# Patient Record
Sex: MALE | Race: WHITE | Employment: OTHER | URBAN - METROPOLITAN AREA
[De-identification: names, ages, dates, MRNs, and addresses within clinical notes are randomized per-mention and may not be internally consistent; named-entity substitution may affect disease eponyms.]

---

## 2017-01-01 ENCOUNTER — THERAPY VISIT (OUTPATIENT)
Dept: PHYSICAL THERAPY | Facility: CLINIC | Age: 72
End: 2017-01-01
Payer: MEDICARE

## 2017-01-01 DIAGNOSIS — M54.12 CERVICAL RADICULOPATHY: ICD-10-CM

## 2017-01-01 DIAGNOSIS — M54.50 ACUTE LEFT-SIDED LOW BACK PAIN WITHOUT SCIATICA: Primary | ICD-10-CM

## 2017-01-01 PROCEDURE — 97110 THERAPEUTIC EXERCISES: CPT | Mod: GP | Performed by: PHYSICAL THERAPIST

## 2017-01-01 PROCEDURE — 97161 PT EVAL LOW COMPLEX 20 MIN: CPT | Mod: GP | Performed by: PHYSICAL THERAPIST

## 2017-01-01 PROCEDURE — G8978 MOBILITY CURRENT STATUS: HCPCS | Mod: GP | Performed by: PHYSICAL THERAPIST

## 2017-01-01 PROCEDURE — G8979 MOBILITY GOAL STATUS: HCPCS | Mod: GP | Performed by: PHYSICAL THERAPIST

## 2017-01-01 ASSESSMENT — ACTIVITIES OF DAILY LIVING (ADL)
KNEE_ACTIVITY_OF_DAILY_LIVING_SCORE: 64.29
GO DOWN STAIRS: ACTIVITY IS MINIMALLY DIFFICULT
SQUAT: ACTIVITY IS FAIRLY DIFFICULT
HOW_WOULD_YOU_RATE_THE_OVERALL_FUNCTION_OF_YOUR_KNEE_DURING_YOUR_USUAL_DAILY_ACTIVITIES?: NEARLY NORMAL
SWELLING: I DO NOT HAVE THE SYMPTOM
AS_A_RESULT_OF_YOUR_KNEE_INJURY,_HOW_WOULD_YOU_RATE_YOUR_CURRENT_LEVEL_OF_DAILY_ACTIVITY?: NEARLY NORMAL
WEAKNESS: THE SYMPTOM AFFECTS MY ACTIVITY MODERATELY
KNEEL ON THE FRONT OF YOUR KNEE: ACTIVITY IS MINIMALLY DIFFICULT
LIMPING: THE SYMPTOM AFFECTS MY ACTIVITY MODERATELY
GO UP STAIRS: ACTIVITY IS MINIMALLY DIFFICULT
GIVING WAY, BUCKLING OR SHIFTING OF KNEE: THE SYMPTOM AFFECTS MY ACTIVITY MODERATELY
SIT WITH YOUR KNEE BENT: ACTIVITY IS MINIMALLY DIFFICULT
RAW_SCORE: 45
WALK: ACTIVITY IS MINIMALLY DIFFICULT
STAND: ACTIVITY IS MINIMALLY DIFFICULT
STIFFNESS: THE SYMPTOM AFFECTS MY ACTIVITY MODERATELY
RISE FROM A CHAIR: ACTIVITY IS MINIMALLY DIFFICULT
KNEE_ACTIVITY_OF_DAILY_LIVING_SUM: 45
PAIN: THE SYMPTOM AFFECTS MY ACTIVITY MODERATELY

## 2017-01-19 ENCOUNTER — THERAPY VISIT (OUTPATIENT)
Dept: PHYSICAL THERAPY | Facility: CLINIC | Age: 72
End: 2017-01-19
Payer: MEDICARE

## 2017-01-19 DIAGNOSIS — M54.12 CERVICAL RADICULOPATHY: Primary | ICD-10-CM

## 2017-01-19 PROCEDURE — G8979 MOBILITY GOAL STATUS: HCPCS | Mod: GP | Performed by: PHYSICAL THERAPIST

## 2017-01-19 PROCEDURE — 97110 THERAPEUTIC EXERCISES: CPT | Mod: GP | Performed by: PHYSICAL THERAPIST

## 2017-01-19 PROCEDURE — G8980 MOBILITY D/C STATUS: HCPCS | Mod: GP | Performed by: PHYSICAL THERAPIST

## 2017-01-19 NOTE — PROGRESS NOTES
Subjective:    HPI                    Objective:    System    Physical Exam    General     ROS    Assessment/Plan:      PROGRESS  REPORT    Progress reporting period is from 11.11.16 to 1.19.17.       SUBJECTIVE  Subjective changes noted by patient:  Pt reports he continues to be able to sleep without waking with pain.  Still feels (L) neck/superior scapular region mainly if sitting 30 min in recliner or at computer.  Has been doing HEP consistently 3 x daily and reports no significant change during or after.  Relates that he has not done exercise when noticing increase of symptoms with sitting, however.  Overall feels like symptoms have improved but feels like things have plateaued.    Current Pain level: 0/10.     Initial Pain level: 3/10.   Changes in function:  Yes (See Goal flowsheet attached for changes in current functional level)  Adverse reaction to treatment or activity: None    OBJECTIVE  C/S AROM:  Flex WNL; Ext mod loss; Rotation WNL (B).     ASSESSMENT/PLAN  Updated problem list and treatment plan: Diagnosis 1:  Neck Pain    Pain -  self management, education, directional preference exercise and home program  Decreased ROM/flexibility - therapeutic exercise and home program  Decreased joint mobility - therapeutic exercise and home program  Impaired muscle performance - neuro re-education and home program  Impaired posture - neuro re-education and home program  STG/LTGs have been met or progress has been made towards goals:  Yes (See Goal flow sheet completed today.)  Assessment of Progress: The patient's progress has plateaued.  Self Management Plans:  Patient is independent in a home treatment program.  Patient is independent in self management of symptoms.  I have re-evaluated this patient and find that the nature, scope, duration and intensity of the therapy is appropriate for the medical condition of the patient.  Serge continues to require the following intervention to meet STG and LTG's:  PT  intervention is no longer required to meet STG/LTG.    Recommendations:  Will follow up with pt over phone in 4 weeks and if he is continuing to be able to self manage symptoms will formally D/C at that time and this progress note to serve as D/C status.    Please refer to the daily flowsheet for treatment today, total treatment time and time spent performing 1:1 timed codes.

## 2017-01-19 NOTE — Clinical Note
[facility] PHYSICAL THERAPY  [address]  [facility] [address]  [phone]    2017    Re: Serge Brambila   :   1945  MRN:  [id number]   REFERRING PHYSICIAN:   Linn Thompson    [facility] PH[facility]ICAL [facility]ERAP[facility]    Date of Initial Evaluation:  2016  Visits:    8  Reason for Referral:  Cervical radiculopathy    PROGRESS  REPORT:  Progress reporting period is from 16 to 17.       SUBJECTIVE  Subjective changes noted by patient:  Pt reports he continues to be able to sleep without waking with pain.  Still feels (L) neck/superior scapular region mainly if sitting 30 min in recliner or at computer.  Has been doing HEP consistently 3 x daily and reports no significant change during or after.  Relates that he has not done exercise when noticing increase of symptoms with sitting, however.  Overall feels like symptoms have improved but feels like things have plateaued.    Current Pain level: 0/10.    Initial Pain level: 3/10.   Changes in function:  Yes (See Goal flowsheet attached for changes in current functional level).  Adverse reaction to treatment or activity: None    OBJECTIVE:  C/S AROM:  Flex WNL; Ext mod loss; Rotation WNL (B).     ASSESSMENT/PLAN  Updated problem list and treatment plan: Diagnosis 1:  Neck Pain    Pain -  self management, education, directional preference exercise and home program  Decreased ROM/flexibility - therapeutic exercise and home program  Decreased joint mobility - therapeutic exercise and home program  Impaired muscle performance - neuro re-education and home program  Impaired posture - neuro re-education and home program  STG/LTGs have been met or progress has been made towards goals:  Yes (See Goal flow sheet completed today.)  Assessment of Progress: The patient's progress has plateaued.  Self Management Plans:  Patient is independent in a home treatment program.  Patient is  independent in self management of symptoms.  I have re-evaluated this patient and find that the nature, scope, duration and intensity of the therapy is appropriate for the medical condition of the patient.  Serge continues to require the following intervention to meet STG and LTG's:  PT intervention is no longer required to meet STG/LTG.      Re: Serge Brambila   :   1945    Recommendations:  Will follow up with pt over phone in 4 weeks and if he is continuing to be able to self manage symptoms will formally D/C at that time and this progress note to serve as D/C status.    Thank you for your referral.    INQUIRIES        Therapist:   Kike Prakash DPT, cert MDT  [facility]  [address] [facility] [address]  [facility] [address]  Phone: [phone]  Fax: [phone]

## 2017-10-19 PROBLEM — M54.50 ACUTE LEFT-SIDED LOW BACK PAIN WITHOUT SCIATICA: Status: ACTIVE | Noted: 2017-01-01

## 2017-10-19 NOTE — PROGRESS NOTES
[facility] Initial Evaluation -- Lumbar    Date: October 19, 2017  Serge Brambila is a 72 year old male with a lumbar condition.   Referral: GP  Work mechanical stresses:  NA  Employment status:  Retired  Leisure mechanical stresses: Sedentary  Functional disability score (JAKE/STarT Back):  See JAKE/STarT Back  VAS score (0-10): 7/10  Patient goals/expectations:  Reduce pain in back and knee    HISTORY:    Present symptoms: (L) lower back, lateral hip, knee and sometimes to ankle   Pain quality (sharp/shooting/stabbing/aching/burning/cramping):  achy   Paresthesia (yes/no):  No    Present since (onset date): ~ 10/1/17.     Symptoms (improving/unchanging/worsening):  unchanging.     Symptoms commenced as a result of: No apparent reason   Condition occurred in the following environment:   No apparent reason     Symptoms at onset (back/thigh/leg): back  Constant symptoms (back/thigh/leg): None  Intermittent symptoms (back/thigh/leg): As above    Symptoms are made worse with the following: Sometimes Bending, Always Rising, Sometimes Standing, Sometimes Walking, Sometimes Lying and Time of day - Always PM   Symptoms are made better with the following: Always When still and Other - heat    Disturbed sleep (yes/no):  Yes Sleeping postures (prone/sup/side R/L):   Previous episodes (0/1-5/6-10/11+): 0 Year of first episode: NT    Previous history: (L) Tibial Plateau fx, none for low back  Previous treatments: PT for knee      Specific Questions:  Cough/Sneeze/Strain (pos/neg): Pos  Bowel/Bladder (normal/abnormal): Normal  Gait (normal/abnormal): Normal  Medications (nil/NSAIDS/analg/steroids/anticoag/other):  Other - Thyroid and High blood pressure  Medical allergies:  None  General health (excellent/good/fair/poor):  Fair  Pertinent medical history:  History of fractures, Diabetes, High blood pressure, Heart problems and Thyroid problems  Imaging (None/Xray/MRI/Other):  None  Recent or major surgery  (yes/no):  No  Night pain (yes/no): No  Accidents (yes/no): No  Unexplained weight loss (yes/no): No  Barriers at home: None  Other red flags: None    EXAMINATION    Posture:   Sitting (good/fair/poor): Fair  Standing (good/fair/poor):Fair  Lordosis (red/acc/normal): Reduced  Correction of posture (better/worse/no effect): Worse    Lateral Shift (right/left/nil): Nil  Relevant (yes/no):  No  Other Observations: NA    Neurological:    Motor deficit:  NT  Reflexes:  NT  Sensory deficit:  NT  Dural signs:  Neg slump    Movement Loss:   Yayo Mod Min Nil Pain   Flexion  X   (L) low back   Extension  X   (L) low back   Side Gliding R  X   (L) low back, lateral hip   Side Gliding L  X   (L) low back     Test Movements:   During: produces, abolishes, increases, decreases, no effect, centralizing, peripheralizing   After: better, worse, no better, no worse, no effect, centralized, peripheralized    Pretest symptoms standing: (L) low back   Symptoms During Symptoms After ROM increased ROM decreased No Effect   FIS        Rep FIS        EIS Increases    No Worse         Rep EIS Increases    Worse      X  Legs feel more weak with walking afterwards   Pretest symptoms lying:     Symptoms During Symptoms After ROM increased ROM decreased No Effect   KRISTINA        Rep KRISTINA        EIL        Rep EIL        If required, pretest symptoms:    Symptoms During Symptoms After ROM increased ROM decreased No Effect   SGIS - R        Rep SGIS - R        SGIS - L        Rep SGIS - L          Static Tests:  Sitting slouched:  NT  Sitting erect:  NT  Standing slouched NT  Standing erect:  NT  Lying prone in extension:  NT Long sitting:  NT    Other Tests: FISit - Increases, NW, NE ROM or walking    Provisional Classification:  Inconclusive/Other - Testing response with FISit    Principle of Management:  Education:  Purpose of repeated movements  Equipment provided:  None  Mechanical therapy (Y/N):  Y   Extension principle:    Lateral Principle:     Flexion principle:  FISit x 10 reps 5 x daily  Other:      ASSESSMENT/PLAN:    Patient is a 72 year old male with lumbar and left side knee complaints.    Patient has the following significant findings with corresponding treatment plan.                Diagnosis 1:  (L) Low back pain/knee pain    Pain -  self management, education, directional preference exercise and home program  Decreased ROM/flexibility - manual therapy, therapeutic exercise and home program  Decreased joint mobility - manual therapy, therapeutic exercise and home program  Decreased strength - therapeutic exercise, therapeutic activities and home program  Impaired muscle performance - neuro re-education and home program  Decreased function - therapeutic activities and home program  Impaired posture - neuro re-education and home program    Therapy Evaluation Codes:   1) History comprised of:   Personal factors that impact the plan of care:      None.    Comorbidity factors that impact the plan of care are:      Diabetes, Heart problems and High blood pressure.     Medications impacting care: High blood pressure.  2) Examination of Body Systems comprised of:   Body structures and functions that impact the plan of care:      Lumbar spine.   Activity limitations that impact the plan of care are:      Bending, Sitting, Standing and Walking.  3) Clinical presentation characteristics are:   Stable/Uncomplicated.  4) Decision-Making    Low complexity using standardized patient assessment instrument and/or measureable assessment of functional outcome.  Cumulative Therapy Evaluation is: Low complexity.    Previous and current functional limitations:  (See Goal Flow Sheet for this information)    Short term and Long term goals: (See Goal Flow Sheet for this information)     Communication ability:  Patient appears to be able to clearly communicate and understand verbal and written communication and follow directions correctly.  Treatment Explanation - The  following has been discussed with the patient:   RX ordered/plan of care  Anticipated outcomes  Possible risks and side effects  This patient would benefit from PT intervention to resume normal activities.   Rehab potential is good.    Frequency:  1 X week, once daily  Duration:  for 6 weeks  Discharge Plan:  Achieve all LTG.  Independent in home treatment program.  Reach maximal therapeutic benefit.    Please refer to the daily flowsheet for treatment today, total treatment time and time spent performing 1:1 timed codes.

## 2017-10-19 NOTE — LETTER
DEPARTMENT OF HEALTH AND HUMAN SERVICES  [facility] FOR MEDICARE & MEDICAID SERVICES    PLAN/UPDATED PLAN OF PROGRESS FOR OUTPATIENT REHABILITATION    PATIENTS NAME:  Serge Brambila  : 1945    PROVIDER NUMBER:    [id number]  [id number]     PROVIDER NAME: [facility]ICAL [facility]ERAP[facility]  MEDICAL RECORD NUMBER: [id number]     START OF CARE DATE:  SOC Date: 10/19/17   TYPE:  PT    PRIMARY/TREATMENT DIAGNOSIS: (Pertinent Medical Diagnosis)  Cervical radiculopathy  Acute left-sided low back pain without sciatica    VISITS FROM START OF CARE: 1     [facility] [employer] [employer] Initial Evaluation -- Lumbar  Date: 2017  Serge Brambila is a 72 year old male with a lumbar condition.   Referral: GP  Work mechanical stresses:  NA  Employment status:  Retired  Leisure mechanical stresses: Sedentary  Functional disability score (JAKE/STarT Back):  See JAKE/STarT Back  VAS score (0-10): 7/10  Patient goals/expectations:  Reduce pain in back and knee    HISTORY:  Present symptoms: (L) lower back, lateral hip, knee and sometimes to ankle   Pain quality (sharp/shooting/stabbing/aching/burning/cramping):  achy   Paresthesia (yes/no):  No  Present since (onset date): ~ 10/1/17.     Symptoms (improving/unchanging/worsening):  unchanging.   Symptoms commenced as a result of: No apparent reason   Condition occurred in the following environment:   No apparent reason   Symptoms at onset (back/thigh/leg): back  Constant symptoms (back/thigh/leg): None  Intermittent symptoms (back/thigh/leg): As above  Symptoms are made worse with the following: Sometimes Bending, Always Rising, Sometimes Standing, Sometimes Walking, Sometimes Lying and Time of day - Always PM   Symptoms are made better with the following: Always When still and Other - heat  Disturbed sleep (yes/no):  Yes   Sleeping postures (prone/sup/side R/L):   Previous episodes (0/1-5/6-10/11+): 0   Year of first episode:  NT  Previous history: (L) Tibial Plateau fx, none for low back  Previous treatments: PT for knee  PATIENTS NAME:  Serge Brambila  : 1945  PRIMARY/TREATMENT DIAGNOSIS: (Pertinent Medical Diagnosis)  Cervical radiculopathy  Acute left-sided low back pain without sciatica    Specific Questions:  Cough/Sneeze/Strain (pos/neg): Pos  Bowel/Bladder (normal/abnormal): Normal  Gait (normal/abnormal): Normal  Medications (nil/NSAIDS/analg/steroids/anticoag/other):  Other - Thyroid and High blood pressure  Medical allergies:  None  General health (excellent/good/fair/poor):  Fair  Pertinent medical history:  History of fractures, Diabetes, High blood pressure, Heart problems and Thyroid problems  Imaging (None/Xray/MRI/Other):  None  Recent or major surgery (yes/no):  No  Night pain (yes/no): No  Accidents (yes/no): No  Unexplained weight loss (yes/no): No  Barriers at home: None  Other red flags: None    EXAMINATION    Posture:   Sitting (good/fair/poor): Fair  Standing (good/fair/poor):Fair  Lordosis (red/acc/normal): Reduced  Correction of posture (better/worse/no effect): Worse  Lateral Shift (right/left/nil): Nil  Relevant (yes/no):  No  Other Observations: NA    Neurological:  Motor deficit:  NT  Reflexes:  NT  Sensory deficit:  NT  Dural signs:  Neg slump  Movement Loss:   Yayo Mod Min Nil Pain   Flexion  X   (L) low back   Extension  X   (L) low back   Side Gliding R  X   (L) low back, lateral hip   Side Gliding L  X   (L) low back     Test Movements:   During: produces, abolishes, increases, decreases, no effect, centralizing, peripheralizing   After: better, worse, no better, no worse, no effect, centralized, peripheralized            PATIENTS NAME:  Serge Brambila  : 1945  PRIMARY/TREATMENT DIAGNOSIS: (Pertinent Medical Diagnosis)  Cervical radiculopathy  Acute left-sided low back pain without sciatica    Pretest symptoms standing: (L) low back   Symptoms During Symptoms After ROM increased ROM  decreased No Effect   FIS        Rep FIS        EIS Increases    No Worse         Rep EIS Increases    Worse      X  Legs feel more weak with walking afterwards   Pretest symptoms lying:     Symptoms During Symptoms After ROM increased ROM decreased No Effect   KRISTINA        Rep KRISTINA        EIL        Rep EIL        If required, pretest symptoms:    Symptoms During Symptoms After ROM increased ROM decreased No Effect   SGIS - R        Rep SGIS - R        SGIS - L        Rep SGIS - L          Static Tests:  Sitting slouched:  NT  Sitting erect:  NT  Standing slouched NT  Standing erect:  NT  Lying prone in extension:  NT Long sitting:  NT    Other Tests: FISit - Increases, NW, NE ROM or walking    Provisional Classification:  Inconclusive/Other - Testing response with FISit    Principle of Management:  Education:  Purpose of repeated movements  Equipment provided:  None  Mechanical therapy (Y/N):  Y   Extension principle:      Lateral Principle:    Flexion principle:  FISit x 10 reps 5 x daily    Other:          PATIENTS NAME:  Serge Brambila  : 1945  PRIMARY/TREATMENT DIAGNOSIS: (Pertinent Medical Diagnosis)  Cervical radiculopathy  Acute left-sided low back pain without sciatica    ASSESSMENT/PLAN:  Patient is a 72 year old male with lumbar and left side knee complaints.    Patient has the following significant findings with corresponding treatment plan.                Diagnosis 1:  (L) Low back pain/knee pain    Pain -  self management, education, directional preference exercise and home program  Decreased ROM/flexibility - manual therapy, therapeutic exercise and home program  Decreased joint mobility - manual therapy, therapeutic exercise and home program  Decreased strength - therapeutic exercise, therapeutic activities and home program  Impaired muscle performance - neuro re-education and home program  Decreased function - therapeutic activities and home program  Impaired posture - neuro re-education and  home program    Therapy Evaluation Codes:   1) History comprised of:   Personal factors that impact the plan of care:      None.    Comorbidity factors that impact the plan of care are:      Diabetes, Heart problems and High blood pressure.     Medications impacting care: High blood pressure.  2) Examination of Body Systems comprised of:   Body structures and functions that impact the plan of care:      Lumbar spine.   Activity limitations that impact the plan of care are:      Bending, Sitting, Standing and Walking.  3) Clinical presentation characteristics are:   Stable/Uncomplicated.  4) Decision-Making    Low complexity using standardized patient assessment instrument and/or measureable assessment of functional outcome.  Cumulative Therapy Evaluation is: Low complexity.    Previous and current functional limitations:  (See Goal Flow Sheet for this information)    Short term and Long term goals: (See Goal Flow Sheet for this information)   Communication ability:  Patient appears to be able to clearly communicate and understand verbal and written communication and follow directions correctly.  Treatment Explanation - The following has been discussed with the patient:   RX ordered/plan of care, Anticipated outcomes, Possible risks and side effects                    PATIENTS NAME:  Serge Brambila  : 1945  PRIMARY/TREATMENT DIAGNOSIS: (Pertinent Medical Diagnosis)  Cervical radiculopathy  Acute left-sided low back pain without sciatica      This patient would benefit from PT intervention to resume normal activities.   Rehab potential is good.  Frequency:  1 X week, once daily  Duration:  for 6 weeks  Discharge Plan:  Achieve all LTG.  Independent in home treatment program.  Reach maximal therapeutic benefit.          Caregiver Signature/Credentials _____________________________ Date ________          Kike Prakash DPT, Cert MDT     I have reviewed and certified the need for these services and plan of  "treatment while under my care.        PHYSICIAN'S SIGNATURE:   _________________________________________    Date___________   Linn Thompson MD    Certification period:  Beginning of Cert date period: 10/19/17 to 01/16/18     Functional Level Progress Report: Please see attached \"Goal Flow sheet for Functional level.\"    ____X____ Continue Services or       ________ DC Services                Service dates: From  SOC Date: 10/19/17  to present                         "

## 2018-01-01 ENCOUNTER — OFFICE VISIT (OUTPATIENT)
Dept: INFUSION THERAPY | Facility: CLINIC | Age: 73
End: 2018-01-01
Attending: RADIOLOGY
Payer: MEDICARE

## 2018-01-01 ENCOUNTER — APPOINTMENT (OUTPATIENT)
Dept: MEDSURG UNIT | Facility: CLINIC | Age: 73
End: 2018-01-01
Attending: RADIOLOGY
Payer: MEDICARE

## 2018-01-01 ENCOUNTER — APPOINTMENT (OUTPATIENT)
Dept: CARDIOLOGY | Facility: CLINIC | Age: 73
DRG: 682 | End: 2018-01-01
Payer: MEDICARE

## 2018-01-01 ENCOUNTER — APPOINTMENT (OUTPATIENT)
Dept: CT IMAGING | Facility: CLINIC | Age: 73
DRG: 435 | End: 2018-01-01
Attending: INTERNAL MEDICINE
Payer: MEDICARE

## 2018-01-01 ENCOUNTER — APPOINTMENT (OUTPATIENT)
Dept: PHYSICAL THERAPY | Facility: CLINIC | Age: 73
DRG: 441 | End: 2018-01-01
Payer: MEDICARE

## 2018-01-01 ENCOUNTER — OFFICE VISIT (OUTPATIENT)
Dept: PALLIATIVE CARE | Facility: CLINIC | Age: 73
End: 2018-01-01
Attending: INTERNAL MEDICINE
Payer: COMMERCIAL

## 2018-01-01 ENCOUNTER — APPOINTMENT (OUTPATIENT)
Dept: GENERAL RADIOLOGY | Facility: CLINIC | Age: 73
DRG: 441 | End: 2018-01-01
Attending: EMERGENCY MEDICINE
Payer: MEDICARE

## 2018-01-01 ENCOUNTER — HOSPITAL ENCOUNTER (INPATIENT)
Facility: CLINIC | Age: 73
LOS: 5 days | Discharge: HOME HEALTH | DRG: 871 | End: 2018-07-01
Attending: EMERGENCY MEDICINE | Admitting: INTERNAL MEDICINE
Payer: MEDICARE

## 2018-01-01 ENCOUNTER — INFUSION THERAPY VISIT (OUTPATIENT)
Dept: TRANSPLANT | Facility: CLINIC | Age: 73
End: 2018-01-01
Attending: INTERNAL MEDICINE
Payer: MEDICARE

## 2018-01-01 ENCOUNTER — APPOINTMENT (OUTPATIENT)
Dept: CT IMAGING | Facility: CLINIC | Age: 73
DRG: 811 | End: 2018-01-01
Attending: EMERGENCY MEDICINE
Payer: MEDICARE

## 2018-01-01 ENCOUNTER — DOCUMENTATION ONLY (OUTPATIENT)
Dept: PHARMACY | Facility: CLINIC | Age: 73
End: 2018-01-01

## 2018-01-01 ENCOUNTER — CARE COORDINATION (OUTPATIENT)
Dept: CARE COORDINATION | Facility: CLINIC | Age: 73
End: 2018-01-01

## 2018-01-01 ENCOUNTER — RADIANT APPOINTMENT (OUTPATIENT)
Dept: ULTRASOUND IMAGING | Facility: CLINIC | Age: 73
End: 2018-01-01
Attending: RADIOLOGY
Payer: MEDICARE

## 2018-01-01 ENCOUNTER — OFFICE VISIT (OUTPATIENT)
Dept: GASTROENTEROLOGY | Facility: CLINIC | Age: 73
End: 2018-01-01
Payer: MEDICARE

## 2018-01-01 ENCOUNTER — APPOINTMENT (OUTPATIENT)
Dept: GENERAL RADIOLOGY | Facility: CLINIC | Age: 73
DRG: 871 | End: 2018-01-01
Attending: EMERGENCY MEDICINE
Payer: MEDICARE

## 2018-01-01 ENCOUNTER — HOSPITAL ENCOUNTER (INPATIENT)
Facility: CLINIC | Age: 73
LOS: 4 days | Discharge: HOME HEALTH | DRG: 682 | End: 2018-07-30
Attending: EMERGENCY MEDICINE | Admitting: INTERNAL MEDICINE
Payer: MEDICARE

## 2018-01-01 ENCOUNTER — OFFICE VISIT (OUTPATIENT)
Dept: INTERVENTIONAL RADIOLOGY/VASCULAR | Facility: CLINIC | Age: 73
End: 2018-01-01
Payer: COMMERCIAL

## 2018-01-01 ENCOUNTER — HOSPITAL ENCOUNTER (OUTPATIENT)
Dept: NUCLEAR MEDICINE | Facility: CLINIC | Age: 73
Setting detail: NUCLEAR MEDICINE
End: 2018-04-16
Attending: RADIOLOGY | Admitting: RADIOLOGY
Payer: MEDICARE

## 2018-01-01 ENCOUNTER — APPOINTMENT (OUTPATIENT)
Dept: GENERAL RADIOLOGY | Facility: CLINIC | Age: 73
DRG: 441 | End: 2018-01-01
Attending: INTERNAL MEDICINE
Payer: MEDICARE

## 2018-01-01 ENCOUNTER — TELEPHONE (OUTPATIENT)
Dept: ONCOLOGY | Facility: CLINIC | Age: 73
End: 2018-01-01

## 2018-01-01 ENCOUNTER — TEAM CONFERENCE (OUTPATIENT)
Dept: INTERVENTIONAL RADIOLOGY/VASCULAR | Facility: CLINIC | Age: 73
End: 2018-01-01

## 2018-01-01 ENCOUNTER — APPOINTMENT (OUTPATIENT)
Dept: CARDIOLOGY | Facility: CLINIC | Age: 73
DRG: 682 | End: 2018-01-01
Attending: HOSPITALIST
Payer: MEDICARE

## 2018-01-01 ENCOUNTER — APPOINTMENT (OUTPATIENT)
Dept: ULTRASOUND IMAGING | Facility: CLINIC | Age: 73
DRG: 871 | End: 2018-01-01
Attending: STUDENT IN AN ORGANIZED HEALTH CARE EDUCATION/TRAINING PROGRAM
Payer: MEDICARE

## 2018-01-01 ENCOUNTER — APPOINTMENT (OUTPATIENT)
Dept: INTERVENTIONAL RADIOLOGY/VASCULAR | Facility: CLINIC | Age: 73
End: 2018-01-01
Attending: RADIOLOGY
Payer: MEDICARE

## 2018-01-01 ENCOUNTER — HOSPITAL ENCOUNTER (INPATIENT)
Facility: CLINIC | Age: 73
LOS: 5 days | Discharge: HOME | DRG: 441 | End: 2018-03-18
Attending: INTERNAL MEDICINE | Admitting: INTERNAL MEDICINE
Payer: MEDICARE

## 2018-01-01 ENCOUNTER — CARE COORDINATION (OUTPATIENT)
Dept: ONCOLOGY | Facility: CLINIC | Age: 73
End: 2018-01-01

## 2018-01-01 ENCOUNTER — OFFICE VISIT (OUTPATIENT)
Dept: CARDIOLOGY | Facility: CLINIC | Age: 73
End: 2018-01-01
Attending: INTERNAL MEDICINE
Payer: COMMERCIAL

## 2018-01-01 ENCOUNTER — TELEPHONE (OUTPATIENT)
Dept: GASTROENTEROLOGY | Facility: CLINIC | Age: 73
End: 2018-01-01

## 2018-01-01 ENCOUNTER — HOSPITAL ENCOUNTER (INPATIENT)
Facility: CLINIC | Age: 73
LOS: 5 days | Discharge: HOME | DRG: 435 | End: 2018-03-06
Attending: INTERNAL MEDICINE | Admitting: PEDIATRICS
Payer: MEDICARE

## 2018-01-01 ENCOUNTER — HOSPITAL ENCOUNTER (INPATIENT)
Facility: CLINIC | Age: 73
LOS: 10 days | Discharge: HOME HEALTH | DRG: 441 | End: 2018-08-10
Attending: EMERGENCY MEDICINE | Admitting: INTERNAL MEDICINE
Payer: MEDICARE

## 2018-01-01 ENCOUNTER — PRE VISIT (OUTPATIENT)
Dept: CARDIOLOGY | Facility: CLINIC | Age: 73
End: 2018-01-01

## 2018-01-01 ENCOUNTER — RESULTS ONLY (OUTPATIENT)
Dept: ONCOLOGY | Facility: CLINIC | Age: 73
End: 2018-01-01

## 2018-01-01 ENCOUNTER — TELEPHONE (OUTPATIENT)
Dept: PHARMACY | Facility: OTHER | Age: 73
End: 2018-01-01

## 2018-01-01 ENCOUNTER — APPOINTMENT (OUTPATIENT)
Dept: MRI IMAGING | Facility: CLINIC | Age: 73
DRG: 435 | End: 2018-01-01
Attending: INTERNAL MEDICINE
Payer: MEDICARE

## 2018-01-01 ENCOUNTER — TELEPHONE (OUTPATIENT)
Dept: INTERVENTIONAL RADIOLOGY/VASCULAR | Facility: CLINIC | Age: 73
End: 2018-01-01

## 2018-01-01 ENCOUNTER — APPOINTMENT (OUTPATIENT)
Dept: MRI IMAGING | Facility: CLINIC | Age: 73
DRG: 871 | End: 2018-01-01
Attending: STUDENT IN AN ORGANIZED HEALTH CARE EDUCATION/TRAINING PROGRAM
Payer: MEDICARE

## 2018-01-01 ENCOUNTER — APPOINTMENT (OUTPATIENT)
Dept: ULTRASOUND IMAGING | Facility: CLINIC | Age: 73
DRG: 435 | End: 2018-01-01
Attending: INTERNAL MEDICINE
Payer: MEDICARE

## 2018-01-01 ENCOUNTER — HOSPITAL ENCOUNTER (OUTPATIENT)
Dept: NUCLEAR MEDICINE | Facility: CLINIC | Age: 73
Setting detail: NUCLEAR MEDICINE
End: 2018-03-28
Attending: FAMILY MEDICINE
Payer: MEDICARE

## 2018-01-01 ENCOUNTER — HOME INFUSION (PRE-WILLOW HOME INFUSION) (OUTPATIENT)
Dept: PHARMACY | Facility: CLINIC | Age: 73
End: 2018-01-01

## 2018-01-01 ENCOUNTER — APPOINTMENT (OUTPATIENT)
Dept: OCCUPATIONAL THERAPY | Facility: CLINIC | Age: 73
DRG: 435 | End: 2018-01-01
Attending: INTERNAL MEDICINE
Payer: MEDICARE

## 2018-01-01 ENCOUNTER — HOSPITAL ENCOUNTER (OUTPATIENT)
Facility: CLINIC | Age: 73
End: 2018-01-01
Admitting: RADIOLOGY
Payer: MEDICARE

## 2018-01-01 ENCOUNTER — HOSPITAL ENCOUNTER (OUTPATIENT)
Facility: CLINIC | Age: 73
Discharge: HOME | End: 2018-01-24
Attending: INTERNAL MEDICINE | Admitting: INTERNAL MEDICINE
Payer: MEDICARE

## 2018-01-01 ENCOUNTER — ONCOLOGY VISIT (OUTPATIENT)
Dept: ONCOLOGY | Facility: CLINIC | Age: 73
DRG: 811 | End: 2018-01-01
Attending: INTERNAL MEDICINE
Payer: MEDICARE

## 2018-01-01 ENCOUNTER — TELEPHONE (OUTPATIENT)
Dept: INTERNAL MEDICINE | Facility: CLINIC | Age: 73
End: 2018-01-01

## 2018-01-01 ENCOUNTER — HOSPITAL ENCOUNTER (INPATIENT)
Facility: CLINIC | Age: 73
LOS: 1 days | DRG: 435 | End: 2018-08-22
Attending: EMERGENCY MEDICINE | Admitting: INTERNAL MEDICINE
Payer: MEDICARE

## 2018-01-01 ENCOUNTER — APPOINTMENT (OUTPATIENT)
Dept: SPEECH THERAPY | Facility: CLINIC | Age: 73
DRG: 441 | End: 2018-01-01
Payer: MEDICARE

## 2018-01-01 ENCOUNTER — INFUSION THERAPY VISIT (OUTPATIENT)
Dept: ONCOLOGY | Facility: CLINIC | Age: 73
End: 2018-01-01
Attending: INTERNAL MEDICINE
Payer: MEDICARE

## 2018-01-01 ENCOUNTER — APPOINTMENT (OUTPATIENT)
Dept: GENERAL RADIOLOGY | Facility: CLINIC | Age: 73
DRG: 441 | End: 2018-01-01
Payer: MEDICARE

## 2018-01-01 ENCOUNTER — ANESTHESIA (OUTPATIENT)
Dept: SURGERY | Facility: CLINIC | Age: 73
DRG: 871 | End: 2018-01-01
Payer: MEDICARE

## 2018-01-01 ENCOUNTER — HOSPITAL ENCOUNTER (OUTPATIENT)
Dept: NUCLEAR MEDICINE | Facility: CLINIC | Age: 73
Setting detail: NUCLEAR MEDICINE
Discharge: HOME | End: 2018-03-28
Attending: FAMILY MEDICINE | Admitting: FAMILY MEDICINE
Payer: MEDICARE

## 2018-01-01 ENCOUNTER — APPOINTMENT (OUTPATIENT)
Dept: PHYSICAL THERAPY | Facility: CLINIC | Age: 73
DRG: 871 | End: 2018-01-01
Attending: STUDENT IN AN ORGANIZED HEALTH CARE EDUCATION/TRAINING PROGRAM
Payer: MEDICARE

## 2018-01-01 ENCOUNTER — HOSPITAL ENCOUNTER (INPATIENT)
Facility: CLINIC | Age: 73
LOS: 1 days | Discharge: HOME | DRG: 811 | End: 2018-05-03
Attending: EMERGENCY MEDICINE | Admitting: INTERNAL MEDICINE
Payer: MEDICARE

## 2018-01-01 ENCOUNTER — RESEARCH ENCOUNTER (OUTPATIENT)
Dept: INTERVENTIONAL RADIOLOGY/VASCULAR | Facility: CLINIC | Age: 73
End: 2018-01-01

## 2018-01-01 ENCOUNTER — APPOINTMENT (OUTPATIENT)
Dept: CT IMAGING | Facility: CLINIC | Age: 73
DRG: 441 | End: 2018-01-01
Attending: INTERNAL MEDICINE
Payer: MEDICARE

## 2018-01-01 ENCOUNTER — DOCUMENTATION ONLY (OUTPATIENT)
Dept: OTHER | Facility: CLINIC | Age: 73
End: 2018-01-01

## 2018-01-01 ENCOUNTER — APPOINTMENT (OUTPATIENT)
Dept: PHYSICAL THERAPY | Facility: CLINIC | Age: 73
DRG: 871 | End: 2018-01-01
Payer: MEDICARE

## 2018-01-01 ENCOUNTER — APPOINTMENT (OUTPATIENT)
Dept: ULTRASOUND IMAGING | Facility: CLINIC | Age: 73
DRG: 682 | End: 2018-01-01
Payer: MEDICARE

## 2018-01-01 ENCOUNTER — HOSPITAL ENCOUNTER (OUTPATIENT)
Facility: CLINIC | Age: 73
Discharge: HOME | End: 2018-04-16
Attending: RADIOLOGY | Admitting: RADIOLOGY
Payer: MEDICARE

## 2018-01-01 ENCOUNTER — ANESTHESIA EVENT (OUTPATIENT)
Dept: SURGERY | Facility: CLINIC | Age: 73
DRG: 871 | End: 2018-01-01
Payer: MEDICARE

## 2018-01-01 ENCOUNTER — CARE COORDINATION (OUTPATIENT)
Dept: ONCOLOGY | Facility: CLINIC | Age: 73
End: 2018-01-01
Payer: COMMERCIAL

## 2018-01-01 ENCOUNTER — APPOINTMENT (OUTPATIENT)
Dept: ULTRASOUND IMAGING | Facility: CLINIC | Age: 73
DRG: 441 | End: 2018-01-01
Payer: MEDICARE

## 2018-01-01 ENCOUNTER — TELEPHONE (OUTPATIENT)
Dept: NEPHROLOGY | Facility: CLINIC | Age: 73
End: 2018-01-01

## 2018-01-01 ENCOUNTER — TRANSFERRED RECORDS (OUTPATIENT)
Dept: HEALTH INFORMATION MANAGEMENT | Facility: CLINIC | Age: 73
End: 2018-01-01

## 2018-01-01 ENCOUNTER — RADIANT APPOINTMENT (OUTPATIENT)
Dept: ULTRASOUND IMAGING | Facility: CLINIC | Age: 73
End: 2018-01-01
Attending: INTERNAL MEDICINE
Payer: MEDICARE

## 2018-01-01 VITALS
DIASTOLIC BLOOD PRESSURE: 63 MMHG | RESPIRATION RATE: 16 BRPM | HEART RATE: 59 BPM | OXYGEN SATURATION: 97 % | SYSTOLIC BLOOD PRESSURE: 120 MMHG | TEMPERATURE: 97.8 F

## 2018-01-01 VITALS
RESPIRATION RATE: 16 BRPM | DIASTOLIC BLOOD PRESSURE: 39 MMHG | SYSTOLIC BLOOD PRESSURE: 139 MMHG | BODY MASS INDEX: 33.63 KG/M2 | TEMPERATURE: 97.8 F | HEART RATE: 61 BPM | WEIGHT: 221.9 LBS | OXYGEN SATURATION: 100 % | HEIGHT: 68 IN

## 2018-01-01 VITALS
SYSTOLIC BLOOD PRESSURE: 138 MMHG | DIASTOLIC BLOOD PRESSURE: 57 MMHG | OXYGEN SATURATION: 100 % | RESPIRATION RATE: 16 BRPM | HEART RATE: 66 BPM | TEMPERATURE: 98.1 F

## 2018-01-01 VITALS
HEART RATE: 74 BPM | BODY MASS INDEX: 31.5 KG/M2 | RESPIRATION RATE: 18 BRPM | DIASTOLIC BLOOD PRESSURE: 46 MMHG | SYSTOLIC BLOOD PRESSURE: 113 MMHG | WEIGHT: 207.2 LBS | OXYGEN SATURATION: 97 % | TEMPERATURE: 96.7 F

## 2018-01-01 VITALS
HEIGHT: 68 IN | OXYGEN SATURATION: 100 % | RESPIRATION RATE: 18 BRPM | WEIGHT: 217.6 LBS | BODY MASS INDEX: 32.98 KG/M2 | SYSTOLIC BLOOD PRESSURE: 146 MMHG | HEART RATE: 67 BPM | DIASTOLIC BLOOD PRESSURE: 42 MMHG | TEMPERATURE: 96.8 F

## 2018-01-01 VITALS
WEIGHT: 230.3 LBS | OXYGEN SATURATION: 100 % | BODY MASS INDEX: 35.02 KG/M2 | DIASTOLIC BLOOD PRESSURE: 38 MMHG | SYSTOLIC BLOOD PRESSURE: 132 MMHG | RESPIRATION RATE: 16 BRPM | TEMPERATURE: 98.6 F

## 2018-01-01 VITALS
SYSTOLIC BLOOD PRESSURE: 134 MMHG | WEIGHT: 203.4 LBS | BODY MASS INDEX: 30.93 KG/M2 | DIASTOLIC BLOOD PRESSURE: 38 MMHG | RESPIRATION RATE: 18 BRPM | HEART RATE: 57 BPM

## 2018-01-01 VITALS
DIASTOLIC BLOOD PRESSURE: 43 MMHG | WEIGHT: 208.11 LBS | TEMPERATURE: 98.1 F | SYSTOLIC BLOOD PRESSURE: 133 MMHG | BODY MASS INDEX: 31.64 KG/M2 | HEART RATE: 65 BPM | OXYGEN SATURATION: 100 %

## 2018-01-01 VITALS
HEIGHT: 68 IN | SYSTOLIC BLOOD PRESSURE: 131 MMHG | TEMPERATURE: 97.7 F | BODY MASS INDEX: 35.87 KG/M2 | RESPIRATION RATE: 16 BRPM | WEIGHT: 236.7 LBS | DIASTOLIC BLOOD PRESSURE: 49 MMHG | OXYGEN SATURATION: 100 % | HEART RATE: 64 BPM

## 2018-01-01 VITALS
BODY MASS INDEX: 31.93 KG/M2 | TEMPERATURE: 97.5 F | DIASTOLIC BLOOD PRESSURE: 43 MMHG | SYSTOLIC BLOOD PRESSURE: 114 MMHG | WEIGHT: 210 LBS | RESPIRATION RATE: 18 BRPM | OXYGEN SATURATION: 98 %

## 2018-01-01 VITALS
HEART RATE: 66 BPM | BODY MASS INDEX: 32.29 KG/M2 | DIASTOLIC BLOOD PRESSURE: 37 MMHG | WEIGHT: 212.3 LBS | TEMPERATURE: 98.4 F | RESPIRATION RATE: 16 BRPM | SYSTOLIC BLOOD PRESSURE: 145 MMHG | OXYGEN SATURATION: 100 %

## 2018-01-01 VITALS
TEMPERATURE: 97.4 F | WEIGHT: 228.9 LBS | HEART RATE: 54 BPM | SYSTOLIC BLOOD PRESSURE: 149 MMHG | BODY MASS INDEX: 34.8 KG/M2 | OXYGEN SATURATION: 100 % | DIASTOLIC BLOOD PRESSURE: 53 MMHG | RESPIRATION RATE: 20 BRPM

## 2018-01-01 VITALS
TEMPERATURE: 97.6 F | HEART RATE: 55 BPM | RESPIRATION RATE: 16 BRPM | OXYGEN SATURATION: 99 % | DIASTOLIC BLOOD PRESSURE: 41 MMHG | BODY MASS INDEX: 35.65 KG/M2 | RESPIRATION RATE: 16 BRPM | BODY MASS INDEX: 35.3 KG/M2 | HEIGHT: 68 IN | SYSTOLIC BLOOD PRESSURE: 103 MMHG | WEIGHT: 232.14 LBS | DIASTOLIC BLOOD PRESSURE: 73 MMHG | HEART RATE: 53 BPM | TEMPERATURE: 98.9 F | WEIGHT: 235.23 LBS | SYSTOLIC BLOOD PRESSURE: 139 MMHG | OXYGEN SATURATION: 99 %

## 2018-01-01 VITALS
SYSTOLIC BLOOD PRESSURE: 135 MMHG | RESPIRATION RATE: 16 BRPM | TEMPERATURE: 98.2 F | WEIGHT: 201.3 LBS | HEART RATE: 65 BPM | BODY MASS INDEX: 30.61 KG/M2 | DIASTOLIC BLOOD PRESSURE: 37 MMHG | OXYGEN SATURATION: 100 %

## 2018-01-01 VITALS
BODY MASS INDEX: 34.56 KG/M2 | DIASTOLIC BLOOD PRESSURE: 66 MMHG | WEIGHT: 228 LBS | HEART RATE: 66 BPM | RESPIRATION RATE: 10 BRPM | OXYGEN SATURATION: 98 % | SYSTOLIC BLOOD PRESSURE: 165 MMHG | HEIGHT: 68 IN

## 2018-01-01 VITALS
TEMPERATURE: 98.6 F | SYSTOLIC BLOOD PRESSURE: 142 MMHG | DIASTOLIC BLOOD PRESSURE: 45 MMHG | WEIGHT: 222.5 LBS | OXYGEN SATURATION: 98 % | BODY MASS INDEX: 33.83 KG/M2

## 2018-01-01 VITALS
SYSTOLIC BLOOD PRESSURE: 148 MMHG | HEIGHT: 68 IN | HEART RATE: 71 BPM | OXYGEN SATURATION: 100 % | WEIGHT: 204.1 LBS | RESPIRATION RATE: 16 BRPM | DIASTOLIC BLOOD PRESSURE: 64 MMHG | BODY MASS INDEX: 30.93 KG/M2 | TEMPERATURE: 98.3 F

## 2018-01-01 VITALS
TEMPERATURE: 98.6 F | WEIGHT: 223.4 LBS | DIASTOLIC BLOOD PRESSURE: 69 MMHG | SYSTOLIC BLOOD PRESSURE: 145 MMHG | OXYGEN SATURATION: 99 % | BODY MASS INDEX: 33.97 KG/M2 | HEART RATE: 53 BPM

## 2018-01-01 VITALS
BODY MASS INDEX: 35.73 KG/M2 | TEMPERATURE: 98.1 F | OXYGEN SATURATION: 100 % | RESPIRATION RATE: 18 BRPM | HEART RATE: 55 BPM | WEIGHT: 235.01 LBS | DIASTOLIC BLOOD PRESSURE: 33 MMHG | SYSTOLIC BLOOD PRESSURE: 188 MMHG

## 2018-01-01 VITALS — DIASTOLIC BLOOD PRESSURE: 62 MMHG | SYSTOLIC BLOOD PRESSURE: 137 MMHG | HEART RATE: 60 BPM | OXYGEN SATURATION: 100 %

## 2018-01-01 VITALS
HEIGHT: 68 IN | HEART RATE: 56 BPM | DIASTOLIC BLOOD PRESSURE: 51 MMHG | WEIGHT: 231 LBS | SYSTOLIC BLOOD PRESSURE: 132 MMHG | BODY MASS INDEX: 35.01 KG/M2 | OXYGEN SATURATION: 100 %

## 2018-01-01 VITALS
HEIGHT: 68 IN | BODY MASS INDEX: 32.31 KG/M2 | SYSTOLIC BLOOD PRESSURE: 142 MMHG | WEIGHT: 213.19 LBS | HEART RATE: 50 BPM | OXYGEN SATURATION: 100 % | TEMPERATURE: 97.9 F | DIASTOLIC BLOOD PRESSURE: 53 MMHG | RESPIRATION RATE: 16 BRPM

## 2018-01-01 VITALS
OXYGEN SATURATION: 99 % | RESPIRATION RATE: 16 BRPM | HEART RATE: 78 BPM | SYSTOLIC BLOOD PRESSURE: 165 MMHG | TEMPERATURE: 98.6 F | BODY MASS INDEX: 30.9 KG/M2 | WEIGHT: 203.2 LBS | DIASTOLIC BLOOD PRESSURE: 55 MMHG

## 2018-01-01 VITALS
WEIGHT: 229.4 LBS | BODY MASS INDEX: 34.89 KG/M2 | OXYGEN SATURATION: 99 % | HEART RATE: 60 BPM | SYSTOLIC BLOOD PRESSURE: 126 MMHG | RESPIRATION RATE: 16 BRPM | DIASTOLIC BLOOD PRESSURE: 44 MMHG | TEMPERATURE: 99.1 F

## 2018-01-01 VITALS
TEMPERATURE: 99 F | BODY MASS INDEX: 34.86 KG/M2 | OXYGEN SATURATION: 96 % | WEIGHT: 230 LBS | SYSTOLIC BLOOD PRESSURE: 120 MMHG | HEIGHT: 68 IN | DIASTOLIC BLOOD PRESSURE: 60 MMHG | RESPIRATION RATE: 16 BRPM | HEART RATE: 60 BPM

## 2018-01-01 VITALS
DIASTOLIC BLOOD PRESSURE: 37 MMHG | SYSTOLIC BLOOD PRESSURE: 73 MMHG | OXYGEN SATURATION: 100 % | HEIGHT: 68 IN | HEART RATE: 40 BPM | BODY MASS INDEX: 31.83 KG/M2 | TEMPERATURE: 92.9 F | WEIGHT: 210 LBS

## 2018-01-01 VITALS
DIASTOLIC BLOOD PRESSURE: 30 MMHG | WEIGHT: 218.9 LBS | TEMPERATURE: 98.7 F | HEART RATE: 59 BPM | BODY MASS INDEX: 33.29 KG/M2 | SYSTOLIC BLOOD PRESSURE: 132 MMHG | RESPIRATION RATE: 16 BRPM

## 2018-01-01 VITALS
OXYGEN SATURATION: 99 % | BODY MASS INDEX: 31.58 KG/M2 | DIASTOLIC BLOOD PRESSURE: 43 MMHG | WEIGHT: 207.7 LBS | SYSTOLIC BLOOD PRESSURE: 143 MMHG | TEMPERATURE: 97.8 F | HEART RATE: 61 BPM

## 2018-01-01 VITALS
OXYGEN SATURATION: 100 % | DIASTOLIC BLOOD PRESSURE: 40 MMHG | HEART RATE: 55 BPM | BODY MASS INDEX: 31.94 KG/M2 | TEMPERATURE: 98.3 F | SYSTOLIC BLOOD PRESSURE: 135 MMHG | WEIGHT: 210 LBS

## 2018-01-01 DIAGNOSIS — R94.31 PROLONGED Q-T INTERVAL ON ECG: ICD-10-CM

## 2018-01-01 DIAGNOSIS — K75.81 LIVER CIRRHOSIS SECONDARY TO NASH (H): Primary | ICD-10-CM

## 2018-01-01 DIAGNOSIS — A41.9 SEPSIS, DUE TO UNSPECIFIED ORGANISM: ICD-10-CM

## 2018-01-01 DIAGNOSIS — K76.82 HEPATIC ENCEPHALOPATHY (H): ICD-10-CM

## 2018-01-01 DIAGNOSIS — D64.9 ANEMIA, UNSPECIFIED TYPE: ICD-10-CM

## 2018-01-01 DIAGNOSIS — K65.2 SBP (SPONTANEOUS BACTERIAL PERITONITIS) (H): ICD-10-CM

## 2018-01-01 DIAGNOSIS — D64.9 ANEMIA, UNSPECIFIED TYPE: Primary | ICD-10-CM

## 2018-01-01 DIAGNOSIS — R18.8 ASCITES OF LIVER: ICD-10-CM

## 2018-01-01 DIAGNOSIS — C22.0 HCC (HEPATOCELLULAR CARCINOMA) (H): Primary | ICD-10-CM

## 2018-01-01 DIAGNOSIS — R16.0 LIVER MASS: Primary | ICD-10-CM

## 2018-01-01 DIAGNOSIS — K74.60 LIVER CIRRHOSIS SECONDARY TO NASH (H): Primary | ICD-10-CM

## 2018-01-01 DIAGNOSIS — D64.9 ANEMIA: ICD-10-CM

## 2018-01-01 DIAGNOSIS — I10 ESSENTIAL HYPERTENSION: ICD-10-CM

## 2018-01-01 DIAGNOSIS — D64.9 ANEMIA: Primary | ICD-10-CM

## 2018-01-01 DIAGNOSIS — K75.81 LIVER CIRRHOSIS SECONDARY TO NASH (H): ICD-10-CM

## 2018-01-01 DIAGNOSIS — C22.0 HCC (HEPATOCELLULAR CARCINOMA) (H): ICD-10-CM

## 2018-01-01 DIAGNOSIS — Z85.05 HISTORY OF HEPATOCELLULAR CARCINOMA: ICD-10-CM

## 2018-01-01 DIAGNOSIS — E79.0 HYPERURICEMIA: ICD-10-CM

## 2018-01-01 DIAGNOSIS — E55.9 VITAMIN D DEFICIENCY: ICD-10-CM

## 2018-01-01 DIAGNOSIS — Z79.899 MEDICATION MANAGEMENT: ICD-10-CM

## 2018-01-01 DIAGNOSIS — Z51.5 ENCOUNTER FOR END OF LIFE CARE: ICD-10-CM

## 2018-01-01 DIAGNOSIS — I48.0 PAROXYSMAL ATRIAL FIBRILLATION (H): ICD-10-CM

## 2018-01-01 DIAGNOSIS — K72.10 END-STAGE LIVER DISEASE (H): ICD-10-CM

## 2018-01-01 DIAGNOSIS — K70.31 ALCOHOLIC CIRRHOSIS OF LIVER WITH ASCITES (H): ICD-10-CM

## 2018-01-01 DIAGNOSIS — R16.0 LIVER MASS: ICD-10-CM

## 2018-01-01 DIAGNOSIS — R18.8 OTHER ASCITES: Primary | ICD-10-CM

## 2018-01-01 DIAGNOSIS — K92.2 GASTROINTESTINAL HEMORRHAGE, UNSPECIFIED GASTROINTESTINAL HEMORRHAGE TYPE: ICD-10-CM

## 2018-01-01 DIAGNOSIS — I95.9 HYPOTENSION, UNSPECIFIED HYPOTENSION TYPE: ICD-10-CM

## 2018-01-01 DIAGNOSIS — K74.60 LIVER CIRRHOSIS SECONDARY TO NASH (H): ICD-10-CM

## 2018-01-01 DIAGNOSIS — Z79.4 TYPE 2 DIABETES MELLITUS WITHOUT COMPLICATION, WITH LONG-TERM CURRENT USE OF INSULIN (H): ICD-10-CM

## 2018-01-01 DIAGNOSIS — I25.10 CORONARY ARTERY DISEASE INVOLVING NATIVE HEART WITHOUT ANGINA PECTORIS, UNSPECIFIED VESSEL OR LESION TYPE: ICD-10-CM

## 2018-01-01 DIAGNOSIS — I10 HYPERTENSION, UNSPECIFIED TYPE: ICD-10-CM

## 2018-01-01 DIAGNOSIS — M54.12 CERVICAL RADICULOPATHY: ICD-10-CM

## 2018-01-01 DIAGNOSIS — K76.82 HEPATIC ENCEPHALOPATHY (H): Primary | ICD-10-CM

## 2018-01-01 DIAGNOSIS — R18.8 OTHER ASCITES: ICD-10-CM

## 2018-01-01 DIAGNOSIS — I48.91 ATRIAL FIBRILLATION, UNSPECIFIED TYPE (H): Primary | ICD-10-CM

## 2018-01-01 DIAGNOSIS — R41.82 ALTERED MENTAL STATUS, UNSPECIFIED ALTERED MENTAL STATUS TYPE: ICD-10-CM

## 2018-01-01 DIAGNOSIS — Z71.89 ADVANCE CARE PLANNING: ICD-10-CM

## 2018-01-01 DIAGNOSIS — N17.9 AKI (ACUTE KIDNEY INJURY) (H): ICD-10-CM

## 2018-01-01 DIAGNOSIS — R18.8 CIRRHOSIS OF LIVER WITH ASCITES, UNSPECIFIED HEPATIC CIRRHOSIS TYPE (H): ICD-10-CM

## 2018-01-01 DIAGNOSIS — C22.0 HEPATOCELLULAR CARCINOMA (H): ICD-10-CM

## 2018-01-01 DIAGNOSIS — I85.10 SECONDARY ESOPHAGEAL VARICES WITHOUT BLEEDING (H): Primary | Chronic | ICD-10-CM

## 2018-01-01 DIAGNOSIS — E72.20 HYPERAMMONEMIA (H): ICD-10-CM

## 2018-01-01 DIAGNOSIS — R17 JAUNDICE: ICD-10-CM

## 2018-01-01 DIAGNOSIS — R78.81 BACTEREMIA: ICD-10-CM

## 2018-01-01 DIAGNOSIS — K74.60 CIRRHOSIS OF LIVER WITH ASCITES, UNSPECIFIED HEPATIC CIRRHOSIS TYPE (H): ICD-10-CM

## 2018-01-01 DIAGNOSIS — N18.9 CHRONIC KIDNEY DISEASE, UNSPECIFIED CKD STAGE: ICD-10-CM

## 2018-01-01 DIAGNOSIS — Z79.4 TYPE 2 DIABETES MELLITUS WITHOUT COMPLICATION, WITH LONG-TERM CURRENT USE OF INSULIN (H): Primary | ICD-10-CM

## 2018-01-01 DIAGNOSIS — Z51.5 COMFORT MEASURES ONLY STATUS: ICD-10-CM

## 2018-01-01 DIAGNOSIS — M62.81 GENERALIZED MUSCLE WEAKNESS: ICD-10-CM

## 2018-01-01 DIAGNOSIS — E07.9 THYROID DISEASE: Primary | ICD-10-CM

## 2018-01-01 DIAGNOSIS — Z79.4 ENCOUNTER FOR LONG-TERM (CURRENT) USE OF INSULIN (H): ICD-10-CM

## 2018-01-01 DIAGNOSIS — I12.9 MALIGNANT HYPERTENSIVE KIDNEY DISEASE WITH CHRONIC KIDNEY DISEASE STAGE I THROUGH STAGE IV, OR UNSPECIFIED(403.00): ICD-10-CM

## 2018-01-01 DIAGNOSIS — C22.0 CARCINOMA OF LIVER (H): ICD-10-CM

## 2018-01-01 DIAGNOSIS — R01.1 HEART MURMUR: ICD-10-CM

## 2018-01-01 DIAGNOSIS — E72.4: ICD-10-CM

## 2018-01-01 DIAGNOSIS — R50.9 FEVER, UNSPECIFIED FEVER CAUSE: ICD-10-CM

## 2018-01-01 DIAGNOSIS — D69.6 THROMBOCYTOPENIA (H): ICD-10-CM

## 2018-01-01 DIAGNOSIS — R06.00 DYSPNEA, UNSPECIFIED TYPE: ICD-10-CM

## 2018-01-01 DIAGNOSIS — E11.8 DIABETIC COMPLICATION (H): ICD-10-CM

## 2018-01-01 DIAGNOSIS — E11.9 TYPE 2 DIABETES MELLITUS WITHOUT COMPLICATION, WITH LONG-TERM CURRENT USE OF INSULIN (H): Primary | ICD-10-CM

## 2018-01-01 DIAGNOSIS — I10 HYPERTENSION: Primary | ICD-10-CM

## 2018-01-01 DIAGNOSIS — E87.5 HYPERKALEMIA: ICD-10-CM

## 2018-01-01 DIAGNOSIS — E11.8 TYPE 2 DIABETES MELLITUS WITH COMPLICATION, UNSPECIFIED LONG TERM INSULIN USE STATUS: ICD-10-CM

## 2018-01-01 DIAGNOSIS — I10 HYPERTENSION: ICD-10-CM

## 2018-01-01 DIAGNOSIS — E11.9 TYPE 2 DIABETES MELLITUS WITHOUT COMPLICATION, WITH LONG-TERM CURRENT USE OF INSULIN (H): ICD-10-CM

## 2018-01-01 LAB
A PHAGOCYTOPH DNA BLD QL NAA+PROBE: NOT DETECTED
A PHAGOCYTOPH IGG TITR SER IF: NORMAL {TITER}
A PHAGOCYTOPH IGM TITR SER IF: NORMAL {TITER}
ABO + RH BLD: NORMAL
AFP SERPL-MCNC: 3.6 UG/L (ref 0–8)
AFP SERPL-MCNC: <1.5 UG/L (ref 0–8)
ALBUMIN FLD-MCNC: 0.3 G/DL
ALBUMIN FLD-MCNC: 0.3 G/DL
ALBUMIN FLD-MCNC: 0.4 G/DL
ALBUMIN FLD-MCNC: 0.4 G/DL
ALBUMIN SERPL-MCNC: 1.9 G/DL (ref 3.4–5)
ALBUMIN SERPL-MCNC: 2.1 G/DL (ref 3.4–5)
ALBUMIN SERPL-MCNC: 2.3 G/DL (ref 3.4–5)
ALBUMIN SERPL-MCNC: 2.4 G/DL (ref 3.4–5)
ALBUMIN SERPL-MCNC: 2.5 G/DL (ref 3.4–5)
ALBUMIN SERPL-MCNC: 2.5 G/DL (ref 3.4–5)
ALBUMIN SERPL-MCNC: 2.6 G/DL (ref 3.4–5)
ALBUMIN SERPL-MCNC: 2.8 G/DL (ref 3.4–5)
ALBUMIN SERPL-MCNC: 2.9 G/DL (ref 3.4–5)
ALBUMIN SERPL-MCNC: 3.1 G/DL (ref 3.4–5)
ALBUMIN SERPL-MCNC: 3.2 G/DL (ref 3.4–5)
ALBUMIN SERPL-MCNC: 3.3 G/DL (ref 3.4–5)
ALBUMIN SERPL-MCNC: 3.4 G/DL (ref 3.4–5)
ALBUMIN SERPL-MCNC: 3.4 G/DL (ref 3.4–5)
ALBUMIN SERPL-MCNC: 3.5 G/DL (ref 3.4–5)
ALBUMIN SERPL-MCNC: 3.5 G/DL (ref 3.4–5)
ALBUMIN SERPL-MCNC: 3.6 G/DL (ref 3.4–5)
ALBUMIN SERPL-MCNC: 3.8 G/DL (ref 3.4–5)
ALBUMIN SERPL-MCNC: 3.8 G/DL (ref 3.4–5)
ALBUMIN UR-MCNC: 10 MG/DL
ALBUMIN UR-MCNC: 10 MG/DL
ALBUMIN UR-MCNC: 30 MG/DL
ALBUMIN UR-MCNC: 30 MG/DL
ALBUMIN UR-MCNC: NEGATIVE MG/DL
ALP SERPL-CCNC: 105 U/L (ref 40–150)
ALP SERPL-CCNC: 109 U/L (ref 40–150)
ALP SERPL-CCNC: 113 U/L (ref 40–150)
ALP SERPL-CCNC: 115 U/L (ref 40–150)
ALP SERPL-CCNC: 130 U/L (ref 40–150)
ALP SERPL-CCNC: 139 U/L (ref 40–150)
ALP SERPL-CCNC: 144 U/L (ref 40–150)
ALP SERPL-CCNC: 150 U/L (ref 40–150)
ALP SERPL-CCNC: 153 U/L (ref 40–150)
ALP SERPL-CCNC: 160 U/L (ref 40–150)
ALP SERPL-CCNC: 160 U/L (ref 40–150)
ALP SERPL-CCNC: 162 U/L (ref 40–150)
ALP SERPL-CCNC: 166 U/L (ref 40–150)
ALP SERPL-CCNC: 174 U/L (ref 40–150)
ALP SERPL-CCNC: 176 U/L (ref 40–150)
ALP SERPL-CCNC: 182 U/L (ref 40–150)
ALP SERPL-CCNC: 186 U/L (ref 40–150)
ALP SERPL-CCNC: 192 U/L (ref 40–150)
ALP SERPL-CCNC: 193 U/L (ref 40–150)
ALP SERPL-CCNC: 193 U/L (ref 40–150)
ALP SERPL-CCNC: 204 U/L (ref 40–150)
ALP SERPL-CCNC: 210 U/L (ref 40–150)
ALP SERPL-CCNC: 226 U/L (ref 40–150)
ALP SERPL-CCNC: 235 U/L (ref 40–150)
ALP SERPL-CCNC: 243 U/L (ref 40–150)
ALP SERPL-CCNC: 250 U/L (ref 40–150)
ALP SERPL-CCNC: 255 U/L (ref 40–150)
ALP SERPL-CCNC: 282 U/L (ref 40–150)
ALP SERPL-CCNC: 93 U/L (ref 40–150)
ALP SERPL-CCNC: 96 U/L (ref 40–150)
ALP SERPL-CCNC: 97 U/L (ref 40–150)
ALP SERPL-CCNC: 99 U/L (ref 40–150)
ALT SERPL W P-5'-P-CCNC: 100 U/L (ref 0–70)
ALT SERPL W P-5'-P-CCNC: 106 U/L (ref 0–70)
ALT SERPL W P-5'-P-CCNC: 126 U/L (ref 0–70)
ALT SERPL W P-5'-P-CCNC: 133 U/L (ref 0–70)
ALT SERPL W P-5'-P-CCNC: 157 U/L (ref 0–70)
ALT SERPL W P-5'-P-CCNC: 160 U/L (ref 0–70)
ALT SERPL W P-5'-P-CCNC: 162 U/L (ref 0–70)
ALT SERPL W P-5'-P-CCNC: 168 U/L (ref 0–70)
ALT SERPL W P-5'-P-CCNC: 178 U/L (ref 0–70)
ALT SERPL W P-5'-P-CCNC: 181 U/L (ref 0–70)
ALT SERPL W P-5'-P-CCNC: 198 U/L (ref 0–70)
ALT SERPL W P-5'-P-CCNC: 231 U/L (ref 0–70)
ALT SERPL W P-5'-P-CCNC: 260 U/L (ref 0–70)
ALT SERPL W P-5'-P-CCNC: 54 U/L (ref 0–70)
ALT SERPL W P-5'-P-CCNC: 58 U/L (ref 0–70)
ALT SERPL W P-5'-P-CCNC: 58 U/L (ref 0–70)
ALT SERPL W P-5'-P-CCNC: 59 U/L (ref 0–70)
ALT SERPL W P-5'-P-CCNC: 59 U/L (ref 0–70)
ALT SERPL W P-5'-P-CCNC: 60 U/L (ref 0–70)
ALT SERPL W P-5'-P-CCNC: 62 U/L (ref 0–70)
ALT SERPL W P-5'-P-CCNC: 62 U/L (ref 0–70)
ALT SERPL W P-5'-P-CCNC: 65 U/L (ref 0–70)
ALT SERPL W P-5'-P-CCNC: 67 U/L (ref 0–70)
ALT SERPL W P-5'-P-CCNC: 68 U/L (ref 0–70)
ALT SERPL W P-5'-P-CCNC: 69 U/L (ref 0–70)
ALT SERPL W P-5'-P-CCNC: 70 U/L (ref 0–70)
ALT SERPL W P-5'-P-CCNC: 71 U/L (ref 0–70)
ALT SERPL W P-5'-P-CCNC: 73 U/L (ref 0–70)
ALT SERPL W P-5'-P-CCNC: 78 U/L (ref 0–70)
ALT SERPL W P-5'-P-CCNC: 80 U/L (ref 0–70)
ALT SERPL-CCNC: 60 IU/L (ref 12–68)
AMMONIA PLAS-SCNC: 112 UMOL/L (ref 10–50)
AMMONIA PLAS-SCNC: 132 UMOL/L (ref 10–50)
AMMONIA PLAS-SCNC: 194 UMOL/L (ref 10–50)
AMMONIA PLAS-SCNC: 202 UMOL/L (ref 10–50)
AMMONIA PLAS-SCNC: 54 UMOL/L (ref 10–50)
ANION GAP SERPL CALCULATED.3IONS-SCNC: 10 MMOL/L (ref 3–14)
ANION GAP SERPL CALCULATED.3IONS-SCNC: 11 MMOL/L (ref 3–14)
ANION GAP SERPL CALCULATED.3IONS-SCNC: 12 MMOL/L (ref 3–14)
ANION GAP SERPL CALCULATED.3IONS-SCNC: 13 MMOL/L (ref 3–14)
ANION GAP SERPL CALCULATED.3IONS-SCNC: 14 MMOL/L (ref 3–14)
ANION GAP SERPL CALCULATED.3IONS-SCNC: 15 MMOL/L (ref 3–14)
ANION GAP SERPL CALCULATED.3IONS-SCNC: 7 MMOL/L (ref 3–14)
ANION GAP SERPL CALCULATED.3IONS-SCNC: 8 MMOL/L (ref 3–14)
ANION GAP SERPL CALCULATED.3IONS-SCNC: 8 MMOL/L (ref 3–14)
ANION GAP SERPL CALCULATED.3IONS-SCNC: 9 MMOL/L (ref 3–14)
APPEARANCE FLD: NORMAL
APPEARANCE UR: ABNORMAL
APPEARANCE UR: CLEAR
APTT PPP: 37 SEC (ref 22–37)
APTT PPP: 42 SEC (ref 22–37)
AST SERPL W P-5'-P-CCNC: 100 U/L (ref 0–45)
AST SERPL W P-5'-P-CCNC: 102 U/L (ref 0–45)
AST SERPL W P-5'-P-CCNC: 106 U/L (ref 0–45)
AST SERPL W P-5'-P-CCNC: 106 U/L (ref 0–45)
AST SERPL W P-5'-P-CCNC: 107 U/L (ref 0–45)
AST SERPL W P-5'-P-CCNC: 107 U/L (ref 0–45)
AST SERPL W P-5'-P-CCNC: 108 U/L (ref 0–45)
AST SERPL W P-5'-P-CCNC: 110 U/L (ref 0–45)
AST SERPL W P-5'-P-CCNC: 110 U/L (ref 0–45)
AST SERPL W P-5'-P-CCNC: 117 U/L (ref 0–45)
AST SERPL W P-5'-P-CCNC: 124 U/L (ref 0–45)
AST SERPL W P-5'-P-CCNC: 127 U/L (ref 0–45)
AST SERPL W P-5'-P-CCNC: 131 U/L (ref 0–45)
AST SERPL W P-5'-P-CCNC: 145 U/L (ref 0–45)
AST SERPL W P-5'-P-CCNC: 157 U/L (ref 0–45)
AST SERPL W P-5'-P-CCNC: 176 U/L (ref 0–45)
AST SERPL W P-5'-P-CCNC: 201 U/L (ref 0–45)
AST SERPL W P-5'-P-CCNC: 212 U/L (ref 0–45)
AST SERPL W P-5'-P-CCNC: 242 U/L (ref 0–45)
AST SERPL W P-5'-P-CCNC: 248 U/L (ref 0–45)
AST SERPL W P-5'-P-CCNC: 267 U/L (ref 0–45)
AST SERPL W P-5'-P-CCNC: 281 U/L (ref 0–45)
AST SERPL W P-5'-P-CCNC: 300 U/L (ref 0–45)
AST SERPL W P-5'-P-CCNC: 302 U/L (ref 0–45)
AST SERPL W P-5'-P-CCNC: 334 U/L (ref 0–45)
AST SERPL W P-5'-P-CCNC: 351 U/L (ref 0–45)
AST SERPL W P-5'-P-CCNC: 361 U/L (ref 0–45)
AST SERPL W P-5'-P-CCNC: 83 U/L (ref 0–45)
AST SERPL W P-5'-P-CCNC: 95 U/L (ref 0–45)
AST SERPL W P-5'-P-CCNC: 99 U/L (ref 0–45)
AST SERPL-CCNC: 94 IU/L (ref 12–37)
BACTERIA #/AREA URNS HPF: ABNORMAL /HPF
BACTERIA #/AREA URNS HPF: ABNORMAL /HPF
BACTERIA SPEC CULT: ABNORMAL
BACTERIA SPEC CULT: NO GROWTH
BACTERIA SPEC CULT: NORMAL
BASE DEFICIT BLDV-SCNC: 8.3 MMOL/L
BASE DEFICIT BLDV-SCNC: 8.6 MMOL/L
BASOPHILS # BLD AUTO: 0 10E9/L (ref 0–0.2)
BASOPHILS NFR BLD AUTO: 0.1 %
BASOPHILS NFR BLD AUTO: 0.2 %
BASOPHILS NFR BLD AUTO: 0.3 %
BASOPHILS NFR BLD AUTO: 0.6 %
BASOPHILS NFR BLD AUTO: 0.7 %
BASOPHILS NFR BLD AUTO: 0.9 %
BASOPHILS NFR BLD AUTO: 1 %
BASOPHILS NFR BLD AUTO: 1.4 %
BILIRUB DIRECT SERPL-MCNC: 0.6 MG/DL (ref 0–0.2)
BILIRUB DIRECT SERPL-MCNC: 12.2 MG/DL (ref 0–0.2)
BILIRUB DIRECT SERPL-MCNC: 3 MG/DL (ref 0–0.2)
BILIRUB DIRECT SERPL-MCNC: 8.4 MG/DL (ref 0–0.2)
BILIRUB SERPL-MCNC: 0.9 MG/DL (ref 0.2–1.3)
BILIRUB SERPL-MCNC: 1 MG/DL (ref 0.2–1.3)
BILIRUB SERPL-MCNC: 1 MG/DL (ref 0.2–1.3)
BILIRUB SERPL-MCNC: 1.1 MG/DL (ref 0.2–1.3)
BILIRUB SERPL-MCNC: 1.2 MG/DL (ref 0.2–1.3)
BILIRUB SERPL-MCNC: 1.4 MG/DL (ref 0.2–1.3)
BILIRUB SERPL-MCNC: 1.4 MG/DL (ref 0.2–1.3)
BILIRUB SERPL-MCNC: 1.7 MG/DL (ref 0.2–1.3)
BILIRUB SERPL-MCNC: 1.9 MG/DL (ref 0.2–1.3)
BILIRUB SERPL-MCNC: 11.1 MG/DL (ref 0.2–1.3)
BILIRUB SERPL-MCNC: 12.2 MG/DL (ref 0.2–1.3)
BILIRUB SERPL-MCNC: 14.5 MG/DL (ref 0.2–1.3)
BILIRUB SERPL-MCNC: 17.2 MG/DL (ref 0.2–1.3)
BILIRUB SERPL-MCNC: 17.2 MG/DL (ref 0.2–1.3)
BILIRUB SERPL-MCNC: 18.1 MG/DL (ref 0.2–1.3)
BILIRUB SERPL-MCNC: 20.6 MG/DL (ref 0.2–1.3)
BILIRUB SERPL-MCNC: 22.4 MG/DL (ref 0.2–1.3)
BILIRUB SERPL-MCNC: 3.7 MG/DL (ref 0.2–1.3)
BILIRUB SERPL-MCNC: 3.7 MG/DL (ref 0.2–1.3)
BILIRUB SERPL-MCNC: 4 MG/DL (ref 0.2–1.3)
BILIRUB SERPL-MCNC: 4.1 MG/DL (ref 0.2–1.3)
BILIRUB SERPL-MCNC: 4.2 MG/DL (ref 0.2–1.3)
BILIRUB SERPL-MCNC: 5.2 MG/DL (ref 0.2–1.3)
BILIRUB SERPL-MCNC: 7.9 MG/DL (ref 0.2–1.3)
BILIRUB SERPL-MCNC: 8.8 MG/DL (ref 0.2–1.3)
BILIRUB SERPL-MCNC: 9.3 MG/DL (ref 0.2–1.3)
BILIRUB SERPL-MCNC: 9.9 MG/DL (ref 0.2–1.3)
BILIRUB UR QL STRIP: ABNORMAL
BILIRUB UR QL STRIP: NEGATIVE
BLD GP AB SCN SERPL QL: NORMAL
BLD PROD TYP BPU: NORMAL
BLD UNIT ID BPU: 0
BLOOD BANK CMNT PATIENT-IMP: NORMAL
BLOOD PRODUCT CODE: NORMAL
BPU ID: NORMAL
BUN SERPL-MCNC: 101 MG/DL (ref 7–30)
BUN SERPL-MCNC: 18 MG/DL (ref 7–30)
BUN SERPL-MCNC: 20 MG/DL (ref 7–30)
BUN SERPL-MCNC: 20 MG/DL (ref 7–30)
BUN SERPL-MCNC: 22 MG/DL (ref 7–30)
BUN SERPL-MCNC: 23 MG/DL (ref 7–30)
BUN SERPL-MCNC: 30 MG/DL (ref 7–30)
BUN SERPL-MCNC: 31 MG/DL (ref 7–30)
BUN SERPL-MCNC: 33 MG/DL (ref 7–30)
BUN SERPL-MCNC: 35 MG/DL (ref 7–30)
BUN SERPL-MCNC: 36 MG/DL (ref 7–30)
BUN SERPL-MCNC: 37 MG/DL (ref 7–30)
BUN SERPL-MCNC: 38 MG/DL (ref 7–30)
BUN SERPL-MCNC: 38 MG/DL (ref 7–30)
BUN SERPL-MCNC: 40 MG/DL (ref 7–30)
BUN SERPL-MCNC: 40 MG/DL (ref 7–30)
BUN SERPL-MCNC: 47 MG/DL (ref 7–30)
BUN SERPL-MCNC: 49 MG/DL (ref 7–30)
BUN SERPL-MCNC: 50 MG/DL (ref 7–30)
BUN SERPL-MCNC: 51 MG/DL (ref 7–30)
BUN SERPL-MCNC: 70 MG/DL (ref 7–30)
BUN SERPL-MCNC: 70 MG/DL (ref 7–30)
BUN SERPL-MCNC: 71 MG/DL (ref 7–30)
BUN SERPL-MCNC: 72 MG/DL (ref 7–30)
BUN SERPL-MCNC: 73 MG/DL (ref 7–30)
BUN SERPL-MCNC: 74 MG/DL (ref 7–30)
BUN SERPL-MCNC: 79 MG/DL (ref 7–30)
BUN SERPL-MCNC: 87 MG/DL (ref 7–30)
BUN SERPL-MCNC: 87 MG/DL (ref 7–30)
BUN SERPL-MCNC: 89 MG/DL (ref 7–30)
BUN SERPL-MCNC: 93 MG/DL (ref 7–30)
BUN SERPL-MCNC: 93 MG/DL (ref 7–30)
BUN SERPL-MCNC: 95 MG/DL (ref 7–30)
BUN SERPL-MCNC: 96 MG/DL (ref 7–30)
BUN SERPL-MCNC: 97 MG/DL (ref 7–30)
BUN SERPL-MCNC: 97 MG/DL (ref 7–30)
BUN SERPL-MCNC: 99 MG/DL (ref 7–30)
CALCIUM SERPL-MCNC: 7.6 MG/DL (ref 8.5–10.1)
CALCIUM SERPL-MCNC: 7.8 MG/DL (ref 8.5–10.1)
CALCIUM SERPL-MCNC: 7.8 MG/DL (ref 8.5–10.1)
CALCIUM SERPL-MCNC: 7.9 MG/DL (ref 8.5–10.1)
CALCIUM SERPL-MCNC: 8 MG/DL (ref 8.5–10.1)
CALCIUM SERPL-MCNC: 8.1 MG/DL (ref 8.5–10.1)
CALCIUM SERPL-MCNC: 8.1 MG/DL (ref 8.5–10.1)
CALCIUM SERPL-MCNC: 8.2 MG/DL (ref 8.5–10.1)
CALCIUM SERPL-MCNC: 8.3 MG/DL (ref 8.5–10.1)
CALCIUM SERPL-MCNC: 8.4 MG/DL (ref 8.5–10.1)
CALCIUM SERPL-MCNC: 8.4 MG/DL (ref 8.5–10.1)
CALCIUM SERPL-MCNC: 8.5 MG/DL (ref 8.5–10.1)
CALCIUM SERPL-MCNC: 8.6 MG/DL (ref 8.5–10.1)
CALCIUM SERPL-MCNC: 8.7 MG/DL (ref 8.5–10.1)
CALCIUM SERPL-MCNC: 8.8 MG/DL (ref 8.5–10.1)
CALCIUM SERPL-MCNC: 8.8 MG/DL (ref 8.5–10.1)
CALCIUM SERPL-MCNC: 9 MG/DL (ref 8.5–10.1)
CALCIUM SERPL-MCNC: 9 MG/DL (ref 8.5–10.1)
CALCIUM SERPL-MCNC: 9.4 MG/DL (ref 8.5–10.1)
CANCER AG19-9 SERPL-ACNC: 20 U/ML (ref 0–37)
CEA SERPL-MCNC: 3.3 UG/L (ref 0–2.5)
CHLORIDE SERPL-SCNC: 102 MMOL/L (ref 94–109)
CHLORIDE SERPL-SCNC: 104 MMOL/L (ref 94–109)
CHLORIDE SERPL-SCNC: 104 MMOL/L (ref 94–109)
CHLORIDE SERPL-SCNC: 107 MMOL/L (ref 94–109)
CHLORIDE SERPL-SCNC: 108 MMOL/L (ref 94–109)
CHLORIDE SERPL-SCNC: 109 MMOL/L (ref 94–109)
CHLORIDE SERPL-SCNC: 109 MMOL/L (ref 94–109)
CHLORIDE SERPL-SCNC: 110 MMOL/L (ref 94–109)
CHLORIDE SERPL-SCNC: 111 MMOL/L (ref 94–109)
CHLORIDE SERPL-SCNC: 112 MMOL/L (ref 94–109)
CHLORIDE SERPL-SCNC: 113 MMOL/L (ref 94–109)
CHLORIDE SERPL-SCNC: 114 MMOL/L (ref 94–109)
CHLORIDE SERPL-SCNC: 116 MMOL/L (ref 94–109)
CHLORIDE SERPL-SCNC: 117 MMOL/L (ref 94–109)
CHLORIDE SERPL-SCNC: 118 MMOL/L (ref 94–109)
CHLORIDE SERPL-SCNC: 124 MMOL/L (ref 94–109)
CHLORIDE UR-SCNC: 124 MMOL/L
CO2 BLDCOV-SCNC: 16 MMOL/L (ref 21–28)
CO2 SERPL-SCNC: 13 MMOL/L (ref 20–32)
CO2 SERPL-SCNC: 14 MMOL/L (ref 20–32)
CO2 SERPL-SCNC: 15 MMOL/L (ref 20–32)
CO2 SERPL-SCNC: 15 MMOL/L (ref 20–32)
CO2 SERPL-SCNC: 16 MMOL/L (ref 20–32)
CO2 SERPL-SCNC: 17 MMOL/L (ref 20–32)
CO2 SERPL-SCNC: 18 MMOL/L (ref 20–32)
CO2 SERPL-SCNC: 19 MMOL/L (ref 20–32)
CO2 SERPL-SCNC: 20 MMOL/L (ref 20–32)
CO2 SERPL-SCNC: 21 MMOL/L (ref 20–32)
COLOR FLD: NORMAL
COLOR FLD: YELLOW
COLOR UR AUTO: ABNORMAL
COLOR UR AUTO: YELLOW
COPATH REPORT: NORMAL
CREAT SERPL-MCNC: 1.45 MG/DL (ref 0.66–1.25)
CREAT SERPL-MCNC: 1.45 MG/DL (ref 0.66–1.25)
CREAT SERPL-MCNC: 1.48 MG/DL (ref 0.66–1.25)
CREAT SERPL-MCNC: 1.49 MG/DL (ref 0.66–1.25)
CREAT SERPL-MCNC: 1.52 MG/DL (ref 0.7–1.3)
CREAT SERPL-MCNC: 1.55 MG/DL (ref 0.66–1.25)
CREAT SERPL-MCNC: 1.56 MG/DL (ref 0.66–1.25)
CREAT SERPL-MCNC: 1.58 MG/DL (ref 0.66–1.25)
CREAT SERPL-MCNC: 1.59 MG/DL (ref 0.66–1.25)
CREAT SERPL-MCNC: 1.64 MG/DL (ref 0.66–1.25)
CREAT SERPL-MCNC: 1.67 MG/DL (ref 0.66–1.25)
CREAT SERPL-MCNC: 1.67 MG/DL (ref 0.66–1.25)
CREAT SERPL-MCNC: 1.69 MG/DL (ref 0.66–1.25)
CREAT SERPL-MCNC: 1.7 MG/DL (ref 0.66–1.25)
CREAT SERPL-MCNC: 1.72 MG/DL (ref 0.66–1.25)
CREAT SERPL-MCNC: 1.72 MG/DL (ref 0.66–1.25)
CREAT SERPL-MCNC: 1.73 MG/DL (ref 0.66–1.25)
CREAT SERPL-MCNC: 1.76 MG/DL (ref 0.66–1.25)
CREAT SERPL-MCNC: 1.79 MG/DL (ref 0.66–1.25)
CREAT SERPL-MCNC: 1.85 MG/DL (ref 0.66–1.25)
CREAT SERPL-MCNC: 1.86 MG/DL (ref 0.66–1.25)
CREAT SERPL-MCNC: 1.86 MG/DL (ref 0.66–1.25)
CREAT SERPL-MCNC: 1.89 MG/DL (ref 0.66–1.25)
CREAT SERPL-MCNC: 1.96 MG/DL (ref 0.66–1.25)
CREAT SERPL-MCNC: 1.97 MG/DL (ref 0.66–1.25)
CREAT SERPL-MCNC: 1.97 MG/DL (ref 0.66–1.25)
CREAT SERPL-MCNC: 2.01 MG/DL (ref 0.66–1.25)
CREAT SERPL-MCNC: 2.08 MG/DL (ref 0.66–1.25)
CREAT SERPL-MCNC: 2.22 MG/DL (ref 0.66–1.25)
CREAT SERPL-MCNC: 2.27 MG/DL (ref 0.66–1.25)
CREAT SERPL-MCNC: 2.37 MG/DL (ref 0.66–1.25)
CREAT SERPL-MCNC: 2.57 MG/DL (ref 0.66–1.25)
CREAT SERPL-MCNC: 2.61 MG/DL (ref 0.66–1.25)
CREAT SERPL-MCNC: 2.68 MG/DL (ref 0.66–1.25)
CREAT SERPL-MCNC: 2.69 MG/DL (ref 0.66–1.25)
CREAT SERPL-MCNC: 2.71 MG/DL (ref 0.66–1.25)
CREAT SERPL-MCNC: 2.88 MG/DL (ref 0.66–1.25)
CREAT SERPL-MCNC: 2.91 MG/DL (ref 0.66–1.25)
CREAT SERPL-MCNC: 2.97 MG/DL (ref 0.66–1.25)
CREAT SERPL-MCNC: 3.11 MG/DL (ref 0.66–1.25)
CREAT SERPL-MCNC: 3.15 MG/DL (ref 0.66–1.25)
CREAT SERPL-MCNC: 3.17 MG/DL (ref 0.66–1.25)
CREAT UR-MCNC: 105 MG/DL
CREAT UR-MCNC: 150 MG/DL
CREAT UR-MCNC: 88 MG/DL
DEPRECATED CALCIDIOL+CALCIFEROL SERPL-MC: 8 UG/L (ref 20–75)
DIFFERENTIAL METHOD BLD: ABNORMAL
E CHAFFEENSIS DNA BLD QL NAA+PROBE: NOT DETECTED
E EWINGII DNA SPEC QL NAA+PROBE: NOT DETECTED
EHRLICHIA DNA SPEC QL NAA+PROBE: NOT DETECTED
EOSINOPHIL # BLD AUTO: 0 10E9/L (ref 0–0.7)
EOSINOPHIL # BLD AUTO: 0.1 10E9/L (ref 0–0.7)
EOSINOPHIL # BLD AUTO: 0.2 10E9/L (ref 0–0.7)
EOSINOPHIL # BLD AUTO: 0.3 10E9/L (ref 0–0.7)
EOSINOPHIL NFR BLD AUTO: 0.2 %
EOSINOPHIL NFR BLD AUTO: 0.6 %
EOSINOPHIL NFR BLD AUTO: 1.5 %
EOSINOPHIL NFR BLD AUTO: 2.7 %
EOSINOPHIL NFR BLD AUTO: 4.8 %
EOSINOPHIL NFR BLD AUTO: 4.8 %
EOSINOPHIL NFR BLD AUTO: 5 %
EOSINOPHIL NFR BLD AUTO: 5 %
EOSINOPHIL NFR BLD AUTO: 5.1 %
EOSINOPHIL NFR BLD AUTO: 5.2 %
EOSINOPHIL NFR BLD AUTO: 5.5 %
EOSINOPHIL NFR BLD AUTO: 6.2 %
EOSINOPHIL NFR FLD MANUAL: 4 %
EPO SERPL-ACNC: 12 MU/ML (ref 4–27)
ERYTHROCYTE [DISTWIDTH] IN BLOOD BY AUTOMATED COUNT: 16.5 % (ref 10–15)
ERYTHROCYTE [DISTWIDTH] IN BLOOD BY AUTOMATED COUNT: 16.7 % (ref 10–15)
ERYTHROCYTE [DISTWIDTH] IN BLOOD BY AUTOMATED COUNT: 16.8 % (ref 10–15)
ERYTHROCYTE [DISTWIDTH] IN BLOOD BY AUTOMATED COUNT: 16.8 % (ref 10–15)
ERYTHROCYTE [DISTWIDTH] IN BLOOD BY AUTOMATED COUNT: 16.9 % (ref 10–15)
ERYTHROCYTE [DISTWIDTH] IN BLOOD BY AUTOMATED COUNT: 17 % (ref 10–15)
ERYTHROCYTE [DISTWIDTH] IN BLOOD BY AUTOMATED COUNT: 17.1 % (ref 10–15)
ERYTHROCYTE [DISTWIDTH] IN BLOOD BY AUTOMATED COUNT: 17.2 % (ref 10–15)
ERYTHROCYTE [DISTWIDTH] IN BLOOD BY AUTOMATED COUNT: 17.3 % (ref 10–15)
ERYTHROCYTE [DISTWIDTH] IN BLOOD BY AUTOMATED COUNT: 18.1 % (ref 10–15)
ERYTHROCYTE [DISTWIDTH] IN BLOOD BY AUTOMATED COUNT: 18.3 % (ref 10–15)
ERYTHROCYTE [DISTWIDTH] IN BLOOD BY AUTOMATED COUNT: 19.1 % (ref 10–15)
ERYTHROCYTE [DISTWIDTH] IN BLOOD BY AUTOMATED COUNT: 19.1 % (ref 10–15)
ERYTHROCYTE [DISTWIDTH] IN BLOOD BY AUTOMATED COUNT: 20.6 % (ref 10–15)
ERYTHROCYTE [DISTWIDTH] IN BLOOD BY AUTOMATED COUNT: 20.7 % (ref 10–15)
ERYTHROCYTE [DISTWIDTH] IN BLOOD BY AUTOMATED COUNT: 20.9 % (ref 10–15)
ERYTHROCYTE [DISTWIDTH] IN BLOOD BY AUTOMATED COUNT: 21.4 % (ref 10–15)
ERYTHROCYTE [DISTWIDTH] IN BLOOD BY AUTOMATED COUNT: 21.5 % (ref 10–15)
ERYTHROCYTE [DISTWIDTH] IN BLOOD BY AUTOMATED COUNT: 21.6 % (ref 10–15)
ERYTHROCYTE [DISTWIDTH] IN BLOOD BY AUTOMATED COUNT: 21.6 % (ref 10–15)
ERYTHROCYTE [DISTWIDTH] IN BLOOD BY AUTOMATED COUNT: 21.7 % (ref 10–15)
ERYTHROCYTE [DISTWIDTH] IN BLOOD BY AUTOMATED COUNT: 21.7 % (ref 10–15)
ERYTHROCYTE [DISTWIDTH] IN BLOOD BY AUTOMATED COUNT: 21.8 % (ref 10–15)
ERYTHROCYTE [DISTWIDTH] IN BLOOD BY AUTOMATED COUNT: 22 % (ref 10–15)
ERYTHROCYTE [DISTWIDTH] IN BLOOD BY AUTOMATED COUNT: 22 % (ref 10–15)
ERYTHROCYTE [DISTWIDTH] IN BLOOD BY AUTOMATED COUNT: 22.1 % (ref 10–15)
ERYTHROCYTE [DISTWIDTH] IN BLOOD BY AUTOMATED COUNT: 22.2 % (ref 10–15)
ERYTHROCYTE [DISTWIDTH] IN BLOOD BY AUTOMATED COUNT: 22.2 % (ref 10–15)
ERYTHROCYTE [DISTWIDTH] IN BLOOD BY AUTOMATED COUNT: 22.3 % (ref 10–15)
ERYTHROCYTE [DISTWIDTH] IN BLOOD BY AUTOMATED COUNT: 22.4 % (ref 10–15)
ERYTHROCYTE [DISTWIDTH] IN BLOOD BY AUTOMATED COUNT: 22.5 % (ref 10–15)
ERYTHROCYTE [DISTWIDTH] IN BLOOD BY AUTOMATED COUNT: 22.6 % (ref 10–15)
ERYTHROCYTE [DISTWIDTH] IN BLOOD BY AUTOMATED COUNT: 22.6 % (ref 10–15)
ERYTHROCYTE [DISTWIDTH] IN BLOOD BY AUTOMATED COUNT: 22.7 % (ref 10–15)
ERYTHROCYTE [DISTWIDTH] IN BLOOD BY AUTOMATED COUNT: 22.9 % (ref 10–15)
ERYTHROCYTE [DISTWIDTH] IN BLOOD BY AUTOMATED COUNT: 22.9 % (ref 10–15)
ERYTHROCYTE [DISTWIDTH] IN BLOOD BY AUTOMATED COUNT: 23.2 % (ref 10–15)
ERYTHROCYTE [DISTWIDTH] IN BLOOD BY AUTOMATED COUNT: 24 % (ref 10–15)
ETHYL GLUCURONIDE UR QL: NEGATIVE
FACT X ACT/NOR PPP CHRO: 32 % (ref 70–130)
FACT X ACT/NOR PPP CHRO: 35 % (ref 70–130)
FACT X ACT/NOR PPP CHRO: 48 % (ref 70–130)
FERRITIN SERPL-MCNC: 42 NG/ML (ref 26–388)
FERRITIN SERPL-MCNC: 958 NG/ML (ref 26–388)
FIBRINOGEN PPP-MCNC: 288 MG/DL (ref 200–420)
FIBRINOGEN PPP-MCNC: 355 MG/DL (ref 200–420)
FIBRINOGEN PPP-MCNC: 376 MG/DL (ref 200–420)
FLUAV H1 2009 PAND RNA SPEC QL NAA+PROBE: NEGATIVE
FLUAV H1 RNA SPEC QL NAA+PROBE: NEGATIVE
FLUAV H3 RNA SPEC QL NAA+PROBE: NEGATIVE
FLUAV RNA SPEC QL NAA+PROBE: NEGATIVE
FLUBV RNA SPEC QL NAA+PROBE: NEGATIVE
FOLATE SERPL-MCNC: 12.8 NG/ML
FRACT EXCRET NA UR+SERPL-RTO: 0.3 %
FRACT EXCRET NA UR+SERPL-RTO: 0.5 %
FRACT EXCRET NA UR+SERPL-RTO: 0.5 %
GFR SERPL CREATININE-BSD FRML MDRD: 19 ML/MIN/1.7M2
GFR SERPL CREATININE-BSD FRML MDRD: 20 ML/MIN/1.7M2
GFR SERPL CREATININE-BSD FRML MDRD: 20 ML/MIN/1.7M2
GFR SERPL CREATININE-BSD FRML MDRD: 21 ML/MIN/1.7M2
GFR SERPL CREATININE-BSD FRML MDRD: 21 ML/MIN/1.7M2
GFR SERPL CREATININE-BSD FRML MDRD: 22 ML/MIN/1.7M2
GFR SERPL CREATININE-BSD FRML MDRD: 23 ML/MIN/1.7M2
GFR SERPL CREATININE-BSD FRML MDRD: 23 ML/MIN/1.7M2
GFR SERPL CREATININE-BSD FRML MDRD: 24 ML/MIN/1.7M2
GFR SERPL CREATININE-BSD FRML MDRD: 24 ML/MIN/1.7M2
GFR SERPL CREATININE-BSD FRML MDRD: 25 ML/MIN/1.7M2
GFR SERPL CREATININE-BSD FRML MDRD: 27 ML/MIN/1.7M2
GFR SERPL CREATININE-BSD FRML MDRD: 29 ML/MIN/1.7M2
GFR SERPL CREATININE-BSD FRML MDRD: 29 ML/MIN/1.7M2
GFR SERPL CREATININE-BSD FRML MDRD: 32 ML/MIN/1.7M2
GFR SERPL CREATININE-BSD FRML MDRD: 33 ML/MIN/1.7M2
GFR SERPL CREATININE-BSD FRML MDRD: 34 ML/MIN/1.7M2
GFR SERPL CREATININE-BSD FRML MDRD: 35 ML/MIN/1.7M2
GFR SERPL CREATININE-BSD FRML MDRD: 36 ML/MIN/1.7M2
GFR SERPL CREATININE-BSD FRML MDRD: 38 ML/MIN/1.7M2
GFR SERPL CREATININE-BSD FRML MDRD: 38 ML/MIN/1.7M2
GFR SERPL CREATININE-BSD FRML MDRD: 39 ML/MIN/1.7M2
GFR SERPL CREATININE-BSD FRML MDRD: 40 ML/MIN/1.7M2
GFR SERPL CREATININE-BSD FRML MDRD: 40 ML/MIN/1.7M2
GFR SERPL CREATININE-BSD FRML MDRD: 41 ML/MIN/1.7M2
GFR SERPL CREATININE-BSD FRML MDRD: 41 ML/MIN/1.7M2
GFR SERPL CREATININE-BSD FRML MDRD: 42 ML/MIN/1.7M2
GFR SERPL CREATININE-BSD FRML MDRD: 43 ML/MIN/1.7M2
GFR SERPL CREATININE-BSD FRML MDRD: 43 ML/MIN/1.7M2
GFR SERPL CREATININE-BSD FRML MDRD: 44 ML/MIN/1.7M2
GFR SERPL CREATININE-BSD FRML MDRD: 44 ML/MIN/1.7M2
GFR SERPL CREATININE-BSD FRML MDRD: 45 ML/MIN/1.73M2
GFR SERPL CREATININE-BSD FRML MDRD: 46 ML/MIN/1.7M2
GFR SERPL CREATININE-BSD FRML MDRD: 47 ML/MIN/1.7M2
GFR SERPL CREATININE-BSD FRML MDRD: 48 ML/MIN/1.7M2
GFR SERPL CREATININE-BSD FRML MDRD: 48 ML/MIN/1.7M2
GLUCOSE BLDC GLUCOMTR-MCNC: 102 MG/DL (ref 70–99)
GLUCOSE BLDC GLUCOMTR-MCNC: 102 MG/DL (ref 70–99)
GLUCOSE BLDC GLUCOMTR-MCNC: 106 MG/DL (ref 70–99)
GLUCOSE BLDC GLUCOMTR-MCNC: 110 MG/DL (ref 70–99)
GLUCOSE BLDC GLUCOMTR-MCNC: 113 MG/DL (ref 70–99)
GLUCOSE BLDC GLUCOMTR-MCNC: 116 MG/DL (ref 70–99)
GLUCOSE BLDC GLUCOMTR-MCNC: 128 MG/DL (ref 70–99)
GLUCOSE BLDC GLUCOMTR-MCNC: 128 MG/DL (ref 70–99)
GLUCOSE BLDC GLUCOMTR-MCNC: 131 MG/DL (ref 70–99)
GLUCOSE BLDC GLUCOMTR-MCNC: 132 MG/DL (ref 70–99)
GLUCOSE BLDC GLUCOMTR-MCNC: 134 MG/DL (ref 70–99)
GLUCOSE BLDC GLUCOMTR-MCNC: 135 MG/DL (ref 70–99)
GLUCOSE BLDC GLUCOMTR-MCNC: 136 MG/DL (ref 70–99)
GLUCOSE BLDC GLUCOMTR-MCNC: 137 MG/DL (ref 70–99)
GLUCOSE BLDC GLUCOMTR-MCNC: 140 MG/DL (ref 70–99)
GLUCOSE BLDC GLUCOMTR-MCNC: 140 MG/DL (ref 70–99)
GLUCOSE BLDC GLUCOMTR-MCNC: 141 MG/DL (ref 70–99)
GLUCOSE BLDC GLUCOMTR-MCNC: 142 MG/DL (ref 70–99)
GLUCOSE BLDC GLUCOMTR-MCNC: 143 MG/DL (ref 70–99)
GLUCOSE BLDC GLUCOMTR-MCNC: 144 MG/DL (ref 70–99)
GLUCOSE BLDC GLUCOMTR-MCNC: 145 MG/DL (ref 70–99)
GLUCOSE BLDC GLUCOMTR-MCNC: 146 MG/DL (ref 70–99)
GLUCOSE BLDC GLUCOMTR-MCNC: 148 MG/DL (ref 70–99)
GLUCOSE BLDC GLUCOMTR-MCNC: 149 MG/DL (ref 70–99)
GLUCOSE BLDC GLUCOMTR-MCNC: 150 MG/DL (ref 70–99)
GLUCOSE BLDC GLUCOMTR-MCNC: 151 MG/DL (ref 70–99)
GLUCOSE BLDC GLUCOMTR-MCNC: 153 MG/DL (ref 70–99)
GLUCOSE BLDC GLUCOMTR-MCNC: 154 MG/DL (ref 70–99)
GLUCOSE BLDC GLUCOMTR-MCNC: 156 MG/DL (ref 70–99)
GLUCOSE BLDC GLUCOMTR-MCNC: 156 MG/DL (ref 70–99)
GLUCOSE BLDC GLUCOMTR-MCNC: 157 MG/DL (ref 70–99)
GLUCOSE BLDC GLUCOMTR-MCNC: 158 MG/DL (ref 70–99)
GLUCOSE BLDC GLUCOMTR-MCNC: 158 MG/DL (ref 70–99)
GLUCOSE BLDC GLUCOMTR-MCNC: 159 MG/DL (ref 70–99)
GLUCOSE BLDC GLUCOMTR-MCNC: 159 MG/DL (ref 70–99)
GLUCOSE BLDC GLUCOMTR-MCNC: 162 MG/DL (ref 70–99)
GLUCOSE BLDC GLUCOMTR-MCNC: 163 MG/DL (ref 70–99)
GLUCOSE BLDC GLUCOMTR-MCNC: 164 MG/DL (ref 70–99)
GLUCOSE BLDC GLUCOMTR-MCNC: 165 MG/DL (ref 70–99)
GLUCOSE BLDC GLUCOMTR-MCNC: 168 MG/DL (ref 70–99)
GLUCOSE BLDC GLUCOMTR-MCNC: 169 MG/DL (ref 70–99)
GLUCOSE BLDC GLUCOMTR-MCNC: 170 MG/DL (ref 70–99)
GLUCOSE BLDC GLUCOMTR-MCNC: 172 MG/DL (ref 70–99)
GLUCOSE BLDC GLUCOMTR-MCNC: 172 MG/DL (ref 70–99)
GLUCOSE BLDC GLUCOMTR-MCNC: 175 MG/DL (ref 70–99)
GLUCOSE BLDC GLUCOMTR-MCNC: 181 MG/DL (ref 70–99)
GLUCOSE BLDC GLUCOMTR-MCNC: 183 MG/DL (ref 70–99)
GLUCOSE BLDC GLUCOMTR-MCNC: 186 MG/DL (ref 70–99)
GLUCOSE BLDC GLUCOMTR-MCNC: 189 MG/DL (ref 70–99)
GLUCOSE BLDC GLUCOMTR-MCNC: 190 MG/DL (ref 70–99)
GLUCOSE BLDC GLUCOMTR-MCNC: 190 MG/DL (ref 70–99)
GLUCOSE BLDC GLUCOMTR-MCNC: 192 MG/DL (ref 70–99)
GLUCOSE BLDC GLUCOMTR-MCNC: 193 MG/DL (ref 70–99)
GLUCOSE BLDC GLUCOMTR-MCNC: 193 MG/DL (ref 70–99)
GLUCOSE BLDC GLUCOMTR-MCNC: 198 MG/DL (ref 70–99)
GLUCOSE BLDC GLUCOMTR-MCNC: 199 MG/DL (ref 70–99)
GLUCOSE BLDC GLUCOMTR-MCNC: 201 MG/DL (ref 70–99)
GLUCOSE BLDC GLUCOMTR-MCNC: 203 MG/DL (ref 70–99)
GLUCOSE BLDC GLUCOMTR-MCNC: 206 MG/DL (ref 70–99)
GLUCOSE BLDC GLUCOMTR-MCNC: 208 MG/DL (ref 70–99)
GLUCOSE BLDC GLUCOMTR-MCNC: 209 MG/DL (ref 70–99)
GLUCOSE BLDC GLUCOMTR-MCNC: 215 MG/DL (ref 70–99)
GLUCOSE BLDC GLUCOMTR-MCNC: 216 MG/DL (ref 70–99)
GLUCOSE BLDC GLUCOMTR-MCNC: 217 MG/DL (ref 70–99)
GLUCOSE BLDC GLUCOMTR-MCNC: 220 MG/DL (ref 70–99)
GLUCOSE BLDC GLUCOMTR-MCNC: 229 MG/DL (ref 70–99)
GLUCOSE BLDC GLUCOMTR-MCNC: 231 MG/DL (ref 70–99)
GLUCOSE BLDC GLUCOMTR-MCNC: 236 MG/DL (ref 70–99)
GLUCOSE BLDC GLUCOMTR-MCNC: 237 MG/DL (ref 70–99)
GLUCOSE BLDC GLUCOMTR-MCNC: 243 MG/DL (ref 70–99)
GLUCOSE BLDC GLUCOMTR-MCNC: 244 MG/DL (ref 70–99)
GLUCOSE BLDC GLUCOMTR-MCNC: 251 MG/DL (ref 70–99)
GLUCOSE BLDC GLUCOMTR-MCNC: 78 MG/DL (ref 70–99)
GLUCOSE BLDC GLUCOMTR-MCNC: 79 MG/DL (ref 70–99)
GLUCOSE BLDC GLUCOMTR-MCNC: 85 MG/DL (ref 70–99)
GLUCOSE BLDC GLUCOMTR-MCNC: 86 MG/DL (ref 70–99)
GLUCOSE BLDC GLUCOMTR-MCNC: 93 MG/DL (ref 70–99)
GLUCOSE BLDC GLUCOMTR-MCNC: 98 MG/DL (ref 70–99)
GLUCOSE FLD-MCNC: 108 MG/DL
GLUCOSE SERPL-MCNC: 102 MG/DL (ref 70–99)
GLUCOSE SERPL-MCNC: 104 MG/DL (ref 74–106)
GLUCOSE SERPL-MCNC: 105 MG/DL (ref 70–99)
GLUCOSE SERPL-MCNC: 106 MG/DL (ref 70–99)
GLUCOSE SERPL-MCNC: 108 MG/DL (ref 70–99)
GLUCOSE SERPL-MCNC: 112 MG/DL (ref 70–99)
GLUCOSE SERPL-MCNC: 112 MG/DL (ref 70–99)
GLUCOSE SERPL-MCNC: 113 MG/DL (ref 70–99)
GLUCOSE SERPL-MCNC: 118 MG/DL (ref 70–99)
GLUCOSE SERPL-MCNC: 119 MG/DL (ref 70–99)
GLUCOSE SERPL-MCNC: 119 MG/DL (ref 70–99)
GLUCOSE SERPL-MCNC: 122 MG/DL (ref 70–99)
GLUCOSE SERPL-MCNC: 127 MG/DL (ref 70–99)
GLUCOSE SERPL-MCNC: 128 MG/DL (ref 70–99)
GLUCOSE SERPL-MCNC: 128 MG/DL (ref 70–99)
GLUCOSE SERPL-MCNC: 130 MG/DL (ref 70–99)
GLUCOSE SERPL-MCNC: 133 MG/DL (ref 70–99)
GLUCOSE SERPL-MCNC: 135 MG/DL (ref 70–99)
GLUCOSE SERPL-MCNC: 136 MG/DL (ref 70–99)
GLUCOSE SERPL-MCNC: 137 MG/DL (ref 70–99)
GLUCOSE SERPL-MCNC: 137 MG/DL (ref 70–99)
GLUCOSE SERPL-MCNC: 146 MG/DL (ref 70–99)
GLUCOSE SERPL-MCNC: 148 MG/DL (ref 70–99)
GLUCOSE SERPL-MCNC: 152 MG/DL (ref 70–99)
GLUCOSE SERPL-MCNC: 160 MG/DL (ref 70–99)
GLUCOSE SERPL-MCNC: 161 MG/DL (ref 70–99)
GLUCOSE SERPL-MCNC: 161 MG/DL (ref 70–99)
GLUCOSE SERPL-MCNC: 162 MG/DL (ref 70–99)
GLUCOSE SERPL-MCNC: 167 MG/DL (ref 70–99)
GLUCOSE SERPL-MCNC: 173 MG/DL (ref 70–99)
GLUCOSE SERPL-MCNC: 174 MG/DL (ref 70–99)
GLUCOSE SERPL-MCNC: 175 MG/DL (ref 70–99)
GLUCOSE SERPL-MCNC: 177 MG/DL (ref 70–99)
GLUCOSE SERPL-MCNC: 179 MG/DL (ref 70–99)
GLUCOSE SERPL-MCNC: 181 MG/DL (ref 70–99)
GLUCOSE SERPL-MCNC: 195 MG/DL (ref 70–99)
GLUCOSE SERPL-MCNC: 208 MG/DL (ref 70–99)
GLUCOSE SERPL-MCNC: 230 MG/DL (ref 70–99)
GLUCOSE SERPL-MCNC: 243 MG/DL (ref 70–99)
GLUCOSE SERPL-MCNC: 76 MG/DL (ref 70–99)
GLUCOSE UR STRIP-MCNC: NEGATIVE MG/DL
GRAM STN SPEC: NORMAL
HADV DNA SPEC QL NAA+PROBE: NEGATIVE
HADV DNA SPEC QL NAA+PROBE: NEGATIVE
HAPTOGLOB SERPL-MCNC: 37 MG/DL (ref 35–175)
HAPTOGLOB SERPL-MCNC: 40 MG/DL (ref 35–175)
HAPTOGLOB SERPL-MCNC: 53 MG/DL (ref 35–175)
HAPTOGLOB SERPL-MCNC: 69 MG/DL (ref 35–175)
HBV CORE AB SERPL QL IA: NONREACTIVE
HBV SURFACE AB SERPL IA-ACNC: 0.44 M[IU]/ML
HBV SURFACE AG SERPL QL IA: NONREACTIVE
HCO3 BLDV-SCNC: 15 MMOL/L (ref 21–28)
HCO3 BLDV-SCNC: 17 MMOL/L (ref 21–28)
HCT VFR BLD AUTO: 17.7 % (ref 40–53)
HCT VFR BLD AUTO: 18.6 % (ref 40–53)
HCT VFR BLD AUTO: 19.2 % (ref 40–53)
HCT VFR BLD AUTO: 19.9 % (ref 40–53)
HCT VFR BLD AUTO: 20.2 % (ref 40–53)
HCT VFR BLD AUTO: 20.2 % (ref 40–53)
HCT VFR BLD AUTO: 20.7 % (ref 40–53)
HCT VFR BLD AUTO: 20.8 % (ref 40–53)
HCT VFR BLD AUTO: 21.1 % (ref 40–53)
HCT VFR BLD AUTO: 21.6 % (ref 40–53)
HCT VFR BLD AUTO: 21.6 % (ref 40–53)
HCT VFR BLD AUTO: 21.9 % (ref 40–53)
HCT VFR BLD AUTO: 22 % (ref 40–53)
HCT VFR BLD AUTO: 22.2 % (ref 40–53)
HCT VFR BLD AUTO: 22.3 % (ref 40–53)
HCT VFR BLD AUTO: 22.4 % (ref 40–53)
HCT VFR BLD AUTO: 22.7 % (ref 40–53)
HCT VFR BLD AUTO: 22.7 % (ref 40–53)
HCT VFR BLD AUTO: 22.9 % (ref 40–53)
HCT VFR BLD AUTO: 23 % (ref 40–53)
HCT VFR BLD AUTO: 23.1 % (ref 40–53)
HCT VFR BLD AUTO: 23.2 % (ref 40–53)
HCT VFR BLD AUTO: 23.2 % (ref 40–53)
HCT VFR BLD AUTO: 23.4 % (ref 40–53)
HCT VFR BLD AUTO: 23.5 % (ref 40–53)
HCT VFR BLD AUTO: 23.5 % (ref 40–53)
HCT VFR BLD AUTO: 23.6 % (ref 40–53)
HCT VFR BLD AUTO: 24.2 % (ref 40–53)
HCT VFR BLD AUTO: 24.5 % (ref 40–53)
HCT VFR BLD AUTO: 24.6 % (ref 40–53)
HCT VFR BLD AUTO: 24.6 % (ref 40–53)
HCT VFR BLD AUTO: 25 % (ref 40–53)
HCT VFR BLD AUTO: 25.1 % (ref 40–53)
HCT VFR BLD AUTO: 25.3 % (ref 40–53)
HCT VFR BLD AUTO: 25.3 % (ref 40–53)
HCT VFR BLD AUTO: 25.4 % (ref 40–53)
HCT VFR BLD AUTO: 26 % (ref 40–53)
HCT VFR BLD AUTO: 26.2 % (ref 40–53)
HCT VFR BLD AUTO: 26.9 % (ref 40–53)
HCT VFR BLD AUTO: 27.7 % (ref 40–53)
HCT VFR BLD AUTO: 27.7 % (ref 40–53)
HCT VFR BLD AUTO: 28 % (ref 40–53)
HCT VFR BLD AUTO: 28.5 % (ref 40–53)
HCT VFR BLD AUTO: 29.4 % (ref 40–53)
HCV AB SERPL QL IA: NONREACTIVE
HGB BLD-MCNC: 5.7 G/DL (ref 13.3–17.7)
HGB BLD-MCNC: 6.2 G/DL (ref 13.3–17.7)
HGB BLD-MCNC: 6.5 G/DL (ref 13.3–17.7)
HGB BLD-MCNC: 6.6 G/DL (ref 13.3–17.7)
HGB BLD-MCNC: 6.7 G/DL (ref 13.3–17.7)
HGB BLD-MCNC: 6.7 G/DL (ref 13.3–17.7)
HGB BLD-MCNC: 6.9 G/DL (ref 13.3–17.7)
HGB BLD-MCNC: 6.9 G/DL (ref 13.3–17.7)
HGB BLD-MCNC: 7 G/DL (ref 13.3–17.7)
HGB BLD-MCNC: 7 G/DL (ref 13.3–17.7)
HGB BLD-MCNC: 7.1 G/DL (ref 13.3–17.7)
HGB BLD-MCNC: 7.2 G/DL (ref 13.3–17.7)
HGB BLD-MCNC: 7.2 G/DL (ref 13.3–17.7)
HGB BLD-MCNC: 7.3 G/DL (ref 13.3–17.7)
HGB BLD-MCNC: 7.4 G/DL (ref 13.3–17.7)
HGB BLD-MCNC: 7.5 G/DL (ref 13.3–17.7)
HGB BLD-MCNC: 7.6 G/DL (ref 13.3–17.7)
HGB BLD-MCNC: 7.7 G/DL (ref 13.3–17.7)
HGB BLD-MCNC: 7.8 G/DL (ref 13.3–17.7)
HGB BLD-MCNC: 7.9 G/DL (ref 13.3–17.7)
HGB BLD-MCNC: 7.9 G/DL (ref 13.3–17.7)
HGB BLD-MCNC: 8 G/DL (ref 13.3–17.7)
HGB BLD-MCNC: 8 G/DL (ref 13.3–17.7)
HGB BLD-MCNC: 8.1 G/DL (ref 13.3–17.7)
HGB BLD-MCNC: 8.1 G/DL (ref 13.3–17.7)
HGB BLD-MCNC: 8.2 G/DL (ref 13.3–17.7)
HGB BLD-MCNC: 8.3 G/DL (ref 13.3–17.7)
HGB BLD-MCNC: 8.4 G/DL (ref 13.3–17.7)
HGB BLD-MCNC: 8.5 G/DL (ref 13.3–17.7)
HGB BLD-MCNC: 8.5 G/DL (ref 13.3–17.7)
HGB BLD-MCNC: 8.6 G/DL (ref 13.3–17.7)
HGB BLD-MCNC: 8.7 G/DL (ref 13.3–17.7)
HGB BLD-MCNC: 8.8 G/DL (ref 13.3–17.7)
HGB BLD-MCNC: 8.9 G/DL (ref 13.3–17.7)
HGB BLD-MCNC: 9.2 G/DL (ref 13.3–17.7)
HGB UR QL STRIP: ABNORMAL
HGB UR QL STRIP: NEGATIVE
HMPV RNA SPEC QL NAA+PROBE: NEGATIVE
HPIV1 RNA SPEC QL NAA+PROBE: NEGATIVE
HPIV2 RNA SPEC QL NAA+PROBE: NEGATIVE
HPIV3 RNA SPEC QL NAA+PROBE: NEGATIVE
HYALINE CASTS #/AREA URNS LPF: 1 /LPF (ref 0–2)
HYALINE CASTS #/AREA URNS LPF: 139 /LPF (ref 0–2)
HYALINE CASTS #/AREA URNS LPF: 2 /LPF (ref 0–2)
IMM GRANULOCYTES # BLD: 0 10E9/L (ref 0–0.4)
IMM GRANULOCYTES NFR BLD: 0 %
IMM GRANULOCYTES NFR BLD: 0 %
IMM GRANULOCYTES NFR BLD: 0.2 %
IMM GRANULOCYTES NFR BLD: 0.3 %
IMM GRANULOCYTES NFR BLD: 0.4 %
IMM GRANULOCYTES NFR BLD: 0.6 %
IMM GRANULOCYTES NFR BLD: 0.7 %
IMM GRANULOCYTES NFR BLD: 1 %
INR PPP: 1.31 (ref 0.86–1.14)
INR PPP: 1.42 (ref 0.86–1.14)
INR PPP: 1.43 (ref 0.86–1.14)
INR PPP: 1.44 (ref 0.86–1.14)
INR PPP: 1.47 (ref 0.86–1.14)
INR PPP: 1.52 (ref 0.86–1.14)
INR PPP: 1.53 (ref 0.86–1.14)
INR PPP: 1.54 (ref 0.86–1.14)
INR PPP: 1.55 (ref 0.86–1.14)
INR PPP: 1.56 (ref 0.86–1.14)
INR PPP: 1.58 (ref 0.86–1.14)
INR PPP: 1.59 (ref 0.86–1.14)
INR PPP: 1.61 (ref 0.86–1.14)
INR PPP: 1.65 (ref 0.86–1.14)
INR PPP: 1.66 (ref 0.86–1.14)
INR PPP: 1.67 (ref 0.86–1.14)
INR PPP: 1.69 (ref 0.86–1.14)
INR PPP: 1.76 (ref 0.86–1.14)
INR PPP: 1.85 (ref 0.86–1.14)
INR PPP: 1.86 (ref 0.86–1.14)
INR PPP: 1.91 (ref 0.86–1.14)
INR PPP: 1.94 (ref 0.86–1.14)
INR PPP: 1.95 (ref 0.86–1.14)
INR PPP: 2.01 (ref 0.86–1.14)
INR PPP: 2.02 (ref 0.86–1.14)
INR PPP: 2.03 (ref 0.86–1.14)
INR PPP: 2.11 (ref 0.86–1.14)
INR PPP: 2.85 (ref 0.86–1.14)
INR PPP: 2.94 (ref 0.86–1.14)
INTERPRETATION ECG - MUSE: NORMAL
IRON SATN MFR SERPL: 104 % (ref 15–46)
IRON SATN MFR SERPL: 97 % (ref 15–46)
IRON SERPL-MCNC: 115 UG/DL (ref 35–180)
IRON SERPL-MCNC: 141 UG/DL (ref 35–180)
KETONES UR STRIP-MCNC: NEGATIVE MG/DL
LACTATE BLD-SCNC: 1 MMOL/L (ref 0.7–2)
LACTATE BLD-SCNC: 2.2 MMOL/L (ref 0.7–2)
LACTATE BLD-SCNC: 2.2 MMOL/L (ref 0.7–2)
LACTATE BLD-SCNC: 2.4 MMOL/L (ref 0.7–2.1)
LDH SERPL L TO P-CCNC: 198 U/L (ref 85–227)
LDH SERPL L TO P-CCNC: 245 U/L (ref 85–227)
LDH SERPL L TO P-CCNC: 248 U/L (ref 85–227)
LEUKOCYTE ESTERASE UR QL STRIP: ABNORMAL
LEUKOCYTE ESTERASE UR QL STRIP: NEGATIVE
LIPASE SERPL-CCNC: 248 U/L (ref 73–393)
LYMPHOCYTES # BLD AUTO: 0.3 10E9/L (ref 0.8–5.3)
LYMPHOCYTES # BLD AUTO: 0.4 10E9/L (ref 0.8–5.3)
LYMPHOCYTES # BLD AUTO: 0.5 10E9/L (ref 0.8–5.3)
LYMPHOCYTES # BLD AUTO: 0.6 10E9/L (ref 0.8–5.3)
LYMPHOCYTES # BLD AUTO: 0.7 10E9/L (ref 0.8–5.3)
LYMPHOCYTES # BLD AUTO: 0.9 10E9/L (ref 0.8–5.3)
LYMPHOCYTES NFR BLD AUTO: 10.7 %
LYMPHOCYTES NFR BLD AUTO: 11 %
LYMPHOCYTES NFR BLD AUTO: 11 %
LYMPHOCYTES NFR BLD AUTO: 11.9 %
LYMPHOCYTES NFR BLD AUTO: 13.4 %
LYMPHOCYTES NFR BLD AUTO: 13.9 %
LYMPHOCYTES NFR BLD AUTO: 14.4 %
LYMPHOCYTES NFR BLD AUTO: 15.5 %
LYMPHOCYTES NFR BLD AUTO: 16.6 %
LYMPHOCYTES NFR BLD AUTO: 20.2 %
LYMPHOCYTES NFR BLD AUTO: 6.2 %
LYMPHOCYTES NFR BLD AUTO: 7 %
LYMPHOCYTES NFR FLD MANUAL: 20 %
LYMPHOCYTES NFR FLD MANUAL: 27 %
LYMPHOCYTES NFR FLD MANUAL: 30 %
LYMPHOCYTES NFR FLD MANUAL: 31 %
LYMPHOCYTES NFR FLD MANUAL: 33 %
LYMPHOCYTES NFR FLD MANUAL: 38 %
LYMPHOCYTES NFR FLD MANUAL: 48 %
Lab: ABNORMAL
Lab: NORMAL
MAGNESIUM SERPL-MCNC: 2.3 MG/DL (ref 1.6–2.3)
MAGNESIUM SERPL-MCNC: 2.3 MG/DL (ref 1.6–2.3)
MAGNESIUM SERPL-MCNC: 2.5 MG/DL (ref 1.6–2.3)
MAGNESIUM SERPL-MCNC: 2.5 MG/DL (ref 1.6–2.3)
MCH RBC QN AUTO: 28.5 PG (ref 26.5–33)
MCH RBC QN AUTO: 28.6 PG (ref 26.5–33)
MCH RBC QN AUTO: 28.7 PG (ref 26.5–33)
MCH RBC QN AUTO: 28.7 PG (ref 26.5–33)
MCH RBC QN AUTO: 28.8 PG (ref 26.5–33)
MCH RBC QN AUTO: 28.8 PG (ref 26.5–33)
MCH RBC QN AUTO: 28.9 PG (ref 26.5–33)
MCH RBC QN AUTO: 29 PG (ref 26.5–33)
MCH RBC QN AUTO: 29.1 PG (ref 26.5–33)
MCH RBC QN AUTO: 29.3 PG (ref 26.5–33)
MCH RBC QN AUTO: 29.3 PG (ref 26.5–33)
MCH RBC QN AUTO: 29.7 PG (ref 26.5–33)
MCH RBC QN AUTO: 29.8 PG (ref 26.5–33)
MCH RBC QN AUTO: 29.9 PG (ref 26.5–33)
MCH RBC QN AUTO: 30 PG (ref 26.5–33)
MCH RBC QN AUTO: 30.1 PG (ref 26.5–33)
MCH RBC QN AUTO: 30.2 PG (ref 26.5–33)
MCH RBC QN AUTO: 30.3 PG (ref 26.5–33)
MCH RBC QN AUTO: 30.3 PG (ref 26.5–33)
MCH RBC QN AUTO: 30.4 PG (ref 26.5–33)
MCH RBC QN AUTO: 30.5 PG (ref 26.5–33)
MCH RBC QN AUTO: 30.6 PG (ref 26.5–33)
MCH RBC QN AUTO: 30.6 PG (ref 26.5–33)
MCH RBC QN AUTO: 30.7 PG (ref 26.5–33)
MCH RBC QN AUTO: 30.8 PG (ref 26.5–33)
MCH RBC QN AUTO: 30.9 PG (ref 26.5–33)
MCH RBC QN AUTO: 31 PG (ref 26.5–33)
MCH RBC QN AUTO: 31 PG (ref 26.5–33)
MCH RBC QN AUTO: 31.9 PG (ref 26.5–33)
MCHC RBC AUTO-ENTMCNC: 30 G/DL (ref 31.5–36.5)
MCHC RBC AUTO-ENTMCNC: 30.2 G/DL (ref 31.5–36.5)
MCHC RBC AUTO-ENTMCNC: 30.3 G/DL (ref 31.5–36.5)
MCHC RBC AUTO-ENTMCNC: 30.5 G/DL (ref 31.5–36.5)
MCHC RBC AUTO-ENTMCNC: 30.5 G/DL (ref 31.5–36.5)
MCHC RBC AUTO-ENTMCNC: 30.6 G/DL (ref 31.5–36.5)
MCHC RBC AUTO-ENTMCNC: 30.7 G/DL (ref 31.5–36.5)
MCHC RBC AUTO-ENTMCNC: 30.8 G/DL (ref 31.5–36.5)
MCHC RBC AUTO-ENTMCNC: 30.8 G/DL (ref 31.5–36.5)
MCHC RBC AUTO-ENTMCNC: 31.3 G/DL (ref 31.5–36.5)
MCHC RBC AUTO-ENTMCNC: 31.9 G/DL (ref 31.5–36.5)
MCHC RBC AUTO-ENTMCNC: 32 G/DL (ref 31.5–36.5)
MCHC RBC AUTO-ENTMCNC: 32.7 G/DL (ref 31.5–36.5)
MCHC RBC AUTO-ENTMCNC: 32.8 G/DL (ref 31.5–36.5)
MCHC RBC AUTO-ENTMCNC: 32.9 G/DL (ref 31.5–36.5)
MCHC RBC AUTO-ENTMCNC: 33 G/DL (ref 31.5–36.5)
MCHC RBC AUTO-ENTMCNC: 33.5 G/DL (ref 31.5–36.5)
MCHC RBC AUTO-ENTMCNC: 33.6 G/DL (ref 31.5–36.5)
MCHC RBC AUTO-ENTMCNC: 33.8 G/DL (ref 31.5–36.5)
MCHC RBC AUTO-ENTMCNC: 33.9 G/DL (ref 31.5–36.5)
MCHC RBC AUTO-ENTMCNC: 33.9 G/DL (ref 31.5–36.5)
MCHC RBC AUTO-ENTMCNC: 34.1 G/DL (ref 31.5–36.5)
MCHC RBC AUTO-ENTMCNC: 34.2 G/DL (ref 31.5–36.5)
MCHC RBC AUTO-ENTMCNC: 34.2 G/DL (ref 31.5–36.5)
MCHC RBC AUTO-ENTMCNC: 34.4 G/DL (ref 31.5–36.5)
MCHC RBC AUTO-ENTMCNC: 34.5 G/DL (ref 31.5–36.5)
MCHC RBC AUTO-ENTMCNC: 34.6 G/DL (ref 31.5–36.5)
MCHC RBC AUTO-ENTMCNC: 34.7 G/DL (ref 31.5–36.5)
MCHC RBC AUTO-ENTMCNC: 35 G/DL (ref 31.5–36.5)
MCHC RBC AUTO-ENTMCNC: 35.2 G/DL (ref 31.5–36.5)
MCHC RBC AUTO-ENTMCNC: 35.3 G/DL (ref 31.5–36.5)
MCV RBC AUTO: 86 FL (ref 78–100)
MCV RBC AUTO: 86 FL (ref 78–100)
MCV RBC AUTO: 88 FL (ref 78–100)
MCV RBC AUTO: 89 FL (ref 78–100)
MCV RBC AUTO: 90 FL (ref 78–100)
MCV RBC AUTO: 91 FL (ref 78–100)
MCV RBC AUTO: 92 FL (ref 78–100)
MCV RBC AUTO: 94 FL (ref 78–100)
MCV RBC AUTO: 95 FL (ref 78–100)
MCV RBC AUTO: 96 FL (ref 78–100)
MCV RBC AUTO: 97 FL (ref 78–100)
MICROBIOLOGIST REVIEW: NORMAL
MONOCYTES # BLD AUTO: 0.2 10E9/L (ref 0–1.3)
MONOCYTES # BLD AUTO: 0.3 10E9/L (ref 0–1.3)
MONOCYTES # BLD AUTO: 0.4 10E9/L (ref 0–1.3)
MONOCYTES # BLD AUTO: 0.5 10E9/L (ref 0–1.3)
MONOCYTES # BLD AUTO: 0.5 10E9/L (ref 0–1.3)
MONOCYTES # BLD AUTO: 0.6 10E9/L (ref 0–1.3)
MONOCYTES NFR BLD AUTO: 11.5 %
MONOCYTES NFR BLD AUTO: 16.8 %
MONOCYTES NFR BLD AUTO: 3.2 %
MONOCYTES NFR BLD AUTO: 3.9 %
MONOCYTES NFR BLD AUTO: 5.5 %
MONOCYTES NFR BLD AUTO: 7.1 %
MONOCYTES NFR BLD AUTO: 7.4 %
MONOCYTES NFR BLD AUTO: 7.7 %
MONOCYTES NFR BLD AUTO: 8.9 %
MONOCYTES NFR BLD AUTO: 9.4 %
MONOCYTES NFR BLD AUTO: 9.7 %
MONOCYTES NFR BLD AUTO: 9.9 %
MONOS+MACROS NFR FLD MANUAL: 7 %
MRSA DNA SPEC QL NAA+PROBE: NEGATIVE
MUCOUS THREADS #/AREA URNS LPF: PRESENT /LPF
NEUTROPHILS # BLD AUTO: 1.8 10E9/L (ref 1.6–8.3)
NEUTROPHILS # BLD AUTO: 2 10E9/L (ref 1.6–8.3)
NEUTROPHILS # BLD AUTO: 2.3 10E9/L (ref 1.6–8.3)
NEUTROPHILS # BLD AUTO: 2.3 10E9/L (ref 1.6–8.3)
NEUTROPHILS # BLD AUTO: 2.4 10E9/L (ref 1.6–8.3)
NEUTROPHILS # BLD AUTO: 2.5 10E9/L (ref 1.6–8.3)
NEUTROPHILS # BLD AUTO: 2.5 10E9/L (ref 1.6–8.3)
NEUTROPHILS # BLD AUTO: 3 10E9/L (ref 1.6–8.3)
NEUTROPHILS # BLD AUTO: 3.8 10E9/L (ref 1.6–8.3)
NEUTROPHILS # BLD AUTO: 4.8 10E9/L (ref 1.6–8.3)
NEUTROPHILS # BLD AUTO: 5.2 10E9/L (ref 1.6–8.3)
NEUTROPHILS # BLD AUTO: 6.9 10E9/L (ref 1.6–8.3)
NEUTROPHILS NFR BLD AUTO: 64.4 %
NEUTROPHILS NFR BLD AUTO: 65.4 %
NEUTROPHILS NFR BLD AUTO: 66.5 %
NEUTROPHILS NFR BLD AUTO: 70.7 %
NEUTROPHILS NFR BLD AUTO: 71.6 %
NEUTROPHILS NFR BLD AUTO: 73.2 %
NEUTROPHILS NFR BLD AUTO: 73.8 %
NEUTROPHILS NFR BLD AUTO: 74.1 %
NEUTROPHILS NFR BLD AUTO: 75.4 %
NEUTROPHILS NFR BLD AUTO: 79.8 %
NEUTROPHILS NFR BLD AUTO: 84.9 %
NEUTROPHILS NFR BLD AUTO: 89.6 %
NEUTS BAND NFR FLD MANUAL: 16 %
NEUTS BAND NFR FLD MANUAL: 3 %
NEUTS BAND NFR FLD MANUAL: 43 %
NEUTS BAND NFR FLD MANUAL: 5 %
NEUTS BAND NFR FLD MANUAL: 63 %
NITRATE UR QL: NEGATIVE
NRBC # BLD AUTO: 0 10*3/UL
NRBC BLD AUTO-RTO: 0 /100
NT-PROBNP SERPL-MCNC: 4205 PG/ML (ref 0–900)
NUM BPU REQUESTED: 1
NUM BPU REQUESTED: 2
NUM BPU REQUESTED: 4
O2/TOTAL GAS SETTING VFR VENT: 21 %
O2/TOTAL GAS SETTING VFR VENT: 21 %
OSMOLALITY UR: 346 MMOL/KG (ref 100–1200)
OTHER CELLS FLD MANUAL: 30 %
OTHER CELLS FLD MANUAL: 32 %
OTHER CELLS FLD MANUAL: 57 %
OTHER CELLS FLD MANUAL: 62 %
OTHER CELLS FLD MANUAL: 64 %
OTHER CELLS FLD MANUAL: 77 %
PCO2 BLDV: 24 MM HG (ref 40–50)
PCO2 BLDV: 26 MM HG (ref 40–50)
PCO2 BLDV: 32 MM HG (ref 40–50)
PH BLDV: 7.33 PH (ref 7.32–7.43)
PH BLDV: 7.39 PH (ref 7.32–7.43)
PH BLDV: 7.44 PH (ref 7.32–7.43)
PH UR STRIP: 5 PH (ref 5–7)
PH UR STRIP: 5.5 PH (ref 5–7)
PHOSPHATE SERPL-MCNC: 3.2 MG/DL (ref 2.5–4.5)
PHOSPHATE SERPL-MCNC: 3.8 MG/DL (ref 2.5–4.5)
PHOSPHATE SERPL-MCNC: 3.8 MG/DL (ref 2.5–4.5)
PHOSPHATE SERPL-MCNC: 5.1 MG/DL (ref 2.5–4.5)
PLATELET # BLD AUTO: 101 10E9/L (ref 150–450)
PLATELET # BLD AUTO: 105 10E9/L (ref 150–450)
PLATELET # BLD AUTO: 105 10E9/L (ref 150–450)
PLATELET # BLD AUTO: 108 10E9/L (ref 150–450)
PLATELET # BLD AUTO: 108 10E9/L (ref 150–450)
PLATELET # BLD AUTO: 109 10E9/L (ref 150–450)
PLATELET # BLD AUTO: 111 10E9/L (ref 150–450)
PLATELET # BLD AUTO: 113 10E9/L (ref 150–450)
PLATELET # BLD AUTO: 114 10E9/L (ref 150–450)
PLATELET # BLD AUTO: 115 10E9/L (ref 150–450)
PLATELET # BLD AUTO: 123 10E9/L (ref 150–450)
PLATELET # BLD AUTO: 124 10E9/L (ref 150–450)
PLATELET # BLD AUTO: 124 10E9/L (ref 150–450)
PLATELET # BLD AUTO: 126 10E9/L (ref 150–450)
PLATELET # BLD AUTO: 131 10E9/L (ref 150–450)
PLATELET # BLD AUTO: 135 10E9/L (ref 150–450)
PLATELET # BLD AUTO: 35 10E9/L (ref 150–450)
PLATELET # BLD AUTO: 37 10E9/L (ref 150–450)
PLATELET # BLD AUTO: 38 10E9/L (ref 150–450)
PLATELET # BLD AUTO: 40 10E9/L (ref 150–450)
PLATELET # BLD AUTO: 40 10E9/L (ref 150–450)
PLATELET # BLD AUTO: 41 10E9/L (ref 150–450)
PLATELET # BLD AUTO: 42 10E9/L (ref 150–450)
PLATELET # BLD AUTO: 43 10E9/L (ref 150–450)
PLATELET # BLD AUTO: 45 10E9/L (ref 150–450)
PLATELET # BLD AUTO: 46 10E9/L (ref 150–450)
PLATELET # BLD AUTO: 48 10E9/L (ref 150–450)
PLATELET # BLD AUTO: 48 10E9/L (ref 150–450)
PLATELET # BLD AUTO: 49 10E9/L (ref 150–450)
PLATELET # BLD AUTO: 54 10E9/L (ref 150–450)
PLATELET # BLD AUTO: 54 10E9/L (ref 150–450)
PLATELET # BLD AUTO: 56 10E9/L (ref 150–450)
PLATELET # BLD AUTO: 56 10E9/L (ref 150–450)
PLATELET # BLD AUTO: 70 10E9/L (ref 150–450)
PLATELET # BLD AUTO: 72 10E9/L (ref 150–450)
PLATELET # BLD AUTO: 73 10E9/L (ref 150–450)
PLATELET # BLD AUTO: 73 10E9/L (ref 150–450)
PLATELET # BLD AUTO: 74 10E9/L (ref 150–450)
PLATELET # BLD AUTO: 75 10E9/L (ref 150–450)
PLATELET # BLD AUTO: 77 10E9/L (ref 150–450)
PLATELET # BLD AUTO: 81 10E9/L (ref 150–450)
PLATELET # BLD AUTO: 87 10E9/L (ref 150–450)
PLATELET # BLD AUTO: 91 10E9/L (ref 150–450)
PLATELET # BLD EST: ABNORMAL 10*3/UL
PLATELET # BLD EST: ABNORMAL 10*3/UL
PO2 BLDV: 36 MM HG (ref 25–47)
PO2 BLDV: 53 MM HG (ref 25–47)
PO2 BLDV: 55 MM HG (ref 25–47)
POTASSIUM SERPL-SCNC: 4 MMOL/L (ref 3.4–5.3)
POTASSIUM SERPL-SCNC: 4.1 MMOL/L (ref 3.4–5.3)
POTASSIUM SERPL-SCNC: 4.2 MMOL/L (ref 3.4–5.3)
POTASSIUM SERPL-SCNC: 4.2 MMOL/L (ref 3.5–5.1)
POTASSIUM SERPL-SCNC: 4.3 MMOL/L (ref 3.4–5.3)
POTASSIUM SERPL-SCNC: 4.4 MMOL/L (ref 3.4–5.3)
POTASSIUM SERPL-SCNC: 4.5 MMOL/L (ref 3.4–5.3)
POTASSIUM SERPL-SCNC: 4.6 MMOL/L (ref 3.4–5.3)
POTASSIUM SERPL-SCNC: 4.6 MMOL/L (ref 3.4–5.3)
POTASSIUM SERPL-SCNC: 4.7 MMOL/L (ref 3.4–5.3)
POTASSIUM SERPL-SCNC: 4.8 MMOL/L (ref 3.4–5.3)
POTASSIUM SERPL-SCNC: 4.9 MMOL/L (ref 3.4–5.3)
POTASSIUM SERPL-SCNC: 5 MMOL/L (ref 3.4–5.3)
POTASSIUM SERPL-SCNC: 5 MMOL/L (ref 3.4–5.3)
POTASSIUM SERPL-SCNC: 5.1 MMOL/L (ref 3.4–5.3)
POTASSIUM SERPL-SCNC: 5.1 MMOL/L (ref 3.4–5.3)
POTASSIUM SERPL-SCNC: 5.2 MMOL/L (ref 3.4–5.3)
POTASSIUM SERPL-SCNC: 5.4 MMOL/L (ref 3.4–5.3)
POTASSIUM SERPL-SCNC: 5.4 MMOL/L (ref 3.4–5.3)
POTASSIUM SERPL-SCNC: 5.5 MMOL/L (ref 3.4–5.3)
POTASSIUM SERPL-SCNC: 5.6 MMOL/L (ref 3.4–5.3)
POTASSIUM SERPL-SCNC: 5.7 MMOL/L (ref 3.4–5.3)
POTASSIUM SERPL-SCNC: 5.7 MMOL/L (ref 3.4–5.3)
POTASSIUM SERPL-SCNC: 5.8 MMOL/L (ref 3.4–5.3)
POTASSIUM SERPL-SCNC: 5.9 MMOL/L (ref 3.4–5.3)
POTASSIUM SERPL-SCNC: 6 MMOL/L (ref 3.4–5.3)
POTASSIUM SERPL-SCNC: 6.3 MMOL/L (ref 3.4–5.3)
POTASSIUM UR-SCNC: 23 MMOL/L
PROCALCITONIN SERPL-MCNC: 6.36 NG/ML
PROCALCITONIN SERPL-MCNC: 6.7 NG/ML
PROCALCITONIN SERPL-MCNC: 7.13 NG/ML
PROCALCITONIN SERPL-MCNC: 7.58 NG/ML
PROT FLD-MCNC: 0.9 G/DL
PROT FLD-MCNC: 1 G/DL
PROT FLD-MCNC: 1.1 G/DL
PROT SERPL-MCNC: 5.6 G/DL (ref 6.8–8.8)
PROT SERPL-MCNC: 5.7 G/DL (ref 6.8–8.8)
PROT SERPL-MCNC: 5.9 G/DL (ref 6.8–8.8)
PROT SERPL-MCNC: 6 G/DL (ref 6.8–8.8)
PROT SERPL-MCNC: 6 G/DL (ref 6.8–8.8)
PROT SERPL-MCNC: 6.1 G/DL (ref 6.8–8.8)
PROT SERPL-MCNC: 6.2 G/DL (ref 6.8–8.8)
PROT SERPL-MCNC: 6.3 G/DL (ref 6.8–8.8)
PROT SERPL-MCNC: 6.4 G/DL (ref 6.8–8.8)
PROT SERPL-MCNC: 6.5 G/DL (ref 6.8–8.8)
PROT SERPL-MCNC: 6.6 G/DL (ref 6.8–8.8)
PROT SERPL-MCNC: 6.7 G/DL (ref 6.8–8.8)
PROT SERPL-MCNC: 6.8 G/DL (ref 6.8–8.8)
PROT SERPL-MCNC: 7 G/DL (ref 6.8–8.8)
PROT SERPL-MCNC: 7.1 G/DL (ref 6.8–8.8)
PROT SERPL-MCNC: 7.2 G/DL (ref 6.8–8.8)
PROT SERPL-MCNC: 7.4 G/DL (ref 6.8–8.8)
PROT UR-MCNC: 0.43 G/L
PROT/CREAT 24H UR: 0.29 G/G CR (ref 0–0.2)
PTH-INTACT SERPL-MCNC: 63 PG/ML (ref 18–80)
RBC # BLD AUTO: 1.97 10E12/L (ref 4.4–5.9)
RBC # BLD AUTO: 2.01 10E12/L (ref 4.4–5.9)
RBC # BLD AUTO: 2.15 10E12/L (ref 4.4–5.9)
RBC # BLD AUTO: 2.16 10E12/L (ref 4.4–5.9)
RBC # BLD AUTO: 2.29 10E12/L (ref 4.4–5.9)
RBC # BLD AUTO: 2.31 10E12/L (ref 4.4–5.9)
RBC # BLD AUTO: 2.32 10E12/L (ref 4.4–5.9)
RBC # BLD AUTO: 2.33 10E12/L (ref 4.4–5.9)
RBC # BLD AUTO: 2.36 10E12/L (ref 4.4–5.9)
RBC # BLD AUTO: 2.37 10E12/L (ref 4.4–5.9)
RBC # BLD AUTO: 2.38 10E12/L (ref 4.4–5.9)
RBC # BLD AUTO: 2.39 10E12/L (ref 4.4–5.9)
RBC # BLD AUTO: 2.41 10E12/L (ref 4.4–5.9)
RBC # BLD AUTO: 2.42 10E12/L (ref 4.4–5.9)
RBC # BLD AUTO: 2.46 10E12/L (ref 4.4–5.9)
RBC # BLD AUTO: 2.49 10E12/L (ref 4.4–5.9)
RBC # BLD AUTO: 2.5 10E12/L (ref 4.4–5.9)
RBC # BLD AUTO: 2.5 10E12/L (ref 4.4–5.9)
RBC # BLD AUTO: 2.52 10E12/L (ref 4.4–5.9)
RBC # BLD AUTO: 2.52 10E12/L (ref 4.4–5.9)
RBC # BLD AUTO: 2.53 10E12/L (ref 4.4–5.9)
RBC # BLD AUTO: 2.53 10E12/L (ref 4.4–5.9)
RBC # BLD AUTO: 2.56 10E12/L (ref 4.4–5.9)
RBC # BLD AUTO: 2.56 10E12/L (ref 4.4–5.9)
RBC # BLD AUTO: 2.59 10E12/L (ref 4.4–5.9)
RBC # BLD AUTO: 2.59 10E12/L (ref 4.4–5.9)
RBC # BLD AUTO: 2.6 10E12/L (ref 4.4–5.9)
RBC # BLD AUTO: 2.61 10E12/L (ref 4.4–5.9)
RBC # BLD AUTO: 2.66 10E12/L (ref 4.4–5.9)
RBC # BLD AUTO: 2.68 10E12/L (ref 4.4–5.9)
RBC # BLD AUTO: 2.69 10E12/L (ref 4.4–5.9)
RBC # BLD AUTO: 2.7 10E12/L (ref 4.4–5.9)
RBC # BLD AUTO: 2.71 10E12/L (ref 4.4–5.9)
RBC # BLD AUTO: 2.74 10E12/L (ref 4.4–5.9)
RBC # BLD AUTO: 2.75 10E12/L (ref 4.4–5.9)
RBC # BLD AUTO: 2.78 10E12/L (ref 4.4–5.9)
RBC # BLD AUTO: 2.78 10E12/L (ref 4.4–5.9)
RBC # BLD AUTO: 2.85 10E12/L (ref 4.4–5.9)
RBC # BLD AUTO: 2.91 10E12/L (ref 4.4–5.9)
RBC # BLD AUTO: 2.93 10E12/L (ref 4.4–5.9)
RBC # BLD AUTO: 2.93 10E12/L (ref 4.4–5.9)
RBC # BLD AUTO: 2.98 10E12/L (ref 4.4–5.9)
RBC # BLD AUTO: 2.99 10E12/L (ref 4.4–5.9)
RBC # BLD AUTO: 3.08 10E12/L (ref 4.4–5.9)
RBC # FLD: NORMAL /UL
RBC #/AREA URNS AUTO: 0 /HPF (ref 0–2)
RBC #/AREA URNS AUTO: 1 /HPF (ref 0–2)
RBC #/AREA URNS AUTO: 3 /HPF (ref 0–2)
RBC #/AREA URNS AUTO: <1 /HPF (ref 0–2)
RETICS # AUTO: 30.5 10E9/L (ref 25–95)
RETICS # AUTO: 34.3 10E9/L (ref 25–95)
RETICS # AUTO: 38.6 10E9/L (ref 25–95)
RETICS # AUTO: 46.1 10E9/L (ref 25–95)
RETICS # AUTO: 58.2 10E9/L (ref 25–95)
RETICS # AUTO: 63.2 10E9/L (ref 25–95)
RETICS/RBC NFR AUTO: 1.2 % (ref 0.5–2)
RETICS/RBC NFR AUTO: 1.3 % (ref 0.5–2)
RETICS/RBC NFR AUTO: 1.4 % (ref 0.5–2)
RETICS/RBC NFR AUTO: 2.3 % (ref 0.5–2)
RETICS/RBC NFR AUTO: 2.4 % (ref 0.5–2)
RETICS/RBC NFR AUTO: 2.5 % (ref 0.5–2)
RHINOVIRUS RNA SPEC QL NAA+PROBE: NEGATIVE
RSV RNA SPEC QL NAA+PROBE: NEGATIVE
RSV RNA SPEC QL NAA+PROBE: NEGATIVE
SAO2 % BLDV FROM PO2: 73 %
SODIUM SERPL-SCNC: 132 MMOL/L (ref 133–144)
SODIUM SERPL-SCNC: 134 MMOL/L (ref 133–144)
SODIUM SERPL-SCNC: 136 MMOL/L (ref 133–144)
SODIUM SERPL-SCNC: 137 MMOL/L (ref 133–144)
SODIUM SERPL-SCNC: 138 MMOL/L (ref 133–144)
SODIUM SERPL-SCNC: 139 MMOL/L (ref 133–144)
SODIUM SERPL-SCNC: 139 MMOL/L (ref 133–144)
SODIUM SERPL-SCNC: 140 MMOL/L (ref 133–144)
SODIUM SERPL-SCNC: 141 MMOL/L (ref 133–144)
SODIUM SERPL-SCNC: 142 MMOL/L (ref 133–144)
SODIUM SERPL-SCNC: 144 MMOL/L (ref 133–144)
SODIUM SERPL-SCNC: 144 MMOL/L (ref 133–144)
SODIUM SERPL-SCNC: 145 MMOL/L (ref 133–144)
SODIUM SERPL-SCNC: 146 MMOL/L (ref 133–144)
SODIUM SERPL-SCNC: 151 MMOL/L (ref 133–144)
SODIUM UR-SCNC: 102 MMOL/L
SODIUM UR-SCNC: 18 MMOL/L
SODIUM UR-SCNC: 18 MMOL/L
SODIUM UR-SCNC: 20 MMOL/L
SODIUM UR-SCNC: 38 MMOL/L
SOURCE: ABNORMAL
SP GR UR STRIP: 1.01 (ref 1–1.03)
SP GR UR STRIP: 1.02 (ref 1–1.03)
SPECIMEN EXP DATE BLD: NORMAL
SPECIMEN SOURCE FLD: NORMAL
SPECIMEN SOURCE: ABNORMAL
SPECIMEN SOURCE: NORMAL
SQUAMOUS #/AREA URNS AUTO: 1 /HPF (ref 0–1)
SQUAMOUS #/AREA URNS AUTO: 1 /HPF (ref 0–1)
SQUAMOUS #/AREA URNS AUTO: <1 /HPF (ref 0–1)
T4 FREE SERPL-MCNC: 0.8 NG/DL (ref 0.76–1.46)
TIBC SERPL-MCNC: 111 UG/DL (ref 240–430)
TIBC SERPL-MCNC: 145 UG/DL (ref 240–430)
TRANS CELLS #/AREA URNS HPF: 1 /HPF (ref 0–1)
TRANS CELLS #/AREA URNS HPF: <1 /HPF (ref 0–1)
TRANS CELLS #/AREA URNS HPF: <1 /HPF (ref 0–1)
TRANSFERRIN SERPL-MCNC: 255 MG/DL (ref 210–360)
TRANSFERRIN SERPL-MCNC: 89 MG/DL (ref 210–360)
TRANSFUSION STATUS PATIENT QL: NORMAL
TROPONIN I SERPL-MCNC: <0.015 UG/L (ref 0–0.04)
TROPONIN I SERPL-MCNC: <0.015 UG/L (ref 0–0.04)
TSH SERPL DL<=0.005 MIU/L-ACNC: <0.01 MU/L (ref 0.4–4)
UPPER EUS: NORMAL
UPPER GI ENDOSCOPY: NORMAL
URATE SERPL-MCNC: 10.4 MG/DL (ref 3.5–7.2)
UROBILINOGEN UR STRIP-MCNC: NORMAL MG/DL (ref 0–2)
UUN UR-MCNC: 318 MG/DL
UUN/CREAT 24H UR: 6 G/G CR
VANCOMYCIN SERPL-MCNC: 12.8 MG/L
VANCOMYCIN SERPL-MCNC: 17.8 MG/L
VANCOMYCIN SERPL-MCNC: 20.7 MG/L
VANCOMYCIN SERPL-MCNC: 24.9 MG/L
VIT B12 SERPL-MCNC: 506 PG/ML (ref 193–986)
WBC # BLD AUTO: 2.4 10E9/L (ref 4–11)
WBC # BLD AUTO: 2.6 10E9/L (ref 4–11)
WBC # BLD AUTO: 2.7 10E9/L (ref 4–11)
WBC # BLD AUTO: 2.8 10E9/L (ref 4–11)
WBC # BLD AUTO: 2.9 10E9/L (ref 4–11)
WBC # BLD AUTO: 3 10E9/L (ref 4–11)
WBC # BLD AUTO: 3 10E9/L (ref 4–11)
WBC # BLD AUTO: 3.1 10E9/L (ref 4–11)
WBC # BLD AUTO: 3.2 10E9/L (ref 4–11)
WBC # BLD AUTO: 3.4 10E9/L (ref 4–11)
WBC # BLD AUTO: 3.5 10E9/L (ref 4–11)
WBC # BLD AUTO: 3.5 10E9/L (ref 4–11)
WBC # BLD AUTO: 3.6 10E9/L (ref 4–11)
WBC # BLD AUTO: 3.6 10E9/L (ref 4–11)
WBC # BLD AUTO: 3.7 10E9/L (ref 4–11)
WBC # BLD AUTO: 3.7 10E9/L (ref 4–11)
WBC # BLD AUTO: 4 10E9/L (ref 4–11)
WBC # BLD AUTO: 4.1 10E9/L (ref 4–11)
WBC # BLD AUTO: 4.2 10E9/L (ref 4–11)
WBC # BLD AUTO: 4.3 10E9/L (ref 4–11)
WBC # BLD AUTO: 4.5 10E9/L (ref 4–11)
WBC # BLD AUTO: 4.6 10E9/L (ref 4–11)
WBC # BLD AUTO: 4.7 10E9/L (ref 4–11)
WBC # BLD AUTO: 4.8 10E9/L (ref 4–11)
WBC # BLD AUTO: 4.9 10E9/L (ref 4–11)
WBC # BLD AUTO: 5.1 10E9/L (ref 4–11)
WBC # BLD AUTO: 5.3 10E9/L (ref 4–11)
WBC # BLD AUTO: 5.4 10E9/L (ref 4–11)
WBC # BLD AUTO: 5.6 10E9/L (ref 4–11)
WBC # BLD AUTO: 6 10E9/L (ref 4–11)
WBC # BLD AUTO: 6.1 10E9/L (ref 4–11)
WBC # BLD AUTO: 6.3 10E9/L (ref 4–11)
WBC # BLD AUTO: 6.7 10E9/L (ref 4–11)
WBC # BLD AUTO: 7.8 10E9/L (ref 4–11)
WBC # FLD AUTO: 101 /UL
WBC # FLD AUTO: 107 /UL
WBC # FLD AUTO: 133 /UL
WBC # FLD AUTO: 134 /UL
WBC # FLD AUTO: 190 /UL
WBC # FLD AUTO: 24 /UL
WBC # FLD AUTO: 287 /UL
WBC #/AREA URNS AUTO: 1 /HPF (ref 0–5)
WBC #/AREA URNS AUTO: 2 /HPF (ref 0–5)
WBC #/AREA URNS AUTO: 2 /HPF (ref 0–5)
WBC #/AREA URNS AUTO: 3 /HPF (ref 0–5)
WBC #/AREA URNS AUTO: <1 /HPF (ref 0–5)

## 2018-01-01 PROCEDURE — 84300 ASSAY OF URINE SODIUM: CPT | Performed by: INTERNAL MEDICINE

## 2018-01-01 PROCEDURE — 99221 1ST HOSP IP/OBS SF/LOW 40: CPT | Mod: AI | Performed by: PEDIATRICS

## 2018-01-01 PROCEDURE — 27210908 ZZH NEEDLE CR4

## 2018-01-01 PROCEDURE — 84100 ASSAY OF PHOSPHORUS: CPT | Performed by: STUDENT IN AN ORGANIZED HEALTH CARE EDUCATION/TRAINING PROGRAM

## 2018-01-01 PROCEDURE — 40000802 ZZH SITE CHECK

## 2018-01-01 PROCEDURE — 12000025 ZZH R&B TRANSPLANT INTERMEDIATE

## 2018-01-01 PROCEDURE — 84443 ASSAY THYROID STIM HORMONE: CPT | Performed by: INTERNAL MEDICINE

## 2018-01-01 PROCEDURE — 36415 COLL VENOUS BLD VENIPUNCTURE: CPT | Performed by: STUDENT IN AN ORGANIZED HEALTH CARE EDUCATION/TRAINING PROGRAM

## 2018-01-01 PROCEDURE — 12000008 ZZH R&B INTERMEDIATE UMMC

## 2018-01-01 PROCEDURE — A9270 NON-COVERED ITEM OR SERVICE: HCPCS | Mod: GY | Performed by: INTERNAL MEDICINE

## 2018-01-01 PROCEDURE — 25000132 ZZH RX MED GY IP 250 OP 250 PS 637: Mod: GY | Performed by: INTERNAL MEDICINE

## 2018-01-01 PROCEDURE — 84157 ASSAY OF PROTEIN OTHER: CPT | Performed by: INTERNAL MEDICINE

## 2018-01-01 PROCEDURE — 99215 OFFICE O/P EST HI 40 MIN: CPT | Mod: ZP | Performed by: INTERNAL MEDICINE

## 2018-01-01 PROCEDURE — 85025 COMPLETE CBC W/AUTO DIFF WBC: CPT | Performed by: STUDENT IN AN ORGANIZED HEALTH CARE EDUCATION/TRAINING PROGRAM

## 2018-01-01 PROCEDURE — 00000146 ZZHCL STATISTIC GLUCOSE BY METER IP

## 2018-01-01 PROCEDURE — 25000132 ZZH RX MED GY IP 250 OP 250 PS 637: Mod: GY | Performed by: STUDENT IN AN ORGANIZED HEALTH CARE EDUCATION/TRAINING PROGRAM

## 2018-01-01 PROCEDURE — 82728 ASSAY OF FERRITIN: CPT | Performed by: STUDENT IN AN ORGANIZED HEALTH CARE EDUCATION/TRAINING PROGRAM

## 2018-01-01 PROCEDURE — A9270 NON-COVERED ITEM OR SERVICE: HCPCS | Mod: GY | Performed by: STUDENT IN AN ORGANIZED HEALTH CARE EDUCATION/TRAINING PROGRAM

## 2018-01-01 PROCEDURE — 84157 ASSAY OF PROTEIN OTHER: CPT | Performed by: STUDENT IN AN ORGANIZED HEALTH CARE EDUCATION/TRAINING PROGRAM

## 2018-01-01 PROCEDURE — 85045 AUTOMATED RETICULOCYTE COUNT: CPT | Performed by: STUDENT IN AN ORGANIZED HEALTH CARE EDUCATION/TRAINING PROGRAM

## 2018-01-01 PROCEDURE — 89051 BODY FLUID CELL COUNT: CPT | Performed by: INTERNAL MEDICINE

## 2018-01-01 PROCEDURE — 25000128 H RX IP 250 OP 636: Performed by: STUDENT IN AN ORGANIZED HEALTH CARE EDUCATION/TRAINING PROGRAM

## 2018-01-01 PROCEDURE — 86900 BLOOD TYPING SEROLOGIC ABO: CPT | Performed by: INTERNAL MEDICINE

## 2018-01-01 PROCEDURE — 89051 BODY FLUID CELL COUNT: CPT | Performed by: STUDENT IN AN ORGANIZED HEALTH CARE EDUCATION/TRAINING PROGRAM

## 2018-01-01 PROCEDURE — 97116 GAIT TRAINING THERAPY: CPT | Mod: GP

## 2018-01-01 PROCEDURE — 80053 COMPREHEN METABOLIC PANEL: CPT | Performed by: STUDENT IN AN ORGANIZED HEALTH CARE EDUCATION/TRAINING PROGRAM

## 2018-01-01 PROCEDURE — P9047 ALBUMIN (HUMAN), 25%, 50ML: HCPCS | Mod: ZF | Performed by: RADIOLOGY

## 2018-01-01 PROCEDURE — 87086 URINE CULTURE/COLONY COUNT: CPT | Performed by: INTERNAL MEDICINE

## 2018-01-01 PROCEDURE — 70450 CT HEAD/BRAIN W/O DYE: CPT

## 2018-01-01 PROCEDURE — 25000128 H RX IP 250 OP 636: Performed by: EMERGENCY MEDICINE

## 2018-01-01 PROCEDURE — 92526 ORAL FUNCTION THERAPY: CPT | Mod: GN | Performed by: SPEECH-LANGUAGE PATHOLOGIST

## 2018-01-01 PROCEDURE — 40000133 ZZH STATISTIC OT WARD VISIT

## 2018-01-01 PROCEDURE — P9047 ALBUMIN (HUMAN), 25%, 50ML: HCPCS | Performed by: INTERNAL MEDICINE

## 2018-01-01 PROCEDURE — 82570 ASSAY OF URINE CREATININE: CPT | Performed by: INTERNAL MEDICINE

## 2018-01-01 PROCEDURE — 86923 COMPATIBILITY TEST ELECTRIC: CPT | Performed by: EMERGENCY MEDICINE

## 2018-01-01 PROCEDURE — 25000125 ZZHC RX 250

## 2018-01-01 PROCEDURE — 85610 PROTHROMBIN TIME: CPT | Performed by: STUDENT IN AN ORGANIZED HEALTH CARE EDUCATION/TRAINING PROGRAM

## 2018-01-01 PROCEDURE — 85610 PROTHROMBIN TIME: CPT | Performed by: INTERNAL MEDICINE

## 2018-01-01 PROCEDURE — 86850 RBC ANTIBODY SCREEN: CPT | Performed by: INTERNAL MEDICINE

## 2018-01-01 PROCEDURE — A9503 TC99M MEDRONATE: HCPCS

## 2018-01-01 PROCEDURE — 25000125 ZZHC RX 250: Performed by: STUDENT IN AN ORGANIZED HEALTH CARE EDUCATION/TRAINING PROGRAM

## 2018-01-01 PROCEDURE — 40000170 ZZH STATISTIC PRE-PROCEDURE ASSESSMENT II: Performed by: INTERNAL MEDICINE

## 2018-01-01 PROCEDURE — 36415 COLL VENOUS BLD VENIPUNCTURE: CPT | Performed by: EMERGENCY MEDICINE

## 2018-01-01 PROCEDURE — 25000128 H RX IP 250 OP 636: Performed by: INTERNAL MEDICINE

## 2018-01-01 PROCEDURE — A9270 NON-COVERED ITEM OR SERVICE: HCPCS | Mod: GY | Performed by: HOSPITALIST

## 2018-01-01 PROCEDURE — 99239 HOSP IP/OBS DSCHRG MGMT >30: CPT | Mod: GC | Performed by: INTERNAL MEDICINE

## 2018-01-01 PROCEDURE — 87070 CULTURE OTHR SPECIMN AEROBIC: CPT | Performed by: PHYSICIAN ASSISTANT

## 2018-01-01 PROCEDURE — 27210995 ZZH RX 272: Performed by: STUDENT IN AN ORGANIZED HEALTH CARE EDUCATION/TRAINING PROGRAM

## 2018-01-01 PROCEDURE — 89051 BODY FLUID CELL COUNT: CPT | Performed by: PHYSICIAN ASSISTANT

## 2018-01-01 PROCEDURE — 83605 ASSAY OF LACTIC ACID: CPT | Performed by: STUDENT IN AN ORGANIZED HEALTH CARE EDUCATION/TRAINING PROGRAM

## 2018-01-01 PROCEDURE — 99153 MOD SED SAME PHYS/QHP EA: CPT

## 2018-01-01 PROCEDURE — 36415 COLL VENOUS BLD VENIPUNCTURE: CPT | Performed by: INTERNAL MEDICINE

## 2018-01-01 PROCEDURE — 96365 THER/PROPH/DIAG IV INF INIT: CPT | Performed by: EMERGENCY MEDICINE

## 2018-01-01 PROCEDURE — 20000002 ZZH R&B BMT INTERMEDIATE

## 2018-01-01 PROCEDURE — 25000128 H RX IP 250 OP 636

## 2018-01-01 PROCEDURE — 97530 THERAPEUTIC ACTIVITIES: CPT | Mod: GP

## 2018-01-01 PROCEDURE — 85384 FIBRINOGEN ACTIVITY: CPT | Performed by: INTERNAL MEDICINE

## 2018-01-01 PROCEDURE — 83615 LACTATE (LD) (LDH) ENZYME: CPT | Performed by: INTERNAL MEDICINE

## 2018-01-01 PROCEDURE — 84132 ASSAY OF SERUM POTASSIUM: CPT | Performed by: INTERNAL MEDICINE

## 2018-01-01 PROCEDURE — 99285 EMERGENCY DEPT VISIT HI MDM: CPT | Mod: 25 | Performed by: EMERGENCY MEDICINE

## 2018-01-01 PROCEDURE — 99233 SBSQ HOSP IP/OBS HIGH 50: CPT | Mod: GC | Performed by: INTERNAL MEDICINE

## 2018-01-01 PROCEDURE — 12000001 ZZH R&B MED SURG/OB UMMC

## 2018-01-01 PROCEDURE — 71045 X-RAY EXAM CHEST 1 VIEW: CPT

## 2018-01-01 PROCEDURE — 25000128 H RX IP 250 OP 636: Performed by: RADIOLOGY

## 2018-01-01 PROCEDURE — 36416 COLLJ CAPILLARY BLOOD SPEC: CPT | Performed by: INTERNAL MEDICINE

## 2018-01-01 PROCEDURE — 40000556 ZZH STATISTIC PERIPHERAL IV START W US GUIDANCE

## 2018-01-01 PROCEDURE — 85027 COMPLETE CBC AUTOMATED: CPT | Performed by: STUDENT IN AN ORGANIZED HEALTH CARE EDUCATION/TRAINING PROGRAM

## 2018-01-01 PROCEDURE — 99285 EMERGENCY DEPT VISIT HI MDM: CPT | Mod: Z6 | Performed by: EMERGENCY MEDICINE

## 2018-01-01 PROCEDURE — 25000125 ZZHC RX 250: Performed by: INTERNAL MEDICINE

## 2018-01-01 PROCEDURE — 82306 VITAMIN D 25 HYDROXY: CPT | Performed by: INTERNAL MEDICINE

## 2018-01-01 PROCEDURE — 86923 COMPATIBILITY TEST ELECTRIC: CPT | Performed by: STUDENT IN AN ORGANIZED HEALTH CARE EDUCATION/TRAINING PROGRAM

## 2018-01-01 PROCEDURE — 40000275 ZZH STATISTIC RCP TIME EA 10 MIN

## 2018-01-01 PROCEDURE — 86901 BLOOD TYPING SEROLOGIC RH(D): CPT | Performed by: INTERNAL MEDICINE

## 2018-01-01 PROCEDURE — 84132 ASSAY OF SERUM POTASSIUM: CPT | Performed by: STUDENT IN AN ORGANIZED HEALTH CARE EDUCATION/TRAINING PROGRAM

## 2018-01-01 PROCEDURE — 85610 PROTHROMBIN TIME: CPT | Performed by: EMERGENCY MEDICINE

## 2018-01-01 PROCEDURE — P9016 RBC LEUKOCYTES REDUCED: HCPCS | Performed by: STUDENT IN AN ORGANIZED HEALTH CARE EDUCATION/TRAINING PROGRAM

## 2018-01-01 PROCEDURE — G0463 HOSPITAL OUTPT CLINIC VISIT: HCPCS | Mod: ZF

## 2018-01-01 PROCEDURE — 87075 CULTR BACTERIA EXCEPT BLOOD: CPT | Performed by: STUDENT IN AN ORGANIZED HEALTH CARE EDUCATION/TRAINING PROGRAM

## 2018-01-01 PROCEDURE — 0W9G3ZZ DRAINAGE OF PERITONEAL CAVITY, PERCUTANEOUS APPROACH: ICD-10-PCS | Performed by: INTERNAL MEDICINE

## 2018-01-01 PROCEDURE — 25000132 ZZH RX MED GY IP 250 OP 250 PS 637: Mod: GY | Performed by: EMERGENCY MEDICINE

## 2018-01-01 PROCEDURE — 82668 ASSAY OF ERYTHROPOIETIN: CPT | Performed by: INTERNAL MEDICINE

## 2018-01-01 PROCEDURE — 25000132 ZZH RX MED GY IP 250 OP 250 PS 637: Mod: GY | Performed by: MARRIAGE & FAMILY THERAPIST

## 2018-01-01 PROCEDURE — 87077 CULTURE AEROBIC IDENTIFY: CPT | Performed by: EMERGENCY MEDICINE

## 2018-01-01 PROCEDURE — P9047 ALBUMIN (HUMAN), 25%, 50ML: HCPCS | Performed by: STUDENT IN AN ORGANIZED HEALTH CARE EDUCATION/TRAINING PROGRAM

## 2018-01-01 PROCEDURE — P9016 RBC LEUKOCYTES REDUCED: HCPCS | Performed by: INTERNAL MEDICINE

## 2018-01-01 PROCEDURE — C1769 GUIDE WIRE: HCPCS

## 2018-01-01 PROCEDURE — 99223 1ST HOSP IP/OBS HIGH 75: CPT | Performed by: INTERNAL MEDICINE

## 2018-01-01 PROCEDURE — 25000128 H RX IP 250 OP 636: Mod: ZF | Performed by: RADIOLOGY

## 2018-01-01 PROCEDURE — 84466 ASSAY OF TRANSFERRIN: CPT | Performed by: INTERNAL MEDICINE

## 2018-01-01 PROCEDURE — 80053 COMPREHEN METABOLIC PANEL: CPT | Performed by: INTERNAL MEDICINE

## 2018-01-01 PROCEDURE — 43235 EGD DIAGNOSTIC BRUSH WASH: CPT | Performed by: INTERNAL MEDICINE

## 2018-01-01 PROCEDURE — 80048 BASIC METABOLIC PNL TOTAL CA: CPT | Performed by: INTERNAL MEDICINE

## 2018-01-01 PROCEDURE — 40000193 ZZH STATISTIC PT WARD VISIT

## 2018-01-01 PROCEDURE — 84156 ASSAY OF PROTEIN URINE: CPT | Performed by: INTERNAL MEDICINE

## 2018-01-01 PROCEDURE — P9016 RBC LEUKOCYTES REDUCED: HCPCS | Performed by: EMERGENCY MEDICINE

## 2018-01-01 PROCEDURE — 83735 ASSAY OF MAGNESIUM: CPT | Performed by: INTERNAL MEDICINE

## 2018-01-01 PROCEDURE — 12000026 ZZH R&B TRANSPLANT

## 2018-01-01 PROCEDURE — 87070 CULTURE OTHR SPECIMN AEROBIC: CPT | Performed by: STUDENT IN AN ORGANIZED HEALTH CARE EDUCATION/TRAINING PROGRAM

## 2018-01-01 PROCEDURE — 93005 ELECTROCARDIOGRAM TRACING: CPT

## 2018-01-01 PROCEDURE — 99221 1ST HOSP IP/OBS SF/LOW 40: CPT | Mod: AI | Performed by: INTERNAL MEDICINE

## 2018-01-01 PROCEDURE — 36245 INS CATH ABD/L-EXT ART 1ST: CPT

## 2018-01-01 PROCEDURE — 96361 HYDRATE IV INFUSION ADD-ON: CPT | Performed by: INTERNAL MEDICINE

## 2018-01-01 PROCEDURE — 86901 BLOOD TYPING SEROLOGIC RH(D): CPT | Performed by: STUDENT IN AN ORGANIZED HEALTH CARE EDUCATION/TRAINING PROGRAM

## 2018-01-01 PROCEDURE — 97161 PT EVAL LOW COMPLEX 20 MIN: CPT | Mod: GP

## 2018-01-01 PROCEDURE — 25000131 ZZH RX MED GY IP 250 OP 636 PS 637: Mod: GY | Performed by: STUDENT IN AN ORGANIZED HEALTH CARE EDUCATION/TRAINING PROGRAM

## 2018-01-01 PROCEDURE — 83010 ASSAY OF HAPTOGLOBIN QUANT: CPT | Performed by: EMERGENCY MEDICINE

## 2018-01-01 PROCEDURE — 81001 URINALYSIS AUTO W/SCOPE: CPT | Performed by: EMERGENCY MEDICINE

## 2018-01-01 PROCEDURE — 87077 CULTURE AEROBIC IDENTIFY: CPT | Performed by: INTERNAL MEDICINE

## 2018-01-01 PROCEDURE — 85018 HEMOGLOBIN: CPT | Performed by: STUDENT IN AN ORGANIZED HEALTH CARE EDUCATION/TRAINING PROGRAM

## 2018-01-01 PROCEDURE — 83010 ASSAY OF HAPTOGLOBIN QUANT: CPT | Performed by: INTERNAL MEDICINE

## 2018-01-01 PROCEDURE — 99285 EMERGENCY DEPT VISIT HI MDM: CPT | Performed by: EMERGENCY MEDICINE

## 2018-01-01 PROCEDURE — 27210190 US PARACENTESIS

## 2018-01-01 PROCEDURE — 93010 ELECTROCARDIOGRAM REPORT: CPT | Mod: Z6 | Performed by: EMERGENCY MEDICINE

## 2018-01-01 PROCEDURE — 93010 ELECTROCARDIOGRAM REPORT: CPT | Performed by: INTERNAL MEDICINE

## 2018-01-01 PROCEDURE — 25000125 ZZHC RX 250: Mod: ZF | Performed by: RADIOLOGY

## 2018-01-01 PROCEDURE — 84300 ASSAY OF URINE SODIUM: CPT | Performed by: HOSPITALIST

## 2018-01-01 PROCEDURE — 85027 COMPLETE CBC AUTOMATED: CPT | Performed by: INTERNAL MEDICINE

## 2018-01-01 PROCEDURE — 82248 BILIRUBIN DIRECT: CPT | Performed by: EMERGENCY MEDICINE

## 2018-01-01 PROCEDURE — 27210732 ZZH ACCESSORY CR1

## 2018-01-01 PROCEDURE — 82378 CARCINOEMBRYONIC ANTIGEN: CPT | Performed by: STUDENT IN AN ORGANIZED HEALTH CARE EDUCATION/TRAINING PROGRAM

## 2018-01-01 PROCEDURE — 40000141 ZZH STATISTIC PERIPHERAL IV START W/O US GUIDANCE

## 2018-01-01 PROCEDURE — 80053 COMPREHEN METABOLIC PANEL: CPT | Performed by: EMERGENCY MEDICINE

## 2018-01-01 PROCEDURE — 49083 ABD PARACENTESIS W/IMAGING: CPT | Mod: GC | Performed by: HOSPITALIST

## 2018-01-01 PROCEDURE — 82945 GLUCOSE OTHER FLUID: CPT | Performed by: STUDENT IN AN ORGANIZED HEALTH CARE EDUCATION/TRAINING PROGRAM

## 2018-01-01 PROCEDURE — 25000131 ZZH RX MED GY IP 250 OP 636 PS 637: Mod: GY | Performed by: INTERNAL MEDICINE

## 2018-01-01 PROCEDURE — 86923 COMPATIBILITY TEST ELECTRIC: CPT | Performed by: INTERNAL MEDICINE

## 2018-01-01 PROCEDURE — 85730 THROMBOPLASTIN TIME PARTIAL: CPT | Performed by: EMERGENCY MEDICINE

## 2018-01-01 PROCEDURE — 99152 MOD SED SAME PHYS/QHP 5/>YRS: CPT

## 2018-01-01 PROCEDURE — 85049 AUTOMATED PLATELET COUNT: CPT | Performed by: INTERNAL MEDICINE

## 2018-01-01 PROCEDURE — 93312 ECHO TRANSESOPHAGEAL: CPT

## 2018-01-01 PROCEDURE — 81001 URINALYSIS AUTO W/SCOPE: CPT | Performed by: STUDENT IN AN ORGANIZED HEALTH CARE EDUCATION/TRAINING PROGRAM

## 2018-01-01 PROCEDURE — 00000155 ZZHCL STATISTIC H-CELL BLOCK W/STAIN: Performed by: STUDENT IN AN ORGANIZED HEALTH CARE EDUCATION/TRAINING PROGRAM

## 2018-01-01 PROCEDURE — 80053 COMPREHEN METABOLIC PANEL: CPT

## 2018-01-01 PROCEDURE — 82803 BLOOD GASES ANY COMBINATION: CPT | Performed by: STUDENT IN AN ORGANIZED HEALTH CARE EDUCATION/TRAINING PROGRAM

## 2018-01-01 PROCEDURE — 87205 SMEAR GRAM STAIN: CPT | Performed by: INTERNAL MEDICINE

## 2018-01-01 PROCEDURE — 82607 VITAMIN B-12: CPT | Performed by: STUDENT IN AN ORGANIZED HEALTH CARE EDUCATION/TRAINING PROGRAM

## 2018-01-01 PROCEDURE — 00000102 ZZHCL STATISTIC CYTO WRIGHT STAIN TC: Performed by: STUDENT IN AN ORGANIZED HEALTH CARE EDUCATION/TRAINING PROGRAM

## 2018-01-01 PROCEDURE — 86706 HEP B SURFACE ANTIBODY: CPT | Performed by: INTERNAL MEDICINE

## 2018-01-01 PROCEDURE — 82565 ASSAY OF CREATININE: CPT | Performed by: RADIOLOGY

## 2018-01-01 PROCEDURE — 0W9G3ZZ DRAINAGE OF PERITONEAL CAVITY, PERCUTANEOUS APPROACH: ICD-10-PCS | Performed by: PHYSICIAN ASSISTANT

## 2018-01-01 PROCEDURE — 82042 OTHER SOURCE ALBUMIN QUAN EA: CPT | Performed by: STUDENT IN AN ORGANIZED HEALTH CARE EDUCATION/TRAINING PROGRAM

## 2018-01-01 PROCEDURE — 87186 SC STD MICRODIL/AGAR DIL: CPT | Performed by: EMERGENCY MEDICINE

## 2018-01-01 PROCEDURE — 82436 ASSAY OF URINE CHLORIDE: CPT | Performed by: STUDENT IN AN ORGANIZED HEALTH CARE EDUCATION/TRAINING PROGRAM

## 2018-01-01 PROCEDURE — 99233 SBSQ HOSP IP/OBS HIGH 50: CPT | Performed by: INTERNAL MEDICINE

## 2018-01-01 PROCEDURE — 25000125 ZZHC RX 250: Performed by: PEDIATRICS

## 2018-01-01 PROCEDURE — 80048 BASIC METABOLIC PNL TOTAL CA: CPT | Performed by: STUDENT IN AN ORGANIZED HEALTH CARE EDUCATION/TRAINING PROGRAM

## 2018-01-01 PROCEDURE — 99291 CRITICAL CARE FIRST HOUR: CPT | Mod: 25 | Performed by: EMERGENCY MEDICINE

## 2018-01-01 PROCEDURE — 84300 ASSAY OF URINE SODIUM: CPT | Performed by: STUDENT IN AN ORGANIZED HEALTH CARE EDUCATION/TRAINING PROGRAM

## 2018-01-01 PROCEDURE — 88305 TISSUE EXAM BY PATHOLOGIST: CPT | Performed by: STUDENT IN AN ORGANIZED HEALTH CARE EDUCATION/TRAINING PROGRAM

## 2018-01-01 PROCEDURE — 88112 CYTOPATH CELL ENHANCE TECH: CPT | Performed by: STUDENT IN AN ORGANIZED HEALTH CARE EDUCATION/TRAINING PROGRAM

## 2018-01-01 PROCEDURE — 36430 TRANSFUSION BLD/BLD COMPNT: CPT

## 2018-01-01 PROCEDURE — 25000132 ZZH RX MED GY IP 250 OP 250 PS 637: Mod: GY

## 2018-01-01 PROCEDURE — 40000225 ZZH STATISTIC SLP WARD VISIT: Performed by: SPEECH-LANGUAGE PATHOLOGIST

## 2018-01-01 PROCEDURE — 80076 HEPATIC FUNCTION PANEL: CPT | Performed by: STUDENT IN AN ORGANIZED HEALTH CARE EDUCATION/TRAINING PROGRAM

## 2018-01-01 PROCEDURE — 0W9G3ZX DRAINAGE OF PERITONEAL CAVITY, PERCUTANEOUS APPROACH, DIAGNOSTIC: ICD-10-PCS | Performed by: PEDIATRICS

## 2018-01-01 PROCEDURE — 85025 COMPLETE CBC W/AUTO DIFF WBC: CPT | Performed by: EMERGENCY MEDICINE

## 2018-01-01 PROCEDURE — 97110 THERAPEUTIC EXERCISES: CPT | Mod: GP

## 2018-01-01 PROCEDURE — 87800 DETECT AGNT MULT DNA DIREC: CPT | Performed by: EMERGENCY MEDICINE

## 2018-01-01 PROCEDURE — 99239 HOSP IP/OBS DSCHRG MGMT >30: CPT | Performed by: INTERNAL MEDICINE

## 2018-01-01 PROCEDURE — 83540 ASSAY OF IRON: CPT | Performed by: INTERNAL MEDICINE

## 2018-01-01 PROCEDURE — 74176 CT ABD & PELVIS W/O CONTRAST: CPT

## 2018-01-01 PROCEDURE — 49083 ABD PARACENTESIS W/IMAGING: CPT | Performed by: PHYSICIAN ASSISTANT

## 2018-01-01 PROCEDURE — 84133 ASSAY OF URINE POTASSIUM: CPT | Performed by: STUDENT IN AN ORGANIZED HEALTH CARE EDUCATION/TRAINING PROGRAM

## 2018-01-01 PROCEDURE — 93306 TTE W/DOPPLER COMPLETE: CPT | Mod: 26 | Performed by: INTERNAL MEDICINE

## 2018-01-01 PROCEDURE — 83615 LACTATE (LD) (LDH) ENZYME: CPT | Performed by: EMERGENCY MEDICINE

## 2018-01-01 PROCEDURE — 85730 THROMBOPLASTIN TIME PARTIAL: CPT | Performed by: INTERNAL MEDICINE

## 2018-01-01 PROCEDURE — 82728 ASSAY OF FERRITIN: CPT | Performed by: INTERNAL MEDICINE

## 2018-01-01 PROCEDURE — 40000611 ZZHCL STATISTIC MORPHOLOGY W/INTERP HEMEPATH TC 85060: Performed by: INTERNAL MEDICINE

## 2018-01-01 PROCEDURE — 83605 ASSAY OF LACTIC ACID: CPT

## 2018-01-01 PROCEDURE — 84466 ASSAY OF TRANSFERRIN: CPT | Performed by: STUDENT IN AN ORGANIZED HEALTH CARE EDUCATION/TRAINING PROGRAM

## 2018-01-01 PROCEDURE — 93320 DOPPLER ECHO COMPLETE: CPT | Mod: 26 | Performed by: INTERNAL MEDICINE

## 2018-01-01 PROCEDURE — 87070 CULTURE OTHR SPECIMN AEROBIC: CPT | Performed by: INTERNAL MEDICINE

## 2018-01-01 PROCEDURE — 87040 BLOOD CULTURE FOR BACTERIA: CPT | Performed by: EMERGENCY MEDICINE

## 2018-01-01 PROCEDURE — 36415 COLL VENOUS BLD VENIPUNCTURE: CPT | Performed by: HOSPITALIST

## 2018-01-01 PROCEDURE — A9585 GADOBUTROL INJECTION: HCPCS | Performed by: INTERNAL MEDICINE

## 2018-01-01 PROCEDURE — 84145 PROCALCITONIN (PCT): CPT | Performed by: INTERNAL MEDICINE

## 2018-01-01 PROCEDURE — A9270 NON-COVERED ITEM OR SERVICE: HCPCS | Mod: GY | Performed by: EMERGENCY MEDICINE

## 2018-01-01 PROCEDURE — 96375 TX/PRO/DX INJ NEW DRUG ADDON: CPT | Performed by: EMERGENCY MEDICINE

## 2018-01-01 PROCEDURE — 12000006 ZZH R&B IMCU INTERMEDIATE UMMC

## 2018-01-01 PROCEDURE — 27210995 ZZH RX 272

## 2018-01-01 PROCEDURE — 85049 AUTOMATED PLATELET COUNT: CPT | Performed by: RADIOLOGY

## 2018-01-01 PROCEDURE — 97530 THERAPEUTIC ACTIVITIES: CPT | Mod: GO

## 2018-01-01 PROCEDURE — 83735 ASSAY OF MAGNESIUM: CPT | Performed by: STUDENT IN AN ORGANIZED HEALTH CARE EDUCATION/TRAINING PROGRAM

## 2018-01-01 PROCEDURE — 82565 ASSAY OF CREATININE: CPT | Performed by: STUDENT IN AN ORGANIZED HEALTH CARE EDUCATION/TRAINING PROGRAM

## 2018-01-01 PROCEDURE — A9270 NON-COVERED ITEM OR SERVICE: HCPCS | Performed by: INTERNAL MEDICINE

## 2018-01-01 PROCEDURE — 93010 ELECTROCARDIOGRAM REPORT: CPT | Mod: ZP | Performed by: INTERNAL MEDICINE

## 2018-01-01 PROCEDURE — 83970 ASSAY OF PARATHORMONE: CPT | Performed by: INTERNAL MEDICINE

## 2018-01-01 PROCEDURE — 99204 OFFICE O/P NEW MOD 45 MIN: CPT | Mod: GC | Performed by: INTERNAL MEDICINE

## 2018-01-01 PROCEDURE — C1760 CLOSURE DEV, VASC: HCPCS

## 2018-01-01 PROCEDURE — A9270 NON-COVERED ITEM OR SERVICE: HCPCS | Mod: GY

## 2018-01-01 PROCEDURE — 97162 PT EVAL MOD COMPLEX 30 MIN: CPT | Mod: GP

## 2018-01-01 PROCEDURE — 86850 RBC ANTIBODY SCREEN: CPT | Performed by: EMERGENCY MEDICINE

## 2018-01-01 PROCEDURE — 99233 SBSQ HOSP IP/OBS HIGH 50: CPT | Mod: 24 | Performed by: INTERNAL MEDICINE

## 2018-01-01 PROCEDURE — 80202 ASSAY OF VANCOMYCIN: CPT

## 2018-01-01 PROCEDURE — 25000125 ZZHC RX 250: Performed by: EMERGENCY MEDICINE

## 2018-01-01 PROCEDURE — 82247 BILIRUBIN TOTAL: CPT | Performed by: STUDENT IN AN ORGANIZED HEALTH CARE EDUCATION/TRAINING PROGRAM

## 2018-01-01 PROCEDURE — 99233 SBSQ HOSP IP/OBS HIGH 50: CPT | Performed by: CLINICAL NURSE SPECIALIST

## 2018-01-01 PROCEDURE — 82042 OTHER SOURCE ALBUMIN QUAN EA: CPT | Performed by: EMERGENCY MEDICINE

## 2018-01-01 PROCEDURE — 85045 AUTOMATED RETICULOCYTE COUNT: CPT | Performed by: EMERGENCY MEDICINE

## 2018-01-01 PROCEDURE — 82140 ASSAY OF AMMONIA: CPT | Performed by: EMERGENCY MEDICINE

## 2018-01-01 PROCEDURE — 27210780 ZZH KIT CR3

## 2018-01-01 PROCEDURE — 0W9G3ZX DRAINAGE OF PERITONEAL CAVITY, PERCUTANEOUS APPROACH, DIAGNOSTIC: ICD-10-PCS | Performed by: INTERNAL MEDICINE

## 2018-01-01 PROCEDURE — 96374 THER/PROPH/DIAG INJ IV PUSH: CPT | Performed by: EMERGENCY MEDICINE

## 2018-01-01 PROCEDURE — 87040 BLOOD CULTURE FOR BACTERIA: CPT | Performed by: INTERNAL MEDICINE

## 2018-01-01 PROCEDURE — 85018 HEMOGLOBIN: CPT | Performed by: INTERNAL MEDICINE

## 2018-01-01 PROCEDURE — 84484 ASSAY OF TROPONIN QUANT: CPT | Performed by: EMERGENCY MEDICINE

## 2018-01-01 PROCEDURE — 87040 BLOOD CULTURE FOR BACTERIA: CPT | Performed by: STUDENT IN AN ORGANIZED HEALTH CARE EDUCATION/TRAINING PROGRAM

## 2018-01-01 PROCEDURE — 49083 ABD PARACENTESIS W/IMAGING: CPT | Performed by: PEDIATRICS

## 2018-01-01 PROCEDURE — 25000132 ZZH RX MED GY IP 250 OP 250 PS 637: Mod: GY | Performed by: HOSPITALIST

## 2018-01-01 PROCEDURE — 80202 ASSAY OF VANCOMYCIN: CPT | Performed by: INTERNAL MEDICINE

## 2018-01-01 PROCEDURE — 78306 BONE IMAGING WHOLE BODY: CPT

## 2018-01-01 PROCEDURE — 99233 SBSQ HOSP IP/OBS HIGH 50: CPT | Mod: GC

## 2018-01-01 PROCEDURE — 49082 ABD PARACENTESIS: CPT | Mod: Z6 | Performed by: EMERGENCY MEDICINE

## 2018-01-01 PROCEDURE — 84100 ASSAY OF PHOSPHORUS: CPT | Performed by: INTERNAL MEDICINE

## 2018-01-01 PROCEDURE — 37000008 ZZH ANESTHESIA TECHNICAL FEE, 1ST 30 MIN: Performed by: INTERNAL MEDICINE

## 2018-01-01 PROCEDURE — 93975 VASCULAR STUDY: CPT | Mod: TC

## 2018-01-01 PROCEDURE — 99232 SBSQ HOSP IP/OBS MODERATE 35: CPT | Mod: GC

## 2018-01-01 PROCEDURE — 85610 PROTHROMBIN TIME: CPT | Performed by: RADIOLOGY

## 2018-01-01 PROCEDURE — A9270 NON-COVERED ITEM OR SERVICE: HCPCS | Mod: GY | Performed by: MARRIAGE & FAMILY THERAPIST

## 2018-01-01 PROCEDURE — 93005 ELECTROCARDIOGRAM TRACING: CPT | Mod: ZF

## 2018-01-01 PROCEDURE — 86900 BLOOD TYPING SEROLOGIC ABO: CPT | Performed by: STUDENT IN AN ORGANIZED HEALTH CARE EDUCATION/TRAINING PROGRAM

## 2018-01-01 PROCEDURE — 81003 URINALYSIS AUTO W/O SCOPE: CPT | Performed by: HOSPITALIST

## 2018-01-01 PROCEDURE — 93010 ELECTROCARDIOGRAM REPORT: CPT | Mod: 76 | Performed by: INTERNAL MEDICINE

## 2018-01-01 PROCEDURE — 87086 URINE CULTURE/COLONY COUNT: CPT | Performed by: STUDENT IN AN ORGANIZED HEALTH CARE EDUCATION/TRAINING PROGRAM

## 2018-01-01 PROCEDURE — 36415 COLL VENOUS BLD VENIPUNCTURE: CPT

## 2018-01-01 PROCEDURE — 83880 ASSAY OF NATRIURETIC PEPTIDE: CPT | Performed by: STUDENT IN AN ORGANIZED HEALTH CARE EDUCATION/TRAINING PROGRAM

## 2018-01-01 PROCEDURE — 85004 AUTOMATED DIFF WBC COUNT: CPT | Performed by: INTERNAL MEDICINE

## 2018-01-01 PROCEDURE — 99223 1ST HOSP IP/OBS HIGH 75: CPT | Mod: AI | Performed by: INTERNAL MEDICINE

## 2018-01-01 PROCEDURE — 25000125 ZZHC RX 250: Performed by: NURSE ANESTHETIST, CERTIFIED REGISTERED

## 2018-01-01 PROCEDURE — 86901 BLOOD TYPING SEROLOGIC RH(D): CPT | Performed by: EMERGENCY MEDICINE

## 2018-01-01 PROCEDURE — 85025 COMPLETE CBC W/AUTO DIFF WBC: CPT | Performed by: INTERNAL MEDICINE

## 2018-01-01 PROCEDURE — 86666 EHRLICHIA ANTIBODY: CPT | Performed by: STUDENT IN AN ORGANIZED HEALTH CARE EDUCATION/TRAINING PROGRAM

## 2018-01-01 PROCEDURE — 87641 MR-STAPH DNA AMP PROBE: CPT | Performed by: INTERNAL MEDICINE

## 2018-01-01 PROCEDURE — 85045 AUTOMATED RETICULOCYTE COUNT: CPT | Performed by: INTERNAL MEDICINE

## 2018-01-01 PROCEDURE — 99223 1ST HOSP IP/OBS HIGH 75: CPT | Performed by: CLINICAL NURSE SPECIALIST

## 2018-01-01 PROCEDURE — 82746 ASSAY OF FOLIC ACID SERUM: CPT | Performed by: STUDENT IN AN ORGANIZED HEALTH CARE EDUCATION/TRAINING PROGRAM

## 2018-01-01 PROCEDURE — 86850 RBC ANTIBODY SCREEN: CPT | Performed by: STUDENT IN AN ORGANIZED HEALTH CARE EDUCATION/TRAINING PROGRAM

## 2018-01-01 PROCEDURE — 80321 ALCOHOLS BIOMARKERS 1OR 2: CPT | Performed by: STUDENT IN AN ORGANIZED HEALTH CARE EDUCATION/TRAINING PROGRAM

## 2018-01-01 PROCEDURE — 93010 ELECTROCARDIOGRAM REPORT: CPT | Mod: Z6 | Performed by: INTERNAL MEDICINE

## 2018-01-01 PROCEDURE — 82570 ASSAY OF URINE CREATININE: CPT | Performed by: HOSPITALIST

## 2018-01-01 PROCEDURE — 0W9G3ZZ DRAINAGE OF PERITONEAL CAVITY, PERCUTANEOUS APPROACH: ICD-10-PCS | Performed by: HOSPITALIST

## 2018-01-01 PROCEDURE — 25000125 ZZHC RX 250: Performed by: RADIOLOGY

## 2018-01-01 PROCEDURE — 27210804 ZZH SHEATH CR3

## 2018-01-01 PROCEDURE — 76700 US EXAM ABDOM COMPLETE: CPT | Mod: XS

## 2018-01-01 PROCEDURE — 99285 EMERGENCY DEPT VISIT HI MDM: CPT | Mod: 25 | Performed by: INTERNAL MEDICINE

## 2018-01-01 PROCEDURE — 85260 CLOT FACTOR X STUART-POWER: CPT | Performed by: INTERNAL MEDICINE

## 2018-01-01 PROCEDURE — 49083 ABD PARACENTESIS W/IMAGING: CPT | Performed by: INTERNAL MEDICINE

## 2018-01-01 PROCEDURE — 82803 BLOOD GASES ANY COMBINATION: CPT | Performed by: INTERNAL MEDICINE

## 2018-01-01 PROCEDURE — 83735 ASSAY OF MAGNESIUM: CPT | Performed by: EMERGENCY MEDICINE

## 2018-01-01 PROCEDURE — 85049 AUTOMATED PLATELET COUNT: CPT | Performed by: HOSPITALIST

## 2018-01-01 PROCEDURE — 84295 ASSAY OF SERUM SODIUM: CPT | Performed by: STUDENT IN AN ORGANIZED HEALTH CARE EDUCATION/TRAINING PROGRAM

## 2018-01-01 PROCEDURE — 99232 SBSQ HOSP IP/OBS MODERATE 35: CPT | Mod: GC | Performed by: INTERNAL MEDICINE

## 2018-01-01 PROCEDURE — 36000053 ZZH SURGERY LEVEL 2 EA 15 ADDTL MIN - UMMC: Performed by: INTERNAL MEDICINE

## 2018-01-01 PROCEDURE — 87798 DETECT AGENT NOS DNA AMP: CPT | Performed by: STUDENT IN AN ORGANIZED HEALTH CARE EDUCATION/TRAINING PROGRAM

## 2018-01-01 PROCEDURE — 49083 ABD PARACENTESIS W/IMAGING: CPT

## 2018-01-01 PROCEDURE — 27210905 ZZH KIT CR7

## 2018-01-01 PROCEDURE — 25800025 ZZH RX 258: Performed by: INTERNAL MEDICINE

## 2018-01-01 PROCEDURE — 40000611 ZZHCL STATISTIC MORPHOLOGY W/INTERP HEMEPATH TC 85060: Performed by: STUDENT IN AN ORGANIZED HEALTH CARE EDUCATION/TRAINING PROGRAM

## 2018-01-01 PROCEDURE — 93005 ELECTROCARDIOGRAM TRACING: CPT | Performed by: INTERNAL MEDICINE

## 2018-01-01 PROCEDURE — 71260 CT THORAX DX C+: CPT

## 2018-01-01 PROCEDURE — 75726 ARTERY X-RAYS ABDOMEN: CPT

## 2018-01-01 PROCEDURE — 40000556 ZZH STATISTIC PERIPHERAL IV START W US GUIDANCE: Mod: ZF

## 2018-01-01 PROCEDURE — A9585 GADOBUTROL INJECTION: HCPCS | Performed by: RADIOLOGY

## 2018-01-01 PROCEDURE — 87186 SC STD MICRODIL/AGAR DIL: CPT | Performed by: INTERNAL MEDICINE

## 2018-01-01 PROCEDURE — 86900 BLOOD TYPING SEROLOGIC ABO: CPT | Performed by: EMERGENCY MEDICINE

## 2018-01-01 PROCEDURE — 81001 URINALYSIS AUTO W/SCOPE: CPT | Performed by: INTERNAL MEDICINE

## 2018-01-01 PROCEDURE — 84540 ASSAY OF URINE/UREA-N: CPT | Performed by: STUDENT IN AN ORGANIZED HEALTH CARE EDUCATION/TRAINING PROGRAM

## 2018-01-01 PROCEDURE — 86803 HEPATITIS C AB TEST: CPT | Performed by: INTERNAL MEDICINE

## 2018-01-01 PROCEDURE — 85260 CLOT FACTOR X STUART-POWER: CPT | Performed by: STUDENT IN AN ORGANIZED HEALTH CARE EDUCATION/TRAINING PROGRAM

## 2018-01-01 PROCEDURE — 34300033 ZZH RX 343

## 2018-01-01 PROCEDURE — 93005 ELECTROCARDIOGRAM TRACING: CPT | Performed by: EMERGENCY MEDICINE

## 2018-01-01 PROCEDURE — 25000128 H RX IP 250 OP 636: Performed by: GENERAL ACUTE CARE HOSPITAL

## 2018-01-01 PROCEDURE — 82803 BLOOD GASES ANY COMBINATION: CPT

## 2018-01-01 PROCEDURE — 99223 1ST HOSP IP/OBS HIGH 75: CPT | Mod: 24 | Performed by: INTERNAL MEDICINE

## 2018-01-01 PROCEDURE — 82962 GLUCOSE BLOOD TEST: CPT

## 2018-01-01 PROCEDURE — 99291 CRITICAL CARE FIRST HOUR: CPT | Mod: 25 | Performed by: INTERNAL MEDICINE

## 2018-01-01 PROCEDURE — 87070 CULTURE OTHR SPECIMN AEROBIC: CPT | Performed by: EMERGENCY MEDICINE

## 2018-01-01 PROCEDURE — 36247 INS CATH ABD/L-EXT ART 3RD: CPT

## 2018-01-01 PROCEDURE — 93306 TTE W/DOPPLER COMPLETE: CPT

## 2018-01-01 PROCEDURE — 36430 TRANSFUSION BLD/BLD COMPNT: CPT | Performed by: EMERGENCY MEDICINE

## 2018-01-01 PROCEDURE — 99221 1ST HOSP IP/OBS SF/LOW 40: CPT | Mod: GC | Performed by: INTERNAL MEDICINE

## 2018-01-01 PROCEDURE — 87086 URINE CULTURE/COLONY COUNT: CPT | Performed by: EMERGENCY MEDICINE

## 2018-01-01 PROCEDURE — 83550 IRON BINDING TEST: CPT | Performed by: INTERNAL MEDICINE

## 2018-01-01 PROCEDURE — 84145 PROCALCITONIN (PCT): CPT | Performed by: STUDENT IN AN ORGANIZED HEALTH CARE EDUCATION/TRAINING PROGRAM

## 2018-01-01 PROCEDURE — 84550 ASSAY OF BLOOD/URIC ACID: CPT | Performed by: STUDENT IN AN ORGANIZED HEALTH CARE EDUCATION/TRAINING PROGRAM

## 2018-01-01 PROCEDURE — 49083 ABD PARACENTESIS W/IMAGING: CPT | Mod: GC | Performed by: INTERNAL MEDICINE

## 2018-01-01 PROCEDURE — 36000051 ZZH SURGERY LEVEL 2 1ST 30 MIN - UMMC: Performed by: INTERNAL MEDICINE

## 2018-01-01 PROCEDURE — 96366 THER/PROPH/DIAG IV INF ADDON: CPT | Performed by: EMERGENCY MEDICINE

## 2018-01-01 PROCEDURE — 78597 LUNG PERFUSION DIFFERENTIAL: CPT

## 2018-01-01 PROCEDURE — 27210794 ZZH OR GENERAL SUPPLY STERILE: Performed by: INTERNAL MEDICINE

## 2018-01-01 PROCEDURE — 40000257 ZZH STATISTIC CONSULT NO CHARGE VASC ACCESS

## 2018-01-01 PROCEDURE — 86301 IMMUNOASSAY TUMOR CA 19-9: CPT | Performed by: STUDENT IN AN ORGANIZED HEALTH CARE EDUCATION/TRAINING PROGRAM

## 2018-01-01 PROCEDURE — 93312 ECHO TRANSESOPHAGEAL: CPT | Mod: 26 | Performed by: INTERNAL MEDICINE

## 2018-01-01 PROCEDURE — 87205 SMEAR GRAM STAIN: CPT | Performed by: STUDENT IN AN ORGANIZED HEALTH CARE EDUCATION/TRAINING PROGRAM

## 2018-01-01 PROCEDURE — 83615 LACTATE (LD) (LDH) ENZYME: CPT | Performed by: STUDENT IN AN ORGANIZED HEALTH CARE EDUCATION/TRAINING PROGRAM

## 2018-01-01 PROCEDURE — 27210995 ZZH RX 272: Performed by: INTERNAL MEDICINE

## 2018-01-01 PROCEDURE — 85384 FIBRINOGEN ACTIVITY: CPT | Performed by: STUDENT IN AN ORGANIZED HEALTH CARE EDUCATION/TRAINING PROGRAM

## 2018-01-01 PROCEDURE — 99205 OFFICE O/P NEW HI 60 MIN: CPT | Mod: GC | Performed by: INTERNAL MEDICINE

## 2018-01-01 PROCEDURE — 86704 HEP B CORE ANTIBODY TOTAL: CPT | Performed by: INTERNAL MEDICINE

## 2018-01-01 PROCEDURE — 84132 ASSAY OF SERUM POTASSIUM: CPT | Performed by: EMERGENCY MEDICINE

## 2018-01-01 PROCEDURE — 82140 ASSAY OF AMMONIA: CPT | Performed by: STUDENT IN AN ORGANIZED HEALTH CARE EDUCATION/TRAINING PROGRAM

## 2018-01-01 PROCEDURE — 25000125 ZZHC RX 250: Performed by: PHYSICIAN ASSISTANT

## 2018-01-01 PROCEDURE — 99239 HOSP IP/OBS DSCHRG MGMT >30: CPT | Mod: 24 | Performed by: INTERNAL MEDICINE

## 2018-01-01 PROCEDURE — 96360 HYDRATION IV INFUSION INIT: CPT | Performed by: INTERNAL MEDICINE

## 2018-01-01 PROCEDURE — 49082 ABD PARACENTESIS: CPT | Performed by: EMERGENCY MEDICINE

## 2018-01-01 PROCEDURE — 87340 HEPATITIS B SURFACE AG IA: CPT | Performed by: INTERNAL MEDICINE

## 2018-01-01 PROCEDURE — 25800025 ZZH RX 258

## 2018-01-01 PROCEDURE — 74183 MRI ABD W/O CNTR FLWD CNTR: CPT

## 2018-01-01 PROCEDURE — 80069 RENAL FUNCTION PANEL: CPT | Performed by: INTERNAL MEDICINE

## 2018-01-01 PROCEDURE — 87205 SMEAR GRAM STAIN: CPT | Performed by: EMERGENCY MEDICINE

## 2018-01-01 PROCEDURE — 82140 ASSAY OF AMMONIA: CPT | Performed by: INTERNAL MEDICINE

## 2018-01-01 PROCEDURE — 99233 SBSQ HOSP IP/OBS HIGH 50: CPT

## 2018-01-01 PROCEDURE — 82105 ALPHA-FETOPROTEIN SERUM: CPT | Performed by: EMERGENCY MEDICINE

## 2018-01-01 PROCEDURE — 93325 DOPPLER ECHO COLOR FLOW MAPG: CPT | Mod: 26 | Performed by: INTERNAL MEDICINE

## 2018-01-01 PROCEDURE — 25800025 ZZH RX 258: Performed by: STUDENT IN AN ORGANIZED HEALTH CARE EDUCATION/TRAINING PROGRAM

## 2018-01-01 PROCEDURE — 40000168 ZZH STATISTIC PP CARE STAGE 3

## 2018-01-01 PROCEDURE — 99292 CRITICAL CARE ADDL 30 MIN: CPT | Mod: 25 | Performed by: EMERGENCY MEDICINE

## 2018-01-01 PROCEDURE — 89051 BODY FLUID CELL COUNT: CPT | Performed by: EMERGENCY MEDICINE

## 2018-01-01 PROCEDURE — 97535 SELF CARE MNGMENT TRAINING: CPT | Mod: GO

## 2018-01-01 PROCEDURE — 80202 ASSAY OF VANCOMYCIN: CPT | Performed by: STUDENT IN AN ORGANIZED HEALTH CARE EDUCATION/TRAINING PROGRAM

## 2018-01-01 PROCEDURE — 85027 COMPLETE CBC AUTOMATED: CPT

## 2018-01-01 PROCEDURE — 80076 HEPATIC FUNCTION PANEL: CPT | Performed by: INTERNAL MEDICINE

## 2018-01-01 PROCEDURE — 71000014 ZZH RECOVERY PHASE 1 LEVEL 2 FIRST HR: Performed by: INTERNAL MEDICINE

## 2018-01-01 PROCEDURE — 83935 ASSAY OF URINE OSMOLALITY: CPT | Performed by: INTERNAL MEDICINE

## 2018-01-01 PROCEDURE — 25800025 ZZH RX 258: Performed by: RADIOLOGY

## 2018-01-01 PROCEDURE — A9540 TC99M MAA: HCPCS | Performed by: RADIOLOGY

## 2018-01-01 PROCEDURE — 87640 STAPH A DNA AMP PROBE: CPT | Performed by: INTERNAL MEDICINE

## 2018-01-01 PROCEDURE — 84439 ASSAY OF FREE THYROXINE: CPT | Performed by: INTERNAL MEDICINE

## 2018-01-01 PROCEDURE — 87798 DETECT AGENT NOS DNA AMP: CPT | Performed by: EMERGENCY MEDICINE

## 2018-01-01 PROCEDURE — 25000131 ZZH RX MED GY IP 250 OP 636 PS 637: Mod: GY | Performed by: EMERGENCY MEDICINE

## 2018-01-01 PROCEDURE — 96361 HYDRATE IV INFUSION ADD-ON: CPT | Performed by: EMERGENCY MEDICINE

## 2018-01-01 PROCEDURE — 25000128 H RX IP 250 OP 636: Performed by: NURSE ANESTHETIST, CERTIFIED REGISTERED

## 2018-01-01 PROCEDURE — 97165 OT EVAL LOW COMPLEX 30 MIN: CPT | Mod: GO

## 2018-01-01 PROCEDURE — 82105 ALPHA-FETOPROTEIN SERUM: CPT | Performed by: STUDENT IN AN ORGANIZED HEALTH CARE EDUCATION/TRAINING PROGRAM

## 2018-01-01 PROCEDURE — C1887 CATHETER, GUIDING: HCPCS

## 2018-01-01 PROCEDURE — G0500 MOD SEDAT ENDO SERVICE >5YRS: HCPCS | Performed by: INTERNAL MEDICINE

## 2018-01-01 PROCEDURE — 83690 ASSAY OF LIPASE: CPT | Performed by: EMERGENCY MEDICINE

## 2018-01-01 PROCEDURE — 99207 ZZC CDG-PROCEDURE CODE ADDED/ADJ: CPT | Performed by: PHYSICIAN ASSISTANT

## 2018-01-01 PROCEDURE — G0463 HOSPITAL OUTPT CLINIC VISIT: HCPCS

## 2018-01-01 PROCEDURE — 92610 EVALUATE SWALLOWING FUNCTION: CPT | Mod: GN | Performed by: SPEECH-LANGUAGE PATHOLOGIST

## 2018-01-01 PROCEDURE — 87800 DETECT AGNT MULT DNA DIREC: CPT | Performed by: INTERNAL MEDICINE

## 2018-01-01 PROCEDURE — 87633 RESP VIRUS 12-25 TARGETS: CPT | Performed by: STUDENT IN AN ORGANIZED HEALTH CARE EDUCATION/TRAINING PROGRAM

## 2018-01-01 PROCEDURE — 34300033 ZZH RX 343: Performed by: RADIOLOGY

## 2018-01-01 PROCEDURE — 37000009 ZZH ANESTHESIA TECHNICAL FEE, EACH ADDTL 15 MIN: Performed by: INTERNAL MEDICINE

## 2018-01-01 PROCEDURE — 87205 SMEAR GRAM STAIN: CPT | Performed by: PHYSICIAN ASSISTANT

## 2018-01-01 PROCEDURE — 85027 COMPLETE CBC AUTOMATED: CPT | Performed by: RADIOLOGY

## 2018-01-01 RX ORDER — NALOXONE HYDROCHLORIDE 0.4 MG/ML
.1-.4 INJECTION, SOLUTION INTRAMUSCULAR; INTRAVENOUS; SUBCUTANEOUS
Status: DISCONTINUED | OUTPATIENT
Start: 2018-01-01 | End: 2018-01-01 | Stop reason: HOSPADM

## 2018-01-01 RX ORDER — METOPROLOL TARTRATE 25 MG/1
25 TABLET, FILM COATED ORAL 2 TIMES DAILY
Qty: 60 TABLET | Refills: 1 | Status: ON HOLD | OUTPATIENT
Start: 2018-01-01 | End: 2018-01-01

## 2018-01-01 RX ORDER — ALBUMIN (HUMAN) 12.5 G/50ML
12.5 SOLUTION INTRAVENOUS 4 TIMES DAILY PRN
Status: DISCONTINUED | OUTPATIENT
Start: 2018-01-01 | End: 2018-01-01 | Stop reason: HOSPADM

## 2018-01-01 RX ORDER — AZITHROMYCIN 250 MG/1
500 TABLET, FILM COATED ORAL DAILY
Status: DISCONTINUED | OUTPATIENT
Start: 2018-01-01 | End: 2018-01-01 | Stop reason: HOSPADM

## 2018-01-01 RX ORDER — PANTOPRAZOLE SODIUM 40 MG/1
40 TABLET, DELAYED RELEASE ORAL 2 TIMES DAILY
Status: DISCONTINUED | OUTPATIENT
Start: 2018-01-01 | End: 2018-01-01 | Stop reason: HOSPADM

## 2018-01-01 RX ORDER — SODIUM BICARBONATE 650 MG/1
1300 TABLET ORAL 2 TIMES DAILY
Qty: 120 TABLET | Refills: 1 | Status: SHIPPED | OUTPATIENT
Start: 2018-01-01

## 2018-01-01 RX ORDER — DEXTROSE MONOHYDRATE 25 G/50ML
25-50 INJECTION, SOLUTION INTRAVENOUS
Status: DISCONTINUED | OUTPATIENT
Start: 2018-01-01 | End: 2018-01-01 | Stop reason: HOSPADM

## 2018-01-01 RX ORDER — DILTIAZEM HCL 60 MG
60 TABLET ORAL EVERY 6 HOURS SCHEDULED
Status: CANCELLED | OUTPATIENT
Start: 2018-01-01

## 2018-01-01 RX ORDER — METOPROLOL TARTRATE 25 MG/1
25 TABLET, FILM COATED ORAL 2 TIMES DAILY
Status: DISCONTINUED | OUTPATIENT
Start: 2018-01-01 | End: 2018-01-01

## 2018-01-01 RX ORDER — SODIUM CHLORIDE, SODIUM LACTATE, POTASSIUM CHLORIDE, CALCIUM CHLORIDE 600; 310; 30; 20 MG/100ML; MG/100ML; MG/100ML; MG/100ML
INJECTION, SOLUTION INTRAVENOUS CONTINUOUS
Status: DISCONTINUED | OUTPATIENT
Start: 2018-01-01 | End: 2018-01-01

## 2018-01-01 RX ORDER — SODIUM CHLORIDE 9 MG/ML
INJECTION, SOLUTION INTRAVENOUS CONTINUOUS
Status: DISCONTINUED | OUTPATIENT
Start: 2018-01-01 | End: 2018-01-01

## 2018-01-01 RX ORDER — LACTULOSE 10 G/15ML
20 SOLUTION ORAL 3 TIMES DAILY
Qty: 2700 ML | Refills: 1 | Status: SHIPPED | OUTPATIENT
Start: 2018-01-01

## 2018-01-01 RX ORDER — LIDOCAINE 40 MG/G
CREAM TOPICAL
Status: DISCONTINUED | OUTPATIENT
Start: 2018-01-01 | End: 2018-01-01 | Stop reason: HOSPADM

## 2018-01-01 RX ORDER — CARVEDILOL 6.25 MG/1
6.25 TABLET ORAL 2 TIMES DAILY WITH MEALS
Status: DISCONTINUED | OUTPATIENT
Start: 2018-01-01 | End: 2018-01-01

## 2018-01-01 RX ORDER — FENTANYL CITRATE 50 UG/ML
25-50 INJECTION, SOLUTION INTRAMUSCULAR; INTRAVENOUS
Status: DISCONTINUED | OUTPATIENT
Start: 2018-01-01 | End: 2018-01-01 | Stop reason: HOSPADM

## 2018-01-01 RX ORDER — ALLOPURINOL 100 MG/1
100 TABLET ORAL DAILY
Qty: 90 TABLET | Refills: 0 | Status: SHIPPED | OUTPATIENT
Start: 2018-01-01

## 2018-01-01 RX ORDER — LACTULOSE 10 G/15ML
200 SOLUTION ORAL EVERY 4 HOURS PRN
Status: DISCONTINUED | OUTPATIENT
Start: 2018-01-01 | End: 2018-01-01 | Stop reason: HOSPADM

## 2018-01-01 RX ORDER — FENTANYL CITRATE 50 UG/ML
INJECTION, SOLUTION INTRAMUSCULAR; INTRAVENOUS PRN
Status: DISCONTINUED | OUTPATIENT
Start: 2018-01-01 | End: 2018-01-01 | Stop reason: HOSPADM

## 2018-01-01 RX ORDER — MIDODRINE HYDROCHLORIDE 2.5 MG/1
2.5 TABLET ORAL
Status: DISCONTINUED | OUTPATIENT
Start: 2018-01-01 | End: 2018-01-01 | Stop reason: HOSPADM

## 2018-01-01 RX ORDER — FUROSEMIDE 40 MG
40 TABLET ORAL DAILY
Status: DISCONTINUED | OUTPATIENT
Start: 2018-01-01 | End: 2018-01-01 | Stop reason: HOSPADM

## 2018-01-01 RX ORDER — GADOBUTROL 604.72 MG/ML
10 INJECTION INTRAVENOUS ONCE
Status: DISCONTINUED | OUTPATIENT
Start: 2018-01-01 | End: 2018-01-01

## 2018-01-01 RX ORDER — MORPHINE SULFATE 10 MG/5ML
5-10 SOLUTION ORAL
Status: DISCONTINUED | OUTPATIENT
Start: 2018-01-01 | End: 2018-01-01

## 2018-01-01 RX ORDER — IPRATROPIUM BROMIDE AND ALBUTEROL SULFATE 2.5; .5 MG/3ML; MG/3ML
3 SOLUTION RESPIRATORY (INHALATION) EVERY 4 HOURS PRN
Status: DISCONTINUED | OUTPATIENT
Start: 2018-01-01 | End: 2018-01-01 | Stop reason: HOSPADM

## 2018-01-01 RX ORDER — LACTULOSE 10 G/15ML
200 SOLUTION ORAL EVERY 4 HOURS
Status: DISCONTINUED | OUTPATIENT
Start: 2018-01-01 | End: 2018-01-01

## 2018-01-01 RX ORDER — INSULIN GLARGINE 100 [IU]/ML
18 INJECTION, SOLUTION SUBCUTANEOUS AT BEDTIME
Qty: 3 ML | Refills: 1 | Status: CANCELLED | OUTPATIENT
Start: 2018-01-01

## 2018-01-01 RX ORDER — CARVEDILOL 6.25 MG/1
6.25 TABLET ORAL 2 TIMES DAILY WITH MEALS
Status: DISCONTINUED | OUTPATIENT
Start: 2018-01-01 | End: 2018-01-01 | Stop reason: HOSPADM

## 2018-01-01 RX ORDER — LACTULOSE 10 G/15ML
20 SOLUTION ORAL 3 TIMES DAILY
Status: DISCONTINUED | OUTPATIENT
Start: 2018-01-01 | End: 2018-01-01 | Stop reason: HOSPADM

## 2018-01-01 RX ORDER — PIPERACILLIN SODIUM, TAZOBACTAM SODIUM 4; .5 G/20ML; G/20ML
4.5 INJECTION, POWDER, LYOPHILIZED, FOR SOLUTION INTRAVENOUS EVERY 6 HOURS
Status: DISCONTINUED | OUTPATIENT
Start: 2018-01-01 | End: 2018-01-01

## 2018-01-01 RX ORDER — LIDOCAINE HYDROCHLORIDE 10 MG/ML
10 INJECTION, SOLUTION EPIDURAL; INFILTRATION; INTRACAUDAL; PERINEURAL ONCE
Status: COMPLETED | OUTPATIENT
Start: 2018-01-01 | End: 2018-01-01

## 2018-01-01 RX ORDER — LEVOTHYROXINE SODIUM 125 UG/1
125 TABLET ORAL DAILY
Status: DISCONTINUED | OUTPATIENT
Start: 2018-01-01 | End: 2018-01-01 | Stop reason: HOSPADM

## 2018-01-01 RX ORDER — SPIRONOLACTONE 100 MG/1
100 TABLET, FILM COATED ORAL DAILY
Status: DISCONTINUED | OUTPATIENT
Start: 2018-01-01 | End: 2018-01-01

## 2018-01-01 RX ORDER — LIDOCAINE 40 MG/G
CREAM TOPICAL
Status: CANCELLED | OUTPATIENT
Start: 2018-01-01

## 2018-01-01 RX ORDER — CIPROFLOXACIN 500 MG/1
500 TABLET, FILM COATED ORAL
Status: DISCONTINUED | OUTPATIENT
Start: 2018-01-01 | End: 2018-01-01

## 2018-01-01 RX ORDER — NEOMYCIN SULFATE 500 MG/1
1000 TABLET ORAL 4 TIMES DAILY
Qty: 240 TABLET | Refills: 1 | Status: SHIPPED | OUTPATIENT
Start: 2018-01-01 | End: 2018-01-01

## 2018-01-01 RX ORDER — FUROSEMIDE 40 MG
40 TABLET ORAL DAILY
Status: DISCONTINUED | OUTPATIENT
Start: 2018-01-01 | End: 2018-01-01

## 2018-01-01 RX ORDER — WARFARIN SODIUM 2.5 MG/1
2.5 TABLET ORAL
Status: DISCONTINUED | OUTPATIENT
Start: 2018-01-01 | End: 2018-01-01 | Stop reason: HOSPADM

## 2018-01-01 RX ORDER — METOPROLOL TARTRATE 25 MG/1
25 TABLET, FILM COATED ORAL 2 TIMES DAILY
Status: DISCONTINUED | OUTPATIENT
Start: 2018-01-01 | End: 2018-01-01 | Stop reason: HOSPADM

## 2018-01-01 RX ORDER — ALBUMIN (HUMAN) 12.5 G/50ML
12.5 SOLUTION INTRAVENOUS 4 TIMES DAILY PRN
Status: CANCELLED
Start: 2018-01-01

## 2018-01-01 RX ORDER — DEXTROSE MONOHYDRATE 100 MG/ML
INJECTION, SOLUTION INTRAVENOUS CONTINUOUS
Status: ACTIVE | OUTPATIENT
Start: 2018-01-01 | End: 2018-01-01

## 2018-01-01 RX ORDER — PROPOFOL 10 MG/ML
INJECTION, EMULSION INTRAVENOUS CONTINUOUS PRN
Status: DISCONTINUED | OUTPATIENT
Start: 2018-01-01 | End: 2018-01-01

## 2018-01-01 RX ORDER — FUROSEMIDE 10 MG/ML
20 INJECTION INTRAMUSCULAR; INTRAVENOUS ONCE
Status: COMPLETED | OUTPATIENT
Start: 2018-01-01 | End: 2018-01-01

## 2018-01-01 RX ORDER — IOPAMIDOL 755 MG/ML
100 INJECTION, SOLUTION INTRAVASCULAR ONCE
Status: COMPLETED | OUTPATIENT
Start: 2018-01-01 | End: 2018-01-01

## 2018-01-01 RX ORDER — SODIUM CHLORIDE 9 MG/ML
INJECTION, SOLUTION INTRAVENOUS CONTINUOUS
Status: CANCELLED | OUTPATIENT
Start: 2018-01-01

## 2018-01-01 RX ORDER — NICOTINE POLACRILEX 4 MG
15-30 LOZENGE BUCCAL
Status: DISCONTINUED | OUTPATIENT
Start: 2018-01-01 | End: 2018-01-01 | Stop reason: HOSPADM

## 2018-01-01 RX ORDER — PANTOPRAZOLE SODIUM 40 MG/1
40 TABLET, DELAYED RELEASE ORAL DAILY
Status: DISCONTINUED | OUTPATIENT
Start: 2018-01-01 | End: 2018-01-01 | Stop reason: HOSPADM

## 2018-01-01 RX ORDER — NALOXONE HYDROCHLORIDE 0.4 MG/ML
.1-.4 INJECTION, SOLUTION INTRAMUSCULAR; INTRAVENOUS; SUBCUTANEOUS
Status: DISCONTINUED | OUTPATIENT
Start: 2018-01-01 | End: 2018-01-01

## 2018-01-01 RX ORDER — HYDROMORPHONE HCL/0.9% NACL/PF 0.2MG/0.2
0.2 SYRINGE (ML) INTRAVENOUS ONCE
Status: COMPLETED | OUTPATIENT
Start: 2018-01-01 | End: 2018-01-01

## 2018-01-01 RX ORDER — TC 99M MEDRONATE 20 MG/10ML
20-30 INJECTION, POWDER, LYOPHILIZED, FOR SOLUTION INTRAVENOUS ONCE
Status: COMPLETED | OUTPATIENT
Start: 2018-01-01 | End: 2018-01-01

## 2018-01-01 RX ORDER — ACETAMINOPHEN 325 MG/1
650 TABLET ORAL EVERY 6 HOURS PRN
Status: DISCONTINUED | OUTPATIENT
Start: 2018-01-01 | End: 2018-01-01 | Stop reason: HOSPADM

## 2018-01-01 RX ORDER — CIPROFLOXACIN 500 MG/1
TABLET, FILM COATED ORAL
Qty: 120 TABLET | Refills: 0 | Status: SHIPPED | OUTPATIENT
Start: 2018-01-01

## 2018-01-01 RX ORDER — FLUTICASONE PROPIONATE 50 MCG
1 SPRAY, SUSPENSION (ML) NASAL DAILY
Status: DISCONTINUED | OUTPATIENT
Start: 2018-01-01 | End: 2018-01-01 | Stop reason: HOSPADM

## 2018-01-01 RX ORDER — ALLOPURINOL 100 MG/1
100 TABLET ORAL DAILY
Status: DISCONTINUED | OUTPATIENT
Start: 2018-01-01 | End: 2018-01-01 | Stop reason: HOSPADM

## 2018-01-01 RX ORDER — ALBUMIN (HUMAN) 12.5 G/50ML
50 SOLUTION INTRAVENOUS ONCE
Status: COMPLETED | OUTPATIENT
Start: 2018-01-01 | End: 2018-01-01

## 2018-01-01 RX ORDER — LORAZEPAM 0.5 MG/1
0.5 TABLET ORAL
Status: DISCONTINUED | OUTPATIENT
Start: 2018-01-01 | End: 2018-01-01

## 2018-01-01 RX ORDER — SPIRONOLACTONE 50 MG/1
50 TABLET, FILM COATED ORAL DAILY
Status: DISCONTINUED | OUTPATIENT
Start: 2018-01-01 | End: 2018-01-01 | Stop reason: HOSPADM

## 2018-01-01 RX ORDER — SODIUM BICARBONATE 650 MG/1
1300 TABLET ORAL 2 TIMES DAILY
Status: DISCONTINUED | OUTPATIENT
Start: 2018-01-01 | End: 2018-01-01 | Stop reason: HOSPADM

## 2018-01-01 RX ORDER — LANOLIN ALCOHOL/MO/W.PET/CERES
3 CREAM (GRAM) TOPICAL
Status: DISCONTINUED | OUTPATIENT
Start: 2018-01-01 | End: 2018-01-01 | Stop reason: HOSPADM

## 2018-01-01 RX ORDER — OCTREOTIDE ACETATE 50 UG/ML
50 INJECTION, SOLUTION INTRAVENOUS; SUBCUTANEOUS ONCE
Status: COMPLETED | OUTPATIENT
Start: 2018-01-01 | End: 2018-01-01

## 2018-01-01 RX ORDER — GUAIFENESIN, PSEUDOEPHEDRINE HYDROCHLORIDE 600; 60 MG/1; MG/1
1 TABLET, EXTENDED RELEASE ORAL 2 TIMES DAILY
Status: DISCONTINUED | OUTPATIENT
Start: 2018-01-01 | End: 2018-01-01 | Stop reason: HOSPADM

## 2018-01-01 RX ORDER — WARFARIN SODIUM 2.5 MG/1
2.5 TABLET ORAL
Status: COMPLETED | OUTPATIENT
Start: 2018-01-01 | End: 2018-01-01

## 2018-01-01 RX ORDER — ONDANSETRON 2 MG/ML
4 INJECTION INTRAMUSCULAR; INTRAVENOUS
Status: DISCONTINUED | OUTPATIENT
Start: 2018-01-01 | End: 2018-01-01 | Stop reason: HOSPADM

## 2018-01-01 RX ORDER — LIDOCAINE HYDROCHLORIDE 10 MG/ML
INJECTION, SOLUTION EPIDURAL; INFILTRATION; INTRACAUDAL; PERINEURAL
Status: COMPLETED
Start: 2018-01-01 | End: 2018-01-01

## 2018-01-01 RX ORDER — ACETAMINOPHEN 325 MG/1
650 TABLET ORAL EVERY 4 HOURS PRN
Status: DISCONTINUED | OUTPATIENT
Start: 2018-01-01 | End: 2018-01-01 | Stop reason: HOSPADM

## 2018-01-01 RX ORDER — FLUMAZENIL 0.1 MG/ML
0.2 INJECTION, SOLUTION INTRAVENOUS
Status: DISCONTINUED | OUTPATIENT
Start: 2018-01-01 | End: 2018-01-01 | Stop reason: HOSPADM

## 2018-01-01 RX ORDER — LEVOTHYROXINE SODIUM 75 UG/1
150 TABLET ORAL DAILY
Status: DISCONTINUED | OUTPATIENT
Start: 2018-01-01 | End: 2018-01-01 | Stop reason: HOSPADM

## 2018-01-01 RX ORDER — DEXTROSE MONOHYDRATE 25 G/50ML
25 INJECTION, SOLUTION INTRAVENOUS ONCE
Status: COMPLETED | OUTPATIENT
Start: 2018-01-01 | End: 2018-01-01

## 2018-01-01 RX ORDER — ALBUMIN (HUMAN) 12.5 G/50ML
100 SOLUTION INTRAVENOUS DAILY
Status: COMPLETED | OUTPATIENT
Start: 2018-01-01 | End: 2018-01-01

## 2018-01-01 RX ORDER — ALBUMIN (HUMAN) 12.5 G/50ML
100 SOLUTION INTRAVENOUS ONCE
Status: COMPLETED | OUTPATIENT
Start: 2018-01-01 | End: 2018-01-01

## 2018-01-01 RX ORDER — BLOOD PRESSURE TEST KIT-MEDIUM
KIT MISCELLANEOUS
COMMUNITY
Start: 2017-01-01

## 2018-01-01 RX ORDER — WARFARIN SODIUM 1 MG/1
1 TABLET ORAL
Status: COMPLETED | OUTPATIENT
Start: 2018-01-01 | End: 2018-01-01

## 2018-01-01 RX ORDER — MAGNESIUM HYDROXIDE 1200 MG/15ML
LIQUID ORAL
Status: COMPLETED
Start: 2018-01-01 | End: 2018-01-01

## 2018-01-01 RX ORDER — NEOMYCIN SULFATE 500 MG/1
1000 TABLET ORAL 4 TIMES DAILY
Status: ON HOLD | COMMUNITY
End: 2018-01-01

## 2018-01-01 RX ORDER — PIPERACILLIN SODIUM, TAZOBACTAM SODIUM 3; .375 G/15ML; G/15ML
3.38 INJECTION, POWDER, LYOPHILIZED, FOR SOLUTION INTRAVENOUS EVERY 6 HOURS
Status: DISCONTINUED | OUTPATIENT
Start: 2018-01-01 | End: 2018-01-01

## 2018-01-01 RX ORDER — SPIRONOLACTONE 50 MG/1
50 TABLET, FILM COATED ORAL DAILY
Qty: 30 TABLET | Refills: 1 | Status: ON HOLD | OUTPATIENT
Start: 2018-01-01 | End: 2018-01-01

## 2018-01-01 RX ORDER — LEVOFLOXACIN 5 MG/ML
750 INJECTION, SOLUTION INTRAVENOUS ONCE
Status: COMPLETED | OUTPATIENT
Start: 2018-01-01 | End: 2018-01-01

## 2018-01-01 RX ORDER — SODIUM CHLORIDE 9 MG/ML
INJECTION, SOLUTION INTRAVENOUS CONTINUOUS
Status: DISCONTINUED | OUTPATIENT
Start: 2018-01-01 | End: 2018-01-01 | Stop reason: HOSPADM

## 2018-01-01 RX ORDER — PANTOPRAZOLE SODIUM 40 MG/1
40 TABLET, DELAYED RELEASE ORAL
Status: DISCONTINUED | OUTPATIENT
Start: 2018-01-01 | End: 2018-01-01

## 2018-01-01 RX ORDER — INSULIN GLARGINE 100 [IU]/ML
30 INJECTION, SOLUTION SUBCUTANEOUS AT BEDTIME
Status: ON HOLD | COMMUNITY
End: 2018-01-01

## 2018-01-01 RX ORDER — LACTULOSE 10 G/15ML
20 SOLUTION ORAL ONCE
Status: COMPLETED | OUTPATIENT
Start: 2018-01-01 | End: 2018-01-01

## 2018-01-01 RX ORDER — ONDANSETRON 4 MG/1
4 TABLET, ORALLY DISINTEGRATING ORAL EVERY 6 HOURS PRN
Status: DISCONTINUED | OUTPATIENT
Start: 2018-01-01 | End: 2018-01-01 | Stop reason: HOSPADM

## 2018-01-01 RX ORDER — ALLOPURINOL 100 MG/1
100 TABLET ORAL DAILY
Status: DISCONTINUED | OUTPATIENT
Start: 2018-01-01 | End: 2018-01-01

## 2018-01-01 RX ORDER — CEFTRIAXONE 2 G/1
2 INJECTION, POWDER, FOR SOLUTION INTRAMUSCULAR; INTRAVENOUS EVERY 24 HOURS
Status: DISCONTINUED | OUTPATIENT
Start: 2018-01-01 | End: 2018-01-01

## 2018-01-01 RX ORDER — ACETAMINOPHEN 325 MG/1
650 TABLET ORAL EVERY 8 HOURS PRN
Qty: 100 TABLET | COMMUNITY
Start: 2018-01-01

## 2018-01-01 RX ORDER — DEXTROSE MONOHYDRATE 100 MG/ML
INJECTION, SOLUTION INTRAVENOUS CONTINUOUS
Status: DISCONTINUED | OUTPATIENT
Start: 2018-01-01 | End: 2018-01-01

## 2018-01-01 RX ORDER — VANCOMYCIN HYDROCHLORIDE 1 G/200ML
1000 INJECTION, SOLUTION INTRAVENOUS ONCE
Status: COMPLETED | OUTPATIENT
Start: 2018-01-01 | End: 2018-01-01

## 2018-01-01 RX ORDER — CARVEDILOL 6.25 MG/1
6.25 TABLET ORAL 2 TIMES DAILY WITH MEALS
Qty: 180 TABLET | Refills: 0 | Status: SHIPPED | OUTPATIENT
Start: 2018-01-01

## 2018-01-01 RX ORDER — SPIRONOLACTONE 50 MG/1
100 TABLET, FILM COATED ORAL DAILY
Qty: 30 TABLET | Refills: 1 | Status: SHIPPED | OUTPATIENT
Start: 2018-01-01 | End: 2018-01-01

## 2018-01-01 RX ORDER — SPIRONOLACTONE 25 MG/1
50 TABLET ORAL DAILY
Status: DISCONTINUED | OUTPATIENT
Start: 2018-01-01 | End: 2018-01-01 | Stop reason: HOSPADM

## 2018-01-01 RX ORDER — NEOMYCIN SULFATE 500 MG/1
500 TABLET ORAL 2 TIMES DAILY
Status: DISCONTINUED | OUTPATIENT
Start: 2018-01-01 | End: 2018-01-01

## 2018-01-01 RX ORDER — INSULIN GLARGINE 100 [IU]/ML
15 INJECTION, SOLUTION SUBCUTANEOUS AT BEDTIME
Status: DISCONTINUED | OUTPATIENT
Start: 2018-01-01 | End: 2018-01-01 | Stop reason: CLARIF

## 2018-01-01 RX ORDER — FENTANYL CITRATE 50 UG/ML
25-50 INJECTION, SOLUTION INTRAMUSCULAR; INTRAVENOUS EVERY 5 MIN PRN
Status: DISCONTINUED | OUTPATIENT
Start: 2018-01-01 | End: 2018-01-01 | Stop reason: HOSPADM

## 2018-01-01 RX ORDER — HEPARIN SODIUM 5000 [USP'U]/.5ML
5000 INJECTION, SOLUTION INTRAVENOUS; SUBCUTANEOUS EVERY 12 HOURS
Status: DISCONTINUED | OUTPATIENT
Start: 2018-01-01 | End: 2018-01-01

## 2018-01-01 RX ORDER — SODIUM POLYSTYRENE SULFONATE 15 G/60ML
30 SUSPENSION ORAL; RECTAL ONCE
Status: COMPLETED | OUTPATIENT
Start: 2018-01-01 | End: 2018-01-01

## 2018-01-01 RX ORDER — CEFAZOLIN SODIUM 1 G/50ML
1250 SOLUTION INTRAVENOUS ONCE
Status: COMPLETED | OUTPATIENT
Start: 2018-01-01 | End: 2018-01-01

## 2018-01-01 RX ORDER — ALLOPURINOL 100 MG/1
100 TABLET ORAL DAILY
Qty: 60 TABLET | Refills: 0 | Status: SHIPPED | OUTPATIENT
Start: 2018-01-01 | End: 2018-01-01

## 2018-01-01 RX ORDER — ACETAMINOPHEN 325 MG/1
650 TABLET ORAL EVERY 8 HOURS PRN
Status: DISCONTINUED | OUTPATIENT
Start: 2018-01-01 | End: 2018-01-01 | Stop reason: HOSPADM

## 2018-01-01 RX ORDER — FUROSEMIDE 20 MG
40 TABLET ORAL DAILY
Qty: 30 TABLET | Refills: 1 | Status: ON HOLD | OUTPATIENT
Start: 2018-01-01 | End: 2018-01-01

## 2018-01-01 RX ORDER — ACETAMINOPHEN 325 MG/1
325 TABLET ORAL EVERY 4 HOURS PRN
Status: DISCONTINUED | OUTPATIENT
Start: 2018-01-01 | End: 2018-01-01 | Stop reason: HOSPADM

## 2018-01-01 RX ORDER — LOSARTAN POTASSIUM 50 MG/1
50 TABLET ORAL DAILY
Status: DISCONTINUED | OUTPATIENT
Start: 2018-01-01 | End: 2018-01-01 | Stop reason: HOSPADM

## 2018-01-01 RX ORDER — INSULIN GLARGINE 100 [IU]/ML
15 INJECTION, SOLUTION SUBCUTANEOUS AT BEDTIME
Qty: 3 ML | Refills: 1 | Status: SHIPPED | OUTPATIENT
Start: 2018-01-01

## 2018-01-01 RX ORDER — CIPROFLOXACIN 250 MG/1
500 TABLET, FILM COATED ORAL DAILY
Qty: 30 TABLET | Refills: 3 | Status: SHIPPED | OUTPATIENT
Start: 2018-01-01 | End: 2018-01-01

## 2018-01-01 RX ORDER — LEVOTHYROXINE SODIUM 150 UG/1
150 TABLET ORAL DAILY
Status: DISCONTINUED | OUTPATIENT
Start: 2018-01-01 | End: 2018-01-01

## 2018-01-01 RX ORDER — SODIUM CHLORIDE, SODIUM LACTATE, POTASSIUM CHLORIDE, CALCIUM CHLORIDE 600; 310; 30; 20 MG/100ML; MG/100ML; MG/100ML; MG/100ML
INJECTION, SOLUTION INTRAVENOUS CONTINUOUS PRN
Status: DISCONTINUED | OUTPATIENT
Start: 2018-01-01 | End: 2018-01-01

## 2018-01-01 RX ORDER — LIDOCAINE HYDROCHLORIDE 10 MG/ML
10 INJECTION, SOLUTION EPIDURAL; INFILTRATION; INTRACAUDAL; PERINEURAL ONCE
Status: DISCONTINUED | OUTPATIENT
Start: 2018-01-01 | End: 2018-01-01

## 2018-01-01 RX ORDER — CEFPODOXIME PROXETIL 200 MG/1
200 TABLET, FILM COATED ORAL 2 TIMES DAILY
Status: DISCONTINUED | OUTPATIENT
Start: 2018-01-01 | End: 2018-01-01 | Stop reason: HOSPADM

## 2018-01-01 RX ORDER — SODIUM CHLORIDE 9 MG/ML
INJECTION, SOLUTION INTRAVENOUS CONTINUOUS PRN
Status: DISCONTINUED | OUTPATIENT
Start: 2018-01-01 | End: 2018-01-01 | Stop reason: HOSPADM

## 2018-01-01 RX ORDER — CIPROFLOXACIN 500 MG/1
TABLET, FILM COATED ORAL
Qty: 120 TABLET | Refills: 0 | Status: SHIPPED | OUTPATIENT
Start: 2018-01-01 | End: 2018-01-01

## 2018-01-01 RX ORDER — PANTOPRAZOLE SODIUM 40 MG/1
40 TABLET, DELAYED RELEASE ORAL
Status: DISCONTINUED | OUTPATIENT
Start: 2018-01-01 | End: 2018-01-01 | Stop reason: HOSPADM

## 2018-01-01 RX ORDER — LACTULOSE 10 G/15ML
20 SOLUTION ORAL
Status: DISCONTINUED | OUTPATIENT
Start: 2018-01-01 | End: 2018-01-01 | Stop reason: HOSPADM

## 2018-01-01 RX ORDER — WARFARIN SODIUM 2.5 MG/1
2.5 TABLET ORAL
Status: DISCONTINUED | OUTPATIENT
Start: 2018-01-01 | End: 2018-01-01

## 2018-01-01 RX ORDER — CARVEDILOL 6.25 MG/1
6.25 TABLET ORAL 2 TIMES DAILY WITH MEALS
Qty: 60 TABLET | Refills: 0 | Status: SHIPPED | OUTPATIENT
Start: 2018-01-01 | End: 2018-01-01

## 2018-01-01 RX ORDER — FUROSEMIDE 10 MG/ML
20 INJECTION INTRAMUSCULAR; INTRAVENOUS DAILY
Status: DISCONTINUED | OUTPATIENT
Start: 2018-01-01 | End: 2018-01-01

## 2018-01-01 RX ORDER — PHYTONADIONE 1 MG/.5ML
10 INJECTION, EMULSION INTRAMUSCULAR; INTRAVENOUS; SUBCUTANEOUS DAILY
Status: DISCONTINUED | OUTPATIENT
Start: 2018-01-01 | End: 2018-01-01

## 2018-01-01 RX ORDER — FENTANYL CITRATE 50 UG/ML
25 INJECTION, SOLUTION INTRAMUSCULAR; INTRAVENOUS
Status: DISCONTINUED | OUTPATIENT
Start: 2018-01-01 | End: 2018-01-01 | Stop reason: HOSPADM

## 2018-01-01 RX ORDER — CIPROFLOXACIN 500 MG/1
500 TABLET, FILM COATED ORAL EVERY 12 HOURS SCHEDULED
Status: DISCONTINUED | OUTPATIENT
Start: 2018-01-01 | End: 2018-01-01 | Stop reason: HOSPADM

## 2018-01-01 RX ORDER — CIPROFLOXACIN 500 MG/1
500 TABLET, FILM COATED ORAL
Status: DISCONTINUED | OUTPATIENT
Start: 2018-01-01 | End: 2018-01-01 | Stop reason: HOSPADM

## 2018-01-01 RX ORDER — GADOBUTROL 604.72 MG/ML
10 INJECTION INTRAVENOUS ONCE
Status: COMPLETED | OUTPATIENT
Start: 2018-01-01 | End: 2018-01-01

## 2018-01-01 RX ORDER — ASPIRIN 81 MG/1
81 TABLET ORAL DAILY
Status: DISCONTINUED | OUTPATIENT
Start: 2018-01-01 | End: 2018-01-01 | Stop reason: HOSPADM

## 2018-01-01 RX ORDER — FLUMAZENIL 0.1 MG/ML
0.2 INJECTION, SOLUTION INTRAVENOUS
Status: ACTIVE | OUTPATIENT
Start: 2018-01-01 | End: 2018-01-01

## 2018-01-01 RX ORDER — SPIRONOLACTONE 50 MG/1
50 TABLET, FILM COATED ORAL DAILY
Status: DISCONTINUED | OUTPATIENT
Start: 2018-01-01 | End: 2018-01-01

## 2018-01-01 RX ORDER — CIPROFLOXACIN 250 MG/1
250 TABLET, FILM COATED ORAL
Status: DISCONTINUED | OUTPATIENT
Start: 2018-01-01 | End: 2018-01-01 | Stop reason: HOSPADM

## 2018-01-01 RX ORDER — PROPRANOLOL HYDROCHLORIDE 20 MG/1
20 TABLET ORAL 4 TIMES DAILY
Status: DISCONTINUED | OUTPATIENT
Start: 2018-01-01 | End: 2018-01-01

## 2018-01-01 RX ORDER — CEFTRIAXONE 2 G/1
2 INJECTION, POWDER, FOR SOLUTION INTRAMUSCULAR; INTRAVENOUS EVERY 12 HOURS
Status: DISCONTINUED | OUTPATIENT
Start: 2018-01-01 | End: 2018-01-01

## 2018-01-01 RX ORDER — LACTULOSE 10 G/15ML
20 SOLUTION ORAL 3 TIMES DAILY PRN
Status: DISCONTINUED | OUTPATIENT
Start: 2018-01-01 | End: 2018-01-01 | Stop reason: HOSPADM

## 2018-01-01 RX ORDER — PANTOPRAZOLE SODIUM 40 MG/1
40 TABLET, DELAYED RELEASE ORAL 2 TIMES DAILY
Status: DISCONTINUED | OUTPATIENT
Start: 2018-01-01 | End: 2018-01-01

## 2018-01-01 RX ORDER — LEVOTHYROXINE SODIUM 150 UG/1
150 TABLET ORAL DAILY
Status: DISCONTINUED | OUTPATIENT
Start: 2018-01-01 | End: 2018-01-01 | Stop reason: HOSPADM

## 2018-01-01 RX ORDER — ETHYL CHLORIDE 100 %
AEROSOL, SPRAY (ML) TOPICAL ONCE
Status: COMPLETED | OUTPATIENT
Start: 2018-01-01 | End: 2018-01-01

## 2018-01-01 RX ORDER — CEFTRIAXONE 1 G/1
1 INJECTION, POWDER, FOR SOLUTION INTRAMUSCULAR; INTRAVENOUS EVERY 24 HOURS
Status: DISCONTINUED | OUTPATIENT
Start: 2018-01-01 | End: 2018-01-01

## 2018-01-01 RX ORDER — SPIRONOLACTONE 50 MG/1
100 TABLET, FILM COATED ORAL DAILY
Qty: 180 TABLET | Refills: 0 | Status: ON HOLD | OUTPATIENT
Start: 2018-01-01 | End: 2018-01-01

## 2018-01-01 RX ORDER — FUROSEMIDE 40 MG
40 TABLET ORAL ONCE
Status: COMPLETED | OUTPATIENT
Start: 2018-01-01 | End: 2018-01-01

## 2018-01-01 RX ORDER — LIDOCAINE HYDROCHLORIDE 10 MG/ML
10 INJECTION, SOLUTION EPIDURAL; INFILTRATION; INTRACAUDAL; PERINEURAL ONCE
Status: DISCONTINUED | OUTPATIENT
Start: 2018-01-01 | End: 2018-01-01 | Stop reason: HOSPADM

## 2018-01-01 RX ORDER — PROCHLORPERAZINE MALEATE 5 MG
5 TABLET ORAL EVERY 6 HOURS PRN
Status: DISCONTINUED | OUTPATIENT
Start: 2018-01-01 | End: 2018-01-01 | Stop reason: HOSPADM

## 2018-01-01 RX ORDER — OCTREOTIDE ACETATE 100 UG/ML
100 INJECTION, SOLUTION INTRAVENOUS; SUBCUTANEOUS 3 TIMES DAILY
Status: DISCONTINUED | OUTPATIENT
Start: 2018-01-01 | End: 2018-01-01 | Stop reason: HOSPADM

## 2018-01-01 RX ORDER — ONDANSETRON 2 MG/ML
4 INJECTION INTRAMUSCULAR; INTRAVENOUS ONCE
Status: CANCELLED | OUTPATIENT
Start: 2018-01-01

## 2018-01-01 RX ORDER — ALBUMIN (HUMAN) 12.5 G/50ML
50 SOLUTION INTRAVENOUS ONCE
Status: DISCONTINUED | OUTPATIENT
Start: 2018-01-01 | End: 2018-01-01

## 2018-01-01 RX ORDER — FLUTICASONE PROPIONATE 50 MCG
1 SPRAY, SUSPENSION (ML) NASAL DAILY
COMMUNITY
End: 2018-01-01

## 2018-01-01 RX ORDER — ATROPINE SULFATE 10 MG/ML
1 SOLUTION/ DROPS OPHTHALMIC 3 TIMES DAILY
Status: DISCONTINUED | OUTPATIENT
Start: 2018-01-01 | End: 2018-01-01 | Stop reason: HOSPADM

## 2018-01-01 RX ORDER — PROCHLORPERAZINE 25 MG
12.5 SUPPOSITORY, RECTAL RECTAL EVERY 12 HOURS PRN
Status: DISCONTINUED | OUTPATIENT
Start: 2018-01-01 | End: 2018-01-01 | Stop reason: HOSPADM

## 2018-01-01 RX ORDER — PANTOPRAZOLE SODIUM 20 MG/1
40 TABLET, DELAYED RELEASE ORAL 2 TIMES DAILY
Qty: 60 TABLET | Refills: 2 | Status: SHIPPED | OUTPATIENT
Start: 2018-01-01

## 2018-01-01 RX ORDER — LIDOCAINE HYDROCHLORIDE AND EPINEPHRINE 10; 10 MG/ML; UG/ML
INJECTION, SOLUTION INFILTRATION; PERINEURAL
Status: DISCONTINUED
Start: 2018-01-01 | End: 2018-01-01 | Stop reason: HOSPADM

## 2018-01-01 RX ORDER — LACTULOSE 10 G/15ML
25 SOLUTION ORAL 2 TIMES DAILY
Status: DISCONTINUED | OUTPATIENT
Start: 2018-01-01 | End: 2018-01-01

## 2018-01-01 RX ORDER — ACETAMINOPHEN 500 MG
500 TABLET ORAL EVERY 6 HOURS PRN
Status: DISCONTINUED | OUTPATIENT
Start: 2018-01-01 | End: 2018-01-01 | Stop reason: HOSPADM

## 2018-01-01 RX ORDER — CODEINE PHOSPHATE AND GUAIFENESIN 10; 100 MG/5ML; MG/5ML
5 SOLUTION ORAL EVERY 4 HOURS PRN
Status: DISCONTINUED | OUTPATIENT
Start: 2018-01-01 | End: 2018-01-01 | Stop reason: HOSPADM

## 2018-01-01 RX ORDER — LANCETS
EACH MISCELLANEOUS
COMMUNITY
Start: 2017-01-01

## 2018-01-01 RX ORDER — SODIUM CHLORIDE 9 MG/ML
INJECTION, SOLUTION INTRAVENOUS
Status: COMPLETED
Start: 2018-01-01 | End: 2018-01-01

## 2018-01-01 RX ORDER — CLINDAMYCIN PHOSPHATE 900 MG/50ML
900 INJECTION, SOLUTION INTRAVENOUS
Status: CANCELLED | OUTPATIENT
Start: 2018-01-01

## 2018-01-01 RX ORDER — BENZONATATE 100 MG/1
100 CAPSULE ORAL 3 TIMES DAILY PRN
Status: DISCONTINUED | OUTPATIENT
Start: 2018-01-01 | End: 2018-01-01 | Stop reason: HOSPADM

## 2018-01-01 RX ORDER — VANCOMYCIN HYDROCHLORIDE 1 G/200ML
1000 INJECTION, SOLUTION INTRAVENOUS EVERY 24 HOURS
Status: DISCONTINUED | OUTPATIENT
Start: 2018-01-01 | End: 2018-01-01

## 2018-01-01 RX ORDER — CIPROFLOXACIN 250 MG/1
500 TABLET, FILM COATED ORAL
Status: DISCONTINUED | OUTPATIENT
Start: 2018-01-01 | End: 2018-01-01

## 2018-01-01 RX ORDER — DEXTROSE MONOHYDRATE 25 G/50ML
INJECTION, SOLUTION INTRAVENOUS
Status: COMPLETED
Start: 2018-01-01 | End: 2018-01-01

## 2018-01-01 RX ORDER — SODIUM CHLORIDE 9 MG/ML
1000 INJECTION, SOLUTION INTRAVENOUS CONTINUOUS
Status: DISCONTINUED | OUTPATIENT
Start: 2018-01-01 | End: 2018-01-01

## 2018-01-01 RX ORDER — FUROSEMIDE 10 MG/ML
40 INJECTION INTRAMUSCULAR; INTRAVENOUS ONCE
Status: COMPLETED | OUTPATIENT
Start: 2018-01-01 | End: 2018-01-01

## 2018-01-01 RX ORDER — LOSARTAN POTASSIUM 25 MG/1
50 TABLET ORAL DAILY
Qty: 30 TABLET | Refills: 1 | Status: ON HOLD | OUTPATIENT
Start: 2018-01-01 | End: 2018-01-01

## 2018-01-01 RX ORDER — MORPHINE SULFATE 100 MG/5ML
5-10 SOLUTION ORAL
Status: DISCONTINUED | OUTPATIENT
Start: 2018-01-01 | End: 2018-01-01

## 2018-01-01 RX ORDER — LEVOTHYROXINE SODIUM 125 UG/1
125 CAPSULE ORAL DAILY
Qty: 30 CAPSULE | Refills: 0 | Status: SHIPPED | OUTPATIENT
Start: 2018-01-01

## 2018-01-01 RX ORDER — LACTULOSE 10 G/15ML
20 SOLUTION ORAL 2 TIMES DAILY PRN
Status: DISCONTINUED | OUTPATIENT
Start: 2018-01-01 | End: 2018-01-01 | Stop reason: HOSPADM

## 2018-01-01 RX ORDER — GADOBUTROL 604.72 MG/ML
0.1 INJECTION INTRAVENOUS ONCE
Status: COMPLETED | OUTPATIENT
Start: 2018-01-01 | End: 2018-01-01

## 2018-01-01 RX ORDER — ALBUMIN (HUMAN) 12.5 G/50ML
150 SOLUTION INTRAVENOUS ONCE
Status: COMPLETED | OUTPATIENT
Start: 2018-01-01 | End: 2018-01-01

## 2018-01-01 RX ORDER — LORAZEPAM 2 MG/ML
0.5 INJECTION INTRAMUSCULAR ONCE
Status: DISCONTINUED | OUTPATIENT
Start: 2018-01-01 | End: 2018-01-01

## 2018-01-01 RX ORDER — ONDANSETRON 2 MG/ML
4 INJECTION INTRAMUSCULAR; INTRAVENOUS EVERY 6 HOURS PRN
Status: DISCONTINUED | OUTPATIENT
Start: 2018-01-01 | End: 2018-01-01 | Stop reason: HOSPADM

## 2018-01-01 RX ORDER — IODIXANOL 320 MG/ML
150 INJECTION, SOLUTION INTRAVASCULAR ONCE
Status: COMPLETED | OUTPATIENT
Start: 2018-01-01 | End: 2018-01-01

## 2018-01-01 RX ORDER — SODIUM POLYSTYRENE SULFONATE 15 G/60ML
15 SUSPENSION ORAL; RECTAL ONCE
Status: COMPLETED | OUTPATIENT
Start: 2018-01-01 | End: 2018-01-01

## 2018-01-01 RX ADMIN — CARVEDILOL 6.25 MG: 6.25 TABLET, FILM COATED ORAL at 08:28

## 2018-01-01 RX ADMIN — LIDOCAINE HYDROCHLORIDE 10 ML: 10 INJECTION, SOLUTION EPIDURAL; INFILTRATION; INTRACAUDAL; PERINEURAL at 08:07

## 2018-01-01 RX ADMIN — SODIUM CHLORIDE 500 ML: 9 INJECTION, SOLUTION INTRAVENOUS at 00:00

## 2018-01-01 RX ADMIN — INSULIN ASPART 1 UNITS: 100 INJECTION, SOLUTION INTRAVENOUS; SUBCUTANEOUS at 15:42

## 2018-01-01 RX ADMIN — HUMAN INSULIN 10 UNITS: 100 INJECTION, SOLUTION SUBCUTANEOUS at 23:00

## 2018-01-01 RX ADMIN — INSULIN ASPART 2 UNITS: 100 INJECTION, SOLUTION INTRAVENOUS; SUBCUTANEOUS at 13:21

## 2018-01-01 RX ADMIN — SODIUM CHLORIDE, POTASSIUM CHLORIDE, SODIUM LACTATE AND CALCIUM CHLORIDE: 600; 310; 30; 20 INJECTION, SOLUTION INTRAVENOUS at 11:29

## 2018-01-01 RX ADMIN — LEVOTHYROXINE SODIUM 150 MCG: 75 TABLET ORAL at 16:29

## 2018-01-01 RX ADMIN — SODIUM CHLORIDE: 9 INJECTION, SOLUTION INTRAVENOUS at 09:02

## 2018-01-01 RX ADMIN — RIFAXIMIN 550 MG: 550 TABLET ORAL at 08:54

## 2018-01-01 RX ADMIN — METOPROLOL TARTRATE 25 MG: 25 TABLET ORAL at 11:04

## 2018-01-01 RX ADMIN — ALLOPURINOL 100 MG: 100 TABLET ORAL at 08:31

## 2018-01-01 RX ADMIN — MIDODRINE HYDROCHLORIDE 2.5 MG: 2.5 TABLET ORAL at 17:50

## 2018-01-01 RX ADMIN — LEVOTHYROXINE SODIUM 125 MCG: 125 TABLET ORAL at 08:35

## 2018-01-01 RX ADMIN — CIPROFLOXACIN HYDROCHLORIDE 500 MG: 500 TABLET, FILM COATED ORAL at 08:35

## 2018-01-01 RX ADMIN — LIDOCAINE HYDROCHLORIDE 20 ML: 10 INJECTION, SOLUTION EPIDURAL; INFILTRATION; INTRACAUDAL; PERINEURAL at 08:37

## 2018-01-01 RX ADMIN — INSULIN ASPART 1 UNITS: 100 INJECTION, SOLUTION INTRAVENOUS; SUBCUTANEOUS at 17:04

## 2018-01-01 RX ADMIN — SODIUM BICARBONATE 650 MG TABLET 1300 MG: at 20:13

## 2018-01-01 RX ADMIN — LEVOTHYROXINE SODIUM 125 MCG: 125 TABLET ORAL at 08:12

## 2018-01-01 RX ADMIN — INSULIN ASPART 1 UNITS: 100 INJECTION, SOLUTION INTRAVENOUS; SUBCUTANEOUS at 08:02

## 2018-01-01 RX ADMIN — MIDODRINE HYDROCHLORIDE 2.5 MG: 2.5 TABLET ORAL at 08:38

## 2018-01-01 RX ADMIN — ALBUMIN HUMAN 100 G: 0.25 SOLUTION INTRAVENOUS at 08:55

## 2018-01-01 RX ADMIN — PHYTONADIONE 10 MG: 10 INJECTION, EMULSION INTRAMUSCULAR; INTRAVENOUS; SUBCUTANEOUS at 09:06

## 2018-01-01 RX ADMIN — SODIUM BICARBONATE 1300 MG: 650 TABLET ORAL at 20:26

## 2018-01-01 RX ADMIN — SODIUM CHLORIDE, POTASSIUM CHLORIDE, SODIUM LACTATE AND CALCIUM CHLORIDE: 600; 310; 30; 20 INJECTION, SOLUTION INTRAVENOUS at 01:03

## 2018-01-01 RX ADMIN — LACTULOSE 20 G: 20 SOLUTION ORAL at 14:44

## 2018-01-01 RX ADMIN — METOPROLOL TARTRATE 25 MG: 25 TABLET ORAL at 19:15

## 2018-01-01 RX ADMIN — LACTULOSE 20 G: 20 SOLUTION ORAL at 05:33

## 2018-01-01 RX ADMIN — VANCOMYCIN HYDROCHLORIDE 1000 MG: 1 INJECTION, SOLUTION INTRAVENOUS at 17:43

## 2018-01-01 RX ADMIN — ATROPINE SULFATE 1 DROP: 10 SOLUTION/ DROPS OPHTHALMIC at 16:51

## 2018-01-01 RX ADMIN — LACTULOSE 200 G: 10 SOLUTION ORAL; RECTAL at 20:03

## 2018-01-01 RX ADMIN — FUROSEMIDE 40 MG: 40 TABLET ORAL at 15:40

## 2018-01-01 RX ADMIN — INSULIN ASPART 2 UNITS: 100 INJECTION, SOLUTION INTRAVENOUS; SUBCUTANEOUS at 16:59

## 2018-01-01 RX ADMIN — LOSARTAN POTASSIUM 50 MG: 50 TABLET ORAL at 07:55

## 2018-01-01 RX ADMIN — RIFAXIMIN 550 MG: 550 TABLET ORAL at 20:55

## 2018-01-01 RX ADMIN — FUROSEMIDE 20 MG: 10 INJECTION, SOLUTION INTRAVENOUS at 16:39

## 2018-01-01 RX ADMIN — INSULIN GLARGINE 15 UNITS: 100 INJECTION, SOLUTION SUBCUTANEOUS at 22:43

## 2018-01-01 RX ADMIN — INSULIN GLARGINE 30 UNITS: 100 INJECTION, SOLUTION SUBCUTANEOUS at 21:33

## 2018-01-01 RX ADMIN — ALBUMIN HUMAN 12.5 G: 0.25 SOLUTION INTRAVENOUS at 15:18

## 2018-01-01 RX ADMIN — MORPHINE SULFATE 10 MG: 10 SOLUTION ORAL at 09:45

## 2018-01-01 RX ADMIN — RIFAXIMIN 550 MG: 550 TABLET ORAL at 11:05

## 2018-01-01 RX ADMIN — ASPIRIN 81 MG: 81 TABLET, COATED ORAL at 08:51

## 2018-01-01 RX ADMIN — PROPRANOLOL HYDROCHLORIDE 20 MG: 20 TABLET ORAL at 20:27

## 2018-01-01 RX ADMIN — INSULIN GLARGINE 20 UNITS: 100 INJECTION, SOLUTION SUBCUTANEOUS at 22:11

## 2018-01-01 RX ADMIN — CARVEDILOL 6.25 MG: 6.25 TABLET, FILM COATED ORAL at 09:46

## 2018-01-01 RX ADMIN — CARVEDILOL 6.25 MG: 6.25 TABLET, FILM COATED ORAL at 17:31

## 2018-01-01 RX ADMIN — RIFAXIMIN 550 MG: 550 TABLET ORAL at 20:43

## 2018-01-01 RX ADMIN — LACTULOSE 200 G: 10 SOLUTION ORAL; RECTAL at 01:58

## 2018-01-01 RX ADMIN — LEVOTHYROXINE SODIUM 150 MCG: 75 TABLET ORAL at 07:45

## 2018-01-01 RX ADMIN — PANTOPRAZOLE SODIUM 40 MG: 40 INJECTION, POWDER, FOR SOLUTION INTRAVENOUS at 08:57

## 2018-01-01 RX ADMIN — LACTULOSE 20 G: 20 SOLUTION ORAL at 20:13

## 2018-01-01 RX ADMIN — METOPROLOL TARTRATE 25 MG: 25 TABLET ORAL at 20:28

## 2018-01-01 RX ADMIN — ALBUMIN HUMAN 12.5 G: 0.25 SOLUTION INTRAVENOUS at 08:43

## 2018-01-01 RX ADMIN — GADOBUTROL 10 ML: 604.72 INJECTION INTRAVENOUS at 15:26

## 2018-01-01 RX ADMIN — LIDOCAINE HYDROCHLORIDE 10 ML: 10 INJECTION, SOLUTION EPIDURAL; INFILTRATION; INTRACAUDAL; PERINEURAL at 08:09

## 2018-01-01 RX ADMIN — LACTULOSE 20 G: 20 POWDER, FOR SOLUTION ORAL at 16:57

## 2018-01-01 RX ADMIN — ALBUMIN HUMAN 12.5 G: 0.25 SOLUTION INTRAVENOUS at 08:26

## 2018-01-01 RX ADMIN — FUROSEMIDE 20 MG: 10 INJECTION, SOLUTION INTRAVENOUS at 16:54

## 2018-01-01 RX ADMIN — ACETAMINOPHEN 650 MG: 325 TABLET, FILM COATED ORAL at 18:36

## 2018-01-01 RX ADMIN — Medication 0.2 MG: at 09:41

## 2018-01-01 RX ADMIN — SODIUM BICARBONATE 1300 MG: 650 TABLET ORAL at 08:35

## 2018-01-01 RX ADMIN — INSULIN ASPART 1 UNITS: 100 INJECTION, SOLUTION INTRAVENOUS; SUBCUTANEOUS at 08:17

## 2018-01-01 RX ADMIN — LACTULOSE 20 G: 20 SOLUTION ORAL at 17:53

## 2018-01-01 RX ADMIN — ACETAMINOPHEN 325 MG: 325 TABLET, FILM COATED ORAL at 19:25

## 2018-01-01 RX ADMIN — CEFTRIAXONE SODIUM 1 G: 10 INJECTION, POWDER, FOR SOLUTION INTRAVENOUS at 20:53

## 2018-01-01 RX ADMIN — LACTULOSE 20 G: 20 SOLUTION ORAL at 14:30

## 2018-01-01 RX ADMIN — PANTOPRAZOLE SODIUM 40 MG: 40 INJECTION, POWDER, FOR SOLUTION INTRAVENOUS at 08:24

## 2018-01-01 RX ADMIN — SODIUM BICARBONATE 650 MG TABLET 1300 MG: at 20:27

## 2018-01-01 RX ADMIN — PANTOPRAZOLE SODIUM 40 MG: 40 INJECTION, POWDER, FOR SOLUTION INTRAVENOUS at 16:14

## 2018-01-01 RX ADMIN — FLUTICASONE PROPIONATE 1 SPRAY: 50 SPRAY, METERED NASAL at 09:12

## 2018-01-01 RX ADMIN — CIPROFLOXACIN HYDROCHLORIDE 500 MG: 500 TABLET, FILM COATED ORAL at 19:44

## 2018-01-01 RX ADMIN — FUROSEMIDE 40 MG: 40 TABLET ORAL at 08:52

## 2018-01-01 RX ADMIN — LIDOCAINE HYDROCHLORIDE 10 ML: 10 INJECTION, SOLUTION EPIDURAL; INFILTRATION; INTRACAUDAL; PERINEURAL at 09:39

## 2018-01-01 RX ADMIN — LACTULOSE 20 G: 20 SOLUTION ORAL at 08:38

## 2018-01-01 RX ADMIN — METOPROLOL TARTRATE 25 MG: 25 TABLET ORAL at 08:38

## 2018-01-01 RX ADMIN — LACTULOSE 20 G: 20 POWDER, FOR SOLUTION ORAL at 17:53

## 2018-01-01 RX ADMIN — LIDOCAINE HYDROCHLORIDE 20 ML: 10 INJECTION, SOLUTION EPIDURAL; INFILTRATION; INTRACAUDAL; PERINEURAL at 08:41

## 2018-01-01 RX ADMIN — FENTANYL CITRATE 25 MCG: 50 INJECTION, SOLUTION INTRAMUSCULAR; INTRAVENOUS at 14:43

## 2018-01-01 RX ADMIN — LIDOCAINE HYDROCHLORIDE 15 ML: 10 INJECTION, SOLUTION EPIDURAL; INFILTRATION; INTRACAUDAL; PERINEURAL at 08:27

## 2018-01-01 RX ADMIN — ALBUMIN HUMAN 12.5 G: 0.25 SOLUTION INTRAVENOUS at 08:37

## 2018-01-01 RX ADMIN — SODIUM BICARBONATE 1300 MG: 650 TABLET ORAL at 08:25

## 2018-01-01 RX ADMIN — SODIUM CHLORIDE 1000 ML: 9 INJECTION, SOLUTION INTRAVENOUS at 10:57

## 2018-01-01 RX ADMIN — LEVOFLOXACIN 750 MG: 5 INJECTION, SOLUTION INTRAVENOUS at 10:57

## 2018-01-01 RX ADMIN — LACTULOSE 20 G: 20 SOLUTION ORAL at 08:37

## 2018-01-01 RX ADMIN — BENZONATATE 100 MG: 100 CAPSULE, LIQUID FILLED ORAL at 02:23

## 2018-01-01 RX ADMIN — ALLOPURINOL 100 MG: 100 TABLET ORAL at 20:43

## 2018-01-01 RX ADMIN — VANCOMYCIN HYDROCHLORIDE 1750 MG: 10 INJECTION, POWDER, LYOPHILIZED, FOR SOLUTION INTRAVENOUS at 13:43

## 2018-01-01 RX ADMIN — RIFAXIMIN 550 MG: 550 TABLET ORAL at 19:55

## 2018-01-01 RX ADMIN — CIPROFLOXACIN HYDROCHLORIDE 500 MG: 500 TABLET, FILM COATED ORAL at 08:57

## 2018-01-01 RX ADMIN — SODIUM CHLORIDE, POTASSIUM CHLORIDE, SODIUM LACTATE AND CALCIUM CHLORIDE: 600; 310; 30; 20 INJECTION, SOLUTION INTRAVENOUS at 23:05

## 2018-01-01 RX ADMIN — ACETAMINOPHEN 650 MG: 325 TABLET, FILM COATED ORAL at 01:07

## 2018-01-01 RX ADMIN — LACTULOSE 20 G: 20 SOLUTION ORAL at 08:27

## 2018-01-01 RX ADMIN — OCTREOTIDE ACETATE 50 MCG: 50 INJECTION, SOLUTION INTRAVENOUS; SUBCUTANEOUS at 11:17

## 2018-01-01 RX ADMIN — INSULIN ASPART 1 UNITS: 100 INJECTION, SOLUTION INTRAVENOUS; SUBCUTANEOUS at 21:46

## 2018-01-01 RX ADMIN — PANTOPRAZOLE SODIUM 40 MG: 40 TABLET, DELAYED RELEASE ORAL at 09:11

## 2018-01-01 RX ADMIN — LIDOCAINE HYDROCHLORIDE 30 ML: 20 SOLUTION ORAL; TOPICAL at 14:34

## 2018-01-01 RX ADMIN — VANCOMYCIN HYDROCHLORIDE 1750 MG: 1 INJECTION, POWDER, LYOPHILIZED, FOR SOLUTION INTRAVENOUS at 06:54

## 2018-01-01 RX ADMIN — LIDOCAINE HYDROCHLORIDE 10 ML: 10 INJECTION, SOLUTION EPIDURAL; INFILTRATION; INTRACAUDAL; PERINEURAL at 12:39

## 2018-01-01 RX ADMIN — PIPERACILLIN SODIUM AND TAZOBACTAM SODIUM 3.38 G: 3; .375 INJECTION, POWDER, LYOPHILIZED, FOR SOLUTION INTRAVENOUS at 01:22

## 2018-01-01 RX ADMIN — OCTREOTIDE ACETATE 100 MCG: 100 INJECTION, SOLUTION INTRAVENOUS; SUBCUTANEOUS at 19:49

## 2018-01-01 RX ADMIN — GUAIFENESIN AND PSEUDOEPHEDRINE HYDROCHLORIDE 1 TABLET: 600; 60 TABLET, EXTENDED RELEASE ORAL at 08:33

## 2018-01-01 RX ADMIN — LACTULOSE 25 ML: 20 SOLUTION ORAL at 20:26

## 2018-01-01 RX ADMIN — SODIUM BICARBONATE 650 MG TABLET 1300 MG: at 08:17

## 2018-01-01 RX ADMIN — CEFTRIAXONE SODIUM 2 G: 2 INJECTION, POWDER, FOR SOLUTION INTRAMUSCULAR; INTRAVENOUS at 13:32

## 2018-01-01 RX ADMIN — LOSARTAN POTASSIUM 50 MG: 50 TABLET ORAL at 20:26

## 2018-01-01 RX ADMIN — FLUTICASONE PROPIONATE 1 SPRAY: 50 SPRAY, METERED NASAL at 08:53

## 2018-01-01 RX ADMIN — PANTOPRAZOLE SODIUM 40 MG: 40 INJECTION, POWDER, FOR SOLUTION INTRAVENOUS at 08:54

## 2018-01-01 RX ADMIN — SODIUM BICARBONATE 1300 MG: 650 TABLET ORAL at 19:15

## 2018-01-01 RX ADMIN — CEFPODOXIME PROXETIL 200 MG: 200 TABLET, FILM COATED ORAL at 19:55

## 2018-01-01 RX ADMIN — VANCOMYCIN HYDROCHLORIDE 1000 MG: 1 INJECTION, SOLUTION INTRAVENOUS at 18:05

## 2018-01-01 RX ADMIN — LEVOTHYROXINE SODIUM 125 MCG: 125 TABLET ORAL at 09:49

## 2018-01-01 RX ADMIN — RIFAXIMIN 550 MG: 550 TABLET ORAL at 20:28

## 2018-01-01 RX ADMIN — LACTULOSE 20 G: 20 SOLUTION ORAL at 20:34

## 2018-01-01 RX ADMIN — LACTULOSE 20 G: 20 SOLUTION ORAL at 20:35

## 2018-01-01 RX ADMIN — RIFAXIMIN 550 MG: 550 TABLET ORAL at 09:11

## 2018-01-01 RX ADMIN — SODIUM CHLORIDE, POTASSIUM CHLORIDE, SODIUM LACTATE AND CALCIUM CHLORIDE: 600; 310; 30; 20 INJECTION, SOLUTION INTRAVENOUS at 04:38

## 2018-01-01 RX ADMIN — CARVEDILOL 6.25 MG: 6.25 TABLET, FILM COATED ORAL at 08:01

## 2018-01-01 RX ADMIN — LEVOTHYROXINE SODIUM 150 MCG: 150 TABLET ORAL at 09:11

## 2018-01-01 RX ADMIN — DEXTROSE MONOHYDRATE 25 G: 500 INJECTION PARENTERAL at 06:10

## 2018-01-01 RX ADMIN — SODIUM CHLORIDE 500 ML: 900 IRRIGANT IRRIGATION at 01:58

## 2018-01-01 RX ADMIN — INSULIN GLARGINE 18 UNITS: 100 INJECTION, SOLUTION SUBCUTANEOUS at 22:36

## 2018-01-01 RX ADMIN — CIPROFLOXACIN HYDROCHLORIDE 250 MG: 250 TABLET, FILM COATED ORAL at 08:26

## 2018-01-01 RX ADMIN — ASPIRIN 81 MG: 81 TABLET, COATED ORAL at 08:23

## 2018-01-01 RX ADMIN — LEVOTHYROXINE SODIUM 150 MCG: 75 TABLET ORAL at 07:55

## 2018-01-01 RX ADMIN — RIFAXIMIN 550 MG: 550 TABLET ORAL at 20:32

## 2018-01-01 RX ADMIN — RIFAXIMIN 550 MG: 550 TABLET ORAL at 20:38

## 2018-01-01 RX ADMIN — PHYTONADIONE 10 MG: 10 INJECTION, EMULSION INTRAMUSCULAR; INTRAVENOUS; SUBCUTANEOUS at 12:10

## 2018-01-01 RX ADMIN — METOPROLOL TARTRATE 25 MG: 25 TABLET ORAL at 08:21

## 2018-01-01 RX ADMIN — GUAIFENESIN AND PSEUDOEPHEDRINE HYDROCHLORIDE 1 TABLET: 600; 60 TABLET, EXTENDED RELEASE ORAL at 19:44

## 2018-01-01 RX ADMIN — SODIUM BICARBONATE 1300 MG: 650 TABLET ORAL at 08:24

## 2018-01-01 RX ADMIN — LACTULOSE 20 G: 10 SOLUTION ORAL at 11:08

## 2018-01-01 RX ADMIN — AZITHROMYCIN 500 MG: 250 TABLET, FILM COATED ORAL at 16:39

## 2018-01-01 RX ADMIN — LEVOTHYROXINE SODIUM 150 MCG: 75 TABLET ORAL at 08:52

## 2018-01-01 RX ADMIN — LACTULOSE 20 G: 20 SOLUTION ORAL at 08:01

## 2018-01-01 RX ADMIN — PANTOPRAZOLE SODIUM 40 MG: 40 TABLET, DELAYED RELEASE ORAL at 08:26

## 2018-01-01 RX ADMIN — PANTOPRAZOLE SODIUM 40 MG: 40 INJECTION, POWDER, FOR SOLUTION INTRAVENOUS at 07:55

## 2018-01-01 RX ADMIN — LACTULOSE 20 G: 20 SOLUTION ORAL at 19:15

## 2018-01-01 RX ADMIN — LACTULOSE 20 G: 20 SOLUTION ORAL at 08:25

## 2018-01-01 RX ADMIN — LACTULOSE 20 G: 10 SOLUTION ORAL at 19:44

## 2018-01-01 RX ADMIN — RIFAXIMIN 550 MG: 550 TABLET ORAL at 20:07

## 2018-01-01 RX ADMIN — PIPERACILLIN SODIUM AND TAZOBACTAM SODIUM 3.38 G: 3; .375 INJECTION, POWDER, LYOPHILIZED, FOR SOLUTION INTRAVENOUS at 18:54

## 2018-01-01 RX ADMIN — CARVEDILOL 6.25 MG: 6.25 TABLET, FILM COATED ORAL at 18:04

## 2018-01-01 RX ADMIN — PANTOPRAZOLE SODIUM 40 MG: 40 INJECTION, POWDER, FOR SOLUTION INTRAVENOUS at 08:35

## 2018-01-01 RX ADMIN — ALBUMIN HUMAN 100 G: 0.25 SOLUTION INTRAVENOUS at 16:10

## 2018-01-01 RX ADMIN — INSULIN GLARGINE 15 UNITS: 100 INJECTION, SOLUTION SUBCUTANEOUS at 23:29

## 2018-01-01 RX ADMIN — PANTOPRAZOLE SODIUM 40 MG: 40 TABLET, DELAYED RELEASE ORAL at 09:44

## 2018-01-01 RX ADMIN — INSULIN ASPART 1 UNITS: 100 INJECTION, SOLUTION INTRAVENOUS; SUBCUTANEOUS at 12:49

## 2018-01-01 RX ADMIN — LACTULOSE 20 G: 20 SOLUTION ORAL at 15:30

## 2018-01-01 RX ADMIN — ALLOPURINOL 100 MG: 100 TABLET ORAL at 08:37

## 2018-01-01 RX ADMIN — CARVEDILOL 6.25 MG: 6.25 TABLET, FILM COATED ORAL at 19:02

## 2018-01-01 RX ADMIN — MIDODRINE HYDROCHLORIDE 2.5 MG: 2.5 TABLET ORAL at 11:40

## 2018-01-01 RX ADMIN — CEFTRIAXONE SODIUM 2 G: 2 INJECTION, POWDER, FOR SOLUTION INTRAMUSCULAR; INTRAVENOUS at 14:25

## 2018-01-01 RX ADMIN — RIFAXIMIN 550 MG: 550 TABLET ORAL at 20:50

## 2018-01-01 RX ADMIN — INSULIN ASPART 1 UNITS: 100 INJECTION, SOLUTION INTRAVENOUS; SUBCUTANEOUS at 13:24

## 2018-01-01 RX ADMIN — SODIUM BICARBONATE 1300 MG: 650 TABLET ORAL at 09:11

## 2018-01-01 RX ADMIN — MIDODRINE HYDROCHLORIDE 2.5 MG: 2.5 TABLET ORAL at 09:11

## 2018-01-01 RX ADMIN — PANTOPRAZOLE SODIUM 40 MG: 40 TABLET, DELAYED RELEASE ORAL at 08:31

## 2018-01-01 RX ADMIN — RIFAXIMIN 550 MG: 550 TABLET ORAL at 20:13

## 2018-01-01 RX ADMIN — INSULIN ASPART 1 UNITS: 100 INJECTION, SOLUTION INTRAVENOUS; SUBCUTANEOUS at 13:56

## 2018-01-01 RX ADMIN — SODIUM BICARBONATE 1300 MG: 650 TABLET ORAL at 08:01

## 2018-01-01 RX ADMIN — LACTULOSE 20 G: 20 SOLUTION ORAL at 20:37

## 2018-01-01 RX ADMIN — LACTULOSE 20 G: 20 SOLUTION ORAL at 19:13

## 2018-01-01 RX ADMIN — IOPAMIDOL 100 ML: 755 INJECTION, SOLUTION INTRAVENOUS at 22:25

## 2018-01-01 RX ADMIN — PIPERACILLIN SODIUM,TAZOBACTAM SODIUM 4.5 G: 4; .5 INJECTION, POWDER, FOR SOLUTION INTRAVENOUS at 05:48

## 2018-01-01 RX ADMIN — Medication: at 15:11

## 2018-01-01 RX ADMIN — RIFAXIMIN 550 MG: 550 TABLET ORAL at 08:39

## 2018-01-01 RX ADMIN — LACTULOSE 20 G: 20 POWDER, FOR SOLUTION ORAL at 20:26

## 2018-01-01 RX ADMIN — FUROSEMIDE 40 MG: 40 TABLET ORAL at 07:55

## 2018-01-01 RX ADMIN — INSULIN ASPART 1 UNITS: 100 INJECTION, SOLUTION INTRAVENOUS; SUBCUTANEOUS at 13:02

## 2018-01-01 RX ADMIN — CARVEDILOL 6.25 MG: 6.25 TABLET, FILM COATED ORAL at 21:39

## 2018-01-01 RX ADMIN — WARFARIN SODIUM 2.5 MG: 2.5 TABLET ORAL at 17:01

## 2018-01-01 RX ADMIN — LACTULOSE 20 G: 20 POWDER, FOR SOLUTION ORAL at 08:23

## 2018-01-01 RX ADMIN — SODIUM BICARBONATE 650 MG TABLET 1300 MG: at 21:14

## 2018-01-01 RX ADMIN — SODIUM BICARBONATE 650 MG TABLET 1300 MG: at 20:26

## 2018-01-01 RX ADMIN — GUAIFENESIN AND PSEUDOEPHEDRINE HYDROCHLORIDE 1 TABLET: 600; 60 TABLET, EXTENDED RELEASE ORAL at 10:25

## 2018-01-01 RX ADMIN — SODIUM BICARBONATE 650 MG TABLET 1300 MG: at 20:35

## 2018-01-01 RX ADMIN — LIDOCAINE HYDROCHLORIDE 20 ML: 10 INJECTION, SOLUTION EPIDURAL; INFILTRATION; INTRACAUDAL; PERINEURAL at 15:18

## 2018-01-01 RX ADMIN — ALBUMIN HUMAN 12.5 G: 0.25 SOLUTION INTRAVENOUS at 08:34

## 2018-01-01 RX ADMIN — SODIUM BICARBONATE 1300 MG: 650 TABLET ORAL at 08:38

## 2018-01-01 RX ADMIN — PANTOPRAZOLE SODIUM 40 MG: 40 TABLET, DELAYED RELEASE ORAL at 08:39

## 2018-01-01 RX ADMIN — RIFAXIMIN 550 MG: 550 TABLET ORAL at 07:59

## 2018-01-01 RX ADMIN — CEFTRIAXONE 1 G: 1 INJECTION, POWDER, FOR SOLUTION INTRAMUSCULAR; INTRAVENOUS at 16:10

## 2018-01-01 RX ADMIN — SPIRONOLACTONE 50 MG: 50 TABLET ORAL at 08:35

## 2018-01-01 RX ADMIN — VANCOMYCIN HYDROCHLORIDE 1000 MG: 1 INJECTION, SOLUTION INTRAVENOUS at 19:58

## 2018-01-01 RX ADMIN — RIFAXIMIN 550 MG: 550 TABLET ORAL at 21:34

## 2018-01-01 RX ADMIN — ALBUMIN HUMAN 12.5 G: 0.25 SOLUTION INTRAVENOUS at 14:54

## 2018-01-01 RX ADMIN — BENZONATATE 100 MG: 100 CAPSULE, LIQUID FILLED ORAL at 01:31

## 2018-01-01 RX ADMIN — LACTULOSE 20 G: 20 POWDER, FOR SOLUTION ORAL at 20:13

## 2018-01-01 RX ADMIN — PIPERACILLIN SODIUM AND TAZOBACTAM SODIUM 3.38 G: 3; .375 INJECTION, POWDER, LYOPHILIZED, FOR SOLUTION INTRAVENOUS at 00:10

## 2018-01-01 RX ADMIN — INSULIN ASPART 1 UNITS: 100 INJECTION, SOLUTION INTRAVENOUS; SUBCUTANEOUS at 18:51

## 2018-01-01 RX ADMIN — FUROSEMIDE 40 MG: 40 TABLET ORAL at 08:01

## 2018-01-01 RX ADMIN — LEVOTHYROXINE SODIUM 125 MCG: 125 TABLET ORAL at 08:25

## 2018-01-01 RX ADMIN — INSULIN ASPART 1 UNITS: 100 INJECTION, SOLUTION INTRAVENOUS; SUBCUTANEOUS at 12:42

## 2018-01-01 RX ADMIN — LIDOCAINE HYDROCHLORIDE 100 MG: 10 INJECTION, SOLUTION INFILTRATION; PERINEURAL at 10:06

## 2018-01-01 RX ADMIN — SODIUM BICARBONATE 1300 MG: 650 TABLET ORAL at 20:29

## 2018-01-01 RX ADMIN — LEVOTHYROXINE SODIUM 150 MCG: 75 TABLET ORAL at 09:45

## 2018-01-01 RX ADMIN — RIFAXIMIN 550 MG: 550 TABLET ORAL at 19:17

## 2018-01-01 RX ADMIN — PROPRANOLOL HYDROCHLORIDE 20 MG: 20 TABLET ORAL at 20:07

## 2018-01-01 RX ADMIN — PIPERACILLIN SODIUM AND TAZOBACTAM SODIUM 3.38 G: 3; .375 INJECTION, POWDER, LYOPHILIZED, FOR SOLUTION INTRAVENOUS at 06:10

## 2018-01-01 RX ADMIN — PANTOPRAZOLE SODIUM 40 MG: 40 TABLET, DELAYED RELEASE ORAL at 08:38

## 2018-01-01 RX ADMIN — LACTULOSE 25 ML: 20 SOLUTION ORAL at 07:44

## 2018-01-01 RX ADMIN — OCTREOTIDE ACETATE 100 MCG: 100 INJECTION, SOLUTION INTRAVENOUS; SUBCUTANEOUS at 20:13

## 2018-01-01 RX ADMIN — LACTULOSE 20 G: 20 SOLUTION ORAL at 20:31

## 2018-01-01 RX ADMIN — DEXTROSE MONOHYDRATE: 100 INJECTION, SOLUTION INTRAVENOUS at 10:40

## 2018-01-01 RX ADMIN — DEXTROSE MONOHYDRATE 25 G: 500 INJECTION PARENTERAL at 03:56

## 2018-01-01 RX ADMIN — RIFAXIMIN 550 MG: 550 TABLET ORAL at 08:23

## 2018-01-01 RX ADMIN — HUMAN INSULIN 5 UNITS/HR: 100 INJECTION, SOLUTION SUBCUTANEOUS at 03:10

## 2018-01-01 RX ADMIN — LACTULOSE 20 G: 20 SOLUTION ORAL at 07:58

## 2018-01-01 RX ADMIN — OCTREOTIDE ACETATE 100 MCG: 100 INJECTION, SOLUTION INTRAVENOUS; SUBCUTANEOUS at 20:40

## 2018-01-01 RX ADMIN — RIFAXIMIN 550 MG: 550 TABLET ORAL at 20:35

## 2018-01-01 RX ADMIN — FUROSEMIDE 20 MG: 10 INJECTION, SOLUTION INTRAVENOUS at 10:54

## 2018-01-01 RX ADMIN — ACETAMINOPHEN 650 MG: 325 TABLET, FILM COATED ORAL at 11:40

## 2018-01-01 RX ADMIN — LIDOCAINE HYDROCHLORIDE 10 ML: 10 INJECTION, SOLUTION EPIDURAL; INFILTRATION; INTRACAUDAL; PERINEURAL at 08:37

## 2018-01-01 RX ADMIN — PANTOPRAZOLE SODIUM 40 MG: 40 TABLET, DELAYED RELEASE ORAL at 08:32

## 2018-01-01 RX ADMIN — METOPROLOL TARTRATE 25 MG: 25 TABLET ORAL at 20:13

## 2018-01-01 RX ADMIN — ALBUMIN HUMAN 12.5 G: 0.25 SOLUTION INTRAVENOUS at 08:22

## 2018-01-01 RX ADMIN — CARVEDILOL 6.25 MG: 6.25 TABLET, FILM COATED ORAL at 08:25

## 2018-01-01 RX ADMIN — CIPROFLOXACIN HYDROCHLORIDE 250 MG: 250 TABLET, FILM COATED ORAL at 08:38

## 2018-01-01 RX ADMIN — RIFAXIMIN 550 MG: 550 TABLET ORAL at 08:13

## 2018-01-01 RX ADMIN — INSULIN GLARGINE 18 UNITS: 100 INJECTION, SOLUTION SUBCUTANEOUS at 22:22

## 2018-01-01 RX ADMIN — ALBUMIN HUMAN 12.5 G: 0.25 SOLUTION INTRAVENOUS at 08:40

## 2018-01-01 RX ADMIN — PANTOPRAZOLE SODIUM 40 MG: 40 INJECTION, POWDER, FOR SOLUTION INTRAVENOUS at 08:39

## 2018-01-01 RX ADMIN — VANCOMYCIN HYDROCHLORIDE 1750 MG: 10 INJECTION, POWDER, LYOPHILIZED, FOR SOLUTION INTRAVENOUS at 20:19

## 2018-01-01 RX ADMIN — AZITHROMYCIN 500 MG: 250 TABLET, FILM COATED ORAL at 08:51

## 2018-01-01 RX ADMIN — SODIUM POLYSTYRENE SULFONATE 15 G: 15 SUSPENSION ORAL; RECTAL at 23:44

## 2018-01-01 RX ADMIN — PIPERACILLIN AND TAZOBACTAM 3.38 G: 3; .375 INJECTION, POWDER, LYOPHILIZED, FOR SOLUTION INTRAVENOUS; PARENTERAL at 18:42

## 2018-01-01 RX ADMIN — CIPROFLOXACIN HYDROCHLORIDE 500 MG: 500 TABLET, FILM COATED ORAL at 08:37

## 2018-01-01 RX ADMIN — INSULIN GLARGINE 30 UNITS: 100 INJECTION, SOLUTION SUBCUTANEOUS at 21:46

## 2018-01-01 RX ADMIN — LACTULOSE 20 G: 20 SOLUTION ORAL at 08:34

## 2018-01-01 RX ADMIN — LACTULOSE 20 G: 20 SOLUTION ORAL at 14:40

## 2018-01-01 RX ADMIN — RIFAXIMIN 550 MG: 550 TABLET ORAL at 08:31

## 2018-01-01 RX ADMIN — SODIUM CHLORIDE 1000 ML: 9 INJECTION, SOLUTION INTRAVENOUS at 23:42

## 2018-01-01 RX ADMIN — CARVEDILOL 6.25 MG: 6.25 TABLET, FILM COATED ORAL at 08:23

## 2018-01-01 RX ADMIN — PANTOPRAZOLE SODIUM 40 MG: 40 TABLET, DELAYED RELEASE ORAL at 08:08

## 2018-01-01 RX ADMIN — LEVOTHYROXINE SODIUM 150 MCG: 75 TABLET ORAL at 11:04

## 2018-01-01 RX ADMIN — SPIRONOLACTONE 50 MG: 50 TABLET ORAL at 07:59

## 2018-01-01 RX ADMIN — LACTULOSE 20 G: 20 POWDER, FOR SOLUTION ORAL at 08:53

## 2018-01-01 RX ADMIN — METOPROLOL TARTRATE 25 MG: 25 TABLET ORAL at 20:43

## 2018-01-01 RX ADMIN — INSULIN ASPART 2 UNITS: 100 INJECTION, SOLUTION INTRAVENOUS; SUBCUTANEOUS at 22:25

## 2018-01-01 RX ADMIN — LACTULOSE 20 G: 20 SOLUTION ORAL at 21:34

## 2018-01-01 RX ADMIN — LACTULOSE 20 G: 20 SOLUTION ORAL at 14:33

## 2018-01-01 RX ADMIN — RIFAXIMIN 550 MG: 550 TABLET ORAL at 08:08

## 2018-01-01 RX ADMIN — LACTULOSE 20 G: 20 SOLUTION ORAL at 15:12

## 2018-01-01 RX ADMIN — SODIUM BICARBONATE 650 MG TABLET 1300 MG: at 07:55

## 2018-01-01 RX ADMIN — FUROSEMIDE 40 MG: 10 INJECTION, SOLUTION INTRAVENOUS at 12:17

## 2018-01-01 RX ADMIN — OCTREOTIDE ACETATE 50 MCG: 50 INJECTION, SOLUTION INTRAVENOUS; SUBCUTANEOUS at 14:34

## 2018-01-01 RX ADMIN — LACTULOSE 20 G: 20 SOLUTION ORAL at 20:27

## 2018-01-01 RX ADMIN — INSULIN ASPART 1 UNITS: 100 INJECTION, SOLUTION INTRAVENOUS; SUBCUTANEOUS at 08:39

## 2018-01-01 RX ADMIN — Medication 1 MG: at 01:42

## 2018-01-01 RX ADMIN — ALBUMIN HUMAN 12.5 G: 0.25 SOLUTION INTRAVENOUS at 08:18

## 2018-01-01 RX ADMIN — SPIRONOLACTONE 50 MG: 25 TABLET ORAL at 09:00

## 2018-01-01 RX ADMIN — METOPROLOL TARTRATE 25 MG: 25 TABLET ORAL at 20:38

## 2018-01-01 RX ADMIN — CARVEDILOL 6.25 MG: 6.25 TABLET, FILM COATED ORAL at 17:49

## 2018-01-01 RX ADMIN — LACTULOSE 20 G: 20 SOLUTION ORAL at 08:16

## 2018-01-01 RX ADMIN — MORPHINE SULFATE 10 MG: 10 SOLUTION ORAL at 13:53

## 2018-01-01 RX ADMIN — ALLOPURINOL 100 MG: 100 TABLET ORAL at 08:25

## 2018-01-01 RX ADMIN — LEVOTHYROXINE SODIUM 150 MCG: 75 TABLET ORAL at 08:37

## 2018-01-01 RX ADMIN — INSULIN ASPART 1 UNITS: 100 INJECTION, SOLUTION INTRAVENOUS; SUBCUTANEOUS at 12:25

## 2018-01-01 RX ADMIN — FLUTICASONE PROPIONATE 1 SPRAY: 50 SPRAY, METERED NASAL at 08:10

## 2018-01-01 RX ADMIN — DEXTROSE MONOHYDRATE 25 G: 25 INJECTION, SOLUTION INTRAVENOUS at 23:00

## 2018-01-01 RX ADMIN — PROPRANOLOL HYDROCHLORIDE 20 MG: 20 TABLET ORAL at 16:03

## 2018-01-01 RX ADMIN — LOSARTAN POTASSIUM 50 MG: 50 TABLET ORAL at 07:44

## 2018-01-01 RX ADMIN — RIFAXIMIN 550 MG: 550 TABLET ORAL at 20:31

## 2018-01-01 RX ADMIN — PANTOPRAZOLE SODIUM 40 MG: 40 INJECTION, POWDER, FOR SOLUTION INTRAVENOUS at 16:18

## 2018-01-01 RX ADMIN — MIDODRINE HYDROCHLORIDE 2.5 MG: 2.5 TABLET ORAL at 17:12

## 2018-01-01 RX ADMIN — RIFAXIMIN 550 MG: 550 TABLET ORAL at 09:45

## 2018-01-01 RX ADMIN — MORPHINE SULFATE 10 MG: 10 SOLUTION ORAL at 16:01

## 2018-01-01 RX ADMIN — SODIUM CHLORIDE, POTASSIUM CHLORIDE, SODIUM LACTATE AND CALCIUM CHLORIDE: 600; 310; 30; 20 INJECTION, SOLUTION INTRAVENOUS at 22:51

## 2018-01-01 RX ADMIN — PHYTONADIONE 10 MG: 10 INJECTION, EMULSION INTRAMUSCULAR; INTRAVENOUS; SUBCUTANEOUS at 18:14

## 2018-01-01 RX ADMIN — SPIRONOLACTONE 50 MG: 50 TABLET ORAL at 16:14

## 2018-01-01 RX ADMIN — LACTULOSE 20 G: 20 SOLUTION ORAL at 07:55

## 2018-01-01 RX ADMIN — SODIUM CHLORIDE 1000 ML: 9 INJECTION, SOLUTION INTRAVENOUS at 12:39

## 2018-01-01 RX ADMIN — MIDODRINE HYDROCHLORIDE 2.5 MG: 2.5 TABLET ORAL at 12:41

## 2018-01-01 RX ADMIN — SODIUM CHLORIDE, POTASSIUM CHLORIDE, SODIUM LACTATE AND CALCIUM CHLORIDE: 600; 310; 30; 20 INJECTION, SOLUTION INTRAVENOUS at 14:37

## 2018-01-01 RX ADMIN — LEVOTHYROXINE SODIUM 150 MCG: 75 TABLET ORAL at 08:17

## 2018-01-01 RX ADMIN — DEXTROSE AND SODIUM CHLORIDE: 5; 450 INJECTION, SOLUTION INTRAVENOUS at 07:59

## 2018-01-01 RX ADMIN — TOPICAL ANESTHETIC 0.5 ML: 200 SPRAY DENTAL; PERIODONTAL at 14:35

## 2018-01-01 RX ADMIN — LACTULOSE 20 G: 20 SOLUTION ORAL at 13:14

## 2018-01-01 RX ADMIN — INSULIN ASPART 2 UNITS: 100 INJECTION, SOLUTION INTRAVENOUS; SUBCUTANEOUS at 12:44

## 2018-01-01 RX ADMIN — LACTULOSE 20 G: 20 POWDER, FOR SOLUTION ORAL at 12:01

## 2018-01-01 RX ADMIN — FLUTICASONE PROPIONATE 1 SPRAY: 50 SPRAY, METERED NASAL at 08:24

## 2018-01-01 RX ADMIN — SODIUM BICARBONATE 1300 MG: 650 TABLET ORAL at 20:13

## 2018-01-01 RX ADMIN — LACTULOSE 20 G: 10 SOLUTION ORAL at 20:12

## 2018-01-01 RX ADMIN — INSULIN ASPART 2 UNITS: 100 INJECTION, SOLUTION INTRAVENOUS; SUBCUTANEOUS at 23:11

## 2018-01-01 RX ADMIN — RIFAXIMIN 550 MG: 550 TABLET ORAL at 08:51

## 2018-01-01 RX ADMIN — SODIUM CHLORIDE, POTASSIUM CHLORIDE, SODIUM LACTATE AND CALCIUM CHLORIDE: 600; 310; 30; 20 INJECTION, SOLUTION INTRAVENOUS at 17:42

## 2018-01-01 RX ADMIN — OCTREOTIDE ACETATE 100 MCG: 100 INJECTION, SOLUTION INTRAVENOUS; SUBCUTANEOUS at 13:14

## 2018-01-01 RX ADMIN — PROPRANOLOL HYDROCHLORIDE 20 MG: 20 TABLET ORAL at 08:38

## 2018-01-01 RX ADMIN — FLUTICASONE PROPIONATE 1 SPRAY: 50 SPRAY, METERED NASAL at 16:02

## 2018-01-01 RX ADMIN — ALBUMIN HUMAN 12.5 G: 0.25 SOLUTION INTRAVENOUS at 08:29

## 2018-01-01 RX ADMIN — METOPROLOL TARTRATE 25 MG: 25 TABLET ORAL at 07:55

## 2018-01-01 RX ADMIN — INSULIN ASPART 1 UNITS: 100 INJECTION, SOLUTION INTRAVENOUS; SUBCUTANEOUS at 17:52

## 2018-01-01 RX ADMIN — ASPIRIN 81 MG: 81 TABLET, COATED ORAL at 09:11

## 2018-01-01 RX ADMIN — OCTREOTIDE ACETATE 50 MCG/HR: 200 INJECTION, SOLUTION INTRAVENOUS; SUBCUTANEOUS at 11:16

## 2018-01-01 RX ADMIN — LEVOTHYROXINE SODIUM 150 MCG: 75 TABLET ORAL at 08:31

## 2018-01-01 RX ADMIN — MORPHINE SULFATE 5 MG: 100 SOLUTION ORAL at 01:29

## 2018-01-01 RX ADMIN — OCTREOTIDE ACETATE 100 MCG: 100 INJECTION, SOLUTION INTRAVENOUS; SUBCUTANEOUS at 08:38

## 2018-01-01 RX ADMIN — METOPROLOL TARTRATE 25 MG: 25 TABLET ORAL at 19:55

## 2018-01-01 RX ADMIN — INSULIN GLARGINE 15 UNITS: 100 INJECTION, SOLUTION SUBCUTANEOUS at 22:20

## 2018-01-01 RX ADMIN — SODIUM BICARBONATE 1300 MG: 650 TABLET ORAL at 08:22

## 2018-01-01 RX ADMIN — CALCIUM GLUCONATE 1 G: 98 INJECTION, SOLUTION INTRAVENOUS at 23:12

## 2018-01-01 RX ADMIN — FUROSEMIDE 40 MG: 40 TABLET ORAL at 08:37

## 2018-01-01 RX ADMIN — LACTULOSE 20 G: 10 SOLUTION ORAL at 08:38

## 2018-01-01 RX ADMIN — LACTULOSE 20 G: 20 POWDER, FOR SOLUTION ORAL at 19:55

## 2018-01-01 RX ADMIN — SODIUM CHLORIDE 1000 ML: 9 INJECTION, SOLUTION INTRAVENOUS at 16:29

## 2018-01-01 RX ADMIN — METOPROLOL TARTRATE 25 MG: 25 TABLET ORAL at 08:31

## 2018-01-01 RX ADMIN — PANTOPRAZOLE SODIUM 40 MG: 40 TABLET, DELAYED RELEASE ORAL at 20:44

## 2018-01-01 RX ADMIN — HUMAN INSULIN 10 UNITS: 100 INJECTION, SOLUTION SUBCUTANEOUS at 06:08

## 2018-01-01 RX ADMIN — LEVOTHYROXINE SODIUM 150 MCG: 75 TABLET ORAL at 08:38

## 2018-01-01 RX ADMIN — ASPIRIN 81 MG: 81 TABLET, COATED ORAL at 09:45

## 2018-01-01 RX ADMIN — RIFAXIMIN 550 MG: 550 TABLET ORAL at 20:36

## 2018-01-01 RX ADMIN — INSULIN ASPART 1 UNITS: 100 INJECTION, SOLUTION INTRAVENOUS; SUBCUTANEOUS at 18:26

## 2018-01-01 RX ADMIN — CEFTRIAXONE SODIUM 2 G: 10 INJECTION, POWDER, FOR SOLUTION INTRAVENOUS at 22:28

## 2018-01-01 RX ADMIN — INSULIN ASPART 1 UNITS: 100 INJECTION, SOLUTION INTRAVENOUS; SUBCUTANEOUS at 18:12

## 2018-01-01 RX ADMIN — RIFAXIMIN 550 MG: 550 TABLET ORAL at 08:24

## 2018-01-01 RX ADMIN — ALBUMIN HUMAN 12.5 G: 0.25 SOLUTION INTRAVENOUS at 09:00

## 2018-01-01 RX ADMIN — RIFAXIMIN 550 MG: 550 TABLET ORAL at 20:21

## 2018-01-01 RX ADMIN — GUAIFENESIN AND CODEINE PHOSPHATE 5 ML: 10; 100 LIQUID ORAL at 12:50

## 2018-01-01 RX ADMIN — INSULIN ASPART 1 UNITS: 100 INJECTION, SOLUTION INTRAVENOUS; SUBCUTANEOUS at 02:14

## 2018-01-01 RX ADMIN — INSULIN ASPART 1 UNITS: 100 INJECTION, SOLUTION INTRAVENOUS; SUBCUTANEOUS at 17:49

## 2018-01-01 RX ADMIN — GUAIFENESIN AND PSEUDOEPHEDRINE HYDROCHLORIDE 1 TABLET: 600; 60 TABLET, EXTENDED RELEASE ORAL at 21:14

## 2018-01-01 RX ADMIN — LACTULOSE 20 G: 20 SOLUTION ORAL at 14:56

## 2018-01-01 RX ADMIN — RIFAXIMIN 550 MG: 550 TABLET ORAL at 20:26

## 2018-01-01 RX ADMIN — SODIUM BICARBONATE 1300 MG: 650 TABLET ORAL at 08:54

## 2018-01-01 RX ADMIN — CARVEDILOL 6.25 MG: 6.25 TABLET, FILM COATED ORAL at 21:34

## 2018-01-01 RX ADMIN — CARVEDILOL 6.25 MG: 6.25 TABLET, FILM COATED ORAL at 08:37

## 2018-01-01 RX ADMIN — ALBUMIN HUMAN 50 G: 0.25 SOLUTION INTRAVENOUS at 08:25

## 2018-01-01 RX ADMIN — ALLOPURINOL 100 MG: 100 TABLET ORAL at 08:35

## 2018-01-01 RX ADMIN — LACTULOSE 20 G: 20 SOLUTION ORAL at 20:07

## 2018-01-01 RX ADMIN — LACTULOSE 20 G: 20 SOLUTION ORAL at 19:55

## 2018-01-01 RX ADMIN — INSULIN ASPART 2 UNITS: 100 INJECTION, SOLUTION INTRAVENOUS; SUBCUTANEOUS at 17:13

## 2018-01-01 RX ADMIN — INSULIN GLARGINE 15 UNITS: 100 INJECTION, SOLUTION SUBCUTANEOUS at 22:24

## 2018-01-01 RX ADMIN — LIDOCAINE HYDROCHLORIDE 10 ML: 10 INJECTION, SOLUTION EPIDURAL; INFILTRATION; INTRACAUDAL; PERINEURAL at 14:45

## 2018-01-01 RX ADMIN — SODIUM CHLORIDE: 0.9 INJECTION, SOLUTION INTRAVENOUS at 13:15

## 2018-01-01 RX ADMIN — CEFPODOXIME PROXETIL 200 MG: 200 TABLET, FILM COATED ORAL at 08:52

## 2018-01-01 RX ADMIN — SODIUM BICARBONATE 650 MG TABLET 1300 MG: at 08:21

## 2018-01-01 RX ADMIN — ALBUMIN HUMAN 12.5 G: 0.25 SOLUTION INTRAVENOUS at 08:45

## 2018-01-01 RX ADMIN — PROPRANOLOL HYDROCHLORIDE 20 MG: 20 TABLET ORAL at 13:04

## 2018-01-01 RX ADMIN — SODIUM BICARBONATE 1300 MG: 650 TABLET ORAL at 20:40

## 2018-01-01 RX ADMIN — METOPROLOL TARTRATE 25 MG: 25 TABLET ORAL at 21:13

## 2018-01-01 RX ADMIN — ALLOPURINOL 100 MG: 100 TABLET ORAL at 08:28

## 2018-01-01 RX ADMIN — ALLOPURINOL 100 MG: 100 TABLET ORAL at 11:03

## 2018-01-01 RX ADMIN — METOPROLOL TARTRATE 25 MG: 25 TABLET ORAL at 08:53

## 2018-01-01 RX ADMIN — LEVOTHYROXINE SODIUM 150 MCG: 150 TABLET ORAL at 17:50

## 2018-01-01 RX ADMIN — LEVOTHYROXINE SODIUM 150 MCG: 150 TABLET ORAL at 08:23

## 2018-01-01 RX ADMIN — TC 99M MEDRONATE 28.1 MCI.: 20 INJECTION, POWDER, LYOPHILIZED, FOR SOLUTION INTRAVENOUS at 09:14

## 2018-01-01 RX ADMIN — RIFAXIMIN 550 MG: 550 TABLET ORAL at 08:37

## 2018-01-01 RX ADMIN — CIPROFLOXACIN HYDROCHLORIDE 500 MG: 500 TABLET, FILM COATED ORAL at 08:01

## 2018-01-01 RX ADMIN — SODIUM BICARBONATE 1300 MG: 650 TABLET ORAL at 20:20

## 2018-01-01 RX ADMIN — ALBUMIN HUMAN 12.5 G: 0.25 SOLUTION INTRAVENOUS at 08:42

## 2018-01-01 RX ADMIN — SODIUM BICARBONATE 1300 MG: 650 TABLET ORAL at 20:34

## 2018-01-01 RX ADMIN — SPIRONOLACTONE 50 MG: 50 TABLET ORAL at 08:01

## 2018-01-01 RX ADMIN — GUAIFENESIN AND CODEINE PHOSPHATE 5 ML: 10; 100 LIQUID ORAL at 00:52

## 2018-01-01 RX ADMIN — RIFAXIMIN 550 MG: 550 TABLET ORAL at 20:40

## 2018-01-01 RX ADMIN — RIFAXIMIN 550 MG: 550 TABLET ORAL at 19:44

## 2018-01-01 RX ADMIN — CARVEDILOL 6.25 MG: 6.25 TABLET, FILM COATED ORAL at 08:35

## 2018-01-01 RX ADMIN — ALBUMIN HUMAN 12.5 G: 0.25 SOLUTION INTRAVENOUS at 08:20

## 2018-01-01 RX ADMIN — TOPICAL ANESTHETIC 1 SPRAY: 200 SPRAY DENTAL; PERIODONTAL at 15:35

## 2018-01-01 RX ADMIN — LIDOCAINE HYDROCHLORIDE: 10 INJECTION, SOLUTION EPIDURAL; INFILTRATION; INTRACAUDAL; PERINEURAL at 00:05

## 2018-01-01 RX ADMIN — INSULIN ASPART 1 UNITS: 100 INJECTION, SOLUTION INTRAVENOUS; SUBCUTANEOUS at 08:30

## 2018-01-01 RX ADMIN — INSULIN ASPART 1 UNITS: 100 INJECTION, SOLUTION INTRAVENOUS; SUBCUTANEOUS at 09:39

## 2018-01-01 RX ADMIN — LACTULOSE 20 G: 20 SOLUTION ORAL at 12:56

## 2018-01-01 RX ADMIN — LACTULOSE 20 G: 20 POWDER, FOR SOLUTION ORAL at 09:46

## 2018-01-01 RX ADMIN — SODIUM BICARBONATE 650 MG TABLET 1300 MG: at 11:05

## 2018-01-01 RX ADMIN — LACTULOSE 20 G: 20 SOLUTION ORAL at 14:06

## 2018-01-01 RX ADMIN — ALLOPURINOL 100 MG: 100 TABLET ORAL at 07:59

## 2018-01-01 RX ADMIN — LACTULOSE 20 G: 20 SOLUTION ORAL at 09:12

## 2018-01-01 RX ADMIN — METOPROLOL TARTRATE 25 MG: 25 TABLET ORAL at 08:51

## 2018-01-01 RX ADMIN — DEXTROSE MONOHYDRATE: 100 INJECTION, SOLUTION INTRAVENOUS at 06:49

## 2018-01-01 RX ADMIN — OCTREOTIDE ACETATE 100 MCG: 100 INJECTION, SOLUTION INTRAVENOUS; SUBCUTANEOUS at 09:12

## 2018-01-01 RX ADMIN — DEXTROSE MONOHYDRATE 25 G: 500 INJECTION PARENTERAL at 11:11

## 2018-01-01 RX ADMIN — INSULIN ASPART 2 UNITS: 100 INJECTION, SOLUTION INTRAVENOUS; SUBCUTANEOUS at 12:47

## 2018-01-01 RX ADMIN — SODIUM BICARBONATE 650 MG TABLET 1300 MG: at 08:31

## 2018-01-01 RX ADMIN — PHYTONADIONE 10 MG: 10 INJECTION, EMULSION INTRAMUSCULAR; INTRAVENOUS; SUBCUTANEOUS at 20:36

## 2018-01-01 RX ADMIN — INSULIN ASPART 1 UNITS: 100 INJECTION, SOLUTION INTRAVENOUS; SUBCUTANEOUS at 14:44

## 2018-01-01 RX ADMIN — VANCOMYCIN HYDROCHLORIDE 1250 MG: 10 INJECTION, POWDER, LYOPHILIZED, FOR SOLUTION INTRAVENOUS at 16:23

## 2018-01-01 RX ADMIN — LIDOCAINE HYDROCHLORIDE 10 ML: 10 INJECTION, SOLUTION EPIDURAL; INFILTRATION; INTRACAUDAL; PERINEURAL at 16:39

## 2018-01-01 RX ADMIN — OCTREOTIDE ACETATE 100 MCG: 100 INJECTION, SOLUTION INTRAVENOUS; SUBCUTANEOUS at 08:26

## 2018-01-01 RX ADMIN — PHYTONADIONE 10 MG: 10 INJECTION, EMULSION INTRAMUSCULAR; INTRAVENOUS; SUBCUTANEOUS at 11:02

## 2018-01-01 RX ADMIN — ALLOPURINOL 100 MG: 100 TABLET ORAL at 08:21

## 2018-01-01 RX ADMIN — SODIUM BICARBONATE: 84 INJECTION, SOLUTION INTRAVENOUS at 04:21

## 2018-01-01 RX ADMIN — ALBUMIN HUMAN 12.5 G: 0.25 SOLUTION INTRAVENOUS at 08:15

## 2018-01-01 RX ADMIN — LACTULOSE 20 G: 20 POWDER, FOR SOLUTION ORAL at 08:13

## 2018-01-01 RX ADMIN — INSULIN ASPART 1 UNITS: 100 INJECTION, SOLUTION INTRAVENOUS; SUBCUTANEOUS at 19:53

## 2018-01-01 RX ADMIN — LACTULOSE 20 G: 10 SOLUTION ORAL at 18:42

## 2018-01-01 RX ADMIN — PANTOPRAZOLE SODIUM 40 MG: 40 INJECTION, POWDER, FOR SOLUTION INTRAVENOUS at 08:34

## 2018-01-01 RX ADMIN — RIFAXIMIN 550 MG: 550 TABLET ORAL at 07:55

## 2018-01-01 RX ADMIN — NEOMYCIN SULFATE 500 MG: 500 TABLET ORAL at 21:52

## 2018-01-01 RX ADMIN — CARVEDILOL 6.25 MG: 6.25 TABLET, FILM COATED ORAL at 08:14

## 2018-01-01 RX ADMIN — LACTULOSE 20 G: 20 SOLUTION ORAL at 08:56

## 2018-01-01 RX ADMIN — ALBUMIN HUMAN 12.5 G: 0.25 SOLUTION INTRAVENOUS at 08:12

## 2018-01-01 RX ADMIN — GUAIFENESIN AND PSEUDOEPHEDRINE HYDROCHLORIDE 1 TABLET: 600; 60 TABLET, EXTENDED RELEASE ORAL at 11:14

## 2018-01-01 RX ADMIN — SODIUM CHLORIDE 1000 ML: 9 INJECTION, SOLUTION INTRAVENOUS at 13:52

## 2018-01-01 RX ADMIN — LACTULOSE 20 G: 10 SOLUTION ORAL at 16:49

## 2018-01-01 RX ADMIN — ALBUMIN HUMAN 50 G: 0.25 SOLUTION INTRAVENOUS at 22:29

## 2018-01-01 RX ADMIN — INSULIN GLARGINE 18 UNITS: 100 INJECTION, SOLUTION SUBCUTANEOUS at 22:03

## 2018-01-01 RX ADMIN — PANTOPRAZOLE SODIUM 40 MG: 40 INJECTION, POWDER, FOR SOLUTION INTRAVENOUS at 08:02

## 2018-01-01 RX ADMIN — INSULIN GLARGINE 18 UNITS: 100 INJECTION, SOLUTION SUBCUTANEOUS at 22:17

## 2018-01-01 RX ADMIN — LEVOTHYROXINE SODIUM 150 MCG: 75 TABLET ORAL at 08:08

## 2018-01-01 RX ADMIN — Medication 3.9 MILLICURIE: at 09:15

## 2018-01-01 RX ADMIN — ASPIRIN 81 MG: 81 TABLET, COATED ORAL at 08:09

## 2018-01-01 RX ADMIN — INSULIN ASPART 1 UNITS: 100 INJECTION, SOLUTION INTRAVENOUS; SUBCUTANEOUS at 22:28

## 2018-01-01 RX ADMIN — MIDODRINE HYDROCHLORIDE 2.5 MG: 2.5 TABLET ORAL at 17:38

## 2018-01-01 RX ADMIN — LACTULOSE 20 G: 20 POWDER, FOR SOLUTION ORAL at 20:38

## 2018-01-01 RX ADMIN — INSULIN GLARGINE 30 UNITS: 100 INJECTION, SOLUTION SUBCUTANEOUS at 22:11

## 2018-01-01 RX ADMIN — LIDOCAINE HYDROCHLORIDE 2 ML: 10 INJECTION, SOLUTION EPIDURAL; INFILTRATION; INTRACAUDAL; PERINEURAL at 12:00

## 2018-01-01 RX ADMIN — INSULIN GLARGINE 15 UNITS: 100 INJECTION, SOLUTION SUBCUTANEOUS at 22:33

## 2018-01-01 RX ADMIN — SPIRONOLACTONE 50 MG: 50 TABLET ORAL at 07:55

## 2018-01-01 RX ADMIN — GADOBUTROL 10 ML: 604.72 INJECTION INTRAVENOUS at 14:04

## 2018-01-01 RX ADMIN — PANTOPRAZOLE SODIUM 40 MG: 40 TABLET, DELAYED RELEASE ORAL at 20:13

## 2018-01-01 RX ADMIN — SODIUM BICARBONATE 1300 MG: 650 TABLET ORAL at 20:38

## 2018-01-01 RX ADMIN — RIFAXIMIN 550 MG: 550 TABLET ORAL at 19:15

## 2018-01-01 RX ADMIN — NEOMYCIN SULFATE 500 MG: 500 TABLET ORAL at 08:52

## 2018-01-01 RX ADMIN — INSULIN ASPART 2 UNITS: 100 INJECTION, SOLUTION INTRAVENOUS; SUBCUTANEOUS at 17:41

## 2018-01-01 RX ADMIN — LACTULOSE 20 G: 20 POWDER, FOR SOLUTION ORAL at 12:49

## 2018-01-01 RX ADMIN — INSULIN ASPART 1 UNITS: 100 INJECTION, SOLUTION INTRAVENOUS; SUBCUTANEOUS at 17:31

## 2018-01-01 RX ADMIN — METOPROLOL TARTRATE 25 MG: 25 TABLET ORAL at 20:26

## 2018-01-01 RX ADMIN — FUROSEMIDE 40 MG: 40 TABLET ORAL at 13:48

## 2018-01-01 RX ADMIN — SODIUM CHLORIDE 1000 ML: 9 INJECTION, SOLUTION INTRAVENOUS at 21:36

## 2018-01-01 RX ADMIN — SODIUM BICARBONATE 650 MG TABLET 1300 MG: at 07:44

## 2018-01-01 RX ADMIN — PIPERACILLIN SODIUM AND TAZOBACTAM SODIUM 3.38 G: 3; .375 INJECTION, POWDER, LYOPHILIZED, FOR SOLUTION INTRAVENOUS at 12:49

## 2018-01-01 RX ADMIN — ALBUMIN HUMAN 25 G: 0.25 SOLUTION INTRAVENOUS at 08:27

## 2018-01-01 RX ADMIN — LIDOCAINE HYDROCHLORIDE 10 ML: 10 INJECTION, SOLUTION EPIDURAL; INFILTRATION; INTRACAUDAL; PERINEURAL at 15:40

## 2018-01-01 RX ADMIN — ALBUMIN HUMAN 100 G: 0.25 SOLUTION INTRAVENOUS at 10:18

## 2018-01-01 RX ADMIN — RIFAXIMIN 550 MG: 550 TABLET ORAL at 08:21

## 2018-01-01 RX ADMIN — FUROSEMIDE 40 MG: 40 TABLET ORAL at 08:53

## 2018-01-01 RX ADMIN — PANTOPRAZOLE SODIUM 40 MG: 40 TABLET, DELAYED RELEASE ORAL at 08:14

## 2018-01-01 RX ADMIN — CEFPODOXIME PROXETIL 200 MG: 200 TABLET, FILM COATED ORAL at 08:08

## 2018-01-01 RX ADMIN — SODIUM BICARBONATE 1300 MG: 650 TABLET ORAL at 20:35

## 2018-01-01 RX ADMIN — ALBUMIN HUMAN 25 G: 0.25 SOLUTION INTRAVENOUS at 08:41

## 2018-01-01 RX ADMIN — LACTULOSE 20 G: 20 SOLUTION ORAL at 20:20

## 2018-01-01 RX ADMIN — SODIUM CHLORIDE, POTASSIUM CHLORIDE, SODIUM LACTATE AND CALCIUM CHLORIDE: 600; 310; 30; 20 INJECTION, SOLUTION INTRAVENOUS at 15:15

## 2018-01-01 RX ADMIN — PANTOPRAZOLE SODIUM 40 MG: 40 INJECTION, POWDER, FOR SOLUTION INTRAVENOUS at 09:37

## 2018-01-01 RX ADMIN — LACTULOSE 20 G: 20 POWDER, FOR SOLUTION ORAL at 13:00

## 2018-01-01 RX ADMIN — SODIUM BICARBONATE 1300 MG: 650 TABLET ORAL at 09:45

## 2018-01-01 RX ADMIN — LEVOTHYROXINE SODIUM 150 MCG: 150 TABLET ORAL at 09:00

## 2018-01-01 RX ADMIN — LIDOCAINE HYDROCHLORIDE 4 ML: 10 INJECTION, SOLUTION EPIDURAL; INFILTRATION; INTRACAUDAL; PERINEURAL at 12:01

## 2018-01-01 RX ADMIN — ALBUMIN HUMAN 12.5 G: 0.25 SOLUTION INTRAVENOUS at 08:51

## 2018-01-01 RX ADMIN — SODIUM BICARBONATE 650 MG TABLET 1300 MG: at 08:38

## 2018-01-01 RX ADMIN — SODIUM BICARBONATE 650 MG TABLET 1300 MG: at 20:07

## 2018-01-01 RX ADMIN — PANTOPRAZOLE SODIUM 40 MG: 40 INJECTION, POWDER, FOR SOLUTION INTRAVENOUS at 16:03

## 2018-01-01 RX ADMIN — ALBUMIN HUMAN 50 G: 0.25 SOLUTION INTRAVENOUS at 16:14

## 2018-01-01 RX ADMIN — SODIUM BICARBONATE 1300 MG: 650 TABLET ORAL at 19:55

## 2018-01-01 RX ADMIN — SODIUM BICARBONATE 650 MG TABLET 1300 MG: at 20:44

## 2018-01-01 RX ADMIN — CIPROFLOXACIN HYDROCHLORIDE 500 MG: 500 TABLET, FILM COATED ORAL at 09:46

## 2018-01-01 RX ADMIN — INSULIN GLARGINE 15 UNITS: 100 INJECTION, SOLUTION SUBCUTANEOUS at 22:38

## 2018-01-01 RX ADMIN — PIPERACILLIN AND TAZOBACTAM 3.38 G: 3; .375 INJECTION, POWDER, LYOPHILIZED, FOR SOLUTION INTRAVENOUS; PARENTERAL at 01:33

## 2018-01-01 RX ADMIN — IODIXANOL 20 ML: 320 INJECTION, SOLUTION INTRAVASCULAR at 09:38

## 2018-01-01 RX ADMIN — LACTULOSE 20 G: 20 SOLUTION ORAL at 15:40

## 2018-01-01 RX ADMIN — INSULIN ASPART 1 UNITS: 100 INJECTION, SOLUTION INTRAVENOUS; SUBCUTANEOUS at 06:21

## 2018-01-01 RX ADMIN — INSULIN GLARGINE 15 UNITS: 100 INJECTION, SOLUTION SUBCUTANEOUS at 21:40

## 2018-01-01 RX ADMIN — LACTULOSE 20 G: 20 SOLUTION ORAL at 19:17

## 2018-01-01 RX ADMIN — LEVOTHYROXINE SODIUM 150 MCG: 75 TABLET ORAL at 08:21

## 2018-01-01 RX ADMIN — LACTULOSE 20 G: 20 POWDER, FOR SOLUTION ORAL at 11:14

## 2018-01-01 RX ADMIN — RIFAXIMIN 550 MG: 550 TABLET ORAL at 08:17

## 2018-01-01 RX ADMIN — LIDOCAINE HYDROCHLORIDE: 10 INJECTION, SOLUTION EPIDURAL; INFILTRATION; INTRACAUDAL; PERINEURAL at 00:04

## 2018-01-01 RX ADMIN — INSULIN ASPART 1 UNITS: 100 INJECTION, SOLUTION INTRAVENOUS; SUBCUTANEOUS at 09:00

## 2018-01-01 RX ADMIN — MIDODRINE HYDROCHLORIDE 2.5 MG: 2.5 TABLET ORAL at 14:11

## 2018-01-01 RX ADMIN — CEFTRIAXONE SODIUM 1 G: 10 INJECTION, POWDER, FOR SOLUTION INTRAVENOUS at 20:27

## 2018-01-01 RX ADMIN — VANCOMYCIN HYDROCHLORIDE 1750 MG: 10 INJECTION, POWDER, LYOPHILIZED, FOR SOLUTION INTRAVENOUS at 11:38

## 2018-01-01 RX ADMIN — DEXTROSE MONOHYDRATE 25 G: 500 INJECTION PARENTERAL at 23:00

## 2018-01-01 RX ADMIN — CIPROFLOXACIN HYDROCHLORIDE 500 MG: 500 TABLET, FILM COATED ORAL at 11:40

## 2018-01-01 RX ADMIN — FUROSEMIDE 40 MG: 40 TABLET ORAL at 08:35

## 2018-01-01 RX ADMIN — ALBUMIN HUMAN 12.5 G: 0.25 SOLUTION INTRAVENOUS at 15:00

## 2018-01-01 RX ADMIN — MIDODRINE HYDROCHLORIDE 2.5 MG: 2.5 TABLET ORAL at 08:25

## 2018-01-01 RX ADMIN — ALLOPURINOL 100 MG: 100 TABLET ORAL at 09:49

## 2018-01-01 RX ADMIN — METOPROLOL TARTRATE 25 MG: 25 TABLET ORAL at 07:44

## 2018-01-01 RX ADMIN — Medication 1 MG: at 09:05

## 2018-01-01 RX ADMIN — FLUTICASONE PROPIONATE 1 SPRAY: 50 SPRAY, METERED NASAL at 09:46

## 2018-01-01 RX ADMIN — WARFARIN SODIUM 1 MG: 1 TABLET ORAL at 17:09

## 2018-01-01 RX ADMIN — INSULIN ASPART 1 UNITS: 100 INJECTION, SOLUTION INTRAVENOUS; SUBCUTANEOUS at 14:00

## 2018-01-01 RX ADMIN — INSULIN ASPART 1 UNITS: 100 INJECTION, SOLUTION INTRAVENOUS; SUBCUTANEOUS at 08:49

## 2018-01-01 RX ADMIN — FUROSEMIDE 40 MG: 40 TABLET ORAL at 08:13

## 2018-01-01 RX ADMIN — RIFAXIMIN 550 MG: 550 TABLET ORAL at 21:14

## 2018-01-01 RX ADMIN — INSULIN GLARGINE 15 UNITS: 100 INJECTION, SOLUTION SUBCUTANEOUS at 21:42

## 2018-01-01 RX ADMIN — SODIUM BICARBONATE 650 MG TABLET 1300 MG: at 08:53

## 2018-01-01 RX ADMIN — MIDAZOLAM 1.5 MG: 1 INJECTION INTRAMUSCULAR; INTRAVENOUS at 14:47

## 2018-01-01 RX ADMIN — PANTOPRAZOLE SODIUM 40 MG: 40 TABLET, DELAYED RELEASE ORAL at 07:44

## 2018-01-01 RX ADMIN — PANTOPRAZOLE SODIUM 40 MG: 40 TABLET, DELAYED RELEASE ORAL at 21:14

## 2018-01-01 RX ADMIN — PANTOPRAZOLE SODIUM 40 MG: 40 TABLET, DELAYED RELEASE ORAL at 17:56

## 2018-01-01 RX ADMIN — NEOMYCIN SULFATE 500 MG: 500 TABLET ORAL at 08:44

## 2018-01-01 RX ADMIN — SODIUM BICARBONATE 1300 MG: 650 TABLET ORAL at 08:23

## 2018-01-01 RX ADMIN — PIPERACILLIN SODIUM AND TAZOBACTAM SODIUM 3.38 G: 3; .375 INJECTION, POWDER, LYOPHILIZED, FOR SOLUTION INTRAVENOUS at 13:44

## 2018-01-01 RX ADMIN — ALLOPURINOL 100 MG: 100 TABLET ORAL at 08:57

## 2018-01-01 RX ADMIN — LACTULOSE 20 G: 20 SOLUTION ORAL at 08:51

## 2018-01-01 RX ADMIN — RIFAXIMIN 550 MG: 550 TABLET ORAL at 08:01

## 2018-01-01 RX ADMIN — ALBUMIN HUMAN 37.5 G: 0.25 SOLUTION INTRAVENOUS at 08:37

## 2018-01-01 RX ADMIN — CIPROFLOXACIN HYDROCHLORIDE 500 MG: 500 TABLET, FILM COATED ORAL at 08:23

## 2018-01-01 RX ADMIN — LIDOCAINE HYDROCHLORIDE 10 ML: 10 INJECTION, SOLUTION EPIDURAL; INFILTRATION; INTRACAUDAL; PERINEURAL at 13:15

## 2018-01-01 RX ADMIN — RIFAXIMIN 550 MG: 550 TABLET ORAL at 19:49

## 2018-01-01 RX ADMIN — SODIUM CHLORIDE, POTASSIUM CHLORIDE, SODIUM LACTATE AND CALCIUM CHLORIDE: 600; 310; 30; 20 INJECTION, SOLUTION INTRAVENOUS at 08:03

## 2018-01-01 RX ADMIN — ALBUMIN HUMAN 100 G: 0.25 SOLUTION INTRAVENOUS at 10:20

## 2018-01-01 RX ADMIN — GUAIFENESIN AND PSEUDOEPHEDRINE HYDROCHLORIDE 1 TABLET: 600; 60 TABLET, EXTENDED RELEASE ORAL at 08:39

## 2018-01-01 RX ADMIN — INSULIN GLARGINE 15 UNITS: 100 INJECTION, SOLUTION SUBCUTANEOUS at 21:56

## 2018-01-01 RX ADMIN — LEVOTHYROXINE SODIUM 125 MCG: 125 TABLET ORAL at 08:28

## 2018-01-01 RX ADMIN — RIFAXIMIN 550 MG: 550 TABLET ORAL at 08:25

## 2018-01-01 RX ADMIN — OCTREOTIDE ACETATE 100 MCG: 100 INJECTION, SOLUTION INTRAVENOUS; SUBCUTANEOUS at 18:35

## 2018-01-01 RX ADMIN — PANTOPRAZOLE SODIUM 40 MG: 40 INJECTION, POWDER, FOR SOLUTION INTRAVENOUS at 07:58

## 2018-01-01 RX ADMIN — LEVOTHYROXINE SODIUM 150 MCG: 75 TABLET ORAL at 08:53

## 2018-01-01 RX ADMIN — ALBUMIN HUMAN 150 G: 0.25 SOLUTION INTRAVENOUS at 16:00

## 2018-01-01 RX ADMIN — LACTULOSE 20 G: 20 POWDER, FOR SOLUTION ORAL at 12:17

## 2018-01-01 RX ADMIN — PANTOPRAZOLE SODIUM 40 MG: 40 INJECTION, POWDER, FOR SOLUTION INTRAVENOUS at 08:13

## 2018-01-01 RX ADMIN — LACTULOSE 20 G: 20 POWDER, FOR SOLUTION ORAL at 16:25

## 2018-01-01 RX ADMIN — LACTULOSE 20 G: 20 SOLUTION ORAL at 17:46

## 2018-01-01 RX ADMIN — PIPERACILLIN SODIUM,TAZOBACTAM SODIUM 4.5 G: 4; .5 INJECTION, POWDER, FOR SOLUTION INTRAVENOUS at 09:18

## 2018-01-01 RX ADMIN — CEFPODOXIME PROXETIL 200 MG: 200 TABLET, FILM COATED ORAL at 20:26

## 2018-01-01 RX ADMIN — LACTULOSE 20 G: 20 SOLUTION ORAL at 15:00

## 2018-01-01 RX ADMIN — RIFAXIMIN 550 MG: 550 TABLET ORAL at 20:34

## 2018-01-01 RX ADMIN — INSULIN ASPART 1 UNITS: 100 INJECTION, SOLUTION INTRAVENOUS; SUBCUTANEOUS at 21:20

## 2018-01-01 RX ADMIN — FUROSEMIDE 40 MG: 40 TABLET ORAL at 08:25

## 2018-01-01 RX ADMIN — PANTOPRAZOLE SODIUM 40 MG: 40 INJECTION, POWDER, FOR SOLUTION INTRAVENOUS at 08:17

## 2018-01-01 RX ADMIN — LEVOTHYROXINE SODIUM 150 MCG: 75 TABLET ORAL at 08:23

## 2018-01-01 RX ADMIN — SODIUM BICARBONATE 1300 MG: 650 TABLET ORAL at 19:17

## 2018-01-01 RX ADMIN — SODIUM CHLORIDE 80 MG: 9 INJECTION, SOLUTION INTRAVENOUS at 11:05

## 2018-01-01 RX ADMIN — MIDODRINE HYDROCHLORIDE 2.5 MG: 2.5 TABLET ORAL at 18:35

## 2018-01-01 RX ADMIN — HUMAN INSULIN 14 UNITS: 100 INJECTION, SOLUTION SUBCUTANEOUS at 03:57

## 2018-01-01 RX ADMIN — PHYTONADIONE 10 MG: 10 INJECTION, EMULSION INTRAMUSCULAR; INTRAVENOUS; SUBCUTANEOUS at 08:34

## 2018-01-01 RX ADMIN — LACTULOSE 20 G: 20 POWDER, FOR SOLUTION ORAL at 09:08

## 2018-01-01 RX ADMIN — LACTULOSE 20 G: 20 SOLUTION ORAL at 09:40

## 2018-01-01 RX ADMIN — VANCOMYCIN HYDROCHLORIDE 1750 MG: 10 INJECTION, POWDER, LYOPHILIZED, FOR SOLUTION INTRAVENOUS at 20:27

## 2018-01-01 RX ADMIN — INSULIN GLARGINE 15 UNITS: 100 INJECTION, SOLUTION SUBCUTANEOUS at 21:50

## 2018-01-01 RX ADMIN — PANTOPRAZOLE SODIUM 40 MG: 40 INJECTION, POWDER, FOR SOLUTION INTRAVENOUS at 08:50

## 2018-01-01 RX ADMIN — METOPROLOL TARTRATE 25 MG: 25 TABLET ORAL at 08:54

## 2018-01-01 RX ADMIN — SODIUM BICARBONATE 650 MG TABLET 1300 MG: at 20:50

## 2018-01-01 RX ADMIN — LEVOTHYROXINE SODIUM 150 MCG: 75 TABLET ORAL at 09:11

## 2018-01-01 RX ADMIN — SPIRONOLACTONE 50 MG: 50 TABLET ORAL at 08:18

## 2018-01-01 RX ADMIN — LACTULOSE 20 G: 20 SOLUTION ORAL at 20:55

## 2018-01-01 RX ADMIN — HUMAN INSULIN 10 UNITS: 100 INJECTION, SOLUTION SUBCUTANEOUS at 11:11

## 2018-01-01 RX ADMIN — INSULIN ASPART 1 UNITS: 100 INJECTION, SOLUTION INTRAVENOUS; SUBCUTANEOUS at 17:46

## 2018-01-01 RX ADMIN — CIPROFLOXACIN HYDROCHLORIDE 500 MG: 500 TABLET, FILM COATED ORAL at 08:28

## 2018-01-01 RX ADMIN — PROPRANOLOL HYDROCHLORIDE 20 MG: 20 TABLET ORAL at 17:30

## 2018-01-01 RX ADMIN — OCTREOTIDE ACETATE 100 MCG: 100 INJECTION, SOLUTION INTRAVENOUS; SUBCUTANEOUS at 15:02

## 2018-01-01 RX ADMIN — LIDOCAINE HYDROCHLORIDE 10 ML: 10 INJECTION, SOLUTION EPIDURAL; INFILTRATION; INTRACAUDAL; PERINEURAL at 14:38

## 2018-01-01 RX ADMIN — RIFAXIMIN 550 MG: 550 TABLET ORAL at 14:30

## 2018-01-01 RX ADMIN — INSULIN GLARGINE 15 UNITS: 100 INJECTION, SOLUTION SUBCUTANEOUS at 23:02

## 2018-01-01 RX ADMIN — Medication 1 MG: at 14:55

## 2018-01-01 RX ADMIN — LACTULOSE 20 G: 20 POWDER, FOR SOLUTION ORAL at 14:30

## 2018-01-01 RX ADMIN — LIDOCAINE HYDROCHLORIDE 15 ML: 10 INJECTION, SOLUTION EPIDURAL; INFILTRATION; INTRACAUDAL; PERINEURAL at 08:14

## 2018-01-01 RX ADMIN — LOSARTAN POTASSIUM 50 MG: 50 TABLET ORAL at 08:54

## 2018-01-01 RX ADMIN — ALBUMIN HUMAN 50 G: 0.25 SOLUTION INTRAVENOUS at 15:00

## 2018-01-01 RX ADMIN — SPIRONOLACTONE 50 MG: 50 TABLET ORAL at 08:37

## 2018-01-01 RX ADMIN — SODIUM CHLORIDE: 9 INJECTION, SOLUTION INTRAVENOUS at 20:36

## 2018-01-01 RX ADMIN — HUMAN INSULIN 2 UNITS/HR: 100 INJECTION, SOLUTION SUBCUTANEOUS at 01:58

## 2018-01-01 RX ADMIN — PANTOPRAZOLE SODIUM 40 MG: 40 TABLET, DELAYED RELEASE ORAL at 11:05

## 2018-01-01 RX ADMIN — LACTULOSE 20 G: 10 SOLUTION ORAL at 08:31

## 2018-01-01 RX ADMIN — RIFAXIMIN 550 MG: 550 TABLET ORAL at 08:28

## 2018-01-01 RX ADMIN — SPIRONOLACTONE 50 MG: 50 TABLET ORAL at 08:25

## 2018-01-01 RX ADMIN — LACTULOSE 20 G: 20 SOLUTION ORAL at 19:48

## 2018-01-01 RX ADMIN — PROPRANOLOL HYDROCHLORIDE 20 MG: 20 TABLET ORAL at 08:17

## 2018-01-01 RX ADMIN — FUROSEMIDE 20 MG: 10 INJECTION, SOLUTION INTRAVENOUS at 15:04

## 2018-01-01 RX ADMIN — INSULIN GLARGINE 18 UNITS: 100 INJECTION, SOLUTION SUBCUTANEOUS at 23:47

## 2018-01-01 RX ADMIN — CIPROFLOXACIN HYDROCHLORIDE 500 MG: 500 TABLET, FILM COATED ORAL at 08:25

## 2018-01-01 RX ADMIN — PIPERACILLIN SODIUM,TAZOBACTAM SODIUM 4.5 G: 4; .5 INJECTION, POWDER, FOR SOLUTION INTRAVENOUS at 17:03

## 2018-01-01 RX ADMIN — PIPERACILLIN SODIUM AND TAZOBACTAM SODIUM 3.38 G: 3; .375 INJECTION, POWDER, LYOPHILIZED, FOR SOLUTION INTRAVENOUS at 06:37

## 2018-01-01 RX ADMIN — SODIUM BICARBONATE 1300 MG: 650 TABLET ORAL at 08:13

## 2018-01-01 RX ADMIN — FUROSEMIDE 40 MG: 40 TABLET ORAL at 14:05

## 2018-01-01 RX ADMIN — LACTULOSE 20 G: 20 SOLUTION ORAL at 20:32

## 2018-01-01 RX ADMIN — INSULIN GLARGINE 15 UNITS: 100 INJECTION, SOLUTION SUBCUTANEOUS at 22:49

## 2018-01-01 RX ADMIN — CARVEDILOL 6.25 MG: 6.25 TABLET, FILM COATED ORAL at 07:59

## 2018-01-01 RX ADMIN — LACTULOSE 20 G: 20 SOLUTION ORAL at 14:18

## 2018-01-01 RX ADMIN — LEVOTHYROXINE SODIUM 125 MCG: 125 TABLET ORAL at 08:01

## 2018-01-01 RX ADMIN — FUROSEMIDE 40 MG: 40 TABLET ORAL at 07:45

## 2018-01-01 RX ADMIN — LOSARTAN POTASSIUM 50 MG: 50 TABLET ORAL at 08:17

## 2018-01-01 RX ADMIN — LACTULOSE 20 G: 20 POWDER, FOR SOLUTION ORAL at 17:08

## 2018-01-01 RX ADMIN — FUROSEMIDE 20 MG: 10 INJECTION, SOLUTION INTRAVENOUS at 07:58

## 2018-01-01 RX ADMIN — PANTOPRAZOLE SODIUM 40 MG: 40 TABLET, DELAYED RELEASE ORAL at 08:51

## 2018-01-01 RX ADMIN — PIPERACILLIN SODIUM,TAZOBACTAM SODIUM 4.5 G: 4; .5 INJECTION, POWDER, FOR SOLUTION INTRAVENOUS at 23:18

## 2018-01-01 RX ADMIN — MIDAZOLAM 2 MG: 1 INJECTION INTRAMUSCULAR; INTRAVENOUS at 09:37

## 2018-01-01 RX ADMIN — PANTOPRAZOLE SODIUM 40 MG: 40 TABLET, DELAYED RELEASE ORAL at 19:44

## 2018-01-01 RX ADMIN — SODIUM BICARBONATE 1300 MG: 650 TABLET ORAL at 19:52

## 2018-01-01 RX ADMIN — PANTOPRAZOLE SODIUM 40 MG: 40 TABLET, DELAYED RELEASE ORAL at 08:21

## 2018-01-01 RX ADMIN — NEOMYCIN SULFATE 500 MG: 500 TABLET ORAL at 20:13

## 2018-01-01 RX ADMIN — ALBUMIN HUMAN 37.5 G: 0.25 SOLUTION INTRAVENOUS at 08:09

## 2018-01-01 RX ADMIN — LACTULOSE 20 G: 10 SOLUTION ORAL at 13:46

## 2018-01-01 RX ADMIN — SPIRONOLACTONE 50 MG: 50 TABLET ORAL at 10:54

## 2018-01-01 RX ADMIN — SODIUM BICARBONATE 1300 MG: 650 TABLET ORAL at 08:08

## 2018-01-01 RX ADMIN — CARVEDILOL 6.25 MG: 6.25 TABLET, FILM COATED ORAL at 18:25

## 2018-01-01 RX ADMIN — PANTOPRAZOLE SODIUM 40 MG: 40 TABLET, DELAYED RELEASE ORAL at 21:34

## 2018-01-01 RX ADMIN — LACTULOSE 20 G: 10 SOLUTION ORAL at 08:21

## 2018-01-01 RX ADMIN — LIDOCAINE HYDROCHLORIDE 20 ML: 10 INJECTION, SOLUTION INFILTRATION; PERINEURAL at 08:22

## 2018-01-01 RX ADMIN — SPIRONOLACTONE 50 MG: 50 TABLET ORAL at 08:14

## 2018-01-01 RX ADMIN — GUAIFENESIN AND PSEUDOEPHEDRINE HYDROCHLORIDE 1 TABLET: 600; 60 TABLET, EXTENDED RELEASE ORAL at 20:13

## 2018-01-01 RX ADMIN — INSULIN ASPART 1 UNITS: 100 INJECTION, SOLUTION INTRAVENOUS; SUBCUTANEOUS at 02:44

## 2018-01-01 RX ADMIN — SODIUM BICARBONATE 650 MG TABLET 1300 MG: at 08:39

## 2018-01-01 RX ADMIN — PANTOPRAZOLE SODIUM 40 MG: 40 TABLET, DELAYED RELEASE ORAL at 08:23

## 2018-01-01 RX ADMIN — SODIUM BICARBONATE 650 MG TABLET 1300 MG: at 19:44

## 2018-01-01 RX ADMIN — SODIUM POLYSTYRENE SULFONATE 30 G: 15 SUSPENSION ORAL; RECTAL at 03:58

## 2018-01-01 RX ADMIN — METOPROLOL TARTRATE 25 MG: 25 TABLET ORAL at 09:11

## 2018-01-01 RX ADMIN — GADOBUTROL 10 ML: 604.72 INJECTION INTRAVENOUS at 15:16

## 2018-01-01 RX ADMIN — PIPERACILLIN SODIUM AND TAZOBACTAM SODIUM 3.38 G: 3; .375 INJECTION, POWDER, LYOPHILIZED, FOR SOLUTION INTRAVENOUS at 18:38

## 2018-01-01 RX ADMIN — PROPOFOL 100 MCG/KG/MIN: 10 INJECTION, EMULSION INTRAVENOUS at 15:35

## 2018-01-01 RX ADMIN — LIDOCAINE HYDROCHLORIDE 20 ML: 10 INJECTION, SOLUTION EPIDURAL; INFILTRATION; INTRACAUDAL; PERINEURAL at 08:25

## 2018-01-01 RX ADMIN — METOPROLOL TARTRATE 25 MG: 25 TABLET ORAL at 20:36

## 2018-01-01 RX ADMIN — INSULIN GLARGINE 30 UNITS: 100 INJECTION, SOLUTION SUBCUTANEOUS at 01:54

## 2018-01-01 RX ADMIN — MIDODRINE HYDROCHLORIDE 2.5 MG: 2.5 TABLET ORAL at 11:21

## 2018-01-01 RX ADMIN — CEFTRIAXONE 1 G: 1 INJECTION, POWDER, FOR SOLUTION INTRAMUSCULAR; INTRAVENOUS at 17:03

## 2018-01-01 RX ADMIN — LACTULOSE 20 G: 20 SOLUTION ORAL at 08:53

## 2018-01-01 RX ADMIN — LACTULOSE 20 G: 20 SOLUTION ORAL at 15:04

## 2018-01-01 RX ADMIN — PANTOPRAZOLE SODIUM 40 MG: 40 INJECTION, POWDER, FOR SOLUTION INTRAVENOUS at 22:04

## 2018-01-01 RX ADMIN — AZITHROMYCIN 500 MG: 250 TABLET, FILM COATED ORAL at 08:08

## 2018-01-01 RX ADMIN — BENZONATATE 100 MG: 100 CAPSULE, LIQUID FILLED ORAL at 11:14

## 2018-01-01 RX ADMIN — LACTULOSE 20 G: 20 POWDER, FOR SOLUTION ORAL at 20:28

## 2018-01-01 RX ADMIN — PANTOPRAZOLE SODIUM 40 MG: 40 INJECTION, POWDER, FOR SOLUTION INTRAVENOUS at 08:48

## 2018-01-01 RX ADMIN — INSULIN ASPART 1 UNITS: 100 INJECTION, SOLUTION INTRAVENOUS; SUBCUTANEOUS at 07:55

## 2018-01-01 RX ADMIN — WARFARIN SODIUM 1 MG: 1 TABLET ORAL at 17:48

## 2018-01-01 RX ADMIN — SODIUM CHLORIDE 8 MG/HR: 9 INJECTION, SOLUTION INTRAVENOUS at 11:06

## 2018-01-01 RX ADMIN — FUROSEMIDE 40 MG: 40 TABLET ORAL at 08:17

## 2018-01-01 RX ADMIN — LEVOTHYROXINE SODIUM 150 MCG: 150 TABLET ORAL at 08:25

## 2018-01-01 RX ADMIN — RIFAXIMIN 550 MG: 550 TABLET ORAL at 08:38

## 2018-01-01 RX ADMIN — CARVEDILOL 6.25 MG: 6.25 TABLET, FILM COATED ORAL at 19:55

## 2018-01-01 RX ADMIN — LACTULOSE 20 G: 20 SOLUTION ORAL at 13:29

## 2018-01-01 RX ADMIN — FENTANYL CITRATE 175 MCG: 50 INJECTION INTRAMUSCULAR; INTRAVENOUS at 09:38

## 2018-01-01 RX ADMIN — INSULIN ASPART 1 UNITS: 100 INJECTION, SOLUTION INTRAVENOUS; SUBCUTANEOUS at 18:14

## 2018-01-01 RX ADMIN — ALLOPURINOL 100 MG: 100 TABLET ORAL at 08:01

## 2018-01-01 RX ADMIN — INSULIN ASPART 1 UNITS: 100 INJECTION, SOLUTION INTRAVENOUS; SUBCUTANEOUS at 20:27

## 2018-01-01 RX ADMIN — RIFAXIMIN 550 MG: 550 TABLET ORAL at 08:35

## 2018-01-01 RX ADMIN — ALBUMIN HUMAN 12.5 G: 0.25 SOLUTION INTRAVENOUS at 15:45

## 2018-01-01 RX ADMIN — CIPROFLOXACIN HYDROCHLORIDE 500 MG: 500 TABLET, FILM COATED ORAL at 11:35

## 2018-01-01 RX ADMIN — LACTULOSE 20 G: 10 SOLUTION ORAL at 20:50

## 2018-01-01 RX ADMIN — LEVOTHYROXINE SODIUM 125 MCG: 125 TABLET ORAL at 07:59

## 2018-01-01 RX ADMIN — METOPROLOL TARTRATE 25 MG: 25 TABLET ORAL at 20:25

## 2018-01-01 RX ADMIN — OCTREOTIDE ACETATE 100 MCG: 100 INJECTION, SOLUTION INTRAVENOUS; SUBCUTANEOUS at 16:54

## 2018-01-01 RX ADMIN — ACETAMINOPHEN 375 MG: 325 TABLET, FILM COATED ORAL at 16:54

## 2018-01-01 RX ADMIN — PANTOPRAZOLE SODIUM 40 MG: 40 INJECTION, POWDER, FOR SOLUTION INTRAVENOUS at 16:22

## 2018-01-01 RX ADMIN — METOPROLOL TARTRATE 25 MG: 25 TABLET ORAL at 08:23

## 2018-01-01 RX ADMIN — METOPROLOL TARTRATE 25 MG: 25 TABLET ORAL at 09:45

## 2018-01-01 RX ADMIN — RIFAXIMIN 550 MG: 550 TABLET ORAL at 09:40

## 2018-01-01 RX ADMIN — ALBUMIN HUMAN 12.5 G: 0.25 SOLUTION INTRAVENOUS at 15:36

## 2018-01-01 RX ADMIN — VANCOMYCIN HYDROCHLORIDE 1000 MG: 1 INJECTION, SOLUTION INTRAVENOUS at 17:26

## 2018-01-01 RX ADMIN — RIFAXIMIN 550 MG: 550 TABLET ORAL at 19:53

## 2018-01-01 RX ADMIN — PANTOPRAZOLE SODIUM 40 MG: 40 INJECTION, POWDER, FOR SOLUTION INTRAVENOUS at 14:25

## 2018-01-01 RX ADMIN — ALLOPURINOL 100 MG: 100 TABLET ORAL at 08:13

## 2018-01-01 RX ADMIN — INSULIN ASPART 1 UNITS: 100 INJECTION, SOLUTION INTRAVENOUS; SUBCUTANEOUS at 12:14

## 2018-01-01 RX ADMIN — GUAIFENESIN AND CODEINE PHOSPHATE 5 ML: 10; 100 LIQUID ORAL at 08:24

## 2018-01-01 RX ADMIN — PANTOPRAZOLE SODIUM 40 MG: 40 TABLET, DELAYED RELEASE ORAL at 20:50

## 2018-01-01 RX ADMIN — PHYTONADIONE 10 MG: 10 INJECTION, EMULSION INTRAMUSCULAR; INTRAVENOUS; SUBCUTANEOUS at 08:40

## 2018-01-01 RX ADMIN — LIDOCAINE HYDROCHLORIDE 15 ML: 10 INJECTION, SOLUTION EPIDURAL; INFILTRATION; INTRACAUDAL; PERINEURAL at 08:19

## 2018-01-01 RX ADMIN — ALBUMIN HUMAN 12.5 G: 0.25 SOLUTION INTRAVENOUS at 15:28

## 2018-01-01 RX ADMIN — METOPROLOL TARTRATE 25 MG: 25 TABLET ORAL at 08:16

## 2018-01-01 RX ADMIN — LEVOTHYROXINE SODIUM 150 MCG: 150 TABLET ORAL at 08:38

## 2018-01-01 RX ADMIN — INSULIN GLARGINE 30 UNITS: 100 INJECTION, SOLUTION SUBCUTANEOUS at 21:44

## 2018-01-01 RX ADMIN — LACTULOSE 20 G: 20 SOLUTION ORAL at 14:00

## 2018-01-01 RX ADMIN — SODIUM CHLORIDE, POTASSIUM CHLORIDE, SODIUM LACTATE AND CALCIUM CHLORIDE 500 ML: 600; 310; 30; 20 INJECTION, SOLUTION INTRAVENOUS at 06:44

## 2018-01-01 RX ADMIN — FLUTICASONE PROPIONATE 1 SPRAY: 50 SPRAY, METERED NASAL at 08:16

## 2018-01-01 RX ADMIN — MORPHINE SULFATE 5 MG: 100 SOLUTION ORAL at 09:05

## 2018-01-01 RX ADMIN — LACTULOSE 20 G: 20 POWDER, FOR SOLUTION ORAL at 15:54

## 2018-01-01 RX ADMIN — LOSARTAN POTASSIUM 50 MG: 50 TABLET ORAL at 08:37

## 2018-01-01 RX ADMIN — CARVEDILOL 6.25 MG: 6.25 TABLET, FILM COATED ORAL at 17:47

## 2018-01-01 RX ADMIN — SODIUM BICARBONATE 1300 MG: 650 TABLET ORAL at 19:49

## 2018-01-01 RX ADMIN — LACTULOSE 20 G: 20 SOLUTION ORAL at 20:40

## 2018-01-01 RX ADMIN — SODIUM BICARBONATE 1300 MG: 650 TABLET ORAL at 08:51

## 2018-01-01 ASSESSMENT — ACTIVITIES OF DAILY LIVING (ADL)
ADLS_ACUITY_SCORE: 13
ADLS_ACUITY_SCORE: 16
ADLS_ACUITY_SCORE: 16
BATHING: 2-->ASSISTIVE PERSON
ADLS_ACUITY_SCORE: 13
SWALLOWING: 0-->SWALLOWS FOODS/LIQUIDS WITHOUT DIFFICULTY
ADLS_ACUITY_SCORE: 12
ADLS_ACUITY_SCORE: 13
TOILETING: 0-->INDEPENDENT
WHICH_OF_THE_ABOVE_FUNCTIONAL_RISKS_HAD_A_RECENT_ONSET_OR_CHANGE?: AMBULATION;TRANSFERRING;COGNITION
ADLS_ACUITY_SCORE: 13
ADLS_ACUITY_SCORE: 14
ADLS_ACUITY_SCORE: 20
ADLS_ACUITY_SCORE: 13
ADLS_ACUITY_SCORE: 13
ADLS_ACUITY_SCORE: 20
ADLS_ACUITY_SCORE: 13
ADLS_ACUITY_SCORE: 17
ADLS_ACUITY_SCORE: 16
ADLS_ACUITY_SCORE: 17
ADLS_ACUITY_SCORE: 17
ADLS_ACUITY_SCORE: 16
ADLS_ACUITY_SCORE: 18
ADLS_ACUITY_SCORE: 14
ADLS_ACUITY_SCORE: 12
ADLS_ACUITY_SCORE: 13
ADLS_ACUITY_SCORE: 17
ADLS_ACUITY_SCORE: 13
FALL_HISTORY_WITHIN_LAST_SIX_MONTHS: NO
ADLS_ACUITY_SCORE: 12
ADLS_ACUITY_SCORE: 18
ADLS_ACUITY_SCORE: 20
ADLS_ACUITY_SCORE: 16
DRESS: 0-->INDEPENDENT
ADLS_ACUITY_SCORE: 18
ADLS_ACUITY_SCORE: 16
ADLS_ACUITY_SCORE: 17
ADLS_ACUITY_SCORE: 12
ADLS_ACUITY_SCORE: 20
ADLS_ACUITY_SCORE: 17
ADLS_ACUITY_SCORE: 14
RETIRED_EATING: 0-->INDEPENDENT
ADLS_ACUITY_SCORE: 25
ADLS_ACUITY_SCORE: 13
ADLS_ACUITY_SCORE: 17
ADLS_ACUITY_SCORE: 17
TRANSFERRING: 0-->INDEPENDENT
ADLS_ACUITY_SCORE: 16
ADLS_ACUITY_SCORE: 18
ADLS_ACUITY_SCORE: 17
ADLS_ACUITY_SCORE: 20
ADLS_ACUITY_SCORE: 17
ADLS_ACUITY_SCORE: 25
ADLS_ACUITY_SCORE: 13
ADLS_ACUITY_SCORE: 12
ADLS_ACUITY_SCORE: 13
ADLS_ACUITY_SCORE: 14
ADLS_ACUITY_SCORE: 14
ADLS_ACUITY_SCORE: 13
ADLS_ACUITY_SCORE: 20
ADLS_ACUITY_SCORE: 25
ADLS_ACUITY_SCORE: 20
ADLS_ACUITY_SCORE: 18
ADLS_ACUITY_SCORE: 17
ADLS_ACUITY_SCORE: 16
PREVIOUS_RESPONSIBILITIES: MEAL PREP
ADLS_ACUITY_SCORE: 14
ADLS_ACUITY_SCORE: 16
AMBULATION: 0-->INDEPENDENT
ADLS_ACUITY_SCORE: 18
ADLS_ACUITY_SCORE: 12
ADLS_ACUITY_SCORE: 13
ADLS_ACUITY_SCORE: 25
ADLS_ACUITY_SCORE: 18
ADLS_ACUITY_SCORE: 25
ADLS_ACUITY_SCORE: 13
ADLS_ACUITY_SCORE: 17
ADLS_ACUITY_SCORE: 16
ADLS_ACUITY_SCORE: 20
ADLS_ACUITY_SCORE: 16
COGNITION: 1 - ATTENTION OR MEMORY DEFICITS
ADLS_ACUITY_SCORE: 16
ADLS_ACUITY_SCORE: 14
ADLS_ACUITY_SCORE: 13
ADLS_ACUITY_SCORE: 12
ADLS_ACUITY_SCORE: 14
ADLS_ACUITY_SCORE: 18
ADLS_ACUITY_SCORE: 13
RETIRED_COMMUNICATION: 0-->UNDERSTANDS/COMMUNICATES WITHOUT DIFFICULTY
ADLS_ACUITY_SCORE: 12
ADLS_ACUITY_SCORE: 16
ADLS_ACUITY_SCORE: 14
ADLS_ACUITY_SCORE: 16
ADLS_ACUITY_SCORE: 14
ADLS_ACUITY_SCORE: 18
ADLS_ACUITY_SCORE: 14
ADLS_ACUITY_SCORE: 20
ADLS_ACUITY_SCORE: 13
ADLS_ACUITY_SCORE: 13
ADLS_ACUITY_SCORE: 11
ADLS_ACUITY_SCORE: 13
ADLS_ACUITY_SCORE: 18
ADLS_ACUITY_SCORE: 20
ADLS_ACUITY_SCORE: 14
ADLS_ACUITY_SCORE: 17
ADLS_ACUITY_SCORE: 17
ADLS_ACUITY_SCORE: 12

## 2018-01-01 ASSESSMENT — ENCOUNTER SYMPTOMS
SHORTNESS OF BREATH: 0
ABDOMINAL PAIN: 0
APPETITE CHANGE: 1
VOMITING: 0
SWOLLEN GLANDS: 0
HEARTBURN: 0
SPUTUM PRODUCTION: 0
FATIGUE: 1
BRUISES/BLEEDS EASILY: 1
POSTURAL DYSPNEA: 1
FEVER: 1
FEVER: 0
BLOATING: 0
WEAKNESS: 1
CHILLS: 1
COLOR CHANGE: 1
BLOOD IN STOOL: 0
DIARRHEA: 0
NAUSEA: 0
ABDOMINAL PAIN: 0
ABDOMINAL PAIN: 0
CONFUSION: 1
COUGH: 0
JAUNDICE: 0
SHORTNESS OF BREATH: 0
BOWEL INCONTINENCE: 0
FATIGUE: 1
COUGH: 1
SHORTNESS OF BREATH: 0
RECTAL PAIN: 0
CONSTIPATION: 0
NAUSEA: 0
ANAL BLEEDING: 0
WHEEZING: 1
APPETITE CHANGE: 1
SHORTNESS OF BREATH: 0
SNORES LOUDLY: 0
HEADACHES: 0
FATIGUE: 1
HEMOPTYSIS: 0
BLOOD IN STOOL: 0
VOMITING: 0
FEVER: 0
VOMITING: 0
COUGH: 0
FATIGUE: 1
DYSPNEA ON EXERTION: 0
NAUSEA: 1
COUGH DISTURBING SLEEP: 1
CONFUSION: 1
ABDOMINAL PAIN: 0
SHORTNESS OF BREATH: 1

## 2018-01-01 ASSESSMENT — PAIN SCALES - GENERAL
PAINLEVEL: NO PAIN (0)

## 2018-01-01 ASSESSMENT — LIFESTYLE VARIABLES: TOBACCO_USE: 1

## 2018-01-01 ASSESSMENT — PAIN DESCRIPTION - DESCRIPTORS
DESCRIPTORS: THROBBING
DESCRIPTORS: ACHING

## 2018-01-24 NOTE — H&P
[facility]  Pre-Endoscopy History and Physical     Serge Brambila MRN# [id number]   YOB: 1945 Age: 72 year old     Date of Procedure: 1/24/2018  Primary care provider: Linn Thompson  Type of Endoscopy: esophagogastroduodenoscopy (upper GI endoscopy)  Reason for Procedure: Cirrhosis  Type of Anesthesia Anticipated: Moderate Sedation    HPI:    Serge is a 72 year old male who will be undergoing the above procedure.      A history and physical has been performed. The patient's medications and allergies have been reviewed. The risks and benefits of the procedure and the sedation options and risks were discussed with the patient.  All questions were answered and informed consent was obtained.      He denies a personal or family history of anesthesia complications or bleeding disorders.     Allergies not on file     None       Patient Active Problem List   Diagnosis     Cervical radiculopathy     Acute left-sided low back pain without sciatica        No past medical history on file.     No past surgical history on file.    Social History   Substance Use Topics     Smoking status: Not on file     Smokeless tobacco: Not on file     Alcohol use Not on file       No family history on file.    REVIEW OF SYSTEMS:     5 point ROS negative except as noted above in HPI, including Gen., Resp., CV, GI &  system review.      PHYSICAL EXAM:   There were no vitals taken for this visit. There is no height or weight on file to calculate BMI.   GENERAL APPEARANCE: healthy, alert and no distress  MENTAL STATUS: alert  AIRWAY EXAM: Mallampatti Class I (visualization of the soft palate, fauces, uvula, anterior and posterior pillars)  RESP: lungs clear to auscultation - no rales, rhonchi or wheezes  CV: normal S1 S2, no S3 or S4      DIAGNOSTICS:    Not indicated      IMPRESSION   ASA Class 2 - Mild systemic disease        PLAN:       Plan for esophagogastroduodenoscopy (upper GI endoscopy). We discussed  the risks, benefits and alternatives and the patient wished to proceed.    The above has been forwarded to the consulting provider.      Signed Electronically by: Colton Stroud MD  January 24, 2018

## 2018-02-07 PROBLEM — M54.50 ACUTE LEFT-SIDED LOW BACK PAIN WITHOUT SCIATICA: Status: RESOLVED | Noted: 2017-01-01 | Resolved: 2018-01-01

## 2018-02-28 NOTE — PROGRESS NOTES
[facility]  Transfer Triage Note    Date of call: 02/28/18  Time of call: 4:05 PM    Reason for Transfer:Procedure can be done here and not at referring hospital  Diagnosis: Liver mass    Outside Records: Available  Additional records requested to be faxed to [phone].    Stability of Patient: Patient is vitally stable, with no critical labs, and will likely remain stable throughout the transfer process    Expected Time of Arrival for Transfer: 8-24 hours    Recommendations for Management and Stabilization: Given    Additional Comments: This is a 72-year-old male with history of cirrhosis, liver mass of uncertain etiology diagnosed in last summer, who was admitted to [facility]note 3[facility] yesterday with ascites and found to have progression of the infiltrative liver mass involving right lower lobe in MRI scan of the abdomen.  Previous biopsies of the liver mass in last summer have all been indeterminate.  Given the extensive nature of the infiltrative mass possibly involving the vasculature, their IR team is not comfortable to biopsy.  Patient is also on Coumadin for atrial fibrillation and his INR is 3.6.  He got a dose of 10 mg IV vitamin K.  He also has chronic anemia with baseline hemoglobin of 7 and he presented with hemoglobin of 6 without any obvious source of bleeding.  His vitals are stable.  They did not do paracentesis given concern for bleeding with INR of 3.6.  Patient is very distended and will require paracentesis soon.  It will be helpful to send cytology from paracentesis fluid, and consult IR for potential biopsy of the liver mass when patient gets here.    Arash Brown MD

## 2018-03-01 PROBLEM — R16.0 LIVER MASS: Status: ACTIVE | Noted: 2018-01-01

## 2018-03-01 NOTE — IP AVS SNAPSHOT
[address] [address]    Phone:  [phone]                                       After Visit Summary   3/1/2018    Serge Brambila    MRN: [id number]           After Visit Summary Signature Page     I have received my discharge instructions, and my questions have been answered. I have discussed any challenges I see with this plan with the nurse or doctor.    ..........................................................................................................................................  Patient/Patient Representative Signature      ..........................................................................................................................................  Patient Representative Print Name and Relationship to Patient    ..................................................               ................................................  Date                                            Time    ..........................................................................................................................................  Reviewed by Signature/Title    ...................................................              ..............................................  Date                                                            Time

## 2018-03-02 NOTE — PROCEDURES
Procedure Note:    Procedure Name: Diagnostic Therapeutic paracentesis  Proceduralist (primary): Alexander Betts MD, Kay Ortiz MD  Pre-procedure diagnosis: ascites  Post-procedure diagnosis: ascites  Indication: malignancy workup/comfort    Consent was obtained from patient and placed in the chart.   Ultrasound used to locate ascites; optimal pocket found to be in LLQ, and marked.  POCUS images permanently saved in the EMR.  Universal protocol performed with nursing.   10 mL of 1% lidocaine used anesthetize with 25 gauge needle.   Cezar catheter used to obtain fluid, which was reddish in appearance, and was sent to lab. Catheter collapsed because of gut wall edema and thus a paracentesis catheter was inserted.  3200 mL total fluid obtained. Patient tolerated without any apparent complications.  Primary team notified.    Electronically signed by:  Alexander Betts M.D.   Pager: [phone]  3/2/2018, 4:20 PM

## 2018-03-02 NOTE — PLAN OF CARE
Problem: Patient Care Overview  Goal: Plan of Care/Patient Progress Review  Patient admitted from OSH this evening with ascites. VSS on RA. Independent. A&Ox4. NPO @ midnight. PIV SL. 6 BM's today per patient. GI consult for tomorrow. Possible liver biopsy and paracentesis. Continue to monitor and follow the POC.

## 2018-03-02 NOTE — PLAN OF CARE
Problem: Patient Care Overview  Goal: Plan of Care/Patient Progress Review  A&Ox4. NPO @ midnight for AB US, sips with meds ok. PIV SL. 6 BM's today per patient. GI consult today. Possible liver biopsy and paracentesis. Up independently.

## 2018-03-02 NOTE — H&P
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    Plan:  # ESLD 2/2 Cirrhosis (alcohol vs KUHN vs cytogenic), MELD-Na 19  Diagnosed with cirrhosis in 5/2017. Etiology unclear- had a negative workup at that time: hepatitis panel, smooth muscle antibody, mitochondrial M2 antibody, LKM type one < 0.05. Last EGD 1/24/18 w grade II varices and moderate portal gastropathy. Used to drink 2-4 drinks of alcohol a week prior to his dx, never was a significant alcohol user. Ast 37, Alt 68, Alk Phos 117, TBili 1.0, INR 2.0 (was initially 3.6 at OSH, s/p IV Vit K 10mg x1 on 2/27 and 5mg x1 on 3/1). Increased abdominal distension since Jan- did not have paracentesis at OSH due to elevated INR. Has never had a paracentesis before. No abdominal pain, afebrile, WBC normal- low concern for SBP but would still send paracentesis fluid labs for further evaluation. Has hx of portal vein thrombus, will get u/s with doppler for further evaluation.   - Continue lactulose 25mL BID  - Continue neomycin 500mg BID   - US Abdomen w/ doppler   - Procedure team consult for paracentesis w/ cell count, cytology, etc.   - Fluid restriction 1.5L  - Daily MELD labs     # Presumed Hepatocellular Carcinoma  MRI from 2/25/18 was reviewed at OSH- showed marked progression of presumed hepatocellular carcinoma (from prior imaging in May 2017) involving large portion of right lobe of liver. Bx from May 2017 inconclusive. Repeat Bx was not done at OSH due to elevated INR (2.0) and concern that mass is invading into  vasculature and pt would be high bleeding risk.   - AFP < 2 from 2/27   - GI consult, appreciate recs  - Consider IR consult in AM for bx  - NPO at MN     # Chronic Normocytic Anemia  Hgb 6.0 at OSH, s/p 2U transfusion, Hgb now stable in 7s. No evidence of bleeding- no melena, BRBPR, hematemesis. Likely multifactorial from anemia of chronic dz due to CKD, cirrhosis, and HCC.    - CBC in AM    # CKD  Cr 1.52, at baseline.     # Atrial Fibrillation on Coumadin  Rate controlled in 70s.   - Hold coumadin  - Continue metoprolol      # Type II DM  - Glargine 15U (half home dose)  - Low SSI  - Hypoglycemia protocol     # Lower Extremity Edema  - Continue Lasix 40mg     # Hypothyroidism   - Continue levothyroxine     # Hypertension  - Continue losartan 50mg   - Continue metoprolol 25mg BID    # GERD  - Continue pantoprazole 40mg     # NAA  - Continue CPAP         FEN: no IVF, monitor and replete lytes, NPO at MN  Prophylaxis: INR therapeutic at 2.0  Consults: GI, procedure team    Code Status: FULL    Disposition: Admit to Medicine for further workup of liver mass, needs paracentesis. Anticipate 1-2 days.     Patient seen and discussed with Dr. Stout, who agrees with above plan.    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    Patient was admitted to [facility] from 2/27-3/1. He presented to [facility] from PCP's office for Hgb 6, fatigue, and increased distention of his abdomen. He received 2U pRBC transfusion and Hgb remained stable. His MRI from 2/25/18 was reveiwed- showed marked progression of presumed  hepatocellular carcinoma (from prior imaging in May) involving large portion of right lobe of liver. He did not have bx due elevated INR, was sent here for possible bx. Did not have paracentesis due to same reason of elevated INR.    Patient reports that his abdomen has increasing become distended since mid-January. Has never had a paracentesis before. He is now having SOB and wheezing especially with exertion. Also with fatigue. Has been compliant with all his home meds including lactulose. Having 3-5 loose, brown colored stools daily. No melena or BRBPR. Denies confusion, lightheadedness, dizziness, fevers, chills, chest pain, abdominal pain, nausea, vomiting, constipation, urinary urgency/frequency/burning, muscle or joint aches. Went to [place] last month for vacation, otherwise no other travel. Denies exposure to sick contacts.     Patient states that his cirrhosis was dx in May 2017. Was told that he had a liver mass, had a bx of the mass at that time but it was inconclusive.              Review of Systems:     Comprehensive Review of Systems negative except otherwise noted in HPI.          Past Medical History:   Medical History reviewed.   Past Medical History:   Diagnosis Date     Atrial fibrillation (H)      Diabetes (H)     type II     Hepatic encephalopathy (H)      Hypertension      MI (myocardial infarction)     stent placement     Sleep apnea      Thyroid disease              Past Surgical History:   Surgical History reviewed.   Past Surgical History:   Procedure Laterality Date     APPENDECTOMY  age 16     COLONOSCOPY       ESOPHAGOSCOPY, GASTROSCOPY, DUODENOSCOPY (EGD), COMBINED N/A 1/24/2018    Procedure: COMBINED ESOPHAGOSCOPY, GASTROSCOPY, DUODENOSCOPY (EGD);  ESOPHAGOGASTRODUODENOSCOPY (MAC) ;  Surgeon: Colton Stroud MD;  Location:  GI     GI SURGERY  2012    partial colectomy for pre-CA nodule, removed 10 in.             Social History:   Social History reviewed.   Social History    Substance Use Topics     Smoking status: Former Smoker     Packs/day: 1.00     Years: 10.00     Quit date: 1/1/1999     Smokeless tobacco: Never Used     Alcohol use No    - Previous alcohol use 2-4 drinks once a week, quit April 2017 after cirrhosis dx. No hx of heavy/execessive alcohol use  - Denies illicit drug use   - Lives with wife, occasionally uses a walker to ambulate           Family History:   Family History reviewed.  No family history on file.          Allergies:     Allergies   Allergen Reactions     Penicillins     - He cannot recall what the reaction was          Medications:   Medications Reviewed.   No current facility-administered medications for this encounter.              Physical Exam:   Vitals were reviewed.  Temp: 98.6  F (37  C) Temp src: Oral BP: 151/51 Pulse: 73   Resp: 16 SpO2: 99 % O2 Device: None (Room air)      General: NAD, sitting in chair comfortably   Skin: Not jaundiced, no rash, no ecchymoses  HEENT: MMM, PERRLA, EOM intact, anicteric sclera   CV: RRR, normal S1S2, 2/6 systolic ejection murmur best heard at the upper right sternal border, no other m/r/g  Resp: Clear to auscultation bilaterally, no wheezes, rhonchi, crackles  Abd: Soft, non-tender, BS+, distended, fluid wave present, unable to appreciate masses   Extremities: warm and well perfused, 2+ pitting edema up to knees bilaterally   Neuro: AAOx3, no focal deficits            Data:        ROUTINE LABS (Last four results)  CMPNo lab results found in last 7 days.  CBCNo lab results found in last 7 days.  INRNo lab results found in last 7 days.  Arterial Blood GasNo lab results found in last 7 days.      Attestation:  Date of Service (when I saw the patient): 03/01/18    This patient has been seen and evaluated by me, Jean Stout.  Discussed with the house staff team or resident(s) and agree with the findings and plan in this note.     I have reviewed today's Medications, Vital Signs and Labs.    71 yo male with liver  cirrhosis who presents with a liver mass suspicious for hepatocellular carcinoma.  Admitted for further workup.

## 2018-03-02 NOTE — PLAN OF CARE
Problem: Patient Care Overview  Goal: Plan of Care/Patient Progress Review  Pt AOx4, VSS on RA. Arrived from [facility] around 1830. Family at bedside. PIV in place. Orders pending, MD notified of his arrival. Admitted due to liver biopsy and paracentesis. Will continue to monitor and follow POC.

## 2018-03-02 NOTE — CONSULTS
[facility]    Hepatology New Patient Visit    Referring provider:  No ref. provider found    Reason for consult:  Further workup of liver mass.     Chief complaint:  Liver mass     HPI:  72 year old male with decompensated KUHN cirrhosis complicated by hepatic encephalopathy, ascites and recent diagnosis of hepatocellular carcinoma in May 2017 now presents as a transfer from [facility] for consideration of further therapy.      The patient is a limited historian and is encephalopathic on my exam.  He tells me he has been told he has a liver cancer but then reports at some point that diagnosis was in doubt. Initially noted to have a 2.5 x 4 cm lesion in segment 7-8 in 5/2017, report shows findings are consistent with HCC.  The patient was sent for a liver biopsy for unclear reasons - biopsy with atypical cells.  It is not clear that any intervention was performed at that time, but the patient did meet with interventional radiology.  Complete records are not yet available for my review.  Subsequent imaging performed in early Feb shows marked progression of presumed hepatocellular carcinoma involving a large portion of the right liver from the dome to the gallbladder fossa.  Also with tumor thrombus in the portal vein.      He now presents after admission to [facility] 2/27 for symptomatic anemia.  Hemoglobin 6, drifting from 9-10 since November 2017.  No evidence of overt blood loss at that time.  Last upper endoscopy visible in our system with grade II esophageal varices in 5/2017 - though the patient does tell me he had a more recent endoscopy a month ago (cant recall exactly where this was done).  No melena, hematochezia, bright red blood per rectum.      The patient also endorsed abdominal distension, lower extremity edema, confusion.  No abdominal pain.  Significant weakness.  No weight loss.  Patient denies fevers, sweats, chills.    Medical hx Surgical hx   Past Medical  History:   Diagnosis Date     Atrial fibrillation (H)      Diabetes (H)      Hepatic encephalopathy (H)      Hypertension      MI (myocardial infarction)      Sleep apnea      Thyroid disease      HFpEF  NAA Past Surgical History:   Procedure Laterality Date     APPENDECTOMY  age 16     COLONOSCOPY       ESOPHAGOSCOPY, GASTROSCOPY, DUODENOSCOPY (EGD), COMBINED N/A 1/24/2018    Procedure: COMBINED ESOPHAGOSCOPY, GASTROSCOPY, DUODENOSCOPY (EGD);  ESOPHAGOGASTRODUODENOSCOPY (MAC) ;  Surgeon: Colton Stroud MD;  Location:  GI     GI SURGERY  2012    partial colectomy for pre-CA nodule, removed 10 in.   R hemiocolectomy for TVA at the appendiceal orifice         Medications  Prior to Admission medications    Medication Sig Start Date End Date Taking? Authorizing Provider   WARFARIN SODIUM PO     Reported, Patient   METFORMIN HCL PO     Reported, Patient   ASPIRIN ADULT LOW STRENGTH PO     Reported, Patient   FUROSEMIDE PO     Reported, Patient   LACTULOSE PO     Reported, Patient   BASAGLAR 100 UNIT/ML injection Inject Subcutaneous daily    Reported, Patient   LEVOTHYROXINE SODIUM PO     Reported, Patient   LOSARTAN POTASSIUM PO     Reported, Patient   METOPROLOL TARTRATE PO     Reported, Patient   neomycin (MYCIFRADIN) 500 MG tablet Take 1,000 mg by mouth 4 times daily    Reported, Patient   Nitroglycerin (NITROQUICK SL)     Reported, Patient   PANTOPRAZOLE SODIUM PO     Reported, Patient   SODIUM BICARBONATE PO     Reported, Patient       Allergies  Allergies   Allergen Reactions     Penicillins        Family hx Social hx   No family history on file.  No family history of liver disease or liver cancer     Social History   Substance Use Topics     Smoking status: Former Smoker     Packs/day: 1.00     Years: 10.00     Quit date: 1/1/1999     Smokeless tobacco: Never Used     Alcohol use No   quit smoking prior 10 pack year histroy   Quit alcohol in April, at most 2-4 drinks once weekly  No illicits  Lives with  wife; has 3 children  Ambulated with a walker       Review of systems  A 10-point review of systems was negative.    Examination  /56  Pulse 65  Temp 96.9  F (36.1  C) (Oral)  Resp 18  Wt 110.6 kg (243 lb 12.8 oz)  SpO2 99%  BMI 37.07 kg/m2  Body mass index is 37.07 kg/(m^2).    Gen- well, NAD, A+Ox3, normal color  Eye- EOMI  ENT- MMM, normal oropharynx  Lym- no palpable lymphadenopathy  CVS- S1, S2 normal, no added sounds, RRR  RS- CTA  Abd- soft, distended, non tender to palpation, no r/r/g  Extr- pulses good, edema bilaterally   MS- hands normal- no clubbing  Neuro- A+Ox3, +asterixis  Skin- no rash or jaundice  Psych- slow to respond to questioning though answering appropriately    Laboratory  Lab Results   Component Value Date     03/02/2018    POTASSIUM 4.0 03/02/2018    CHLORIDE 116 03/02/2018    CO2 17 03/02/2018    BUN 23 03/02/2018    CR 1.49 03/02/2018       Lab Results   Component Value Date    BILITOTAL 1.2 03/02/2018    ALT 59 03/02/2018    AST 83 03/02/2018    ALKPHOS 182 03/02/2018       Lab Results   Component Value Date    ALBUMIN 2.1 03/02/2018    PROTTOTAL 5.7 03/02/2018        Lab Results   Component Value Date    WBC 3.6 03/02/2018    HGB 6.7 03/02/2018    MCV 95 03/02/2018     03/02/2018       Lab Results   Component Value Date    INR 1.76 03/02/2018     MELD-Na score: 18 at 3/2/2018  6:38 AM  MELD score: 18 at 3/2/2018  6:38 AM  Calculated from:  Serum Creatinine: 1.49 mg/dL at 3/2/2018  6:38 AM  Serum Sodium: 144 mmol/L (Rounded to 137) at 3/2/2018  6:38 AM  Total Bilirubin: 1.2 mg/dL at 3/2/2018  6:38 AM  INR(ratio): 1.76 at 3/2/2018  6:38 AM  Age: 72 years    Radiology:  MRI abdomen:   1.  Marked progression of presumed hepatocellular carcinoma involving a large portion of the right lobe of the liver from the liver dome to the gallbladder fossa.  2.  Ascites.  3.  Portal vein is distended with tumor thrombus.  4.  Splenomegaly with enlarged splenic  varices.  5.  Epigastric and periportal nodes.  6.  No distant metastatic disease appreciated on this examination.    US:  1.  Ascites, hepatic cirrhosis with multiple heterogeneous masses  likely representing HCC, these are better defined on MRI from  2/5/2018.   2.  Nonocclusive tumor thrombus in the main portal vein with partial  nonocclusive extension into the left and right portal vein. Retrograde  flow in the main portal vein and right portal vein.  3.  Echogenic foci in the gallbladder likely represents  cholelithiasis, less likely sludge. No evidence of cholecystitis.    Assessment  72 year old male with decompensated KUHN cirrhosis complicated by hepatic encephalopathy, ascites and recent diagnosis of hepatocellular carcinoma in May 2017 now presents as a transfer from [facility] for consideration of further diagnostic testing/therapy.      #Large Hepatocellular carcinoma with portal venous tumor thrombus (9 x 7 cm).  MRI images reviewed personally with radiology.  Findings are consistent with HCC and there is no indication for additional testing.  Tumor is not within Mode Criteria.  Will complete staging and discuss therapeutic options with oncology and interventional radiology.  The patient may be a candidate for local-regional therapy including radio-embolization, especially given involvement of the portal vein.    -CT of the chest with contrast for staging  -Alpha feta protein  -Request formal over read of MRI images from [facility].    -Oncology consult  -IR consultation for consideration of radio-embolization   -Will plan to discuss the patient's case at liver tumor conference next Friday     #Decompensated KUHN cirrhosis complicated by hepatic encephalopathy, ascites.  No history of variceal hemorrhage.  MELD Na = 18.  Encephalopathic on our exam.    -complete infectious workup including blood culture, UA/UCx, and paracentesis.  CT chest as above.    -trend MELD labs   -trial 10 IV vitamin K  x 3 days   -lactulose, titrate to 3-4 bowel movements per day  -nutrition consultation  -2 gram sodium restriction    #Acute kidney injury?  Unclear baseline.  Cr improved today from recent peak 1.88 on 2/27/18.  BUN normal.  -UA/lytes  -strict I/O  -obtain outside records to determinate chronicity  -consider dose of albumin; assess response    #Anemia, normocytic.  Hemoglobin 6's.  Drifting from 9-10 since November 2017.  No evidence of overt blood loss at that time.  Last upper endoscopy visible in our system with grade II esophageal varices in 5/2017 - though the patient does tell me he had a more recent endoscopy a month ago (cant recall exactly where this was done).    -iron panel, b12, folate, smear, retic   -transfuse for hemoglobin <7  -call with overt blood loss  -please obtain outside hospital records re most recent endoscopy     GI will follow peripherally over the weekend.      Case discussed with Dr. Godfrey who is in agreement with the plan of care.  Please page with ? Or change in clinical status.      Yennifer Culver MD  Hepatology  [facility]    The patient was seen and examined.  The above assessment and plan was developed jointly with the fellow.      Chester Godfrey MD    GI Fellow Addendum    Consulting/Referring Physician: Dr. Marti

## 2018-03-03 NOTE — PROGRESS NOTES
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     # Hepatocellular carcinoma  Liver mass first appreciated in 05/2017. Repeat MRI from 02/2018 demonstrating progression of presumed hepatocellular carcinoma. Imaging appears c/w HCC.  Discussed with GI who will present case at liver tumor conference on 3/9.   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 No prior paracentesis.  Complicated by Grade III esophogeal varices, moderate portal gastropathy, splenic varices, ascites and tumor thrombus of portal vein.   -- Repeat liver US w/ dopplers  -- Trend daily MELD labs  -- Continue lactulose 20 g TID  -- Protonix 40 mg BID  -- Low sodium diet  -- Trial of 10 mg Vitamin K IV x3 days  -- UA with reflex to cx  -- CAPS consult for paracentesis, will plan to start ceftriaxone following tap  -- Blood Cx x2    # Acute on chronic normocytic anemia  Slowly down trending baseline since August, now between ~9-11. Required 2 U PRBC at OSH after presenting there with Hgb 6. Found to have hgb 6.7 POA.  Received 1 U PRBC with increase to 8.5. No h/o GI bleed.   -- Iron studies from 2/27 at OSH: Fe 27, Fe sat 8, ferritin 31, Transferrin 271, TIBC 346. Will repeat although patient recently received a total of 3 U of blood over the past 5 days.    -- Folate/B12  -- Reticulocyte  -- Peripheral smear  -- Trend CBC    # Acute kidney injury  Noted to have normal SCr in 2016 with steadily rising SCr since begininning of 2017. Presents with SCr 1.49 which is down trending from recent peak to 1.8 at OSH.   -- Trend BMP  -- Urine lytes  -- UA/UCx as above    # Atrial fibrillation ([id number] 5)  Started on warfarin in May after initial diagnosis. Found to have INR of 3.6 at OSH now s/p 10 mg IV vit K on 2/27 and 5 mg on 3/1. Warfarin held on admission.   -- Continue to hold warfarin  -- Trend INR  -- Vit K trial as above  -- Holding metoprolol at this time.     # Type II diabetes mellitus  - holding metformin   - Lantus 15 U (home dose 30 U)  - Low SSI  - Hypoglycemia protocol    # Lower extremity edema  - Continue PTA lasix    # CAD s/p RCA PCI in 2002  - Holding ASA at this time    Chronic problems  #Hypothyroidism - continue PTA levothyroxine  #HTN - Continue PTA losartan, hold metoprolol 25 mg BID  #GERD - continue PTA pantoprazole   #NAA - continue PTA CPAP    # Pain Assessment:   Current Pain Score 3/2/2018 3/2/2018 3/1/2018   Patient currently in pain? denies denies denies   Serge s pain level was assessed and he currently denies pain.      Diet: Fluid restriction 1500 ML FLUID  2 Gram Sodium Diet  Fluid restriction 1500 ML FLUID  Fluids: n/a  DVT Prophylaxis: Pneumatic Compression Devices  Code Status: Full Code    Disposition Plan   Expected discharge: 2 - 3 days, recommended to prior living arrangement once safe disposition plan/ TCU bed available and pending further evaluation of new dx of HCC.      Entered: Young Sousa 03/02/2018, 6:03 PM   Information in the above section will display in the discharge planner report.      The patient's care was discussed with the Attending Physician, Dr. Kumar.    Young Sousa  [facility]: 3  Pager: [phone]  Please see sticky note for cross cover information    Interval History   No  acute events overnight. Patient was reported to be wandering the hallway and ambulating well overnight. Currently denies abdominal pain, chest pain, headache or nausea.     Physical Exam   Vital Signs: Temp: 96.9  F (36.1  C) Temp src: Oral BP: 141/56 Pulse: 65   Resp: 18 SpO2: 99 % O2 Device: None (Room air)    Weight: 243 lbs 12.8 oz  General Appearance: NAD, resting comfortably  Respiratory: Oxymask in place, good air movement,  Cardiovascular: RRR, 2/6 FRANKO over LLSB, no rubs  GI: Distended, distant bowel sounds, no HSM appreciated, non-tender  Skin: No suspicious lesions over exposed areas. No jaundice.  Neuro: AAOx3, mild asterixis     Data   All data personally reviewed in epic.

## 2018-03-03 NOTE — PLAN OF CARE
Problem: Patient Care Overview  Goal: Plan of Care/Patient Progress Review  OT/5A: OT orders acknowledged and treatment initiated   Discharge Planner OT   Patient plan for discharge: Home with assist from wife  Current status: Pt sit<>Stand IND, ambulated IND ~15'x2. Seated EOB pt mod I to don/doff bilateral socks using figure 4 technique.   Barriers to return to prior living situation: Decreased activity tolerance, medical status, poor insight to limitations   Recommendations for discharge: TCU pending progress in therapy   Rationale for recommendations: Pt with some memory limitations and decreased insight into limitations, wife not home during working hours. Pt would benefit from further therapy to increased activity tolerance strength/endurance to facilitate return to PLOF and increase safety.        Entered by: Ramona Wise 03/03/2018 12:41 PM

## 2018-03-03 NOTE — PROGRESS NOTES
CLINICAL NUTRITION SERVICES - ASSESSMENT NOTE     Nutrition Prescription    RECOMMENDATIONS FOR MDs/PROVIDERS TO ORDER:  Continue with 2 gm Na+ diet as tolerated    Malnutrition Status:    Non-severe malnutrition in the context of acute on chronic illness     Recommendations already ordered by Registered Dietitian (RD):  None     Future/Additional Recommendations:  None        REASON FOR ASSESSMENT  Serge Brambila is a/an 72 year old male assessed by the dietitian for Provider Order - malnutrition, new dx of HCC    Chart reviewed:  PMH: ESLD, MELD 18, CAD s/p PCI (2005), HFpEF, Atrial Fibrillation on coumadin, CKD, T2DM.    - Patient presents for further evaluation of hepatocellular carcinoma.  - Per provider note, Liver mass first diagnosed in 05/2017. Repeat MRI from 02/2018 demonstrating progression of presumed hepatocellular carcinoma. Imaging appears c/w HCC.   - Also Grade III esophogeal varices, moderate portal gastropathy, splenic varices, ascites and tumor thrombus of portal vein    NUTRITION HISTORY  Obtained from patient and his wife. Patient reports a fair po intake. Per wife, patient has not been eating well for the past 3 month due to early satiety and fluid in his abdomen. Usually eats 2 meals per day, skips bkf.     Patient appeared somewhat confused.     CURRENT NUTRITION ORDERS  Diet: 2 g Sodium + 1500 ml fluid restriction  Intake/Tolerance: Patient reports tolerating his meal trays. Ate all of his oatmeal and toast at bkf this morning. Unable to order lunch due to NPO/ awaiting procedure.     LABS  T.bili: 1.4 ( elevated)      MEDICATIONS  Insulin   Lasix   Lactulose, rifaximin      ANTHROPOMETRICS  Height: 0 cm (Data Unavailable) - 172.7 cm   Most Recent Weight: 105.8 kg (233 lb 4 oz) - driest wt this admit on 3/3/18  IBW: 70 kg ( 151% IBW)   BMI:35.47 kg /m2 -  Obesity Grade II BMI 35-39.9  Weight History: Patient reports his UBW ~ 200# ( 91 kg )   Wt Readings from Last 10 Encounters:    03/03/18 105.8 kg (233 lb 4 oz)   01/24/18 103.4 kg (228 lb)       Dosing Weight: 79 kg (adjusted wt based on driest wt of 105.8 kg this admission.     ASSESSED NUTRITION NEEDS  Estimated Energy Needs: 1580 - 1975  kcals/day (20 - 25 kcals/kg)  Justification: Maintenance and Obese  Estimated Protein Needs: 79 - 95 grams protein/day (1 - 1.2 grams of pro/kg)  Justification: Maintenance  Estimated Fluid Needs: ESLD, CHF  Justification: Per provider pending fluid status    PHYSICAL FINDINGS  Lower extremity edema    MALNUTRITION  % Intake: </=75% for >/= 1 month (severe)  % Weight Loss: Unable to assess due to volume status changes   Subcutaneous Fat Loss: None observed  Muscle Loss: None observed  Fluid Accumulation/Edema: Mild, distended abdomen / ascites   Malnutrition Diagnosis: Non-severe malnutrition in the context of acute on chronic illness     NUTRITION DIAGNOSIS  Inadequate oral intake related to early satiety, ascites inhibiting patients ability to take adequate PO as evidenced by patients spouse report      INTERVENTIONS  Implementation  Nutrition Education: Encouraged small frequent meals + oral boost supplements if decrease PO and appetite.     Medical food supplement therapy - Patient declined at this time    Goals  Patient to consume % of nutritionally adequate meal trays TID, or the equivalent with supplements/snacks.     Monitoring/Evaluation  Progress toward goals will be monitored and evaluated per protocol.    Willi Payan RD/ALEXIS  Weekend Pager [phone]  Unit RD pager: [phone]

## 2018-03-03 NOTE — PROGRESS NOTES
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     # Suspected hepatocellular carcinoma  Liver mass first appreciated in 05/2017. Repeat MRI from 02/2018 demonstrating progression of presumed hepatocellular carcinoma. Imaging appears c/w HCC.  Discussed with GI who will present case at liver tumor conference on 3/9. AFP <1.5 and negative at OSH.   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 No prior paracentesis.  Complicated by Grade II esophogeal varices, moderate portal gastropathy, splenic varices, ascites and tumor thrombus of portal vein.   -- Repeat liver US w/ dopplers  -- Trend daily MELD labs  -- Continue lactulose 20 g scheduled TID, titrate PRNs to 3-5 BM per day  -- Protonix 40 mg IV BID  -- Low sodium diet  -- Trial of 10 mg Vitamin K IV x3 days (d1= 3/2/18)  -- Follow cultures  -- Start spironolactone 50 mg QD, will up titrate to 100 mg in 3 days  -- Start propranolol 20 mg QID    # Acute on chronic normocytic anemia  Slowly down trending baseline since August, now between ~9-11. Required 2 U PRBC at OSH after presenting there with Hgb 6. Found to have hgb 6.7 POA. No h/o GI bleed and Hgb remains stable  after receiving 1 U PRBC.   -- Iron studies from 2/27 at OSH: Fe 27, Fe sat 8, ferritin 31, Transferrin 271, TIBC 346. Will repeat although patient recently received a total of 3 U of blood over the past 5 days.   -- Follow up path  -- Trend CBC    # Acute kidney injury  Noted to have normal SCr in 2016 with steadily rising SCr since begininning of 2017. Presents with SCr 1.49 which is down trending from recent peak to 1.8 at OSH.   -- Trend BMP    # Atrial fibrillation ([id number] 5)  Started on warfarin in May after initial diagnosis. Found to have INR of 3.6 at OSH now s/p 10 mg IV vit K on 2/27 and 5 mg on 3/1. Warfarin held on admission.   -- Continue to hold warfarin  -- Trend INR  -- Vit K trial as above  -- Discontinue metoprolol    # Type II diabetes mellitus  - holding metformin   - Lantus 15 U (home dose 30 U)  - Low SSI  - Hypoglycemia protocol    # Lower extremity edema  - Continue PTA lasix    # CAD s/p RCA PCI in 2002  - Holding ASA at this time    Chronic problems  #Hypothyroidism - continue PTA levothyroxine  #HTN - Continue PTA losartan, restart metoprolol 25 mg BID  #GERD - continue PTA pantoprazole   #NAA - continue PTA CPAP    # Pain Assessment:   Current Pain Score 3/3/2018 3/2/2018 3/2/2018   Patient currently in pain? denies denies denies   Srege s pain level was assessed and he currently denies pain.      Diet: Fluid restriction 1500 ML FLUID  2 Gram Sodium Diet  Fluid restriction 1500 ML FLUID  Fluids: n/a  DVT Prophylaxis: Pneumatic Compression Devices  Code Status: Full Code    Disposition Plan   Expected discharge: 2 - 3 days, recommended to prior living arrangement once safe disposition plan/ TCU bed available and pending further evaluation of new dx of HCC.      Entered: oYung Sousa 03/03/2018, 4:10 PM   Information in the above section will display in the discharge planner report.      The patient's care was discussed with the Attending Physician, Dr. Kumar.    Young JACQUES  [place]  [facility]  [place]: 3  Pager: [phone]  Please see sticky note for cross cover information    Interval History   No acute events overnight. Wife updated at bedside. Dyspnea improved after >3L removed from paracentesis yesterday. No chest pain, abdominal pain, or chills. Family notes that patient is at his baseline mental status.     Physical Exam   Vital Signs: Temp: 96.3  F (35.7  C) Temp src: Oral BP: 122/48 Pulse: 63   Resp: 15 SpO2: 99 % O2 Device: None (Room air)    Weight: 233 lbs 3.95 oz  General Appearance: NAD, interactive  Respiratory: On room air, bibasilar rales, no wheezes or rhonchi  Cardiovascular: RRR, 2/6 FRANKO over LLSB, no rubs  GI: Soft, distended, distant bowel sounds, no HSM appreciated, non-tender  Skin: No suspicious lesions over exposed areas. No jaundice.  Neuro: AAOx3, no asterixis     Data   All data personally reviewed in epic.

## 2018-03-03 NOTE — PLAN OF CARE
Problem: Patient Care Overview  Goal: Plan of Care/Patient Progress Review  Outcome: No Change  Pt A/Ox4. Pt seems slow/confused but family stated this is pts baseline. Up with SBA to toilet, urine collected and sent. Pt with x2 stools today. Lactulose BID. Pt was NPO to prep for Abd US and paracentesis. Procedures done, pt was 2g Na diet this lyly. Pt instructed to not eat anything after he finished dinner at 18:30. Pt will need abd CT done around 20:30 or later this lyly. CT aware. L PIV SL, infused 1U PRBC today for initial hgb of 6.7. Repeat hgb 8.5. Onc and IR consults placed for tomorrow.

## 2018-03-03 NOTE — PLAN OF CARE
Problem: Patient Care Overview  Goal: Plan of Care/Patient Progress Review  Outcome: No Change    /50 (BP Location: Right arm)  Pulse 75  Temp 98.8  F (37.1  C) (Oral)  Resp 16  Wt 110.6 kg (243 lb 12.8 oz)  SpO2 98%  BMI 37.07 kg/m2    5304-0844:  Pt A&O, but forgetful @ baseline.  Up w/SBA.  VSS on RA.  CPAP @ HS.  L PIV x 2 SL.  Pt was NPO for abd CT, now completed.  Pt now back to 2gm Na diet w/ 1500ml FR, per MD.  Scheduled Lactulose BID.  Voiding WNL, UA sent.  No BM this shift.  No c/o pain.  Need sputum sample, pt aware.  Hgb now 8.5 after 1 unit PRBCs.  Abd para and US done yesterday.  Hepatology, oncology, and IR consults today.  AR=770 and 143, SSI held per order, Lantus given.  TCU bed available pending further eval of new dx HCC. Continue to monitor and follow POC.

## 2018-03-03 NOTE — PROGRESS NOTES
" 03/03/18 1208   Quick Adds   Type of Visit Initial Occupational Therapy Evaluation   Living Environment   Lives With spouse   Living Arrangements house   Home Accessibility bed and bath are not on the first floor;bed not on first floor;bath not on first floor;grab bars present (bathtub);stairs to enter home;stairs within home   Number of Stairs to Enter Home 3   Number of Stairs Within Home 16   Transportation Available family or friend will provide;car   Living Environment Comment Pt lives at home with wife. Pt is retired and wife works. Home is a split level home with 2 sets of stairs inside the home. Pt bedroom and bathroom are on the upper level of the home. Wife completes most of the in/outside of home \"chores,\" as well as provides assistance for  as needed. They are starting to implement routine where  does not shower unless wife is home.    Self-Care   Usual Activity Tolerance moderate   Current Activity Tolerance fair   Regular Exercise no   Equipment Currently Used at Home grab bar   Activity/Exercise/Self-Care Comment Pt IND with self care activities, when showering wife is home for increased safety. Wife cooks meals, when pt is home and she is at work pt with make his own lunch. Pt does not regularily exercise, for the past month or so has been feeling very fatigued with minimal activity.   Functional Level Prior   Ambulation 0-->independent   Transferring 0-->independent   Toileting 0-->independent   Bathing 0-->independent   Dressing 0-->independent   Eating 0-->independent   Communication 0-->understands/communicates without difficulty   Swallowing 0-->swallows foods/liquids without difficulty   Cognition 0 - no cognition issues reported   Fall history within last six months no   Which of the above functional risks had a recent onset or change? (can still complete IND but with decreased activity tolerance)   Prior Functional Level Comment IND with ADLs, wife provides assistance for IADLs " as needed.   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 Pt with some impulsive tendencies, and over estimates his own abilites.    Cognitive Status Examination   Orientation orientation to person, place and time   Level of Consciousness alert   Able to Follow Commands WNL/WFL   Personal Safety (Cognitive) mild impairment;impulsive   Memory impaired   Attention Sustained attention impaired   Visual Perception   Visual Perception Wears glasses  (reading)   Sensory Examination   Sensory Quick Adds No deficits were identified   Pain Assessment   Patient Currently in Pain No   Integumentary/Edema   Integumentary/Edema other (describe)  (Swelling in LE )   Range of Motion (ROM)   ROM Quick Adds No deficits were identified   Strength   Manual Muscle Testing Quick Adds No deficits were identified   Coordination   Upper Extremity Coordination No deficits were identified   Transfer Skill: Sit to Stand   Level of [place]: Sit/Stand independent   Lower Body Dressing   Level of [place]: Dress Lower Body independent  (some pain using figure 4 L LE)   Grooming   Level of [place]: Grooming  "independent  (reccomend wife is home when pt showers )   Eating/Self Feeding   Level of [place]: Eating independent   Instrumental Activities of Daily Living (IADL)   Previous Responsibilities meal prep  (cold lunches )   Activities of Daily Living Analysis   Impairments Contributing to Impaired Activities of Daily Living strength decreased;pain   General Therapy Interventions   Planned Therapy Interventions ADL retraining;ROM;strengthening   Clinical Impression   Criteria for Skilled Therapeutic Interventions Met yes, treatment indicated   OT Diagnosis deconditioning    Influenced by the following impairments decreased activity tolerance, general fatigue, L LE pain    Assessment of Occupational Performance 1-3 Performance Deficits   Identified Performance Deficits showering, IADLs   Clinical Decision Making (Complexity) Low complexity   Therapy Frequency daily   Predicted Duration of Therapy Intervention (days/wks) 1 week   Anticipated Equipment Needs at Discharge shower chair   Anticipated Discharge Disposition Home with Assist  (wife home after work )   Risks and Benefits of Treatment have been explained. Yes   Patient, Family & other staff in agreement with plan of care Yes   [facility] TM \"6 Clicks\"   2016, Trustees of [facility], under license to [employer].  All rights reserved.   6 Clicks Short Forms Daily Activity Inpatient Short Form   [facility]  \"6 Clicks\" Daily Activity Inpatient Short Form   1. Putting on and taking off regular lower body clothing? 3 - A Little   2. Bathing (including washing, rinsing, drying)? 3 - A Little   3. Toileting, which includes using toilet, bedpan or urinal? 4 - None   4. Putting on and taking off regular upper body clothing? 4 - None   5. Taking care of personal grooming such as brushing teeth? 4 - None   6. Eating meals? 4 - None   Daily Activity Raw Score (Score out of 24.Lower scores equate to lower levels of function) 22   Total " Evaluation Time   Total Evaluation Time (Minutes) 8

## 2018-03-04 NOTE — PLAN OF CARE
Problem: Patient Care Overview  Goal: Plan of Care/Patient Progress Review  Outcome: No Change  Pt A/Ox4, VSS and denies pain. Up indep to toilet, x3 BM's today. Pt on lactulose TID with goal of 3-5 BM's a day. Hgb 8.2 this AM. Pt has two L PIV's, both SL. FE studies done to check for cause in hgb drop over the past year. MRI of abd done yesterday, in process. Onc to consult pt prior to DC. Pt will possibly DC on Monday pending labs. Per pt, MD stated that pt is to f/u with PCP for MRI imaging to be reviewed.

## 2018-03-04 NOTE — PLAN OF CARE
Problem: Patient Care Overview  Goal: Plan of Care/Patient Progress Review  Outcome: No Change    /53 (BP Location: Right arm)  Pulse 66  Temp 96.9  F (36.1  C) (Oral)  Resp 16  Wt 105.8 kg (233 lb 4 oz)  SpO2 98%  BMI 35.47 kg/m2    9156-9991:  Pt A&O, but forgetful.  Pt can be a little slow to respond.  Up independently.  Ambulated halls this shift x 2.  VSS on RA.  Pt has home CPAP @ HS.  L PIVs SL.  IV Rocephin.  LE edema.  Lactulose up to TID from BID, goal 3-5 BMs/day.  Pt reported BM x 3 today.  Hgb down to 7.7 today @ 8.2 on 1800 recheck.  Iron studies done.  MRI of abd done today, results pending.  Voiding WNL.  No BM this shift.  No c/o pain.  Tolerating 2gm Na diet w/ 1500 ml FR.  KT=752 and 170, no SSI given per order.   Plan for possible d/c Monday, pt to f/u with OP Hepatology for review of MRI imaging results.  Continue to monitor and follow POC.

## 2018-03-04 NOTE — PROGRESS NOTES
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     # Suspected hepatocellular carcinoma  Liver mass first appreciated in 05/2017. Repeat MRI from 02/2018 demonstrating progression of presumed hepatocellular carcinoma. Imaging appears c/w HCC.  Discussed with GI who will present case at liver tumor conference on 3/9. AFP <1.5 and negative at OSH. CEA 3.3.   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 No prior paracentesis.  Complicated by Grade II esophogeal varices, moderate portal gastropathy, splenic varices, ascites and tumor thrombus of portal vein.   -- Trend daily MELD labs  -- Continue lactulose 20 g scheduled TID, titrate PRNs to 3-5 BM per day  -- Protonix 40 mg IV BID  -- Low sodium diet  -- Trial of 10 mg Vitamin K IV x3 days, complete today  -- Follow up cultures, NGTD on ascitic or blood cx (collected 3/2)  -- Continue spironolactone 50 mg QD, will up titrate to 100 mg ion 3/6  -- Continue propranolol 20 mg QID  -- Discontinue ceftriaxone for SBP ppx    # Acute on chronic normocytic anemia  Slowly down trending baseline since August,  now between ~9-11. Required 2 U PRBC at OSH after presenting there with Hgb 6. Found to have hgb 6.7 POA. No h/o GI bleed and Hgb remains stable after receiving 1 U PRBC.   -- Iron studies from 2/27 at OSH: Fe 27, Fe sat 8, ferritin 31, Transferrin 271, TIBC 346.   -- Follow up path  -- Trend CBC    # Acute kidney injury  Noted to have normal SCr in 2016 with steadily rising SCr since begininning of 2017. Presents with SCr 1.49 which is down trending from recent peak to 1.8 at OSH.   -- Trend BMP    # Atrial fibrillation (KEY2EO1-GKOy 5)  Started on warfarin in May after initial diagnosis. Found to have INR of 3.6 at OSH now s/p 10 mg IV vit K on 2/27 and 5 mg on 3/1. Warfarin held on admission. PTA metoprolol discontinued.   -- Continue to hold warfarin, will need further consideration of anticoagulation moving forward in the setting of possible malignancy, PVT and Afib.   -- Trend INR  -- Vit K trial as above    # Type II diabetes mellitus  - holding metformin   - Lantus 15 U (home dose 30 U)  - Low SSI  - Hypoglycemia protocol    # Lower extremity edema  - Continue PTA lasix    # CAD s/p RCA PCI in 2002  - Holding ASA at this time    Chronic problems  #Hypothyroidism - continue PTA levothyroxine  #HTN - Continue PTA losartan, restart metoprolol 25 mg BID  #GERD - continue PTA pantoprazole   #NAA - continue PTA CPAP    # Pain Assessment:   Current Pain Score 3/3/2018 3/3/2018 3/2/2018   Patient currently in pain? denies denies nitish Valentine s pain level was assessed and he currently denies pain.      Diet: Fluid restriction 1500 ML FLUID  2 Gram Sodium Diet  Fluid restriction 1500 ML FLUID  Fluids: n/a  DVT Prophylaxis: Pneumatic Compression Devices  Code Status: Full Code    Disposition Plan   Expected discharge: 2 - 3 days, recommended to prior living arrangement once safe disposition plan/ TCU bed available and pending further evaluation of new dx of HCC.      Entered: Young Sousa 03/04/2018, 6:36 AM    Information in the above section will display in the discharge planner report.      The patient's care was discussed with the Attending Physician, Dr. Emanuel.    Young Sousa  [facility]: 3  Pager: [phone]  Please see sticky note for cross cover information    Interval History   No acute events overnight. No shortness of breath, abdominal pain, or chest pain. 5 bowel movements yesterday without blood.     Physical Exam   Vital Signs: Temp: 96.9  F (36.1  C) Temp src: Oral BP: 136/53 Pulse: 66   Resp: 16 SpO2: 98 % O2 Device: None (Room air)    Weight: 233 lbs 3.95 oz  General Appearance: NAD, interactive  Respiratory: On room air, bibasilar rales, no wheezes or rhonchi  Cardiovascular: RRR, 2/6 FRANKO over LLSB, no rubs  GI: Soft, distended, distant bowel sounds, no HSM appreciated, non-tender  Skin: No suspicious lesions over exposed areas. No jaundice.  Neuro: AAOx3, no asterixis     Data   All data personally reviewed in epic.

## 2018-03-04 NOTE — CONSULTS
Interventional Radiology Clinic Visit    Date of this visit: 3/4/2018    Serge Brambila  is referred by  for treatment recommendations. The patient requires evaluation for diagnosis of infiltrative HCC.     Primary Physician: Linn Thompson        History Of Present Illness:    Serge Brambila is a 72 year old male who presents with diagnosis of HCC on a background of likely KUHN cirrhosis.  Mr. Brambila was initially found to have HCC in May of 2017.  It appears the lesion was biopsied, for unclear reasons, but not treated.  Although the May imaging is not available, the HCC was reportedly much smaller at this time.  Currently it is large, infiltrative, and involves the adjacent venous systems.  He has fairly well maintained liver function with a total bilirubin of 1.4.  He does have ascites and was admitted with anemia presumably from esophageal varices.    Review of Systems:    General: Negative for recent fever.  Skin: Negative for jaundice.  Eyes: Negative for jaundice.  Respiratory: Negative for shortness of breath.  Cardiovascular: Negative for chest pain.  Gastrointestinal: Negative for abdominal pain or swelling, nausea, vomiting, or diarrhea.  Musculoskeletal: Negative for ankle swelling.    Past Medical/Surgical History:    Past Medical History:   Diagnosis Date     Atrial fibrillation (H)      Diabetes (H)     type II     Hepatic encephalopathy (H)      Hypertension      MI (myocardial infarction)     stent placement     Sleep apnea      Thyroid disease      Past Surgical History:   Procedure Laterality Date     APPENDECTOMY  age 16     COLONOSCOPY       ESOPHAGOSCOPY, GASTROSCOPY, DUODENOSCOPY (EGD), COMBINED N/A 1/24/2018    Procedure: COMBINED ESOPHAGOSCOPY, GASTROSCOPY, DUODENOSCOPY (EGD);  ESOPHAGOGASTRODUODENOSCOPY (MAC) ;  Surgeon: Colton Stroud MD;  Location:  GI     GI SURGERY  2012    partial colectomy for pre-CA nodule, removed 10 in.       Current Medications:    No  current outpatient prescriptions on file.       Allergies:    Penicillins    Family History:  No pertinent family history.    Social History:      Social History     Social History     Marital status:      Spouse name: N/A     Number of children: N/A     Years of education: N/A     Social History Main Topics     Smoking status: Former Smoker     Packs/day: 1.00     Years: 10.00     Quit date: 1/1/1999     Smokeless tobacco: Never Used     Alcohol use No     Drug use: No     Sexual activity: Not on file     Other Topics Concern     Not on file     Social History Narrative     No narrative on file       Physical Exam:    /53 (BP Location: Right arm)  Pulse 66  Temp 96.9  F (36.1  C) (Oral)  Resp 16  Wt 105.8 kg (233 lb 4 oz)  SpO2 98%  BMI 35.47 kg/m2     GENERAL APPEARANCE: healthy, alert and no distress  PSYCHIATRIC: mentation appears normal and affect normal.  EYES: No jaundice.  SKIN: No jaundice.   RESP: lungs clear to auscultation - no rales, rhonchi or wheezes  CARDIOVASCULAR: regular rates and rhythm, normal S1 S2, no S3 or S4 and no murmur  ABDOMEN:  soft, nontender, no masses and bowel sounds normal.  MUSCULOSKELETAL: No edema in the lower extremities.    Laboratory Studies:    Lab Results   Component Value Date    HGB 8.2 03/03/2018     Lab Results   Component Value Date     03/03/2018     Lab Results   Component Value Date    WBC 3.5 03/03/2018       Lab Results   Component Value Date    INR 1.67 03/03/2018       Lab Results   Component Value Date    PROTTOTAL 6.1 03/03/2018      Lab Results   Component Value Date    ALBUMIN 2.3 03/03/2018     Lab Results   Component Value Date    BILITOTAL 1.4 03/03/2018     No results found for: BILICONJ   Lab Results   Component Value Date    ALKPHOS 192 03/03/2018     Lab Results   Component Value Date     03/03/2018     Lab Results   Component Value Date    ALT 65 03/03/2018       Lab Results   Component Value Date    CR 1.45  03/03/2018     No results found for: GFR    Alpha Fetoprotein   Date Value Ref Range Status   03/02/2018 <1.5 0 - 8 ug/L Final     Comment:     Assay Method:  Chemiluminescence using Siemens Centaur XP       Imaging:     I reviewed the MRI from 2/5/2018 which demonstrated a infiltrative HCC.        ASSESSMENT:      Serge Brambila is a 72 year old male with infiltrative HCC in the right lobe of the liver on the background of KUHN cirrhosis.     Child-Israel score: 9 (B)  Native MELD score: 17  ECOG performance status: 1    I believe the patient is a suitable candidate for a radioembolization procedure.    I showed him the imaging and discussed the findings. I discussed with him that the goal of the procedure is disease control with a survival benefit rather than a cure given the nature of the disease, but that sometimes lesions will be cured in this manner. Alternatives were reviewed, including surgery, but the patient is not a surgical candidate.  I explained the radioembolization procedure  - that it is a two step procedure on two different days that involves an angiogram and nuclear medicine study on each day, and that it requires insurance pre-approval. I explained that the first procedure involves mapping the arteries to the liver and embolizing any sidebranches that place the patient at risk of non-target radiation injury, then checking for shunting to the lungs. The second procedure, assuming relevant sidebranches were embolizable and the lung shunting is not too high, involves delivering the radiation beads intra-arterially and imaging the liver afterwards to assess the pattern of radiation distribution.   We discussed the procedure at length, however, the patient would like to come to clinic as an outpatient with family to further discuss his options.  We will contact him to set this appointment up after discharge.      PLAN:  Will return to clinic with his family, per patient request, as soon as patient is  discharged to further discuss treatment options.      A total of 30 minutes was spent in care for the patient, of which >50% was spent in counseling and co-ordination of care.    It was a pleasure seeing the patient.     Signed,    Fernando Montgomery M.D.  Department of Interventional Radiology  [facility]  Patient Care Team:  Linn Thompson as PCP - General (Family Practice)

## 2018-03-04 NOTE — PLAN OF CARE
Problem: Patient Care Overview  Goal: Plan of Care/Patient Progress Review  Outcome: No Change  Pt A/Ox4, VSS and denies pain. Up indep to toilet, x3 BM's today. Pt on lactulose TID with goal of 3-5 BM's a day. Hgb 7.7 this AM, recheck pending for 18:00 timed draw. Pt has two L PIV's, both SL. FE studies done to check for cause in hgb drop over the past year. MRI of abd done today, in process. Pt will possibly DC on MOnday pending labs. Per pt, MD stated that pt is to f/u with OP hepatology for MRI imaging to be reviewed.

## 2018-03-04 NOTE — PLAN OF CARE
Problem: Patient Care Overview  Goal: Plan of Care/Patient Progress Review  5A-PT: Defer: Per discussion with RN, OT, chart review and observation of pt, pt with no acute PT needs at this time, ambulating hallway indep. RN denies balance concerns. PT to defer. Will complete orders, please re-order if pt has change in status. OT to continue to follows.

## 2018-03-05 NOTE — PROGRESS NOTES
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     # Suspected hepatocellular carcinoma  Liver mass first appreciated in 05/2017. Repeat MRI from 02/2018 demonstrating progression of presumed hepatocellular carcinoma. Imaging appears c/w HCC.  Discussed with GI who will present case at liver tumor conference on 3/9. AFP <1.5 and negative at OSH. CEA 3.3. High risk for decompensation based on location of mass.  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 Cirrhotic changes on US noted at that time. No prior paracentesis.  Complicated by Grade II esophogeal varices, moderate portal gastropathy, splenic varices, ascites and tumor thrombus of portal vein.   -- Trend daily MELD labs  -- Continue lactulose 20 g scheduled TID, titrate PRNs to 3-5 BM per day  -- Protonix 40 mg IV BID  -- Low sodium diet  -- Follow up cultures, NGTD on ascitic or blood cx (collected 3/2)  -- Continue spironolactone 50 mg QD, will increase to 100 mg on 3/6    # Acute on chronic  normocytic anemia  Slowly down trending baseline since August, now between ~9-11. Required 2 U PRBC at OSH after presenting there with Hgb 6. Found to have hgb 6.7 POA. No h/o GI bleed and Hgb remains stable after receiving 1 U PRBC, differential includes variceal bleeding, will monitor for bleeding one additional day for stability.   -- Iron studies from 2/27 at OSH: Fe 27, Fe sat 8, ferritin 31, Transferrin 271, TIBC 346.   -- Follow up path  -- Trend CBC    # H/o right hemicolectomy for recurrent cecal villous adenoma  Last colonoscopy was in 2011. No known family history of GI related disease or malignancies. Pan-diverticulosis identified on colonoscopy.   -- No colonoscopies since 2011 noted in [facility], [facility] or [place] systems.   -- Will need repeat colonoscopy as outpatient    # Acute kidney injury  Noted to have normal SCr in 2016 with steadily rising SCr since begininning of 2017. Presents with SCr 1.49  from recent peak to 1.8 at OSH. Rising after initiation of spironolactone   -- Seen by Dr. Kumar at [facility], will need outpatient follow up  -- Trend BMP    # Atrial fibrillation ([id number]-[id number] 5)  Started on warfarin in May after initial diagnosis. Found to have INR of 3.6 at OSH now s/p 10 mg IV vit K on 2/27 and 5 mg on 3/1. Warfarin held on admission. PTA metoprolol discontinued.   -- Continue to hold warfarin, will need further consideration of anticoagulation moving forward in the setting of possible malignancy, PVT and Afib.   -- Metoprolol 25 mg BID  -- Trend INR  -- Vit K trial as above    # Type II diabetes mellitus  - holding metformin   - Lantus 15 U (home dose 30 U)  - Low SSI  - Hypoglycemia protocol    # Lower extremity edema  - Continue PTA lasix    # CAD s/p RCA PCI in 2002  - Holding ASA at this time    Chronic problems  #Hypothyroidism - continue PTA levothyroxine  #HTN - Continue PTA losartan, restart metoprolol 25 mg BID  #GERD - continue PTA pantoprazole   #NAA - continue  PTA CPAP    # Pain Assessment:   Current Pain Score 3/4/2018 3/4/2018 3/3/2018   Patient currently in pain? denies denies nitish [place] pain level was assessed and he currently denies pain.      Diet: Fluid restriction 1500 ML FLUID  2 Gram Sodium Diet  Fluid restriction 1500 ML FLUID  Fluids: n/a  DVT Prophylaxis: Pneumatic Compression Devices  Code Status: Full Code    Disposition Plan   Expected discharge: 2 - 3 days, recommended to prior living arrangement once safe disposition plan/ TCU bed available and pending further evaluation of new dx of HCC.      Entered: Young Sousa 03/05/2018, 6:18 AM   Information in the above section will display in the discharge planner report.      The patient's care was discussed with the Attending Physician, Dr. Romero.    Young Sousa  [facility]  [place]: 3  Pager: [phone]  Please see sticky note for cross cover information    Interval History   No acute events overnight. Patient feels somewhat short of breath with exertion but not requiring oxygen. No chest pain or abdominal pain. He has had 4-6 bowel movements overnight. Discussed MRI results with patient and wife by phone.     Physical Exam   Vital Signs: Temp: 96.1  F (35.6  C) Temp src: Oral BP: (!) 137/39 Pulse: 60   Resp: 20 SpO2: 97 % O2 Device: None (Room air)    Weight: 229 lbs 14.4 oz  General Appearance: NAD, interactive  Respiratory: On room air, CTAB, no wheezes or rhonchi  Cardiovascular: RRR, 2/6 FRANKO over LLSB, no rubs  GI: Soft, distended, distant bowel sounds, no HSM appreciated, non-tender  Skin: No suspicious lesions over exposed areas. No jaundice.  Neuro: AAOx3, no asterixis     Data   All data personally reviewed in [facility].    Internal Medicine Staff Addendum  Date of Service: 3/5/2018  I have seen and examined Kosta, reviewed the data and discussed the plan of care with the patient and the care team on P&FC Rounds.  I agree with the above documentation     I discussed pt's care  with bedside RN, case management/social work today.  I personally reviewed imaging, labs, medications and past 24 hr notes.  Assessment/Plan/Diagnoses: plan/dx as above, which contains my edits and reflects our joint medical decision-making.     Vineet Romero MD  Internal Medicine/Pediatrics Hospitalist & Staff Physician   of Internal Medicine and Pediatrics  [facility]  Pager: [phone]

## 2018-03-05 NOTE — PLAN OF CARE
Problem: Patient Care Overview  Goal: Plan of Care/Patient Progress Review  Outcome: No Change    /47 (BP Location: Right arm)  Pulse 58  Temp 97.7  F (36.5  C) (Oral)  Resp 18  Wt 104.3 kg (229 lb 14.4 oz)  SpO2 98%  BMI 34.96 kg/m2    3190-7599:  Pt A&O, but forgetful.  Pt can be a little slow w/ responses.  Up independently, ambulated halls this shift.  VSS on RA.  Pt has home CPAP @ HS.  L PIVs x 2 SL.  IV Rocephin d/c'd, UA normal.  LE edema improving.  Lactulose TID, goal of 3-5 BMs/day.  BM x 3 reported on eves, no BM this shift.  No c/o pain.  Tolerating 2gm Na diet w/ 1500 ml FR.  Hgb=8.3.  RX=355 and 172, SSI held per order.  Plan for possible d/c today or tomorrow pending PT/OT eval and Oncology consult.  Pt to f/u with PCP to compare MRI imaging and development of care plan.  Continue to monitor and follow POC.

## 2018-03-05 NOTE — PLAN OF CARE
Problem: Patient Care Overview  Goal: Plan of Care/Patient Progress Review  Discharge Planner OT   Patient plan for discharge: Home with assist from wife  Current status: Pt completed all mobility, transfers and ambulation ind. Pt ambulated ~500 ft with no LOB or rest breaks. Pt completed UB dressing ind, LB dressing using figure four technique. Pt completed standing g/h tasks ind. Provided education on grading activity for fluctuation in energy levels, medication set up and W/S and pacing strategies. Pt able to provide examples for home carryover and states spouse will assist as needed.  Barriers to return to prior living situation: Decreased activity tolerance, medical status  Recommendations for discharge: After collaboration with the OT, the recommended dc location is now home with assist.

## 2018-03-05 NOTE — PROGRESS NOTES
GI Fellow Brief Note    Patient seen and examined.    Mentation better this AM.    Denies pain.    Patient tells me he is anxious for discharge home.      MELD-Na score: 16 at 3/5/2018  7:25 AM  MELD score: 16 at 3/5/2018  7:25 AM  Calculated from:  Serum Creatinine: 1.67 mg/dL at 3/5/2018  7:25 AM  Serum Sodium: 140 mmol/L (Rounded to 137) at 3/5/2018  7:25 AM  Total Bilirubin: 0.9 mg/dL (Rounded to 1) at 3/5/2018  7:25 AM  INR(ratio): 1.52 at 3/5/2018  7:25 AM  Age: 72 years    CT chest without clear evidence of metastatic disease.      Impression and Plan:  72 year old male with new diagnosis of infiltrative HCC in the right lobe of the liver on the background of KUHN cirrhosis. Not within rafita criteria.  AFP low which is unusual.  Discussing therapeutic options including radioembolization.       -appreciate IR input; patient to follow up in the outpatient setting  -I will arrange for liver clinic follow up; patient requests transition of care to [facility]  -appreciate oncology consultation   -await MRI abdomen overread  -continue with lactulose/rifaximin   -continue with furosemide/lasix   -would hold BB given large ascites; patient will be referred for outpatient endoscopy for consideration of banding esophageal varices (I will arrange)    Case discussed with Dr. Godfrey who is in agreement with plan of care.    Please page with ? Or change in clinical status    Yennifer Culver  [phone]    Seen and examined.  The above assessment and plan was developed jointly with the fellow.      Chester Godfrey MD

## 2018-03-06 NOTE — PLAN OF CARE
Problem: Patient Care Overview  Goal: Plan of Care/Patient Progress Review  Outcome: No Change  A&O but forgetful at times. VSS on RA. Denies pain. Scheduled lactulose given. BM x2. OT eval completed. BG checked with meals. Tolerating diet. 1500ml fluid restriction. Ambulating independently. Awaiting for oncology recs. Possible d/c tomorrow. Will continue with POC.

## 2018-03-06 NOTE — PLAN OF CARE
Problem: Patient Care Overview  Goal: Plan of Care/Patient Progress Review  Outcome: No Change  [id number]    Reason for Admission: Hepatocellular carcinoma eval.   PMHx: ESLD KUHN, CAD, HFpEF, A-fibb on coumadin, R hemicolectomy, CKD , DM2, Hypothyroid.   Barriers to D/C: None. Cleared by PT/OT. Possible d/c today to home.    Vitals: VSS on RA ex bradycardia in 50s. CPAP when sleeping  Activity: Up ind. Ambulated on unit this evening.   Pain: No c/o pain at this time.   Neuro: A&O x4. No encephalopathy at this time.   Cardiac: WDL.   Respiratory: Dyspnea on exertion.   GI/: WDL. BM x1 this shift.   Diet: 2 gram Na+ diet. 1500 FR.   Skin: WDL ex ruddiness.   LDAs: L upper PIV. L lower PIV.   BG: Before meals and bedtime and 0200. SSI given per parameters. (220 and 149)    Plan: D/c today to home. Follow up with liver clinic outpt.

## 2018-03-06 NOTE — CONSULTS
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 Staging work up complete suggesting HCC localized to liver. Chest CT less likely to be metastatic, short term follow up is needed. AFP is normal limits.The size and location of mass makes it unresectable and he does not satisfy [place]/UNOS criteria for liver transplantation. The lesion is too big for RFA but may be amenable to TACE/radioembolization. The pt may be a candidate for considering systemic Rx with Sorafenib following TACE (time to progression benefit established only in HCV related HCC).     Of note, non occlusive portal vein thrombus is probably a tumor thrombus (per MRI imaging) which does not necessarily require AC. Risks for AC should be weighed against benfits (pt at risk for bleeding and pt also has Afib). pt has hx of  right hemicolectomy for recurrent cecal villous adenoma in 2012. No colonscopy in records after the procedure. No known family history of colon cancer. Pan-diverticulosis identified on colonoscopy in 2011.     Recommendations:  -F/u IR consultation for possible TACE or radioembolization  -Consideration for systemic therapy with Sorafenib; will arrange for f/u appt with GI oncologist  -Colonoscopy as an OP      Pt seen and discussed with Dr. Torrez who agrees with the plan.    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     Patient was admitted to [facility] from 2/27-3/1. He presented to [facility] from PCP's office for Hgb 6, fatigue, and increased distention of his abdomen. He received 2U pRBC transfusion.. His MRI from 2/25/18 was reveiwed- showed marked progression of presumed hepatocellular carcinoma (from prior imaging in May) involving large portion of right lobe of liver. He did not have bx due elevated INR, was sent here for possible bx. Did not have paracentesis due to same reason of elevated INR.     Patient reports that his abdomen has increasing become distended since mid-January. Has never had a paracentesis before. He is now having SOB and wheezing especially with exertion. Also with fatigue. Has been compliant with all his home meds including lactulose. Having 3-5 loose, brown colored stools daily. No melena or BRBPR. Denies confusion, lightheadedness, dizziness, fevers, chills, chest pain, abdominal pain, nausea, vomiting, constipation, urinary urgency/frequency/burning, muscle or joint aches. Went to [place] last month for vacation, otherwise no other travel. Denies exposure to sick contacts.    Patient states that his cirrhosis was dx in May 2017. Was told that he had a liver mass, had a bx of the mass at that time but it was inconclusive.     Hospital Course: Pt was admitted for confusion and was treated for hepatic encephalopathy and portal HTN and ascites. With appropriate Rx his symptoms have improved.MRI of the abdomen w/wo contrast on 3/3/19 did confirm the diagnosis of HCC and it also showed a tumor thrombus in the  portal vein. AFp was normal. CEA was slighly elvated and  is pending. CT scan of chest w contrast showed patchy upper lobe predominant peripheral subpleural opacities,predominantly groundglass, with a consolidative component (infectious/inflammatory) atypical for metastasis.  Oncology was consulted for further management.    Review of system: Complete ROS otherwise negative         Past Medical History:     Past Medical History:   Diagnosis Date     Atrial fibrillation (H)      Diabetes (H)     type II     Hepatic encephalopathy (H)      Hypertension      MI (myocardial infarction)     stent placement     Sleep apnea      Thyroid disease              Past Surgical History:     Past Surgical History:   Procedure Laterality Date     APPENDECTOMY  age 16     COLONOSCOPY       ESOPHAGOSCOPY, GASTROSCOPY, DUODENOSCOPY (EGD), COMBINED N/A 1/24/2018    Procedure: COMBINED ESOPHAGOSCOPY, GASTROSCOPY, DUODENOSCOPY (EGD);  ESOPHAGOGASTRODUODENOSCOPY (MAC) ;  Surgeon: Colton Stroud MD;  Location:  GI     GI SURGERY  2012    partial colectomy for pre-CA nodule, removed 10 in.             Social History:     Social History     Social History     Marital status:      Spouse name: N/A     Number of children: N/A     Years of education: N/A     Occupational History     Not on file.     Social History Main Topics     Smoking status: Former Smoker     Packs/day: 1.00     Years: 10.00     Quit date: 1/1/1999     Smokeless tobacco: Never Used     Alcohol use No     Drug use: No     Sexual activity: Not on file     Other Topics Concern     Not on file     Social History Narrative     No narrative on file             Family History:   No family history on file.             Allergies:   .   Allergies   Allergen Reactions     Penicillins              Medications:     Current Facility-Administered Medications   Medication     metoprolol tartrate (LOPRESSOR) tablet 25 mg     [START ON 3/6/2018] spironolactone  "(ALDACTONE) tablet 50 mg     fluticasone (FLONASE) 50 MCG/ACT spray 1 spray     rifaximin (XIFAXAN) tablet 550 mg     pantoprazole (PROTONIX) 40 mg IV push injection     Daily 2 GRAM acetaminophen limit, unless fulminent liver failure or transaminases greater than or equal to 300 - 400, then none     lactulose (CHRONULAC) solution 20 g     lactulose (CHRONULAC) solution 20 g     levothyroxine (SYNTHROID/LEVOTHROID) tablet 150 mcg     losartan (COZAAR) tablet 50 mg     sodium bicarbonate tablet 1,300 mg     naloxone (NARCAN) injection 0.1-0.4 mg     melatonin tablet 1 mg     acetaminophen (TYLENOL) tablet 650 mg     insulin glargine (LANTUS) injection 15 Units     glucose 40 % gel 15-30 g    Or     dextrose 50 % injection 25-50 mL    Or     glucagon injection 1 mg     insulin aspart (NovoLOG) inj (RAPID ACTING)     insulin aspart (NovoLOG) inj (RAPID ACTING)     furosemide (LASIX) tablet 40 mg           Vital signs:  Temp: 97.1  F (36.2  C) Temp src: Oral BP: 146/50 Pulse: 53   Resp: 18 SpO2: 99 % O2 Device: None (Room air)     Weight: 105.4 kg (232 lb 4.8 oz)  Estimated body mass index is 35.32 kg/(m^2) as calculated from the following:    Height as of 1/24/18: 1.727 m (5' 8\").    Weight as of this encounter: 105.4 kg (232 lb 4.8 oz).    Physical exam:    Gen: Well built and nourished, not in any acute distress  HEENT: Mucous Membrane Moist, no oral lesions, no pallor, icterus  Neck: supple,   Lymph Nodes: no cervical, axillary LAD   CVS: Regular Rate Rhythm, no murmurs  Resp: CTA, no added sounds  GI: Soft, non-tender, distended, no palpable Hepatospleenomegaly   Extremities: mild LE edema   Neuro: AAOx4. Cranial nerves grossly intact. Strength 5/5 throughout.  Sensations grossly intact.          Data:   The labs and imaging were reviewed.      .  Recent Results (from the past 24 hour(s))   Glucose by meter    Collection Time: 03/04/18  9:56 PM   Result Value Ref Range    Glucose 181 (H) 70 - 99 mg/dL   Glucose by " meter    Collection Time: 03/05/18  2:04 AM   Result Value Ref Range    Glucose 172 (H) 70 - 99 mg/dL   Comprehensive metabolic panel    Collection Time: 03/05/18  7:25 AM   Result Value Ref Range    Sodium 140 133 - 144 mmol/L    Potassium 4.5 3.4 - 5.3 mmol/L    Chloride 112 (H) 94 - 109 mmol/L    Carbon Dioxide 19 (L) 20 - 32 mmol/L    Anion Gap 9 3 - 14 mmol/L    Glucose 122 (H) 70 - 99 mg/dL    Urea Nitrogen 20 7 - 30 mg/dL    Creatinine 1.67 (H) 0.66 - 1.25 mg/dL    GFR Estimate 41 (L) >60 mL/min/1.7m2    GFR Estimate If Black 49 (L) >60 mL/min/1.7m2    Calcium 8.1 (L) 8.5 - 10.1 mg/dL    Bilirubin Total 0.9 0.2 - 1.3 mg/dL    Albumin 2.3 (L) 3.4 - 5.0 g/dL    Protein Total 6.5 (L) 6.8 - 8.8 g/dL    Alkaline Phosphatase 250 (H) 40 - 150 U/L    ALT 68 0 - 70 U/L     (H) 0 - 45 U/L   CBC with platelets    Collection Time: 03/05/18  7:25 AM   Result Value Ref Range    WBC 4.5 4.0 - 11.0 10e9/L    RBC Count 2.93 (L) 4.4 - 5.9 10e12/L    Hemoglobin 8.4 (L) 13.3 - 17.7 g/dL    Hematocrit 27.7 (L) 40.0 - 53.0 %    MCV 95 78 - 100 fl    MCH 28.7 26.5 - 33.0 pg    MCHC 30.3 (L) 31.5 - 36.5 g/dL    RDW 17.0 (H) 10.0 - 15.0 %    Platelet Count 126 (L) 150 - 450 10e9/L   INR    Collection Time: 03/05/18  7:25 AM   Result Value Ref Range    INR 1.52 (H) 0.86 - 1.14   Glucose by meter    Collection Time: 03/05/18  8:15 AM   Result Value Ref Range    Glucose 116 (H) 70 - 99 mg/dL   Glucose by meter    Collection Time: 03/05/18 12:21 PM   Result Value Ref Range    Glucose 140 (H) 70 - 99 mg/dL   Glucose by meter    Collection Time: 03/05/18  5:48 PM   Result Value Ref Range    Glucose 209 (H) 70 - 99 mg/dL       Lab Results   Component Value Date    WBC 4.5 03/05/2018     Lab Results   Component Value Date    RBC 2.93 03/05/2018     Lab Results   Component Value Date    HGB 8.4 03/05/2018     Lab Results   Component Value Date    HCT 27.7 03/05/2018     No components found for: MCT  Lab Results   Component Value Date     MCV 95 03/05/2018     Lab Results   Component Value Date    MCH 28.7 03/05/2018     Lab Results   Component Value Date    MCHC 30.3 03/05/2018     Lab Results   Component Value Date    RDW 17.0 03/05/2018     Lab Results   Component Value Date     03/05/2018         Last Basic Metabolic Panel:  Lab Results   Component Value Date     03/05/2018      Lab Results   Component Value Date    POTASSIUM 4.5 03/05/2018     Lab Results   Component Value Date    CHLORIDE 112 03/05/2018     Lab Results   Component Value Date    HARI 8.1 03/05/2018     Lab Results   Component Value Date    CO2 19 03/05/2018     Lab Results   Component Value Date    BUN 20 03/05/2018     Lab Results   Component Value Date    CR 1.67 03/05/2018     Lab Results   Component Value Date     03/05/2018         Results for orders placed or performed during the hospital encounter of 03/01/18   US Abdomen Complete w Doppler Complete    Narrative    EXAMINATION: US ABDOMEN COMPLETE WITH DOPPLER, 3/2/2018 9:58 AM     COMPARISON: Outside Hospital Liver MRI abdomen report from 2/5/2018.    HISTORY: Cirrhosis, likely hepatocellular carcinoma, evaluate for  portal vein thrombus and ascites;      TECHNIQUE: The abdomen was scanned in standard fashion with  specialized ultrasound transducer(s) using both gray-scale, color  Doppler, and spectral flow techniques.    Findings:    Liver: The liver demonstrates abnormal heterogeneous echogenicity and  nodularity. Multiple focal heterogeneously echogenic hepatic masses  with ill-defined borders are noted. These are better evaluated on the  MRI from 2/5/2018. Liver measures 16.7 cm.    Extrahepatic portal vein flow is retrograde, measuring 66 cm/sec.  Right portal vein flow is retrograde, measuring 72 cm/sec.  Left portal vein flow is antegrade, measuring 13 cm/sec.  Nonocclusive clot is visualized in the intra-hepatic and extra-hepatic  main portal vein that partially extends into the right portal  and left  portal veins.    Flow in the hepatic artery is towards the liver and:  226 cm/sec peak systolic  0.65 resistive index.     The splenic vein is patent and flow is towards the liver.  The left,  middle, and right hepatic veins are patent with flow towards the IVC.  The IVC is patent with flow towards the heart.   The visualized aorta  is not dilated.    Gallbladder: Gallbladder is decompressed and not well-visualized.  There is no wall thickening, pericholecystic fluid, positive  sonographic Delong's sign. Small echogenic foci within the lumen of  the gallbladder demonstrates no flow on color imaging, this may  represent cholelithiasis versus sludge.    Bile Ducts: Both the intra- and extrahepatic biliary system are of  normal caliber.  The common bile duct is not visualized.    Pancreas: Is not visualized.    Kidneys: Both kidneys are of normal echotexture, without soft tissue  mass or hydronephrosis.   The craniocaudal dimensions are: right- 11.7  cm, left- 10.8 cm. The left kidney is not well-seen. The right kidney  demonstrates parapelvic cyst measuring 1.7 x 2.0 x 2.0 cm.    Spleen: The spleen measures 12.9 cm in sagittal dimension.    Fluid: Ascites is present.      Impression    Impression:   1.  Ascites, hepatic cirrhosis with multiple heterogeneous masses  likely representing HCC, these are better defined on MRI from  2/5/2018.   2.  Nonocclusive tumor thrombus in the main portal vein with partial  nonocclusive extension into the left and right portal vein. Retrograde  flow in the main portal vein and right portal vein.  3.  Echogenic foci in the gallbladder likely represents  cholelithiasis, less likely sludge. No evidence of cholecystitis.    I have personally reviewed the examination and initial interpretation  and I agree with the findings.    TOSHIA YIP MD   CT Chest w Contrast    Narrative    EXAMINATION: Chest CT  with contrast     CLINICAL HISTORY: Evaluate for evidence of  metastasis from  hepatocellular carcinoma..    COMPARISON: MRI 5 2018..    TECHNIQUE: CT imaging obtained through the chest with intravenous  contrast. Coronal and axial MIP reformatted images obtained.    FINDINGS:  Heart size is normal. No pericardial effusion.  Normal thoracic  vasculature. No thoracic lymphadenopathy. Central tracheobronchial  tree is patent. No pleural effusion or pneumothorax. Mucosal  hyperdensities in the lower esophagus which taper superiorly  suspicious for esophageal varices.    The lung windows reveals scattered patchy predominantly groundglass  opacities. These nodules are present in subpleural and  peribronchovascular locations. The most notable focuses a  peribronchial vascular mixed consolidative and groundglass opacity in  the right upper lobe (series 6 image 67), measuring at least 4.6 cm.  Additional innumerable smaller prominently groundglass density nodules  are also present.    Bones and soft tissues:  Degenerative changes in spine, most notable in the partially  visualized lower cervical spine. No acute fracture identified. No  suspicious bone lesion. Mild gynecomastia. Mild soft tissue anasarca.    Partially imaged upper abdomen:  Cirrhotic morphology of the liver with upper abdominal ascites.  Heterogeneous ill-defined mass in the right lobe of liver is poorly  characterized on this single phase examination, but was consistent  with a large infiltrative hepatocellular carcinoma on the MRI from  2/5/2018. There is again noted portal vein thrombus, which is not  obviously changed or not MRI, but again is suboptimally visualized.  Portosystemic collateral vasculature.      Impression    IMPRESSION:   1. Patchy upper lobe predominant peribronchovascular and peripheral  subpleural opacities. The opacities are predominantly groundglass,  however there is a consolidative component within several of the  areas. The differential is broad and the appearance is most suspicious  for an  infectious or inflammatory cause. The appearance is less  typical of metastasis, but not entirely excluded from the  differential. Attention on follow-up.    2. The upper abdominal findings are better characterized on the  comparison MRI. There has been no obvious change in the large  hepatocellular carcinoma an associated portal vein thrombus, although  these findings are not to completely characterized. There is upper  abdominal ascites and varices, and probable esophageal varices.    I have personally reviewed the examination and initial interpretation  and I agree with the findings.    BRITTANY HARVEY MD ([place])   MR Abdomen w/o & w Contrast    Narrative    MRI ABDOMEN    CLINICAL HISTORY:  Evaluation for possible HCC, please document if  patient meets radiographic criteria;     TECHNIQUE: Images were acquired with and without intravenous contrast  through the abdomen. The following MR images were acquired: TrueFISP,  multiplanar T2 weighted, axial T1 in/out of phase, diffusion-weighted.  Multiplanar T1-weighted images with fat saturation were before  contrast administration and at multiple time points following the  administration of intravenous contrast.     FINDINGS:    Comparison study: Ultrasound 3/2/2018    Liver: Cirrhotic hepatic configuration. No steatosis or iron  deposition.    There is a 11 x 5.9 cm arterially enhancing lesion in the right  hepatic lobe, hepatic segments 7, 8 and 5 (series 10 image 52). There  are areas of internal washout during the portal venous and delayed  phases. Pseudocapsule is not definitively identified. There mild  associated increased T2 signal.  There is no increased signal on  diffusion weighted images. Previously this measured similar in size  (exam 4 weeks ago) accounting for differences in technique.  This  lesion is most conspicuous on three-minute delayed phase images, and  is well seen on outside MR images. Increased prominence of a 2.3 cm T1  hyperintense nodule  within this lesion on series 9 image 53. This  could represent an underlying regenerative nodule and/or a new area of  hemorrhage given history of prior biopsy. There is a similarly T1  hyperintense lesion in segment 5 (9 image 36).  There is venous  invasion, evidenced by mixed signal intensity and heterogeneously  enhancing, nonocclusive intraluminal material extending from the  anterior and posterior divisions of the right through the main portal  vein to the portosplenic confluence. OPTN/LIRADS class 5X/LR-TIV    Gallbladder: Cholelithiasis    Spleen: Splenomegaly, measuring 14.4 cm.    Kidneys: Bilateral simple cysts.    Adrenal glands: Unremarkable.    Pancreas: Maintained T1 hyperintensity.    Bowel: Unremarkable    Lymph nodes: Indeterminate small gastrohepatic ligament and periportal  lymph nodes which may be reactive. Examples include series 10, image  49 and series 10, image 27.    Blood vessels:  Splenorenal varices. Expansile portal vein thrombus as  above. No abdominal aortic aneurysm.      Lung bases: Relatively clear.    Bones and soft tissues: No suspicious lesions in the bones.    Mesentery and abdominal wall: Mesenteric stranding and edema,  otherwise normal.    Ascites: Moderate volume ascites.      Impression    IMPRESSION:    1. Cirrhosis and evidence of portal hypertension including  splenomegaly, modified ascites and upper abdominal collateral vessel  formation..  2. Infiltrative right hepatic lobe HCC measures up to 11 cm. Tumor is  comparable outside MR to 5/20/2018. Associated tumor thrombus extends  throughout the right portal the venous system to the splenoportal  confluence.  OPTN 5X/LR-TIV .  3. Based on this exam only, the patient is not within [place] criteria.  4. Cholelithiasis    OPTN/LIRADS definitions.  LIRADS v2014.    LIRADS 1:  Definitely benign.  LIRADS 2:  Probably benign.  LIRADS 3:  Indeterminate for HCC.  LIRADS 4:  Probably HCC.  LIRADS M:  Probably malignant.  Not  specific for HCC.    OPTN 5a:  Diagnostic for HCC.  < 2 cm.  OPTN 5b:  Diagnostic for HCC.  > Or = 2 cm and < 5 cm.  OPTN 5x:  Diagnostic for HCC.  > Or = 5 cm.  OPTN 5T:  Previously treated HCC.  At the [facility],  this category is invoked if there is suspicion of residual tumor.    [place] criteria for liver transplantation:    1. Presence of no HCCs greater than 5 cm. One HCC measuring 3-5 cm is  allowed if no other HCCs are present.  2. Maximum of 3 HCCs measuring 3 cm or less.  3. No vascular invasion.  4. No extrahepatic metastases.    I have personally reviewed the examination and initial interpretation  and I agree with the findings.    MD Guanaco MARTINEZ MD

## 2018-03-06 NOTE — DISCHARGE SUMMARY
General acute hospital, [place]    Internal Medicine Discharge Summary- [facility]    Date of Admission:  3/1/2018  Date of Discharge:  3/6/2018  2:27 PM  Discharging Attending Provider: Dr. Romero  Discharge Team: [facility] 3    Discharge Diagnoses   Altered mental status secondary to hepatic encephalopathy  Acute on chronic anemia secondary to blood loss  End stage liver disease  Couaguloathy due to liver dysfunction  Liver mass, likely HCC  Acute kidney injury    Follow-ups Needed After Discharge   1. PCP within 7 days   - Discuss ongoing anticoagulation management; INR vs chromogenic factor 10   - Discuss anti-hypertensive regimen (i.e. Losartan)   - Ongoing Rifaximin vs neomycin coverage, assistance paperwork provided  2. Oncology follow up for further intervention   3. Interventional radiology within 1-2 weeks for consideration of radio-embolization therapy  4. Nephrology follow up in 3-4 weeks (Dr. Kumar) at [facility]   5. Hepatology for HCC follow up   6. GI follow up for variceal banding and repeat colonoscopy    Hospital Course   Serge Brambila was admitted on 3/1/2018 for further workup of a presumed HCC.  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  He also has a history of hepatic encephalopathy requiring lactulose and neomycin.  This is further complicated by 3 to esophageal varices, moderate portal gastropathy, splenic varices, ascites and tumor thrombus of the portal vein.  A liver mass was first noticed on imaging in May 2017.  Repeat MRI from February 2018 demonstrated progression of presumed hepatocellular carcinoma as imaging appeared consistent with HCC.  He was transferred to [facility] from an outside hospital following a brief stay for acute anemia with hemoglobin of 6 and increased abdominal distention.   The above-noted MRI was completed during that hospital stay. Following blood transfusion and stabilization he was transferred to [facility] for potential biopsy of this area.  The hepatology team was consulted.  His hemoglobin was stable and there was no suspected active GI bleed.  Due to ascites a paracentesis was completed and was not consistent with SBP; cytology was negative for malignancy.  AFP was less than 1.5 and CEA was 3.3.  CA 19-9 was pending.  GI and oncology were involved and were in agreement that this is likely HCC.  Though imaging was not within my [place] Criteria.  Interventional radiology was consulted and discussed radio embolization the HCC.  He will follow-up in INR clinic as an outpatient with his family to discuss this option further.  He will follow-up in hepatology clinic at [facility], with plan for outpatient endoscopy for consideration of esophageal variceal banding and colonoscopy.  Additionally he will follow-up with GI oncology as an outpatient to discuss potential systemic therapy for HCC.     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  Cleared with lactulose and rifaximin.  Diagnostic paracentesis was performed and was not consistent with SBP.  Lactulose will be continued at discharge.  Rifaximin was found to be exceptionally expensive for the patient with his current insurance, thus a prior authorization was filled prior to discharge, but this may take weeks to obtain.  Neomycin was not continued upon discharge due to his underlying renal disease.  Patient and his family were instructed to follow-up on this issue with his primary care provider, and in the interim were instructed on proper use of lactulose and signs of refractory encephalopathy despite this therapy for which he should seek medical evaluation.  In addition Spironolactone will be continued upon discharge at an increased dose of 100 mg daily.  Follow up with GI as per above.     # Acute on chronic normocytic anemia  Slowly down trending baseline since August, now between ~9-11. Required 2 U PRBC at OSH after presenting there with Hgb 6. Found to have hgb 6.7 POA. No h/o GI bleed and Hgb remains stable after receiving 1 U PRBC, differential includes variceal bleeding as he has a hx of this. However no active bleeding was noticed during hospitalization. He was briefly on IV protonix and received ceftriaxone prophylaxis due to hx of varices.  An anemia workup was completed though this was likely low yield in the setting of recent transfusion.  Day of discharge his hemoglobin was 8.7 and stable.  Follow-up on this issue as an outpatient with recommended CBC recheck and colonoscopy. Continue PPI upon DC.    # H/o right hemicolectomy for recurrent cecal villous adenoma  Last colonoscopy was in 2011. No known family history of GI related disease or malignancies. Pan-diverticulosis identified on colonoscopy.   -- [place]oscopies since [place] [facility] [facility] or [place] systems.   -- Will need repeat colonoscopy as outpatient     # Acute kidney injury  Noted to have normal SCr in 2016 with steadily rising SCr since begininning of 2017. Presents with SCr 1.49  from recent peak to 1.8 at OSH. Cr was 1.58 at discharge. Recommend BMP recheck at PCP follow up and follow up with Dr. Kumar at [facility].     # Atrial fibrillation ([id number])  Started on warfarin in May after initial diagnosis. Found to have INR of 3.6 at OSH now s/p 10 mg IV vit K on 2/27 and 5 mg on 3/1. Warfarin and metoprolol held on admission due to concern for GIB. Once stabilized these medications were restarted. Following heme/onc consult for HCC, it was recommended that chromogenic factor 10 levels could be followed in the future if there is question of INR validity due to his underlying liver disease. Baseline chromogenic factor 10 day of discharge was 48 and this can be  used to trend as needed. INR ws 1.47. Pt instructed to restart home dose of warfarin and have INR recheck in 2 days. Recommend rechecking chromogenic factor 10 if INR is elevated beyond expectation or if he has bleeding with a normal INR. Continue metoprolol.     # Type II diabetes mellitus  - hold metformin due to JUAN M, discuss restarting with PCP   - continue home dose long acting insulin     # Lower extremity edema  - Continue PTA lasix     # CAD s/p RCA PCI in 2002  - continue ASA, metoprolol     Chronic problems  #Hypothyroidism - continue PTA levothyroxine  #HTN - Continue PTA losartan, metoprolol  #GERD - continue PTA pantoprazole   #NAA - continue PTA CPAP    Consultations This Hospital Stay   GI HEPATOLOGY ADULT IP CONSULT  INTERNAL MEDICINE PROCEDURE TEAM ADULT IP CONSULT [facility] - PARACENTESIS  NUTRITION SERVICES ADULT IP CONSULT  ONCOLOGY IP CONSULT  INTERVENTIONAL RADIOLOGY ADULT/PEDS IP CONSULT  RADIOLOGY IP CONSULT  PHYSICAL THERAPY ADULT IP CONSULT  OCCUPATIONAL THERAPY ADULT IP CONSULT    Code Status   Full Code     The patient was discussed with Dr. Heather Santacruz MD  PGY3 IM/Peds  [facility]  ______________________________________________________________________    Physical Exam   Vital Signs: Temp: 97.1  F (36.2  C) Temp src: Oral BP: 146/50 Pulse: 53   Resp: 18 SpO2: 99 % O2 Device: None (Room air)    Weight: 232 lbs 4.8 oz    General Appearance: NAD  Respiratory: CTAB, no w/r/r  Cardiovascular: RRR, 3/6 FRANKO at LUSB/LLSB  GI: obese, mild distention, bowel sounds active, paracentesis dressing c/d/i  Neuro: alert, no asterixis, no focal deficits    Significant Results and Procedures   Most Recent 3 CBC's:  Recent Labs   Lab Test  03/06/18   0840  03/05/18   0725  03/04/18   0833   WBC  4.9  4.5  4.1   HGB  8.7*  8.4*  8.3*   MCV  95  95  95   PLT  124*  126*  124*     Most Recent 3 BMP's:  Recent Labs   Lab Test  03/06/18   0840  03/05/18   0725  03/04/18   0833   NA   141  140  145*   POTASSIUM  4.8  4.5  4.3   CHLORIDE  112*  112*  116*   CO2  21  19*  20   BUN  22  20  18   CR  1.58*  1.67*  1.45*   ANIONGAP  8  9  9   HARI  8.3*  8.1*  7.9*   GLC  118*  122*  108*     Most Recent 2 LFT's:  Recent Labs   Lab Test  03/06/18   0840  03/05/18   0725   AST  212*  100*   ALT  100*  68   ALKPHOS  255*  250*   BILITOTAL  0.9  0.9     Most Recent 3 INR's:  Recent Labs   Lab Test  03/06/18   0840  03/05/18   0725  03/04/18   0833   INR  1.47*  1.52*  1.53*   ,   Results for orders placed or performed during the hospital encounter of 03/01/18   US Abdomen Complete w Doppler Complete    Narrative    EXAMINATION: US ABDOMEN COMPLETE WITH DOPPLER, 3/2/2018 9:58 AM     COMPARISON: Outside Hospital Liver MRI abdomen report from 2/5/2018.    HISTORY: Cirrhosis, likely hepatocellular carcinoma, evaluate for  portal vein thrombus and ascites;      TECHNIQUE: The abdomen was scanned in standard fashion with  specialized ultrasound transducer(s) using both gray-scale, color  Doppler, and spectral flow techniques.    Findings:    Liver: The liver demonstrates abnormal heterogeneous echogenicity and  nodularity. Multiple focal heterogeneously echogenic hepatic masses  with ill-defined borders are noted. These are better evaluated on the  MRI from 2/5/2018. Liver measures 16.7 cm.    Extrahepatic portal vein flow is retrograde, measuring 66 cm/sec.  Right portal vein flow is retrograde, measuring 72 cm/sec.  Left portal vein flow is antegrade, measuring 13 cm/sec.  Nonocclusive clot is visualized in the intra-hepatic and extra-hepatic  main portal vein that partially extends into the right portal and left  portal veins.    Flow in the hepatic artery is towards the liver and:  226 cm/sec peak systolic  0.65 resistive index.     The splenic vein is patent and flow is towards the liver.  The left,  middle, and right hepatic veins are patent with flow towards the IVC.  The IVC is patent with flow towards  the heart.   The visualized aorta  is not dilated.    Gallbladder: Gallbladder is decompressed and not well-visualized.  There is no wall thickening, pericholecystic fluid, positive  sonographic Delong's sign. Small echogenic foci within the lumen of  the gallbladder demonstrates no flow on color imaging, this may  represent cholelithiasis versus sludge.    Bile Ducts: Both the intra- and extrahepatic biliary system are of  normal caliber.  The common bile duct is not visualized.    Pancreas: Is not visualized.    Kidneys: Both kidneys are of normal echotexture, without soft tissue  mass or hydronephrosis.   The craniocaudal dimensions are: right- 11.7  cm, left- 10.8 cm. The left kidney is not well-seen. The right kidney  demonstrates parapelvic cyst measuring 1.7 x 2.0 x 2.0 cm.    Spleen: The spleen measures 12.9 cm in sagittal dimension.    Fluid: Ascites is present.      Impression    Impression:   1.  Ascites, hepatic cirrhosis with multiple heterogeneous masses  likely representing HCC, these are better defined on MRI from  2/5/2018.   2.  Nonocclusive tumor thrombus in the main portal vein with partial  nonocclusive extension into the left and right portal vein. Retrograde  flow in the main portal vein and right portal vein.  3.  Echogenic foci in the gallbladder likely represents  cholelithiasis, less likely sludge. No evidence of cholecystitis.    I have personally reviewed the examination and initial interpretation  and I agree with the findings.    TOSHIA YIP MD   CT Chest w Contrast    Narrative    EXAMINATION: Chest CT  with contrast     CLINICAL HISTORY: Evaluate for evidence of metastasis from  hepatocellular carcinoma..    COMPARISON: MRI 5 2018..    TECHNIQUE: CT imaging obtained through the chest with intravenous  contrast. Coronal and axial MIP reformatted images obtained.    FINDINGS:  Heart size is normal. No pericardial effusion.  Normal thoracic  vasculature. No thoracic  lymphadenopathy. Central tracheobronchial  tree is patent. No pleural effusion or pneumothorax. Mucosal  hyperdensities in the lower esophagus which taper superiorly  suspicious for esophageal varices.    The lung windows reveals scattered patchy predominantly groundglass  opacities. These nodules are present in subpleural and  peribronchovascular locations. The most notable focuses a  peribronchial vascular mixed consolidative and groundglass opacity in  the right upper lobe (series 6 image 67), measuring at least 4.6 cm.  Additional innumerable smaller prominently groundglass density nodules  are also present.    Bones and soft tissues:  Degenerative changes in spine, most notable in the partially  visualized lower cervical spine. No acute fracture identified. No  suspicious bone lesion. Mild gynecomastia. Mild soft tissue anasarca.    Partially imaged upper abdomen:  Cirrhotic morphology of the liver with upper abdominal ascites.  Heterogeneous ill-defined mass in the right lobe of liver is poorly  characterized on this single phase examination, but was consistent  with a large infiltrative hepatocellular carcinoma on the MRI from  2/5/2018. There is again noted portal vein thrombus, which is not  obviously changed or not MRI, but again is suboptimally visualized.  Portosystemic collateral vasculature.      Impression    IMPRESSION:   1. Patchy upper lobe predominant peribronchovascular and peripheral  subpleural opacities. The opacities are predominantly groundglass,  however there is a consolidative component within several of the  areas. The differential is broad and the appearance is most suspicious  for an infectious or inflammatory cause. The appearance is less  typical of metastasis, but not entirely excluded from the  differential. Attention on follow-up.    2. The upper abdominal findings are better characterized on the  comparison MRI. There has been no obvious change in the large  hepatocellular  carcinoma an associated portal vein thrombus, although  these findings are not to completely characterized. There is upper  abdominal ascites and varices, and probable esophageal varices.    I have personally reviewed the examination and initial interpretation  and I agree with the findings.    BRITTANY HARVEY MD ([place])   MR Abdomen w/o & w Contrast    Narrative    MRI ABDOMEN    CLINICAL HISTORY:  Evaluation for possible HCC, please document if  patient meets radiographic criteria;     TECHNIQUE: Images were acquired with and without intravenous contrast  through the abdomen. The following MR images were acquired: TrueFISP,  multiplanar T2 weighted, axial T1 in/out of phase, diffusion-weighted.  Multiplanar T1-weighted images with fat saturation were before  contrast administration and at multiple time points following the  administration of intravenous contrast.     FINDINGS:    Comparison study: Ultrasound 3/2/2018    Liver: Cirrhotic hepatic configuration. No steatosis or iron  deposition.    There is a 11 x 5.9 cm arterially enhancing lesion in the right  hepatic lobe, hepatic segments 7, 8 and 5 (series 10 image 52). There  are areas of internal washout during the portal venous and delayed  phases. Pseudocapsule is not definitively identified. There mild  associated increased T2 signal.  There is no increased signal on  diffusion weighted images. Previously this measured similar in size  (exam 4 weeks ago) accounting for differences in technique.  This  lesion is most conspicuous on three-minute delayed phase images, and  is well seen on outside MR images. Increased prominence of a 2.3 cm T1  hyperintense nodule within this lesion on series 9 image 53. This  could represent an underlying regenerative nodule and/or a new area of  hemorrhage given history of prior biopsy. There is a similarly T1  hyperintense lesion in segment 5 (9 image 36).  There is venous  invasion, evidenced by mixed signal intensity and  heterogeneously  enhancing, nonocclusive intraluminal material extending from the  anterior and posterior divisions of the right through the main portal  vein to the portosplenic confluence. OPTN/LIRADS class 5X/LR-TIV    Gallbladder: Cholelithiasis    Spleen: Splenomegaly, measuring 14.4 cm.    Kidneys: Bilateral simple cysts.    Adrenal glands: Unremarkable.    Pancreas: Maintained T1 hyperintensity.    Bowel: Unremarkable    Lymph nodes: Indeterminate small gastrohepatic ligament and periportal  lymph nodes which may be reactive. Examples include series 10, image  49 and series 10, image 27.    Blood vessels:  Splenorenal varices. Expansile portal vein thrombus as  above. No abdominal aortic aneurysm.      Lung bases: Relatively clear.    Bones and soft tissues: No suspicious lesions in the bones.    Mesentery and abdominal wall: Mesenteric stranding and edema,  otherwise normal.    Ascites: Moderate volume ascites.      Impression    IMPRESSION:    1. Cirrhosis and evidence of portal hypertension including  splenomegaly, modified ascites and upper abdominal collateral vessel  formation..  2. Infiltrative right hepatic lobe HCC measures up to 11 cm. Tumor is  comparable outside MR to 5/20/2018. Associated tumor thrombus extends  throughout the right portal the venous system to the splenoportal  confluence.  OPTN 5X/LR-TIV .  3. Based on this exam only, the patient is not within [place] criteria.  4. Cholelithiasis    OPTN/LIRADS definitions.  LIRADS v2014.    LIRADS 1:  Definitely benign.  LIRADS 2:  Probably benign.  LIRADS 3:  Indeterminate for HCC.  LIRADS 4:  Probably HCC.  LIRADS M:  Probably malignant.  Not specific for HCC.    OPTN 5a:  Diagnostic for HCC.  < 2 cm.  OPTN 5b:  Diagnostic for HCC.  > Or = 2 cm and < 5 cm.  OPTN 5x:  Diagnostic for HCC.  > Or = 5 cm.  OPTN 5T:  Previously treated HCC.  At the [facility],  this category is invoked if there is suspicion of residual tumor.    [place]  criteria for liver transplantation:    1. Presence of no HCCs greater than 5 cm. One HCC measuring 3-5 cm is  allowed if no other HCCs are present.  2. Maximum of 3 HCCs measuring 3 cm or less.  3. No vascular invasion.  4. No extrahepatic metastases.    I have personally reviewed the examination and initial interpretation  and I agree with the findings.    ALVINO LOVE MD       Pending Results   Unresulted Labs Ordered in the Past 30 Days of this Admission     Date and Time Order Name Status Description    3/2/2018 1902 Fluid Culture Aerobic Bacterial Preliminary     3/2/2018 1632 Blood culture Preliminary     3/2/2018 1632 Blood culture Preliminary         Primary Care Physician   TRENA LANGE    Discharge Disposition   Discharged to home  Condition at discharge: Stable    Discharge Orders     Follow Up and recommended labs and tests   1. PCP within 7 days  2. Oncology follow up for further intervention   3. Interventional radiology within 1-2 weeks for consideration of radio-embolization therapy  4. Nephrology follow up in 3-4 weeks (Dr. Kumar) at [facility]   5. Hepatology (Dr. Godfrey) for HCC follow up   6. GI follow up for repeat colonoscopy and esophogeal banding   7. Anticoagulation clinic for ongoing monitoring of chromogenic factor 10    CBC, CMP in 7 days  INR, chromogenic factor 10 in 2-3 days     Activity   Your activity upon discharge: activity as tolerated     Adult [facility] Follow-up and recommended labs and tests   Follow up with primary care provider, TRENA LANGE, within 7 days to evaluate after surgery, for hospital follow- up and regarding new diagnosis.  The following labs/tests are recommended: CMP, CBC, INR, chromogenic factor 10.      1. PCP within 7 days: discuss re-initiation of metformin if Kidney function continues to be stable  2. Oncology follow up for further intervention   3. Interventional radiology within 1-2 weeks for consideration of radio-embolization therapy  4. Nephrology  follow up in 3-4 weeks (Dr. Kumar) at [facility]   5. Hepatology (Dr. Godfrey) for HCC follow up   6. GI follow up for repeat colonoscopy and esophogeal banding   7. Anticoagulation clinic for ongoing monitoring of INR (though discuss repeat chromogenic factor 10 repeat testing with your primary doctor and oncologist)      Appointments on [place] and/or [facility] (with [facility] or [facility] provider or service). Call [phone] if you haven't heard regarding these appointments within 7 days of discharge.     Reason for your hospital stay   Dear Serge Brambila    Your were hospitalized at [facility] for further evaluation of hepatocellular carcinoma.  Today you are ready to be discharged home.  If you develop fever, shortness of breath, light headedness, chest pain, abdominal pain, bowel changes or worsening confusion please seek medical attention.    We are suggesting the following medication changes:  Spironolactone 50 mg daily  Lactulose 3 times daily with a goal of 3-5 bowel movements per day.  Stop taking neomycin. Prior authorization for cheaper rifaximin (another medicine for hepatic encephalopathy) was placed in the hospital, it can take a few weeks to get approval. Discuss this with your primary care doctor.  - continue your usual dose of coumadin and follow up with your INR clinic for INR recheck in 2 days. If your INR is ever high or you are having new bleeding have your doctor check a chromogenic factor 10 level as this may help guide anticoagulation in the setting of liver disease.    Please get the following tests done:  INR in two days at anticoagulation clinic  CBC, CMP within 7 days    Please set up an appointment with:  1. PCP within 7 days to discuss likely diagnosis, and to help coordinate specialist appointments  2. Oncology follow up for further intervention   3. Interventional radiology within 1-2 weeks for consideration of radio-embolization therapy  4.  Nephrology follow up in 3-4 weeks (Dr. Kumar) at [facility]   5. Hepatology (Dr. Godfrey) for HCC follow up   6. GI follow up for repeat colonoscopy and esophogeal banding   7. Anticoagulation clinic for ongoing monitoring of chromogenic factor 10    Your hospital unit at the time of discharge is 5A so if you have any questions please call the hospital at [phone] and ask to talk to a nurse on 5A.     Full Code     Diet   Follow this diet upon discharge: Orders Placed This Encounter     Fluid restriction 1500 ML FLUID     2 Gram Sodium Diet       Discharge Medications   Discharge Medication List as of 3/6/2018  1:55 PM      START taking these medications    Details   rifaximin (XIFAXAN) 550 MG TABS tablet Take 1 tablet (550 mg) by mouth 2 times daily, Disp-60 tablet, R-3, Local Print      spironolactone (ALDACTONE) 50 MG tablet Take 1 tablet (50 mg) by mouth daily, Disp-30 tablet, R-1, E-Prescribe         CONTINUE these medications which have CHANGED    Details   aspirin (ASPIRIN ADULT LOW STRENGTH) 81 MG EC tablet Take 1 tablet (81 mg) by mouth daily, Disp-30 tablet, R-1, E-Prescribe      furosemide (LASIX) 20 MG tablet Take 2 tablets (40 mg) by mouth daily, Disp-30 tablet, R-1, E-Prescribe      lactulose (CHRONULAC) 10 GM/15ML solution 30 mLs (20 g) by Oral or NG Tube route 3 times daily, Disp-2700 mL, R-1, E-Prescribe      metoprolol tartrate (LOPRESSOR) 25 MG tablet Take 1 tablet (25 mg) by mouth 2 times daily, Disp-60 tablet, R-1, E-Prescribe      losartan (COZAAR) 25 MG tablet Take 2 tablets (50 mg) by mouth daily, Disp-30 tablet, R-1, E-Prescribe      sodium bicarbonate 650 MG tablet Take 2 tablets (1,300 mg) by mouth 2 times daily, Disp-120 tablet, R-1, E-Prescribe         CONTINUE these medications which have NOT CHANGED    Details   WARFARIN SODIUM PO Historical      BASAGLAR 100 UNIT/ML injection Inject Subcutaneous daily, Historical      LEVOTHYROXINE SODIUM PO Historical      Nitroglycerin  (NITROQUICK SL) Historical      PANTOPRAZOLE SODIUM PO Historical         STOP taking these medications       metFORMIN (GLUCOPHAGE) 500 MG tablet Comments:   Reason for Stopping:         neomycin (MYCIFRADIN) 500 MG tablet Comments:   Reason for Stopping:             Allergies   Allergies   Allergen Reactions     Penicillins      Internal Medicine Staff Addendum  Date of Service: 3/6/2018  I have seen and examined Mr. De La Cruz, reviewed the data and discussed the plan of care with the patient, his wife, and the care team on P&FC Rounds.  I agree with the above documentation.  I discussed pt's care with bedside RN, case management/social work today.  I personally reviewed imaging, labs, medications and past 24 hr notes.    40 minutes spent in discharge, including >50% in counseling and coordination of care, medication review and plan of care recommended on follow up. Questions were answered.   Dr. Thompson  (PCP) was contacted electronically at the time of discharge, so as to bridge from hospital to outpatient care.   It was our pleasure to care for Mr. De La Cruz during his hospitalization. Please do not hesitate to contact me should there be questions regarding the hospital course or discharge plan.      Vineet Romero MD  Internal Medicine/Pediatrics Hospitalist & Staff Physician   of Internal Medicine and Pediatrics  [facility]  Pager: [phone]

## 2018-03-06 NOTE — PROGRESS NOTES
Patient Assistance Enrollment    Pt has trouble affording Xifaxan, copays are over $550 per month. Pharmacy liaison provided application for [employer] Patient Assistance Program (Ph: [phone]) to provide free drug shipped to home if eligible. Pt agreed to apply. Submitted application via fax 3/5. Per [facility] representative, this can take up to 3 weeks to process. Awaiting determination from [facility].     3/12: Pt was approved for free xifaxan through [employer] PAP. Left voicemail for pt regarding status update. Effective 03/06/2018 - 12/31/2018.     Lauren Fisher  Pharmacy Liaison  Ph: [phone] Page: [phone]

## 2018-03-06 NOTE — PLAN OF CARE
Problem: Patient Care Overview  Goal: Plan of Care/Patient Progress Review  Outcome: Adequate for Discharge Date Met: 03/06/18  D/c orders received from MD. D/c instructions reviewed with patient and wife both state understanding. D/c'ed IVx2 cannula intact. D/c home with wife.

## 2018-03-07 NOTE — PLAN OF CARE
Problem: Patient Care Overview  Goal: Plan of Care/Patient Progress Review  Occupational Therapy Discharge Summary    Reason for therapy discharge:    Discharged to home.    Progress towards therapy goal(s). See goals on Care Plan in [facility] electronic health record for goal details.  Goals partially met.  Barriers to achieving goals:   discharge from facility.    Therapy recommendation(s):    Wife to assist with heavy ADLs.

## 2018-03-09 NOTE — TELEPHONE ENCOUNTER
----- Message from Yennifer Culver MD sent at 3/9/2018  6:52 AM CST -----  Regarding: RE: Post-hospital follow up   He does.  The patient has esophageal varices that were not banded and he needs repeat endoscopy for consideration of banding.      Joon De Oliveira    Thanks so much  ----- Message -----     From: Di Jones MA     Sent: 3/7/2018   8:25 AM       To: Yennifer Culver MD  Subject: RE: Post-hospital follow up                      Hi,    I reached out to this patient to schedule EGD... He stated he just had one at the end of Jan 2018. Informed him that I would reach out and ask the question for him.  Per report - repeat in 1 year. Does he need another one so soon?      Thank Di anne  ----- Message -----     From: Yennifer Culver MD     Sent: 3/6/2018  10:53 AM       To: Referral Specialist Team, #  Subject: Post-hospital follow up                          Hi All,     This patient is discharging from the hospital and I would like him to follow up in my clinic (Yennifer Culver).  Can you please schedule him for my next available with standard labs prior to that appointment.     Patient may be discharged either today or tomorrow.      I would also like this patient scheduled for an upper endoscopy with any provider; moderate sedation ok;  for consideration of variceal banding.  I will place the order.      Thanks so much for your help with this patient    Yennifer Culver

## 2018-03-12 NOTE — TELEPHONE ENCOUNTER
Called wife and informed her that I am reaching out to them regarding new pt consult about Y90.   Pt was consulted while inpatient for HCC.   Informed his wife that I can f/u with DR. Montgomery as we'd like for them to follow up with a clinic visit regarding Y90. I do see a thorough note regarding Y90 consult.     Wife states that she is aware of this pending procedure and they would like to move forward. I informed her that I'll consult with Dr Galeas. Should an appt not be needed then I can just send out additional information regarding the Y90 information. She agrees to plan.     An option would be to keep the consult appt that we've made for them on Mon 3/26 @ 1pm.       I will consult with Dr Montgomery to see what is the best plan of care due to we would want to get Prior Authorization as this can take 7-14 days.     *consulted with Dr. Montgomery. Called wife.   I will send out information regarding Y90 Sirspheres to them. I've informed wife that this will include an OLAYINKA for them to sign and mail back to me.     We will go ahead and still schedule for the new consult appt for Y90 on Mon 3/26 as wife will make this appt for more information. In the meantime we should hopefully get authorization back with an approval or denial.     Wife agrees  To plan.     Information as well as OLAYINKA for Y90 Sirs will be mailed out today,.  Jacqueline Valverde RN, BSN  Interventional Radiology Nurse Coordinator   Phone: [phone]

## 2018-03-13 PROBLEM — R41.82 ALTERED MENTAL STATUS: Status: ACTIVE | Noted: 2018-01-01

## 2018-03-13 NOTE — ED PROVIDER NOTES
History     Chief Complaint   Patient presents with     Altered Mental Status     The history is provided by the spouse. The history is limited by the condition of the patient (altered mental status).     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 History is provided by the patient's wife due to patient's altered mental status.    Patient's wife reports concern for patient's altered mental status despite being compliant with his prescribed medications including lactulose. Patient's wife reports the patient was discharged last week after admission for hepatic encephalopathy. She does report that while admitted plan was made to transition patient from neomycin to rifaximin, however, this required pre-authorization from patient's insurance. She states they have received the authorization, however, the patient has not yet started this medication. Since discharge, the patient has not taken neomycin. She reports the patient's last paracentesis was 10 days ago during the patient's last admission.     Per chart review, patient was admitted from 03/01/18-03/06/18 for altered mental status secondary to hepatic encephalopathy which was treated with lactulose and rifaximin. Diagnostic paracentesis was performed while the patient was admitted and did not show consistent evidence for SBP. He was instructed to discontinue neomycin due to underlying renal disease while they waited for authorization for rifaximin and his spironolactone dosage was increased to 100 mg daily.     I have reviewed the Medications, Allergies, Past Medical and Surgical History, and Social History in the [employer] system.  Past Medical History:   Diagnosis  Date     Atrial fibrillation (H)      Diabetes (H)     type II     Hepatic encephalopathy (H)      Hypertension      MI (myocardial infarction)     stent placement     Sleep apnea      Thyroid disease        Past Surgical History:   Procedure Laterality Date     APPENDECTOMY  age 16     COLONOSCOPY       ESOPHAGOSCOPY, GASTROSCOPY, DUODENOSCOPY (EGD), COMBINED N/A 1/24/2018    Procedure: COMBINED ESOPHAGOSCOPY, GASTROSCOPY, DUODENOSCOPY (EGD);  ESOPHAGOGASTRODUODENOSCOPY (MAC) ;  Surgeon: Colton Stroud MD;  Location:  GI     GI SURGERY  2012    partial colectomy for pre-CA nodule, removed 10 in.       No family history on file.    Social History   Substance Use Topics     Smoking status: Former Smoker     Packs/day: 1.00     Years: 10.00     Quit date: 1/1/1999     Smokeless tobacco: Never Used     Alcohol use No       Current Facility-Administered Medications   Medication     lactulose (CEPHULAC) Packet 20 g     rifaximin (XIFAXAN) tablet 550 mg     Current Outpatient Prescriptions   Medication     lactulose (CHRONULAC) 10 GM/15ML solution     rifaximin (XIFAXAN) 550 MG TABS tablet     aspirin (ASPIRIN ADULT LOW STRENGTH) 81 MG EC tablet     furosemide (LASIX) 20 MG tablet     spironolactone (ALDACTONE) 50 MG tablet     metoprolol tartrate (LOPRESSOR) 25 MG tablet     losartan (COZAAR) 25 MG tablet     sodium bicarbonate 650 MG tablet     WARFARIN SODIUM PO     BASAGLAR 100 UNIT/ML injection     LEVOTHYROXINE SODIUM PO     Nitroglycerin (NITROQUICK SL)     PANTOPRAZOLE SODIUM PO        Allergies   Allergen Reactions     Penicillins        Review of Systems   Unable to perform ROS: Mental status change       Physical Exam   BP: 167/75  Pulse: 101  Heart Rate: 88  Temp: 97.8  F (36.6  C)  Resp: 20  Weight: 108.7 kg (239 lb 10.2 oz)  SpO2: 97 %      Physical Exam   Constitutional: No distress.   HENT:   Head: Atraumatic.   Mouth/Throat: Oropharynx is clear and moist. No oropharyngeal exudate.   Eyes: Pupils  are equal, round, and reactive to light. No scleral icterus.   Neck: Neck supple. No JVD present.   Cardiovascular: Regular rhythm, normal heart sounds and intact distal pulses.  Tachycardia present.  Exam reveals no gallop.    No murmur heard.  Pulmonary/Chest: Breath sounds normal. No respiratory distress. He has no wheezes. He has no rales. He exhibits no tenderness.   Abdominal: Soft. Bowel sounds are normal. He exhibits distension. He exhibits no mass. There is no tenderness. There is no rebound and no guarding.   Musculoskeletal: He exhibits no edema or tenderness.   Neurological: He is alert. No cranial nerve deficit.   Not oriented to time and place   Skin: Skin is warm. No rash noted. He is not diaphoretic.       ED Course     ED Course     Procedures       12:15 PM  The patient was seen and examined by Dr. Vazquez in Room 15.          EKG Interpretation:      Interpreted by Lory Vazquez MD  Time reviewed: 11:55 AM  Symptoms at time of EKG: altered mental status   Rhythm: normal sinus   Rate: 94 bpm  Axis: Rightward Axis   Ectopy: none  Conduction: normal  ST Segments/ T Waves: QTc prolongation   Q Waves: none  Comparison to prior: Unchanged from 03/01/2018    Clinical Impression: no acute changes, no STEMI    Critical Care time:  was 30 minutes for this patient excluding procedures.         Results for orders placed or performed during the hospital encounter of 03/13/18 (from the past 24 hour(s))   EKG 12 lead     Status: None (Preliminary result)    Collection Time: 03/13/18 11:54 AM   Result Value Ref Range    Interpretation ECG Click View Image link to view waveform and result    CT Head w/o Contrast     Status: None    Collection Time: 03/13/18  1:03 PM    Narrative    CT HEAD W/O CONTRAST 3/13/2018 1:03 PM    Provided History: confusion;     Comparison: None available.    Technique: Using multidetector thin collimation helical acquisition  technique, axial, coronal and sagittal CT images from the  skull base  to the vertex were obtained without intravenous contrast.     Findings:    No intracranial hemorrhage, mass effect, or midline shift.  Mild-to-moderate generalized parenchymal volume loss and mild  leukoaraiosis. Ventricles are not dilated out of proportion to the  cerebral sulci. The gray to white matter differentiation of the  cerebral hemispheres is preserved. The basal cisterns are patent.    The visualized paranasal sinuses are clear. The mastoid air cells are  clear.       Impression    Impression: No acute intracranial pathology.    I have personally reviewed the examination and initial interpretation  and I agree with the findings.    CATHY DIAZ MD   Ammonia     Status: Abnormal    Collection Time: 03/13/18  1:16 PM   Result Value Ref Range    Ammonia 112 (HH) 10 - 50 umol/L   Comprehensive metabolic panel     Status: Abnormal    Collection Time: 03/13/18  1:17 PM   Result Value Ref Range    Sodium 142 133 - 144 mmol/L    Potassium 4.7 3.4 - 5.3 mmol/L    Chloride 114 (H) 94 - 109 mmol/L    Carbon Dioxide 16 (L) 20 - 32 mmol/L    Anion Gap 12 3 - 14 mmol/L    Glucose 152 (H) 70 - 99 mg/dL    Urea Nitrogen 33 (H) 7 - 30 mg/dL    Creatinine 1.72 (H) 0.66 - 1.25 mg/dL    GFR Estimate 39 (L) >60 mL/min/1.7m2    GFR Estimate If Black 48 (L) >60 mL/min/1.7m2    Calcium 8.5 8.5 - 10.1 mg/dL    Bilirubin Total 1.0 0.2 - 1.3 mg/dL    Albumin 2.4 (L) 3.4 - 5.0 g/dL    Protein Total 7.2 6.8 - 8.8 g/dL    Alkaline Phosphatase 243 (H) 40 - 150 U/L    ALT 67 0 - 70 U/L     (H) 0 - 45 U/L   INR     Status: Abnormal    Collection Time: 03/13/18  1:17 PM   Result Value Ref Range    INR 1.56 (H) 0.86 - 1.14           Labs Ordered and Resulted from Time of ED Arrival Up to the Time of Departure from the ED   AMMONIA - Abnormal; Notable for the following:        Result Value    Ammonia 112 (*)     All other components within normal limits   COMPREHENSIVE METABOLIC PANEL - Abnormal; Notable for the  following:     Chloride 114 (*)     Carbon Dioxide 16 (*)     Glucose 152 (*)     Urea Nitrogen 33 (*)     Creatinine 1.72 (*)     GFR Estimate 39 (*)     GFR Estimate If Black 48 (*)     Albumin 2.4 (*)     Alkaline Phosphatase 243 (*)      (*)     All other components within normal limits   INR - Abnormal; Notable for the following:     INR 1.56 (*)     All other components within normal limits   CBC WITH PLATELETS DIFFERENTIAL   ROUTINE UA WITH MICROSCOPIC   CARDIAC CONTINUOUS MONITORING            Assessments & Plan (with Medical Decision Making)  Hepatic encephalopathy, in the process of transitioning to rifaximin from neomycine in addition to lactuloce, because of insurance, has not started rifaximin yet, now confusted, no fever or other new symptoms, CT head and labs all ok, D/W Medicine-admit. Given lactuloce and rifaximin po that he tolerated.       I have reviewed the nursing notes.    I have reviewed the findings, diagnosis, plan and need for follow up with the patient.    New Prescriptions    No medications on file       Final diagnoses:   Hepatic encephalopathy (H)   IPari, am serving as a trained medical scribe to document services personally performed by Lory Vazquez MD, based on the provider's statements to me.   Lory MEDRANO MD, was physically present and have reviewed and verified the accuracy of this note documented by Pari Kendrick.      3/13/2018   [facility], [place], EMERGENCY DEPARTMENT     Lory Vazquez MD  03/13/18 [id number]

## 2018-03-13 NOTE — ED NOTES
[facility], [place]   ED Nurse to Floor Handoff     Serge Brambila is a 72 year old male who speaks English and lives with a spouse,  in a home  They arrived in the ED by ambulance from home    ED Chief Complaint: Altered Mental Status    ED Dx;   Final diagnoses:   Hepatic encephalopathy (H)         Needed?: No    Allergies:   Allergies   Allergen Reactions     Penicillins      Happened in his teens, unsure what his reaction was. Tolerated ceftriaxone 3/18   .  Past Medical Hx:   Past Medical History:   Diagnosis Date     Atrial fibrillation (H)      Diabetes (H)     type II     Hepatic encephalopathy (H)      Hypertension      MI (myocardial infarction)     stent placement     Sleep apnea      Thyroid disease       Baseline Mental status: WDL  Current Mental Status changes: other confusion, forgetful at times    Infection: No  Sepsis suspected: No  Isolation type: No active isolations     Activity level - Baseline/Home:  Independent  Activity Level - Current:   Stand with Assist    Bariatric equipment needed?: No    In the ED these meds were given:   Medications   0.9% sodium chloride BOLUS (0 mLs Intravenous Stopped 3/13/18 1433)   lactulose (CEPHULAC) Packet 20 g (20 g Oral Given 3/13/18 1430)   rifaximin (XIFAXAN) tablet 550 mg (550 mg Oral Given 3/13/18 1430)       Drips running?  No    Home pump or pre-existing LDA's present? No    Labs results:   Labs Ordered and Resulted from Time of ED Arrival Up to the Time of Departure from the ED   AMMONIA - Abnormal; Notable for the following:        Result Value    Ammonia 112 (*)     All other components within normal limits   COMPREHENSIVE METABOLIC PANEL - Abnormal; Notable for the following:     Chloride 114 (*)     Carbon Dioxide 16 (*)     Glucose 152 (*)     Urea Nitrogen 33 (*)     Creatinine 1.72 (*)     GFR Estimate 39 (*)     GFR Estimate If Black 48 (*)     Albumin 2.4 (*)     Alkaline Phosphatase 243 (*)     AST  124 (*)     All other components within normal limits   INR - Abnormal; Notable for the following:     INR 1.56 (*)     All other components within normal limits   CBC WITH PLATELETS DIFFERENTIAL   ROUTINE UA WITH MICROSCOPIC   CARDIAC CONTINUOUS MONITORING       Imaging Studies:   Recent Results (from the past 24 hour(s))   CT Head w/o Contrast    Narrative    CT HEAD W/O CONTRAST 3/13/2018 1:03 PM    Provided History: confusion;     Comparison: None available.    Technique: Using multidetector thin collimation helical acquisition  technique, axial, coronal and sagittal CT images from the skull base  to the vertex were obtained without intravenous contrast.     Findings:    No intracranial hemorrhage, mass effect, or midline shift.  Mild-to-moderate generalized parenchymal volume loss and mild  leukoaraiosis. Ventricles are not dilated out of proportion to the  cerebral sulci. The gray to white matter differentiation of the  cerebral hemispheres is preserved. The basal cisterns are patent.    The visualized paranasal sinuses are clear. The mastoid air cells are  clear.       Impression    Impression: No acute intracranial pathology.    I have personally reviewed the examination and initial interpretation  and I agree with the findings.    CATHY DIAZ MD       Recent vital signs:   /55  Pulse 101  Temp 97.8  F (36.6  C) (Oral)  Resp 18  Wt 108.7 kg (239 lb 10.2 oz)  SpO2 99%  BMI 36.44 kg/m2    Cardiac Rhythm: Normal Sinus  Pt needs tele? No  Skin/wound Issues: None    Code Status: Full Code    Pain control: fair    Nausea control: fair    Abnormal labs/tests/findings requiring intervention: ammonia level 112    Family present during ED course? Yes   Family Comments/Social Situation comments: wife at bedside    Tasks needing completion: None    Heather Luther RN  [facility]-- [phone] [facility]

## 2018-03-13 NOTE — ED NOTES
Pt BIBA with altered mental status. Pt's wife reports pt was starting to get confused this morning and gave him his scheduled lactulose. A family friend checked on pt and called 911 due to his confusion. Upon arrival, pt is alert, slow to respond, confused on the date/time. Pt reports chest and abd pain. VSS upon arrival. Neuro exam completed, no deficits noted.

## 2018-03-13 NOTE — IP AVS SNAPSHOT
MRN:[id number]                      After Visit Summary   3/13/2018    Serge Brambila    MRN: [id number]           Thank you!     Thank you for choosing [facility] for your care. Our goal is always to provide you with excellent care. Hearing back from our patients is one way we can continue to improve our services. Please take a few minutes to complete the written survey that you may receive in the mail after you visit with us. Thank you!        Patient Information     Date Of Birth          1945        About your hospital stay     You were admitted on:  March 13, 2018 You last received care in the:  [address] [facility]    You were discharged on:  March 18, 2018        Reason for your hospital stay       You were admitted to the hospital with confusion and an infection in your lungs. We treated you with antibiotics and increased doses of lactulose with improvement in your symptoms.                  Who to Call     For medical emergencies, please call 911.  For non-urgent questions about your medical care, please call your primary care provider or clinic, [phone]          Attending Provider     Provider Specialty    Lory Vazquez MD Internal Medicine    Nataliia Ernandez DO Internal Medicine       Primary Care Provider Office Phone # Fax #    Linn GONZALEZ Jay [phone]       When to contact your care team       Contact your care team with fevers, chills, confusion, and increasing abdominal distention.                  After Care Instructions     Activity       Your activity upon discharge: activity as tolerated            Diet       Follow this diet upon discharge: Orders Placed This Encounter      2 Gram Sodium Diet            Discharge Instructions       You should continue to take lactulose and rifaximin with a goal of 4-5 loose stools daily, if you notice changes in sleep pattern or increasing confusion you should take more of the lactulose. Tomorrow you can resume  the losartan and spironolactone that we have been holding in the hospital. You should follow up with your primary care provider in the next few days to recheck your kidney function. Make sure to discuss warfarin with your cardiologist and keep your previously scheduled appointments with the cancer and liver doctors.                  Follow-up Appointments     Adult [facility] Follow-up and recommended labs and tests       Follow up with primary care provider, TRENA LANGE, within 7 days for hospital follow- up.  The following labs/tests are recommended: BMP to check renal function.    Follow up as previously scheduled with the liver and cancer doctors.      Appointments on [facility] and/or [facility] (with [facility] or [facility] provider or service). Call [phone] if you haven't heard regarding these appointments within 7 days of discharge.                  Your next 10 appointments already scheduled     Mar 23, 2018  8:15 AM CDT   (Arrive by 8:00 AM)   New Patient Visit with Zues Willis MD   [facility] ([facility])    [address]  [address]  [facility] [id number] [phone]            Mar 26, 2018  1:00 PM CDT   (Arrive by 12:45 PM)   New Patient Visit with Fernando Montgomery MD   [facility] Interventional Radiology ([facility])    [address]  1st Floor  [place] [place] [facility] [id number] [phone]            May 09, 2018  8:00 AM CDT   Lab with  LAB   [facility] Lab ([facility])    [address]  1st Floor  [place] [facility] [id number] [phone]            May 09, 2018  9:00 AM CDT   (Arrive by 8:45 AM)   Return General Liver with Yennifer Culver MD   [facility] Hepatology ([facility])    [address]  [address]  [facility] [place] [facility] [id number] [phone]              Pending Results     Date and Time Order Name Status Description    3/18/2018 1004 POC US  "Guide for Paracentesis In process     3/15/2018 1000 POC US Guide for Paracentesis In process     3/13/2018 193 Anaerobic bacterial culture Preliminary     3/13/2018 1939 fluid culture - Aerobic Bacterial Preliminary             Statement of Approval     Ordered          18 1218  I have reviewed and agree with all the recommendations and orders detailed in this document.  EFFECTIVE NOW     Approved and electronically signed by:  Swapnil Rondon MD             Admission Information     Date & Time Provider Department Dept. Phone    3/13/2018 Nataliia Ernandez DO [address] [facility] [place] [phone]      Your Vitals Were     Blood Pressure Pulse Temperature Respirations Height Weight    103/73 (BP Location: Left arm) 53 98.9  F (37.2  C) (Axillary) 16 1.727 m (5' 7.99\") 106.7 kg (235 lb 3.7 oz)    Pulse Oximetry BMI (Body Mass Index)                99% 35.78 kg/m2          [employer] Information     [facility] lets you send messages to your doctor, view your test results, renew your prescriptions, schedule appointments and more. To sign up, go to [email or web address] . Click on \"Log in\" on the left side of the screen, which will take you to the Welcome page. Then click on \"Sign up Now\" on the right side of the page.     You will be asked to enter the access code listed below, as well as some personal information. Please follow the directions to create your username and password.     Your access code is: [email or web address]  Expires: 2018 11:57 AM     Your access code will  in 90 days. If you need help or a new code, please call your [facility] clinic or [phone].        Care EveryWhere ID     This is your Care EveryWhere ID. This could be used by other organizations to access your [facility] medical records  [id number]        Equal Access to Services     FRANK CONROY AH: Eva Benz, warupert lindsey, qaybta kaalbartolo marie, miley guerrero. So [facility] " [phone].    ATENCIÓN: Si tawanda magana, tiene a collins disposición servicios gratuitos de asistencia lingüística. Rufina blakely [phone].    We comply with applicable federal civil rights laws and [place] laws. We do not discriminate on the basis of race, color, national origin, age, disability, sex, sexual orientation, or gender identity.               Review of your medicines      CONTINUE these medicines which have NOT CHANGED        Dose / Directions    aspirin 81 MG EC tablet   Commonly known as:  ASPIRIN ADULT LOW STRENGTH   Used for:  Coronary artery disease involving native heart without angina pectoris, unspecified vessel or lesion type        Dose:  81 mg   Take 1 tablet (81 mg) by mouth daily   Quantity:  30 tablet   Refills:  1       BASAGLAR 100 UNIT/ML injection        Dose:  37 Units   Inject 37 Units Subcutaneous At Bedtime   Refills:  0       fluticasone 50 MCG/ACT spray   Commonly known as:  FLONASE        Dose:  1 spray   Spray 1 spray into both nostrils daily   Refills:  0       furosemide 20 MG tablet   Commonly known as:  LASIX   Used for:  Cirrhosis of liver with ascites, unspecified hepatic cirrhosis type (H)        Dose:  40 mg   Take 2 tablets (40 mg) by mouth daily   Quantity:  30 tablet   Refills:  1       lactulose 10 GM/15ML solution   Commonly known as:  CHRONULAC   Used for:  Hepatic encephalopathy (H)        Dose:  20 g   30 mLs (20 g) by Oral or NG Tube route 3 times daily   Quantity:  2700 mL   Refills:  1       LEVOTHYROXINE SODIUM PO        Dose:  150 mcg   Take 150 mcg by mouth daily   Refills:  0       losartan 25 MG tablet   Commonly known as:  COZAAR   Used for:  Essential hypertension        Dose:  50 mg   Take 2 tablets (50 mg) by mouth daily   Quantity:  30 tablet   Refills:  1       metoprolol tartrate 25 MG tablet   Commonly known as:  LOPRESSOR   Used for:  Paroxysmal atrial fibrillation (H)        Dose:  25 mg   Take 1 tablet (25 mg) by mouth 2 times daily    Quantity:  60 tablet   Refills:  1       NITROQUICK SL        Refills:  0       PANTOPRAZOLE SODIUM PO        Dose:  40 mg   Take 40 mg by mouth daily   Refills:  0       rifaximin 550 MG Tabs tablet   Commonly known as:  XIFAXAN   Indication:  hepatic encephalopathy   Used for:  Hepatic encephalopathy (H)        Dose:  550 mg   Take 1 tablet (550 mg) by mouth 2 times daily   Quantity:  60 tablet   Refills:  3       sodium bicarbonate 650 MG tablet   Used for:  Chronic kidney disease, unspecified CKD stage        Dose:  1300 mg   Take 2 tablets (1,300 mg) by mouth 2 times daily   Quantity:  120 tablet   Refills:  1       spironolactone 50 MG tablet   Commonly known as:  ALDACTONE   Used for:  Cirrhosis of liver with ascites, unspecified hepatic cirrhosis type (H)        Dose:  50 mg   Take 1 tablet (50 mg) by mouth daily   Quantity:  30 tablet   Refills:  1       WARFARIN SODIUM PO   Indication:  Warfarin through [facility]        Dose:  5 mg   Take 5 mg by mouth daily   Refills:  0                Protect others around you: Learn how to safely use, store and throw away your medicines at www.disposemymeds.org.             Medication List: This is a list of all your medications and when to take them. Check marks below indicate your daily home schedule. Keep this list as a reference.      Medications           Morning Afternoon Evening Bedtime As Needed    aspirin 81 MG EC tablet   Commonly known as:  ASPIRIN ADULT LOW STRENGTH   Take 1 tablet (81 mg) by mouth daily   Last time this was given:  81 mg on 3/18/2018  8:09 AM                                BASAGLAR 100 UNIT/ML injection   Inject 37 Units Subcutaneous At Bedtime   Last time this was given:  18 Units on 3/17/2018 10:17 PM                                fluticasone 50 MCG/ACT spray   Commonly known as:  FLONASE   Spray 1 spray into both nostrils daily   Last time this was given:  1 spray on 3/18/2018  8:10 AM                                furosemide 20  MG tablet   Commonly known as:  LASIX   Take 2 tablets (40 mg) by mouth daily                                lactulose 10 GM/15ML solution   Commonly known as:  CHRONULAC   30 mLs (20 g) by Oral or NG Tube route 3 times daily   Last time this was given:  20 g on 3/14/2018  3:12 PM                                LEVOTHYROXINE SODIUM PO   Take 150 mcg by mouth daily   Last time this was given:  150 mcg on 3/18/2018  8:08 AM                                losartan 25 MG tablet   Commonly known as:  COZAAR   Take 2 tablets (50 mg) by mouth daily                                metoprolol tartrate 25 MG tablet   Commonly known as:  LOPRESSOR   Take 1 tablet (25 mg) by mouth 2 times daily   Last time this was given:  25 mg on 3/18/2018  8:16 AM                                NITROQUICK SL                                PANTOPRAZOLE SODIUM PO   Take 40 mg by mouth daily   Last time this was given:  40 mg on 3/18/2018  8:08 AM                                rifaximin 550 MG Tabs tablet   Commonly known as:  XIFAXAN   Take 1 tablet (550 mg) by mouth 2 times daily   Last time this was given:  550 mg on 3/18/2018  8:08 AM                                sodium bicarbonate 650 MG tablet   Take 2 tablets (1,300 mg) by mouth 2 times daily   Last time this was given:  1,300 mg on 3/18/2018  8:08 AM                                spironolactone 50 MG tablet   Commonly known as:  ALDACTONE   Take 1 tablet (50 mg) by mouth daily                                WARFARIN SODIUM PO   Take 5 mg by mouth daily   Last time this was given:  1 mg on 3/17/2018  5:09 PM

## 2018-03-13 NOTE — H&P
"[facility]    Internal Medicine History and Physical - [facility]       Date of Admission:  3/13/2018    Chief Complaint   \"I have been confused today\"    History is obtained from the patient.    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    Recent admission here from 3/1 - 3/6 with altered mental status and JUAN M consistent with hepatic encephalopathy due to inadequate stooling. At that point was discharged with lactulose and plan for rifaximin however he was unable to get this covered immediately so he has been without this.    Since discharge, he has been doing well, taking lactulose as prescribed 3-4 bowel movements per day. He denies any fevers, chills, night sweats. Has some intermittent left upper quadrant abdominal pain, no specific trigger. Developed some nausea this AM without emesis. No dark tarry stools or bright red blood per rectum. Continues to have LE edema and abdominal distention.     Denies chest pain, SOB, rashes, dysuria.    Review of Systems   The 10 point Review of Systems is negative other than noted in the HPI or here.    Past Medical History    I have reviewed this patient's medical history and updated it with pertinent information if needed.   Past Medical History:   Diagnosis Date     Atrial fibrillation (H)      Diabetes (H)     type II     Hepatic encephalopathy (H)      Hypertension      MI (myocardial infarction)     stent placement     Sleep apnea      Thyroid disease      Past Surgical History   I have reviewed this patient's surgical history and updated it with pertinent information if needed.  Past Surgical History:   Procedure Laterality Date     APPENDECTOMY  age 16     COLONOSCOPY       ESOPHAGOSCOPY, GASTROSCOPY, " DUODENOSCOPY (EGD), COMBINED N/A 2018    Procedure: COMBINED ESOPHAGOSCOPY, GASTROSCOPY, DUODENOSCOPY (EGD);  ESOPHAGOGASTRODUODENOSCOPY (MAC) ;  Surgeon: Colton Stroud MD;  Location:  GI     GI SURGERY      partial colectomy for pre-CA nodule, removed 10 in.     Social History   Social History   Substance Use Topics     Smoking status: Former Smoker     Packs/day: 1.00     Years: 10.00     Quit date: 1999     Smokeless tobacco: Never Used     Alcohol use No     Family History   Non contributory    Prior to Admission Medications   Prior to Admission Medications   Prescriptions Last Dose Informant Patient Reported? Taking?   BASAGLAR 100 UNIT/ML injection 3/12/2018 at 2000  Yes Yes   Sig: Inject 37 Units Subcutaneous At Bedtime    LEVOTHYROXINE SODIUM PO 3/12/2018 at 0800  Yes Yes   Sig: Take 150 mcg by mouth daily    Nitroglycerin (NITROQUICK SL)   Yes No   PANTOPRAZOLE SODIUM PO 3/12/2018 at 0800  Yes Yes   Sig: Take 40 mg by mouth daily    WARFARIN SODIUM PO   Yes No   Sig: Take 5 mg by mouth daily    aspirin (ASPIRIN ADULT LOW STRENGTH) 81 MG EC tablet 3/12/2018 at 0800  No Yes   Sig: Take 1 tablet (81 mg) by mouth daily   fluticasone (FLONASE) 50 MCG/ACT spray 3/12/2018 at 0800  Yes Yes   Sig: Spray 1 spray into both nostrils daily   furosemide (LASIX) 20 MG tablet 3/12/2018 at 0800  No Yes   Sig: Take 2 tablets (40 mg) by mouth daily   lactulose (CHRONULAC) 10 GM/15ML solution 3/13/2018 at 0700  No Yes   Si mLs (20 g) by Oral or NG Tube route 3 times daily   losartan (COZAAR) 25 MG tablet 3/12/2018 at Unknown time  No Yes   Sig: Take 2 tablets (50 mg) by mouth daily   metoprolol tartrate (LOPRESSOR) 25 MG tablet 3/12/2018 at Unknown time  No Yes   Sig: Take 1 tablet (25 mg) by mouth 2 times daily   rifaximin (XIFAXAN) 550 MG TABS tablet   No No   Sig: Take 1 tablet (550 mg) by mouth 2 times daily   sodium bicarbonate 650 MG tablet 3/12/2018 at 2000  No Yes   Sig: Take 2 tablets  (1,300 mg) by mouth 2 times daily   spironolactone (ALDACTONE) 50 MG tablet 3/12/2018 at 2000  No Yes   Sig: Take 1 tablet (50 mg) by mouth daily      Facility-Administered Medications: None     Allergies   Allergies   Allergen Reactions     Penicillins      Happened in his teens, unsure what his reaction was. Tolerated ceftriaxone 3/18       Physical Exam   Vital Signs: Temp: 97  F (36.1  C) Temp src: Axillary BP: 153/56 Pulse: 101 Heart Rate: 92 Resp: 20 SpO2: 100 % O2 Device: None (Room air)    Weight: 232 lbs 2.31 oz    General Appearance: pleasant, comfortable, confused, NAD  Eyes: sclera anicteric  HEENT: no JVD  Respiratory: some scattered wheezing  Cardiovascular: irregular, normal rate, II/VI systolic murmur, no r/g  GI: distended, non tender to palpation, positive fluid wave  Genitourinary: deferred  Skin: no jaundice   Extremities: 1-2+ LE edema, angelita's nails  Neurologic: alert and oriented to person/place/situation, positive asterixis, no apparent focal deficits  Psychiatric: normal mood and affect    Assessment & Plan   Serge Brambila is a 72 year old male  who has a 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    #AMS  #Cirrhosis 2/2 EtOH vs KUHN  #Concern for HCC  #Elevated ammonia  #Asterixis  Constellation of symptoms most consistent with hepatic encephalopathy. As to etiology of hepatic encephalopathy, differential includes inadequate stooling, SBP, dehydration and JUAN M. Given normal WBC and no fevers infection seems less likely, will obtain diagnostic paracentesis to rule out SBP. I suspect this will be due to inadequate stool output with contribution from worsened renal function, however will rule out other causes. It is unclear to me at this point the overall plans for  treatment of his presumed HCC, will further clarify moving forward as patient clears.  -diagnostic SBP  -lactulose QID and prn if needed  -rifaximin BID  -consider therapeutic para in coming days  -hold medications that could cause somnolence  -2 gram sodium diet  -daily MELD labs    #JUAN M on CKD  Recently renal function has been slowly worsening. This chronic worsening is likely related to his underlying liver disease, however has been feeling dehydrated and has been receiving lasix. Clearly his total body volume is up given ascites, however I would not be surprised if his intravascular volume is down. To that end, will hold lasix for now, give some albumin, and continue to monitor.  -hold lasix, spironolactone, losartan  -trend Cr  -albumin 50 g   -continuing home sodium bicarb    #Chronic normocytic anemia  At recent baseline, no signs of bleeding but will monitor closely.    #AFib  Has been on warfarin for a while, CHADs VASc is high, however so is bleeding risk given liver disease and thrombocytopenia. At this point will hold warfarin given acute decompensation and address anticoagulation moving forward with patient and family as he clears.  -metoprolol 25 mg bid    #DM II  Basal and bolus insulin while inpatient.    #Hypothyroidism  Levothyroxine.    # Pain Assessment:   Current Pain Score 3/13/2018 3/6/2018 3/5/2018   Patient currently in pain? denies denies denies   Serge s pain level was assessed and he currently denies pain.      Diet: 2 Gram Sodium Diet  Fluids: none  DVT Prophylaxis: Pneumatic Compression Devices  Code Status: Full Code    Disposition Plan   Expected discharge: 2 - 3 days; recommended to prior living arrangement once mental status at baseline.     Entered: Swapnil Glass 03/13/2018, 4:53 PM   Information in the above section will display in the discharge planner report.    The patient was discussed with Dr. Ernandez.    Swapnil Glass  [address]  [facility]   Pager: [phone]  Please see sticky note for cross cover information    Data   Data     Recent Labs  Lab 03/13/18  1317   WBC 6.1   HGB 8.6*   MCV 94   *   INR 1.56*      POTASSIUM 4.7   CHLORIDE 114*   CO2 16*   BUN 33*   CR 1.72*   ANIONGAP 12   HARI 8.5   *   ALBUMIN 2.4*   PROTTOTAL 7.2   BILITOTAL 1.0   ALKPHOS 243*   ALT 67   *     Recent Results (from the past 24 hour(s))   CT Head w/o Contrast    Narrative    CT HEAD W/O CONTRAST 3/13/2018 1:03 PM    Provided History: confusion;     Comparison: None available.    Technique: Using multidetector thin collimation helical acquisition  technique, axial, coronal and sagittal CT images from the skull base  to the vertex were obtained without intravenous contrast.     Findings:    No intracranial hemorrhage, mass effect, or midline shift.  Mild-to-moderate generalized parenchymal volume loss and mild  leukoaraiosis. Ventricles are not dilated out of proportion to the  cerebral sulci. The gray to white matter differentiation of the  cerebral hemispheres is preserved. The basal cisterns are patent.    The visualized paranasal sinuses are clear. The mastoid air cells are  clear.       Impression    Impression: No acute intracranial pathology.    I have personally reviewed the examination and initial interpretation  and I agree with the findings.    CATHY DIAZ MD   XR Chest Port 1 View    Narrative    Exam: XR CHEST PORT 1 VW, 3/13/2018 2:58 PM    Indication: confusion;     Comparison: Chest CT 3/2/2018    Findings:   Heart is normal in size. There are bilateral hazy airspace opacities.  No large pleural effusion. No pneumothorax.      Impression    Impression: Bilateral hazy airspace opacities. Primary differential  includes infection versus pulmonary edema.    I have personally reviewed the examination and initial interpretation  and I agree with the findings.    BRAULIO OTERO MD

## 2018-03-13 NOTE — IP AVS SNAPSHOT
[address] [address]    Phone:  [phone]    Fax:  [phone]                                       After Visit Summary   3/13/2018    Serge Brambila    MRN: [id number]           After Visit Summary Signature Page     I have received my discharge instructions, and my questions have been answered. I have discussed any challenges I see with this plan with the nurse or doctor.    ..........................................................................................................................................  Patient/Patient Representative Signature      ..........................................................................................................................................  Patient Representative Print Name and Relationship to Patient    ..................................................               ................................................  Date                                            Time    ..........................................................................................................................................  Reviewed by Signature/Title    ...................................................              ..............................................  Date                                                            Time

## 2018-03-13 NOTE — PHARMACY-ADMISSION MEDICATION HISTORY
Admission medication history interview status for the 3/13/2018 admission is complete. See Epic admission navigator for allergy information, pharmacy, prior to admission medications and immunization status.     Medication history interview sources:  Patient    Changes made to PTA medication list (reason)  Added: flonase  Deleted: None  Changed: added doses for- basaglar, levothyroxine, pantoprazole, warfarin    Additional medication history information (including reliability of information, actions taken by pharmacist):  - patient excellent historian, wife also helpful  - Patient was unsure what his goal INR is, but reports it was 1.5 yesterday 3/12 at [facility] (per care everywhere, INR was 1.5, taking 5mg daily, goal INR 2-3)  - Patient has gotten authorization through insurance for rifaximin, but has not started it yet  - 10/17/17 flu shot    Prior to Admission medications    Medication Sig Last Dose Taking? Auth Provider   fluticasone (FLONASE) 50 MCG/ACT spray Spray 1 spray into both nostrils daily 3/12/2018 at 0800 Yes Unknown, Entered By History   aspirin (ASPIRIN ADULT LOW STRENGTH) 81 MG EC tablet Take 1 tablet (81 mg) by mouth daily 3/12/2018 at 0800 Yes Young Sousa DO   furosemide (LASIX) 20 MG tablet Take 2 tablets (40 mg) by mouth daily 3/12/2018 at 0800 Yes Young Sousa DO   spironolactone (ALDACTONE) 50 MG tablet Take 1 tablet (50 mg) by mouth daily 3/12/2018 at 2000 Yes Young Sousa DO   lactulose (CHRONULAC) 10 GM/15ML solution 30 mLs (20 g) by Oral or NG Tube route 3 times daily 3/13/2018 at 0700 Yes Young Sousa DO   metoprolol tartrate (LOPRESSOR) 25 MG tablet Take 1 tablet (25 mg) by mouth 2 times daily 3/12/2018 at Unknown time Yes Young Sousa DO   losartan (COZAAR) 25 MG tablet Take 2 tablets (50 mg) by mouth daily 3/12/2018 at Unknown time Yes Young Sousa DO   sodium bicarbonate 650 MG tablet Take 2 tablets (1,300 mg) by mouth 2  times daily 3/12/2018 at 2000 Yes Young Sousa DO   BASAGLAR 100 UNIT/ML injection Inject 37 Units Subcutaneous At Bedtime  3/12/2018 at 2000 Yes Reported, Patient   LEVOTHYROXINE SODIUM PO Take 150 mcg by mouth daily  3/12/2018 at 0800 Yes Reported, Patient   PANTOPRAZOLE SODIUM PO Take 40 mg by mouth daily  3/12/2018 at 0800 Yes Reported, Patient   rifaximin (XIFAXAN) 550 MG TABS tablet Take 1 tablet (550 mg) by mouth 2 times daily   Young Sousa DO   WARFARIN SODIUM PO Take 5 mg by mouth daily    Reported, Patient   Nitroglycerin (NITROQUICK SL)    Reported, Patient         Medication history completed by: Lucille Diamond, PharmD Resident

## 2018-03-14 NOTE — PLAN OF CARE
Problem: Liver Failure, Acute/Chronic (Adult)  Goal: Signs and Symptoms of Listed Potential Problems Will be Absent, Minimized or Managed (Liver Failure, Acute/Chronic)  Signs and symptoms of listed potential problems will be absent, minimized or managed by discharge/transition of care (reference Liver Failure, Acute/Chronic (Adult) CPG).   Outcome: No Change   03/14/18 1601   Liver Failure, Acute/Chronic   Problems Assessed (Liver Failure, Acute/Chronic) all   Problems Present (Liver Failure) gastrointestinal complications   Orientated to self and place only. Forgetful. Most confused this afternoon and achy headache MD notified. Lactulose every two hours prn. Robitussin and tessalon for cough. Hand tremors have improved. The MD was not able to preform the paracentesis today. Blood gas ph 7.39,pco2 26, po2 53 and bicarb 15. Loose stool. NP swab done. Stand by assist. Bed alarm is on.

## 2018-03-14 NOTE — PHARMACY-VANCOMYCIN DOSING SERVICE
Pharmacy Vancomycin Initial Note  Date of Service 2018  Patient's  1945  72 year old, male    Indication: [facility]ated Pneumonia    Current estimated CrCl = CrCl cannot be calculated (Unknown ideal weight.).    Creatinine for last 3 days  3/13/2018:  1:17 PM Creatinine 1.72 mg/dL  3/14/2018:  3:21 AM Creatinine 1.64 mg/dL    Recent Vancomycin Level(s) for last 3 days  No results found for requested labs within last 72 hours.      Vancomycin IV Administrations (past 72 hours)      No vancomycin orders with administrations in past 72 hours.                Nephrotoxins and other renal medications (Future)    Start     Dose/Rate Route Frequency Ordered Stop    03/15/18 0615  vancomycin (VANCOCIN) 1,750 mg in sodium chloride 0.9 % 500 mL intermittent infusion      1,750 mg  over 2 Hours Intravenous EVERY 12 HOURS 18 1804      18 1815  vancomycin (VANCOCIN) 1,750 mg in sodium chloride 0.9 % 500 mL intermittent infusion      1,750 mg  over 2 Hours Intravenous ONCE 18 18018 1800  piperacillin-tazobactam (ZOSYN) 3.375 g vial to attach to  mL bag      3.375 g  over 30 Minutes Intravenous EVERY 6 HOURS 18 1757            Contrast Orders - past 72 hours     None                Plan:  1.  Start vancomycin  1750 mg IV q12h.   2.  Goal Trough Level: 15-20 mg/L   3.  Pharmacy will check trough levels as appropriate in 1-3 Days.    4. Serum creatinine levels will be ordered daily for the first week of therapy and at least twice weekly for subsequent weeks.    5. [place] method utilized to dose vancomycin therapy: Method 2    Jacquie Anne RPH

## 2018-03-14 NOTE — PROGRESS NOTES
Care Coordinator Progress Note     Admission Date/Time:  3/13/2018  Attending MD:  Nataliia Ernandez DO     Data  Chart reviewed, discussed with interdisciplinary team.   Patient was admitted for:    Hepatic encephalopathy (H)  Medication management.    Concerns with insurance coverage for discharge needs: None   Current Living Situation: Patient lives with spouse.  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 Met with pt at bedside to introduce RNCC role and discuss discharge planning and pt appeared to be confused. Pt gave writer permission to discuss discharge planning with his spouse, Tori. Called Tori who stated that pt is typically fairly independent at home and is alone during the day while Tori is at work. Per Aurea, pt takes the prescribed dosage of lactulose religiously and, if pt dose miss a dose, Tori reminds pt. Pt wears a CPAP at night and gets INR checks weekly for coumadin. Pt intermittently drives and Tori stated that there has been no concerns for safety while pt has been driving. Pt was not taking any antibiotics since last discharge as pt was waiting for insurance approval for rifaxamin. Tori stated that the pt did get approval for rifaxamin and will be getting rifaxamin mailed to house soon. Will continue to follow plan of care and assist with discharge planning as needed.     Plan  Anticipated Discharge Date:  2-3 days  Anticipated Discharge Plan:  Likely discharge to home with assist from  spouse    Sangita Elizabeth RN

## 2018-03-14 NOTE — PLAN OF CARE
Problem: Patient Care Overview  Goal: Plan of Care/Patient Progress Review  Outcome: No Change  Pt has hx of hepatocellular carcinoma and admitted through ED w/ increased ammonia, confusion, and weakness. Currently pt is afeb, VSS. O2 sats 98% on RA, but pt sounding and feeling labored with breathing. 2L NC for comfort. MD notified. Paracentesis planned for diagnostic purposes, but MD communicated will take off extra fluid for pt comfort. Also ordered albumin. Pt is not confused at this point, but is having difficulty finding words to express himself, especially medical terms. Able to remember lab values from earlier in day and last week, medication regime, etc. Unable to state month. Pt has bilateral hand tremors and generalized weakness, using bed alarm when family not present. Bruising on skin - pt on coumadin for a-fib. Hx of DMT2, HTN, MI, CAD, thyroid disease. 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 Family present and engaged in cares.

## 2018-03-14 NOTE — PROGRESS NOTES
Internal Medicine Brief Progress Note    Attempted bedside paracentesis, site was found with ultrasound. Was unable to enter peritoneal cavity with needle due to excess subcutaneous tissue. Will place IR consult for procedure in AM.    Will give dose of ceftriaxone tonight for potential SBP while awaiting further workup.    Swapnil Rondon  Internal Medicine PGY2

## 2018-03-14 NOTE — PROGRESS NOTES
"CLINICAL NUTRITION SERVICES - ASSESSMENT NOTE     Nutrition Prescription    RECOMMENDATIONS FOR MDs/PROVIDERS TO ORDER:  None at this time.    Malnutrition Status:    Non-severe malnutrition in the context of chronic illness    Recommendations already ordered by Registered Dietitian (RD):  None at this time.    Future/Additional Recommendations:  Continue 2 gram sodium diet as tolerated. If eating <75% of meals consistently, offer oral nutrition supplements.     Encourage intake of small, frequent meals (5-6 times/day) high in protein/calories throughout the day.         REASON FOR ASSESSMENT  Serge Brambila is a/an 72 year old male assessed by the dietitian for Admission Nutrition Risk Screen for reduced oral intake over the last month    NUTRITION HISTORY  Serge reports his appetite has been < normal for the past month. Pt seen by inpatient RD on 3/3/18 recently where he reported the same hx in addition to difficulty tolerating larger amount of PO intake d/t early satiety. Pt stated he usually eats 2 meals per day. Reported that he is supposed to be following a lower sodium diet, but hasn't been consistent with this.     CURRENT NUTRITION ORDERS  Diet: 2 g Sodium  Intake/Tolerance: no intake documented yet since admission.     LABS  Labs reviewed    MEDICATIONS  Medications reviewed    ANTHROPOMETRICS  Ht Readings from Last 1 Encounters:   01/24/18 1.727 m (5' 8\")   Most Recent Weight: 105.2 kg (231 lb 14.8 oz)    IBW: 70 kg  BMI: Obesity Grade I BMI 30-34.9  Weight History: Stable x 2 months, though suspect wt trends have varied d/t fluid status  Wt Readings from Last 15 Encounters:   03/14/18 105.2 kg (231 lb 14.8 oz)   03/06/18 105.3 kg (232 lb 2.3 oz)   01/24/18 103.4 kg (228 lb)       Dosing Weight: 79 kg (adjusted) - using driest wt this admission of 105.2 kg (3/14/18)    ASSESSED NUTRITION NEEDS  Estimated Energy Needs: 0604-6070 kcals/day (20 - 25 kcals/kg)  Justification: Overweight  Estimated " Protein Needs: 79-95 grams protein/day (1 - 1.2 grams of pro/kg)  Justification: Hypercatabolism with acute illness  Estimated Fluid Needs: Per provider pending fluid status    PHYSICAL FINDINGS  Heavy, frequent cough.  Distended abdomen.  LUE bruised.  Ezra's nails.    MALNUTRITION  % Intake: < 75% for >/= 3 months (non-severe)  % Weight Loss: None noted, but likely will be difficult to assess with fluid status changes associated with ESLD  Subcutaneous Fat Loss: None observed  Muscle Loss: Upper arm (bicep, tricep):  Moderate   Fluid Accumulation/Edema: trace edema in BLEs    Malnutrition Diagnosis: Non-severe malnutrition in the context of chronic illness    NUTRITION DIAGNOSIS  Inadequate oral intake related to decreased appetite and early satiety associated with chronic illness as evidenced by suspect eating <75% of estimated need for >3 months per pt verbal report      INTERVENTIONS  Implementation  Nutrition Education: Unable to complete due to pt is intermittently confused today; falling asleep multiple times during assessment.    Goals  Patient to consume % of nutritionally adequate meal trays TID, or the equivalent with supplements/snacks.     Monitoring/Evaluation  Progress toward goals will be monitored and evaluated per protocol.    Chester Starr RD, LD  5C/BMT Dietitian  Pager: [phone]

## 2018-03-14 NOTE — PLAN OF CARE
Problem: Liver Failure, Acute/Chronic (Adult)  Goal: Signs and Symptoms of Listed Potential Problems Will be Absent, Minimized or Managed (Liver Failure, Acute/Chronic)  Signs and symptoms of listed potential problems will be absent, minimized or managed by discharge/transition of care (reference Liver Failure, Acute/Chronic (Adult) CPG).   Outcome: No Change  Afebrile OVSS, BP sl high this morning due to persistent coughing. Pt is A/O but some times forgetful and difficulty finding words. MD had atempted abdominal paracentesis at bedside. MD could not aspirate fluid due to dense subcutaneuos tissue may be going to IR for procedure today. Pt had moderate amount of nose bleed during the night. Also started to have persistent nagging dry cough that kept him up all nigh Pt could not find a very comfortable position in bed as such sat in the chair for 2 hours then back to bed and spent most of the night sitting at the edge of the bed . Alternating between home C-PAP and 02 2L NC. Pt started wheezing this morning around 04:30 keo, HO notified with all the above. Tessalon ordered earlier with no relief but no other orders given. New PIV in right for arm placed at the beginning of the shift and pt got Albumin with no problem. Pt also continue to have small frequent loose stools. Placed on Droplet/enteric precautions. Continue to monitor pt and continue with plan of care.   03/14/18 0650   Liver Failure, Acute/Chronic   Problems Assessed (Liver Failure, Acute/Chronic) all   Problems Present (Liver Failure) gastrointestinal complications;fluid/electrolyte imbalance;neurologic deterioration;renal dysfunction

## 2018-03-14 NOTE — PROGRESS NOTES
[facility], [place]    Internal Medicine Progress Note - Maroon Service    Main Plans for Today   -paracentesis with procedure team  -ultrasound IVC and lungs for assessment of fluid status  -robitussin plus codeine for cough  -respiratory viral panel/BNP/VBG    Assessment & Plan   Serge Brambila is a 72 year old male  who has a 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     #Hepatic encephalopathy  #Cirrhosis 2/2 EtOH vs KUHN  #Concern for HCC  #Elevated ammonia  #Asterixis  Constellation of symptoms most consistent with hepatic encephalopathy, reason for decompensation seems most likely inadequate stooling and less likely infection. Attempted bedside paracentesis, unable to enter peritoneum given subcutaneous tissue, have asked procedure team to attempt. In the interim, have given ceftriaxone for potential SBP. Mental status stable, asterixis improving.  -diagnostic/therapeutic SBP with procedure team  -lactulose QID and prn if needed  -rifaximin BID  -hold medications that could cause somnolence  -2 gram sodium diet  -daily MELD labs    MELD-Na score: 19 at 3/14/2018  3:21 AM  MELD score: 19 at 3/14/2018  3:21 AM  Calculated from:  Serum Creatinine: 1.64 mg/dL at 3/14/2018  3:21 AM  Serum Sodium: 144 mmol/L (Rounded to 137) at 3/14/2018  3:21 AM  Total Bilirubin: 1.7 mg/dL at 3/14/2018  3:21 AM  INR(ratio): 1.67 at 3/14/2018  3:21 AM  Age: 72 years    #JUAN M on CKD  Recently renal function has been slowly worsening. This chronic worsening is likely related to his underlying liver disease, at presentation we were concerned with intravascular dehydration with increased total body volume. Currently holding diuretics and ARB, received dose of albumin 3/13,  creatinine/BUN slightly improved, however with cough and subjective SOB concern for pulmonary edema. Intravascular volume status remains unclear, will ask procedure team to ultrasound IVC and lungs for further assessment.  -hold lasix, spironolactone, losartan  -trend Cr  -albumin 50 g 3/13  -continuing home sodium bicarb  -ultrasound IVC and lungs  -VBG/BNP    #Cough  #SOB  The timing of this is slightly unclear, initially stated cough has been present for a few days but later stated it was new since last night. Dry and non productive, does endorse some nasal drainage. As above, concern for pulmonary edema vs infection (viral or bacterial). Will treat symptomatically and further evaluate.  -respiratory viral panel  -robitussin/codeine and tessalon  -evaluate volume status as above  -defer CXR for now     #Chronic normocytic anemia  At recent baseline, no signs of bleeding but will monitor closely.     #AFib  Has been on warfarin for a while, CHADs VASc is high, however so is bleeding risk given liver disease and thrombocytopenia. At this point will hold warfarin given acute decompensation and address anticoagulation moving forward with patient and family as he clears.  -metoprolol 25 mg bid     #DM II  Basal and bolus insulin while inpatient.     #Hypothyroidism  Levothyroxine.    # Pain Assessment:   Current Pain Score 3/14/2018 3/14/2018 3/13/2018   Patient currently in pain? denies denies denies   Serge s pain level was assessed and he currently denies pain.      Diet: 2 Gram Sodium Diet  Fluids: none  DVT Prophylaxis: Pneumatic Compression Devices  Code Status: Full Code    Disposition Plan   Expected discharge: 2 - 3 days, recommended to prior living arrangement once mental status at baseline.     Entered: Swapnil Glass 03/14/2018, 8:25 AM   Information in the above section will display in the discharge planner report.      The patient's care was discussed with the Attending Physician, Dr. Ernandez,  Bedside Nurse, Care Coordinator/ and Patient.    Swapnil Glass  [facility]: 3  Pager: [phone]  Please see sticky note for cross cover information    Interval History   Difficulty sleeping last night due to persistent dry cough, received tessalon without much benefit. On further discussion has been having cough for a few days prior to admission along with runny nose, however he later stated this cough is new since last evening. Continues to deny fevers and chills, feels breathing has been improving. Notes his hand tremors are improved this morning, however continues to endorse some difficulty with word finding. Had nose bleed overnight, this has since resolved, will occasionally get nose bleeds.    Physical Exam   Vital Signs: Temp: 97.9  F (36.6  C) Temp src: Axillary BP: 148/56 Pulse: 101 Heart Rate: 91 Resp: 18 SpO2: 98 % O2 Device: None (Room air) Oxygen Delivery: 2 LPM  Weight: 231 lbs 14.78 oz  General Appearance: pleasant male, coughing frequently  Respiratory: clear bilaterally with good air movement  Cardiovascular: normal rate, regular rhythm, no m/r/g  GI: similar distention, positive fluid wave, non tender  Skin: scattered bruising, no jaundice  Extremities: stable LE edema     Data   Medications       sodium chloride (PF)  3 mL Intracatheter Q8H     aspirin  81 mg Oral Daily     fluticasone  1 spray Both Nostrils Daily     levothyroxine (SYNTHROID/LEVOTHROID) tablet 150 mcg  150 mcg Oral Daily     metoprolol tartrate  25 mg Oral BID     pantoprazole (PROTONIX) EC tablet 40 mg  40 mg Oral Daily     rifaximin  550 mg Oral BID     sodium bicarbonate  1,300 mg Oral BID     lactulose  20 g Oral 4x Daily     insulin glargine  18 Units Subcutaneous At Bedtime     insulin aspart  1-7 Units Subcutaneous TID AC     insulin aspart  1-5 Units Subcutaneous At Bedtime     sodium chloride (PF)  3 mL Intracatheter Q8H     Data     Recent Labs  Lab 03/14/18  8572  03/13/18  1317   WBC 6.7 6.1   HGB 8.2* 8.6*   MCV 94 94   * 108*   INR 1.67* 1.56*    142   POTASSIUM 4.9 4.7   CHLORIDE 114* 114*   CO2 15* 16*   BUN 30 33*   CR 1.64* 1.72*   ANIONGAP 15* 12   HARI 8.7 8.5   * 152*   ALBUMIN 2.9* 2.4*   PROTTOTAL 7.1 7.2   BILITOTAL 1.7* 1.0   ALKPHOS 193* 243*   ALT 58 67   * 124*

## 2018-03-15 NOTE — PLAN OF CARE
Problem: Liver Failure, Acute/Chronic (Adult)  Goal: Signs and Symptoms of Listed Potential Problems Will be Absent, Minimized or Managed (Liver Failure, Acute/Chronic)  Signs and symptoms of listed potential problems will be absent, minimized or managed by discharge/transition of care (reference Liver Failure, Acute/Chronic (Adult) CPG).   Pt is alert, oriented to self, place and situation.Pt is forgetful.Pt is able to make his needs known. Pt is cooperative to nursing cares. Pt is uop to chair for breakfast and pt is up adelaide recliner for late lunch. Bs 85 and 107 today, good appetite. Pt had paracenthesis done at bed side with 1.5 liters off per MD. Abdominal fluid sample got sent to lab. Pt had 50 gm albumin IV x1. Up to bathroom with SBA, had two watery stool for the day shift. Voided in toilet( urine not saved).Chair arm is maintained when pt is sitting on a chair and bed alarm is maintained when apt is on bed.

## 2018-03-15 NOTE — PLAN OF CARE
"Problem: Liver Failure, Acute/Chronic (Adult)  Goal: Signs and Symptoms of Listed Potential Problems Will be Absent, Minimized or Managed (Liver Failure, Acute/Chronic)  Signs and symptoms of listed potential problems will be absent, minimized or managed by discharge/transition of care (reference Liver Failure, Acute/Chronic (Adult) CPG).   Outcome: No Change  AVSS, Pt is oriented to self, place and person. Pt is disoriented to time and situation. Pt is forgetful and have difficulty finding words.  Lactulose given per order and pt is having loose stools. Pt is not using call light as such setting off bed alarm frequently. Pt is very thirsty and getting up frequently to drink. BG at HS was 198, No SSI needed but got schedule Lantus insulin as ordered BG on AM lab was 113. New PIV placed due to previous one pulled accidentally by pt. Using \"No No \" to protect IV.. Got Vancomycin and Zosyn. Pt continue to cough strong non productive cough. Pt got Guifenesin with codien and Tessalon with some relief. C/O left knee pain and warm and cold packs applied.  Lung sounds with wheezing and coarse. Continue to monitor pt and continue with plan of care.        "

## 2018-03-15 NOTE — PHARMACY-ANTICOAGULATION SERVICE
Clinical Pharmacy - Warfarin Dosing Consult     Pharmacy has been consulted to manage this patient s warfarin therapy.  Indication: Atrial Fibrillation  Therapy Goal: INR 2-3;Chr Factor 10: 20-40%  Warfarin Prior to Admission: Yes  Warfarin PTA Regimen: 5mg daily  Significant drug interactions: rifaximin may decrease INR    INR   Date Value Ref Range Status   03/15/2018 1.94 (H) 0.86 - 1.14 Final   03/14/2018 1.67 (H) 0.86 - 1.14 Final     Chromogenic Factor 10   Date Value Ref Range Status   03/06/2018 48 (L) 70 - 130 % Final     Comment:     Therapeutic Range:  A Chromogenic Factor 10 level of approximately 20-40%   inversely correlates with an INR of 2-3 for patients receiving Warfarin.   Chromogenic Factor 10 levels below 20% indicate an INR greater than 3 and   levels above 40% indicate an INR less than 2.         Recommend warfarin 2.5 mg today.  Pharmacy will monitor Sergekatrina Brambila daily and order warfarin doses to achieve specified goal.      Please contact pharmacy as soon as possible if the warfarin needs to be held for a procedure or if the warfarin goals change.

## 2018-03-15 NOTE — PLAN OF CARE
Problem: Liver Failure, Acute/Chronic (Adult)  Goal: Signs and Symptoms of Listed Potential Problems Will be Absent, Minimized or Managed (Liver Failure, Acute/Chronic)  Signs and symptoms of listed potential problems will be absent, minimized or managed by discharge/transition of care (reference Liver Failure, Acute/Chronic (Adult) CPG).   Outcome: No Change   03/14/18 1930   Liver Failure, Acute/Chronic   Problems Assessed (Liver Failure, Acute/Chronic) all   Problems Present (Liver Failure) gastrointestinal complications;situational response       Problem: Patient Care Overview  Goal: Plan of Care/Patient Progress Review  Outcome: No Change  [id number]. AVSS. Pt oriented to self, place and situation; trouble finding words at times. Pt had no complaints of pain or nausea. Lactulose given as scheduled & pt having moderate amount of stool output. CXR performed & suspected pneumonia found so started on IV abx. Blood sugars covered per orders. No other concerns, continue plan of care.

## 2018-03-15 NOTE — PROCEDURES
PROCEDURE:   Diagnostic & Therapeutic Paracentesis, U/S guided.    INDICATION:   Symptom management    DIAGNOSIS:  Worsening ascites.    PROCEDURE : Jo Ann Jacobs PA-C    ATTENDING: Dr. Mari Emanuel    CONSENT:   Informed consent was obtained after risks and benefits were explained at length.     PROCEDURE SUMMARY:   A time-out was performed. The area of the right abdomen was prepped and draped in a sterile fashion using chlorhexidine scrub. 1% lidocaine was used to numb the region. Ultrasound was used to locate a safe and appropriate location for paracentesis catheter insertion. A 5Fr paracentesis catheter was inserted and advanced with negative pressure. No blood was aspirated. Clear yellow fluid was retrieved and collected. Approximately 65 mL of ascitic fluid was collected and sent for laboratory analysis. The catheter was then connected to the vaccutainer and 1.5 liters of additional ascitic fluid were drained. The catheter was removed and no leaking was noted. The patient tolerated the procedure well without any immediate complications. Dr. Emanuel was present during the procedure.    ESTIMATED BLOOD LOSS: minimal

## 2018-03-15 NOTE — PROGRESS NOTES
[facility], [place]     Internal Medicine Progress Note - Maroon Service        Main Plans for Today      - paracentesis        Assessment & Plan      Serge Brambila is a 72 year old male  who has a 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      #Hepatic encephalopathy 2/2 to either non compliance or pneumonia  #Cirrhosis 2/2 EtOH vs KUHN  #Concern for HCC  #Elevated ammonia  #Asterixis  -diagnostic/therapeutic SBP done --> negative for SBP  -lactulose QID and prn if needed  -rifaximin BID  -hold medications that could cause somnolence  -2 gram sodium diet  -daily MELD labs     Healthcare associated pneumonia  - vancomycin and zosyn  - mrsa nares pending    #JUAN M on CKD - difficult as he is total volume up and third spacing.  Will give additional albumin dose today and monitor kidney function.    - IVC evaluation  -hold lasix, spironolactone, losartan today  -trend Cr  -albumin 50 g 3/13. Albumin 3/15/2018  -continuing home sodium bicarb        #Chronic normocytic anemia  At recent baseline, no signs of bleeding but will monitor closely.      #AFib  Has been on warfarin for a while, CHADs VASc is high, however so is bleeding risk given liver disease and thrombocytopenia.    -metoprolol 25 mg bid  - resume coumadin  - Need to discuss with patient and wife risk vs benefit of him staying on warfarin      #DM II  Basal and bolus insulin while inpatient.      #Hypothyroidism  Levothyroxine.     # Pain Assessment:   Current Pain Score 3/14/2018 3/14/2018 3/13/2018   Patient currently in pain? denies denies denies   Serge s pain level was assessed and he currently denies pain.       Diet: 2 Gram Sodium Diet  Fluids: none  DVT Prophylaxis: Pneumatic Compression  Devices  Code Status: Full Code        Disposition Plan     Expected discharge: 2 - 3 days, recommended to prior living arrangement once mental status at baseline.        Information in the above section will display in the discharge planner report.       The patient's care was discussed with Bedside Nurse, Care Coordinator/ and Patient.     Nataliia Ernandez DO  [facility]        Interval History      Cough improved this morning.  Was persistent last night.  Did not sleep well.  Denies pain.  Denies shortness of breath at this moment.     Physical Exam     Vital Signs: Temp: 97.9  F (36.6  C) Temp src: Axillary BP: 148/56 Pulse: 101 Heart Rate: 91 Resp: 18 SpO2: 98 % O2 Device: None (Room air) Oxygen Delivery: 2 LPM  Weight: 231 lbs 14.78 oz  General Appearance: Laying in bed comfortably, not distressed  Respiratory: expiratory wheezing in right lung fields  Cardiovascular:  RRR, no murmurs appreciated  GI: soft, non distended, non tender  Skin: no jaundice  Extremities: 1+ BLE pitting edema        Data          Medications         sodium chloride (PF)  3 mL Intracatheter Q8H     aspirin  81 mg Oral Daily     fluticasone  1 spray Both Nostrils Daily     levothyroxine (SYNTHROID/LEVOTHROID) tablet 150 mcg  150 mcg Oral Daily     metoprolol tartrate  25 mg Oral BID     pantoprazole (PROTONIX) EC tablet 40 mg  40 mg Oral Daily     rifaximin  550 mg Oral BID     sodium bicarbonate  1,300 mg Oral BID     lactulose  20 g Oral 4x Daily     insulin glargine  18 Units Subcutaneous At Bedtime     insulin aspart  1-7 Units Subcutaneous TID AC     insulin aspart  1-5 Units Subcutaneous At Bedtime     sodium chloride (PF)  3 mL Intracatheter Q8H         Data

## 2018-03-15 NOTE — PROGRESS NOTES
CAPS team consulted for diagnostic and therapeutic paracentesis. Bedside ultrasound completed and adequate fluid for para bedside, but patient with severe almost continuous coughing and inability to lie supine, rolls onto L side and curls up to assist with coughing discomfort. Checked in with patient throughout day to assess for improvement in ability to tolerate procedure today, but Pt continued with severe cough and inability to lie supine. CAPS team will attempt para again tomorrow, Ashley 3 team updated. Please call with any questions.     Mari Emanuel MD  [phone]

## 2018-03-16 NOTE — PLAN OF CARE
"Problem: Patient Care Overview  Goal: Plan of Care/Patient Progress Review  /63 (BP Location: Left arm)  Pulse 101  Temp 97.3  F (36.3  C)  Resp 14  Ht 1.727 m (5' 7.99\")  Wt 105.3 kg (232 lb 2.3 oz)  SpO2 97%  BMI 35.31 kg/m2  VSS. A&Ox4. CPAP- HS. Denies pain and nausea. Up SBA. Poor PO intake. Voiding well, 3 BMs this evening. L PIV SL'd. Coarse lung sounds. Continue POC.      "

## 2018-03-16 NOTE — PLAN OF CARE
Problem: Patient Care Overview  Goal: Plan of Care/Patient Progress Review  Outcome: Improving  Pt alert, disoriented to situation. Forgetful at times. VSS. Afebrile. Cipap on when sleeping. Denies pain. Vanc stopped today.  Abd ultrasound completed today. Ascites fluid neg for bacteria. Frequent dry cough, lungs course. PIC TKO between antibiotics.BGs covered with SS insulin. 89 and 149. 2g sodium diet, denies n/v. several loose BMs. PT consult this afternoon. Up w/ SBA. Bed alarm on for safety. Using call light appropriately. UA still needs to be collected. Cont POC.

## 2018-03-16 NOTE — PLAN OF CARE
Problem: Patient Care Overview  Goal: Plan of Care/Patient Progress Review  Outcome: No Change  2405-0409: VSS on CPAP throughout night. Alert but disoriented to time and situation. Very forgetful especially short term memory. Up SBA. Voiding adequate amounts. 5 BMs overnight. Taken off droplet precautions, all tests negative. PIV TKO with intermittent zosyn and vancomycin for HAP. Frequent dry cough. Lung sounds coarse. Hgb 7.0 with AM labs, team aware. Tolerating 2gm Na diet, fair appetite. Paracentesis done yesterday, 1.5L off. Dressing CDI. Bed alarm on at all times for safety, impulsivity. Will continue to monitor.

## 2018-03-16 NOTE — PROGRESS NOTES
[facility], [place]    Internal Medicine Progress Note - Maroon Service    Main Plans for Today   -PT consult  -narrow antibiotics  -UA, FENa  -bedside ultrasound for IVC and volume status      Assessment & Plan   Serge Brambila is a 72 year old male  who has a history of cirrhosis secondary to EtOH vs K[facility]N c/b hepatic encephalopathy, variceal bleed, and ascites, likely CKD, AFib on warfarin, and hypothyroidism who is admitted for confusion with elevated ammonia and asterixis on exam concerning for hepatic encephalopathy FTH bacterial pneumonia      #Hepatic encephalopathy  #Cirrhosis 2/2 EtOH vs KUHN  #Concern for HCC  #Elevated ammonia  #Asterixis at presentation  Decompensation likely multifactorial due to inadequate stool output, lack of rifaximin, and pneumonia. Clinically improving mental status and now without asterixis, however MELD increasing due to worsening renal function.  -para negative for SBP - 1.5 L removed  -lactulose QID and prn if needed  -rifaximin BID  -hold medications that could cause somnolence  -2 gram sodium diet  -daily MELD labs     MELD-Na score: 23 at 3/16/2018  4:30 AM  MELD score: 23 at 3/16/2018  4:30 AM  Calculated from:  Serum Creatinine: 1.97 mg/dL at 3/16/2018  4:30 AM  Serum Sodium: 138 mmol/L (Rounded to 137) at 3/16/2018  4:30 AM  Total Bilirubin: 1.7 mg/dL at 3/16/2018  4:30 AM  INR(ratio): 2.11 at 3/16/2018  4:30 AM  Age: 72 years     #JUAN M on CKD  Renal function worsening since admission, I suspect pre-renal component, however unclear if intravascular hypo or hypervolemia. Has received albumin 50 g x2 since admission, will send further workup and try to assess intravascular volume status.  -hold lasix, spironolactone, losartan  -trend Cr  -albumin 50 g 3/13 and 3/15  -continuing home sodium bicarb  -ultrasound IVC  -UA/FENa     #Pneumonia  Cough, SOB, elevated procal, and CXR all concerning for bacterial pneumonia. Symptomatically is improving  since beginning treatment. He does have recent healthcare exposure, however MRSA nasal swab negative and does not seem to fit with pseudomonal pneumonia. Will narrow today and continue to monitor, low threshold to broaden.  -respiratory viral panel negative  -robitussin/codeine and tessalon  -vanc/zosyn -> cefpodoxime/azithromycin 3/16   -low threshold to broaden back to zosyn      #Chronic normocytic anemia  7.0 today, no signs of bleeding, will obtain consent but not recheck until AM.      #AFib  Has been on warfarin for a while, CHADs VASc is high, however so is bleeding risk given liver disease and thrombocytopenia. Have resumed warfarin, will further discuss as mental status clears  -metoprolol 25 mg bid  -warfarin      #DM II  Basal and bolus insulin while inpatient.      #Hypothyroidism  Levothyroxine.    # Pain Assessment:   Current Pain Score 3/16/2018 3/15/2018 3/15/2018   Patient currently in pain? denies denies denies   Pain location - - -   Pain descriptors - - -   Serge s pain level was assessed and he currently denies pain.      Diet: 2 Gram Sodium Diet  Fluids: none  DVT Prophylaxis: Warfarin  Code Status: Full Code    Disposition Plan   Expected discharge: 2 - 3 days, recommended to prior living arrangement once mental status at baseline.     Entered: Swapnil Glass 03/16/2018, 12:53 PM   Information in the above section will display in the discharge planner report.      The patient's care was discussed with the Attending Physician, Dr. Ernandez, Bedside Nurse, Care Coordinator/ and Patient.    Swapnil Glass  [facility]: 3  Pager: [phone]  Please see sticky note for cross cover information    Interval History   No acute events overnight. Cough and breathing continue to improve, no fevers or chills. Thinking is also improving, however will be impulsive at times. Having good response in terms of stool output with lactulose. Denies blood in stool,  urine, or further epistaxis. Interested in PT consult for getting up and out of bed more.    Physical Exam   Vital Signs: Temp: 98  F (36.7  C) Temp src: Axillary BP: 116/56 Pulse: 53 Heart Rate: 54 Resp: 16 SpO2: 96 % O2 Device: BiPAP/CPAP    Weight: 233 lbs .42 oz  General Appearance: pleasant, comfortable, NAD, coughing far less than prior  Respiratory: clear bilaterally, good air movement  Cardiovascular: normal rate, regular rhythm, no m/r/g  GI: soft, distended - slightly less than previously, non tender, positive fluid wave  Skin: no jaundice  Neuro: alert and oriented, better word finding, no asterixis     Data   Reviewed in EPIC

## 2018-03-16 NOTE — PROVIDER NOTIFICATION
MD notified via web-based paging regarding patient's Hgb 7.0 with AM labs. Last Hgb 7.8. Currently no consent form signed. No s/sx of bleeding. Will continue to monitor.

## 2018-03-17 NOTE — PROGRESS NOTES
03/17/18 1134   Quick Adds   Type of Visit Initial PT Evaluation   Living Environment   Lives With spouse   Living Arrangements house   Home Accessibility bed and bath are not on the first floor;bed not on first floor;bath not on first floor;grab bars present (bathtub);stairs to enter home;stairs within home   Number of Stairs to Enter Home 1   Number of Stairs Within Home 16  (3 story split entry, 6 steps each. bedroom 3rd floor )   Stair Railings at Home (one railing)   Living Environment Comment Pt wife works full time. Pt IND, makes bed, showers IND. Wife responsible for household duties.    Self-Care   Usual Activity Tolerance moderate   Current Activity Tolerance fair   Regular Exercise no   Equipment Currently Used at Home cpap  (Has shower chair)   Activity/Exercise/Self-Care Comment Pt IND w/ self cares. Pt does some exercise, stationary bike ~ 1 mile however not often    Functional Level Prior   Ambulation 0-->independent   Transferring 0-->independent   Toileting 0-->independent   Bathing 0-->independent   Dressing 0-->independent   Eating 0-->independent   Fall history within last six months no   Prior Functional Level Comment IND    General Information   Onset of Illness/Injury or Date of Surgery - Date 03/13/18   Referring Physician Swapnil Rondon MD   Patient/Family Goals Statement Not stated   Pertinent History of Current Problem (include personal factors and/or comorbidities that impact the POC) Serge Brambila is a 72 year old male  who has a history of cirrhosis secondary to EtOH 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 c/b hepatic encephalopathy, variceal bleed, and ascites, likely CKD, AFib on warfarin, and hypothyroidism who is admitted for confusion with elevated ammonia and asterixis on exam concerning for hepatic encephalopathy FTH bacterial pneumonia   Precautions/Limitations no known precautions/limitations   General Observations Activity: Up w/ assist    Cognitive Status Examination   Orientation  "orientation to person, place and time   Level of Consciousness alert   Follows Commands and Answers Questions 100% of the time   Personal Safety and Judgment intact   Memory impaired   Cognitive Comment Pt reports difficulty w/ word finding and memory for > 1.5 years, chronic   Posture    Posture Forward head position;Protracted shoulders   Range of Motion (ROM)   ROM Comment WFL   Strength   Strength Comments Gross BLE >3/5 based on functional mobility however pt w/ gross deconditioning    Bed Mobility   Bed Mobility Comments Not assessed    Transfer Skills   Transfer Comments STS w/o AD, CGA for safety, no LOB, requires BUE push off from low surface    Gait   Gait Comments Pt ambulates w/o AD, CGA, low BLE ft clearance, slow catina   Balance   Balance Comments Good sitting. Fair-good dynamic standing balance   Sensory Examination   Sensory Perception Comments Intact   General Therapy Interventions   Planned Therapy Interventions balance training;neuromuscular re-education;strengthening;transfer training;progressive activity/exercise   Clinical Impression   Criteria for Skilled Therapeutic Intervention yes, treatment indicated   PT Diagnosis Impaired endurance and tolerance to activity    Influenced by the following impairments Medical condition, age, strength, endurance, high level balance   Functional limitations due to impairments IMpaired endurance and tolerance to acitivty   Clinical Presentation Evolving/Changing   Clinical Presentation Rationale age, medical condition, recent hospitalization    Clinical Decision Making (Complexity) Moderate complexity   Therapy Frequency` 5 times/week   Predicted Duration of Therapy Intervention (days/wks) 1 week   Risk & Benefits of therapy have been explained Yes   Patient, Family & other staff in agreement with plan of care Yes   [facility] AM-PAC  \"6 Clicks\" V.2 Basic Mobility Inpatient Short Form   1. Turning from your back to your side while in a flat bed " without using bedrails? 3 - A Little   2. Moving from lying on your back to sitting on the side of a flat bed without using bedrails? 3 - A Little   3. Moving to and from a bed to a chair (including a wheelchair)? 3 - A Little   4. Standing up from a chair using your arms (e.g., wheelchair, or bedside chair)? 3 - A Little   5. To walk in hospital room? 3 - A Little   6. Climbing 3-5 steps with a railing? 3 - A Little   Basic Mobility Raw Score (Score out of 24.Lower scores equate to lower levels of function) 18   Total Evaluation Time   Total Evaluation Time (Minutes) 10

## 2018-03-17 NOTE — PLAN OF CARE
"Problem: Patient Care Overview  Goal: Plan of Care/Patient Progress Review  Outcome: No Change  /43 (BP Location: Right arm)  Pulse 53  Temp 98.7  F (37.1  C) (Axillary)  Resp 18  Ht 1.727 m (5' 7.99\")  Wt 105.7 kg (233 lb 0.4 oz)  SpO2 98%  BMI 35.44 kg/m2 on his own Bipap machine at night.Lungs clear and diminished in bases.Continues to have dry cough but says its better when he is on Bipap machine.Up to bathroom with SBA and has been stable on his feet but continues to have loose stools from lactulose.Pt A&O x3 and sleeping between cares and denies pain.Plan is possible discharge today.      "

## 2018-03-17 NOTE — PLAN OF CARE
Problem: Liver Failure, Acute/Chronic (Adult)  Goal: Signs and Symptoms of Listed Potential Problems Will be Absent, Minimized or Managed (Liver Failure, Acute/Chronic)  Signs and symptoms of listed potential problems will be absent, minimized or managed by discharge/transition of care (reference Liver Failure, Acute/Chronic (Adult) CPG).   Outcome: Improving   03/17/18 1835   Liver Failure, Acute/Chronic   Problems Assessed (Liver Failure, Acute/Chronic) all   Problems Present (Liver Failure) infection       Problem: Patient Care Overview  Goal: Plan of Care/Patient Progress Review  Outcome: Improving  AVSS. Patient offers no complaints. Patient has been up walking with wife. Anticipate discharge Monday.      03/17/18 1835   OTHER   Plan Of Care Reviewed With patient;spouse   Plan of Care Review   Progress improving

## 2018-03-17 NOTE — PROGRESS NOTES
[facility], [place]    Internal Medicine Progress Note - [facility] Service    Main Plans for Today   - IV lasix 40 mg x1  - discuss warfarin plan   - PT consult    Assessment & Plan   Serge Brambila is a 72 year old male  who has a history of cirrhosis secondary to EtOH vs KUHN c/b hepatic encephalopathy, variceal bleed, and ascites, likely CKD, AFib on warfarin, and hypothyroidism who is admitted for confusion with elevated ammonia and asterixis on exam concerning for hepatic encephalopathy FTH bacterial pneumonia improving with lactulose/rifaximin and antibiotics.      #Hepatic encephalopathy  #Cirrhosis 2/2 EtOH vs KUHN  #Concern for HCC  #Elevated ammonia  #Asterixis at presentation  Decompensation likely multifactorial due to inadequate stool output, lack of rifaximin, and pneumonia. Clinically improving mental status and now without asterixis, however MELD increased due to worsened renal function.  -para negative for SBP - 1.5 L removed  -lactulose QID and prn if needed  -rifaximin BID - has now received home medication  -hold medications that could cause somnolence  -2 gram sodium diet  -daily MELD labs      MELD-Na score: 21 at 3/17/2018  4:18 AM  MELD score: 21 at 3/17/2018  4:18 AM  Calculated from:  Serum Creatinine: 1.96 mg/dL at 3/17/2018  4:18 AM  Serum Sodium: 137 mmol/L at 3/17/2018  4:18 AM  Total Bilirubin: 1.2 mg/dL at 3/17/2018  4:18 AM  INR(ratio): 1.86 at 3/17/2018  4:18 AM  Age: 72 years     #JUAN M on CKD  Renal function worsening since admission, I suspect pre-renal component, however unclear if intravascular hypo or hypervolemia. Has received albumin 50 g x2 since admission, IVC ultrasound ~2.5 cm with some respiratory variation, received IV lasix x1 3/16 with stable creatinine, seems like more fluid to be removed.  -hold spironolactone and losartan  -trend Cr  -albumin 50 g 3/13 and 3/15  -continuing home sodium bicarb  -ultrasound IVC  -UA bland, FENa 0.5%  -IV  lasix 20 mg 3/16, 40 mg 3/17      #Pneumonia  Cough, SOB, elevated procal, and CXR all concerning for bacterial pneumonia. Symptomatically is improving since beginning treatment. He does have recent healthcare exposure, however MRSA nasal swab negative and does not seem to fit with pseudomonal pneumonia. Have narrowed, procal improving.  -respiratory viral panel negative  -robitussin/codeine and tessalon  -vanc/zosyn -> cefpodoxime/azithromycin 3/16   -low threshold to broaden back to zosyn      #Chronic normocytic anemia  7.6 today, no signs of bleeding, consent obtained.      #AFib  Has been on warfarin for a while, CHADs VASc is high, however so is bleeding risk given liver disease and thrombocytopenia. Have resumed warfarin, will further discuss as mental status clears.  -metoprolol 25 mg bid  -warfarin      #DM II  Basal and bolus insulin while inpatient.      #Hypothyroidism  Levothyroxine.    # Pain Assessment:   Current Pain Score 3/17/2018 3/17/2018 3/16/2018   Patient currently in pain? denies denies denies   Pain location - - -   Pain descriptors - - -   Serge s pain level was assessed and he currently denies pain.      Diet: 2 Gram Sodium Diet  Fluids: none  DVT Prophylaxis: Warfarin  Code Status: Full Code    Disposition Plan   Expected discharge: 2 - 3 days, recommended to prior living arrangement once mental status at baseline and renal function improved.     Entered: Swapnil Glass 03/17/2018, 12:00 PM   Information in the above section will display in the discharge planner report.      The patient's care was discussed with the Attending Physician, Dr. Ernandez, Bedside Nurse, Care Coordinator/ and Patient.    Swapnil Glass  [facility]: 3  Pager: [phone]  Please see sticky note for cross cover information    Interval History   No acute events overnight, cough continues to be improved. Able to lie more flat compared to prior days. No fevers or chills,  thinking is clearing with continued bowel movements.    Physical Exam   Vital Signs: Temp: 98.2  F (36.8  C) Temp src: Axillary BP: 133/57   Heart Rate: 61 Resp: 18 SpO2: 97 % O2 Device: BiPAP/CPAP    Weight: 235 lbs 4.8 oz  General Appearance: pleasant male, comfortable sitting in chair  Respiratory: clear bilaterally, good air movement  Cardiovascular: normal rate, regular rhythm, no m/r/g  GI: soft, similar distention, non tender  Skin: no rashes or bruising  Extremities: similar LE edema     Data   Medications     Warfarin Therapy Reminder         furosemide  40 mg Intravenous Once     azithromycin  500 mg Oral Daily     cefpodoxime  200 mg Oral BID     lidocaine (PF)  10 mL Intradermal Once     sodium chloride (PF)  3 mL Intracatheter Q8H     aspirin  81 mg Oral Daily     fluticasone  1 spray Both Nostrils Daily     levothyroxine (SYNTHROID/LEVOTHROID) tablet 150 mcg  150 mcg Oral Daily     metoprolol tartrate  25 mg Oral BID     pantoprazole (PROTONIX) EC tablet 40 mg  40 mg Oral Daily     rifaximin  550 mg Oral BID     sodium bicarbonate  1,300 mg Oral BID     lactulose  20 g Oral 4x Daily     insulin glargine  18 Units Subcutaneous At Bedtime     insulin aspart  1-7 Units Subcutaneous TID AC     insulin aspart  1-5 Units Subcutaneous At Bedtime     sodium chloride (PF)  3 mL Intracatheter Q8H     Data     Recent Labs  Lab 03/17/18  0418 03/16/18  0430 03/15/18  0435   WBC 5.6 5.1 6.3   HGB 7.6* 7.0* 7.8*   MCV 97 94 94   * 101* 115*   INR 1.86* 2.11* 1.94*    138 142   POTASSIUM 4.6 4.9 4.8   CHLORIDE 111* 112* 113*   CO2 14* 16* 18*   BUN 38* 38* 36*   CR 1.96* 1.97* 1.79*   ANIONGAP 12 10 11   HARI 8.0* 8.0* 8.1*   * 106* 113*   ALBUMIN 2.6* 2.6* 2.6*   PROTTOTAL 6.1* 6.0* 6.6*   BILITOTAL 1.2 1.7* 1.9*   ALKPHOS 160* 130 162*   ALT 59 54 60   * 117* 157*

## 2018-03-17 NOTE — PLAN OF CARE
Problem: Patient Care Overview  Goal: Plan of Care/Patient Progress Review  Discharge Planner PT   Patient plan for discharge: Home w/ assist  Current status: Eval complete, treatment initiated. Pt doing well and close to baseline for functional mobility. Assessed pt safety w/ ambulating to and from bathroom, pt is safe to do so during day. Pt to call nursing staff at night. A & O x 3. Wife present. Pt progressed to IND w/ STS transfers from low surfaces. Pt ambualted 500' w/o AD, engaged in balance challenges, no overt LOB. 13 stairs in stairwell w/ SBA, no LOB, reciprocal ascending, descend step to due to L knee pain. Will follow for endurance, strength, HEP  Barriers to return to prior living situation: None  Recommendations for discharge: Rec continued OP PT py to improve above impairments however pt declining therefore plan to issue HEP for pt to perform at home  Rationale for recommendations: Pt safe to DC home when medically. Rec pt wears yellow mask in hallway 2/2 cough, pt educated on this. Pt safe to walk halls w/ wife during day or w/ nursing staff w/o AD       Entered by: Claudette Heath 03/17/2018 12:46 PM

## 2018-03-18 NOTE — PLAN OF CARE
Problem: Liver Failure, Acute/Chronic (Adult)  Goal: Signs and Symptoms of Listed Potential Problems Will be Absent, Minimized or Managed (Liver Failure, Acute/Chronic)  Signs and symptoms of listed potential problems will be absent, minimized or managed by discharge/transition of care (reference Liver Failure, Acute/Chronic (Adult) CPG).   Outcome: Improving   03/18/18 0609   Liver Failure, Acute/Chronic   Problems Assessed (Liver Failure, Acute/Chronic) all   Problems Present (Liver Failure) infection;renal dysfunction       Problem: Patient Care Overview  Goal: Plan of Care/Patient Progress Review  Outcome: Improving  AVSS. Patient offered no complaints overnight. New PIV placed. No replacements needed. Good appetite, voiding adequately. Pt is recording intake on whiteboard in room. Continue to monitor.

## 2018-03-18 NOTE — PROGRESS NOTES
Pt discharged to: home  Via: personal vehicle  Accompanied by: wife  Belongings: with patient  Teaching: reviewed medication instructions and appointment follow ups  Clinic appointment: patient states has appointment tomorrow with provider

## 2018-03-18 NOTE — PROCEDURES
PROCEDURE:   Therapeutic Paracentesis, U/S guided.    INDICATION:   Symptom management    DIAGNOSIS:  Worsening ascites.    Procedure : Oleksandr Xie PA-C    ATTENDING: Dr. Mari Emanuel    CONSENT:   Informed consent was obtained after risks and benefits were explained at length.     PROCEDURE SUMMARY:   A time-out was performed. The area of the left abdomen was prepped and draped in a sterile fashion using chlorhexidine scrub. 1% lidocaine was used to numb the region. Ultrasound was used to locate a safe and appropriate location for paracentesis catheter insertion. A small incision was made with an 11 blade. An 8Fr paracentesis catheter was inserted and advanced with negative pressure. No blood was aspirated. Clear yellow fluid was retrieved and collected. Approximately 65 mL of ascitic fluid was collected and sent for laboratory analysis. The catheter was then connected to the vaccutainer and 1.7 liters of additional ascitic fluid were drained. The catheter was removed and no leaking was noted. The patient tolerated the procedure well without any immediate complications. Dr. Emanuel was present during the procedure.    ESTIMATED BLOOD LOSS: minimal

## 2018-03-19 NOTE — DISCHARGE SUMMARY
[facility], [place]    Internal Medicine Discharge Summary- [facility]    Date of Admission:  3/13/2018  Date of Discharge:  3/18/2018  Discharging Attending Provider: Dr. Nataliia Ernandez  Discharge Team: [facility] 3    Discharge Diagnoses   1. Hepatic encephalopathy  POA: Y  2. Cirrhosis  POA: Y  3. Acute kidney injury on chronic kidney disease  POA: Y  4. Bacterial pneumonia  POA: Y  5. History of atrial fibrillation  POA: Y    Follow-ups Needed After Discharge   1. Follow up with PCP within 1 week for recheck of kidney function after resuming losartan and spironolactone  2. Follow up as previously scheduled with oncologist and hepatologist for hepatocellular carcinoma and cirrhosis  3. Follow up with cardiologist regarding ongoing warfarin use in setting of cirrhosis and prior GI bleeding    Hospital Course   Serge Brambila was admitted on 3/13/2018 for altered mental status secondary to hepatic encephalopathy.  The following problems were addressed during his hospitalization:    #Hepatic encephalopathy  #Cirrhosis 2/2 EtOH vs KUHN  #Concern for HCC  #Elevated ammonia  #Asterixis at presentation  Presented with a few days of difficulty with word finding and confusion. At presentation, ammonia was elevated and asterixis present on exam consistent with hepatic encephalopathy. Had recent admission for similar issues, was discharged with rifaximin, however was unable to get this medication in timely manner. Decompensation likely multifactorial due to inadequate stool output, lack of rifaximin, and pneumonia (see below). Underwent para x2 that were negative for SBP. Mental status improved with increased lactulose, adding rifaximin, and treating infection. MELD remained stable during hospitalization.  -para negative for SBP x2  -lactulose for goal of 4-5 bowel movements daily  -rifaximin BID - has now received home medication  -2 gram sodium diet  -follow up with oncology and hepatology as  previously scheduled      MELD-Na score: 18 at 3/18/2018  4:27 AM  MELD score: 18 at 3/18/2018  4:27 AM  Calculated from:  Serum Creatinine: 1.69 mg/dL at 3/18/2018  4:27 AM  Serum Sodium: 138 mmol/L (Rounded to 137) at 3/18/2018  4:27 AM  Total Bilirubin: 1.2 mg/dL at 3/18/2018  4:27 AM  INR(ratio): 1.66 at 3/18/2018  4:27 AM  Age: 72 years     #JUAN M on CKD  Renal function worsened during admission, we suspected pre-renal component, however it was unclear if intravascular hypo or hypervolemia. Received albumin 50 g x2 since admission with further worsening, bedside IVC ultrasound ~2.5 cm with ~30% respiratory variation which seemed to indicate he did not need further fluids. Rather we reintroduced lasix, initially IV, with improvement in creatinine to baseline. Held spironolactone and losartan during hospitalization, will continue after discharge with PCP follow up to ensure renal function stable.  -resume home lasix 3/18  -resume spironolactone/losartan 3/19  -follow up with PCP to ensure renal function stable  -albumin 50 g 3/13 and 3/15  -continuing home sodium bicarb  -ultrasound IVC - 2.5 cm with ~30% variability  -UA bland, FENa 0.5%  -IV lasix 20 mg 3/16, 40 mg 3/17 - creatinine improvement after      #Pneumonia  Cough, SOB, elevated procal, and CXR all concerning for bacterial pneumonia. Symptomatically he improved with antibiotics. He does have recent healthcare exposure, however MRSA nasal swab negative and does not seem to fit with pseudomonal pneumonia. Completed 6 day course of antibiotics for CAP.  -respiratory viral panel negative  -vanc/zosyn -> cefpodoxime/azithromycin 3/16 - completed 6 day course      #Chronic normocytic anemia  Remained stable, no signs of bleeding, consent obtained.      #AFib  Has been on warfarin for a while, CHADs VASc is high, however so is bleeding risk given liver disease and thrombocytopenia. Continuing warfarin, will follow up with cardiologist to discuss risks/benefits  moving forward.  -metoprolol 25 mg bid  -warfarin      #DM II  PTA medications at discharge.      #Hypothyroidism  Levothyroxine.    # Discharge Pain Plan:   -Patient currently has NO PAIN and is not being prescribed pain medications on discharge.    Consultations This Hospital Stay   PT  Medicine procedure team    Code Status   Full Code     The patient was discussed with Dr. Ernandez.    Swapnil Rondon  Internal Medicine PGY2  [facility]  Pager: [phone]  ______________________________________________________________________    Physical Exam   Vital Signs: Temp: 98.9  F (37.2  C) Temp src: Axillary BP: 103/73   Heart Rate: 56 Resp: 16 SpO2: 99 % O2 Device: BiPAP/CPAP    Weight: 235 lbs 3.69 oz    General Appearance: pleasant male, comfortable, NAD  Respiratory: clear bilaterally  Cardiovascular: normal rate, regular rhythm, II/VI systolic ejection murmur, no r/g  GI: soft, distended - similar, non tender  Skin: no rashes or bruising  Other: stable 1+ LE edema bilaterally    Significant Results and Procedures   Ammonia: 112  Cr 3/18: 1.69    MELD-Na score: 18 at 3/18/2018  4:27 AM  MELD score: 18 at 3/18/2018  4:27 AM  Calculated from:  Serum Creatinine: 1.69 mg/dL at 3/18/2018  4:27 AM  Serum Sodium: 138 mmol/L (Rounded to 137) at 3/18/2018  4:27 AM  Total Bilirubin: 1.2 mg/dL at 3/18/2018  4:27 AM  INR(ratio): 1.66 at 3/18/2018  4:27 AM  Age: 72 years    CXR 3/14:  IMPRESSION: Increased patchy airspace opacities in the midlungs  bilaterally, compatible with worsening pulmonary edema or infection.    Pending Results   These results will be followed up by PCP  Unresulted Labs Ordered in the Past 30 Days of this Admission     Date and Time Order Name Status Description    3/13/2018 1939 Anaerobic bacterial culture Preliminary     3/13/2018 1939 fluid culture - Aerobic Bacterial Preliminary         Primary Care Physician   TRENA LANGE    Discharge Disposition   Discharged to home  Condition at  discharge: Stable    Discharge Orders     Reason for your hospital stay   You were admitted to the hospital with confusion and an infection in your lungs. We treated you with antibiotics and increased doses of lactulose with improvement in your symptoms.     Adult [facility] Follow-up and recommended labs and tests   Follow up with primary care provider, TRENA LANGE, within 7 days for hospital follow- up.  The following labs/tests are recommended: BMP to check renal function.    Follow up as previously scheduled with the liver and cancer doctors.      Appointments on [place] and/or [facility] (with [facility] or [facility] provider or service). Call [phone] if you haven't heard regarding these appointments within 7 days of discharge.     Activity   Your activity upon discharge: activity as tolerated     When to contact your care team   Contact your care team with fevers, chills, confusion, and increasing abdominal distention.     Discharge Instructions   You should continue to take lactulose and rifaximin with a goal of 4-5 loose stools daily, if you notice changes in sleep pattern or increasing confusion you should take more of the lactulose. Tomorrow you can resume the losartan and spironolactone that we have been holding in the hospital. You should follow up with your primary care provider in the next few days to recheck your kidney function. Make sure to discuss warfarin with your cardiologist and keep your previously scheduled appointments with the cancer and liver doctors.     Full Code     Diet   Follow this diet upon discharge: Orders Placed This Encounter     2 Gram Sodium Diet       Discharge Medications   Discharge Medication List as of 3/18/2018 12:55 PM      CONTINUE these medications which have NOT CHANGED    Details   fluticasone (FLONASE) 50 MCG/ACT spray Spray 1 spray into both nostrils daily, Historical      aspirin (ASPIRIN ADULT LOW STRENGTH) 81 MG EC tablet Take 1 tablet (81 mg) by mouth daily,  Disp-30 tablet, R-1, E-Prescribe      furosemide (LASIX) 20 MG tablet Take 2 tablets (40 mg) by mouth daily, Disp-30 tablet, R-1, E-Prescribe      spironolactone (ALDACTONE) 50 MG tablet Take 1 tablet (50 mg) by mouth daily, Disp-30 tablet, R-1, E-Prescribe      lactulose (CHRONULAC) 10 GM/15ML solution 30 mLs (20 g) by Oral or NG Tube route 3 times daily, Disp-2700 mL, R-1, E-Prescribe      metoprolol tartrate (LOPRESSOR) 25 MG tablet Take 1 tablet (25 mg) by mouth 2 times daily, Disp-60 tablet, R-1, E-Prescribe      losartan (COZAAR) 25 MG tablet Take 2 tablets (50 mg) by mouth daily, Disp-30 tablet, R-1, E-Prescribe      sodium bicarbonate 650 MG tablet Take 2 tablets (1,300 mg) by mouth 2 times daily, Disp-120 tablet, R-1, E-Prescribe      BASAGLAR 100 UNIT/ML injection Inject 37 Units Subcutaneous At Bedtime , Historical      LEVOTHYROXINE SODIUM PO Take 150 mcg by mouth daily , Historical      PANTOPRAZOLE SODIUM PO Take 40 mg by mouth daily , Historical      rifaximin (XIFAXAN) 550 MG TABS tablet Take 1 tablet (550 mg) by mouth 2 times daily, Disp-60 tablet, R-3, Local Print      WARFARIN SODIUM PO Take 5 mg by mouth daily , Historical      Nitroglycerin (NITROQUICK SL) Historical           Allergies   Allergies   Allergen Reactions     Penicillins      Happened in his teens, unsure what his reaction was. Tolerated ceftriaxone 3/18

## 2018-03-23 NOTE — NURSING NOTE
Chief Complaint   Patient presents with     Blood Draw     venipuncture labs drawn.     Maryan Hamilton, CMA

## 2018-03-23 NOTE — LETTER
"3/23/2018       RE: Serge Brambila  [address]  [facility] [address]     Dear Colleague,    Thank you for referring your patient, Serge Brambila, to the [facility]. Please see a copy of my visit note below.    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 He was admitted at the time for altered mental status that was eventually determined to be hepatic encephalopathy. The following month on 5/19/17 he had an MRI abdomen reported as \"consistent with HCC\" involving the right lobe of the liver, segment 7-8, measuring 2.6 x 4.1 cm. He had a CT-guided liver biopsy on 6/28/17. Pathology returned as \"atypical hepatocytes.\" He followed up with his PCP multiple times over the rest of 2017 and did not have further workup for the liver mass until a repeat MRI liver on 2/5/18 that showed progression of the HCC, now measuring 9.2 x 7.3 cm. There was also evidence of portal vein tumor thrombus. These records were reviewed in care everywhere.     He was admitted to [facility] from 3/1-3/6/18 for hepatic encephalopathy, acute anemia (Hb 6), and worsening ascites. Gastroenterology and oncology were consulted. MRI abdomen on 3/3/18 was definitive for HCC (OPTN 5x), with an 11cm right lobe lesion and tumor thrombus in the portal venous system. Interventional radiology was consulted as an inpatient and discussed radio-embolization, with plan to follow-up in their clinic. He was Child-Israel class B based on labs during that hospitalization.     He was admitted here again from 3/13-3/18/18 for hepatic encephalopathy, JUAN M, and pneumonia. He had therapeutic paracentesis done twice during that hospitalization.     Serge is here with his wife Tori for " initial medical oncology consultation. He was discharged 5 days ago. He completed a course of abx for pneumonia and still has a cough. He is doing ok at home. He naps several times during the day and definitely is less active compared to a year ago. He goes up and down the stairs in their townhome. He drives to the store and does shopping. He retired several years ago and his wife still works full time.     He has no abdominal pain. His weight has fluctuated up and down by at least 30 lbs, depending on the amount of extra fluid he has from the liver disease. Since starting spironolactone, his weight is down.     PAST MEDICAL HISTORY:  1. 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 HCC   3. CAD  4. DM  5. 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 . Has 3 children. Lives in [place]. Previously drank 4 beers per week which he stopped since diagnosis of cirrhosis. Former smoker.     MEDS:    Current Outpatient Prescriptions:      Blood Pressure Monitoring (RA BLOOD PRESSURE CUFF MONITOR) MISC, by Misc.(Non-Drug; Combo Route) route once daily., Disp: , Rfl:      blood glucose monitoring (NO BRAND SPECIFIED) test strip, 1 each, Disp: , Rfl:      blood glucose monitoring (ONETOUCH ULTRA) test strip, TEST TWICE DAILY TO THREE TIMES DAILY, Disp: , Rfl:      [employer] LANCETS 21G MISC, by Misc.(Non-Drug; Combo Route) route once daily., Disp: , Rfl:      fluticasone (FLONASE) 50 MCG/ACT spray, Spray 1 spray into both nostrils daily, Disp: , Rfl:      rifaximin (XIFAXAN) 550 MG TABS tablet, Take 1 tablet (550 mg) by mouth 2 times daily, Disp: 60 tablet, Rfl: 3     aspirin (ASPIRIN ADULT LOW STRENGTH) 81 MG EC tablet, Take 1 tablet (81 mg) by mouth daily, Disp: 30 tablet, Rfl: 1      "furosemide (LASIX) 20 MG tablet, Take 2 tablets (40 mg) by mouth daily, Disp: 30 tablet, Rfl: 1     spironolactone (ALDACTONE) 50 MG tablet, Take 1 tablet (50 mg) by mouth daily, Disp: 30 tablet, Rfl: 1     lactulose (CHRONULAC) 10 GM/15ML solution, 30 mLs (20 g) by Oral or NG Tube route 3 times daily, Disp: 2700 mL, Rfl: 1     metoprolol tartrate (LOPRESSOR) 25 MG tablet, Take 1 tablet (25 mg) by mouth 2 times daily, Disp: 60 tablet, Rfl: 1     losartan (COZAAR) 25 MG tablet, Take 2 tablets (50 mg) by mouth daily, Disp: 30 tablet, Rfl: 1     sodium bicarbonate 650 MG tablet, Take 2 tablets (1,300 mg) by mouth 2 times daily, Disp: 120 tablet, Rfl: 1     WARFARIN SODIUM PO, Take 5 mg by mouth daily , Disp: , Rfl:      BASAGLAR 100 UNIT/ML injection, Inject 37 Units Subcutaneous At Bedtime , Disp: , Rfl:      LEVOTHYROXINE SODIUM PO, Take 150 mcg by mouth daily , Disp: , Rfl:      Nitroglycerin (NITROQUICK SL), , Disp: , Rfl:      PANTOPRAZOLE SODIUM PO, Take 40 mg by mouth daily , Disp: , Rfl:     ALLERGIES: PCN    ROS: Full 12-point ROS reviewed and negative unless reported above. Pertinent symptoms reviewed above per HPI.    PHYSICAL EXAM:  /49 (BP Location: Right arm, Patient Position: Chair, Cuff Size: Adult Large)  Pulse 64  Temp 97.7  F (36.5  C) (Oral)  Resp 16  Ht 1.727 m (5' 7.99\")  Wt 107.4 kg (236 lb 11.2 oz)  SpO2 100%  BMI 36 kg/m2  ECOG PS 2-3  General: NAD, alert and interactive  HEENT: PERRL, MMM, no oral lesions  Lungs: CTA bilaterally  Cardiovascular: RRR, systolic murmur  Abdominal/Rectal: +BS, soft, obese. Palpable hepatosplenomegaly. Non-distended.   Lymph: no cervical, supraclavicular, axillary, or inguinal adenopathy  MSK: decreased muscle mass   Skin: ecchymoses on dorsal hands   Neuro: A&O, although responses are delayed. Moving all extremities. Ambulates onto exam table slowly.     LABS REVIEWED ON DAY OF VISIT  Results for TOM SAUER (MRN [id number]) as of 3/22/2018 " 21:05   Ref. Range 3/2/2018 16:28   Alpha Fetoprotein Latest Ref Range: 0 - 8 ug/L <1.5       RADIOLOGY:  MRI abdomen 3/3/18  IMPRESSION:    1. Cirrhosis and evidence of portal hypertension including  splenomegaly, modified ascites and upper abdominal collateral vessel  formation..  2. Infiltrative right hepatic lobe HCC measures up to 11 cm. Tumor is  comparable outside MR to 5/20/2018. Associated tumor thrombus extends  throughout the right portal the venous system to the splenoportal  confluence.  OPTN 5X/LR-TIV .  3. Based on this exam only, the patient is not within [place] criteria.  4. Cholelithiasis    CT chest 3/2/18  1. Patchy upper lobe predominant peribronchovascular and peripheral  subpleural opacities. The opacities are predominantly groundglass,  however there is a consolidative component within several of the  areas. The differential is broad and the appearance is most suspicious  for an infectious or inflammatory cause. The appearance is less  typical of metastasis, but not entirely excluded from the  differential. Attention on follow-up.    IMPRESSION/PLAN:  Serge Brambila is a 71 y/o man with HCC in the setting of cirrhosis 2/2 KUHN. The right hepatic lobe mass is 11 cm with tumor thrombus in the portal vein, so this clearly does not meet [place] criteria for transplantation. Currently can be staged as [id number]. We will complete staging with a bone scan. AFP level is undetectable so this will not be helpful as a marker of disease activity.     We discussed that he has a large primary HCC that is invading the portal venous system. Pending the bone scan, we do not have evidence of metastatic disease. Given the size and venous involvement, he is very unlikely to be a surgical candidate. We offered him a surgical referral, and he preferred to think about this rather than schedule it now. He has an appointment with Dr. Fernando Montgomery next week in IR clinic to discuss the option of local therapy with  radioembolization.    He has either Child Israel class B liver disease (score = 9) or if given a score of 3+ for ascites, then has class C disease. In either case, he has advanced liver disease. We discussed that the evidence of benefit for sorafenib or lenvatinib is restricted to class A disease, with a very small amount of data for class B disease. For instance the SHARP study ([facility] 2008) had only 5% of patients with class B disease. Even in patients with class A disease, the benefit is modest with an approximate 3 month PFS over placebo. Aside from his borderline B/C liver disease, he also has borderline performance status (ECOG 2-3) and has been hospitalized twice in the past month. Given these factors, we do not recommend systemic chemotherapy.     In summary, plan is to complete staging with a bone scan and have him see Dr. Montgomery in IR clinic next week to discuss local therapy. We can see him back in the future although we do not anticipate that he will be a candidate for systemic chemotherapy.     We ordered lab work today at the request of his primary physician Dr. Thompson at [facility].      Patient seen and discussed with staff Dr. Alba Bella MD  Heme/Onc Fellow  Pager: [phone]      MEDICAL ONCOLOGY ATTENDING PHYSICIAN ADDENDUM:    I have seen and evaluated this patient with the medical oncology fellow. I have reviewed and edited his note, and agree with the assessment and plan stated above. A complete review of systems was assessed, and pertinent positives/negatives are discussed in detail above and as follows.    Mr. Serge Brambila is a 72-year-old gentleman from [facility], accompanied by his wife.  He is kindly referred for a recent diagnosis of hepatocellular carcinoma in the setting of cirrhosis.  It is unclear if the cirrhosis is secondary to KUHN/NAFLD or alcoholic cirrhosis.  He has a remote history of heavy binge drinking in the 1980s, but he does not specify the amount.  More recently,  he endorses drinking 4 beers per week.  Please see the fellow's note for further details.      In brief, as of last 04/2017, he was hospitalized at [facility] where he received subsequent care for many months when he developed hepatic encephalopathy.  Since that time in the last 4 months, he has had 4 hospitalizations for encephalopathy, including last week here at our own institution, the [facility].  He had an MRI of abdomen and further radiologic evaluation at [facility] that detected a small size, sub 3 cm mass consistent at the time and interpreted as early stage hepatocellular carcinoma.      I do not see any evidence that further full staging or further evaluation was completed at [facility].  A CT-guided biopsy was attempted on 06/28 and reported to be negative, in terms of histopathologic proof.  The patient had subsequent followup including 02/2018.  An MRI abdomen showed significantly enlarged tumor.  At the current time, most recent imaging suggests that it is nearly 11 cm in size.  By the end of February, he was hospitalized with a hemoglobin of 6 and another subsequent MRI abdomen 03/03 at our institution, where he was also hospitalized 03/15-03/18 with hepatic encephalopathy.  He had acute renal failure on top of his chronic kidney disease.  He has severe type 2 diabetes and a long list of moderate comorbidities as noted in the medical record and in Dr. Bella's note.  Patient is under the care of hepatologist, Dr. Godfrey, at our institution as well.      PAST MEDICAL HISTORY, SURGICAL HISTORY, FAMILY HISTORY, Review of Systems AND FULL SOCIAL HISTORY:  Reviewed in full.      The patient's wife is here today and notes that she missed many of the inpatient discussions while the patient was hospitalized, and the patient does not recall much of the conversation as he was hospitalized for hepatic encephalopathy.  Apparently, other family members were present.  They did meet  with Dr. Fernando Montgomery from our Interventional Radiology team, with whom they are scheduled to meet this Monday, 3 days from now, to discuss the possibility of yttrium-90 radioembolization.      I had a very in-depth conversation with the patient and his wife today that a tumor of this size is well outside of [place] criteria.  With 11 cm hepatocellular carcinoma, it would be extremely unlikely, based on that criteria alone, that he would be eligible for surgical resection.  I quoted that only approximately 20% of patients with HCC can have surgical resection with intent to cure due to the extensive comorbidities, cirrhosis-related issues or complications of resecting the tumor based on its location.  The patient has a known tumor thrombus in the portal vein and that alone would make him ineligible for attempt at surgical resection with intent to cure and/or liver transplantation.      He had extensive questions about liver transplantation that I will defer to Dr. Godfrey.  But I did review with him the extreme lack of likelihood that he would be a candidate for liver transplantation on numerous issues.  His Child-Israel score is either B or C, depending on the subjective assessment of ascites but regardless, as I reviewed systemic therapy options, I would not consider him to be an ideal candidate for sorafenib, lenvatinib, or any other form of FDA-approved systemic therapy for unresectable hepatocellular carcinoma.      Of note, his systemic staging has not yet been completed.  He did have a CT chest earlier in March that did not show any obvious evidence of lung metastases, but as the bone scan has not yet been performed, we offered to order one.  We reviewed in depth that the implication would be that if he has stage IV metastatic disease, then radioembolization would not be likely to be of benefit and would likely not be performed.  We will go forward with his consent and have that done, but it would be unlikely to be  done in time for the appointment 3 days from now with Dr. Montgomery.  I reviewed again that the possibility of radioembolization lies with Dr. Montgomery, but the likelihood that it would significantly prolong his overall survival would be less likely than increasing progression-free survival.      He has severe comorbidities, in addition to his moderate to severe cirrhosis.  We did review at his request that the prognosis is not good in general.  He took it rather hard.  It was unclear how much he was able to do absorb from today's conversation due to either being overwhelmed and/or any residual hepatic encephalopathy and lingering side effects.  The patient's wife asked also very good questions, and she stated full understanding of today's conversation.  We have elected to proceed with the bone scan which we did order today.  His AFP is unremarkable.  The CT chest has already been done.  This will complete full staging for his radiologically diagnosed HCC.  We will also look forward to Dr. Kali Montgomery's evaluation.  I sent a message via Epic following his consultation to apprise Dr. Montgomery of the conversation I had with the patient.      I would be happy to see the patient back at any time if he wishes to continue surveillance here.  But again, as stated above, I would not be likely in the setting of his severe cirrhosis, his Child-Israel score to recommend sorafenib or any other systemic-directed therapy as it would likely have excessive risk rather than a potential benefit.  I also offered that if he would like to see one of our hepatobiliary surgeons for a full surgical assessment, he is welcome to do so, but I did prepare him with the idea that it would be extremely unlikely he would be offered surgery.  His wife stated full understanding at the time that I left the room.  By the completion of the visit, he did not make a final decision as to whether or not he wanted surgical referral.  Again, I would be happy to make it  at any time.  Otherwise, I did offer he is welcome to seek a second opinion if he so wishes.     I spent > 60 minutes in consultation including history, full review of systems, and full physical exam; 50 minutes were spent discussion.  Over 50% of the time was spent counseling and coordinating care.    25 minutes were spent reviewing the patient s medical records, images, imaging reports, pathology review, and all other records and lab values prior to and during the appointment.    WAYNE FRIAS MD, PHD

## 2018-03-23 NOTE — PROGRESS NOTES
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 He was admitted at the time for altered mental status that was eventually determined to be hepatic encephalopathy. The following month on 5/19/17 he had an MRI abdomen reported as \"consistent with HCC\" involving the right lobe of the liver, segment 7-8, measuring 2.6 x 4.1 cm. He had a CT-guided liver biopsy on 6/28/17. Pathology returned as \"atypical hepatocytes.\" He followed up with his PCP multiple times over the rest of 2017 and did not have further workup for the liver mass until a repeat MRI liver on 2/5/18 that showed progression of the HCC, now measuring 9.2 x 7.3 cm. There was also evidence of portal vein tumor thrombus. These records were reviewed in care everywhere.     He was admitted to [facility] from 3/1-3/6/18 for hepatic encephalopathy, acute anemia (Hb 6), and worsening ascites. Gastroenterology and oncology were consulted. MRI abdomen on 3/3/18 was definitive for HCC (OPTN 5x), with an 11cm right lobe lesion and tumor thrombus in the portal venous system. Interventional radiology was consulted as an inpatient and discussed radio-embolization, with plan to follow-up in their clinic. He was Child-Israel class B based on labs during that hospitalization.     He was admitted here again from 3/13-3/18/18 for hepatic encephalopathy, JUAN M, and pneumonia. He had therapeutic paracentesis done twice during that hospitalization.     Serge is here with his wife Tori for initial medical oncology consultation. He was discharged 5 days ago. He completed a course of abx for pneumonia and still has a cough. He is doing ok at home. He naps several times during the day and definitely is less active compared to a year " ago. He goes up and down the stairs in their townhome. He drives to the store and does shopping. He retired several years ago and his wife still works full time.     He has no abdominal pain. His weight has fluctuated up and down by at least 30 lbs, depending on the amount of extra fluid he has from the liver disease. Since starting spironolactone, his weight is down.     PAST MEDICAL HISTORY:  1. 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 HCC   3. CAD  4. DM  5. 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 . Has 3 children. Lives in [place]. Previously drank 4 beers per week which he stopped since diagnosis of cirrhosis. Former smoker.     MEDS:    Current Outpatient Prescriptions:      Blood Pressure Monitoring (RA BLOOD PRESSURE CUFF MONITOR) MISC, by Misc.(Non-Drug; Combo Route) route once daily., Disp: , Rfl:      blood glucose monitoring (NO BRAND SPECIFIED) test strip, 1 each, Disp: , Rfl:      blood glucose monitoring (ONETOUCH ULTRA) test strip, TEST TWICE DAILY TO THREE TIMES DAILY, Disp: , Rfl:      [employer] LANCETS 21G MISC, by Misc.(Non-Drug; Combo Route) route once daily., Disp: , Rfl:      fluticasone (FLONASE) 50 MCG/ACT spray, Spray 1 spray into both nostrils daily, Disp: , Rfl:      rifaximin (XIFAXAN) 550 MG TABS tablet, Take 1 tablet (550 mg) by mouth 2 times daily, Disp: 60 tablet, Rfl: 3     aspirin (ASPIRIN ADULT LOW STRENGTH) 81 MG EC tablet, Take 1 tablet (81 mg) by mouth daily, Disp: 30 tablet, Rfl: 1     furosemide (LASIX) 20 MG tablet, Take 2 tablets (40 mg) by mouth daily, Disp: 30 tablet, Rfl: 1     spironolactone (ALDACTONE) 50 MG tablet, Take 1 tablet (50 mg) by mouth daily, Disp: 30 tablet, Rfl: 1     lactulose (CHRONULAC) 10 GM/15ML solution,  "30 mLs (20 g) by Oral or NG Tube route 3 times daily, Disp: 2700 mL, Rfl: 1     metoprolol tartrate (LOPRESSOR) 25 MG tablet, Take 1 tablet (25 mg) by mouth 2 times daily, Disp: 60 tablet, Rfl: 1     losartan (COZAAR) 25 MG tablet, Take 2 tablets (50 mg) by mouth daily, Disp: 30 tablet, Rfl: 1     sodium bicarbonate 650 MG tablet, Take 2 tablets (1,300 mg) by mouth 2 times daily, Disp: 120 tablet, Rfl: 1     WARFARIN SODIUM PO, Take 5 mg by mouth daily , Disp: , Rfl:      BASAGLAR 100 UNIT/ML injection, Inject 37 Units Subcutaneous At Bedtime , Disp: , Rfl:      LEVOTHYROXINE SODIUM PO, Take 150 mcg by mouth daily , Disp: , Rfl:      Nitroglycerin (NITROQUICK SL), , Disp: , Rfl:      PANTOPRAZOLE SODIUM PO, Take 40 mg by mouth daily , Disp: , Rfl:     ALLERGIES: PCN    ROS: Full 12-point ROS reviewed and negative unless reported above. Pertinent symptoms reviewed above per HPI.    PHYSICAL EXAM:  /49 (BP Location: Right arm, Patient Position: Chair, Cuff Size: Adult Large)  Pulse 64  Temp 97.7  F (36.5  C) (Oral)  Resp 16  Ht 1.727 m (5' 7.99\")  Wt 107.4 kg (236 lb 11.2 oz)  SpO2 100%  BMI 36 kg/m2  ECOG PS 2-3  General: NAD, alert and interactive  HEENT: PERRL, MMM, no oral lesions  Lungs: CTA bilaterally  Cardiovascular: RRR, systolic murmur  Abdominal/Rectal: +BS, soft, obese. Palpable hepatosplenomegaly. Non-distended.   Lymph: no cervical, supraclavicular, axillary, or inguinal adenopathy  MSK: decreased muscle mass   Skin: ecchymoses on dorsal hands   Neuro: A&O, although responses are delayed. Moving all extremities. Ambulates onto exam table slowly.     LABS REVIEWED ON DAY OF VISIT  Results for TOM SAUER (MRN [id number]) as of 3/22/2018 21:05   Ref. Range 3/2/2018 16:28   Alpha Fetoprotein Latest Ref Range: 0 - 8 ug/L <1.5       RADIOLOGY:  MRI abdomen 3/3/18  IMPRESSION:    1. Cirrhosis and evidence of portal hypertension including  splenomegaly, modified ascites and upper abdominal " collateral vessel  formation..  2. Infiltrative right hepatic lobe HCC measures up to 11 cm. Tumor is  comparable outside MR to 5/20/2018. Associated tumor thrombus extends  throughout the right portal the venous system to the splenoportal  confluence.  OPTN 5X/LR-TIV .  3. Based on this exam only, the patient is not within Bryson criteria.  4. Cholelithiasis    CT chest 3/2/18  1. Patchy upper lobe predominant peribronchovascular and peripheral  subpleural opacities. The opacities are predominantly groundglass,  however there is a consolidative component within several of the  areas. The differential is broad and the appearance is most suspicious  for an infectious or inflammatory cause. The appearance is less  typical of metastasis, but not entirely excluded from the  differential. Attention on follow-up.    IMPRESSION/PLAN:  Serge Brambila is a 71 y/o man with HCC in the setting of cirrhosis 2/2 KUHN. The right hepatic lobe mass is 11 cm with tumor thrombus in the portal vein, so this clearly does not meet Bryson criteria for transplantation. Currently can be staged as [id number]. We will complete staging with a bone scan. AFP level is undetectable so this will not be helpful as a marker of disease activity.     We discussed that he has a large primary HCC that is invading the portal venous system. Pending the bone scan, we do not have evidence of metastatic disease. Given the size and venous involvement, he is very unlikely to be a surgical candidate. We offered him a surgical referral, and he preferred to think about this rather than schedule it now. He has an appointment with Dr. Fernando Montgomery next week in IR clinic to discuss the option of local therapy with radioembolization.    He has either Child Israel class B liver disease (score = 9) or if given a score of 3+ for ascites, then has class C disease. In either case, he has advanced liver disease. We discussed that the evidence of benefit for sorafenib or  lenvatinib is restricted to class A disease, with a very small amount of data for class B disease. For instance the SHARP study ([facility] 2008) had only 5% of patients with class B disease. Even in patients with class A disease, the benefit is modest with an approximate 3 month PFS over placebo. Aside from his borderline B/C liver disease, he also has borderline performance status (ECOG 2-3) and has been hospitalized twice in the past month. Given these factors, we do not recommend systemic chemotherapy.     In summary, plan is to complete staging with a bone scan and have him see Dr. Montgomery in IR clinic next week to discuss local therapy. We can see him back in the future although we do not anticipate that he will be a candidate for systemic chemotherapy.     We ordered lab work today at the request of his primary physician Dr. Thompson at [facility].      Patient seen and discussed with staff Dr. Alba Bella MD  Heme/Onc Fellow  Pager: [phone]      MEDICAL ONCOLOGY ATTENDING PHYSICIAN ADDENDUM:    I have seen and evaluated this patient with the medical oncology fellow. I have reviewed and edited his note, and agree with the assessment and plan stated above. A complete review of systems was assessed, and pertinent positives/negatives are discussed in detail above and as follows.    Mr. Serge Brambila is a 72-year-old gentleman from [facility], accompanied by his wife.  He is kindly referred for a recent diagnosis of hepatocellular carcinoma in the setting of cirrhosis.  It is unclear if the cirrhosis is secondary to KUHN/NAFLD or alcoholic cirrhosis.  He has a remote history of heavy binge drinking in the 1980s, but he does not specify the amount.  More recently, he endorses drinking 4 beers per week.  Please see the fellow's note for further details.      In brief, as of last 04/2017, he was hospitalized at [facility] where he received subsequent care for many months when he developed hepatic  encephalopathy.  Since that time in the last 4 months, he has had 4 hospitalizations for encephalopathy, including last week here at our own institution, the [facility].  He had an MRI of abdomen and further radiologic evaluation at [facility] that detected a small size, sub 3 cm mass consistent at the time and interpreted as early stage hepatocellular carcinoma.      I do not see any evidence that further full staging or further evaluation was completed at [facility].  A CT-guided biopsy was attempted on 06/28 and reported to be negative, in terms of histopathologic proof.  The patient had subsequent followup including 02/2018.  An MRI abdomen showed significantly enlarged tumor.  At the current time, most recent imaging suggests that it is nearly 11 cm in size.  By the end of February, he was hospitalized with a hemoglobin of 6 and another subsequent MRI abdomen 03/03 at our institution, where he was also hospitalized 03/15-03/18 with hepatic encephalopathy.  He had acute renal failure on top of his chronic kidney disease.  He has severe type 2 diabetes and a long list of moderate comorbidities as noted in the medical record and in Dr. Bella's note.  Patient is under the care of hepatologist, Dr. Godfrey, at our institution as well.      PAST MEDICAL HISTORY, SURGICAL HISTORY, FAMILY HISTORY, Review of Systems AND FULL SOCIAL HISTORY:  Reviewed in full.      The patient's wife is here today and notes that she missed many of the inpatient discussions while the patient was hospitalized, and the patient does not recall much of the conversation as he was hospitalized for hepatic encephalopathy.  Apparently, other family members were present.  They did meet with Dr. Fernando Montgomery from our Interventional Radiology team, with whom they are scheduled to meet this Monday, 3 days from now, to discuss the possibility of yttrium-90 radioembolization.      I had a very in-depth conversation with the patient  and his wife today that a tumor of this size is well outside of Bryson criteria.  With 11 cm hepatocellular carcinoma, it would be extremely unlikely, based on that criteria alone, that he would be eligible for surgical resection.  I quoted that only approximately 20% of patients with HCC can have surgical resection with intent to cure due to the extensive comorbidities, cirrhosis-related issues or complications of resecting the tumor based on its location.  The patient has a known tumor thrombus in the portal vein and that alone would make him ineligible for attempt at surgical resection with intent to cure and/or liver transplantation.      He had extensive questions about liver transplantation that I will defer to Dr. Godfrey.  But I did review with him the extreme lack of likelihood that he would be a candidate for liver transplantation on numerous issues.  His Child-Israel score is either B or C, depending on the subjective assessment of ascites but regardless, as I reviewed systemic therapy options, I would not consider him to be an ideal candidate for sorafenib, lenvatinib, or any other form of FDA-approved systemic therapy for unresectable hepatocellular carcinoma.      Of note, his systemic staging has not yet been completed.  He did have a CT chest earlier in March that did not show any obvious evidence of lung metastases, but as the bone scan has not yet been performed, we offered to order one.  We reviewed in depth that the implication would be that if he has stage IV metastatic disease, then radioembolization would not be likely to be of benefit and would likely not be performed.  We will go forward with his consent and have that done, but it would be unlikely to be done in time for the appointment 3 days from now with Dr. Montgomery.  I reviewed again that the possibility of radioembolization lies with Dr. Montgomery, but the likelihood that it would significantly prolong his overall survival would be less likely than  increasing progression-free survival.      He has severe comorbidities, in addition to his moderate to severe cirrhosis.  We did review at his request that the prognosis is not good in general.  He took it rather hard.  It was unclear how much he was able to do absorb from today's conversation due to either being overwhelmed and/or any residual hepatic encephalopathy and lingering side effects.  The patient's wife asked also very good questions, and she stated full understanding of today's conversation.  We have elected to proceed with the bone scan which we did order today.  His AFP is unremarkable.  The CT chest has already been done.  This will complete full staging for his radiologically diagnosed HCC.  We will also look forward to Dr. Kali Montgomery's evaluation.  I sent a message via Epic following his consultation to apprise Dr. Montgomery of the conversation I had with the patient.      I would be happy to see the patient back at any time if he wishes to continue surveillance here.  But again, as stated above, I would not be likely in the setting of his severe cirrhosis, his Child-Israel score to recommend sorafenib or any other systemic-directed therapy as it would likely have excessive risk rather than a potential benefit.  I also offered that if he would like to see one of our hepatobiliary surgeons for a full surgical assessment, he is welcome to do so, but I did prepare him with the idea that it would be extremely unlikely he would be offered surgery.  His wife stated full understanding at the time that I left the room.  By the completion of the visit, he did not make a final decision as to whether or not he wanted surgical referral.  Again, I would be happy to make it at any time.  Otherwise, I did offer he is welcome to seek a second opinion if he so wishes.             I spent > 60 minutes in consultation including history, full review of systems, and full physical exam; 50 minutes were spent discussion.   Over 50% of the time was spent counseling and coordinating care.    25 minutes were spent reviewing the patient s medical records, images, imaging reports, pathology review, and all other records and lab values prior to and during the appointment.    WAYNE FRIAS MD, PHD

## 2018-03-23 NOTE — NURSING NOTE
"Oncology Rooming Note    March 23, 2018 8:02 AM   Serge Brambila is a 72 year old male who presents for:    Chief Complaint   Patient presents with     Oncology Clinic Visit     [facility] follow up      Initial Vitals: /49 (BP Location: Right arm, Patient Position: Chair, Cuff Size: Adult Large)  Pulse 64  Temp 97.7  F (36.5  C) (Oral)  Resp 16  Ht 1.727 m (5' 7.99\")  Wt 107.4 kg (236 lb 11.2 oz)  SpO2 100%  BMI 36 kg/m2 Estimated body mass index is 36 kg/(m^2) as calculated from the following:    Height as of this encounter: 1.727 m (5' 7.99\").    Weight as of this encounter: 107.4 kg (236 lb 11.2 oz). Body surface area is 2.27 meters squared.  No Pain (0) Comment: Data Unavailable   No LMP for male patient.  Allergies reviewed: YES  Medications reviewed: YES    Medications: Medication refills not needed today.  Pharmacy name entered into EPIC: Data Unavailable    Clinical concerns: no new concerns.  Pt received flu shot elsewhere. See Immunizations   Completed Fall Risk Screening and updated Health Maintenance .  See screenings  Updated pt health maintenance on colonscopy. Pt has had it done in 2011        6 minutes for nursing intake (face to face time)     Henny Walker CMA                "

## 2018-03-23 NOTE — MR AVS SNAPSHOT
After Visit Summary   3/23/2018    Serge Brambila    MRN: [id number]           Patient Information     Date Of Birth          1945        Visit Information        Provider Department      3/23/2018 8:15 AM Zeus Willis MD [facility]        Today's Diagnoses     HCC (hepatocellular carcinoma) (H)    -  1       Follow-ups after your visit        Your next 10 appointments already scheduled     Apr 06, 2018  7:30 AM CDT   Paracentesis Visit with Uc Spec Inf Para Provider,  40 ATC   [facility] Special[facility] Procedure ([facility])    [address]  [address]  [facility] [address]   [phone]            Apr 16, 2018  6:30 AM CDT   Procedure 6.5 hour with U2A ROOM 1   Unit 2A [facility] [place] ([facility])    [address] [address]               Apr 16, 2018  8:00 AM CDT   (Arrive by 7:45 AM)   IR VISCERAL ANGIOGRAM with UUIR4   [facility], [place], Interventional Radiology ([facility])    [address] [facility] [id number] [phone]           1. Your doctor will need to do a history and physical within 30 days before this procedure. 2. Your doctor will determine whether you need a 12 lead EKG, as well as which medications should not be taken the morning of the exam. 3. Laboratory tests are to be obtained by your doctor prior to the exam (creatinine, Hgb/Hct, platelet count, and INR) 4. If you have allergies to x-ray contrast or iodine, contact your doctor or a Radiology nurse prior to the exam day for instructions. 5. Someone will need to drive you to and from the hospital. 6. Bring a list of all drugs you are taking; include supplements and over-the-counter medications. 7. Wear comfortable clothes and leave your valuables at home. 8. If you are or may be pregnant, contact your doctor or a Radiology  nurse prior to the day of the exam. 9.  If you have diabetes, check with your doctor or a Radiology nurse to see if your insulin needs to be adjusted for the exam. 10. If you are taking Coumadin (to thin you blood) please contact your doctor or a Radiology nurse at least 5 days before the exam for special instructions. 11. You should not have received barium (x-ray contrast) within 48 hours of this exam. 12. The day before your exam you may eat your regular diet and are encouraged to drink at least 2 quarts of clear liquids. Drink no alcoholic beverages for 24 hours prior to the exam. 13. If you have a colostomy you will need to irrigate it with tap water at 8PM the evening before and again at 6AM the morning of the exam. 14. Do not smoke for 24 hours prior to the procedure. 15. Do not eat any solid food or milk products for 6 hours prior to the exam. You may drink clear liquids until 2 hours prior to the exam. Clear liquids include the following: water, Jell-O, clear broth, apple juice or any non-carbonated drink that you can see through (no pop!) 16. The morning of the exam you may brush your teeth and take medications as directed with a sip of water. 17. Tell the Radiology nurse if you have any allergies. 18. You will be asked to empty your bladder before the exam begins. 19. Following the exam you will need to remain on complete bedrest for 4-6 hours. The nurse will monitor your vital signs, puncture site, and the pulses and temperature of the arm or leg that was punctured. 20. When discharged, you cannot drive until morning, and an adult must be with you until then. You should stay in the [facility] area overnight.            Apr 16, 2018 12:00 PM CDT   (Arrive by 11:45 AM)   NM Y-90 SEPCT MAPPING with UUNM3   [facility], [place], Nuclear Medicine ([facility])    [address] [address] [phone]           Please bring a list of your  medicines to the exam. (Include vitamins, minerals and over-the-counter drugs.) You should wear comfortable clothes. Leave your valuables at home. Please bring related prior results and films.  Tell your doctor:   If you are breastfeeding or may be pregnant.   If you have had a barium test within the past 48 hours. Barium may change the results of certain exams.   If you think you may need sedation (medicine to help you relax).  You may eat and drink as normal.  Please call your Imaging Department at your exam site with any questions.            Apr 18, 2018  6:30 AM CDT   Procedure 6.5 hour with U2A ROOM 16   Unit 2A [facility] ([facility])    [address] [facility] [address]               Apr 18, 2018  8:30 AM CDT   (Arrive by 8:15 AM)   IR PERIPHERAL VISCERAL EMBOLIZATION with UUIR5   [facility], [place], Interventional Radiology ([facility])    [address] [address] [phone]           1. You will need to have had a history and physical exam within 7 days of the procedure. 2. Laboratory test are to be obtained by your doctor prior to the exam (CBCP, INR and PTT) 3. Someone will need to drive you to and from the hospital. 4. If you are or may be pregnant, contact your doctor or a Radiology nurse prior to the day of the exam. 5. If you have diabetes, check with your doctor or a Radiology nurse to see if your insulin needs to be adjusted for the exam. 6. If you are taking Coumadin (to thin you blood) please contact your doctor or a Radiology nurse at least 3 days before the exam for special instructions. 7. The day before your exam you may eat your regular diet and are encouraged to drink at least 2 quarts of clear liquids. Drink no alcoholic beverages for 24 hours prior to the exam. 8. Do not eat any solid food or milk products for 6 hours prior to the exam. You may drink clear liquids  until 2 hours prior to the exam. Clear liquids include the following: water, Jell-O, clear broth, apple juice or any noncarbonated drink that you can see through (no pop!) 9. The morning of the exam you may brush your teeth and take medications as directed with a sip of water. 10. Tell the Radiology nurse if you have any allergies.            Apr 18, 2018  2:30 PM CDT   (Arrive by 2:15 PM)   NM SIRSPHERE THERAPY with UUNM3   [facility], [place], Nuclear Medicine ([facility]    [address] [id number] [phone]           Please bring a list of your medicines to the exam. (Include vitamins, minerals and over-the-counter drugs.) You should wear comfortable clothes. Leave your valuables at home. Please bring related prior results and films.  Tell your doctor:   If you are breastfeeding or may be pregnant.   If you have had a barium test within the past few days. Barium may change the results of certain exams.   If you think you may need sedation (medicine to help you relax).  You may eat and drink as normal.  Please call your Imaging Department at your exam site with any questions.            May 09, 2018  8:00 AM CDT   Lab with  LAB   [facility] ([facility])    [address]  1st Floor  [facility] [address] [phone]            May 09, 2018  9:00 AM CDT   (Arrive by 8:45 AM)   Return General Liver with Yennifer Culver MD   [facility]atology ([facility])    [address]  [address]  [facility] [address] [phone]              Who to contact     If you have questions or need follow up information about today's clinic visit or your schedule please contact [facility] directly at [phone].  Normal or non-critical lab and imaging results will be communicated to you by MyChart, letter or phone within 4 business days after the clinic  "has received the results. If you do not hear from us within 7 days, please contact the clinic through [employer] or phone. If you have a critical or abnormal lab result, we will notify you by phone as soon as possible.  Submit refill requests through [employer] or call your pharmacy and they will forward the refill request to us. Please allow 3 business days for your refill to be completed.          Additional Information About Your Visit        [email or web address] Information     [employer] lets you send messages to your doctor, view your test results, renew your prescriptions, schedule appointments and more. To sign up, go to [email or web address] . Click on \"Log in\" on the left side of the screen, which will take you to the Welcome page. Then click on \"Sign up Now\" on the right side of the page.     You will be asked to enter the access code listed below, as well as some personal information. Please follow the directions to create your username and password.     Your access code is: [email or web address]  Expires: 2018 11:57 AM     Your access code will  in 90 days. If you need help or a new code, please call your [place] clinic or [phone].        Care EveryWhere ID     This is your Care EveryWhere ID. This could be used by other organizations to access your [place] medical records  [id number]        Your Vitals Were     Pulse Temperature Respirations Height Pulse Oximetry BMI (Body Mass Index)    64 97.7  F (36.5  C) (Oral) 16 1.727 m (5' 7.99\") 100% 36 kg/m2       Blood Pressure from Last 3 Encounters:   No data found for BP    Weight from Last 3 Encounters:   No data found for Wt              Today, you had the following     No orders found for display       Primary Care Provider Office Phone # Fax #    Linn Thompson [phone]       [facility] [address]  [facility] [address]        Equal Access to Services     FRANK CONROY AH: Hadii prateek buitrago Soangel, waaxda luqadaha, qaybta " miley morse laverneyang johnsonsancho holt ah. Elvira [facility] [phone].    ATENCIÓN: Si tawanda magana, tiene a collins disposición servicios gratuitos de asistencia lingüística. Rufina al [phone].    We comply with applicable federal civil rights laws and [place] laws. We do not discriminate on the basis of race, color, national origin, age, disability, sex, sexual orientation, or gender identity.            Thank you!     Thank you for choosing [facility]  for your care. Our goal is always to provide you with excellent care. Hearing back from our patients is one way we can continue to improve our services. Please take a few minutes to complete the written survey that you may receive in the mail after your visit with us. Thank you!             Your Updated Medication List - Protect others around you: Learn how to safely use, store and throw away your medicines at www.disposemymeds.org.          This list is accurate as of 3/23/18 11:59 PM.  Always use your most recent med list.                   Brand Name Dispense Instructions for use Diagnosis    aspirin 81 MG EC tablet    ASPIRIN ADULT LOW STRENGTH    30 tablet    Take 1 tablet (81 mg) by mouth daily    Coronary artery disease involving native heart without angina pectoris, unspecified vessel or lesion type       BASAGLAR 100 UNIT/ML injection      Inject 37 Units Subcutaneous At Bedtime        * ONETOUCH ULTRA test strip   Generic drug:  blood glucose monitoring      TEST TWICE DAILY TO THREE TIMES DAILY        * blood glucose monitoring test strip    no brand specified     1 each        fluticasone 50 MCG/ACT spray    FLONASE     Spray 1 spray into both nostrils daily        furosemide 20 MG tablet    LASIX    30 tablet    Take 2 tablets (40 mg) by mouth daily    Cirrhosis of liver with ascites, unspecified hepatic cirrhosis type (H)       [employer] LANCETS 21G Misc      by Misc.(Non-Drug; Combo Route) route once daily.         lactulose 10 GM/15ML solution    CHRONULAC    2700 mL    30 mLs (20 g) by Oral or NG Tube route 3 times daily    Hepatic encephalopathy (H)       LEVOTHYROXINE SODIUM PO      Take 150 mcg by mouth daily        losartan 25 MG tablet    COZAAR    30 tablet    Take 2 tablets (50 mg) by mouth daily    Essential hypertension       metoprolol tartrate 25 MG tablet    LOPRESSOR    60 tablet    Take 1 tablet (25 mg) by mouth 2 times daily    Paroxysmal atrial fibrillation (H)       NITROQUICK SL           PANTOPRAZOLE SODIUM PO      Take 40 mg by mouth daily        RA BLOOD PRESSURE CUFF MONITOR Misc      by Misc.(Non-Drug; Combo Route) route once daily.        rifaximin 550 MG Tabs tablet    XIFAXAN    60 tablet    Take 1 tablet (550 mg) by mouth 2 times daily    Hepatic encephalopathy (H)       sodium bicarbonate 650 MG tablet     120 tablet    Take 2 tablets (1,300 mg) by mouth 2 times daily    Chronic kidney disease, unspecified CKD stage       spironolactone 50 MG tablet    ALDACTONE    30 tablet    Take 1 tablet (50 mg) by mouth daily    Cirrhosis of liver with ascites, unspecified hepatic cirrhosis type (H)       WARFARIN SODIUM PO      Take 5 mg by mouth daily        * Notice:  This list has 2 medication(s) that are the same as other medications prescribed for you. Read the directions carefully, and ask your doctor or other care provider to review them with you.

## 2018-03-26 NOTE — PATIENT INSTRUCTIONS
"We will call you with the appointment details of your Radioembolizaton procedure.     As discussed with you, I will need to seek insurance approval before scheduling you for your procedure. This may take up to two weeks, however I will keep you updated. Please feel free to call me for updates as well.     On the day of the appointment, you will check in 1.5 hours early to your scheduled procedure.     Please check into the [facility] Waiting room, located on the main level, at [facility], located at [address] [place] [address].       Reminders:    -No solids or milk products 6 hours prior to appointment time.    -No clear liquids 2 hours prior to appointment time.     -Please take your morning medications as indicated with enough water to swallow.    -Please have a  as you will be going home afterwards.    Radioembolization consists of two procedures.     1.  Y90 Mapping- This procedure will help us see the vasculature of your liver and help us determine the shunting from the liver to the lung and gastric area.   Please allow for a total time of 3-5 hours for this day. You will need to \"pass\" this procedure before the Delivery day. This means that if you're \"shunting\" calculation is higher than a certain percentage (20%) then we may not be able to move forward with treatment due to risk of the radioembolization particles migrating elsewhere besides the tumor itself. If you do pass then we will move forward with the Deliver day.     2. Y90 Delivery procedure. This procedure is the actual delivery of the radioactive particles into the liver.   This will take approximately 2 hours. Please also plan for at least 3-5 hours for this day as well.         -Post follow up: We will call you 2-3 days after you leave to follow up after the procedure.     We will also have you return in 1 mos with an MRI and follow up appt.             *Please keep in mind that you may have Post Embolization " syndrome.  The reason for this is because the tumor is dying.     This includes:    -high fevers 101-102, which may last for a couple of days. We recommend using over the counter medications such as Tylenol or Ibuprofen.     -Nausea, in which we will give you medications after you are discharged home.     -Decreased appetite: We don't expect you to eat 3 full meals. Instead, we prefer that you snack through out the day.     -Feeling fatigue: this is normal, however we recommend that you get up and walk around.     -Radiation Precaution (please do these things for 5 days)     - we advise that although you are not radiating a large amount of radiation, please refrain from being near small children, pregnant women and those who have a compromised immune system. We advise a 3 feet distance for no more than 30 minutes.     -PLEASE ALSO MAKE SURE THAT YOU FLUSH TWICE WHEN USING THE BATHROOM, and make sure to rinse the sink and tub after each time you use them.      -Wash your hands with soap and water after each visit to the bathroom.      -Use separate eating utensils. Wash them separately from your family's dishes.      -Avoid public transportation (buses, trains, taxis, light rail) and crowded places such as stores, restaurants, theaters and sporting events.      -Sleep alone, Avoid intimate contact; No kissing, or intercourse     **Please keep in mind to stay hydrated after the procedure. At Least 6 glasses of water a day.        *Abnormal symptoms in which to call or seek immediate help:  1. Confusion!!-GO TO THE EMERGENCY ROOM.  2. Swelling of the lower legs  3. Severe abdominal pain that doesn't go away with pain medications.   4. High fevers that do not come down with over the counter medications.      Should ANY of the above symptoms are exhibited, please seek immediate help or call our hospital at [phone] and ask for the Interventional Radiologist On-call (after hours) or you can contact me (during business  hours) 8-4pm.     Should you have any further questions, please call us at anytime. It has been a pleasure to see you today.           Jacqueline Valverde RN  Interventional Radiology Nurse Coordinator   Phone: [phone]

## 2018-03-26 NOTE — PROGRESS NOTES
03/26/2018  Patient was informed about the  RESiN Study  and provided the study consent form to review. They were afforded the opportunity to ask questions and receive answers. Upon confirming they understood what the study was about and what participation entails, the study consent and HIPPA forms were signed and dated. The patient was also provided a signed copy for their records.    Astrid Nixon  Clinical Research Coordinator  Radiology

## 2018-03-26 NOTE — MR AVS SNAPSHOT
"              After Visit Summary   3/26/2018    Serge Brambila    MRN: [id number]           Patient Information     Date Of Birth          1945        Visit Information        Provider Department      3/26/2018 1:00 PM Fernando Montgomery MD [facility] Interventional Radiology        Care Instructions    We will call you with the appointment details of your Radioembolizaton procedure.     As discussed with you, I will need to seek insurance approval before scheduling you for your procedure. This may take up to two weeks, however I will keep you updated. Please feel free to call me for updates as well.     On the day of the appointment, you will check in 1.5 hours early to your scheduled procedure.     Please check into the [facility] Waiting room, located on the main level, at [facility], located at [address] [place] [address].       Reminders:    -No solids or milk products 6 hours prior to appointment time.    -No clear liquids 2 hours prior to appointment time.     -Please take your morning medications as indicated with enough water to swallow.    -Please have a  as you will be going home afterwards.    Radioembolization consists of two procedures.     1.  Y90 Mapping- This procedure will help us see the vasculature of your liver and help us determine the shunting from the liver to the lung and gastric area.   Please allow for a total time of 3-5 hours for this day. You will need to \"pass\" this procedure before the Delivery day. This means that if you're \"shunting\" calculation is higher than a certain percentage (20%) then we may not be able to move forward with treatment due to risk of the radioembolization particles migrating elsewhere besides the tumor itself. If you do pass then we will move forward with the Deliver day.     2. Y90 Delivery procedure. This procedure is the actual delivery of the radioactive particles into the liver.   This will take approximately " 2 hours. Please also plan for at least 3-5 hours for this day as well.         -Post follow up: We will call you 2-3 days after you leave to follow up after the procedure.     We will also have you return in 1 mos with an MRI and follow up appt.             *Please keep in mind that you may have Post Embolization syndrome.  The reason for this is because the tumor is dying.     This includes:    -high fevers 101-102, which may last for a couple of days. We recommend using over the counter medications such as Tylenol or Ibuprofen.     -Nausea, in which we will give you medications after you are discharged home.     -Decreased appetite: We don't expect you to eat 3 full meals. Instead, we prefer that you snack through out the day.     -Feeling fatigue: this is normal, however we recommend that you get up and walk around.     -Radiation Precaution (please do these things for 5 days)     - we advise that although you are not radiating a large amount of radiation, please refrain from being near small children, pregnant women and those who have a compromised immune system. We advise a 3 feet distance for no more than 30 minutes.     -PLEASE ALSO MAKE SURE THAT YOU FLUSH TWICE WHEN USING THE BATHROOM, and make sure to rinse the sink and tub after each time you use them.      -Wash your hands with soap and water after each visit to the bathroom.      -Use separate eating utensils. Wash them separately from your family's dishes.      -Avoid public transportation (buses, trains, taxis, light rail) and crowded places such as stores, restaurants, theaters and sporting events.      -Sleep alone, Avoid intimate contact; No kissing, or intercourse     **Please keep in mind to stay hydrated after the procedure. At Least 6 glasses of water a day.        *Abnormal symptoms in which to call or seek immediate help:  1. Confusion!!-GO TO THE EMERGENCY ROOM.  2. Swelling of the lower legs  3. Severe abdominal pain that doesn't go away  with pain medications.   4. High fevers that do not come down with over the counter medications.      Should ANY of the above symptoms are exhibited, please seek immediate help or call our hospital at [phone] and ask for the Interventional Radiologist On-call (after hours) or you can contact me (during business hours) 8-4pm.     Should you have any further questions, please call us at anytime. It has been a pleasure to see you today.           Jacqueline Valverde RN  Interventional Radiology Nurse Coordinator   Phone: [phone]                              Follow-ups after your visit        Your next 10 appointments already scheduled     Mar 28, 2018  9:00 AM CDT   (Arrive by 8:45 AM)   NM INJECTION with UUNMINJ2   [facility] [place], Nuclear Medicine ([facility])    [address] [place] [address] [phone]            Mar 28, 2018 12:00 PM CDT   (Arrive by 11:45 AM)   NM BONE SCAN WHOLE BODY with UUNM3   [facility], Nuclear Medicine ([facility])    [address] [place] [address] [phone]           Please bring a list of your medicines to the exam. (Include vitamins, minerals and over-the-counter drugs.) You should wear comfortable clothes. Leave your valuables at home. Please bring related prior results and films.  Tell your doctor:   If you are breastfeeding or may be pregnant.   If you have had a barium test within the past 48 hours. Barium may change the results of certain exams.   If you think you may need sedation (medicine to help you relax).  You may eat and drink as normal.  Please call your Imaging Department at your exam site with any questions.            May 09, 2018  8:00 AM CDT   Lab with  LAB   [facility] ([facility])    [address]  1st Floor  [facility] [address] [phone]            May 09, 2018  9:00 AM CDT    (Arrive by 8:45 AM)   Return General Liver with Yennifer Culver MD   [facility] Hepatology ([facility])    [address]  [address]  [facility] [address] [phone]              Who to contact     Please call your clinic at [phone] to:    Ask questions about your health    Make or cancel appointments    Discuss your medicines    Learn about your test results    Speak to your doctor            Additional Information About Your Visit        MyChart Information     [employer] is an electronic gateway that provides easy, online access to your medical records. With [employer], you can request a clinic appointment, read your test results, renew a prescription or communicate with your care team.     To sign up for [employer] visit the website at [email or web address]   You will be asked to enter the access code listed below, as well as some personal information. Please follow the directions to create your username and password.     Your access code is: [email or web address]  Expires: 2018 11:57 AM     Your access code will  in 90 days. If you need help or a new code, please contact your [facility] or call [phone] for assistance.        Care EveryWhere ID     This is your Care EveryWhere ID. This could be used by other organizations to access your [facility] medical records  [id number]        Your Vitals Were     Pulse Pulse Oximetry                60 100%           Blood Pressure from Last 3 Encounters:   18 137/62   18 131/49   18 103/73    Weight from Last 3 Encounters:   18 107.4 kg (236 lb 11.2 oz)   18 106.7 kg (235 lb 3.7 oz)   18 105.3 kg (232 lb 2.3 oz)              Today, you had the following     No orders found for display       Primary Care Provider Office Phone # Fax #    Linn Thompson [phone]       [facility] [address]  [facility] [address]         Equal Access to Services     [facility]: Hadii prateek curiel iftikhar Benz, waluceroda luqadaha, qaybta katoniomiley andino. So [facility] [phone].    ATENCIÓN: Si habla español, tiene a collins disposición servicios gratuitos de asistencia lingüística. Llame al [phone].    We comply with applicable federal civil rights laws and [place] laws. We do not discriminate on the basis of race, color, national origin, age, disability, sex, sexual orientation, or gender identity.            Thank you!     Thank you for choosing [facility] INTERVENTIONAL RADIOLOGY  for your care. Our goal is always to provide you with excellent care. Hearing back from our patients is one way we can continue to improve our services. Please take a few minutes to complete the written survey that you may receive in the mail after your visit with us. Thank you!             Your Updated Medication List - Protect others around you: Learn how to safely use, store and throw away your medicines at www.disposemymeds.org.          This list is accurate as of 3/26/18  1:11 PM.  Always use your most recent med list.                   Brand Name Dispense Instructions for use Diagnosis    aspirin 81 MG EC tablet    ASPIRIN ADULT LOW STRENGTH    30 tablet    Take 1 tablet (81 mg) by mouth daily    Coronary artery disease involving native heart without angina pectoris, unspecified vessel or lesion type       BASAGLAR 100 UNIT/ML injection      Inject 37 Units Subcutaneous At Bedtime        * ONETOUCH ULTRA test strip   Generic drug:  blood glucose monitoring      TEST TWICE DAILY TO THREE TIMES DAILY        * blood glucose monitoring test strip    no brand specified     1 each        fluticasone 50 MCG/ACT spray    FLONASE     Spray 1 spray into both nostrils daily        furosemide 20 MG tablet    LASIX    30 tablet    Take 2 tablets (40 mg) by mouth daily    Cirrhosis of liver with ascites, unspecified hepatic cirrhosis  type (H)       [employer] LANCETS 21G Misc      by Misc.(Non-Drug; Combo Route) route once daily.        lactulose 10 GM/15ML solution    CHRONULAC    2700 mL    30 mLs (20 g) by Oral or NG Tube route 3 times daily    Hepatic encephalopathy (H)       LEVOTHYROXINE SODIUM PO      Take 150 mcg by mouth daily        losartan 25 MG tablet    COZAAR    30 tablet    Take 2 tablets (50 mg) by mouth daily    Essential hypertension       metoprolol tartrate 25 MG tablet    LOPRESSOR    60 tablet    Take 1 tablet (25 mg) by mouth 2 times daily    Paroxysmal atrial fibrillation (H)       NITROQUICK SL           PANTOPRAZOLE SODIUM PO      Take 40 mg by mouth daily        RA BLOOD PRESSURE CUFF MONITOR Misc      by Misc.(Non-Drug; Combo Route) route once daily.        rifaximin 550 MG Tabs tablet    XIFAXAN    60 tablet    Take 1 tablet (550 mg) by mouth 2 times daily    Hepatic encephalopathy (H)       sodium bicarbonate 650 MG tablet     120 tablet    Take 2 tablets (1,300 mg) by mouth 2 times daily    Chronic kidney disease, unspecified CKD stage       spironolactone 50 MG tablet    ALDACTONE    30 tablet    Take 1 tablet (50 mg) by mouth daily    Cirrhosis of liver with ascites, unspecified hepatic cirrhosis type (H)       WARFARIN SODIUM PO      Take 5 mg by mouth daily        * Notice:  This list has 2 medication(s) that are the same as other medications prescribed for you. Read the directions carefully, and ask your doctor or other care provider to review them with you.

## 2018-03-26 NOTE — LETTER
3/26/2018       RE: Serge Brambila  [address]  [facility] [address]     Dear Colleague,    Thank you for referring your patient, Serge Brambila, to the [facility] INTERVENTIONAL RADIOLOGY at [facility]. Please see a copy of my visit note below.          Interventional Radiology Clinic Visit    Date of this visit: 3/26/2018    Srege Brambila  is referred by  for treatment recommendations. 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  He was initially diagnosed in April 2017 at [facility]. He was admitted at the time for altered mental status from HE. An MRI abdomen from May 2017demonstrated a 2.6 x 4.1 cm mass consistent with HCC involving the right lobe of the liver.  For unclear reasons he had a CT-guided liver biopsy in June 2017 which came back as atypical cells. This was not followed up until a liver MRI on 2/5/18 that showed progression of the HCC, now measuring 9.2 x 7 cm with portal vein invasion.       He was admitted to [facility] in early March, acute anemia, and worsening ascites. Gastroenterology and oncology were consulted. MRI abdomen on 3/3/18 was definitive for HCC (OPTN 5x), with an 11cm right lobe lesion and tumor thrombus in the portal venous system. He was readmitted in mid march for HE and pneumonia.       Review of Systems:    General: Negative for recent fever.  Skin: Negative for jaundice.  Eyes: Negative for jaundice.  Respiratory: Negative for shortness of breath.  Cardiovascular: Negative for chest pain.  Gastrointestinal: Negative for abdominal pain or swelling, nausea, vomiting, or diarrhea.  Musculoskeletal: Negative for ankle swelling.    Past Medical/Surgical History:    Past Medical History:   Diagnosis Date     Atrial  fibrillation (H)      Diabetes (H)     type II     Hepatic encephalopathy (H)      Hypertension      MI (myocardial infarction)     stent placement     Sleep apnea      Thyroid disease      Past Surgical History:   Procedure Laterality Date     APPENDECTOMY  age 16     COLONOSCOPY       ESOPHAGOSCOPY, GASTROSCOPY, DUODENOSCOPY (EGD), COMBINED N/A 1/24/2018    Procedure: COMBINED ESOPHAGOSCOPY, GASTROSCOPY, DUODENOSCOPY (EGD);  ESOPHAGOGASTRODUODENOSCOPY (MAC) ;  Surgeon: Colton Stroud MD;  Location:  GI     GI SURGERY  2012    partial colectomy for pre-CA nodule, removed 10 in.       Current Medications:    Current Outpatient Prescriptions   Medication Sig Dispense Refill     Blood Pressure Monitoring (RA BLOOD PRESSURE CUFF MONITOR) MISC by Misc.(Non-Drug; Combo Route) route once daily.       blood glucose monitoring (NO BRAND SPECIFIED) test strip 1 each       blood glucose monitoring (ONETOUCH ULTRA) test strip TEST TWICE DAILY TO THREE TIMES DAILY       [employer] LANCETS 21G MISC by Misc.(Non-Drug; Combo Route) route once daily.       fluticasone (FLONASE) 50 MCG/ACT spray Spray 1 spray into both nostrils daily       rifaximin (XIFAXAN) 550 MG TABS tablet Take 1 tablet (550 mg) by mouth 2 times daily 60 tablet 3     aspirin (ASPIRIN ADULT LOW STRENGTH) 81 MG EC tablet Take 1 tablet (81 mg) by mouth daily 30 tablet 1     furosemide (LASIX) 20 MG tablet Take 2 tablets (40 mg) by mouth daily 30 tablet 1     spironolactone (ALDACTONE) 50 MG tablet Take 1 tablet (50 mg) by mouth daily 30 tablet 1     lactulose (CHRONULAC) 10 GM/15ML solution 30 mLs (20 g) by Oral or NG Tube route 3 times daily 2700 mL 1     metoprolol tartrate (LOPRESSOR) 25 MG tablet Take 1 tablet (25 mg) by mouth 2 times daily 60 tablet 1     losartan (COZAAR) 25 MG tablet Take 2 tablets (50 mg) by mouth daily 30 tablet 1     sodium bicarbonate 650 MG tablet Take 2 tablets (1,300 mg) by mouth 2 times daily 120 tablet 1     WARFARIN SODIUM  PO Take 5 mg by mouth daily        BASAGLAR 100 UNIT/ML injection Inject 37 Units Subcutaneous At Bedtime        LEVOTHYROXINE SODIUM PO Take 150 mcg by mouth daily        Nitroglycerin (NITROQUICK SL)        PANTOPRAZOLE SODIUM PO Take 40 mg by mouth daily          Allergies:    Penicillins    Family History:    No family history on file.    Social History:    Social History     Social History     Marital status:      Spouse name: N/A     Number of children: N/A     Years of education: N/A     Social History Main Topics     Smoking status: Former Smoker     Packs/day: 1.00     Years: 10.00     Quit date: 1/1/1999     Smokeless tobacco: Never Used     Alcohol use No     Drug use: No     Sexual activity: Not on file     Other Topics Concern     Not on file     Social History Narrative       Physical Exam:    /62  Pulse 60  SpO2 100%     GENERAL APPEARANCE: healthy, alert and no distress  PSYCHIATRIC: mentation appears normal and affect normal.  EYES: No jaundice.  SKIN: No jaundice.   RESP: lungs clear to auscultation - no rales, rhonchi or wheezes  CARDIOVASCULAR: regular rates and rhythm, normal S1 S2, no S3 or S4 and no murmur  ABDOMEN:  soft, nontender, no masses and bowel sounds normal.  MUSCULOSKELETAL: No edema in the lower extremities.    Laboratory Studies:    Lab Results   Component Value Date    HGB 8.1 03/23/2018     Lab Results   Component Value Date     03/23/2018     Lab Results   Component Value Date    WBC 5.4 03/23/2018       Lab Results   Component Value Date    INR 1.66 03/18/2018       Lab Results   Component Value Date    PROTTOTAL 6.6 03/23/2018      Lab Results   Component Value Date    ALBUMIN 2.6 03/23/2018     Lab Results   Component Value Date    BILITOTAL 0.9 03/23/2018     No results found for: BILICONJ   Lab Results   Component Value Date    ALKPHOS 204 03/23/2018     Lab Results   Component Value Date     03/23/2018     Lab Results   Component Value Date     ALT 62 03/23/2018       Lab Results   Component Value Date    CR 1.89 03/23/2018     No results found for: GFR    Alpha Fetoprotein   Date Value Ref Range Status   03/02/2018 <1.5 0 - 8 ug/L Final     Comment:     Assay Method:  Chemiluminescence using Siemens Centaur XP       Imaging:     I reviewed the MRI from 3/3/2018 which demonstrates an approximately 11cm infiltrative right lobe mass with portal vein invasion.       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     Child-Israel score: B(9)  Native MELD score: 18  ECOG performance status: 0-1    I believe the patient is a suitable candidate for a radioembolization procedure.    I showed Mr. Brambila the imaging and discussed the findings. I discussed with him that the goal of the procedure is disease control with a survival benefit rather than a cure given the nature of the disease, but that rarely in his case, lesions will be cured in this manner. Alternatives were reviewed, including surgery, but the patient is not a surgical candidate.  I explained the radioembolization procedure  - that it is a two step procedure on two different days that involves an angiogram and nuclear medicine study on each day, and that it requires insurance pre-approval. I explained that the first procedure involves mapping the arteries to the liver and embolizing any sidebranches that place the patient at risk of non-target radiation injury, then checking for shunting to the lungs. The second procedure, assuming relevant sidebranches were embolizable and the lung shunting is not too high, involves delivering the radiation beads intra-arterially and imaging the liver afterwards to assess the pattern of radiation distribution.   I explained that both procedures are performed under conscious sedation. We discussed what will happen before, during and after the procedure; what to expect in the post procedure recovery  period; and what the follow-up will be. We discussed the risks of the procedure which include, but are not limited to, vascular injury causing bleeding or occlusion of blood vessels, groin hematoma, infection, biloma, liver failure, non-target radiation injury to structures such as gallbladder/bowel/pancreas/lung (with risks such as bowel ulceration, pancreatitis or pneumonitis), and incomplete treatment. Risks are increased the greater the deviation from normal lab values, especially albumin and total bilirubin, and also the larger the area we must treat. Specifically, we discussed that his hepatic encephalopathy may be worsened.  We also discussed the post-radioembolization syndrome which consists of varying degrees of pain, nausea, and lethargy. I also explained that new tumors may develop elsewhere given the nature of the disease. Most patients go home the same day, but occasionally circumstances are such that a longer admission may be required. I also informed the patient that I will be assisted during the procedure by a fellow and/or resident, and that sometimes a medical student may be observing. All of Mr. Brambila's questions were answered and he agreed to proceed.     PLAN:  Pending insurance pre-approval, we will schedule him for the Y90 mapping procedure accordingly.      A total of 45 minutes was spent in care for the patient, of which >50% was spent in counseling and co-ordination of care.    It was a pleasure seeing the patient.     CC  Patient Care Team:  Linn Thompson as PCP - General (Family Practice)  RICHARD DIETZ      Again, thank you for allowing me to participate in the care of your patient.      Sincerely,    Fernando Montgomery MD

## 2018-03-26 NOTE — PROGRESS NOTES
Interventional Radiology Clinic Visit    Date of this visit: 3/26/2018    Serge Brambila  is referred by  for treatment recommendations. 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  He was initially diagnosed in April 2017 at [facility]. He was admitted at the time for altered mental status from HE. An MRI abdomen from May 2017demonstrated a 2.6 x 4.1 cm mass consistent with HCC involving the right lobe of the liver.  For unclear reasons he had a CT-guided liver biopsy in June 2017 which came back as atypical cells. This was not followed up until a liver MRI on 2/5/18 that showed progression of the HCC, now measuring 9.2 x 7 cm with portal vein invasion.       He was admitted to [facility] in early March, acute anemia, and worsening ascites. Gastroenterology and oncology were consulted. MRI abdomen on 3/3/18 was definitive for HCC (OPTN 5x), with an 11cm right lobe lesion and tumor thrombus in the portal venous system. He was readmitted in mid march for HE and pneumonia.       Review of Systems:    General: Negative for recent fever.  Skin: Negative for jaundice.  Eyes: Negative for jaundice.  Respiratory: Negative for shortness of breath.  Cardiovascular: Negative for chest pain.  Gastrointestinal: Negative for abdominal pain or swelling, nausea, vomiting, or diarrhea.  Musculoskeletal: Negative for ankle swelling.    Past Medical/Surgical History:    Past Medical History:   Diagnosis Date     Atrial fibrillation (H)      Diabetes (H)     type II     Hepatic encephalopathy (H)      Hypertension      MI (myocardial infarction)     stent placement     Sleep apnea      Thyroid disease      Past Surgical History:   Procedure Laterality Date     APPENDECTOMY  age 16     COLONOSCOPY       ESOPHAGOSCOPY,  GASTROSCOPY, DUODENOSCOPY (EGD), COMBINED N/A 1/24/2018    Procedure: COMBINED ESOPHAGOSCOPY, GASTROSCOPY, DUODENOSCOPY (EGD);  ESOPHAGOGASTRODUODENOSCOPY (MAC) ;  Surgeon: Colton Stroud MD;  Location:  GI     GI SURGERY  2012    partial colectomy for pre-CA nodule, removed 10 in.       Current Medications:    Current Outpatient Prescriptions   Medication Sig Dispense Refill     Blood Pressure Monitoring (RA BLOOD PRESSURE CUFF MONITOR) MISC by Misc.(Non-Drug; Combo Route) route once daily.       blood glucose monitoring (NO BRAND SPECIFIED) test strip 1 each       blood glucose monitoring (ONETOUCH ULTRA) test strip TEST TWICE DAILY TO THREE TIMES DAILY       [employer] LANCETS 21G MISC by Misc.(Non-Drug; Combo Route) route once daily.       fluticasone (FLONASE) 50 MCG/ACT spray Spray 1 spray into both nostrils daily       rifaximin (XIFAXAN) 550 MG TABS tablet Take 1 tablet (550 mg) by mouth 2 times daily 60 tablet 3     aspirin (ASPIRIN ADULT LOW STRENGTH) 81 MG EC tablet Take 1 tablet (81 mg) by mouth daily 30 tablet 1     furosemide (LASIX) 20 MG tablet Take 2 tablets (40 mg) by mouth daily 30 tablet 1     spironolactone (ALDACTONE) 50 MG tablet Take 1 tablet (50 mg) by mouth daily 30 tablet 1     lactulose (CHRONULAC) 10 GM/15ML solution 30 mLs (20 g) by Oral or NG Tube route 3 times daily 2700 mL 1     metoprolol tartrate (LOPRESSOR) 25 MG tablet Take 1 tablet (25 mg) by mouth 2 times daily 60 tablet 1     losartan (COZAAR) 25 MG tablet Take 2 tablets (50 mg) by mouth daily 30 tablet 1     sodium bicarbonate 650 MG tablet Take 2 tablets (1,300 mg) by mouth 2 times daily 120 tablet 1     WARFARIN SODIUM PO Take 5 mg by mouth daily        BASAGLAR 100 UNIT/ML injection Inject 37 Units Subcutaneous At Bedtime        LEVOTHYROXINE SODIUM PO Take 150 mcg by mouth daily        Nitroglycerin (NITROQUICK SL)        PANTOPRAZOLE SODIUM PO Take 40 mg by mouth daily          Allergies:    Penicillins    Family  History:    No family history on file.    Social History:    Social History     Social History     Marital status:      Spouse name: N/A     Number of children: N/A     Years of education: N/A     Social History Main Topics     Smoking status: Former Smoker     Packs/day: 1.00     Years: 10.00     Quit date: 1/1/1999     Smokeless tobacco: Never Used     Alcohol use No     Drug use: No     Sexual activity: Not on file     Other Topics Concern     Not on file     Social History Narrative       Physical Exam:    /62  Pulse 60  SpO2 100%     GENERAL APPEARANCE: healthy, alert and no distress  PSYCHIATRIC: mentation appears normal and affect normal.  EYES: No jaundice.  SKIN: No jaundice.   RESP: lungs clear to auscultation - no rales, rhonchi or wheezes  CARDIOVASCULAR: regular rates and rhythm, normal S1 S2, no S3 or S4 and no murmur  ABDOMEN:  soft, nontender, no masses and bowel sounds normal.  MUSCULOSKELETAL: No edema in the lower extremities.    Laboratory Studies:    Lab Results   Component Value Date    HGB 8.1 03/23/2018     Lab Results   Component Value Date     03/23/2018     Lab Results   Component Value Date    WBC 5.4 03/23/2018       Lab Results   Component Value Date    INR 1.66 03/18/2018       Lab Results   Component Value Date    PROTTOTAL 6.6 03/23/2018      Lab Results   Component Value Date    ALBUMIN 2.6 03/23/2018     Lab Results   Component Value Date    BILITOTAL 0.9 03/23/2018     No results found for: BILICONJ   Lab Results   Component Value Date    ALKPHOS 204 03/23/2018     Lab Results   Component Value Date     03/23/2018     Lab Results   Component Value Date    ALT 62 03/23/2018       Lab Results   Component Value Date    CR 1.89 03/23/2018     No results found for: GFR    Alpha Fetoprotein   Date Value Ref Range Status   03/02/2018 <1.5 0 - 8 ug/L Final     Comment:     Assay Method:  Chemiluminescence using Siemens Centaur XP       Imaging:     I reviewed  the MRI from 3/3/2018 which demonstrates an approximately 11cm infiltrative right lobe mass with portal vein invasion.       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     Child-Israel score: B(9)  Native MELD score: 18  ECOG performance status: 0-1    I believe the patient is a suitable candidate for a radioembolization procedure.    I showed Mr. Brambila the imaging and discussed the findings. I discussed with him that the goal of the procedure is disease control with a survival benefit rather than a cure given the nature of the disease, but that rarely in his case, lesions will be cured in this manner. Alternatives were reviewed, including surgery, but the patient is not a surgical candidate.  I explained the radioembolization procedure  - that it is a two step procedure on two different days that involves an angiogram and nuclear medicine study on each day, and that it requires insurance pre-approval. I explained that the first procedure involves mapping the arteries to the liver and embolizing any sidebranches that place the patient at risk of non-target radiation injury, then checking for shunting to the lungs. The second procedure, assuming relevant sidebranches were embolizable and the lung shunting is not too high, involves delivering the radiation beads intra-arterially and imaging the liver afterwards to assess the pattern of radiation distribution.   I explained that both procedures are performed under conscious sedation. We discussed what will happen before, during and after the procedure; what to expect in the post procedure recovery period; and what the follow-up will be. We discussed the risks of the procedure which include, but are not limited to, vascular injury causing bleeding or occlusion of blood vessels, groin hematoma, infection, biloma, liver failure, non-target radiation injury to structures such as  gallbladder/bowel/pancreas/lung (with risks such as bowel ulceration, pancreatitis or pneumonitis), and incomplete treatment. Risks are increased the greater the deviation from normal lab values, especially albumin and total bilirubin, and also the larger the area we must treat. Specifically, we discussed that his hepatic encephalopathy may be worsened.  We also discussed the post-radioembolization syndrome which consists of varying degrees of pain, nausea, and lethargy. I also explained that new tumors may develop elsewhere given the nature of the disease. Most patients go home the same day, but occasionally circumstances are such that a longer admission may be required. I also informed the patient that I will be assisted during the procedure by a fellow and/or resident, and that sometimes a medical student may be observing. All of Mr. Brambila's questions were answered and he agreed to proceed.     PLAN:  Pending insurance pre-approval, we will schedule him for the Y90 mapping procedure accordingly.      A total of 45 minutes was spent in care for the patient, of which >50% was spent in counseling and co-ordination of care.    It was a pleasure seeing the patient.     Signed,    Fernando Montgomery M.D.  Department of Interventional Radiology  [facility]     CC  Patient Care Team:  Linn Thompson as PCP - General (Family Practice)  RICHARD DIETZ

## 2018-03-27 NOTE — TELEPHONE ENCOUNTER
Chun Phillips,         Thank you for submitting a referral for patient [id number]. This patient has Medicare as their primary insurance.  Medicare does not allow for pre-determination or authorization prior to treatment.  This patients secondary plan benefits are summarized below.         [facility] : [phone]    Spoke to Iban Jimenez Ref#: [id number]         Effective Date: 01/01/2017-current    Plan type: PPO    Referral: No    Calendar Year Plan    Fully-Funded         Radiology Benefit Manager: NO    Radiation Therapy Manager: NO         Provider and Facility: Same    Patient is covered at 100% of allowable    LTM: Unlimited         Per the FDA approved package insert, SIR-Spheres  is indicated for the treatment of non-resectable metastatic liver tumors from colorectal cancer with adjuvant intra-hepatic artery (HAC) FUDR (Floxuridine) chemotherapy. Medicare should cover 80% of the Medicare eligible charges for outpatient hospital, based on claims review, however, your patients diagnosis is HCC. The patient s secondary plan benefits are summarized above and should cover the remaining balance after Medicare claims are paid.         As a proactive measure we recommend that your facility and physician submit supporting clinical documentation with the claim to justify the off-label use.. We recommend sending a  copy of the treating physicians consult note that summarizes the clinical evidence for the  off-label use.          Your Medicare contractor has not published a local coverage determination (LCD) or medical coverage policy for Yttrium-90. In the event that there is a denial of services, I will assist you with the appeal process. If you have any additional questions or concerns please do not hesitate to contact me.         Thank you         Have a great day!

## 2018-03-28 NOTE — TELEPHONE ENCOUNTER
Wife called to inform us that pt's INR is 2.0.     We will schedule accordingly and then call back with appt details to safely loop in PCP to assist with coumadin bridging therapy.   Jacqueline Valverde RN, BSN  Interventional Radiology Nurse Coordinator   Phone: [phone]

## 2018-03-30 PROBLEM — K74.60 LIVER CIRRHOSIS SECONDARY TO NASH (H): Status: ACTIVE | Noted: 2018-01-01

## 2018-03-30 PROBLEM — R18.8 ASCITES OF LIVER: Status: ACTIVE | Noted: 2018-01-01

## 2018-03-30 PROBLEM — K75.81 LIVER CIRRHOSIS SECONDARY TO NASH (H): Status: ACTIVE | Noted: 2018-01-01

## 2018-03-30 NOTE — TELEPHONE ENCOUNTER
Received a msg from Dr. Durga Pillai, b/c Dr. Thompson is on vacation that pt can just stop his coumadin 5 days prior to the procedure and restart the night after his delivery.    Pt is on coumadin for a.fib in which this hasn't been evaluated for over one year. Pt does have a cardiologist. Dr. Draper in which he hasn't seen. I did advise for her to see if they can make an appt to f/u to look into this. She states that he had symptoms but it was never really looked into for this particular a.fib diagnosis.     I once again urged her to make an appt. In which she will.     Otherwise we will continue to follow up with the protocols regarding coumadin and  Have him stop 5 days prior to.     She agrees to plan .    Jacqueline Valverde RN, BSN  Interventional Radiology Nurse Coordinator   Phone: [phone]

## 2018-03-30 NOTE — TELEPHONE ENCOUNTER
Called and spoke to pt regarding his Y90 appt details.   He states that he would like for me to talk with his wife instead.     Called wife Tori on her cell phone.  Informed her that we did schedule Mapping for Mon 4/16   Delivery for Weds 4/18.     Appt details/time/location given to wife.   Will send out letter for their convenience.     I did inform her that I will call his PCP for management of his coumadin. I did inform her that IR protocols is to have pt off coumadin 5 days prior to, or have his INR 1.8 or below. They will help manage bridging if needed, however we will have the PCP manage this.     She states that pt is also due for a paracentesis. All Andrés have been performed while pt has been inpatient. Chart reviewed last noted para was inpatient on 3/18 with 1.7 liters removed.     She states that pt may be in denial as she states that pt has been short of breath and abdomen is large. She states that she's not sure in regards to how to schedule the para. I informed her that I will consult with Dr. Montgomery and we will see if we can put in orders until he sees Dr. Culver in Hepatology in May. I will also inform Dr. Willis of this information as well. She agrees to plan.     *Called infusion center: soonest appt Fri 4/6. @ 730am. I've asked for him to be placed on a waiting list to be scheduled sooner if possible.     *Called coumadin clinic ciera Lam a msg for someone to call me back to assist with coumadin bridging therapy.      Jacqueline Valverde RN, BSN  Interventional Radiology Nurse Coordinator   Phone: [phone]

## 2018-04-06 NOTE — MR AVS SNAPSHOT
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  The following information is a summary of our appointment as well as important reminders.      Discharge Instructions for Paracentesis  Paracentesis is a procedure to remove extra fluid from your belly (abdomen). This fluid buildup in the abdomen is called ascites. The procedure may have been done to take a sample of the fluid. Or, it may have been done to drain the extra fluid from your abdomen and help make you more comfortable.     Ascites is buildup of excess fluid in the abdomen.   Home care    If you have pain after the procedure, your healthcare provider can prescribe or recommend pain medicines. Take these exactly as directed. If you stopped taking other medicines before the procedure, ask your provider when you can start them again.    Take it easy for 24 hours after the procedure. Avoid physical activity until your provider says it s OK.    You will have a small bandage over the puncture site. Stitches (sutures), surgical staples, adhesive tapes, adhesive strips, or surgical glue may be used to close the incision. They also help stop bleeding and speed healing. You may take the bandage off in 24 hours.    Check the puncture site for the signs of infection listed below.  Follow-up care  Make a follow-up appointment with your healthcare provider as directed. During your follow-up visit, your provider will check your  healing. Let your provider know how you are feeling. You can also discuss the cause of your ascites and if you need any further treatment.  When to seek medical advice  Call your healthcare provider if you have any of the following after the procedure:    A fever of 100.4 F (38.0 C) or higher    Trouble breathing    Pain that doesn't go away even after taking pain medicine    Belly pain not caused by having the skin punctured    Bleeding from the puncture site    More than a small amount of fluid leaking from the puncture site    Swollen belly    Signs of infection at the puncture site. These include increased pain, redness, or swelling, warmth, or bad-smelling drainage.    Blood in your urine    Feeling dizzy or lightheaded, or fainting   Date Last Reviewed: 7/1/2016 2000-2017 [employer]. [address] [place] [address]. All rights reserved. This information is not intended as a substitute for professional medical care. Always follow your healthcare professional's instructions.      We look forward in seeing you on your next appointment here at [facility].  Please don t hesitate to call us at [phone] to reschedule any of your appointments or to speak with one of the [facility] registered nurses.  It was a pleasure taking care of you today.    Sincerely,    [facility]  Specialty Infusion [facility] Procedure Center  [address] [place] [address]  [address]  Phone:  [phone]            Follow-ups after your visit        Your next 10 appointments already scheduled     Apr 16, 2018  6:30 AM CDT   Procedure 6.5 hour with U2A ROOM 1   [address]A [facility] ([facility], [facility])    [address] [place] [address]               Apr 16, 2018  8:00 AM CDT   (Arrive by 7:45 AM)   IR VISCERAL ANGIOGRAM with UUIR4   [facility] [place], Interventional Radiology ([facility], [facility])    [address] [facility]  [place]  [facility] [address]   [phone]           1. Your doctor will need to do a history and physical within 30 days before this procedure. 2. Your doctor will determine whether you need a 12 lead EKG, as well as which medications should not be taken the morning of the exam. 3. Laboratory tests are to be obtained by your doctor prior to the exam (creatinine, Hgb/Hct, platelet count, and INR) 4. If you have allergies to x-ray contrast or iodine, contact your doctor or a Radiology nurse prior to the exam day for instructions. 5. Someone will need to drive you to and from the hospital. 6. Bring a list of all drugs you are taking; include supplements and over-the-counter medications. 7. Wear comfortable clothes and leave your valuables at home. 8. If you are or may be pregnant, contact your doctor or a Radiology nurse prior to the day of the exam. 9.  If you have diabetes, check with your doctor or a Radiology nurse to see if your insulin needs to be adjusted for the exam. 10. If you are taking Coumadin (to thin you blood) please contact your doctor or a Radiology nurse at least 5 days before the exam for special instructions. 11. You should not have received barium (x-ray contrast) within 48 hours of this exam. 12. The day before your exam you may eat your regular diet and are encouraged to drink at least 2 quarts of clear liquids. Drink no alcoholic beverages for 24 hours prior to the exam. 13. If you have a colostomy you will need to irrigate it with tap water at 8PM the evening before and again at 6AM the morning of the exam. 14. Do not smoke for 24 hours prior to the procedure. 15. Do not eat any solid food or milk products for 6 hours prior to the exam. You may drink clear liquids until 2 hours prior to the exam. Clear liquids include the following: water, Jell-O, clear broth, apple juice or any non-carbonated drink that you can see through (no pop!) 16. The morning of the exam you may brush your teeth  and take medications as directed with a sip of water. 17. Tell the Radiology nurse if you have any allergies. 18. You will be asked to empty your bladder before the exam begins. 19. Following the exam you will need to remain on complete bedrest for 4-6 hours. The nurse will monitor your vital signs, puncture site, and the pulses and temperature of the arm or leg that was punctured. 20. When discharged, you cannot drive until morning, and an adult must be with you until then. You should stay in the [place] area overnight.            Apr 16, 2018 12:00 PM CDT   (Arrive by 11:45 AM)   NM Y-90 SEPCT MAPPING with UUNM3   [facility], Nuclear Medicine ([facility])    [address] [facility] [address] [phone]           Please bring a list of your medicines to the exam. (Include vitamins, minerals and over-the-counter drugs.) You should wear comfortable clothes. Leave your valuables at home. Please bring related prior results and films.  Tell your doctor:   If you are breastfeeding or may be pregnant.   If you have had a barium test within the past 48 hours. Barium may change the results of certain exams.   If you think you may need sedation (medicine to help you relax).  You may eat and drink as normal.  Please call your Imaging Department at your exam site with any questions.            Apr 18, 2018  6:30 AM CDT   Procedure 6.5 hour with U2A ROOM 16   Unit 2A [facility] ([facility])    [address] [facility] [address]               Apr 18, 2018  8:30 AM CDT   (Arrive by 8:15 AM)   IR PERIPHERAL VISCERAL EMBOLIZATION with UUIR5   [facility], Interventional Radiology ([facility])    [address] [facility] [id number] [phone]           1. You will need to have had a history and physical exam within 7 days of the procedure. 2.  Laboratory test are to be obtained by your doctor prior to the exam (CBCP, INR and PTT) 3. Someone will need to drive you to and from the hospital. 4. If you are or may be pregnant, contact your doctor or a Radiology nurse prior to the day of the exam. 5. If you have diabetes, check with your doctor or a Radiology nurse to see if your insulin needs to be adjusted for the exam. 6. If you are taking Coumadin (to thin you blood) please contact your doctor or a Radiology nurse at least 3 days before the exam for special instructions. 7. The day before your exam you may eat your regular diet and are encouraged to drink at least 2 quarts of clear liquids. Drink no alcoholic beverages for 24 hours prior to the exam. 8. Do not eat any solid food or milk products for 6 hours prior to the exam. You may drink clear liquids until 2 hours prior to the exam. Clear liquids include the following: water, Jell-O, clear broth, apple juice or any noncarbonated drink that you can see through (no pop!) 9. The morning of the exam you may brush your teeth and take medications as directed with a sip of water. 10. Tell the Radiology nurse if you have any allergies.            Apr 18, 2018  2:30 PM CDT   (Arrive by 2:15 PM)   NM SIRSPHERE THERAPY with UUNM3   [facility] [place], Nuclear Medicine ([facility] [place])    [address] [facility] [address] [phone]           Please bring a list of your medicines to the exam. (Include vitamins, minerals and over-the-counter drugs.) You should wear comfortable clothes. Leave your valuables at home. Please bring related prior results and films.  Tell your doctor:   If you are breastfeeding or may be pregnant.   If you have had a barium test within the past few days. Barium may change the results of certain exams.   If you think you may need sedation (medicine to help you relax).  You may eat and drink as normal.  Please call your Imaging  "Department at your exam site with any questions.            Apr 24, 2018  7:30 AM CDT   Paracentesis Visit with Uc Spec Inf Para Provider, UC 40 ATC   [facility] Special[facility] ([facility])    [address]  [address]  [facility] [address] [phone]            May 09, 2018  8:00 AM CDT   Lab with UC LAB   [facility] ([facility])    [address]  1st Floor  [facility] [address] [phone]            May 09, 2018  9:00 AM CDT   (Arrive by 8:45 AM)   Return General Liver with Yennifer Culver MD   [facility] Hepatology ([facility])    [address]  [address]  [facility] [address] [phone]              Who to contact     If you have questions or need follow up information about today's clinic visit or your schedule please contact [facility] SPECIALTY AND PROCEDURE directly at [phone].  Normal or non-critical lab and imaging results will be communicated to you by [employer], letter or phone within 4 business days after the clinic has received the results. If you do not hear from us within 7 days, please contact the clinic through [employer] or phone. If you have a critical or abnormal lab result, we will notify you by phone as soon as possible.  Submit refill requests through [employer] or call your pharmacy and they will forward the refill request to us. Please allow 3 business days for your refill to be completed.          Additional Information About Your Visit        [employer] Information     [employer] lets you send messages to your doctor, view your test results, renew your prescriptions, schedule appointments and more. To sign up, go to [email or web address] . Click on \"Log in\" on the left side of the screen, which will take you to the Welcome page. Then click on \"Sign up Now\" on the right side of the page.     You will be asked " to enter the access code listed below, as well as some personal information. Please follow the directions to create your username and password.     Your access code is: [email or web address]  Expires: 2018 11:57 AM     Your access code will  in 90 days. If you need help or a new code, please call your [place] clinic or [phone].        Care EveryWhere ID     This is your Care EveryWhere ID. This could be used by other organizations to access your [place] medical records  [id number]        Your Vitals Were     Pulse Temperature Respirations Pulse Oximetry BMI (Body Mass Index)       60 99.1  F (37.3  C) (Oral) 16 99% 34.89 kg/m2        Blood Pressure from Last 3 Encounters:   18 126/44   18 137/62   18 131/49    Weight from Last 3 Encounters:   18 104.1 kg (229 lb 6.4 oz)   18 107.4 kg (236 lb 11.2 oz)   18 106.7 kg (235 lb 3.7 oz)              We Performed the Following     US Paracentesis        Primary Care Provider Office Phone # Fax #    Linn GONZALEZ Thompson [phone]       [facility] [address]  [facility] [address]        Equal Access to Services     FRANK CONROY : Hadii prateek ku hadasho Soangel, waaxda luqadaha, qaybta kaalmada adeegyada, miley sesay hayalan holt . So [facility] [phone].    ATENCIÓN: Si habla español, tiene a collins disposición servicios gratuitos de asistencia lingüística. Llame al [phone].    We comply with applicable federal civil rights laws and [place] laws. We do not discriminate on the basis of race, color, national origin, age, disability, sex, sexual orientation, or gender identity.            Thank you!     Thank you for choosing [facility] SPECIALTY AND PROCEDURE  for your care. Our goal is always to provide you with excellent care. Hearing back from our patients is one way we can continue to improve our services. Please take a few minutes to complete the written survey  that you may receive in the mail after your visit with us. Thank you!             Your Updated Medication List - Protect others around you: Learn how to safely use, store and throw away your medicines at www.disposemymeds.org.          This list is accurate as of 4/6/18  9:14 AM.  Always use your most recent med list.                   Brand Name Dispense Instructions for use Diagnosis    aspirin 81 MG EC tablet    ASPIRIN ADULT LOW STRENGTH    30 tablet    Take 1 tablet (81 mg) by mouth daily    Coronary artery disease involving native heart without angina pectoris, unspecified vessel or lesion type       BASAGLAR 100 UNIT/ML injection      Inject 37 Units Subcutaneous At Bedtime        * ONETOUCH ULTRA test strip   Generic drug:  blood glucose monitoring      TEST TWICE DAILY TO THREE TIMES DAILY        * blood glucose monitoring test strip    no brand specified     1 each        fluticasone 50 MCG/ACT spray    FLONASE     Spray 1 spray into both nostrils daily        furosemide 20 MG tablet    LASIX    30 tablet    Take 2 tablets (40 mg) by mouth daily    Cirrhosis of liver with ascites, unspecified hepatic cirrhosis type (H)       [employer] LANCETS 21G Misc      by Misc.(Non-Drug; Combo Route) route once daily.        lactulose 10 GM/15ML solution    CHRONULAC    2700 mL    30 mLs (20 g) by Oral or NG Tube route 3 times daily    Hepatic encephalopathy (H)       LEVOTHYROXINE SODIUM PO      Take 150 mcg by mouth daily        losartan 25 MG tablet    COZAAR    30 tablet    Take 2 tablets (50 mg) by mouth daily    Essential hypertension       metoprolol tartrate 25 MG tablet    LOPRESSOR    60 tablet    Take 1 tablet (25 mg) by mouth 2 times daily    Paroxysmal atrial fibrillation (H)       NITROQUICK SL           PANTOPRAZOLE SODIUM PO      Take 40 mg by mouth daily        RA BLOOD PRESSURE CUFF MONITOR Misc      by Misc.(Non-Drug; Combo Route) route once daily.        rifaximin 550 MG Tabs tablet    XIFAXAN    60  tablet    Take 1 tablet (550 mg) by mouth 2 times daily    Hepatic encephalopathy (H)       sodium bicarbonate 650 MG tablet     120 tablet    Take 2 tablets (1,300 mg) by mouth 2 times daily    Chronic kidney disease, unspecified CKD stage       spironolactone 50 MG tablet    ALDACTONE    30 tablet    Take 1 tablet (50 mg) by mouth daily    Cirrhosis of liver with ascites, unspecified hepatic cirrhosis type (H)       WARFARIN SODIUM PO      Take 5 mg by mouth daily        * Notice:  This list has 2 medication(s) that are the same as other medications prescribed for you. Read the directions carefully, and ask your doctor or other care provider to review them with you.

## 2018-04-06 NOTE — PATIENT INSTRUCTIONS
Dear Serge Brambila    Thank you for choosing [facility] ([facility]) for your Paracentesis.  The following information is a summary of our appointment as well as important reminders.      Discharge Instructions for Paracentesis  Paracentesis is a procedure to remove extra fluid from your belly (abdomen). This fluid buildup in the abdomen is called ascites. The procedure may have been done to take a sample of the fluid. Or, it may have been done to drain the extra fluid from your abdomen and help make you more comfortable.     Ascites is buildup of excess fluid in the abdomen.   Home care    If you have pain after the procedure, your healthcare provider can prescribe or recommend pain medicines. Take these exactly as directed. If you stopped taking other medicines before the procedure, ask your provider when you can start them again.    Take it easy for 24 hours after the procedure. Avoid physical activity until your provider says it s OK.    You will have a small bandage over the puncture site. Stitches (sutures), surgical staples, adhesive tapes, adhesive strips, or surgical glue may be used to close the incision. They also help stop bleeding and speed healing. You may take the bandage off in 24 hours.    Check the puncture site for the signs of infection listed below.  Follow-up care  Make a follow-up appointment with your healthcare provider as directed. During your follow-up visit, your provider will check your healing. Let your provider know how you are feeling. You can also discuss the cause of your ascites and if you need any further treatment.  When to seek medical advice  Call your healthcare provider if you have any of the following after the procedure:    A fever of 100.4 F (38.0 C) or higher    Trouble breathing    Pain that doesn't go away even after taking pain medicine    Belly pain not caused by having the skin punctured    Bleeding from  the puncture site    More than a small amount of fluid leaking from the puncture site    Swollen belly    Signs of infection at the puncture site. These include increased pain, redness, or swelling, warmth, or bad-smelling drainage.    Blood in your urine    Feeling dizzy or lightheaded, or fainting   Date Last Reviewed: 7/1/2016 2000-2017 [employer]. [address] [place] [address]. All rights reserved. This information is not intended as a substitute for professional medical care. Always follow your healthcare professional's instructions.      We look forward in seeing you on your next appointment here at [facility].  Please don t hesitate to call us at [phone] to reschedule any of your appointments or to speak with one of the [facility] registered nurses.  It was a pleasure taking care of you today.    Sincerely,    [facility]  Specialty Infusion & Procedure Center  [address] [place]  [address]  Phone:  [phone]

## 2018-04-06 NOTE — PROGRESS NOTES
Paracentesis Nursing Note  Serge Brambila [facility] for [facility].    During today's appointment orders from Dr. Montgomery were completed.    Progress Note:  Patient identification verified by name and date of birth.  Assessment completed.  Vitals monitored throughout appointment and recorded in Doc Flowsheets.  See proceduralist note in ultrasound.    Date of consent or authorization: 4/6/18.  Invasive Procedure Safety Checklist was completed and sent for scanning.     Paracentesis performed by Devin Hoang PA-C Radiology.    The following labs were communicated to provider performing paracentesis:  Lab Results   Component Value Date     03/23/2018       Total amount of ascites fluid drained: 2.9 liters.  Color of ascites fluid: straw colored.  Total amount of albumin given: 37.5  grams.    Patient tolerated procedure well.    Post procedure,denies pain or discomfort post paracentesis.    Per pt left-sided rib pain from recent fall.  Pain improves with repositioning.  Post-paracentesis also helped decrease pain.  Pt to make an appointment with primary care doctor.  Pt and wife verbalized understanding.    Discharge Plan:  Discharge instructions were reviewed with patient.  Patient/Representative verbalized understanding and all questions were answered.   [facility] in stable condition.    KATIE CALDERON, LEEANNE    Administrations This Visit     albumin human 25 % injection 12.5 g     Admin Date Action Dose Route Administered By             04/06/2018 New Bag 12.5 g Intravenous Ally Timmons RN              Admin Date Action Dose Route Administered By             04/06/2018 New Bag 12.5 g Intravenous Ally Timmons, LEEANNE              Admin Date Action Dose Route Administered By             04/06/2018 New Bag 12.5 g Intravenous Ally Timmons, RN                    lidocaine 1 % 20 mL     Admin Date  Action Dose Route Administered By             04/06/2018 Given by Other 20 mL Other Ally Timmons, RN                          /42  Pulse 61  Temp 99.1  F (37.3  C) (Oral)  Resp 16  SpO2 99%

## 2018-04-16 NOTE — IP AVS SNAPSHOT
MRN:[id number]                      After Visit Summary   4/16/2018    Serge Brmabila    MRN: [id number]           Visit Information        Department      4/16/2018  6:39 AM [address] [facility]          Review of your medicines      UNREVIEWED medicines. Ask your doctor about these medicines        Dose / Directions    aspirin 81 MG EC tablet   Commonly known as:  ASPIRIN ADULT LOW STRENGTH   Used for:  Coronary artery disease involving native heart without angina pectoris, unspecified vessel or lesion type        Dose:  81 mg   Take 1 tablet (81 mg) by mouth daily   Quantity:  30 tablet   Refills:  1       BASAGLAR 100 UNIT/ML injection        Dose:  37 Units   Inject 37 Units Subcutaneous At Bedtime   Refills:  0       fluticasone 50 MCG/ACT spray   Commonly known as:  FLONASE        Dose:  1 spray   Spray 1 spray into both nostrils daily   Refills:  0       furosemide 20 MG tablet   Commonly known as:  LASIX   Used for:  Cirrhosis of liver with ascites, unspecified hepatic cirrhosis type (H)        Dose:  40 mg   Take 2 tablets (40 mg) by mouth daily   Quantity:  30 tablet   Refills:  1       lactulose 10 GM/15ML solution   Commonly known as:  CHRONULAC   Used for:  Hepatic encephalopathy (H)        Dose:  20 g   30 mLs (20 g) by Oral or NG Tube route 3 times daily   Quantity:  2700 mL   Refills:  1       LEVOTHYROXINE SODIUM PO        Dose:  150 mcg   Take 150 mcg by mouth daily   Refills:  0       losartan 25 MG tablet   Commonly known as:  COZAAR   Used for:  Essential hypertension        Dose:  50 mg   Take 2 tablets (50 mg) by mouth daily   Quantity:  30 tablet   Refills:  1       metoprolol tartrate 25 MG tablet   Commonly known as:  LOPRESSOR   Used for:  Paroxysmal atrial fibrillation (H)        Dose:  25 mg   Take 1 tablet (25 mg) by mouth 2 times daily   Quantity:  60 tablet   Refills:  1       NITROQUICK SL        Refills:  0       PANTOPRAZOLE SODIUM PO        Dose:  40 mg    Take 40 mg by mouth daily   Refills:  0       rifaximin 550 MG Tabs tablet   Commonly known as:  XIFAXAN   Indication:  hepatic encephalopathy   Used for:  Hepatic encephalopathy (H)        Dose:  550 mg   Take 1 tablet (550 mg) by mouth 2 times daily   Quantity:  60 tablet   Refills:  3       sodium bicarbonate 650 MG tablet   Used for:  Chronic kidney disease, unspecified CKD stage        Dose:  1300 mg   Take 2 tablets (1,300 mg) by mouth 2 times daily   Quantity:  120 tablet   Refills:  1       spironolactone 50 MG tablet   Commonly known as:  ALDACTONE   Used for:  Cirrhosis of liver with ascites, unspecified hepatic cirrhosis type (H)        Dose:  50 mg   Take 1 tablet (50 mg) by mouth daily   Quantity:  30 tablet   Refills:  1       WARFARIN SODIUM PO   Indication:  Warfarin through [facility]        Dose:  5 mg   Take 5 mg by mouth daily   Refills:  0         CONTINUE these medicines which have NOT CHANGED        Dose / Directions    * ONETOUCH ULTRA test strip   Generic drug:  blood glucose monitoring        TEST TWICE DAILY TO THREE TIMES DAILY   Refills:  0       * blood glucose monitoring test strip   Commonly known as:  no brand specified        Dose:  1 each   1 each   Refills:  0       [employer] LANCETS 21G Misc        by Misc.(Non-Drug; Combo Route) route once daily.   Refills:  0       RA BLOOD PRESSURE CUFF MONITOR Misc        by Misc.(Non-Drug; Combo Route) route once daily.   Refills:  0       * Notice:  This list has 2 medication(s) that are the same as other medications prescribed for you. Read the directions carefully, and ask your doctor or other care provider to review them with you.             Protect others around you: Learn how to safely use, store and throw away your medicines at www.disposemymeds.org.         Follow-ups after your visit        Your next 10 appointments already scheduled     Apr 16, 2018 12:00 PM CDT   (Arrive by 11:45 AM)   NM Y-90 SEPCT MAPPING with UUNM3   [facility],  [place], Nuclear Medicine ([facility])    [address] [address] [phone]           Please bring a list of your medicines to the exam. (Include vitamins, minerals and over-the-counter drugs.) You should wear comfortable clothes. Leave your valuables at home. Please bring related prior results and films.  Tell your doctor:   If you are breastfeeding or may be pregnant.   If you have had a barium test within the past 48 hours. Barium may change the results of certain exams.   If you think you may need sedation (medicine to help you relax).  You may eat and drink as normal.  Please call your Imaging Department at your exam site with any questions.            Apr 18, 2018  6:30 AM CDT   Procedure 6.5 hour with U2A ROOM 16   [address] [facility] ([facility])    [address] [address]               Apr 18, 2018  8:30 AM CDT   (Arrive by 8:15 AM)   IR PERIPHERAL VISCERAL EMBOLIZATION with UUIR5   [facility], Interventional Radiology ([facility])    [address] [address] [phone]           1. You will need to have had a history and physical exam within 7 days of the procedure. 2. Laboratory test are to be obtained by your doctor prior to the exam (CBCP, INR and PTT) 3. Someone will need to drive you to and from the hospital. 4. If you are or may be pregnant, contact your doctor or a Radiology nurse prior to the day of the exam. 5. If you have diabetes, check with your doctor or a Radiology nurse to see if your insulin needs to be adjusted for the exam. 6. If you are taking Coumadin (to thin you blood) please contact your doctor or a Radiology nurse at least 3 days before the exam for special instructions. 7. The day before your exam you may eat your regular diet and are encouraged to drink at least 2  quarts of clear liquids. Drink no alcoholic beverages for 24 hours prior to the exam. 8. Do not eat any solid food or milk products for 6 hours prior to the exam. You may drink clear liquids until 2 hours prior to the exam. Clear liquids include the following: water, Jell-O, clear broth, apple juice or any noncarbonated drink that you can see through (no pop!) 9. The morning of the exam you may brush your teeth and take medications as directed with a sip of water. 10. Tell the Radiology nurse if you have any allergies.            Apr 18, 2018  2:30 PM CDT   (Arrive by 2:15 PM)   NM SIRSPHERE THERAPY with UUNM3   [facility], [place], Nuclear Medicine ([facility])    [address] [address] [phone]           Please bring a list of your medicines to the exam. (Include vitamins, minerals and over-the-counter drugs.) You should wear comfortable clothes. Leave your valuables at home. Please bring related prior results and films.  Tell your doctor:   If you are breastfeeding or may be pregnant.   If you have had a barium test within the past few days. Barium may change the results of certain exams.   If you think you may need sedation (medicine to help you relax).  You may eat and drink as normal.  Please call your Imaging Department at your exam site with any questions.            Apr 24, 2018  7:30 AM CDT   Paracentesis Visit with  Spec Inf Para Provider,  40 ATC   [facility] Special[facility] Procedure ([facility])    [address]  [address]  [facility] [address] [phone]            May 09, 2018  8:00 AM CDT   Lab with  LAB   [facility] Lab ([facility])    [address]  1st Floor  [facility] [id number] [phone]            May 09, 2018  9:00 AM CDT   (Arrive by 8:45 AM)   Return General Liver with Yennifer Culver MD   [facility]  Hepatology ([facility])    [address]  [address]  [facility] [address] [phone]               Care Instructions        Further instructions from your care team               [facility]   Interventional Radiology  Discharge Instructions Post Angiography for Y-90 Mapping    AFTER YOU GO HOME          Relax and take it easy for 24 hours.       Drink plenty of fluids.       Resume your regular diet, unless otherwise instructed by your Primary Physician.       DO NOT smoke for at least 24 hours, if you were given any sedation.       DO NOT drink alcoholic beverages the day of your procedure.       Do not drive or operate machinery at home or at work for 24 hours.          DO NOT do any strenuous exercise or lifting for at least 2 days following your Procedure.       DO NOT take a bath or shower for at least 12 hours following your procedure.       DO NOT make any important or legal decisions for 24 hours following your procedure.    CALL THE PHYSICIAN IF:       - You start bleeding from the procedure site. lie down flat and hold pressure on the site. A small lump or bruise is common at the puncture site. Your physician will tell you if you need to return to the hospital.      - You develop numbness, coolness or a change in color of the leg that was punctured.      - You experience increased pain or redness at the puncture site.      - You develop hives or a rash or unexplained itching.      - You develop a temperature of 101 degrees F or greater.    Additional Information:           Support the puncture site for coughing, sneezing, or moving your bowels for the first 48 hours  No tub bath, hot tubs, or swimming for 5 days  No lotion or powder to the puncture site for 3 day      Closure Device:Mynx            [facility] INTERVENTIONAL RADIOLOGY DEPARTMENT         Procedure Physician: SAWYER Montgomery/FADI Graves                             Date of Procedure:April 16,  "2018       Telephone Numbers:   [phone]     Monday-Friday 8:00 to 4:30 pm                                        [phone]     After 4:30 pm Monday-Friday, Weekend and Holidays. Ask for the Interventional Radiologist on call. Someone is on call 24 hrs/day.                     Additional Information About Your Visit        [employer] Information     [employer] gives you secure access to your electronic health record. If you see a primary care provider, you can also send messages to your care team and make appointments. If you have questions, please call your primary care clinic.  If you do not have a primary care provider, please call [phone] and they will assist you.        Care EveryWhere ID     This is your Care EveryWhere ID. This could be used by other organizations to access your [facility] medical records  [id number]        Your Vitals Were     Blood Pressure Pulse Temperature Respirations Height Weight    156/61 60 99  F (37.2  C) (Oral) 11 1.727 m (5' 8\") 104.3 kg (230 lb)    Pulse Oximetry BMI (Body Mass Index)                100% 34.97 kg/m2           Primary Care Provider Office Phone # Fax #    Linn Thompson [phone]      Equal Access to Services     FRANK CONROY AH: Hadii prateek curiel hadasho Sojerryali, waaxda luqadaha, qaybta kaalmada adeegyada, miley guerrero. So [facility] [phone].    ATENCIÓN: Si habla español, tiene a collins disposición servicios gratuitos de asistencia lingüística. Rufina al [phone].    We comply with applicable federal civil rights laws and [place] laws. We do not discriminate on the basis of race, color, national origin, age, disability, sex, sexual orientation, or gender identity.            Thank you!     Thank you for choosing [facility] for your care. Our goal is always to provide you with excellent care. Hearing back from our patients is one way we can continue to improve our services. Please take a few minutes to complete the written " survey that you may receive in the mail after you visit with us. Thank you!             Medication List: This is a list of all your medications and when to take them. Check marks below indicate your daily home schedule. Keep this list as a reference.      Medications           Morning Afternoon Evening Bedtime As Needed    aspirin 81 MG EC tablet   Commonly known as:  ASPIRIN ADULT LOW STRENGTH   Take 1 tablet (81 mg) by mouth daily                                BASAGLAR 100 UNIT/ML injection   Inject 37 Units Subcutaneous At Bedtime                                * ONETOUCH ULTRA test strip   TEST TWICE DAILY TO THREE TIMES DAILY   Generic drug:  blood glucose monitoring                                * blood glucose monitoring test strip   Commonly known as:  no brand specified   1 each                                fluticasone 50 MCG/ACT spray   Commonly known as:  FLONASE   Spray 1 spray into both nostrils daily                                furosemide 20 MG tablet   Commonly known as:  LASIX   Take 2 tablets (40 mg) by mouth daily                                [employer] LANCETS 21G Misc   by Misc.(Non-Drug; Combo Route) route once daily.                                lactulose 10 GM/15ML solution   Commonly known as:  CHRONULAC   30 mLs (20 g) by Oral or NG Tube route 3 times daily                                LEVOTHYROXINE SODIUM PO   Take 150 mcg by mouth daily                                losartan 25 MG tablet   Commonly known as:  COZAAR   Take 2 tablets (50 mg) by mouth daily                                metoprolol tartrate 25 MG tablet   Commonly known as:  LOPRESSOR   Take 1 tablet (25 mg) by mouth 2 times daily                                NITROQUICK SL                                PANTOPRAZOLE SODIUM PO   Take 40 mg by mouth daily                                RA BLOOD PRESSURE CUFF MONITOR Misc   by Misc.(Non-Drug; Combo Route) route once daily.                                rifaximin  550 MG Tabs tablet   Commonly known as:  XIFAXAN   Take 1 tablet (550 mg) by mouth 2 times daily                                sodium bicarbonate 650 MG tablet   Take 2 tablets (1,300 mg) by mouth 2 times daily                                spironolactone 50 MG tablet   Commonly known as:  ALDACTONE   Take 1 tablet (50 mg) by mouth daily                                WARFARIN SODIUM PO   Take 5 mg by mouth daily                                * Notice:  This list has 2 medication(s) that are the same as other medications prescribed for you. Read the directions carefully, and ask your doctor or other care provider to review them with you.

## 2018-04-16 NOTE — PROGRESS NOTES
Patient is ready for the procedure. Here for Y-90 Mapping. Wife with him. Blood Glucose is 113 this am.

## 2018-04-16 NOTE — PROGRESS NOTES
Patient Name: Serge Brambila  Medical Record Number: [id number]  Today's Date: 4/16/2018    Procedure: Y-90 mapping  Proceduralist: Rick Montgomery and Star    Sedation start time: 0840  Sedation end time: 0940  Sedation medications administered: versed 2 mg., fentanyl 175 mcg., dilaudid 0.2 mg.  Total sedation time: 1 hour    Procedure start time: 0850  Puncture time: 0851  Procedure end time: 0840    Report given to: Demetria Landaverde Notes: Pt arrived to IR room  4  from    Pt denies any questions or concerns regarding procedure. Pt positioned  Supine  and monitored per protocol. Pt tolerated procedure without any noted complications. Pt transferred back to  . RFA puncture site closed with a Mynx closure device.

## 2018-04-16 NOTE — DISCHARGE INSTRUCTIONS
[facility]   Interventional Radiology  Discharge Instructions Post Angiography for Y-90 Mapping    AFTER YOU GO HOME          Relax and take it easy for 24 hours.       Drink plenty of fluids.       Resume your regular diet, unless otherwise instructed by your Primary Physician.       DO NOT smoke for at least 24 hours, if you were given any sedation.       DO NOT drink alcoholic beverages the day of your procedure.       Do not drive or operate machinery at home or at work for 24 hours.          DO NOT do any strenuous exercise or lifting for at least 2 days following your Procedure.       DO NOT take a bath or shower for at least 12 hours following your procedure.       DO NOT make any important or legal decisions for 24 hours following your procedure.    CALL THE PHYSICIAN IF:       - You start bleeding from the procedure site. lie down flat and hold pressure on the site. A small lump or bruise is common at the puncture site. Your physician will tell you if you need to return to the hospital.      - You develop numbness, coolness or a change in color of the leg that was punctured.      - You experience increased pain or redness at the puncture site.      - You develop hives or a rash or unexplained itching.      - You develop a temperature of 101 degrees F or greater.    Additional Information:           Support the puncture site for coughing, sneezing, or moving your bowels for the first 48 hours  No tub bath, hot tubs, or swimming for 5 days  No lotion or powder to the puncture site for 3 day      Closure Device:Mynx            [facility] INTERVENTIONAL RADIOLOGY DEPARTMENT         Procedure Physician: SAWYER Montgomery/FADI Graves                             Date of Procedure:April 16, 2018       Telephone Numbers:   [phone]     Monday-Friday 8:00 to 4:30 pm                                        [phone]     After 4:30 pm Monday-Friday, Weekend and Holidays. Ask for the Interventional  Radiologist on call. Someone is on call 24 hrs/day.

## 2018-04-16 NOTE — IP AVS SNAPSHOT
[address] [address]                                       After Visit Summary   4/16/2018    Serge Brambila    MRN: [id number]           After Visit Summary Signature Page     I have received my discharge instructions, and my questions have been answered. I have discussed any challenges I see with this plan with the nurse or doctor.    ..........................................................................................................................................  Patient/Patient Representative Signature      ..........................................................................................................................................  Patient Representative Print Name and Relationship to Patient    ..................................................               ................................................  Date                                            Time    ..........................................................................................................................................  Reviewed by Signature/Title    ...................................................              ..............................................  Date                                                            Time

## 2018-04-16 NOTE — PROGRESS NOTES
Interventional Radiology Pre-Procedure Sedation Assessment   Time of Assessment: 7:22 AM    Expected Level: Moderate Sedation    Indication: Sedation is required for the following type of Procedure: Arterial    Sedation and procedural consent: Risks, benefits and alternatives were discussed with Patient    PO Intake: Appropriately NPO for procedure    ASA Class: Class 3 - SEVERE SYSTEMIC DISEASE, DEFINITE FUNCTIONAL LIMITATIONS.    Mallampati: Grade 1:  Soft palate, uvula, tonsillar pillars, and posterior pharyngeal wall visible    Lungs: Lungs Clear with good breath sounds bilaterally    Heart: Normal heart sounds and rate, 2/6 early systolic murmur     History and physical reviewed and no updates needed. I have reviewed the lab findings, diagnostic data, medications, and the plan for sedation. I have determined this patient to be an appropriate candidate for the planned sedation and procedure and have reassessed the patient IMMEDIATELY PRIOR to sedation and procedure.    Jay Graves MD

## 2018-04-16 NOTE — PROGRESS NOTES
Patient tolerated recovery stage well. VSS, R groin site clean/dry/intact, no hematoma, and denies pain. Patient tolerated PO food and fluids. Teaching was done and discharge instructions were given. Patient ambulated, voided, and PIV was removed. Patient discharged from the hospital via wheel chair to home with wife.

## 2018-04-18 NOTE — TELEPHONE ENCOUNTER
Called and informed pt who I spoke with his wife Tori regarding referral.     Informed them that I did consult with Dr. Montgomery regarding further follow up with Palliative care services. I informed her that this service is a supportive and outstanding group who not only helps patient but also helps them and their family with the disease process and coping mechanisms.     They are very open to this service. Would like to go ahead and schedule for this appt. I informed her that I'll have my  call and help with scheduling as they may have direct access to the appts.     The wife states that she and pt's family/children would also like to come to this appt. I informed her that I'll have my  call her with some options that way they can pick and choose to see who can make it or not.     She also asked about the Hepatology appt in which I informed her to keep it and then inquire to see if future appts are needed.     She states that pt seems to be needing more karine recently and would like to call the infusion center to schedule an appt sooner. I did provide the number for her.     We will continue to keep In touch as needed. In the meantime, we will have them see palliative care.   She agrees to plan.     Jacqueline Valverde RN, BSN  Interventional Radiology Nurse Coordinator   Phone: [phone]

## 2018-04-24 NOTE — PATIENT INSTRUCTIONS
Dear Serge Brambila    Thank you for choosing [facility] ([facility]) for your Paracentesis.  The following information is a summary of our appointment as well as important reminders.    Discharge Instructions for Paracentesis  Paracentesis is a procedure to remove extra fluid from your belly (abdomen). This fluid buildup in the abdomen is called ascites. The procedure may have been done to take a sample of the fluid. Or, it may have been done to drain the extra fluid from your abdomen and help make you more comfortable.     Ascites is buildup of excess fluid in the abdomen.   Home care    If you have pain after the procedure, your healthcare provider can prescribe or recommend pain medicines. Take these exactly as directed. If you stopped taking other medicines before the procedure, ask your provider when you can start them again.    Take it easy for 24 hours after the procedure. Avoid physical activity until your provider says it s OK.    You will have a small bandage over the puncture site. Stitches (sutures), surgical staples, adhesive tapes, adhesive strips, or surgical glue may be used to close the incision. They also help stop bleeding and speed healing. You may take the bandage off in 24 hours.    Check the puncture site for the signs of infection listed below.  Follow-up care  Make a follow-up appointment with your healthcare provider as directed. During your follow-up visit, your provider will check your healing. Let your provider know how you are feeling. You can also discuss the cause of your ascites and if you need any further treatment.  When to seek medical advice  Call your healthcare provider if you have any of the following after the procedure:    A fever of 100.4 F (38.0 C) or higher    Trouble breathing    Pain that doesn't go away even after taking pain medicine    Belly pain not caused by having the skin punctured    Bleeding from the  puncture site    More than a small amount of fluid leaking from the puncture site    Swollen belly    Signs of infection at the puncture site. These include increased pain, redness, or swelling, warmth, or bad-smelling drainage.    Blood in your urine    Feeling dizzy or lightheaded, or fainting   Date Last Reviewed: 7/1/2016 2000-2017 [employer]. [address] [place] [address]. All rights reserved. This information is not intended as a substitute for professional medical care. Always follow your healthcare professional's instructions.    We look forward in seeing you on your next appointment here at [facility].  Please don t hesitate to call us at [phone] to reschedule any of your appointments or to speak with one of the [facility] registered nurses.  It was a pleasure taking care of you today.    Sincerely,    [facility]  Special[facility] Infusion [facility] Procedure Center  [address] [place]  [address]  Phone:  [phone]

## 2018-04-24 NOTE — PROGRESS NOTES
Paracentesis Nursing Note  Serge Brambila presents [facility] for [facility]acentesis.    During today's appointment orders from Dr. Montgomery were completed.    Progress Note:  Patient identification verified by name and date of birth.  Assessment completed.  Vitals monitored throughout appointment and recorded in Doc Flowsheets.  See proceduralist note in ultrasound.    Date of consent or authorization: 4/6/18.  Invasive Procedure Safety Checklist was completed and sent for scanning.     Paracentesis performed by Colt Curiel PA-C Radiology.    The following labs were communicated to provider performing paracentesis:  Lab Results   Component Value Date     04/16/2018       Total amount of ascites fluid drained: 3.2 liters.  Color of ascites fluid: straw colored.  Total amount of albumin given: 37.5  grams.    Patient tolerated procedure well.    Post procedure, denies pain, dizziness or discomfort post paracentesis.  Rib pain also improved post-paracentesis.      Discharge Plan:  Discharge instructions were reviewed with patient. Declined AVS at discharge.  Patient/Representative verbalized understanding and all questions were answered.   Dis[facility] in stable condition.    KATIE CALDERON, RN        /41  Temp 98.6  F (37  C) (Oral)  Resp 16  Wt 107.8 kg (237 lb 9.6 oz)  SpO2 100%  BMI 36.13 kg/m2

## 2018-04-24 NOTE — MR AVS SNAPSHOT
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  The following information is a summary of our appointment as well as important reminders.    Discharge Instructions for Paracentesis  Paracentesis is a procedure to remove extra fluid from your belly (abdomen). This fluid buildup in the abdomen is called ascites. The procedure may have been done to take a sample of the fluid. Or, it may have been done to drain the extra fluid from your abdomen and help make you more comfortable.     Ascites is buildup of excess fluid in the abdomen.   Home care    If you have pain after the procedure, your healthcare provider can prescribe or recommend pain medicines. Take these exactly as directed. If you stopped taking other medicines before the procedure, ask your provider when you can start them again.    Take it easy for 24 hours after the procedure. Avoid physical activity until your provider says it s OK.    You will have a small bandage over the puncture site. Stitches (sutures), surgical staples, adhesive tapes, adhesive strips, or surgical glue may be used to close the incision. They also help stop bleeding and speed healing. You may take the bandage off in 24 hours.    Check the puncture site for the signs of infection listed below.  Follow-up care  Make a follow-up appointment with your healthcare provider as directed. During your follow-up visit, your provider will check your  healing. Let your provider know how you are feeling. You can also discuss the cause of your ascites and if you need any further treatment.  When to seek medical advice  Call your healthcare provider if you have any of the following after the procedure:    A fever of 100.4 F (38.0 C) or higher    Trouble breathing    Pain that doesn't go away even after taking pain medicine    Belly pain not caused by having the skin punctured    Bleeding from the puncture site    More than a small amount of fluid leaking from the puncture site    Swollen belly    Signs of infection at the puncture site. These include increased pain, redness, or swelling, warmth, or bad-smelling drainage.    Blood in your urine    Feeling dizzy or lightheaded, or fainting   Date Last Reviewed: 7/1/2016 2000-2017 [employer]. [address] [place] [address]. All rights reserved. This information is not intended as a substitute for professional medical care. Always follow your healthcare professional's instructions.    We look forward in seeing you on your next appointment here at [facility].  Please don t hesitate to call us at [phone] to reschedule any of your appointments or to speak with one of the [facility] registered nurses.  It was a pleasure taking care of you today.    Sincerely,    [facility]  Specialty Infusion [facility] Procedure Center  [address] [place] [address]  [address]  Phone:  [phone]            Follow-ups after your visit        Your next 10 appointments already scheduled     May 09, 2018  8:00 AM CDT   Lab with  LAB   [facility] Lab ([facility])    [address] [place] [address] Floor  [facility] [address] [phone]            May 09, 2018  9:00 AM CDT   (Arrive by 8:45 AM)   Return General Liver with Yennifer Culver MD   [facility] Hepatology ([facility])    [address] [facility]  [address] [place] [address]  [address]   [phone]            May 09, 2018 10:20 AM CDT   (Arrive by 10:05 AM)   Return Visit with Rach Mazariegos PA-C   [facility] ([facility])    [address]  [address]  [facility] [address]   [phone]            May 11, 2018  7:30 AM CDT   Paracentesis Visit with Uc Spec Inf Para Provider, UC 39 ATC   [facility] Specialty and Procedure ([facility])    [address]  [address]  [place] [place] [facility] [place] [address]   [phone]            May 16, 2018 12:45 PM CDT   (Arrive by 12:30 PM)   New Patient Visit with Sudarshan Connell MD   [facility] ([facility])    [address] [place] [address]  [address]  [facility] [place] [facility] [place] [address]   [phone]            May 18, 2018  7:30 AM CDT   Paracentesis Visit with Uc Spec Inf Para Provider, UC 39 ATC   [facility] Specialty and Procedure ([facility])    [address]  [address]  [facility] [place] [address]   [phone]            May 25, 2018  2:00 PM CDT   Paracentesis Visit with Uc Spec Inf Para Provider, UC 40 ATC   [facility] Specialty and Procedure ([facility])    [address]  [address]  [place] [address]   [phone]              Who to contact     If you have questions or need follow up information about today's clinic visit or your schedule please contact [facility] SPECIALTY AND PROCEDURE directly at [phone].  Normal or non-critical lab and imaging results will be communicated to you by MyChart, letter or phone within 4 business days after the clinic has received the results. If you do not hear from us within 7 days, please contact the clinic through MyChart or phone. If you have a critical or abnormal lab result, we will notify you by phone as soon as  possible.  Submit refill requests through [employer] or call your pharmacy and they will forward the refill request to us. Please allow 3 business days for your refill to be completed.          Additional Information About Your Visit        [employer] Information     [employer] gives you secure access to your electronic health record. If you see a primary care provider, you can also send messages to your care team and make appointments. If you have questions, please call your primary care clinic.  If you do not have a primary care provider, please call [phone] and they will assist you.        Care EveryWhere ID     This is your Care EveryWhere ID. This could be used by other organizations to access your [facility] medical records  [id number]        Your Vitals Were     Temperature Respirations Pulse Oximetry BMI (Body Mass Index)          98.6  F (37  C) (Oral) 16 100% 35.02 kg/m2         Blood Pressure from Last 3 Encounters:   04/24/18 (!) 132/38   04/16/18 120/60   04/06/18 126/44    Weight from Last 3 Encounters:   04/24/18 104.5 kg (230 lb 4.8 oz)   04/16/18 104.3 kg (230 lb)   04/06/18 104.1 kg (229 lb 6.4 oz)              We Performed the Following     US Paracentesis        Primary Care Provider Office Phone # Fax #    Linn Thompson [phone]       [facility] [address]  [facility] [address]        Equal Access to Services     [facility] : Hadii aad ku hadasho Soomaali, waaxda luqadaha, qaybta kaalmada adeegyada, miley holt . So [facility] [phone].    ATENCIÓN: Si habla español, tiene a collins disposición servicios gratuitos de asistencia lingüística. Rufina al [phone].    We comply with applicable federal civil rights laws and [place] laws. We do not discriminate on the basis of race, color, national origin, age, disability, sex, sexual orientation, or gender identity.            Thank you!     Thank you for choosing [facility]  CENTER SPECIALTY AND PROCEDURE  for your care. Our goal is always to provide you with excellent care. Hearing back from our patients is one way we can continue to improve our services. Please take a few minutes to complete the written survey that you may receive in the mail after your visit with us. Thank you!             Your Updated Medication List - Protect others around you: Learn how to safely use, store and throw away your medicines at www.disposemymeds.org.          This list is accurate as of 4/24/18  9:17 AM.  Always use your most recent med list.                   Brand Name Dispense Instructions for use Diagnosis    aspirin 81 MG EC tablet    ASPIRIN ADULT LOW STRENGTH    30 tablet    Take 1 tablet (81 mg) by mouth daily    Coronary artery disease involving native heart without angina pectoris, unspecified vessel or lesion type       BASAGLAR 100 UNIT/ML injection      Inject 37 Units Subcutaneous At Bedtime        * ONETOUCH ULTRA test strip   Generic drug:  blood glucose monitoring      TEST TWICE DAILY TO THREE TIMES DAILY        * blood glucose monitoring test strip    no brand specified     1 each        fluticasone 50 MCG/ACT spray    FLONASE     Spray 1 spray into both nostrils daily        furosemide 20 MG tablet    LASIX    30 tablet    Take 2 tablets (40 mg) by mouth daily    Cirrhosis of liver with ascites, unspecified hepatic cirrhosis type (H)       [employer] LANCETS 21G Misc      by Misc.(Non-Drug; Combo Route) route once daily.        lactulose 10 GM/15ML solution    CHRONULAC    2700 mL    30 mLs (20 g) by Oral or NG Tube route 3 times daily    Hepatic encephalopathy (H)       LEVOTHYROXINE SODIUM PO      Take 150 mcg by mouth daily        losartan 25 MG tablet    COZAAR    30 tablet    Take 2 tablets (50 mg) by mouth daily    Essential hypertension       metoprolol tartrate 25 MG tablet    LOPRESSOR    60 tablet    Take 1 tablet (25 mg) by mouth 2 times daily    Paroxysmal atrial  fibrillation (H)       NITROQUICK SL           PANTOPRAZOLE SODIUM PO      Take 40 mg by mouth daily        RA BLOOD PRESSURE CUFF MONITOR Misc      by Misc.(Non-Drug; Combo Route) route once daily.        rifaximin 550 MG Tabs tablet    XIFAXAN    60 tablet    Take 1 tablet (550 mg) by mouth 2 times daily    Hepatic encephalopathy (H)       sodium bicarbonate 650 MG tablet     120 tablet    Take 2 tablets (1,300 mg) by mouth 2 times daily    Chronic kidney disease, unspecified CKD stage       spironolactone 50 MG tablet    ALDACTONE    30 tablet    Take 1 tablet (50 mg) by mouth daily    Cirrhosis of liver with ascites, unspecified hepatic cirrhosis type (H)       WARFARIN SODIUM PO      Take 5 mg by mouth daily        * Notice:  This list has 2 medication(s) that are the same as other medications prescribed for you. Read the directions carefully, and ask your doctor or other care provider to review them with you.

## 2018-04-25 NOTE — TELEPHONE ENCOUNTER
"Wife calling very frustrated in regards to the appts. She states that pt did meet with his PCP in which they were recommended to seek out Hospice at this time. She states that with the conversation that pt only had a few months to live, they were advised to get started. She is also frustrated as they felt that they had no more guidance in regards to what to do next.     I informed her that I do see a Palliative care appt, however wife states that they feel that a month out with no further direction is frustrating to them, as they don't know what the next steps are.    I informed her that if they wish to see Hospice now to see what they have to offer or what they can do for pt towards end of life care, it is appropriate. She asked if they have to \"commit\" once they set up the meeting as pt has decided to move forward and at least meet with Hospit[facility],  as recommended by their PCP. I informed her that it can be an informational setting in which they can learn about hospice services. She states that Hospice services would be with [facility]. I inquired if they have a Palliative service as well, as she stated that their [facility] appt is one month out. She states that they will inquire on this to see if they can get in sooner. She asked if I had any \"pull\" for getting an appt sooner with Palliative, in which I told her that I do not, but can only send a msg to see if they can be put on a waiting list or so. She agrees to plan.     I informed her that I would advise to keep the Palliative care appt on 5/16 just incase something happens.     We talked about care teams as they had expressed that they would like to see someone else other than their current Oncologist. They are wondering who we would recommend. I informed her that we do work with other Oncologists in which we can make a referral to. I informed her that there is Dr. Espinosa, and Dr Cruz who are also great Oncologists that I know of. She is wondering if they " can get a referral to one of  Those other providers. Will have our  work on this referral and call them with appt details.     In the meantime, I informed her that with his diagnosis and prognosis that was given to them, they should focus on enjoying time together and with family. We will work on these appts and then call them when we schedule.     She agrees to plan.     Jacqueline Valverde RN, BSN  Interventional Radiology Nurse Coordinator   Phone: [phone]

## 2018-04-30 PROBLEM — C22.0 HCC (HEPATOCELLULAR CARCINOMA) (H): Status: ACTIVE | Noted: 2018-01-01

## 2018-04-30 NOTE — PROGRESS NOTES
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   He was previously seen by Dr. Willis.  MRI abdomen on 3/3/18 showed an infiltrative right hepatic lobe hepatocellular carcinoma, measuring up to 11 cm.  There is associated tumor thrombus that extends throughout the right portal venous system to the splenoportal confluence.  There was not obvious evidence of metastatic disease on staging CT chest or bone scan.  He was felt not to be a transplant candidate or surgical candidate.  He was seen by IR for evaluation for Y-90 treatment, but was canceled due to extensive lung update with shunt to the lung.  He was not offered any further locoregional treatment options.  He met with Dr. Willis, and he was felt to be a poor candidate for any of the systemic therapy options due to his poor liver function.  He made an appointment with palliative care, but it was a month out, and they felt like they weren't getting enough support.  He met with his PCP in the [facility], and he was recommended to enroll onto hospice.  He has a meeting with hospice tomorrow.  He presents here today for a second opinion, although he and his wife said that they don't know why they are here.  They say that the appointment was made for them.  He has been undergoing paracentesis every 2 weeks, but are going to increase the frequency to weekly.  He notes fatigue and occasional shortness of breath.          REVIEW OF SYSTEMS:   14 point ROS was reviewed and is negative other than as noted above in HPI.       HOME MEDICATIONS:  Current Outpatient Prescriptions   Medication Sig Dispense Refill     aspirin (ASPIRIN ADULT LOW  STRENGTH) 81 MG EC tablet Take 1 tablet (81 mg) by mouth daily 30 tablet 1     BASAGLAR 100 UNIT/ML injection Inject 37 Units Subcutaneous At Bedtime        blood glucose monitoring (NO BRAND SPECIFIED) test strip 1 each       blood glucose monitoring (ONETOUCH ULTRA) test strip TEST TWICE DAILY TO THREE TIMES DAILY       Blood Pressure Monitoring (RA BLOOD PRESSURE CUFF MONITOR) MISC by Misc.(Non-Drug; Combo Route) route once daily.       fluticasone (FLONASE) 50 MCG/ACT spray Spray 1 spray into both nostrils daily       furosemide (LASIX) 20 MG tablet Take 2 tablets (40 mg) by mouth daily 30 tablet 1     [employer] LANCETS 21G MISC by Misc.(Non-Drug; Combo Route) route once daily.       lactulose (CHRONULAC) 10 GM/15ML solution 30 mLs (20 g) by Oral or NG Tube route 3 times daily 2700 mL 1     LEVOTHYROXINE SODIUM PO Take 150 mcg by mouth daily        losartan (COZAAR) 25 MG tablet Take 2 tablets (50 mg) by mouth daily 30 tablet 1     metoprolol tartrate (LOPRESSOR) 25 MG tablet Take 1 tablet (25 mg) by mouth 2 times daily 60 tablet 1     Nitroglycerin (NITROQUICK SL)        PANTOPRAZOLE SODIUM PO Take 40 mg by mouth daily        rifaximin (XIFAXAN) 550 MG TABS tablet Take 1 tablet (550 mg) by mouth 2 times daily 60 tablet 3     sodium bicarbonate 650 MG tablet Take 2 tablets (1,300 mg) by mouth 2 times daily 120 tablet 1     spironolactone (ALDACTONE) 50 MG tablet Take 1 tablet (50 mg) by mouth daily 30 tablet 1     WARFARIN SODIUM PO Take 5 mg by mouth daily            ALLERGIES:  Allergies   Allergen Reactions     Penicillins      Happened in his teens, unsure what his reaction was. Tolerated ceftriaxone 3/18         PAST MEDICAL HISTORY:  Past Medical History:   Diagnosis Date     Atrial fibrillation (H)      Diabetes (H)     type II     Hepatic encephalopathy (H)      Hypertension      MI (myocardial infarction)     stent placement     Sleep apnea      Thyroid disease          PAST SURGICAL HISTORY:  Past  Surgical History:   Procedure Laterality Date     APPENDECTOMY  age 16     COLONOSCOPY       ESOPHAGOSCOPY, GASTROSCOPY, DUODENOSCOPY (EGD), COMBINED N/A 1/24/2018    Procedure: COMBINED ESOPHAGOSCOPY, GASTROSCOPY, DUODENOSCOPY (EGD);  ESOPHAGOGASTRODUODENOSCOPY (MAC) ;  Surgeon: Colton Stroud MD;  Location:  GI     GI SURGERY  2012    partial colectomy for pre-CA nodule, removed 10 in.         SOCIAL HISTORY:  Social History     Social History     Marital status:      Spouse name: N/A     Number of children: N/A     Years of education: N/A     Occupational History     Not on file.     Social History Main Topics     Smoking status: Former Smoker     Packs/day: 1.00     Years: 10.00     Quit date: 1/1/1999     Smokeless tobacco: Never Used     Alcohol use No     Drug use: No     Sexual activity: Not on file     Other Topics Concern     Not on file     Social History Narrative         FAMILY HISTORY:  No family history on file.      PHYSICAL EXAM:  Vital signs:  BP (!) 188/33  Pulse 55  Temp 98.1  F (36.7  C) (Oral)  Resp 18  Wt 106.6 kg (235 lb 0.2 oz)  SpO2 100%  BMI 35.73 kg/m2   ECOG: 3  GENERAL/CONSTITUTIONAL: No acute distress. Accompanied by wife.  EYES: No scleral icterus.  GASTROINTESTINAL: +abdominal distention.  NEUROLOGIC: Alert, oriented, answers questions appropriately.  INTEGUMENTARY: No jaundice.      LABS:  CBC RESULTS:   Recent Labs   Lab Test  04/16/18   0735   WBC  4.7   RBC  2.50*   HGB  7.2*   HCT  23.5*   MCV  94   MCH  28.8   MCHC  30.6*   RDW  19.1*   PLT  105*       Recent Labs   Lab Test  04/16/18   0735  03/23/18   1022  03/18/18   0427   NA   --   140  138   POTASSIUM   --   5.2  4.5   CHLORIDE   --   111*  110*   CO2   --   20  17*   ANIONGAP   --   9  11   GLC   --   137*  127*   BUN   --   40*  37*   CR  2.69*  1.89*  1.69*   HARI   --   8.2*  8.3*     Lab Results   Component Value Date     03/23/2018     Lab Results   Component Value Date    ALT 62  03/23/2018     No results found for: BILICONJ   Lab Results   Component Value Date    BILITOTAL 0.9 03/23/2018     Lab Results   Component Value Date    ALBUMIN 2.6 03/23/2018     Lab Results   Component Value Date    PROTTOTAL 6.6 03/23/2018      Lab Results   Component Value Date    ALKPHOS 204 03/23/2018     INR 1.67    Component      Latest Ref Rng & Units 3/2/2018   Alpha Fetoprotein      0 - 8 ug/L <1.5         IMAGING:  CT chest 3/2/18:  1. Patchy upper lobe predominant peribronchovascular and peripheral  subpleural opacities. The opacities are predominantly groundglass,  however there is a consolidative component within several of the  areas. The differential is broad and the appearance is most suspicious  for an infectious or inflammatory cause. The appearance is less  typical of metastasis, but not entirely excluded from the  differential. Attention on follow-up.     2. The upper abdominal findings are better characterized on the  comparison MRI. There has been no obvious change in the large  hepatocellular carcinoma an associated portal vein thrombus, although  these findings are not to completely characterized. There is upper  abdominal ascites and varices, and probable esophageal varices.       MRI abdomen 3/3/18:  1. Cirrhosis and evidence of portal hypertension including  splenomegaly, modified ascites and upper abdominal collateral vessel  formation..  2. Infiltrative right hepatic lobe HCC measures up to 11 cm. Tumor is  comparable outside MR to 5/20/2018. Associated tumor thrombus extends  throughout the right portal the venous system to the splenoportal  confluence.  OPTN 5X/LR-TIV .  3. Based on this exam only, the patient is not within Bryson criteria.  4. Cholelithiasis    Bone scan 3/28/18:  1. No abnormal radiotracer uptake to suggest osteoblastic metastasis.  2. Ascites.       ASSESSMENT/PLAN:  Serge Brambila is a 72 year old male with:    Hepatocellular carcinoma: He has a large tumor in  the right lobe of the liver, measuring up to 11 cm, with associated tumor thrombus.  His Child-Israel score is borderline high B, or C.  I agree that he would not be a liver transplant candidate, and likely not a surgical candidate.  He said initially that he doesn't understand that, but after some discussion, he seemed to understand it more.  I did offer a surgical referral, but he declined.  He attempted Y-90 by IR, but was not a candidate due to extensive lung shunting.  No further locoregional options were offered.  I discussed systemic therapy with the patient, such as sorafenib.  However, with his high Child-Israel score that is likely class C, or high B at best, he likely would not get much benefit from sorafenib, and may push him further into liver failure, along with other toxicities, and end up causing more harm than benefit.  Nivolumab is approved after failure on sorafenib.  We reviewed the incurable nature of his cancer, and that he has quite limited treatment options.  Prognosis is poor.  He and his wife said that it was difficult to process the first time he heard it from Dr. Willis, but is able to accept it better now.  They expressed some frustration over how far away his palliative care appointment is, and felt that there wasn't enough support.  They were able to get a hospice referral through his PCP at [facility] and is meeting with hospice tomorrow at his home.  He plans to enroll onto hospice.  I felt that it is a good plan, and agreed with his wishes.  It would be best to focus on supportive cares, quality of life, and spending as much time with his loved ones as much as possible at this point.    Serge and his wife had their questions answered.  As above, he is planning to enroll with home hospice tomorrow.  They are welcome to call with questions or return to clinic as needed if any further oncologic issues arise.        I spent a total of 60 minutes with the patient, with over >50% of the  time in counseling and/or coordination of care.       Ree Cruz MD  Hematology/Oncology  [facility]

## 2018-04-30 NOTE — MR AVS SNAPSHOT
After Visit Summary   4/30/2018    Serge Brambila    MRN: [id number]           Patient Information     Date Of Birth          1945        Visit Information        Provider Department      4/30/2018 7:45 AM Ree Cruz MD [facility]        Today's Diagnoses     HCC (hepatocellular carcinoma) (H)    -  1       Follow-ups after your visit        Your next 10 appointments already scheduled     May 09, 2018  8:00 AM CDT   Lab with UC LAB   [facility] ([facility])    [address]  1st Floor  [place] [id number] [phone]            May 09, 2018  9:00 AM CDT   (Arrive by 8:45 AM)   Return General Liver with Yennifer Culver MD   [facility] ([facility])    [address]  [address]  [place] [facility] [id number] [phone]            May 11, 2018  7:30 AM CDT   Paracentesis Visit with Uc Spec Inf Para Provider, UC 39 ATC   [facility] ([facility])    [address]  [address]  [place] [facility] [id number] [phone]            May 16, 2018 12:45 PM CDT   (Arrive by 12:30 PM)   New Patient Visit with Sudarshan Connell MD   [facility] ([facility])    [address]  [address]  [facility] [id number] [phone]            May 17, 2018  7:30 AM CDT   Paracentesis Visit with Uc Spec Inf Para Provider, UC 40 ATC   [facility] ([facility])    [address]  [address]  [place] [facility] [id number] [phone]            May 25, 2018  2:00 PM CDT   Paracentesis Visit with Uc Spec Inf Para Provider, UC 40 ATC   [facility] ([facility])    [address]  [address]  [place] [address]    [phone]            May 31, 2018  7:30 AM CDT   Paracentesis Visit with Uc Spec Inf Para Provider   [facility] ([facility])    [address]  [address]  [place] [facility] [address] [phone]              Who to contact     If you have questions or need follow up information about today's clinic visit or your schedule please contact [facility] directly at [phone].  Normal or non-critical lab and imaging results will be communicated to you by [employer], letter or phone within 4 business days after the clinic has received the results. If you do not hear from us within 7 days, please contact the clinic through [employer] or phone. If you have a critical or abnormal lab result, we will notify you by phone as soon as possible.  Submit refill requests through [employer] or call your pharmacy and they will forward the refill request to us. Please allow 3 business days for your refill to be completed.          Additional Information About Your Visit        [employer] Information     [employer] gives you secure access to your electronic health record. If you see a primary care provider, you can also send messages to your care team and make appointments. If you have questions, please call your primary care clinic.  If you do not have a primary care provider, please call [phone] and they will assist you.        Care EveryWhere ID     This is your Care EveryWhere ID. This could be used by other organizations to access your [place] medical records  [id number]        Your Vitals Were     Pulse Temperature Respirations Pulse Oximetry BMI (Body Mass Index)       55 98.1  F (36.7  C) (Oral) 18 100% 35.73 kg/m2        Blood Pressure from Last 3 Encounters:   04/30/18 (!) 188/33   04/24/18 (!) 132/38   04/16/18 120/60    Weight from Last 3 Encounters:   04/30/18 106.6 kg (235 lb 0.2 oz)   04/24/18 104.5 kg (230 lb 4.8 oz)    04/16/18 104.3 kg (230 lb)              Today, you had the following     No orders found for display       Primary Care Provider Office Phone # Fax #    Linn Thompson [phone]       [facility] [address]  [facility] [address]        Equal Access to Services     FRANK CONROY : Hadii aad ku hadasho Soomaali, waaxda luqadaha, qaybta kaalmada adeegyada, waxay idiin hayaan adesancho maddymaurilio lacory guerrero. So [facility] [phone].    ATENCIÓN: Si habla español, tiene a collins disposición servicios gratuitos de asistencia lingüística. [facility] [phone].    We comply with applicable federal civil rights laws and [place] laws. We do not discriminate on the basis of race, color, national origin, age, disability, sex, sexual orientation, or gender identity.            Thank you!     Thank you for choosing [facility]  for your care. Our goal is always to provide you with excellent care. Hearing back from our patients is one way we can continue to improve our services. Please take a few minutes to complete the written survey that you may receive in the mail after your visit with us. Thank you!             Your Updated Medication List - Protect others around you: Learn how to safely use, store and throw away your medicines at www.disposemymeds.org.          This list is accurate as of 4/30/18  9:25 AM.  Always use your most recent med list.                   Brand Name Dispense Instructions for use Diagnosis    aspirin 81 MG EC tablet    ASPIRIN ADULT LOW STRENGTH    30 tablet    Take 1 tablet (81 mg) by mouth daily    Coronary artery disease involving native heart without angina pectoris, unspecified vessel or lesion type       BASAGLAR 100 UNIT/ML injection      Inject 37 Units Subcutaneous At Bedtime        * ONETOUCH ULTRA test strip   Generic drug:  blood glucose monitoring      TEST TWICE DAILY TO THREE TIMES DAILY        * blood glucose monitoring test strip    no brand  specified     1 each        fluticasone 50 MCG/ACT spray    FLONASE     Spray 1 spray into both nostrils daily        furosemide 20 MG tablet    LASIX    30 tablet    Take 2 tablets (40 mg) by mouth daily    Cirrhosis of liver with ascites, unspecified hepatic cirrhosis type (H)       [employer] LANCETS 21G Misc      by Misc.(Non-Drug; Combo Route) route once daily.        lactulose 10 GM/15ML solution    CHRONULAC    2700 mL    30 mLs (20 g) by Oral or NG Tube route 3 times daily    Hepatic encephalopathy (H)       LEVOTHYROXINE SODIUM PO      Take 150 mcg by mouth daily        losartan 25 MG tablet    COZAAR    30 tablet    Take 2 tablets (50 mg) by mouth daily    Essential hypertension       metoprolol tartrate 25 MG tablet    LOPRESSOR    60 tablet    Take 1 tablet (25 mg) by mouth 2 times daily    Paroxysmal atrial fibrillation (H)       NITROQUICK SL           PANTOPRAZOLE SODIUM PO      Take 40 mg by mouth daily        RA BLOOD PRESSURE CUFF MONITOR Misc      by Misc.(Non-Drug; Combo Route) route once daily.        rifaximin 550 MG Tabs tablet    XIFAXAN    60 tablet    Take 1 tablet (550 mg) by mouth 2 times daily    Hepatic encephalopathy (H)       sodium bicarbonate 650 MG tablet     120 tablet    Take 2 tablets (1,300 mg) by mouth 2 times daily    Chronic kidney disease, unspecified CKD stage       spironolactone 50 MG tablet    ALDACTONE    30 tablet    Take 1 tablet (50 mg) by mouth daily    Cirrhosis of liver with ascites, unspecified hepatic cirrhosis type (H)       WARFARIN SODIUM PO      Take 5 mg by mouth daily        * Notice:  This list has 2 medication(s) that are the same as other medications prescribed for you. Read the directions carefully, and ask your doctor or other care provider to review them with you.

## 2018-04-30 NOTE — LETTER
4/30/2018       RE: Serge Brambila  [address]  [facility] [id number]     Dear Colleague,    Thank you for referring your patient, Serge Brambila, to the [facility]. Please see a copy of my visit note below.    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   He was previously seen by Dr. Willis.  MRI abdomen on 3/3/18 showed an infiltrative right hepatic lobe hepatocellular carcinoma, measuring up to 11 cm.  There is associated tumor thrombus that extends throughout the right portal venous system to the splenoportal confluence.  There was not obvious evidence of metastatic disease on staging CT chest or bone scan.  He was felt not to be a transplant candidate or surgical candidate.  He was seen by IR for evaluation for Y-90 treatment, but was canceled due to extensive lung update with shunt to the lung.  He was not offered any further locoregional treatment options.  He met with Dr. Willis, and he was felt to be a poor candidate for any of the systemic therapy options due to his poor liver function.  He made an appointment with palliative care, but it was a month out, and they felt like they weren't getting enough support.  He met with his PCP in the [facility], and he was recommended to enroll onto hospice.  He has a meeting with hospice tomorrow.  He presents here today for a second opinion, although he and his wife said that they don't know why they are here.  They say that the appointment was made for them.  He has been undergoing paracentesis every 2 weeks, but are going to increase the frequency to weekly.  He notes fatigue and occasional  shortness of breath.          REVIEW OF SYSTEMS:   14 point ROS was reviewed and is negative other than as noted above in HPI.       HOME MEDICATIONS:  Current Outpatient Prescriptions   Medication Sig Dispense Refill     aspirin (ASPIRIN ADULT LOW STRENGTH) 81 MG EC tablet Take 1 tablet (81 mg) by mouth daily 30 tablet 1     BASAGLAR 100 UNIT/ML injection Inject 37 Units Subcutaneous At Bedtime        blood glucose monitoring (NO BRAND SPECIFIED) test strip 1 each       blood glucose monitoring (ONETOUCH ULTRA) test strip TEST TWICE DAILY TO THREE TIMES DAILY       Blood Pressure Monitoring (RA BLOOD PRESSURE CUFF MONITOR) MISC by Misc.(Non-Drug; Combo Route) route once daily.       fluticasone (FLONASE) 50 MCG/ACT spray Spray 1 spray into both nostrils daily       furosemide (LASIX) 20 MG tablet Take 2 tablets (40 mg) by mouth daily 30 tablet 1     [employer] LANCETS 21G MISC by Misc.(Non-Drug; Combo Route) route once daily.       lactulose (CHRONULAC) 10 GM/15ML solution 30 mLs (20 g) by Oral or NG Tube route 3 times daily 2700 mL 1     LEVOTHYROXINE SODIUM PO Take 150 mcg by mouth daily        losartan (COZAAR) 25 MG tablet Take 2 tablets (50 mg) by mouth daily 30 tablet 1     metoprolol tartrate (LOPRESSOR) 25 MG tablet Take 1 tablet (25 mg) by mouth 2 times daily 60 tablet 1     Nitroglycerin (NITROQUICK SL)        PANTOPRAZOLE SODIUM PO Take 40 mg by mouth daily        rifaximin (XIFAXAN) 550 MG TABS tablet Take 1 tablet (550 mg) by mouth 2 times daily 60 tablet 3     sodium bicarbonate 650 MG tablet Take 2 tablets (1,300 mg) by mouth 2 times daily 120 tablet 1     spironolactone (ALDACTONE) 50 MG tablet Take 1 tablet (50 mg) by mouth daily 30 tablet 1     WARFARIN SODIUM PO Take 5 mg by mouth daily            ALLERGIES:  Allergies   Allergen Reactions     Penicillins      Happened in his teens, unsure what his reaction was. Tolerated ceftriaxone 3/18         PAST MEDICAL HISTORY:  Past Medical History:    Diagnosis Date     Atrial fibrillation (H)      Diabetes (H)     type II     Hepatic encephalopathy (H)      Hypertension      MI (myocardial infarction)     stent placement     Sleep apnea      Thyroid disease          PAST SURGICAL HISTORY:  Past Surgical History:   Procedure Laterality Date     APPENDECTOMY  age 16     COLONOSCOPY       ESOPHAGOSCOPY, GASTROSCOPY, DUODENOSCOPY (EGD), COMBINED N/A 1/24/2018    Procedure: COMBINED ESOPHAGOSCOPY, GASTROSCOPY, DUODENOSCOPY (EGD);  ESOPHAGOGASTRODUODENOSCOPY (MAC) ;  Surgeon: Colton Stroud MD;  Location:  GI     GI SURGERY  2012    partial colectomy for pre-CA nodule, removed 10 in.         SOCIAL HISTORY:  Social History     Social History     Marital status:      Spouse name: N/A     Number of children: N/A     Years of education: N/A     Occupational History     Not on file.     Social History Main Topics     Smoking status: Former Smoker     Packs/day: 1.00     Years: 10.00     Quit date: 1/1/1999     Smokeless tobacco: Never Used     Alcohol use No     Drug use: No     Sexual activity: Not on file     Other Topics Concern     Not on file     Social History Narrative         FAMILY HISTORY:  No family history on file.      PHYSICAL EXAM:  Vital signs:  BP (!) 188/33  Pulse 55  Temp 98.1  F (36.7  C) (Oral)  Resp 18  Wt 106.6 kg (235 lb 0.2 oz)  SpO2 100%  BMI 35.73 kg/m2   ECOG: 3  GENERAL/CONSTITUTIONAL: No acute distress. Accompanied by wife.  EYES: No scleral icterus.  GASTROINTESTINAL: +abdominal distention.  NEUROLOGIC: Alert, oriented, answers questions appropriately.  INTEGUMENTARY: No jaundice.      LABS:  CBC RESULTS:   Recent Labs   Lab Test  04/16/18   0735   WBC  4.7   RBC  2.50*   HGB  7.2*   HCT  23.5*   MCV  94   MCH  28.8   MCHC  30.6*   RDW  19.1*   PLT  105*       Recent Labs   Lab Test  04/16/18   0735  03/23/18   1022  03/18/18   0427   NA   --   140  138   POTASSIUM   --   5.2  4.5   CHLORIDE   --   111*  110*   CO2    --   20  17*   ANIONGAP   --   9  11   GLC   --   137*  127*   BUN   --   40*  37*   CR  2.69*  1.89*  1.69*   HARI   --   8.2*  8.3*     Lab Results   Component Value Date     03/23/2018     Lab Results   Component Value Date    ALT 62 03/23/2018     No results found for: BILICONJ   Lab Results   Component Value Date    BILITOTAL 0.9 03/23/2018     Lab Results   Component Value Date    ALBUMIN 2.6 03/23/2018     Lab Results   Component Value Date    PROTTOTAL 6.6 03/23/2018      Lab Results   Component Value Date    ALKPHOS 204 03/23/2018     INR 1.67    Component      Latest Ref Rng & Units 3/2/2018   Alpha Fetoprotein      0 - 8 ug/L <1.5         IMAGING:  CT chest 3/2/18:  1. Patchy upper lobe predominant peribronchovascular and peripheral  subpleural opacities. The opacities are predominantly groundglass,  however there is a consolidative component within several of the  areas. The differential is broad and the appearance is most suspicious  for an infectious or inflammatory cause. The appearance is less  typical of metastasis, but not entirely excluded from the  differential. Attention on follow-up.     2. The upper abdominal findings are better characterized on the  comparison MRI. There has been no obvious change in the large  hepatocellular carcinoma an associated portal vein thrombus, although  these findings are not to completely characterized. There is upper  abdominal ascites and varices, and probable esophageal varices.       MRI abdomen 3/3/18:  1. Cirrhosis and evidence of portal hypertension including  splenomegaly, modified ascites and upper abdominal collateral vessel  formation..  2. Infiltrative right hepatic lobe HCC measures up to 11 cm. Tumor is  comparable outside MR to 5/20/2018. Associated tumor thrombus extends  throughout the right portal the venous system to the splenoportal  confluence.  OPTN 5X/LR-TIV .  3. Based on this exam only, the patient is not within [place] criteria.  4.  Cholelithiasis    Bone scan 3/28/18:  1. No abnormal radiotracer uptake to suggest osteoblastic metastasis.  2. Ascites.       ASSESSMENT/PLAN:  Serge Brambila is a 72 year old male with:    Hepatocellular carcinoma: He has a large tumor in the right lobe of the liver, measuring up to 11 cm, with associated tumor thrombus.  His Child-Israel score is borderline high B, or C.  I agree that he would not be a liver transplant candidate, and likely not a surgical candidate.  He said initially that he doesn't understand that, but after some discussion, he seemed to understand it more.  I did offer a surgical referral, but he declined.  He attempted Y-90 by IR, but was not a candidate due to extensive lung shunting.  No further locoregional options were offered.  I discussed systemic therapy with the patient, such as sorafenib.  However, with his high Child-Israel score that is likely class C, or high B at best, he likely would not get much benefit from sorafenib, and may push him further into liver failure, along with other toxicities, and end up causing more harm than benefit.  Nivolumab is approved after failure on sorafenib.  We reviewed the incurable nature of his cancer, and that he has quite limited treatment options.  Prognosis is poor.  He and his wife said that it was difficult to process the first time he heard it from Dr. Willis, but is able to accept it better now.  They expressed some frustration over how far away his palliative care appointment is, and felt that there wasn't enough support.  They were able to get a hospice referral through his PCP at [facility] and is meeting with hospice tomorrow at his home.  He plans to enroll onto hospice.  I felt that it is a good plan, and agreed with his wishes.  It would be best to focus on supportive cares, quality of life, and spending as much time with his loved ones as much as possible at this point.    Serge and his wife had their questions answered.  As  above, he is planning to enroll with home hospice tomorrow.  They are welcome to call with questions or return to clinic as needed if any further oncologic issues arise.        I spent a total of 60 minutes with the patient, with over >50% of the time in counseling and/or coordination of care.       Ree Cruz MD  Hematology/Oncology  [facility]

## 2018-04-30 NOTE — NURSING NOTE
"Oncology Rooming Note    April 30, 2018 8:03 AM   Serge Brambila is a 72 year old male who presents for:    Chief Complaint   Patient presents with     Oncology Clinic Visit     New for HCC      Initial Vitals: BP (!) 188/33  Pulse 55  Temp 98.1  F (36.7  C) (Oral)  Resp 18  Wt 106.6 kg (235 lb 0.2 oz)  SpO2 100%  BMI 35.73 kg/m2 Estimated body mass index is 35.73 kg/(m^2) as calculated from the following:    Height as of 4/16/18: 1.727 m (5' 8\").    Weight as of this encounter: 106.6 kg (235 lb 0.2 oz). Body surface area is 2.26 meters squared.  No Pain (0) Comment: Data Unavailable   No LMP for male patient.  Allergies reviewed: Yes  Medications reviewed: Yes    Medications: Medication refills not needed today.  Pharmacy name entered into EPIC: Data Unavailable    Clinical concerns: Bp was abnormal  Cruz was notified.    10 minutes for nursing intake (face to face time)     Karla Limon MA              "

## 2018-05-02 PROBLEM — R41.82 ALTERED MENTAL STATUS: Status: RESOLVED | Noted: 2018-01-01 | Resolved: 2018-01-01

## 2018-05-02 PROBLEM — D62 ACUTE BLOOD LOSS ANEMIA: Status: ACTIVE | Noted: 2018-01-01

## 2018-05-02 NOTE — ED TRIAGE NOTES
Pt arrived from home per his primary care MD pt's HGB was 5.8 today. Pt reports mild SOB with ambulating and at rest at times. 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 On arrival pt alert and oriented x4. Vitals stable, afebrile.

## 2018-05-02 NOTE — H&P
[facility], [place]    Hematology / Oncology  Admission History & Physical     Date of Admission:  05/02/18  Date of Service: 05/02/18  Primary Oncologist: Dr. Ree Cruz (previously saw Dr. 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 He is not a transplant candidate, not appropriate for loco-regional treatments (per IR), and not felt to benefit from systemic therapy. Has recently made the decision to pursue hospice, but presents to the hospital today for evaluation of acute on chronic anemia, and concern for GI bleed.    Acute on chronic anemia.  Possible GI bleed.  Patient presented to PCP for evaluation of a 2-week history of SOB and lab workup revealed hemoglobin 5.8. He was instructed to present to the ED for further evaluation. Repeat labs here showed hemoglobin 5.7. This represents a drop from 7.2 on 4/16/2018. He denies any melena or hematochezia, though fecal occult blood test was positive in the ED. Of note, he is maintained on warfarin and aspirin, and INR on admission labs was 2.98.  He does have evidence of grade 2 esophageal varices on recent EGD, though denies any prior episodes of ulcers or GI bleeding.  Vitals are reassuring.  Two units of PRBCs were ordered to be transfused in the ED.  He was started on octreotide and pantoprazole.  In this setting, suspect slow/ooziing (rather than brisk) GI bleeding in the setting of high INR on warfarin.  - Add-on hemolysis labs and peripheral smear (to admission labs, prior to transfusion).  - Reverse INR with vitamin K 10 mg IV x 1.  - Continue pantoprazole 40 mg IV BID for now.   - Unlikely to benefit from octreotide IV, so will not continue for now.  -  "Recheck hemoglobin around 10PM.  - Transfuse for goal Hgb >8 (w/CAD history).  - Holding home aspirin and warfarin.  - NPO at midnight in case GI intervention is needed.    Recently diagnosed HCC.  Initially found to have a liver mass on MRI 5/19/2017 after he was evaluated for altered mental status which was felt to be secondary to hepatic encephalopathy.  At that time, the mass involved the right lobe of the liver, measuring 2.6 x 4.1 cm.  CT-guided liver biopsy on 6/28/2017 revealed \"atypical hepatocytes\".  Repeat MRI on 2/5/2018 showed progression of the tumor measuring 9.2 x 7.3 cm with evidence of portal vein thrombus.  He was admitted to [facility] in early March 2018 for hepatic encephalopathy and acute anemia with worsening ascites.  MRI on 3/3/2018 was definitive for HCC with an 11 cm right lobe lesion and tumor thrombus in the portal venous system.  IR discussed radio embolization, and he underwent successful MAA mapping for right hepatic lobe localization on 4/16/2018, but further therapy was canceled due to extensive lung shunting.  He saw Dr. Lina Cruz on 4/30/2018 for a second opinion.    - After discussion of possible systemic therapy options, the decision was made to pursue hospice.    Mild JUAN M on CKD stage 3.  Creatinine 2.2 on admission labs, slightly elevated compared to recent baseline. Possible mild pre-renal etiology in setting of GI bleeding.  - Holding home losartan for now.  - Gentle IV fluids overnight.    ESLD (thought secondary to KUHN), c/b portal hypertension with recurrent ascites, grade II esophageal varices, and hepatic encephalopathy.  - Continue home furosemide and spironolactone.  - Continue home lactulose and rifaximin.    CAD.  Hypertension.  - Holding home aspirin in setting of possible GI bleed.  - Continue home metoprolol (with holding parameters for low BP).  - Holding home losartan. If BP stable and bleeding concerns resolved, could consider resuming in the next day or " two.    Paroxysmal A.fib.  Currently in NSR. Maintained on warfarin long-term. INR today is 2.94.  - Holding warfarin in setting of possible GI bleed.  - Considering he is going to be entering hospice, do not think he will benefit from restarting warfarin at this point.    Hypothyroidism.  - Continue home levothyroxine.    Insulin-dependent DM2.  - Decrease home Basaglar insulin from 38 units QHS to 20 units QHS (anticipating NPO status).  - Medium intensity sliding scale correction with hypoglycemia protocol.    Pain Assessment:  Current Pain Score 5/2/2018   Patient currently in pain? yes   Pain score (0-10) -   Pain location Rib cage   Pain descriptors Aching   - Serge [place] pain level was assessed and he currently denies any pain medications.      FEN: CLD (NPO at midnight), NS at 100 ml/hr, replete lytes PRN  Prophylaxis: mechanical  Code: FULL  Disposition: Admitted to the oncology service for evaluation of acute on chronic anemia. Anticipate discharge to home in the next couple of days pending further evaluation and clinical improvement.    Jo Ann Matt MD/PhD  Heme/Onc/Transplant Hospitalist    Chief Complaint   Told to present to the ED for evaluation after found to have hemoglobin 5.8 associated with shortness of breath.  - History obtained from the patient and through chart review.    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 He presented to the ED for evaluation of abnormal labs. He was at a clinic appointment earlier in the ED for evaluation of shortness of breath and was found to have a hemoglobin of 5.8. He also complains of fatigue, abdominal distention and increased leg swelling. He currently is on lactulose but denies any other changes in his stools; specifically denies any melena  or hematochezia. He has no nausea or vomiting. Most recent therapeutic paracentesis was on 4/24/2018 (for removal of 3.2 L straw-colored ascites fluid). He normally feels some improvement in his shortness of breath after paracentesis, though notes he did not experience this typical relief. His shortness of breath seems to be worse in the morning, and he denies any orthopnea or PND. He is not dizzy or lightheaded when standing. Of note, he recently established care with Dr. Ree Cruz (of medical oncology) on 4/30/2018 for a second opinion regarding his newly diagnosed HCC. It was felt that he is not a transplant candidate, and likely not a candidate for loco-regional treatment by IR either. Systemic therapy was discussed, but overall it was felt that he would not benefit much from this. He was ultimately recommended to meet with hospice and palliative care.    Upon arrival in the ED, he was noted to be afebrile with HR 64 and /35. O2 sats were 100% on room air. Lab workup revealed hemoglobin 5.7 (previously 7.2 on 4/16/2018), platelet count 109 and INR 2.94. Troponin was negative. LFTs were stably elevated, and renal function is near his baseline (creatinine 2.2). CT abd/pelvis w/o contrast showed no evidence of hemorrhage in the abdomen or pelvis, with chronic sequelae of portal hypertension including splenomegaly, moderate/large volume ascites, mesenteric edema and soft tissue anasarca. He was started on octreotide and pantoprazole, and 2 units PRBCs were ordered for transfusion. He was admitted to the oncology service for further cares.    Heme/Onc History (adapted from most recent clinic note):  Serge Brambila is a 72 year old male with PMHx of ESLD thought secondary to KUHN, with ascites, hepatic encephalopathy, CAD, DM, atrial fibrillation, who presents with hepatocellular carcinoma.   He was previously seen by Dr. Willis.  MRI abdomen on 3/3/18 showed an infiltrative right hepatic lobe  "hepatocellular carcinoma, measuring up to 11 cm.  There is associated tumor thrombus that extends throughout the right portal venous system to the splenoportal confluence.  There was not obvious evidence of metastatic disease on staging CT chest or bone scan.  He was felt not to be a transplant candidate or surgical candidate.  He was seen by IR for evaluation for Y-90 treatment, but was canceled due to extensive lung shunting.  He was not offered any further locoregional treatment options.  He met with Dr. Willis, and he was felt to be a poor candidate for any of the systemic therapy options due to his poor liver function.  He met with his PCP in the [facility], and he was recommended to enroll onto hospice.  He has been undergoing paracentesis every 2 weeks, but are going to increase the frequency to weekly.  He notes fatigue and occasional shortness of breath.      Per note from 4/30/18 - \"He is planning to enroll with home hospice tomorrow.\"    Past Medical History    I have reviewed this patient's medical history and updated it with pertinent information if needed.   Past Medical History:   Diagnosis Date     Atrial fibrillation (H)      Diabetes (H)     type II     Hepatic encephalopathy (H)      Hypertension      MI (myocardial infarction)     stent placement     Sleep apnea      Thyroid disease        Past Surgical History   I have reviewed this patient's surgical history and updated it with pertinent information if needed.  Past Surgical History:   Procedure Laterality Date     APPENDECTOMY  age 16     COLONOSCOPY       ESOPHAGOSCOPY, GASTROSCOPY, DUODENOSCOPY (EGD), COMBINED N/A 1/24/2018    Procedure: COMBINED ESOPHAGOSCOPY, GASTROSCOPY, DUODENOSCOPY (EGD);  ESOPHAGOGASTRODUODENOSCOPY (MAC) ;  Surgeon: Colton Stroud MD;  Location:  GI     GI SURGERY  2012    partial colectomy for pre-CA nodule, removed 10 in.       Prior to Admission Medications   Prior to Admission Medications "   Prescriptions Last Dose Informant Patient Reported? Taking?   BASAGLAR 100 UNIT/ML injection  Self Yes No   Sig: Inject 37 Units Subcutaneous At Bedtime    Blood Pressure Monitoring (RA BLOOD PRESSURE CUFF MONITOR) MISC  Self Yes No   Sig: by Misc.(Non-Drug; Combo Route) route once daily.   [employer] LANCETS 21G MISC  Self Yes No   Sig: by Misc.(Non-Drug; Combo Route) route once daily.   LEVOTHYROXINE SODIUM PO  Self Yes No   Sig: Take 150 mcg by mouth daily    Nitroglycerin (NITROQUICK SL)  Self Yes No   PANTOPRAZOLE SODIUM PO  Self Yes No   Sig: Take 40 mg by mouth daily    WARFARIN SODIUM PO 2018 at Unknown time Self Yes Yes   Sig: Take 5 mg by mouth daily    aspirin (ASPIRIN ADULT LOW STRENGTH) 81 MG EC tablet  Self No No   Sig: Take 1 tablet (81 mg) by mouth daily   blood glucose monitoring (NO BRAND SPECIFIED) test strip  Self Yes No   Si each   blood glucose monitoring (ONETOUCH ULTRA) test strip  Self Yes No   Sig: TEST TWICE DAILY TO THREE TIMES DAILY   fluticasone (FLONASE) 50 MCG/ACT spray  Self Yes No   Sig: Spray 1 spray into both nostrils daily   furosemide (LASIX) 20 MG tablet  Self No No   Sig: Take 2 tablets (40 mg) by mouth daily   lactulose (CHRONULAC) 10 GM/15ML solution  Self No No   Si mLs (20 g) by Oral or NG Tube route 3 times daily   losartan (COZAAR) 25 MG tablet  Self No No   Sig: Take 2 tablets (50 mg) by mouth daily   metoprolol tartrate (LOPRESSOR) 25 MG tablet  Self No No   Sig: Take 1 tablet (25 mg) by mouth 2 times daily   rifaximin (XIFAXAN) 550 MG TABS tablet  Self No No   Sig: Take 1 tablet (550 mg) by mouth 2 times daily   sodium bicarbonate 650 MG tablet  Self No No   Sig: Take 2 tablets (1,300 mg) by mouth 2 times daily   spironolactone (ALDACTONE) 50 MG tablet  Self No No   Sig: Take 1 tablet (50 mg) by mouth daily      Facility-Administered Medications: None     Allergies   Allergies   Allergen Reactions     Penicillins      Happened in his teens, unsure what his  reaction was. Tolerated ceftriaxone 3/18       Social History   Social History     Social History     Marital status:      Spouse name: N/A     Number of children: N/A     Years of education: N/A     Occupational History     Not on file.     Social History Main Topics     Smoking status: Former Smoker     Packs/day: 1.00     Years: 10.00     Quit date: 1/1/1999     Smokeless tobacco: Never Used     Alcohol use No     Drug use: No     Sexual activity: Not on file     Other Topics Concern     Not on file     Social History Narrative       Family History   I have reviewed this patient's family history and updated it with pertinent information if needed.   Family History   Problem Relation Age of Onset     Hyperlipidemia Father      Hypertension Father      Cervical Cancer Maternal Grandmother      Alcoholism Mother      Abdominal Aortic Aneurysm Mother      HEART DISEASE Mother      Hypertension Mother      Thyroid Disease Mother      Hyperlipidemia Mother        Review of Systems   A comprehensive ROS was performed with the patient and was found to be negative or non-contributory with the exception of that noted in the HPI above.    Physical Exam   Temp:  [98  F (36.7  C)-98.4  F (36.9  C)] 98  F (36.7  C)  Heart Rate:  [63-68] 63  Resp:  [15-16] 15  BP: (126-148)/(35-49) 132/49  SpO2:  [100 %] 100 %  CONSTITUTIONAL: Alert and interactive. Lying in bed in no acute distress.  HEENT: NC/AT, PERRLA, EOMI, anicteric sclera, oropharynx is pink and moist.  NECK: Supple, full ROM.  CARDIOVASCULAR: RRR. Normal S1/S2. Grade II/VI systolic ejection murmur heard at the apex. 2+ equal and bilateral radial and pedal pulses.   RESPIRATORY: CTAB. No wheezes appreciated. Normal respiratory effort on ambient air.  GASTROINTESTINAL: Soft, non-tender, moderately distended, normoactive bowel sounds. No rebound or guarding.  MUSCULOSKELETAL: No joint swelling or tenderness.  EXTREMITIES: 1+ bilateral lower extremity edema.   SKIN:  Warm and intact. No concerning lesions or rashes on exposed skin surfaces. No jaundice.  NEUROLOGIC: Alert and fully oriented, appropriately responsive during interview.     Data   I have personally reviewed the following labs/imaging:  Results for orders placed or performed during the hospital encounter of 05/02/18 (from the past 24 hour(s))   CBC with platelets differential   Result Value Ref Range    WBC 4.0 4.0 - 11.0 10e9/L    RBC Count 1.97 (L) 4.4 - 5.9 10e12/L    Hemoglobin 5.7 (LL) 13.3 - 17.7 g/dL    Hematocrit 18.6 (L) 40.0 - 53.0 %    MCV 94 78 - 100 fl    MCH 28.9 26.5 - 33.0 pg    MCHC 30.6 (L) 31.5 - 36.5 g/dL    RDW 19.1 (H) 10.0 - 15.0 %    Platelet Count 109 (L) 150 - 450 10e9/L    Diff Method Automated Method     % Neutrophils 73.8 %    % Lymphocytes 13.4 %    % Monocytes 9.9 %    % Eosinophils 2.7 %    % Basophils 0.2 %    % Immature Granulocytes 0.0 %    Nucleated RBCs 0 0 /100    Absolute Neutrophil 3.0 1.6 - 8.3 10e9/L    Absolute Lymphocytes 0.5 (L) 0.8 - 5.3 10e9/L    Absolute Monocytes 0.4 0.0 - 1.3 10e9/L    Absolute Eosinophils 0.1 0.0 - 0.7 10e9/L    Absolute Basophils 0.0 0.0 - 0.2 10e9/L    Abs Immature Granulocytes 0.0 0 - 0.4 10e9/L    Absolute Nucleated RBC 0.0    Comprehensive metabolic panel   Result Value Ref Range    Sodium 140 133 - 144 mmol/L    Potassium 5.2 3.4 - 5.3 mmol/L    Chloride 114 (H) 94 - 109 mmol/L    Carbon Dioxide 17 (L) 20 - 32 mmol/L    Anion Gap 10 3 - 14 mmol/L    Glucose 130 (H) 70 - 99 mg/dL    Urea Nitrogen 50 (H) 7 - 30 mg/dL    Creatinine 2.22 (H) 0.66 - 1.25 mg/dL    GFR Estimate 29 (L) >60 mL/min/1.7m2    GFR Estimate If Black 35 (L) >60 mL/min/1.7m2    Calcium 8.2 (L) 8.5 - 10.1 mg/dL    Bilirubin Total 1.1 0.2 - 1.3 mg/dL    Albumin 2.6 (L) 3.4 - 5.0 g/dL    Protein Total 7.0 6.8 - 8.8 g/dL    Alkaline Phosphatase 210 (H) 40 - 150 U/L    ALT 68 0 - 70 U/L     (H) 0 - 45 U/L   ABO/Rh type and screen   Result Value Ref Range    Units Ordered 2      ABO O     RH(D) Pos     Antibody Screen Neg     Test Valid Only At          [facility]    Specimen Expires 05/05/2018     Crossmatch Red Blood Cells    Troponin I   Result Value Ref Range    Troponin I ES <0.015 0.000 - 0.045 ug/L   Blood component   Result Value Ref Range    Unit Number [id number]     Blood Component Type Red Blood Cells Leukocyte Reduced     Division Number 00     Status of Unit Released to care unit 05/02/2018 1628     Blood Product Code [id number]     Unit Status ISS    Blood component   Result Value Ref Range    Unit Number [id number]     Blood Component Type Red Blood Cells Leukocyte Reduced     Division Number 00     Status of Unit Ready for patient 05/02/2018 1453     Blood Product Code [id number]     Unit Status AKASH    INR   Result Value Ref Range    INR 2.94 (H) 0.86 - 1.14   EKG 12-lead, tracing only   Result Value Ref Range    Interpretation ECG Click View Image link to view waveform and result    CT Abdomen Pelvis w/o Contrast    Narrative    Exam: CT abdomen and pelvis without contrast    Comparison: Abdominal MRI 3/3/2018    History: drop in hemoglobin, h/o large liver tumor with thrombus, rule  out bleed    Technique: CT of the abdomen and pelvis was obtained without  intravenous contrast. Coronal and sagittal reconstructions were  obtained and reviewed.    Findings:    Lower chest: Mild cardiomegaly. Hypodense blood pool, compatible with  patient's clinical history of anemia. No pericardial effusion. Tiny  left-sided pleural effusion. No focal consolidation.    Abdomen/pelvis: Cirrhotic configuration of the liver with a poorly  defined infiltrative hepatocellular carcinoma involving the right  hepatic lobe, better characterized on MRI 3/3/2018. Prominent  gastrosplenic varices. The spleen is enlarged measuring 14.5 cm in  length. Moderate-large mild ascites with mesenteric edema and  anasarca.    Small amount of layering  stones/biliary sludge. Normal right adrenal  gland. Left adrenal gland is not well seen. Right-sided simple renal  cysts. No hydronephrosis. Small bowel is nondistended. Postsurgical  changes of partial colectomy and appendectomy. Colonic diverticulosis  without evidence of diverticulitis.    Reactive appearing abdominal and pelvic lymph nodes.    Bones: Degenerative changes of the spine and pelvis.      Impression    Impression:   1. No evidence of hemorrhage in the abdomen/pelvis.  2. Poorly defined infiltrative hepatocellular carcinoma in the right  hepatic lobe, better characterized on MRI 3/3/2018.  3. Sequelae of portal hypertension including splenomegaly,  moderate-large volume ascites, mesenteric edema, and soft tissue  anasarca.    I have personally reviewed the examination and initial interpretation  and I agree with the findings.    EDOUARD EUCEDA MD

## 2018-05-02 NOTE — IP AVS SNAPSHOT
MRN:[id number]                      After Visit Summary   5/2/2018    Serge Galeas    MRN: [id number]           Thank you!     Thank you for choosing [facility] for your care. Our goal is always to provide you with excellent care. Hearing back from our patients is one way we can continue to improve our services. Please take a few minutes to complete the written survey that you may receive in the mail after you visit with us. Thank you!        Patient Information     Date Of Birth          1945        Designated Caregiver       Most Recent Value    Caregiver    Will someone help with your care after discharge? yes    Name of designated caregiver nazanin galeas    Phone number of caregiver [phone]      About your hospital stay     You were admitted on:  May 2, 2018 You last received care in the:  [address] [facility] [facility]    You were discharged on:  May 3, 2018        Reason for your hospital stay       You were admitted for low hemobglobin                  Who to Call     For medical emergencies, please call 911.  For non-urgent questions about your medical care, please call your primary care provider or clinic, [phone]          Attending Provider     Provider Specialty    Patricia Cross MD Emergency Medicine    [facility]il[facility] peterson MD Hematology & Oncology       Primary Care Provider Office Phone # Fax #    Linn CARLOS Thompson [phone]       When to contact your care team       Call your local primary care provider office or the [facility] 24-hour triage line at [phone] or [phone] for temp >100.4, uncontrolled nausea/vomiting/diarrhea/constipation, unrelieved pain, bleeding not relieved with pressure, dizziness, chest pain, shortness of breath, loss of consciousness, and any new or concerning symptoms.                  After Care Instructions     Activity       Your activity upon discharge: Activity as tolerated. No driving or strenuous activity while  taking narcotics, if having headaches/dizziness/vision changes, or if feeling generally weak or unwell.            Diet       Follow this diet upon discharge: Regular diet as tolerated. Be sure to drink plenty of non-caffeinated fluids.                  Follow-up Appointments     Follow Up and recommended labs and tests       Follow up in clinic with various appointments as previously scheduled and listed below. Please schedule post-hospital discharge follow-up with your local primary care provider within 1 week of discharge and request to have your hemoglobin rechecked.                  Your next 10 appointments already scheduled     May 09, 2018  8:00 AM CDT   Lab with [facility]   [facility] ([facility])    [address]  1st Floor  [place] [facility] [address]   [phone]            May 09, 2018  9:00 AM CDT   (Arrive by 8:45 AM)   Return General Liver with Yennifer Culver MD   [facility] ([facility])    [address]  [address]  [place] [address]   [phone]            May 11, 2018  7:30 AM CDT   Paracentesis Visit with Uc Spec Inf Para Provider, UC 39 ATC   [facility] ([facility])    [address]  [address]  [place] [address] [phone]            May 16, 2018 12:45 PM CDT   (Arrive by 12:30 PM)   New Patient Visit with Sudarshan Connell MD   [facility] ([facility])    [address]  [address]  [place] [address]   [phone]            May 17, 2018  7:30 AM CDT   Paracentesis Visit with Uc Spec Inf Para Provider, UC 40 ATC   [facility] ([facility])    [address]  [address]  [place] [address]   [phone]            May 25, 2018  2:00 PM CDT   Paracentesis Visit with Uc Spec  Inf Para Provider,  40 ATC   [facility] ([facility])    [address]  [address]  [facility] [id number] [phone]            May 31, 2018  7:30 AM CDT   Paracentesis Visit with Uc Spec Inf Para Provider   [facility] ([facility])    [address]  [address]  [facility] [id number] [phone]              Pending Results     Date and Time Order Name Status Description    5/2/2018 1726 Blood Morphology Pathologist Review In process             Statement of Approval     Ordered          05/03/18 1423  I have reviewed and agree with all the recommendations and orders detailed in this document.  EFFECTIVE NOW     Approved and electronically signed by:  Tea Silveira APRN CNP             Admission Information     Date & Time Provider Department Dept. Phone    5/2/2018 Liliana Rizvi MD [address] [facility] [place] [phone]      Your Vitals Were     Blood Pressure Pulse Temperature Respirations Weight Pulse Oximetry    149/53 54 97.4  F (36.3  C) (Oral) 20 103.8 kg (228 lb 14.4 oz) 100%    BMI (Body Mass Index)                   34.8 kg/m2           MyChart Information     [facility] gives you secure access to your electronic health record. If you see a primary care provider, you can also send messages to your care team and make appointments. If you have questions, please call your primary care clinic.  If you do not have a primary care provider, please call [phone] and they will assist you.        Care EveryWhere ID     This is your Care EveryWhere ID. This could be used by other organizations to access your [facility] medical records  [id number]        Equal Access to Services     FRANK CONROY : anand Worrell qaybta kaalmada adeegyada, waxay idiin hayaan adeeg kharash la'aan ah. So [facility]  [phone].    ATENCIÓN: Si tawanda magana, tiene a collins disposición servicios gratuitos de asistencia lingüística. Rufina blakely [phone].    We comply with applicable federal civil rights laws and [place] laws. We do not discriminate on the basis of race, color, national origin, age, disability, sex, sexual orientation, or gender identity.               Review of your medicines      CONTINUE these medicines which may have CHANGED, or have new prescriptions. If we are uncertain of the size of tablets/capsules you have at home, strength may be listed as something that might have changed.        Dose / Directions    pantoprazole 20 MG EC tablet   Commonly known as:  PROTONIX   This may have changed:  when to take this   Used for:  Gastrointestinal hemorrhage, unspecified gastrointestinal hemorrhage type, HCC (hepatocellular carcinoma) (H)        Dose:  40 mg   Take 2 tablets (40 mg) by mouth 2 times daily   Quantity:  60 tablet   Refills:  2         CONTINUE these medicines which have NOT CHANGED        Dose / Directions    BASAGLAR 100 UNIT/ML injection        Dose:  37 Units   Inject 37 Units Subcutaneous At Bedtime   Refills:  0       * ONETOUCH ULTRA test strip   Generic drug:  blood glucose monitoring        TEST TWICE DAILY TO THREE TIMES DAILY   Refills:  0       * blood glucose monitoring test strip   Commonly known as:  no brand specified        Dose:  1 each   1 each   Refills:  0       fluticasone 50 MCG/ACT spray   Commonly known as:  FLONASE        Dose:  1 spray   Spray 1 spray into both nostrils daily   Refills:  0       furosemide 20 MG tablet   Commonly known as:  LASIX   Used for:  Cirrhosis of liver with ascites, unspecified hepatic cirrhosis type (H)        Dose:  40 mg   Take 2 tablets (40 mg) by mouth daily   Quantity:  30 tablet   Refills:  1       [employer] LANCETS 21G Misc        by Misc.(Non-Drug; Combo Route) route once daily.   Refills:  0       lactulose 10 GM/15ML solution   Commonly known  as:  CHRONULAC   Used for:  Hepatic encephalopathy (H)        Dose:  20 g   30 mLs (20 g) by Oral or NG Tube route 3 times daily   Quantity:  2700 mL   Refills:  1       LEVOTHYROXINE SODIUM PO        Dose:  150 mcg   Take 150 mcg by mouth daily   Refills:  0       metoprolol tartrate 25 MG tablet   Commonly known as:  LOPRESSOR   Used for:  Paroxysmal atrial fibrillation (H)        Dose:  25 mg   Take 1 tablet (25 mg) by mouth 2 times daily   Quantity:  60 tablet   Refills:  1       NITROQUICK SL        Refills:  0       RA BLOOD PRESSURE CUFF MONITOR Misc        by Misc.(Non-Drug; Combo Route) route once daily.   Refills:  0       rifaximin 550 MG Tabs tablet   Commonly known as:  XIFAXAN   Indication:  hepatic encephalopathy   Used for:  Hepatic encephalopathy (H)        Dose:  550 mg   Take 1 tablet (550 mg) by mouth 2 times daily   Quantity:  60 tablet   Refills:  3       sodium bicarbonate 650 MG tablet   Used for:  Chronic kidney disease, unspecified CKD stage        Dose:  1300 mg   Take 2 tablets (1,300 mg) by mouth 2 times daily   Quantity:  120 tablet   Refills:  1       spironolactone 50 MG tablet   Commonly known as:  ALDACTONE   Used for:  Cirrhosis of liver with ascites, unspecified hepatic cirrhosis type (H)        Dose:  50 mg   Take 1 tablet (50 mg) by mouth daily   Quantity:  30 tablet   Refills:  1       * Notice:  This list has 2 medication(s) that are the same as other medications prescribed for you. Read the directions carefully, and ask your doctor or other care provider to review them with you.      STOP taking     aspirin 81 MG EC tablet   Commonly known as:  ASPIRIN ADULT LOW STRENGTH           losartan 25 MG tablet   Commonly known as:  COZAAR           WARFARIN SODIUM PO                Where to get your medicines      These medications were sent to [facility] - [place], [place] - [address]  [address], [facility] [place] [place] [address]     Phone:  [phone]      pantoprazole 20 MG EC tablet                Protect others around you: Learn how to safely use, store and throw away your medicines at www.disposemymeds.org.             Medication List: This is a list of all your medications and when to take them. Check marks below indicate your daily home schedule. Keep this list as a reference.      Medications           Morning Afternoon Evening Bedtime As Needed    BASAGLAR 100 UNIT/ML injection   Inject 37 Units Subcutaneous At Bedtime   Last time this was given:  20 Units on 5/2/2018 10:11 PM                                * ONETOUCH ULTRA test strip   TEST TWICE DAILY TO THREE TIMES DAILY   Generic drug:  blood glucose monitoring                                * blood glucose monitoring test strip   Commonly known as:  no brand specified   1 each                                fluticasone 50 MCG/ACT spray   Commonly known as:  FLONASE   Spray 1 spray into both nostrils daily                                furosemide 20 MG tablet   Commonly known as:  LASIX   Take 2 tablets (40 mg) by mouth daily   Last time this was given:  40 mg on 5/3/2018  8:53 AM                                KR[employer] LANCETS 21G Misc   by Misc.(Non-Drug; Combo Route) route once daily.                                lactulose 10 GM/15ML solution   Commonly known as:  CHRONULAC   30 mLs (20 g) by Oral or NG Tube route 3 times daily   Last time this was given:  20 g on 5/3/2018  8:53 AM                                LEVOTHYROXINE SODIUM PO   Take 150 mcg by mouth daily   Last time this was given:  150 mcg on 5/3/2018  9:00 AM                                metoprolol tartrate 25 MG tablet   Commonly known as:  LOPRESSOR   Take 1 tablet (25 mg) by mouth 2 times daily   Last time this was given:  25 mg on 5/3/2018  8:53 AM                                NITROQUICK SL                                pantoprazole 20 MG EC tablet   Commonly known as:  PROTONIX   Take 2 tablets (40 mg) by mouth 2 times daily                                 RA BLOOD PRESSURE CUFF MONITOR Misc   by Misc.(Non-Drug; Combo Route) route once daily.                                rifaximin 550 MG Tabs tablet   Commonly known as:  XIFAXAN   Take 1 tablet (550 mg) by mouth 2 times daily   Last time this was given:  550 mg on 5/3/2018  8:54 AM                                sodium bicarbonate 650 MG tablet   Take 2 tablets (1,300 mg) by mouth 2 times daily   Last time this was given:  1,300 mg on 5/3/2018  8:54 AM                                spironolactone 50 MG tablet   Commonly known as:  ALDACTONE   Take 1 tablet (50 mg) by mouth daily   Last time this was given:  50 mg on 5/3/2018  9:00 AM                                * Notice:  This list has 2 medication(s) that are the same as other medications prescribed for you. Read the directions carefully, and ask your doctor or other care provider to review them with you.

## 2018-05-02 NOTE — ED NOTES
[facility], [place]   ED Nurse to Floor Handoff     Serge Brambila is a 72 year old male who speaks English and lives with a spouse,  in a home  They arrived in the ED by car from clinic    ED Chief Complaint: Abnormal Labs    ED Dx;   Final diagnoses:   Anemia, unspecified type   Gastrointestinal hemorrhage, unspecified gastrointestinal hemorrhage type   HCC (hepatocellular carcinoma) (H)         Needed?: No    Allergies:   Allergies   Allergen Reactions     Penicillins      Happened in his teens, unsure what his reaction was. Tolerated ceftriaxone 3/18   .  Past Medical Hx:   Past Medical History:   Diagnosis Date     Atrial fibrillation (H)      Diabetes (H)     type II     Hepatic encephalopathy (H)      Hypertension      MI (myocardial infarction)     stent placement     Sleep apnea      Thyroid disease       Baseline Mental status: WDL  Current Mental Status changes: at basesline    Infection present or suspected this encounter: no  Sepsis suspected: No  Isolation type: No active isolations     Activity level - Baseline/Home:  Stand with Assist  Activity Level - Current:   Stand with Assist    Bariatric equipment needed?: No    In the ED these meds were given:   Medications   pantoprazole (PROTONIX) 40 mg IV push injection (40 mg Intravenous Given 5/2/18 1425)   octreotide (sandoSTATIN) injection 50 mcg (50 mcg Intravenous Given 5/2/18 1434)       Drips running?  Yes  1st unit of PRBCs    Home pump  No    Current LDAs  Peripheral IV 05/02/18 Right Upper arm (Active)   Number of days:0       Labs results:   Labs Ordered and Resulted from Time of ED Arrival Up to the Time of Departure from the ED   CBC WITH PLATELETS DIFFERENTIAL - Abnormal; Notable for the following:        Result Value    RBC Count 1.97 (*)     Hemoglobin 5.7 (*)     Hematocrit 18.6 (*)     MCHC 30.6 (*)     RDW 19.1 (*)     Platelet Count 109 (*)     Absolute Lymphocytes 0.5 (*)     All other  components within normal limits   COMPREHENSIVE METABOLIC PANEL - Abnormal; Notable for the following:     Chloride 114 (*)     Carbon Dioxide 17 (*)     Glucose 130 (*)     Urea Nitrogen 50 (*)     Creatinine 2.22 (*)     GFR Estimate 29 (*)     GFR Estimate If Black 35 (*)     Calcium 8.2 (*)     Albumin 2.6 (*)     Alkaline Phosphatase 210 (*)      (*)     All other components within normal limits   INR - Abnormal; Notable for the following:     INR 2.94 (*)     All other components within normal limits   TROPONIN I   INR   ABO/RH TYPE AND SCREEN   RED BLOOD CELL PREPARE ORDER UNIT   BLOOD COMPONENT   BLOOD COMPONENT       Imaging Studies:   Recent Results (from the past 24 hour(s))   CT Abdomen Pelvis w/o Contrast    Narrative    Exam: CT abdomen and pelvis without contrast    Comparison: Abdominal MRI 3/3/2018    History: drop in hemoglobin, h/o large liver tumor with thrombus, rule  out bleed    Technique: CT of the abdomen and pelvis was obtained without  intravenous contrast. Coronal and sagittal reconstructions were  obtained and reviewed.    Findings:    Lower chest: Mild cardiomegaly. Hypodense blood pool, compatible with  patient's clinical history of anemia. No pericardial effusion. Tiny  left-sided pleural effusion. No focal consolidation.    Abdomen/pelvis: Cirrhotic configuration of the liver with a poorly  defined infiltrative hepatocellular carcinoma involving the right  hepatic lobe, better characterized on MRI 3/3/2018. Prominent  gastrosplenic varices. The spleen is enlarged measuring 14.5 cm in  length. Moderate-large mild ascites with mesenteric edema and  anasarca.    Small amount of layering stones/biliary sludge. Normal right adrenal  gland. Left adrenal gland is not well seen. Right-sided simple renal  cysts. No hydronephrosis. Small bowel is nondistended. Postsurgical  changes of partial colectomy and appendectomy. Colonic diverticulosis  without evidence of  diverticulitis.    Reactive appearing abdominal and pelvic lymph nodes.    Bones: Degenerative changes of the spine and pelvis.      Impression    Impression:   1. No evidence of hemorrhage in the abdomen/pelvis.  2. Poorly defined infiltrative hepatocellular carcinoma in the right  hepatic lobe, better characterized on MRI 3/3/2018.  3. Sequelae of portal hypertension including splenomegaly,  moderate-large volume ascites, mesenteric edema, and soft tissue  anasarca.    I have personally reviewed the examination and initial interpretation  and I agree with the findings.    EDOUARD EUCEDA MD       Recent vital signs:   /49  Temp 98.1  F (36.7  C) (Oral)  Resp 16  Wt 105.2 kg (231 lb 14.8 oz)  SpO2 100%  BMI 35.26 kg/m2    Cardiac Rhythm: Normal Sinus  Pt needs tele? No  Skin/wound Issues: None    Code Status: Full Code    Pain control: pt had none    Nausea control: pt had none    Abnormal labs/tests/findings requiring intervention: Hgb 5.8    Family present during ED course? Yes   Family Comments/Social Situation comments: Wife @ bedside    Tasks needing completion: None    Hailee Delong RN  [facility]-- [phone] [place] [facility]  [phone] [facility]

## 2018-05-02 NOTE — ED PROVIDER NOTES
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  The patient reports that he has been undergoing paracentesis every two weeks to help with abdominal distention as well as occasional shortness of breath.  He states that his last paracentesis was on Friday (5 days ago), but he did not experience his typical relief from shortness of breath and fatigue.  He was seen by [facility] for second opinion regarding his newly diagnosed hepatic lobe hepatocellular carcinoma on Monday (2 days ago).  It was felt that he is not a transplant candidate and likely not a surgical candidate.  They discussed systemic therapy, but felt that he would not benefit much from this.  Ultimately, they discussed that his treatment options are quite limited and recommended he meet with hospice and palliative care.  His primary care referred him to meet with hospice yesterday to the [facility] system.  Today, he had labs drawn through [facility] due to continued shortness of breath and was found to have a hemoglobin of 5.8.  He states he continues to have shortness of breath, worst in the morning, and some bilateral rib pain.  He states he also has significant increased leg swelling.  He is on Lasix and spironolactone.  He currently is on lactulose, but denies any other changes in his stools or noticing any blood.  He denies any cough, fever, or abdominal pain.  The patient has undergone transfusions twice in the past.  He denies any recent injuries or trauma, but notes that he did fall approximately 1 month ago and experienced an injury to his left ribs.  He was evaluated at [facility] at that time  and chest x-ray showed no fracture.    Past Medical History:   Diagnosis Date     Atrial fibrillation (H)      Diabetes (H)     type II     Hepatic encephalopathy (H)      Hypertension      MI (myocardial infarction)     stent placement     Sleep apnea      Thyroid disease        Past Surgical History:   Procedure Laterality Date     APPENDECTOMY  age 16     COLONOSCOPY       ESOPHAGOSCOPY, GASTROSCOPY, DUODENOSCOPY (EGD), COMBINED N/A 1/24/2018    Procedure: COMBINED ESOPHAGOSCOPY, GASTROSCOPY, DUODENOSCOPY (EGD);  ESOPHAGOGASTRODUODENOSCOPY (MAC) ;  Surgeon: Colton Stroud MD;  Location:  GI     GI SURGERY  2012    partial colectomy for pre-CA nodule, removed 10 in.       No family history on file.    Social History   Substance Use Topics     Smoking status: Former Smoker     Packs/day: 1.00     Years: 10.00     Quit date: 1/1/1999     Smokeless tobacco: Never Used     Alcohol use No       Current Facility-Administered Medications   Medication     octreotide (sandoSTATIN) injection 50 mcg     Current Outpatient Prescriptions   Medication     WARFARIN SODIUM PO     aspirin (ASPIRIN ADULT LOW STRENGTH) 81 MG EC tablet     BASAGLAR 100 UNIT/ML injection     blood glucose monitoring (NO BRAND SPECIFIED) test strip     blood glucose monitoring (ONETOUCH ULTRA) test strip     Blood Pressure Monitoring (RA BLOOD PRESSURE CUFF MONITOR) MISC     fluticasone (FLONASE) 50 MCG/ACT spray     furosemide (LASIX) 20 MG tablet     [employer] LANCETS 21G MISC     lactulose (CHRONULAC) 10 GM/15ML solution     LEVOTHYROXINE SODIUM PO     losartan (COZAAR) 25 MG tablet     metoprolol tartrate (LOPRESSOR) 25 MG tablet     Nitroglycerin (NITROQUICK SL)     PANTOPRAZOLE SODIUM PO     rifaximin (XIFAXAN) 550 MG TABS tablet     sodium bicarbonate 650 MG tablet     spironolactone (ALDACTONE) 50 MG tablet        Allergies   Allergen Reactions     Penicillins      Happened in his teens, unsure what his reaction was. Tolerated  ceftriaxone 3/18         I have reviewed the Medications, Allergies, Past Medical and Surgical History, and Social History in the Epic system.    Review of Systems   Constitutional: Negative for fever.   Respiratory: Positive for shortness of breath. Negative for cough.    Cardiovascular: Positive for chest pain (bilateral rib pain).   Gastrointestinal: Negative for abdominal pain, anal bleeding, blood in stool, nausea and vomiting.   All other systems reviewed and are negative.      Physical Exam   BP: (!) 148/35  Heart Rate: 64  Temp: 98.4  F (36.9  C)  Weight: 105.2 kg (231 lb 14.8 oz)  SpO2: 100 %      Physical Exam   General: patient is alert and oriented and in no acute distress   Head: atraumatic and normocephalic   EENT: moist mucus membranes without tonsillar erythema or exudates, pupils round and reactive, sclera anicteric  Neck: supple   Cardiovascular: regular rate and rhythm, extremities warm and well perfused, 2+ bilateral lower extremity edema  Pulmonary: lungs clear to auscultation bilaterally   Abdomen: soft, protuberant, non-tender  Musculoskeletal: normal range of motion   Neurological: alert and oriented, moving all extremities symmetrically, gait normal   Skin: warm, dry, bruising noted along the knees from prior fall      ED Course     ED Course     Procedures             EKG Interpretation:      Interpreted by Patricia Cross  Time reviewed: 1435  Symptoms at time of EKG: dyspnea   Rhythm: junctional  Rate: Normal  Axis: Normal  Ectopy: none  Conduction: normal  ST Segments/ T Waves: No acute ischemic changes  Q Waves: nonspecific  Comparison to prior: junctional rhythm    Clinical Impression: junctional rhythm      Critical Care time:  none             Labs Ordered and Resulted from Time of ED Arrival Up to the Time of Departure from the ED - No data to display         Assessments & Plan (with Medical Decision Making)   Mr. Brambila is a 72 year old male with a history of ESLD thought  secondary to KUHN with ascites, hepatic encephalopathy, CAD, DM Type II, atrial fibrillation, and newly diagnosed hepatocellular carcinoma who presents to the emergency department with his wife for dyspnea and anemia.  Repeat hemoglobin in the emergency department is 5.7.  Platelets are 109.  His INR is 2.94.  BUN is 50 with creatinine of 2.22.  LFTs are at baseline with an alk phos of 210 and AST of 100, ALT of 68.  On exam he is Hemoccult positive without evidence of gross blood on rectal exam.  He was started on Protonix and octreotide.  He does have a history of a large thrombus at 1 of his liver liver lesions.  He did have a CT of the abdomen to rule out intra-abdominal bleeding which is negative.  2 units of PRBCs were ordered.  We will plan to admit to oncology for continued transfusion and further workup of GI bleed and anemia.    I have reviewed the nursing notes.    I have reviewed the findings, diagnosis, plan and need for follow up with the patient.    Current Discharge Medication List          Final diagnoses:   Anemia, unspecified type   Gastrointestinal hemorrhage, unspecified gastrointestinal hemorrhage type   HCC (hepatocellular carcinoma) (H)   I, John Skinner, am serving as a trained medical scribe to document services personally performed by Patricia Cross MD, based on the provider's statements to me.      I, Patricia Cross MD, was physically present and have reviewed and verified the accuracy of this note documented by John Skinner.       5/2/2018   [facility], EMERGENCY DEPARTMENT     Patricia Cross MD  05/02/18 2747

## 2018-05-02 NOTE — IP AVS SNAPSHOT
[address] [address]    Phone:  [phone]                                       After Visit Summary   5/2/2018    Serge Brambila    MRN: [id number]           After Visit Summary Signature Page     I have received my discharge instructions, and my questions have been answered. I have discussed any challenges I see with this plan with the nurse or doctor.    ..........................................................................................................................................  Patient/Patient Representative Signature      ..........................................................................................................................................  Patient Representative Print Name and Relationship to Patient    ..................................................               ................................................  Date                                            Time    ..........................................................................................................................................  Reviewed by Signature/Title    ...................................................              ..............................................  Date                                                            Time

## 2018-05-03 NOTE — DISCHARGE SUMMARY
[facility], [place]    Discharge Summary  Hematology / Oncology    Date of Admission:  5/2/2018  Date of Discharge:  5/3/2018  Discharging Provider: Tea Silveira DNP, APRN, CNP  Primary Heme/Oncologist: Dr. Zeus Willis/Dr. 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 He is not a transplant candidate, not appropriate for loco-regional treatments (per IR), and not felt to benefit from systemic therapy. He has met with hospice and is considering enrolling in hospice care but has not made a decision to enroll as of yet. He presented to the [facility] ED for evaluation of acute on chronic anemia, and concern for GI bleed.    Hospital Course   Serge Brambila was admitted on 5/2/2018.  The following problems were addressed during his hospitalization:    Acute on chronic anemia.  Possible GI bleed.  Patient presented to PCP for evaluation of a 2-week history of SO; lab workup revealed hemoglobin 5.8. He was instructed to present to the ED for further evaluation. Repeat labs here showed hemoglobin 5.7. This represents a drop from 7.2 on 4/16/2018. He denies any melena, hematochezia, or hematemesis. Of note, he is maintained on warfarin and aspirin, and INR on admission labs was 2.98. He does have evidence of grade 2 esophageal varices on recent EGD, though denies any prior episodes of ulcers or GI bleeding. Vitals are  "reassuring. Two units of PRBCs were ordered to be transfused in the ED.  He was started on octreotide and pantoprazole. In this setting, suspect slow/ooziing (rather than brisk) GI bleeding in the setting of high INR on warfarin.    -Patient received total of 4 units RBC transfusion with improvement of hgb prior to discharge. He will f/u with PCP for hgb recheck.   -Aspirin and warfarin were discontinued PTA on Tuesday. We will NOT plan to resume.  -Patient received vitamin K for INR reversal.   -GI-Hepatology consulted. They discussed EGD and felt that it was appropriate to defer at this time, especially in absence of active bleeding, minimal intervention possible, and risk of more bleeding with high INR. Patient has outpt f/u with hepatologist at [place] next week.   -Continue oral PPI BID.   -Unlikely to benefit from octreotide IV, so discontinued.      Recently diagnosed HCC.  Initially found to have a liver mass on MRI 5/19/2017 after he was evaluated for altered mental status which was felt to be secondary to hepatic encephalopathy. At that time, the mass involved the right lobe of the liver, measuring 2.6 x 4.1 cm. CT-guided liver biopsy on 6/28/2017 revealed \"atypical hepatocytes\". Repeat MRI on 2/5/2018 showed progression of the tumor measuring 9.2 x 7.3 cm with evidence of portal vein thrombus. He was admitted to [facility] in early March 2018 for hepatic encephalopathy and acute anemia with worsening ascites. MRI on 3/3/2018 was definitive for HCC with an 11 cm right lobe lesion and tumor thrombus in the portal venous system. IR discussed radio embolization, and he underwent successful MAA mapping for right hepatic lobe localization on 4/16/2018, but further therapy was canceled due to extensive lung shunting. He saw Dr. Lina Cruz on 4/30/2018 for a second opinion. After discussion of possible systemic therapy options, the decision was made to pursue hospice.    -Patient/wife reports that they had " "information meeting with [facility] hospice liaison and are considering their options. However, it sounds like patient's goals currently are not in line with hospice and he has not enrolled as of yet. At this time, he continues to want aggressive cares such as full code, labs, transfusions, scopes if recommended, etc. He and his wife will discuss their options.      Mild JUAN M on CKD stage 3.  Creatinine 2.2 on admission labs, slightly elevated compared to recent baseline. Possible mild pre-renal etiology in setting of GI bleeding.    -Received gentle IV fluids overnight and 4U RBCs.  -Continue to hold PTA losartan.      ESLD (thought secondary to KUHN), c/b portal hypertension with recurrent ascites, grade II esophageal varices, and hepatic encephalopathy.  -Continue home furosemide and spironolactone.  -Continue home lactulose and rifaximin.  -Had therapeutic paracentesis with removal of ~3L on 5/3 afternoon.      CAD.  Hypertension.  -Continue home metoprolol. Did not resume home losartan as BPs ok and creatinine still elevated.      Paroxysmal A.fib.  Currently in NSR. Previously maintained on warfarin long-term but according to patient was \"stopped by his doctors\" on Tues 5/1/18. INR today is improving 2.85.     Hypothyroidism.  -Continue home levothyroxine.     Insulin-dependent DM2.  -Continue home Basaglar insulin 38 units qHS.     Discharge pain plan.   -Patient currently has NO PAIN and is not being prescribed pain medications on discharge.    Tea Silveira DNP, APRN, CNP  Hematology/Oncology  Pager: [phone]    Significant Results and Procedures   Results for orders placed or performed during the hospital encounter of 05/02/18   CT Abdomen Pelvis w/o Contrast    Narrative    Exam: CT abdomen and pelvis without contrast    Comparison: Abdominal MRI 3/3/2018    History: drop in hemoglobin, h/o large liver tumor with thrombus, rule  out bleed    Technique: CT of the abdomen and pelvis was obtained " without  intravenous contrast. Coronal and sagittal reconstructions were  obtained and reviewed.    Findings:    Lower chest: Mild cardiomegaly. Hypodense blood pool, compatible with  patient's clinical history of anemia. No pericardial effusion. Tiny  left-sided pleural effusion. No focal consolidation.    Abdomen/pelvis: Cirrhotic configuration of the liver with a poorly  defined infiltrative hepatocellular carcinoma involving the right  hepatic lobe, better characterized on MRI 3/3/2018. Prominent  gastrosplenic varices. The spleen is enlarged measuring 14.5 cm in  length. Moderate-large mild ascites with mesenteric edema and  anasarca.    Small amount of layering stones/biliary sludge. Normal right adrenal  gland. Left adrenal gland is not well seen. Right-sided simple renal  cysts. No hydronephrosis. Small bowel is nondistended. Postsurgical  changes of partial colectomy and appendectomy. Colonic diverticulosis  without evidence of diverticulitis.    Reactive appearing abdominal and pelvic lymph nodes.    Bones: Degenerative changes of the spine and pelvis.      Impression    Impression:   1. No evidence of hemorrhage in the abdomen/pelvis.  2. Poorly defined infiltrative hepatocellular carcinoma in the right  hepatic lobe, better characterized on MRI 3/3/2018.  3. Sequelae of portal hypertension including splenomegaly,  moderate-large volume ascites, mesenteric edema, and soft tissue  anasarca.    I have personally reviewed the examination and initial interpretation  and I agree with the findings.    EDOUARD EUCEDA MD   POC US Guide for Paracentesis    Impression    Attending: Rosa  Resident: Kenyon  Procedure: Diagnostic and therapeutic paracentesis  Indication: patient comfort and fluid analysis  Pre-procedure diagnosis: KUHN cirrhosis, hepatocellular carcinoma  Post-procedure diagnosis: same     The risks and benefits of the procedure were explained to the patient who expressed understanding and opted  to proceed.  Consent was obtained and placed in the chart.  Ultrasound was used to find a pocket of ascites in the left upper quadrant. A time out was performed. The area was prepped and draped in the usual sterile fashion.  6 ml of 1% lidocaine was instilled and ascites located.  A small incision was made with an 11 blade.  The [employer] paracentesis catheter and needle were inserted until ascites obtained then the needle removed.  The apparatus was connected to vacuum bottles and a total of 3200 ml removed.   A specimen was sent for analysis. The catheter was withdrawn and the area dressed. Pre and post-procedure images were saved to the medical record.     Patient tolerated the procedure well with no immediate complications.  The primary team was informed of the procedure.      Juwan Lee MD  Med-Peds Hospitalist  Pager [phone]             Unresulted Labs Ordered in the Past 30 Days of this Admission     Date and Time Order Name Status Description    5/2/2018 1726 Blood Morphology Pathologist Review In process           Code Status   Full Code    Physical Exam   Temp: 97.4  F (36.3  C) Temp src: Oral BP: 149/53 Pulse: 54 Heart Rate: 55 Resp: 20 SpO2: 100 % O2 Device: None (Room air)    Vitals:    05/02/18 1310 05/02/18 1809 05/03/18 1122   Weight: 105.2 kg (231 lb 14.8 oz) 97.6 kg (215 lb 1.6 oz) 103.8 kg (228 lb 14.4 oz)     Vital Signs with Ranges  Temp:  [96.2  F (35.7  C)-98.4  F (36.9  C)] 97.4  F (36.3  C)  Pulse:  [54] 54  Heart Rate:  [51-73] 55  Resp:  [15-20] 20  BP: (119-159)/(35-55) 149/53  SpO2:  [97 %-100 %] 100 %  I/O last 3 completed shifts:  In: 1994.33 [P.O.:600; I.V.:1088.33]  Out: 425 [Urine:425]    Constitutional: 72M seen resting comfortably in bed in NAD. Alert and interactive.   HEENT: NCAT. PERRL, anicteric sclera. Oral mucosa pink and moist with no lesions or thrush.  Hematologic / Lymphatic: No overt bleeding.  Respiratory: Non-labored breathing on RA. Lungs CTAB.   Cardiovascular:  Regular rate and rhythm. Grade 2 FRANKO appreciated. Strong distal pulses.  GI: Normoactive bowel sounds. Abdomen soft, non-distended, and non-tender to deep palpation throughout.   Skin: Warm and dry. No concerning lesions or rash on exposed surfaces. No jaundice.   Musculoskeletal: Extremities grossly normal, non-tender, 1+ non pitting BLE edema.   Neurologic: Alert, oriented, speech normal, no focal deficits.   Neuropsychiatric: Mentation and affect appear normal/appropriate.  Vascular Access: PIV.    Discharge Disposition   Discharged to home  Condition at discharge: Stable    Consultations This Hospital Stay   VASCULAR ACCESS CARE ADULT IP CONSULT  GI LUMINAL ADULT IP CONSULT  SOCIAL WORK IP CONSULT  INTERNAL MEDICINE PROCEDURE TEAM ADULT IP CONSULT [place] - [facility]    Discharge Orders     Reason for your hospital stay   You were admitted for low hemobglobin     Follow Up and recommended labs and tests   Follow up in clinic with various appointments as previously scheduled and listed below. Please schedule post-hospital discharge follow-up with your local primary care provider within 1 week of discharge and request to have your hemoglobin rechecked.     Activity   Your activity upon discharge: Activity as tolerated. No driving or strenuous activity while taking narcotics, if having headaches/dizziness/vision changes, or if feeling generally weak or unwell.     When to contact your care team   Call your local primary care provider office or the [facility] 24-hour triage line at [phone] or [phone] for temp >100.4, uncontrolled nausea/vomiting/diarrhea/constipation, unrelieved pain, bleeding not relieved with pressure, dizziness, chest pain, shortness of breath, loss of consciousness, and any new or concerning symptoms.     Full Code     Diet   Follow this diet upon discharge: Regular diet as tolerated. Be sure to drink plenty of non-caffeinated fluids.       Discharge Medications    Current Discharge Medication List      CONTINUE these medications which have CHANGED    Details   pantoprazole (PROTONIX) 20 MG EC tablet Take 2 tablets (40 mg) by mouth 2 times daily  Qty: 60 tablet, Refills: 2    Associated Diagnoses: Gastrointestinal hemorrhage, unspecified gastrointestinal hemorrhage type; HCC (hepatocellular carcinoma) (H)         CONTINUE these medications which have NOT CHANGED    Details   BASAGLAR 100 UNIT/ML injection Inject 37 Units Subcutaneous At Bedtime       !! blood glucose monitoring (NO BRAND SPECIFIED) test strip 1 each      !! blood glucose monitoring (ONETOUCH ULTRA) test strip TEST TWICE DAILY TO THREE TIMES DAILY      Blood Pressure Monitoring (RA BLOOD PRESSURE CUFF MONITOR) MISC by Misc.(Non-Drug; Combo Route) route once daily.      furosemide (LASIX) 20 MG tablet Take 2 tablets (40 mg) by mouth daily  Qty: 30 tablet, Refills: 1    Associated Diagnoses: Cirrhosis of liver with ascites, unspecified hepatic cirrhosis type (H)      [employer] LANCETS 21G MISC by Misc.(Non-Drug; Combo Route) route once daily.      lactulose (CHRONULAC) 10 GM/15ML solution 30 mLs (20 g) by Oral or NG Tube route 3 times daily  Qty: 2700 mL, Refills: 1    Associated Diagnoses: Hepatic encephalopathy (H)      LEVOTHYROXINE SODIUM PO Take 150 mcg by mouth daily       metoprolol tartrate (LOPRESSOR) 25 MG tablet Take 1 tablet (25 mg) by mouth 2 times daily  Qty: 60 tablet, Refills: 1    Associated Diagnoses: Paroxysmal atrial fibrillation (H)      rifaximin (XIFAXAN) 550 MG TABS tablet Take 1 tablet (550 mg) by mouth 2 times daily  Qty: 60 tablet, Refills: 3    Associated Diagnoses: Hepatic encephalopathy (H)      sodium bicarbonate 650 MG tablet Take 2 tablets (1,300 mg) by mouth 2 times daily  Qty: 120 tablet, Refills: 1    Associated Diagnoses: Chronic kidney disease, unspecified CKD stage      spironolactone (ALDACTONE) 50 MG tablet Take 1 tablet (50 mg) by mouth daily  Qty: 30 tablet, Refills: 1     Associated Diagnoses: Cirrhosis of liver with ascites, unspecified hepatic cirrhosis type (H)      fluticasone (FLONASE) 50 MCG/ACT spray Spray 1 spray into both nostrils daily      Nitroglycerin (NITROQUICK SL)        !! - Potential duplicate medications found. Please discuss with provider.      STOP taking these medications       aspirin (ASPIRIN ADULT LOW STRENGTH) 81 MG EC tablet Comments:   Reason for Stopping:         losartan (COZAAR) 25 MG tablet Comments:   Reason for Stopping:         WARFARIN SODIUM PO Comments:   Reason for Stopping:             Allergies   Allergies   Allergen Reactions     Penicillins      Happened in his teens, unsure what his reaction was. Tolerated ceftriaxone 3/18     Data   CBC  Recent Labs  Lab 05/03/18  0534 05/02/18 2238 05/02/18  1350   WBC 3.4*  --  4.0   RBC 2.56*  --  1.97*   HGB 7.5* 7.0* 5.7*   HCT 23.5*  --  18.6*   MCV 92  --  94   MCH 29.3  --  28.9   MCHC 31.9  --  30.6*   RDW 18.1*  --  19.1*   PLT 91*  --  109*     CMP  Recent Labs  Lab 05/03/18  0534 05/02/18  1350    140   POTASSIUM 5.4* 5.2   CHLORIDE 116* 114*   CO2 17* 17*   ANIONGAP 9 10   GLC 76 130*   BUN 51* 50*   CR 2.27* 2.22*   GFRESTIMATED 29* 29*   GFRESTBLACK 35* 35*   HARI 7.8* 8.2*   PROTTOTAL 6.4* 7.0   ALBUMIN 2.3* 2.6*   BILITOTAL 1.7* 1.1   ALKPHOS 176* 210*   AST 95* 100*   ALT 62 68     INR  Recent Labs  Lab 05/03/18  0534 05/02/18  1350   INR 2.85* 2.94*

## 2018-05-03 NOTE — PROCEDURES
Hospitalist Procedure Service - Paracentesis Procedure Note  Date of Service: 05/03/2018    Patient: Serge Brambila  MRN: [id number]  Admission Date: 5/2/2018  Hospital Day # 1    Attending: Rosa  Resident: Kenoyn  Procedure: Diagnostic and therapeutic paracentesis  Indication: patient comfort and fluid analysis  Pre-procedure diagnosis: KUHN cirrhosis, hepatocellular carcinoma  Post-procedure diagnosis: same    The risks and benefits of the procedure were explained to the patient who expressed understanding and opted to proceed.  Consent was obtained and placed in the chart.  Ultrasound was used to find a pocket of ascites in the left upper quadrant. A time out was performed. The area was prepped and draped in the usual sterile fashion.  6 ml of 1% lidocaine was instilled and ascites located.  A small incision was made with an 11 blade.  The [employer] paracentesis catheter and needle were inserted until ascites obtained then the needle removed.  The apparatus was connected to vacuum bottles and a total of 3200 ml removed.   A specimen was sent for analysis. The catheter was withdrawn and the area dressed. Pre and post-procedure images were saved to the medical record.    Patient tolerated the procedure well with no immediate complications.  The primary team was informed of the procedure.     Juwan Lee MD  Med-Peds Hospitalist  Pager [phone]

## 2018-05-03 NOTE — PLAN OF CARE
Problem: Patient Care Overview  Goal: Plan of Care/Patient Progress Review  Outcome: No Change  Pt dcd to home. Received 1 unit prbc without incident.afeb.vss. 3 L fluid removed via para today.good po intake.up ad david.

## 2018-05-03 NOTE — CONSULTS
Hepatology Consultation    Serge Brambila   MRN# [id number]     Age: 72 year old YOB: 1945       Referring provider: Liliana Rizvi  Attending Hepatologist: Dr. 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 He does not have overt signs of GI bleeding.       Recommendations:   1. Anemia  - differential includes slow bleeding from portal hypertensive gastropathy or combination of CKD and bone marrow suppression.   - due to elevated INR and low platelets, he likely has oozing from the stomach.   - has received PRBC with appropriate response.     2. KUHN cirrhosis  3. HCC   4. Esophageal varices  5. Hepatic encephalopathy  - MELD 28. Not a candidate for liver transplant  - not a candidate for Y90 due to shunting.   - history of gr II EV that has not been banded  - continue lactulose  - agree with continued palliative care discussions for possible hospice.   - para today. Would not recommend pleurex catheter unless enrolled in hospice.     Plan of care discussed with Dr. Lockwood    Thank you for the opportunity to be involved in Serge Brambila care. Please call with any questions or concerns.     YOLI Ellis, CNP  Inpatient Hepatology [facility]  [phone]               History of Present Illness:   Serge Brambila is a 72 year old male with a history of KUHN cirrhosis. His liver disease has been complicated by HCC (11 cm) with tumor thrombus, ascites, gr II EV, HE, CAD, a-fib (previously on warfarin), HTN, DM II, CKD who was admitted with a drop in his hemoglobin to 5.7. He did have any overt evidence of bleeding and CT abdomen in the ED did not have bleeding from the HCC. He has been evaluated for Y90 therapy for his HCC, however, he did have  "significant vascular shunting to his lungs on mapping. He was seen recently by a hospice team at [facility] but did not want to enroll due to concerns about receiving blood products. He started to feel unwell which he notes occurs when his hemoglobin is low and his primary provider suggested admission to hospital.     Mr. Brambila denies any melena or hematochezia but notes that \"I don't really look at them\". Per RN, he has had a BM here that was brown. A FOBT was performed in the ED that was positive. He has been followed by Dr. Stroud in the past who performed an EGD in 1/2018 that had gr II EV without varices. He denies changes in mentation or weight loss. He has been feeling fatigued. He has been off Coumadin since 5/1 and received Vitamin K 10 mg IV x1.              Past Medical History:     Past Medical History:   Diagnosis Date     Atrial fibrillation (H)      Diabetes (H)     type II     Hepatic encephalopathy (H)      Hypertension      MI (myocardial infarction)     stent placement     Sleep apnea      Thyroid disease               Past Surgical History:     Past Surgical History:   Procedure Laterality Date     APPENDECTOMY  age 16     COLONOSCOPY       ESOPHAGOSCOPY, GASTROSCOPY, DUODENOSCOPY (EGD), COMBINED N/A 1/24/2018    Procedure: COMBINED ESOPHAGOSCOPY, GASTROSCOPY, DUODENOSCOPY (EGD);  ESOPHAGOGASTRODUODENOSCOPY (MAC) ;  Surgeon: Colton Stroud MD;  Location:  GI     GI SURGERY  2012    partial colectomy for pre-CA nodule, removed 10 in.              Social History:     Social History   Substance Use Topics     Smoking status: Former Smoker     Packs/day: 1.00     Years: 10.00     Quit date: 1/1/1999     Smokeless tobacco: Never Used     Alcohol use No             Family History:   The family history includes Abdominal Aortic Aneurysm in his mother; Alcoholism in his mother; Cervical Cancer in his maternal grandmother; HEART DISEASE in his mother; Hyperlipidemia in his father and " mother; Hypertension in his father and mother; Thyroid Disease in his mother.             Immunizations:     Immunization History   Administered Date(s) Administered     Influenza (High Dose) 3 valent vaccine 10/23/2017            Allergies:     Allergies   Allergen Reactions     Penicillins      Happened in his teens, unsure what his reaction was. Tolerated ceftriaxone 3/18             Medications:   @  Prescriptions Prior to Admission   Medication Sig Dispense Refill Last Dose     aspirin (ASPIRIN ADULT LOW STRENGTH) 81 MG EC tablet Take 1 tablet (81 mg) by mouth daily 30 tablet 1 5/2/2018 at Unknown time     BASAGLAR 100 UNIT/ML injection Inject 37 Units Subcutaneous At Bedtime    5/2/2018 at Unknown time     blood glucose monitoring (NO BRAND SPECIFIED) test strip 1 each   5/2/2018 at Unknown time     blood glucose monitoring (ONETOUCH ULTRA) test strip TEST TWICE DAILY TO THREE TIMES DAILY   5/2/2018 at Unknown time     Blood Pressure Monitoring (RA BLOOD PRESSURE CUFF MONITOR) MISC by Misc.(Non-Drug; Combo Route) route once daily.   5/2/2018 at Unknown time     furosemide (LASIX) 20 MG tablet Take 2 tablets (40 mg) by mouth daily 30 tablet 1 5/2/2018 at Unknown time     [employer]ER LANCETS 21G MISC by Misc.(Non-Drug; Combo Route) route once daily.   5/2/2018 at Unknown time     lactulose (CHRONULAC) 10 GM/15ML solution 30 mLs (20 g) by Oral or NG Tube route 3 times daily 2700 mL 1 5/2/2018 at Unknown time     LEVOTHYROXINE SODIUM PO Take 150 mcg by mouth daily    5/2/2018 at Unknown time     losartan (COZAAR) 25 MG tablet Take 2 tablets (50 mg) by mouth daily 30 tablet 1 5/2/2018 at Unknown time     metoprolol tartrate (LOPRESSOR) 25 MG tablet Take 1 tablet (25 mg) by mouth 2 times daily 60 tablet 1 5/2/2018 at Unknown time     PANTOPRAZOLE SODIUM PO Take 40 mg by mouth daily    5/2/2018 at Unknown time     rifaximin (XIFAXAN) 550 MG TABS tablet Take 1 tablet (550 mg) by mouth 2 times daily 60 tablet 3 5/2/2018 at  Unknown time     sodium bicarbonate 650 MG tablet Take 2 tablets (1,300 mg) by mouth 2 times daily 120 tablet 1 5/2/2018 at Unknown time     spironolactone (ALDACTONE) 50 MG tablet Take 1 tablet (50 mg) by mouth daily 30 tablet 1 5/2/2018 at Unknown time     fluticasone (FLONASE) 50 MCG/ACT spray Spray 1 spray into both nostrils daily   Unknown at Unknown time     Nitroglycerin (NITROQUICK SL)    Unknown at Unknown time     WARFARIN SODIUM PO Take 5 mg by mouth daily       @          Review of Systems:    ROS: 10 point ROS neg other than the symptoms noted above in the HPI.          Physical Exam:   Blood pressure 137/44, temperature 96.5  F (35.8  C), temperature source Oral, resp. rate 18, weight 97.6 kg (215 lb 1.6 oz), SpO2 97 %. Body mass index is 32.71 kg/(m^2).    Intake/Output Summary (Last 24 hours) at 05/03/18 1119  Last data filed at 05/03/18 0957   Gross per 24 hour   Intake          2294.33 ml   Output              425 ml   Net          1869.33 ml     General: In no acute distress, no facial muscle wasting  Neuro: AOx3, No asterixis  HEENT: PERRL Noscleral icterus, Nooral lesions  Lymph:  Nocervical lymphadenoapthy  CV: S1/S2 with gr 2/6 murmurs, Skin warm and dry  Lungs: clear to auscultation Respirations even and nonlabored on room air  Abd: Moderately distended, nontender. +BS  Extrem: +1-2 lower peripehral edema  Skin: Nojaundice  Psych: pleasant         Data:     Lab Results   Component Value Date    WBC 3.4 05/03/2018     Lab Results   Component Value Date    RBC 2.56 05/03/2018     Lab Results   Component Value Date    HGB 7.5 05/03/2018     Lab Results   Component Value Date    HCT 23.5 05/03/2018     No components found for: MCT  Lab Results   Component Value Date    MCV 92 05/03/2018     Lab Results   Component Value Date    MCH 29.3 05/03/2018     Lab Results   Component Value Date    MCHC 31.9 05/03/2018     Lab Results   Component Value Date    RDW 18.1 05/03/2018     Lab Results    Component Value Date    PLT 91 05/03/2018       Last Basic Metabolic Panel:  Lab Results   Component Value Date     05/03/2018      Lab Results   Component Value Date    POTASSIUM 5.4 05/03/2018     Lab Results   Component Value Date    CHLORIDE 116 05/03/2018     Lab Results   Component Value Date    HARI 7.8 05/03/2018     Lab Results   Component Value Date    CO2 17 05/03/2018     Lab Results   Component Value Date    BUN 51 05/03/2018     Lab Results   Component Value Date    CR 2.27 05/03/2018     Lab Results   Component Value Date    GLC 76 05/03/2018       Liver Function Studies -   Recent Labs   Lab Test  05/03/18   0534   PROTTOTAL  6.4*   ALBUMIN  2.3*   BILITOTAL  1.7*   ALKPHOS  176*   AST  95*   ALT  62       Lab Results   Component Value Date    INR 2.85 05/03/2018       MELD-Na score: 28 at 5/3/2018  5:34 AM  MELD score: 28 at 5/3/2018  5:34 AM  Calculated from:  Serum Creatinine: 2.27 mg/dL at 5/3/2018  5:34 AM  Serum Sodium: 142 mmol/L (Rounded to 137) at 5/3/2018  5:34 AM  Total Bilirubin: 1.7 mg/dL at 5/3/2018  5:34 AM  INR(ratio): 2.85 at 5/3/2018  5:34 AM  Age: 72 years    IMAGING:  CT abd wo 5/2/18  Impression:   1. No evidence of hemorrhage in the abdomen/pelvis.  2. Poorly defined infiltrative hepatocellular carcinoma in the right  hepatic lobe, better characterized on MRI 3/3/2018.  3. Sequelae of portal hypertension including splenomegaly,  moderate-large volume ascites, mesenteric edema, and soft tissue  Anasarca.    EGD 1/24/18  Impression:               - Grade II esophageal varices.                             - Normal duodenal bulb, first portion of the                             duodenum and second portion of the duodenum.

## 2018-05-03 NOTE — PROGRESS NOTES
Prior Authorization Approval    pantoprazole 40 mg tabs  Date Initiated: 05/03/2018  Date Completed: 05/02/2018  Prior Auth Type: Quantity Limit     Status: Approved    Effective Date: 02/02/2018 - 05/03/2019  Copay: n/a  [place] Filled: No    Insurance: [employer] PDP  Ph: [phone]  ID: [id number]  Case Number: [id number]  Submitted Via: Елена Fisher  Pharmacy Liaison  Ph: [phone] Page: [phone]

## 2018-05-03 NOTE — PLAN OF CARE
Problem: Anemia (Adult)  Goal: Identify Related Risk Factors and Signs and Symptoms  Related risk factors and signs and symptoms are identified upon initiation of Human Response Clinical Practice Guideline (CPG).  Outcome: Improving  Pt admitted at 1800 from ED. Diastolic BP occasionally soft, OVSS, afebrile. Pt had one unit pRBC started in ED, finished here then a second infused for Hgb 5.7, re-check 7.0, night shift will infuse 3rd unit. Up w SBA, had one BM, good UOP, ambulated under his own power to bathroom w/ no dizziness or weakness. C/o pain from accidental fall a month ago; rib pain on the right, tylenol x1 w some relief. Was on clear diet until midnight now NPO. Insulin given for HS BG of 189. Home CPAP machine prepared for use; distilled water ordered--waiting for it to arrive. Pt has had heart murmur whole life; pt and moonlighter unconcerned at this time (is pt's baseline). 2nd IV placed. Possible paracentesis tomorrow.

## 2018-05-08 NOTE — PROGRESS NOTES
"RN Care Coordination Note  Reason for Outgoing Call:   Ree Cruz MD Fujioka, Naomi, MD Cc: Tea Silveira, APRN CNP; Sudarshan Connell MD; Moraima Quintero RN                   Yes, we will.  Thank you.  I'll cc: Mor[place]ia.            Previous Messages       ----- Message -----      From: Liliana Rizvi MD      Sent: 5/3/2018   6:25 PM        To: Ree Cruz MD, *     Hi Ree,     I know you only saw this patient once, but he is not ready to transition to hospice after all, and I worry about him because no one is in a position to coordinate his care. Can I ask you to have your nurse coordinator keep in touch with him until he makes that transition? He does have appts with Hepatology next week already and an appt with Palliative Care the week following (cc'd as well).     Thanks much!   Liliana           Nursing Action/Patient Instruction:  - spoke with pt by phone: introduced self as RN CC with Dr. Cruz's pts for [facility]. He tells me he is doing \"fine\" denies complaints/questions/new sx since recent discharge from the hospital. His wife is at work. He confirmed with me that he knows the date/time/location of tomorrow's hepatology appt.  - encouraged pt to call [facility] Nurse Line with questions/concerns.  - attempt to reach pt's wife by phone. Left detailed message re: my role and purpose of call to f/u after recent admission per request of inpt team and Dr. Cruz. Left call-back number and request to call me back if needed for questions/concerns.  Patient Response/Evaluation:   Pt thanked me for the call and states he will call us with questions/concerns.    Moraima Quintero, RN, BSN, OCN  Care Coordinator  [facility]      "

## 2018-05-09 NOTE — MR AVS SNAPSHOT
After Visit Summary   5/9/2018    Serge Brambila    MRN: [id number]           Patient Information     Date Of Birth          1945        Visit Information        Provider Department      5/9/2018 9:00 AM Yennifer Culver MD [facility] Hepatology        Today's Diagnoses     Other ascites    -  1      Care Instructions    Follow up with Dr. Quinn in 3 months  Start taking Ciprofloxacin daily  Continue with current medications and paracentesis as neded          Follow-ups after your visit        Follow-up notes from your care team     Return in about 3 months (around 8/9/2018).      Your next 10 appointments already scheduled     May 11, 2018  7:30 AM CDT   Paracentesis Visit with Uc Spec Inf Para Provider, UC 39 ATC   [facility] ([facility])    [address] [place] [address]  [address]  [facility] [address]   [phone]            May 16, 2018 12:45 PM CDT   (Arrive by 12:30 PM)   New Patient Visit with Sudarshan Connell MD   [facility] ([facility])    [address] [place] [address]  [address]  [facility] [address]   [phone]            May 17, 2018  7:30 AM CDT   Paracentesis Visit with Uc Spec Inf Para Provider, UC 40 ATC   [facility] ([facility])    [address] [place] [address]  [address]  [facility] [address]   [phone]            May 25, 2018  2:00 PM CDT   Paracentesis Visit with Uc Spec Inf Para Provider, UC 40 ATC   [facility] ([facility])    [address] [place] [address]  [address]  [facility] [address]   [phone]            May 31, 2018  7:30 AM CDT   Paracentesis Visit with Uc Spec Inf Para Provider, UC 39 ATC   [facility] ([facility])    [address]  [facility]  [address]  [facility] [id number] [phone]            Jun 08, 2018  2:00 PM CDT   Paracentesis Visit with  Spec Inf Para Provider   [facility] ([facility])    [address] [facility] [address]  [address]  [facility] [id number] [phone]              Who to contact     If you have questions or need follow up information about today's clinic visit or your schedule please contact [facility] HEPATOLOGY directly at [phone].  Normal or non-critical lab and imaging results will be communicated to you by [employer], letter or phone within 4 business days after the clinic has received the results. If you do not hear from us within 7 days, please contact the clinic through [employer] or phone. If you have a critical or abnormal lab result, we will notify you by phone as soon as possible.  Submit refill requests through [employer] or call your pharmacy and they will forward the refill request to us. Please allow 3 business days for your refill to be completed.          Additional Information About Your Visit        Unicahart Information     [employer] gives you secure access to your electronic health record. If you see a primary care provider, you can also send messages to your care team and make appointments. If you have questions, please call your primary care clinic.  If you do not have a primary care provider, please call [phone] and they will assist you.        Care EveryWhere ID     This is your Care EveryWhere ID. This could be used by other organizations to access your [facility] medical records  [id number]        Your Vitals Were     Pulse Temperature Pulse Oximetry BMI (Body Mass Index)          53 98.6  F (37  C) (Oral) 99% 33.97 kg/m2         Blood Pressure from Last 3 Encounters:   05/09/18 145/69   05/03/18 149/53   04/30/18 (!) 188/33    Weight from Last 3 Encounters:   05/09/18 101.3 kg (223 lb 6.4 oz)   05/03/18 103.8  kg (228 lb 14.4 oz)   04/30/18 106.6 kg (235 lb 0.2 oz)              Today, you had the following     No orders found for display         Today's Medication Changes          These changes are accurate as of 5/9/18 10:11 AM.  If you have any questions, ask your nurse or doctor.               Start taking these medicines.        Dose/Directions    ciprofloxacin 250 MG tablet   Commonly known as:  CIPRO   Used for:  Other ascites   Started by:  Yennifer Culver MD        Dose:  500 mg   Take 2 tablets (500 mg) by mouth daily   Quantity:  30 tablet   Refills:  3            Where to get your medicines      These medications were sent to [id number] - [place], [place] - [address] AT [facility] [address]  [address], [place] [address]     Phone:  [phone]     ciprofloxacin 250 MG tablet                Primary Care Provider Office Phone # Fax #    Linn Thompson [phone]       [facility] [address]  [facility] [address]        Equal Access to Services     [facility] : Hadii aad ku hadasho Soomaali, waaxda luqadaha, qaybta kaalmada adeegyada, miley holt . So [facility] [phone].    ATENCIÓN: Si habla español, tiene a collins disposición servicios gratuitos de asistencia lingüística. Rufina al [phone].    We comply with applicable federal civil rights laws and [place] laws. We do not discriminate on the basis of race, color, national origin, age, disability, sex, sexual orientation, or gender identity.            Thank you!     Thank you for choosing [facility]PATOLOG[facility]  for your care. Our goal is always to provide you with excellent care. Hearing back from our patients is one way we can continue to improve our services. Please take a few minutes to complete the written survey that you may receive in the mail after your visit with us. Thank you!             Your Updated Medication List -  Protect others around you: Learn how to safely use, store and throw away your medicines at www.disposemymeds.org.          This list is accurate as of 5/9/18 10:11 AM.  Always use your most recent med list.                   Brand Name Dispense Instructions for use Diagnosis    BASAGLAR 100 UNIT/ML injection      Inject 37 Units Subcutaneous At Bedtime        * ONETOUCH ULTRA test strip   Generic drug:  blood glucose monitoring      TEST TWICE DAILY TO THREE TIMES DAILY        * blood glucose monitoring test strip    no brand specified     1 each        ciprofloxacin 250 MG tablet    CIPRO    30 tablet    Take 2 tablets (500 mg) by mouth daily    Other ascites       fluticasone 50 MCG/ACT spray    FLONASE     Spray 1 spray into both nostrils daily        furosemide 20 MG tablet    LASIX    30 tablet    Take 2 tablets (40 mg) by mouth daily    Cirrhosis of liver with ascites, unspecified hepatic cirrhosis type (H)       [employer] LANCETS 21G Misc      by Misc.(Non-Drug; Combo Route) route once daily.        lactulose 10 GM/15ML solution    CHRONULAC    2700 mL    30 mLs (20 g) by Oral or NG Tube route 3 times daily    Hepatic encephalopathy (H)       LEVOTHYROXINE SODIUM PO      Take 150 mcg by mouth daily        metoprolol tartrate 25 MG tablet    LOPRESSOR    60 tablet    Take 1 tablet (25 mg) by mouth 2 times daily    Paroxysmal atrial fibrillation (H)       NITROQUICK SL           pantoprazole 20 MG EC tablet    PROTONIX    60 tablet    Take 2 tablets (40 mg) by mouth 2 times daily    Gastrointestinal hemorrhage, unspecified gastrointestinal hemorrhage type, HCC (hepatocellular carcinoma) (H)       RA BLOOD PRESSURE CUFF MONITOR Misc      by Misc.(Non-Drug; Combo Route) route once daily.        rifaximin 550 MG Tabs tablet    XIFAXAN    60 tablet    Take 1 tablet (550 mg) by mouth 2 times daily    Hepatic encephalopathy (H)       sodium bicarbonate 650 MG tablet     120 tablet    Take 2 tablets (1,300 mg) by mouth 2  times daily    Chronic kidney disease, unspecified CKD stage       spironolactone 50 MG tablet    ALDACTONE    30 tablet    Take 1 tablet (50 mg) by mouth daily    Cirrhosis of liver with ascites, unspecified hepatic cirrhosis type (H)       * Notice:  This list has 2 medication(s) that are the same as other medications prescribed for you. Read the directions carefully, and ask your doctor or other care provider to review them with you.

## 2018-05-09 NOTE — PROGRESS NOTES
[facility]    Hepatology New Patient Visit    Referring provider:  Referred Self      Chief complaint:  Hospital follow up.    HPI:  Mr. 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  No history of bleeding secondary to esophageal varices.      I first met Mr. Brambila in March of 2018 when he was noted to have a large hepatocellular carcinoma 11 cm.  There is associated tumor thrombus that extends throughout the right portal venous system to the splenoportal confluence. AFP has previously been normal.  He was seen by IR for evaluation for Y-90 treatment, but was determined to not be a candidate due to extensive lung update with shunt to the lung.  He was not offered any further locoregional treatment options.  He met with two different oncologists Dr. Willis and Dr. Cruz, and he was felt to be a poor candidate for any of the systemic therapy options due to his poor liver function.  Ultimately hospice care was recommended but the patient reports not feeling ready for this.  He did meet with the [facility] hospice team last week.  He is anxious to meet with palliative care here at the [facility] as well - appointment scheduled for some time next week.      Of note the patient also has had multiple recent admissions for symptomatic anemia.  The most recent hospitalization was Thursday May 3rd after an admission for symptomatic anemia with 2 gram drop in hemoglobin.  No overt blood loss.  Warfarin stopped on that admission.  Appropriate response to blood transfusion. Anemia felt to be multifactorial.  No procedures were performed.    Today the patient reports that he is feeling well with all things considered.  Understandably, he does voice some concerns regarding his cancer diagnosis.  The patient denies abdominal pain.  He reports nausea without vomiting.  Also with  early satiety.  40 lb weight loss in the last 1 year unintentional though has stabilized over the last few months.   No significant abdominal distension today however last paracentesis was Thursday with 3 liters off now on a weekly schedule.  Also with lower extremity edema bilaterally which has been chronic.  The patient is currently 40 mg of lasix and spironolactone 50 mg daily - increased dosing has been limited previously by renal function.  Yesterday with a single episode of confusion which is unusual for him on lactulose (3-5 BMs daily) and rifaximin.  No jaundice, fevers/chills.  No hematochezia, hematemesis, or melenic stool.    He stopped coumadin last Tuesday due to recurrent admissions for anemia thought to be multifactorial in the setting of chronic kidney disease, oozing from portal hypertensive gastropathy in the setting of supra-therapeutic INR.      Medical hx Surgical hx   Past Medical History:   Diagnosis Date     Atrial fibrillation (H)      Diabetes (H)      Hepatic encephalopathy (H)      Hypertension      MI (myocardial infarction)      Sleep apnea      Thyroid disease       Past Surgical History:   Procedure Laterality Date     APPENDECTOMY  age 16     COLONOSCOPY       ESOPHAGOSCOPY, GASTROSCOPY, DUODENOSCOPY (EGD), COMBINED N/A 1/24/2018    Procedure: COMBINED ESOPHAGOSCOPY, GASTROSCOPY, DUODENOSCOPY (EGD);  ESOPHAGOGASTRODUODENOSCOPY (MAC) ;  Surgeon: Colton Stroud MD;  Location:  GI     GI SURGERY  2012    partial colectomy for pre-CA nodule, removed 10 in.          Medications  Prior to Admission medications    Medication Sig Start Date End Date Taking? Authorizing Provider   BASAGLAR 100 UNIT/ML injection Inject 37 Units Subcutaneous At Bedtime    Yes Reported, Patient   blood glucose monitoring (NO BRAND SPECIFIED) test strip 1 each 1/4/18  Yes Reported, Patient   blood glucose monitoring (ONETOUCH ULTRA) test strip TEST TWICE DAILY TO THREE TIMES DAILY 9/12/17  Yes Reported,  Patient   Blood Pressure Monitoring (RA BLOOD PRESSURE CUFF MONITOR) MISC by Misc.(Non-Drug; Combo Route) route once daily. 12/27/17  Yes Reported, Patient   fluticasone (FLONASE) 50 MCG/ACT spray Spray 1 spray into both nostrils daily   Yes Unknown, Entered By History   furosemide (LASIX) 20 MG tablet Take 2 tablets (40 mg) by mouth daily 3/5/18  Yes Young Sousa DO   [employer] LANCETS 21G MISC by Misc.(Non-Drug; Combo Route) route once daily. 12/27/17  Yes Reported, Patient   lactulose (CHRONULAC) 10 GM/15ML solution 30 mLs (20 g) by Oral or NG Tube route 3 times daily 3/6/18  Yes Young Sousa DO   LEVOTHYROXINE SODIUM PO Take 150 mcg by mouth daily    Yes Reported, Patient   metoprolol tartrate (LOPRESSOR) 25 MG tablet Take 1 tablet (25 mg) by mouth 2 times daily 3/5/18  Yes Young Sousa DO   Nitroglycerin (NITROQUICK SL)    Yes Reported, Patient   pantoprazole (PROTONIX) 20 MG EC tablet Take 2 tablets (40 mg) by mouth 2 times daily 5/3/18  Yes Tea Silveira APRN CNP   rifaximin (XIFAXAN) 550 MG TABS tablet Take 1 tablet (550 mg) by mouth 2 times daily 3/5/18  Yes Young Sousa DO   sodium bicarbonate 650 MG tablet Take 2 tablets (1,300 mg) by mouth 2 times daily 3/5/18  Yes Young Sousa DO   spironolactone (ALDACTONE) 50 MG tablet Take 1 tablet (50 mg) by mouth daily 3/6/18  Yes Young Sousa DO       Allergies  Allergies   Allergen Reactions     Penicillins      Happened in his teens, unsure what his reaction was. Tolerated ceftriaxone 3/18       Family hx Social hx   Family History   Problem Relation Age of Onset     Hyperlipidemia Father      Hypertension Father      Cervical Cancer Maternal Grandmother      Alcoholism Mother      Abdominal Aortic Aneurysm Mother      HEART DISEASE Mother      Hypertension Mother      Thyroid Disease Mother      Hyperlipidemia Mother       Social History   Substance Use Topics     Smoking status: Former Smoker      Packs/day: 1.00     Years: 10.00     Quit date: 1/1/1999     Smokeless tobacco: Never Used     Alcohol use No          Review of systems  A 10-point review of systems was negative.    Examination  /69  Pulse 53  Temp 98.6  F (37  C) (Oral)  Wt 223 lb 6.4 oz (101.3 kg)  SpO2 99%  BMI 33.97 kg/m2  Body mass index is 33.97 kg/(m^2).    Gen- well, NAD, A+Ox3, normal color  Eye- EOMI  ENT- MMM, normal oropharynx  CVS- RRR  RS- EWOB  Abd- soft, NT, ND, +BS  Extr- pulses good, no TAMMY  MS- hands normal- no clubbing  Neuro- A+Ox3, no asterixis  Skin- no rash or jaundice  Psych- normal mood    Laboratory  Lab Results   Component Value Date     05/09/2018    POTASSIUM 4.8 05/09/2018    CHLORIDE 113 05/09/2018    CO2 20 05/09/2018    BUN 31 05/09/2018    CR 1.55 05/09/2018       Lab Results   Component Value Date    BILITOTAL 1.4 05/09/2018    ALT 73 05/09/2018     05/09/2018    ALKPHOS 226 05/09/2018       Lab Results   Component Value Date    ALBUMIN 2.5 05/09/2018    PROTTOTAL 6.8 05/09/2018        Lab Results   Component Value Date    WBC 4.2 05/09/2018    HGB 9.2 05/09/2018    MCV 96 05/09/2018    PLT 81 05/09/2018       Lab Results   Component Value Date    INR 1.61 05/09/2018         Radiology  EGD 1/2018:  Impression:               - Grade II esophageal varices.                             - Normal duodenal bulb, first portion of the                             duodenum and second portion of the duodenum.                             - No specimens collected.   Recommendation:           - Anti Relux Precautions                             - Give a beta blocker with dosage titrated by the                             heart rate.                             - Repeat upper endoscopy in 1 year for surveillance.       Colonoscopy reviewed in care everywhere from 2011.      MELD-Na score: 17 at 5/9/2018  8:08 AM  MELD score: 17 at 5/9/2018  8:08 AM  Calculated from:  Serum Creatinine: 1.55 mg/dL at  5/9/2018  8:08 AM  Serum Sodium: 140 mmol/L (Rounded to 137) at 5/9/2018  8:08 AM  Total Bilirubin: 1.4 mg/dL at 5/9/2018  8:08 AM  INR(ratio): 1.61 at 5/9/2018  8:08 AM  Age: 72 years    Assessment  Mr. 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  The patient is not within rafita criteria and is not a candidate for local-regional therapy or systemic chemotherapy.      I discussed the incurable nature of his cancer and encouraged him to meet with palliative care as scheduled next week.  I also encouraged him to maximize time with his family and friends and to proceed with plans to travel to [place].  He is not yet willing to proceed with hospice care though will consider this moving forward.  We will do our best to manage his symptoms related to decompensated cirrhosis and cancer at this time.  He will continue with diuretics and paracentesis as needed.  He will also continue with lactulose and rifaximin.  Will start SBP prophylaxis as well.  Doing reasonably well overall from a cirrhosis perspective.  MELD Na = 17.    Plan  -will start ciprofloxacin 500 mg daily for SBP prophylaxis  -continue with lasix and spironolactone with close monitoring of renal function and electrolytes  -continue with paracentesis PRN, increased frequency to weekly   -continue with rifaximin and lactulose (titrate to 3-4 bowel movements per day)  -continue with BB, may consider switch to non-selective BB defer this to primary HR 50s today  -follow up with oncology  -strongly encouraged follow up with palliative care as scheduled.  Patient is not yet ready to enroll in hospice care.      Return to clinic in 3 months    Discussed with Dr. Cheyenne Culver   Hepatology Fellow  [facility]    Attestation:  This patient has been seen and evaluated by me, Zahira Quinn.  Discussed  with the house staff team or resident(s) and agree with the findings and plan in this note.

## 2018-05-09 NOTE — NURSING NOTE
Chief Complaint   Patient presents with     RECHECK     asciteies of the liver      /69  Pulse 53  Temp 98.6  F (37  C) (Oral)  Wt 101.3 kg (223 lb 6.4 oz)  SpO2 99%  BMI 33.97 kg/m2  Michelle Hoskins, CMA

## 2018-05-11 NOTE — PATIENT INSTRUCTIONS
Discharge Instructions for Paracentesis  Paracentesis is a procedure to remove extra fluid from your belly (abdomen). This fluid buildup in the abdomen is called ascites. The procedure may have been done to take a sample of the fluid. Or, it may have been done to drain the extra fluid from your abdomen and help make you more comfortable.     Ascites is buildup of excess fluid in the abdomen.   Home care    If you have pain after the procedure, your healthcare provider can prescribe or recommend pain medicines. Take these exactly as directed. If you stopped taking other medicines before the procedure, ask your provider when you can start them again.    Take it easy for 24 hours after the procedure. Avoid physical activity until your provider says it s OK.    You will have a small bandage over the puncture site. Stitches (sutures), surgical staples, adhesive tapes, adhesive strips, or surgical glue may be used to close the incision. They also help stop bleeding and speed healing. You may take the bandage off in 24 hours.    Check the puncture site for the signs of infection listed below.  Follow-up care  Make a follow-up appointment with your healthcare provider as directed. During your follow-up visit, your provider will check your healing. Let your provider know how you are feeling. You can also discuss the cause of your ascites and if you need any further treatment.  When to seek medical advice  Call your healthcare provider if you have any of the following after the procedure:    A fever of 100.4 F (38.0 C) or higher    Trouble breathing    Pain that doesn't go away even after taking pain medicine    Belly pain not caused by having the skin punctured    Bleeding from the puncture site    More than a small amount of fluid leaking from the puncture site    Swollen belly    Signs of infection at the puncture site. These include increased pain, redness, or swelling, warmth, or bad-smelling drainage.    Blood in your  urine    Feeling dizzy or lightheaded, or fainting   Date Last Reviewed: 7/1/2016 2000-2017 [employer]. [address] [facility] [address], [place] [address]. All rights reserved. This information is not intended as a substitute for professional medical care. Always follow your healthcare professional's instructions.

## 2018-05-11 NOTE — PROGRESS NOTES
Paracentesis Nursing Note  Serge Brambila presents today to [facility] for a paracentesis.    During today's appointment orders from Fernando Montgomery were completed.    Progress Note:  Patient identification verified by name and date of birth.  Assessment completed.  Vitals monitored throughout appointment and recorded in Doc Flowsheets.  See proceduralist note in ultrasound.    Date of consent or authorization: 4/6/18.  Invasive Procedure Safety Checklist was completed and sent for scanning.     Paracentesis performed by Myrtle Walters PA-C    The following labs were communicated to provider performing paracentesis:  Lab Results   Component Value Date    PLT 81 05/09/2018       Total amount of ascites fluid drained: 2.7 liters.  Color of ascites fluid: yellow, clear.  Total amount of albumin given: 25  Grams. Pt's IV infiltrated at end of appt and pt made choice to not have another IV placed to finish albumin; pt did not receive ~10 mls of last bag administered.    Patient tolerated procedure well.    Post procedure,denies pain or discomfort post paracentesis.      Discharge Plan:  Discharge instructions were reviewed with patient.  Patient/Representative verbalized understanding and all questions were answered.   Discharged from [facility] in stable condition.    Yesenia Hicks RN      Administrations This Visit     albumin human 25 % injection 12.5 g     Admin Date Action Dose Route Administered By             05/11/2018 New Bag 25 g Intravenous Yesenia Hicks, LEEANNE                    lidocaine 1 % 20 mL     Admin Date Action Dose Route Administered By             05/11/2018 Given by Other Clinician 20 mL Other Yesenia Hicks RN                          /59  Temp 98.6  F (37  C) (Oral)  Wt 104 kg (229 lb 3.2 oz)  SpO2 98%  BMI 34.85 kg/m2

## 2018-05-11 NOTE — MR AVS SNAPSHOT
After Visit Summary   5/11/2018    Serge Brambila    MRN: [id number]           Patient Information     Date Of Birth          1945        Visit Information        Provider Department      5/11/2018 7:30 AM Provider, Uc Spec Inf Para; UC [facility] Special[facility] Procedure        Today's Diagnoses     Liver cirrhosis secondary to KUHN (H)    -  1    Ascites of liver          Care Instructions      Discharge Instructions for Paracentesis  Paracentesis is a procedure to remove extra fluid from your belly (abdomen). This fluid buildup in the abdomen is called ascites. The procedure may have been done to take a sample of the fluid. Or, it may have been done to drain the extra fluid from your abdomen and help make you more comfortable.     Ascites is buildup of excess fluid in the abdomen.   Home care    If you have pain after the procedure, your healthcare provider can prescribe or recommend pain medicines. Take these exactly as directed. If you stopped taking other medicines before the procedure, ask your provider when you can start them again.    Take it easy for 24 hours after the procedure. Avoid physical activity until your provider says it s OK.    You will have a small bandage over the puncture site. Stitches (sutures), surgical staples, adhesive tapes, adhesive strips, or surgical glue may be used to close the incision. They also help stop bleeding and speed healing. You may take the bandage off in 24 hours.    Check the puncture site for the signs of infection listed below.  Follow-up care  Make a follow-up appointment with your healthcare provider as directed. During your follow-up visit, your provider will check your healing. Let your provider know how you are feeling. You can also discuss the cause of your ascites and if you need any further treatment.  When to seek medical advice  Call your healthcare provider if you have any of the following after the  procedure:    A fever of 100.4 F (38.0 C) or higher    Trouble breathing    Pain that doesn't go away even after taking pain medicine    Belly pain not caused by having the skin punctured    Bleeding from the puncture site    More than a small amount of fluid leaking from the puncture site    Swollen belly    Signs of infection at the puncture site. These include increased pain, redness, or swelling, warmth, or bad-smelling drainage.    Blood in your urine    Feeling dizzy or lightheaded, or fainting   Date Last Reviewed: 7/1/2016 2000-2017 [employer]. [address] [place] [address]. All rights reserved. This information is not intended as a substitute for professional medical care. Always follow your healthcare professional's instructions.                Follow-ups after your visit        Your next 10 appointments already scheduled     May 16, 2018 12:45 PM CDT   (Arrive by 12:30 PM)   New Patient Visit with Sudarshna Connell MD   [facility] ([facility])    [address] [place] [address]  [address]  [facility] [id number] [phone]            May 17, 2018  7:30 AM CDT   Paracentesis Visit with Uc Spec Inf Para Provider, UC 40 ATC   [facility] ([facility])    [address] [place] [address]  [address]  [facility] [id number] [phone]            May 25, 2018  2:00 PM CDT   Paracentesis Visit with Uc Spec Inf Para Provider, UC 40 ATC   [facility] ([facility])    [address] [place] [address]  [address]  [facility] [id number] [phone]            May 31, 2018  7:30 AM CDT   Paracentesis Visit with Uc Spec Inf Para Provider, UC 39 ATC   [facility] ([facility])    [address] [place] [address]  [address]  [facility] [address]    [phone]            Jun 08, 2018  2:00 PM CDT   Paracentesis Visit with Uc Spec Inf Para Provider, UC 40 ATC   [facility] Procedure ([facility])    [address]  [address]  [facility] [address] [phone]            Jun 14, 2018  7:30 AM CDT   Paracentesis Visit with Uc Spec Inf Para Provider   [facility] ([facility])    [address]  [address]  [facility] [address] [phone]              Who to contact     If you have questions or need follow up information about today's clinic visit or your schedule please contact [facility] SPECIALTY AND PROCEDURE directly at [phone].  Normal or non-critical lab and imaging results will be communicated to you by Shoplocalhart, letter or phone within 4 business days after the clinic has received the results. If you do not hear from us within 7 days, please contact the clinic through Shoplocalhart or phone. If you have a critical or abnormal lab result, we will notify you by phone as soon as possible.  Submit refill requests through Twined or call your pharmacy and they will forward the refill request to us. Please allow 3 business days for your refill to be completed.          Additional Information About Your Visit        ShoplocalharClerts! Information     Twined gives you secure access to your electronic health record. If you see a primary care provider, you can also send messages to your care team and make appointments. If you have questions, please call your primary care clinic.  If you do not have a primary care provider, please call [phone] and they will assist you.        Care EveryWhere ID     This is your Care EveryWhere ID. This could be used by other organizations to access your [place] medical records  [id number]        Your Vitals Were     Temperature Pulse Oximetry BMI (Body  Mass Index)             98.6  F (37  C) (Oral) 98% 33.83 kg/m2          Blood Pressure from Last 3 Encounters:   05/11/18 142/45   05/09/18 145/69   05/03/18 149/53    Weight from Last 3 Encounters:   05/11/18 100.9 kg (222 lb 8 oz)   05/09/18 101.3 kg (223 lb 6.4 oz)   05/03/18 103.8 kg (228 lb 14.4 oz)              We Performed the Following     US Paracentesis        Primary Care Provider Office Phone # Fax #    Linn Thompson [phone]       [facility] [address]  [facility] [address]        Equal Access to Services     FRANK CONROY : Hadii prateek sellerso Demar, waaxda luqadaha, qaybta kaalmada adeegyada, miley guerrero. So [facility] [phone].    ATENCIÓN: Si habla español, tiene a collins disposición servicios gratuitos de asistencia lingüística. [facility] [phone].    We comply with applicable federal civil rights laws and [place] laws. We do not discriminate on the basis of race, color, national origin, age, disability, sex, sexual orientation, or gender identity.            Thank you!     Thank you for choosing [facility] SPECIALTY AND PROCEDURE  for your care. Our goal is always to provide you with excellent care. Hearing back from our patients is one way we can continue to improve our services. Please take a few minutes to complete the written survey that you may receive in the mail after your visit with us. Thank you!             Your Updated Medication List - Protect others around you: Learn how to safely use, store and throw away your medicines at www.disposemymeds.org.          This list is accurate as of 5/11/18  9:31 AM.  Always use your most recent med list.                   Brand Name Dispense Instructions for use Diagnosis    BASAGLAR 100 UNIT/ML injection      Inject 37 Units Subcutaneous At Bedtime        * ONETOUCH ULTRA test strip   Generic drug:  blood glucose monitoring      TEST TWICE DAILY TO THREE TIMES  DAILY        * blood glucose monitoring test strip    no brand specified     1 each        ciprofloxacin 250 MG tablet    CIPRO    30 tablet    Take 2 tablets (500 mg) by mouth daily    Other ascites       fluticasone 50 MCG/ACT spray    FLONASE     Spray 1 spray into both nostrils daily        furosemide 20 MG tablet    LASIX    30 tablet    Take 2 tablets (40 mg) by mouth daily    Cirrhosis of liver with ascites, unspecified hepatic cirrhosis type (H)       [employer] LANCETS 21G Misc      by Misc.(Non-Drug; Combo Route) route once daily.        lactulose 10 GM/15ML solution    CHRONULAC    2700 mL    30 mLs (20 g) by Oral or NG Tube route 3 times daily    Hepatic encephalopathy (H)       LEVOTHYROXINE SODIUM PO      Take 150 mcg by mouth daily        metoprolol tartrate 25 MG tablet    LOPRESSOR    60 tablet    Take 1 tablet (25 mg) by mouth 2 times daily    Paroxysmal atrial fibrillation (H)       NITROQUICK SL           pantoprazole 20 MG EC tablet    PROTONIX    60 tablet    Take 2 tablets (40 mg) by mouth 2 times daily    Gastrointestinal hemorrhage, unspecified gastrointestinal hemorrhage type, HCC (hepatocellular carcinoma) (H)       RA BLOOD PRESSURE CUFF MONITOR Misc      by Misc.(Non-Drug; Combo Route) route once daily.        rifaximin 550 MG Tabs tablet    XIFAXAN    60 tablet    Take 1 tablet (550 mg) by mouth 2 times daily    Hepatic encephalopathy (H)       sodium bicarbonate 650 MG tablet     120 tablet    Take 2 tablets (1,300 mg) by mouth 2 times daily    Chronic kidney disease, unspecified CKD stage       spironolactone 50 MG tablet    ALDACTONE    30 tablet    Take 1 tablet (50 mg) by mouth daily    Cirrhosis of liver with ascites, unspecified hepatic cirrhosis type (H)       * Notice:  This list has 2 medication(s) that are the same as other medications prescribed for you. Read the directions carefully, and ask your doctor or other care provider to review them with you.

## 2018-05-16 NOTE — NURSING NOTE
"Oncology Rooming Note    May 16, 2018 12:36 PM   Serge Brambila is a 72 year old male who presents for:    Chief Complaint   Patient presents with     Oncology Clinic Visit     New: Liver CA      Initial Vitals: BP (!) 139/39 (BP Location: Right arm, Patient Position: Chair, Cuff Size: Adult Large)  Pulse 61  Temp 97.8  F (36.6  C) (Oral)  Resp 16  Ht 1.727 m (5' 7.99\")  Wt 100.7 kg (221 lb 14.4 oz)  SpO2 100%  BMI 33.75 kg/m2 Estimated body mass index is 33.75 kg/(m^2) as calculated from the following:    Height as of this encounter: 1.727 m (5' 7.99\").    Weight as of this encounter: 100.7 kg (221 lb 14.4 oz). Body surface area is 2.2 meters squared.  No Pain (0) Comment: Data Unavailable   No LMP for male patient.  Allergies reviewed: YES  Medications reviewed: YES    Medications: Medication refills not needed today.  Pharmacy name entered into Inbilin: [id number] - [place], [place] - [address] [place] [address] NE AT [facility]    Clinical concerns: no new concerns.  Pt received flu shot elsewhere. See Immunizations     6 minutes for nursing intake (face to face time)     Henny Walker CMA                "

## 2018-05-16 NOTE — LETTER
5/16/2018       RE: Serge Brambila  [address]  [facility] [address]     Dear Colleague,    Thank you for referring your patient, Serge Brambila, to the [facility] at [facility]. Please see a copy of my visit note below.    Palliative Care Outpatient Clinic Consultation Note    Patient:  Serge Brambila     Underlying Illness:   1. KUHN cirrhosis  2. HCC    Reason For Consult: Introduction to palliative care    History of Present Illness: Mr. Brambila is a 73 y/o man with above diagnosis as well as CAD, DMII, HTN.  He was diagnosed with KUHN cirrhosis and HCC about a year ago.  His disease is complicated by anasarca, refractory ascites requiring intermittent paracentesis, mild HE, esophageal varices, low grade GI bleed(recent hospitalization on 5/2/2018), portal vein thrombosis, and probable hepatopulmonary syndrome.      Despite his illness, his functional status remains reasonable and he has very few symptoms.  He does have significant fatigue when compared to where he was 1-2 years ago, but is still able to get out of the house, go shopping, go visit friends ect...  They are planning a 6 day trip to [facility] this coming June.      He does have significant trouble sleeping, which is thought to be due to low grade HE.  Will toss and turn at night and then be sleepy during the day and take frequent naps.  They are considering melatonin, although he does not like to take pills generally speaking.      Denies any issues with pain, dyspnea, nausea.  Appetite is mediocre, and he has been losing weight, ~45-50 lbs in the last year, although he thinks a lot of this was water weight he shed once he was placed on the diuretics.  Does still have some edema for which he continues on diuretics and ill use compression stockings and intermittent wrapping.  Ascites is managed with intermittent karine.  Fluid does not bother him a whole lot.      The majority of our  conversation involved discussing hospice care and te possible roles of palliative care.  He had met with the hospice agency from [facility] and did not feel that they would be able to provide him with the care he needs.  They said that they would not perform blood transfusions or intermittent paracenteses.  He does not want an indwelling peritoneal catheter over fears of infection.     We discussed in details the structure of hospice, hospice philosophy, and services that they can provide.  They asked very nuanced questions and demonstrated a lot of knowledge about hospice.  In particular, I discussed that certain treatments - like blood transfusions - would be at the discretion of the agency but that we typically administer them if patients are symptomatic, have a reasonable performance status, and symptomatically improve with transfusions, but not if they are bleeding rapidly and eminently dying from this. Nor does hospice follow Hgb routinely.      Review of Systems:  ROS: 10 point ROS neg other than the symptoms noted above in the HPI    Social History:  .  Lives with his wife in town home in [place].  Three children, Tiburcio is here with him today along with his wife and his sister.  Children live locally.      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 Brambila currently feels his quality of life is good enough that he would want to seek medical care for something that was potentially reversible.  He gave specific examples of having a heart attack, bleeding from his varices, or having severe respiratory failure.  His symptoms are minimal at this time, but would like to have more energy.     We did discuss prognosis in more detail.  He has been told different things by different doctors.  He thinks that he has longer then the 3-6 months that he was given by some doctors but does not expect to live  "significantly longer then one year.      We did discuss what the typical trajectory of his illness is as well as other types of trajectories.      He did voice that if he was bed bound or had no chance of recovery he would not want to be kept alive under those circumstances.  Would not want to be dependent on \"machines\" to live, specifically no long term intubation.  We would be ok with shorter term intubation.  He would also be ok with short term feeding tube.  He was less sure about long term feeding tube, would depend on the circumstances.      Life Sustaining Treatments Discussed:  Mechanical Intubation: Yes, but only short term for reversible illnesses  Feeding Tube/TPN: Short term ok, not sure about long term  IV Medications/Fluids: Yes  Re-hospitalization: Yes    Advance Care Planning:  Code status:  FULL CODE  POLST: No  Advance Directive:    Yes, but needs to be notarized  Surrogate Decision Maker(s):  Wife, Heike, is primary, and son, Tiburcio, is aternate  Hospice: Discussed in detail today.  Not in alignment with goals at this time.               Medications:   I have reviewed this patient's medication profile.  MNPMP: reviewed     Allergies   Allergen Reactions     Penicillins      Happened in his teens, unsure what his reaction was. Tolerated ceftriaxone 3/18       Past Medical, Family and Surgical Histories Reviewed    Vital signs:    , Blood pressure (!) 139/39, pulse 61, temperature 97.8  F (36.6  C), temperature source Oral, resp. rate 16, height 1.727 m (5' 7.99\"), weight 100.7 kg (221 lb 14.4 oz), SpO2 100 %.  Estimated body mass index is 33.75 kg/(m^2) as calculated from the following:    Height as of this encounter: 1.727 m (5' 7.99\").    Weight as of this encounter: 100.7 kg (221 lb 14.4 oz).  General: Healthy appearing man, slightly overweight  Eyes: EOMI, sclera clear  HEENT: NC/AT; mucous membranes   Lungs: speaking full sentences   Abdomen: Moderately disteded  Ext: Compression stockings in " place, significant edema throughout  Neuro: A&O x 3; CN II-XII grossly intact; gait WNL.  Concentration is ok but thinking seems a bit slow, may be WNL for patient  Neuropsych: Affect WNL, good eye contat     Data reviewed:  I haved reviewed recent labs and imaging    Assessment and Plan:  71 y/o man with KUHN cirrhosis, ESLD, HCC who is not eligible for further treatment.  In addition he has other co-morbidities including DMII, HTN, CAD.      His symptom burden is low and his QOL remains reasonable to him at this time that his preferences remain for continued LST - including returning to hospital and advanced life support.  He did acknowledge that if his condition deteriorated significantly(bed bound, inability to interact with family) or if there was no reasonable chance of recovery he would not want LST under these circumstances.  Discussed code status- thinking about it as above.     Discussed hospice in detail today.  Family is very medically sirisha and we had in-depth discussion about hospice and how this fits in with his goals and illness at this time. Currently his goals do not align with hospice but likely will in the future should his QOL deteriorate or his symptom burden increase.  He has a PCP he trusts who can continue these discussions.  Clarified with him that he can travel while on hospice; the details vary by what exactly he is doing, but short trips are usually not a problem if they don't interrupt the hospice plan of care, and for longer trips hospices often arrange a temporary 'transfer' to another agency.     Has very good support system in place a demonstrates good coping skills.  Prognostic awareness is very realistic.      Will follow-up as needed to continue discussions or manage any symptoms that arise.      Patient seen and discussed with Dr. David Thomas  Palliative Care Fellow PGY-5    Attending Note:  Patient seen and evaluated with Dr Thomas and I agree with/confirm his  findings/recs in this note.     Thank you for involving us in the patient's care.   Omar Hernandez MD / Palliative Medicine / Pager [phone] / [facility] Inpatient Team Consult Pager [phone] (answered 8am-430pm M-F) - ok to text page via [email or web address] / After-Hours Answering Service [phone] / Palliative Clinic in the [facility] at the [facility] - [phone] (scheduling); [phone] (triage).

## 2018-05-16 NOTE — MR AVS SNAPSHOT
After Visit Summary   5/16/2018    Serge Brambila    MRN: [id number]           Patient Information     Date Of Birth          1945        Visit Information        Provider Department      5/16/2018 12:45 PM Sudarshan Connell MD [facility]        Today's Diagnoses     HCC (hepatocellular carcinoma) (H)    -  1    Advance care planning           Follow-ups after your visit        Your next 10 appointments already scheduled     May 25, 2018  2:00 PM CDT   Paracentesis Visit with Uc Spec Inf Para Provider, UC 40 ATC   [facility] ([facility])    [address]  [address]  [place] [facility] [address]   [phone]            May 31, 2018  7:30 AM CDT   Paracentesis Visit with Uc Spec Inf Para Provider, UC 39 ATC   [facility] ([facility])    [address]  [address]  [place] [facility] [address]   [phone]            Jun 04, 2018  9:00 AM CDT   (Arrive by 8:45 AM)   ATRIAL FIBULATION VISIT with Bell Duarte MD   [facility] ([facility])    [address]  [address]  [place] [address]   [phone]            Jun 08, 2018  2:00 PM CDT   Paracentesis Visit with Uc Spec Inf Para Provider, UC 40 ATC   [facility] ([facility])    [address]  [address]  [place] [facility] [address]   [phone]            Jun 14, 2018  7:30 AM CDT   Paracentesis Visit with Uc Spec Inf Para Provider, UC 39 ATC   [facility] ([facility])    [address]  [address]  [place] [address]   [phone]            Jun 21, 2018  7:30 AM CDT   Paracentesis Visit with Uc Spec Inf Para Provider   [facility] Special[facility]  "and Procedure ([facility])    [address]  [address]  [facility] [address] [phone]              Who to contact     If you have questions or need follow up information about today's clinic visit or your schedule please contact [facility] directly at [phone].  Normal or non-critical lab and imaging results will be communicated to you by MyChart, letter or phone within 4 business days after the clinic has received the results. If you do not hear from us within 7 days, please contact the clinic through NetWitnesshart or phone. If you have a critical or abnormal lab result, we will notify you by phone as soon as possible.  Submit refill requests through [email or web address] or call your pharmacy and they will forward the refill request to us. Please allow 3 business days for your refill to be completed.          Additional Information About Your Visit        NetWitnesshart Information     [email or web address] gives you secure access to your electronic health record. If you see a primary care provider, you can also send messages to your care team and make appointments. If you have questions, please call your primary care clinic.  If you do not have a primary care provider, please call [phone] and they will assist you.        Care EveryWhere ID     This is your Care EveryWhere ID. This could be used by other organizations to access your [place] medical records  [id number]        Your Vitals Were     Pulse Temperature Respirations Height Pulse Oximetry BMI (Body Mass Index)    61 97.8  F (36.6  C) (Oral) 16 1.727 m (5' 7.99\") 100% 33.75 kg/m2       Blood Pressure from Last 3 Encounters:   05/17/18 (!) 132/30   05/16/18 (!) 139/39   05/11/18 142/45    Weight from Last 3 Encounters:   05/17/18 99.3 kg (218 lb 14.4 oz)   05/16/18 100.7 kg (221 lb 14.4 oz)   05/11/18 100.9 kg (222 lb 8 oz)              Today, you had the following     No orders found for display       Primary Care Provider " Office Phone # Fax #    Linn Thompson [phone]       [facility] [address]  [facility] [address]        Equal Access to Services     FRANK CONROY : Hadii aad ku hadradha Benz, waaxda luqadaha, qaybta kaalmada aderyan, miley lamb yanet guerrero. So [facility] [phone].    ATENCIÓN: Si habla español, tiene a collins disposición servicios gratuitos de asistencia lingüística. Rufina al [phone].    We comply with applicable federal civil rights laws and [place] laws. We do not discriminate on the basis of race, color, national origin, age, disability, sex, sexual orientation, or gender identity.            Thank you!     Thank you for choosing [facility]  for your care. Our goal is always to provide you with excellent care. Hearing back from our patients is one way we can continue to improve our services. Please take a few minutes to complete the written survey that you may receive in the mail after your visit with us. Thank you!             Your Updated Medication List - Protect others around you: Learn how to safely use, store and throw away your medicines at www.disposemymeds.org.          This list is accurate as of 5/16/18 11:59 PM.  Always use your most recent med list.                   Brand Name Dispense Instructions for use Diagnosis    BASAGLAR 100 UNIT/ML injection      Inject 37 Units Subcutaneous At Bedtime        * ONETOUCH ULTRA test strip   Generic drug:  blood glucose monitoring      TEST TWICE DAILY TO THREE TIMES DAILY        * blood glucose monitoring test strip    no brand specified     1 each        ciprofloxacin 250 MG tablet    CIPRO    30 tablet    Take 2 tablets (500 mg) by mouth daily    Other ascites       fluticasone 50 MCG/ACT spray    FLONASE     Spray 1 spray into both nostrils daily        furosemide 20 MG tablet    LASIX    30 tablet    Take 2 tablets (40 mg) by mouth daily    Cirrhosis of liver with ascites,  unspecified hepatic cirrhosis type (H)       [employer] LANCETS 21G Misc      by Misc.(Non-Drug; Combo Route) route once daily.        lactulose 10 GM/15ML solution    CHRONULAC    2700 mL    30 mLs (20 g) by Oral or NG Tube route 3 times daily    Hepatic encephalopathy (H)       LEVOTHYROXINE SODIUM PO      Take 150 mcg by mouth daily        metoprolol tartrate 25 MG tablet    LOPRESSOR    60 tablet    Take 1 tablet (25 mg) by mouth 2 times daily    Paroxysmal atrial fibrillation (H)       NITROQUICK SL           pantoprazole 20 MG EC tablet    PROTONIX    60 tablet    Take 2 tablets (40 mg) by mouth 2 times daily    Gastrointestinal hemorrhage, unspecified gastrointestinal hemorrhage type, HCC (hepatocellular carcinoma) (H)       RA BLOOD PRESSURE CUFF MONITOR Misc      by Misc.(Non-Drug; Combo Route) route once daily.        rifaximin 550 MG Tabs tablet    XIFAXAN    60 tablet    Take 1 tablet (550 mg) by mouth 2 times daily    Hepatic encephalopathy (H)       sodium bicarbonate 650 MG tablet     120 tablet    Take 2 tablets (1,300 mg) by mouth 2 times daily    Chronic kidney disease, unspecified CKD stage       spironolactone 50 MG tablet    ALDACTONE    30 tablet    Take 1 tablet (50 mg) by mouth daily    Cirrhosis of liver with ascites, unspecified hepatic cirrhosis type (H)       * Notice:  This list has 2 medication(s) that are the same as other medications prescribed for you. Read the directions carefully, and ask your doctor or other care provider to review them with you.

## 2018-05-16 NOTE — PROGRESS NOTES
Addendum:  Pt's wife returned my call (LVM) after receiving my detailed VM, no needs/concerns voiced. Appts as scheduled tomorrow. LEEANNE Fu

## 2018-05-17 NOTE — MR AVS SNAPSHOT
After Visit Summary   5/17/2018    Serge Brambila    MRN: [id number]           Patient Information     Date Of Birth          1945        Visit Information        Provider Department      5/17/2018 7:30 AM Provider, Elli Spec Inf Para; UC 40 ATC [facility]        Today's Diagnoses     Liver cirrhosis secondary to KUHN (H)    -  1    Ascites of liver           Follow-ups after your visit        Your next 10 appointments already scheduled     May 25, 2018  2:00 PM CDT   Paracentesis Visit with Uc Spec Inf Para Provider, UC 40 ATC   [facility] ([facility])    [address]  [address]  [place] [address]   [phone]            May 31, 2018  7:30 AM CDT   Paracentesis Visit with Uc Spec Inf Para Provider, UC 39 ATC   [facility] ([facility])    [address]  [address]  [place] [address]   [phone]            Jun 04, 2018  9:00 AM CDT   (Arrive by 8:45 AM)   ATRIAL FIBULATION VISIT with Bell Duarte MD   [facility] ([facility])    [address]  [address]  [facility] [address]   [phone]            Jun 08, 2018  2:00 PM CDT   Paracentesis Visit with Uc Spec Inf Para Provider, UC 40 ATC   [facility] ([facility])    [address]  [address]  [facility] [facility] [address]   [phone]            Jun 14, 2018  7:30 AM CDT   Paracentesis Visit with Uc Spec Inf Para Provider, UC 39 ATC   [facility] ([facility])    [address]  [address]  [place] [facility] [address]   [phone]            Jun 21, 2018  7:30 AM CDT   Paracentesis Visit with Uc Spec Inf Para Provider   [facility] ([facility])    [address]  [address]  [facility] [address] [phone]              Who to contact     If you have questions or need follow up information about today's clinic visit or your schedule please contact [facility] SPECIAL[facility] PROCED[facility] directly at [phone].  Normal or non-critical lab and imaging results will be communicated to you by MyChart, letter or phone within 4 business days after the clinic has received the results. If you do not hear from us within 7 days, please contact the clinic through Unbabelhart or phone. If you have a critical or abnormal lab result, we will notify you by phone as soon as possible.  Submit refill requests through Embarkly or call your pharmacy and they will forward the refill request to us. Please allow 3 business days for your refill to be completed.          Additional Information About Your Visit        UnbabelharSavelli Information     Embarkly gives you secure access to your electronic health record. If you see a primary care provider, you can also send messages to your care team and make appointments. If you have questions, please call your primary care clinic.  If you do not have a primary care provider, please call [phone] and they will assist you.        Care EveryWhere ID     This is your Care EveryWhere ID. This could be used by other organizations to access your [place] medical records  [id number]        Your Vitals Were     Pulse Temperature Respirations BMI (Body Mass Index)          59 98.7  F (37.1  C) (Oral) 16 33.29 kg/m2         Blood Pressure from Last 3 Encounters:   05/17/18 (!) 132/30   05/16/18 (!) 139/39   05/11/18 142/45    Weight from Last 3 Encounters:   05/17/18 99.3 kg (218 lb 14.4 oz)   05/16/18 100.7 kg (221 lb 14.4 oz)   05/11/18 100.9 kg (222 lb 8 oz)              We Performed the Following     US Paracentesis        Primary  Care Provider Office Phone # Fax #    Linn Thompson [phone]       [facility] [address]  [facility] [address]        Equal Access to Services     FRANK CONROY : Hadii aad ku hadveronicao Sojerryali, waaxda luqadaha, qaybta kaalmada aderyan, miley lamb laFabioalan guerrero. So [facility] [phone].    ATENCIÓN: Si habla español, tiene a collins disposición servicios gratuitos de asistencia lingüística. Rufina al [phone].    We comply with applicable federal civil rights laws and [place] laws. We do not discriminate on the basis of race, color, national origin, age, disability, sex, sexual orientation, or gender identity.            Thank you!     Thank you for choosing [facility] PROCED[facility]  for your care. Our goal is always to provide you with excellent care. Hearing back from our patients is one way we can continue to improve our services. Please take a few minutes to complete the written survey that you may receive in the mail after your visit with us. Thank you!             Your Updated Medication List - Protect others around you: Learn how to safely use, store and throw away your medicines at www.disposemymeds.org.          This list is accurate as of 5/17/18 11:24 AM.  Always use your most recent med list.                   Brand Name Dispense Instructions for use Diagnosis    BASAGLAR 100 UNIT/ML injection      Inject 37 Units Subcutaneous At Bedtime        * ONETOUCH ULTRA test strip   Generic drug:  blood glucose monitoring      TEST TWICE DAILY TO THREE TIMES DAILY        * blood glucose monitoring test strip    no brand specified     1 each        ciprofloxacin 250 MG tablet    CIPRO    30 tablet    Take 2 tablets (500 mg) by mouth daily    Other ascites       fluticasone 50 MCG/ACT spray    FLONASE     Spray 1 spray into both nostrils daily        furosemide 20 MG tablet    LASIX    30 tablet    Take 2 tablets (40 mg) by mouth daily     Cirrhosis of liver with ascites, unspecified hepatic cirrhosis type (H)       [employer] LANCETS 21G Misc      by Misc.(Non-Drug; Combo Route) route once daily.        lactulose 10 GM/15ML solution    CHRONULAC    2700 mL    30 mLs (20 g) by Oral or NG Tube route 3 times daily    Hepatic encephalopathy (H)       LEVOTHYROXINE SODIUM PO      Take 150 mcg by mouth daily        metoprolol tartrate 25 MG tablet    LOPRESSOR    60 tablet    Take 1 tablet (25 mg) by mouth 2 times daily    Paroxysmal atrial fibrillation (H)       NITROQUICK SL           pantoprazole 20 MG EC tablet    PROTONIX    60 tablet    Take 2 tablets (40 mg) by mouth 2 times daily    Gastrointestinal hemorrhage, unspecified gastrointestinal hemorrhage type, HCC (hepatocellular carcinoma) (H)       RA BLOOD PRESSURE CUFF MONITOR Misc      by Misc.(Non-Drug; Combo Route) route once daily.        rifaximin 550 MG Tabs tablet    XIFAXAN    60 tablet    Take 1 tablet (550 mg) by mouth 2 times daily    Hepatic encephalopathy (H)       sodium bicarbonate 650 MG tablet     120 tablet    Take 2 tablets (1,300 mg) by mouth 2 times daily    Chronic kidney disease, unspecified CKD stage       spironolactone 50 MG tablet    ALDACTONE    30 tablet    Take 1 tablet (50 mg) by mouth daily    Cirrhosis of liver with ascites, unspecified hepatic cirrhosis type (H)       * Notice:  This list has 2 medication(s) that are the same as other medications prescribed for you. Read the directions carefully, and ask your doctor or other care provider to review them with you.

## 2018-05-17 NOTE — PROGRESS NOTES
Paracentesis Nursing Note  Serge Brambila [facility].    During today's appointment orders from Fernando Montgomery were completed.    Progress Note:  Patient identification verified by name and date of birth.  Assessment completed.  Vitals monitored throughout appointment and recorded in Doc Flowsheets.  See proceduralist note in ultrasound.    Date of consent or authorization: 4/6/18.  Invasive Procedure Safety Checklist was completed and sent for scanning.     Paracentesis performed by Celestino Morales PA-C    The following labs were communicated to provider performing paracentesis:  Lab Results   Component Value Date    PLT 81 05/09/2018       Total amount of ascites fluid drained: 1.2 liters.  Color of ascites fluid: clear, red.  Total amount of albumin given 12.5 grams.    Patient tolerated procedure well.    Patient was scheduled for Cardiology Appt on June 4th  9 am. To establish care with Dr. Peacock @ [facility] and F/U BP    Post procedure,denies pain or discomfort post paracentesis.      Discharge Plan:  Discharge instructions were reviewed with patient.  Patient/Representative verbalized understanding and all questions were answered.   Discharged [facility] in stable condition.    Halie Ruiz RN       Administrations This Visit     albumin human 25 % injection 12.5 g     Admin Date Action Dose Route Administered By             05/17/2018 New Bag 12.5 g Intravenous Halie Ruiz RN                    lidocaine 1 % 20 mL     Admin Date Action Dose Route Administered By             05/17/2018 Given by Other Clinician 15 mL Other Halie Ruiz RN                              BP (!) (P) 132/33  Pulse (P) 59  Temp 98.7  F (37.1  C) (Oral)  Resp 16  Wt 100.7 kg (222 lb)  BMI 33.76 kg/m2

## 2018-05-25 NOTE — PROGRESS NOTES
Paracentesis Nursing Note  Serge Brambila presents [facility] for a [facility]esis.    During today's appointment orders from Dr. Fernando Montgomery were completed.    Progress Note:  Patient identification verified by name and date of birth.  Assessment completed.  Vitals monitored throughout appointment and recorded in Doc Flowsheets.  See proceduralist note in ultrasound.    Date of consent or authorization: 04/06/2018 - 07/06/2018.  Invasive Procedure Safety Checklist was completed and sent for scanning.     Paracentesis performed by Colt Curiel PA-C Radiology.    The following labs were communicated to provider performing paracentesis:  Lab Results   Component Value Date    PLT 81 05/09/2018       Total amount of ascites fluid drained: 2.2 liters.  Color of ascites fluid: light yellow.  Total amount of albumin given: 25  grams.    Patient tolerated procedure well.    Post procedure,denies pain or discomfort post paracentesis.     Administrations This Visit     albumin human 25 % injection 12.5 g     Admin Date Action Dose Route Administered By             05/25/2018 New Bag 12.5 g Intravenous Katty Mccloud RN              Admin Date Action Dose Route Administered By             05/25/2018 New Bag 12.5 g Intravenous Katty Mccloud RN                    lidocaine 1 % 20 mL     Admin Date Action Dose Route Administered By             05/25/2018 Given by Other Clinician 10 mL Other Katty Mccloud RN                              Discharge Plan:  Discharge instructions were reviewed with patient.  Patient/Representative verbalized understanding and all questions were answered.   Discharged [facility] in stable condition.    Katty Mccloud RN        BP (!) 149/37  Pulse 57  Temp 98.4  F (36.9  C) (Oral)  Resp 16  Wt 98.5 kg (217 lb 2.5 oz)  SpO2 100%  BMI 33.03 kg/m2

## 2018-05-25 NOTE — PATIENT INSTRUCTIONS
Dear Serge Brambila    Thank you for choosing [facility] ([facility]) for your procedure.  The following information is a summary of our appointment as well as important reminders.      DISCHARGE INSTRUCTIONS FOLLOWING ABDOMINAL PARACENTESIS    After you go home:    No strenuous activity for 24 hours    Resume your regular diet    Limit fluid intake for the first 48 hours to no more than 2 quarts per day.  There should be minimal drainage from the needle site.  If drainage does occur and soaks through the bandage, apply gentle pressure with your hand for 5 minutes.    Notify MD for the following:    Excessive drainage    Excessive swelling, redness or tenderness at the needle site    Fever greater than 101 degrees F    Dizziness or light-headedness when getting up or walking      IF THIS IS A MEDICAL EMERGENCY, CALL [phone]    If you have any questions or concerns    Contact the Hepatology Clinic:   [phone]      If this is after hours, contact the hospital :  [phone] and as to have the GI resident on call paged                       We look forward in seeing you on your next appointment here at [facility].  Please don t hesitate to call us at [phone] to reschedule any of your appointments or to speak with one of the [facility] registered nurses.  It was a pleasure taking care of you today.    Sincerely,    [facility]  [facility]  [address] [place] [address]  [address]  Phone:  [phone]

## 2018-05-25 NOTE — MR AVS SNAPSHOT
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  The following information is a summary of our appointment as well as important reminders.      DISCHARGE INSTRUCTIONS FOLLOWING ABDOMINAL PARACENTESIS    After you go home:    No strenuous activity for 24 hours    Resume your regular diet    Limit fluid intake for the first 48 hours to no more than 2 quarts per day.  There should be minimal drainage from the needle site.  If drainage does occur and soaks through the bandage, apply gentle pressure with your hand for 5 minutes.    Notify MD for the following:    Excessive drainage    Excessive swelling, redness or tenderness at the needle site    Fever greater than 101 degrees F    Dizziness or light-headedness when getting up or walking      IF THIS IS A MEDICAL EMERGENCY, CALL 911    If you have any questions or concerns    Contact the Hepatology Clinic:   [phone]      If this is after hours, contact the hospital :  [phone] and as to have the GI resident on call paged                       We look forward in seeing you on your next appointment here at [facility].  Please don t hesitate to call us at [phone] to reschedule any of your appointments or to speak with one of the [facility] registered nurses.  It was a pleasure taking care of you today.    Sincerely,    [facility]  Specialty Infusion [facility] Procedure Center  [address] [place] [address]  [place]  [address]  Phone:  [phone]            Follow-ups after your visit        Your next 10 appointments already scheduled     May 31, 2018  7:30 AM CDT   Paracentesis Visit with Uc Spec Inf Para Provider, UC 39 ATC   [facility] Specialty and Procedure ([facility])    [address]  [address]  [facility] [place] [address] [phone]            Jun 04, 2018  9:00 AM CDT   (Arrive by 8:45 AM)   ATRIAL FIBULATION VISIT with Bell Duarte MD   [facility] ([facility])    [address]  [address]  [facility] [place] [address] [phone]            Jun 05, 2018  7:30 AM CDT   Paracentesis Visit with Uc Spec Inf Para Provider, UC 40 ATC   [facility] Specialty and Procedure ([facility])    [address]  [address]  [facility] [place] [id number] [phone]            Jun 14, 2018  7:30 AM CDT   Paracentesis Visit with Uc Spec Inf Para Provider, UC 39 ATC   [facility] Specialty and Procedure ([facility])    [address]  [address]  [facility] [place] [address] [phone]            Jun 21, 2018  7:30 AM CDT   Paracentesis Visit with Uc Spec Inf Para Provider, UC 40 ATC   [facility] Specialty and Procedure ([facility])    [address]  [address]  [facility] [place] [address] [phone]            Jun 28, 2018  7:30 AM CDT   Paracentesis Visit with Uc Spec Inf Para Provider   [facility] Specialty and Procedure ([facility])    [address]  [address]  [facility] [place] [id number] [phone]              Who to contact     If you have questions or need follow up information about today's clinic visit or your schedule please contact [facility] SPECIALTY AND  PROCEDURE directly at [phone].  Normal or non-critical lab and imaging results will be communicated to you by MyChart, letter or phone within 4 business days after the clinic has received the results. If you do not hear from us within 7 days, please contact the clinic through [employer] or phone. If you have a critical or abnormal lab result, we will notify you by phone as soon as possible.  Submit refill requests through [employer] or call your pharmacy and they will forward the refill request to us. Please allow 3 business days for your refill to be completed.          Additional Information About Your Visit        [employer]ul[employer] Information     [employer] gives you secure access to your electronic health record. If you see a primary care provider, you can also send messages to your care team and make appointments. If you have questions, please call your primary care clinic.  If you do not have a primary care provider, please call [phone] and they will assist you.        Care EveryWhere ID     This is your Care EveryWhere ID. This could be used by other organizations to access your [place] medical records  [id number]        Your Vitals Were     Pulse Temperature Respirations Pulse Oximetry BMI (Body Mass Index)       57 98.4  F (36.9  C) (Oral) 16 100% 33.03 kg/m2        Blood Pressure from Last 3 Encounters:   05/25/18 145/49   05/17/18 (!) 132/30   05/16/18 (!) 139/39    Weight from Last 3 Encounters:   05/25/18 98.5 kg (217 lb 2.5 oz)   05/17/18 99.3 kg (218 lb 14.4 oz)   05/16/18 100.7 kg (221 lb 14.4 oz)              We Performed the Following     US Paracentesis        Primary Care Provider Office Phone # Fax #    Linn Thompson [phone]       [facility] [address]  [facility] [address]        Equal Access to Services     FRANK CONROY : Eva Benz, waaxda luqadaha, qaybta kaalmada adesanchoyamiranda, miley guerrero. So [facility]  [phone].    ATENCIÓN: Si tawanda magana, tiene a collins disposición servicios gratuitos de asistencia lingüística. Rufina blakely [phone].    We comply with applicable federal civil rights laws and [place] laws. We do not discriminate on the basis of race, color, national origin, age, disability, sex, sexual orientation, or gender identity.            Thank you!     Thank you for choosing [facility] SPECIALTY AND PROCEDURE  for your care. Our goal is always to provide you with excellent care. Hearing back from our patients is one way we can continue to improve our services. Please take a few minutes to complete the written survey that you may receive in the mail after your visit with us. Thank you!             Your Updated Medication List - Protect others around you: Learn how to safely use, store and throw away your medicines at www.disposemymeds.org.          This list is accurate as of 5/25/18  3:24 PM.  Always use your most recent med list.                   Brand Name Dispense Instructions for use Diagnosis    BASAGLAR 100 UNIT/ML injection      Inject 37 Units Subcutaneous At Bedtime        * ONETOUCH ULTRA test strip   Generic drug:  blood glucose monitoring      TEST TWICE DAILY TO THREE TIMES DAILY        * blood glucose monitoring test strip    no brand specified     1 each        ciprofloxacin 250 MG tablet    CIPRO    30 tablet    Take 2 tablets (500 mg) by mouth daily    Other ascites       fluticasone 50 MCG/ACT spray    FLONASE     Spray 1 spray into both nostrils daily        furosemide 20 MG tablet    LASIX    30 tablet    Take 2 tablets (40 mg) by mouth daily    Cirrhosis of liver with ascites, unspecified hepatic cirrhosis type (H)       [employer] LANCETS 21G Misc      by Misc.(Non-Drug; Combo Route) route once daily.        lactulose 10 GM/15ML solution    CHRONULAC    2700 mL    30 mLs (20 g) by Oral or NG Tube route 3 times daily    Hepatic encephalopathy (H)        LEVOTHYROXINE SODIUM PO      Take 150 mcg by mouth daily        metoprolol tartrate 25 MG tablet    LOPRESSOR    60 tablet    Take 1 tablet (25 mg) by mouth 2 times daily    Paroxysmal atrial fibrillation (H)       NITROQUICK SL           pantoprazole 20 MG EC tablet    PROTONIX    60 tablet    Take 2 tablets (40 mg) by mouth 2 times daily    Gastrointestinal hemorrhage, unspecified gastrointestinal hemorrhage type, HCC (hepatocellular carcinoma) (H)       RA BLOOD PRESSURE CUFF MONITOR Misc      by Misc.(Non-Drug; Combo Route) route once daily.        rifaximin 550 MG Tabs tablet    XIFAXAN    60 tablet    Take 1 tablet (550 mg) by mouth 2 times daily    Hepatic encephalopathy (H)       sodium bicarbonate 650 MG tablet     120 tablet    Take 2 tablets (1,300 mg) by mouth 2 times daily    Chronic kidney disease, unspecified CKD stage       spironolactone 50 MG tablet    ALDACTONE    30 tablet    Take 1 tablet (50 mg) by mouth daily    Cirrhosis of liver with ascites, unspecified hepatic cirrhosis type (H)       * Notice:  This list has 2 medication(s) that are the same as other medications prescribed for you. Read the directions carefully, and ask your doctor or other care provider to review them with you.

## 2018-06-05 NOTE — MR AVS SNAPSHOT
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 This fluid buildup in the abdomen is called ascites. The procedure may have been done to take a sample of the fluid. Or, it may have been done to drain the extra fluid from your abdomen and help make you more comfortable.     Ascites is buildup of excess fluid in the abdomen.   Home care    If you have pain after the procedure, your healthcare provider can prescribe or recommend pain medicines. Take these exactly as directed. If you stopped taking other medicines before the procedure, ask your provider when you can start them again.    Take it easy for 24 hours after the procedure. Avoid physical activity until your provider says it s OK.    You will have a small bandage over the puncture site. Stitches (sutures), surgical staples, adhesive tapes, adhesive strips, or surgical glue may be used to close the incision. They also help stop bleeding and speed healing. You may take the bandage off in 24 hours.    Check the puncture site for the signs of infection listed below.  Follow-up care  Make a follow-up appointment with your healthcare provider as directed. During your follow-up visit, your provider will check your healing. Let your provider know how you are feeling. You can also discuss the cause of your ascites and if you need any further treatment.  When to seek medical advice  Call your healthcare provider if you have any of the following after the  procedure:    A fever of 100.4 F (38.0 C) or higher    Trouble breathing    Pain that doesn't go away even after taking pain medicine    Belly pain not caused by having the skin punctured    Bleeding from the puncture site    More than a small amount of fluid leaking from the puncture site    Swollen belly    Signs of infection at the puncture site. These include increased pain, redness, or swelling, warmth, or bad-smelling drainage.    Blood in your urine    Feeling dizzy or lightheaded, or fainting   Date Last Reviewed: 7/1/2016 2000-2017 [employer]. [address] [place] [address]. All rights reserved. This information is not intended as a substitute for professional medical care. Always follow your healthcare professional's instructions.                Follow-ups after your visit        Your next 10 appointments already scheduled     Jun 13, 2018  9:30 AM CDT   (Arrive by 9:15 AM)   ATRIAL FIBULATION VISIT with Bryan Orozco MD   [facility] ([facility])    [address] [place] [address]  [address] [place] [address] [phone]            Jun 14, 2018  7:30 AM CDT   Paracentesis Visit with Uc Spec Inf Para Provider, UC 39 ATC   [facility] Specialty and Procedure ([facility])    [address]  [address]  [facility] [address] [phone]            Jun 21, 2018  7:30 AM CDT   Paracentesis Visit with Uc Spec Inf Para Provider, UC 40 ATC   [facility] Specialty and Procedure ([facility])    [address]  [address]  [facility] [id number] [phone]            Jun 28, 2018  7:30 AM CDT   Paracentesis Visit with Uc Spec Inf Para Provider, UC 39 ATC   [facility] Specialty and Procedure ([facility])    [address]  [address]  [facility] [address] [phone]             Jul 05, 2018  2:00 PM CDT   Paracentesis Visit with Uc Spec Inf Para Provider, UC 40 ATC   [facility] Specialty and Procedure ([facility])    [address]  [address]  [facility] [address] [phone]            Jul 12, 2018  7:30 AM CDT   Paracentesis Visit with Uc Spec Inf Para Provider   [facility] Specialty and Procedure ([facility])    [address]  [address]  [facility] [address] [phone]              Who to contact     If you have questions or need follow up information about today's clinic visit or your schedule please contact [facility] SPECIALTY AND PROCEDURE directly at [phone].  Normal or non-critical lab and imaging results will be communicated to you by Freebeepayhart, letter or phone within 4 business days after the clinic has received the results. If you do not hear from us within 7 days, please contact the clinic through Freebeepayhart or phone. If you have a critical or abnormal lab result, we will notify you by phone as soon as possible.  Submit refill requests through [employer] or call your pharmacy and they will forward the refill request to us. Please allow 3 business days for your refill to be completed.          Additional Information About Your Visit        Freebeepayhart Information     [employer] gives you secure access to your electronic health record. If you see a primary care provider, you can also send messages to your care team and make appointments. If you have questions, please call your primary care clinic.  If you do not have a primary care provider, please call [phone] and they will assist you.        Care EveryWhere ID     This is your Care EveryWhere ID. This could be used by other organizations to access your [place] medical records  [id number]        Your Vitals Were     Pulse Temperature Pulse Oximetry BMI (Body Mass Index)           55 98.3  F (36.8  C) (Oral) 100% 31.94 kg/m2         Blood Pressure from Last 3 Encounters:   06/05/18 135/40   05/25/18 (!) 145/37   05/17/18 (!) 132/30    Weight from Last 3 Encounters:   06/05/18 95.3 kg (210 lb)   05/25/18 96.3 kg (212 lb 4.9 oz)   05/17/18 99.3 kg (218 lb 14.4 oz)              We Performed the Following     US Paracentesis        Primary Care Provider Office Phone # Fax #    Linn Thompson [phone]       [facility] [address]  [facility] [address]        Equal Access to Services     FRANK CONROY : Hadii aad ku hadasho Soangel, waaxda luqadaha, qaybta kaalmada adeegyada, miley holt . So [facility] [phone].    ATENCIÓN: Si habla español, tiene a collins disposición servicios gratuitos de asistencia lingüística. [facility] [phone].    We comply with applicable federal civil rights laws and [place] laws. We do not discriminate on the basis of race, color, national origin, age, disability, sex, sexual orientation, or gender identity.            Thank you!     Thank you for choosing [facility] SPECIALTY AND PROCEDURE  for your care. Our goal is always to provide you with excellent care. Hearing back from our patients is one way we can continue to improve our services. Please take a few minutes to complete the written survey that you may receive in the mail after your visit with us. Thank you!             Your Updated Medication List - Protect others around you: Learn how to safely use, store and throw away your medicines at www.disposemymeds.org.          This list is accurate as of 6/5/18 10:10 AM.  Always use your most recent med list.                   Brand Name Dispense Instructions for use Diagnosis    BASAGLAR 100 UNIT/ML injection      Inject 37 Units Subcutaneous At Bedtime        * ONETOUCH ULTRA test strip   Generic drug:  blood glucose monitoring      TEST TWICE DAILY TO THREE TIMES DAILY        * blood  glucose monitoring test strip    no brand specified     1 each        ciprofloxacin 250 MG tablet    CIPRO    30 tablet    Take 2 tablets (500 mg) by mouth daily    Other ascites       fluticasone 50 MCG/ACT spray    FLONASE     Spray 1 spray into both nostrils daily        furosemide 20 MG tablet    LASIX    30 tablet    Take 2 tablets (40 mg) by mouth daily    Cirrhosis of liver with ascites, unspecified hepatic cirrhosis type (H)       KROGER LANCETS 21G Misc      by Misc.(Non-Drug; Combo Route) route once daily.        lactulose 10 GM/15ML solution    CHRONULAC    2700 mL    30 mLs (20 g) by Oral or NG Tube route 3 times daily    Hepatic encephalopathy (H)       LEVOTHYROXINE SODIUM PO      Take 150 mcg by mouth daily        metoprolol tartrate 25 MG tablet    LOPRESSOR    60 tablet    Take 1 tablet (25 mg) by mouth 2 times daily    Paroxysmal atrial fibrillation (H)       NITROQUICK SL           pantoprazole 20 MG EC tablet    PROTONIX    60 tablet    Take 2 tablets (40 mg) by mouth 2 times daily    Gastrointestinal hemorrhage, unspecified gastrointestinal hemorrhage type, HCC (hepatocellular carcinoma) (H)       RA BLOOD PRESSURE CUFF MONITOR Misc      by Misc.(Non-Drug; Combo Route) route once daily.        rifaximin 550 MG Tabs tablet    XIFAXAN    60 tablet    Take 1 tablet (550 mg) by mouth 2 times daily    Hepatic encephalopathy (H)       sodium bicarbonate 650 MG tablet     120 tablet    Take 2 tablets (1,300 mg) by mouth 2 times daily    Chronic kidney disease, unspecified CKD stage       spironolactone 50 MG tablet    ALDACTONE    30 tablet    Take 1 tablet (50 mg) by mouth daily    Cirrhosis of liver with ascites, unspecified hepatic cirrhosis type (H)       * Notice:  This list has 2 medication(s) that are the same as other medications prescribed for you. Read the directions carefully, and ask your doctor or other care provider to review them with you.

## 2018-06-05 NOTE — PATIENT INSTRUCTIONS
Discharge Instructions for Paracentesis  Paracentesis is a procedure to remove extra fluid from your belly (abdomen). This fluid buildup in the abdomen is called ascites. The procedure may have been done to take a sample of the fluid. Or, it may have been done to drain the extra fluid from your abdomen and help make you more comfortable.     Ascites is buildup of excess fluid in the abdomen.   Home care    If you have pain after the procedure, your healthcare provider can prescribe or recommend pain medicines. Take these exactly as directed. If you stopped taking other medicines before the procedure, ask your provider when you can start them again.    Take it easy for 24 hours after the procedure. Avoid physical activity until your provider says it s OK.    You will have a small bandage over the puncture site. Stitches (sutures), surgical staples, adhesive tapes, adhesive strips, or surgical glue may be used to close the incision. They also help stop bleeding and speed healing. You may take the bandage off in 24 hours.    Check the puncture site for the signs of infection listed below.  Follow-up care  Make a follow-up appointment with your healthcare provider as directed. During your follow-up visit, your provider will check your healing. Let your provider know how you are feeling. You can also discuss the cause of your ascites and if you need any further treatment.  When to seek medical advice  Call your healthcare provider if you have any of the following after the procedure:    A fever of 100.4 F (38.0 C) or higher    Trouble breathing    Pain that doesn't go away even after taking pain medicine    Belly pain not caused by having the skin punctured    Bleeding from the puncture site    More than a small amount of fluid leaking from the puncture site    Swollen belly    Signs of infection at the puncture site. These include increased pain, redness, or swelling, warmth, or bad-smelling drainage.    Blood in your  urine    Feeling dizzy or lightheaded, or fainting   Date Last Reviewed: 7/1/2016 2000-2017 [employer]. [address] [facility], [place] [address]. All rights reserved. This information is not intended as a substitute for professional medical care. Always follow your healthcare professional's instructions.

## 2018-06-05 NOTE — TELEPHONE ENCOUNTER
[facility] Call Center    Phone Message    May a detailed message be left on voicemail: yes    Reason for Call: Medication Question or concern regarding medication   Prescription Clarification  Name of Medication: Lactulose  Prescribing Provider: Dr. Young Sousa   Pharmacy:  Pharmacy   What on the order needs clarification? Frequency    Action Taken: Message routed to:  [facility] ([facility]

## 2018-06-05 NOTE — PROGRESS NOTES
Paracentesis Nursing Note  Serge Brambila [facility].    During today's appointment orders from Fernando Montgomery were completed.    Progress Note:  Patient identification verified by name and date of birth.  Assessment completed.  Vitals monitored throughout appointment and recorded in Doc Flowsheets.  See proceduralist note in ultrasound.    Date of consent or authorization: 4/6.  Invasive Procedure Safety Checklist was completed and sent for scanning.     Paracentesis performed by Myrtle Walters PA-C.    The following labs were communicated to provider performing paracentesis:  Lab Results   Component Value Date    PLT 81 05/09/2018       Total amount of ascites fluid drained: 1.4 liters.  Color of ascites fluid: yellow and clear.  Total amount of albumin given: 25  grams.    Patient tolerated procedure well.    Post procedure,denies pain or discomfort post paracentesis.      Discharge Plan:  Discharge instructions were reviewed with patient.  Patient/Representative verbalized understanding and all questions were answered.   Discharged [facility] in stable condition.    Yesenia Hicks RN       Administrations This Visit     albumin human 25 % injection 12.5 g     Admin Date Action Dose Route Administered By             06/05/2018 New Bag 25 g Intravenous Yesenia Hicks RN                    lidocaine 1 % 20 mL     Admin Date Action Dose Route Administered By             06/05/2018 Given by Other Clinician 15 mL Other Yesenia Hicks RN                            /40  Pulse 55  Temp 98.3  F (36.8  C) (Oral)  SpO2 100%

## 2018-06-13 NOTE — LETTER
6/13/2018      RE: Serge Brambila  [address]  [facility] [address]       Dear Colleague,    Thank you for the opportunity to participate in the care of your patient, Serge Brambila, at the [facility]. Please see a copy of my visit note below.    CARDIOLOGY CONSULTATION    Referring Provider:  Fernando Montgomery  Primary Care Provider:   Linn Thompson  Indication for Consultation:  Atrial fibrillation    HPI: Serge Brambila is a 72 year old male being seen today for evaluation of atrial fibrillation.   He has a PMH of CAD (s/p inferior MI in 2002 with PCI of RCA), HTN, HPL, DM, NAA, KUHN cirrhosis, hepatocellular carcinoma, and GI bleeding necessitating multiple transfusions, who has been referred for evaluation of AF.     He was initially diagnosed with atrial fibrillation in 5/2017 at an outside hospital while he was admitted to the ICU with sepsis. On a one month follow up he was found to be in AF again, albeit rate controlled. He had remained on warfarin until two months ago, when it was stopped due to frequent GI bleeds requiring several hospitalizations for transfusions.     In terms of his symptoms, he denies ever noticing any symptoms from AF. Specifically he denies any chest pain, pressure, palpitations, SOB, fatigue, dizziness, lightheadedness, presyncope or syncope.     From a hepatocellular carcinoma standpoint, he is not a candidate for any curative treatment at this time. Prior to holding warfarin, he had to be admitted to the hospital for fatigue, anemia, and required several transfusions. However, since the warfarin has bene held he has been able to spend more time out of the hospital.       PAST MEDICAL HISTORY:  Past Medical History:   Diagnosis Date     Atrial fibrillation (H)      Diabetes (H)     type II     Hepatic encephalopathy (H)      Hypertension      MI (myocardial infarction)     stent placement     Sleep apnea       Thyroid disease        CURRENT MEDICATIONS:  Current Outpatient Prescriptions   Medication Sig Dispense Refill     BASAGLAR 100 UNIT/ML injection Inject 37 Units Subcutaneous At Bedtime        blood glucose monitoring (NO BRAND SPECIFIED) test strip 1 each       blood glucose monitoring (ONETOUCH ULTRA) test strip TEST TWICE DAILY TO THREE TIMES DAILY       Blood Pressure Monitoring (RA BLOOD PRESSURE CUFF MONITOR) MISC by Misc.(Non-Drug; Combo Route) route once daily.       furosemide (LASIX) 20 MG tablet Take 2 tablets (40 mg) by mouth daily 30 tablet 1     [employer] LANCETS 21G MISC by Misc.(Non-Drug; Combo Route) route once daily.       lactulose (CHRONULAC) 10 GM/15ML solution 30 mLs (20 g) by Oral or NG Tube route 3 times daily 2700 mL 1     LEVOTHYROXINE SODIUM PO Take 150 mcg by mouth daily        metoprolol tartrate (LOPRESSOR) 25 MG tablet Take 1 tablet (25 mg) by mouth 2 times daily 60 tablet 1     pantoprazole (PROTONIX) 20 MG EC tablet Take 2 tablets (40 mg) by mouth 2 times daily 60 tablet 2     rifaximin (XIFAXAN) 550 MG TABS tablet Take 1 tablet (550 mg) by mouth 2 times daily 60 tablet 3     sodium bicarbonate 650 MG tablet Take 2 tablets (1,300 mg) by mouth 2 times daily 120 tablet 1     spironolactone (ALDACTONE) 50 MG tablet Take 1 tablet (50 mg) by mouth daily 30 tablet 1     ciprofloxacin (CIPRO) 250 MG tablet Take 2 tablets (500 mg) by mouth daily (Patient not taking: Reported on 5/16/2018) 30 tablet 3     fluticasone (FLONASE) 50 MCG/ACT spray Spray 1 spray into both nostrils daily       Nitroglycerin (NITROQUICK SL)          PAST SURGICAL HISTORY:  Past Surgical History:   Procedure Laterality Date     APPENDECTOMY  age 16     COLONOSCOPY       ESOPHAGOSCOPY, GASTROSCOPY, DUODENOSCOPY (EGD), COMBINED N/A 1/24/2018    Procedure: COMBINED ESOPHAGOSCOPY, GASTROSCOPY, DUODENOSCOPY (EGD);  ESOPHAGOGASTRODUODENOSCOPY (MAC) ;  Surgeon: Colton Stroud MD;  Location:  GI     GI SURGERY   "2012    partial colectomy for pre-CA nodule, removed 10 in.       ALLERGIES  Penicillins    FAMILY HX:  Family History   Problem Relation Age of Onset     Hyperlipidemia Father      Hypertension Father      Cervical Cancer Maternal Grandmother      Alcoholism Mother      Abdominal Aortic Aneurysm Mother      HEART DISEASE Mother      Hypertension Mother      Thyroid Disease Mother      Hyperlipidemia Mother        SOCIAL HX:  Social History     Social History     Marital status:      Spouse name: N/A     Number of children: N/A     Years of education: N/A     Social History Main Topics     Smoking status: Former Smoker     Packs/day: 1.00     Years: 10.00     Quit date: 1/1/1999     Smokeless tobacco: Never Used     Alcohol use No     Drug use: No     Sexual activity: Not Asked     Other Topics Concern     None     Social History Narrative       ROS:  Constitutional: No fever, chills, or sweats. No weight gain/loss.   HEENT: No visual disturbance, ear ache, epistaxis, sore throat.   Allergies/Immunologic: Negative.   Respiratory: No cough, hemoptysis.   Cardiovascular: As per HPI.   GI: No nausea, vomiting, hematemesis, melena, or hematochezia.   : No urinary frequency, dysuria, or hematuria.   Integument: No rash.   Psychiatric: No anxiety / depression.   Neuro: No speech disturbance, focal sensory or motor deficit.   Endocrinology: No polyuria / polyphagia.   Musculoskeletal: No myalgia.    VITAL SIGNS:  /51 (BP Location: Right arm, Patient Position: Chair, Cuff Size: Adult Regular)  Pulse 56  Ht 1.727 m (5' 8\")  Wt 104.8 kg (231 lb)  SpO2 100%  BMI 35.12 kg/m2  Body mass index is 35.12 kg/(m^2).  Wt Readings from Last 2 Encounters:   06/13/18 104.8 kg (231 lb)   06/05/18 95.3 kg (210 lb)       PHYSICAL EXAM  /51 (BP Location: Right arm, Patient Position: Chair, Cuff Size: Adult Regular)  Pulse 56  Ht 1.727 m (5' 8\")  Wt 104.8 kg (231 lb)  SpO2 100%  BMI 35.12 kg/m2  GEN: aao x 3, " NAD  Neck: No JVD elevation  LUNGS: No wheezing or rales  HEART: S1S2 audible, no murmurs or rubs. Regular rhythm  ABDOMEN: Soft, nt, nd. +BS  EXTREMITIES: Warm calves, +DPs, no LE edema  NEURO: aao x 3, no focal deficits      LABS  Recent Labs   Lab Test  05/09/18   0808  05/03/18   0534   WBC  4.2  3.4*   HGB  9.2*  7.5*   HCT  29.4*  23.5*   PLT  81*  91*     Recent Labs   Lab Test  05/09/18   0808  05/03/18   0534   NA  140  142   POTASSIUM  4.8  5.4*   CHLORIDE  113*  116*   CO2  20  17*   GLC  161*  76   BUN  31*  51*   CR  1.55*  2.27*   HARI  8.3*  7.8*     No results for input(s): CHOL, HDL, LDL, TRIG, CHOLHDLRATIO, NHDL in the last 19418 hours.     ECHO: 4/2017   1. LV function is normal. The visually estimated ejection fraction is 65%.   2. Mild concentric left ventricular hypertrophy.   3. Cannot rule out small area of basal septal hypokinesis.   4. Aortic sclerosis without stenosis; mean gradient 6.0 mmHG.   5. Due to inadequate tricuspid regurgitant velocity, unable to calculate pulmonary artery systolic pressure.   6. IVC does not fully collapse with respiration, right atrial pressure is elevated.   7. No pericardial effusion.    STRESS TEST:  6/2017  CONCLUSIONS:  1.  Negative vasodilator stress test without evidence of infarct or ischemia.  2.  Preserved left ventricular systolic function, ejection fraction 54%.        ASSESSMENT AND PLAN:   72 year old male being seen today for evaluation of atrial fibrillation.   He has a PMH of CAD (s/p inferior MI in 2002 with PCI of RCA), HTN, HPL, DM, NAA, KUHN cirrhosis, hepatocellular carcinoma, and GI bleeding necessitating multiple transfusions, who has been referred for evaluation of AF.     He was initially diagnosed with atrial fibrillation in 5/2017 at an outside hospital while he was admitted to the ICU with sepsis. On a one month follow up he was found to be in AF again, albeit rate controlled. He had remained on warfarin until two months ago, when it  was stopped due to frequent GI bleeds requiring several hospitalizations for transfusions.     In terms of his symptoms, he denies ever noticing any symptoms from AF. Specifically he denies any chest pain, pressure, palpitations, SOB, fatigue, dizziness, lightheadedness, presyncope or syncope.     From a hepatocellular carcinoma standpoint, he is not a candidate for any curative treatment at this time. Prior to holding warfarin, he had to be admitted to the hospital for fatigue, anemia, and required several transfusions. However, since the warfarin has bene held he has been able to spend more time out of the hospital.     We discussed his risk for thromboembolism (CHADSVasc 4: age, HTN, DM, CAD) with the patient and his wife. Given his history of recurrent GI bleeding and his overall prognosis from HCC, patient and wife decided to continue holding warfarin.     Follow up as needed.    Patient seen and discussed with Dr. Orozco and the above note reflects our joint assessment and plan.     Aurelio Becker MD  Cardiovascular Disease Fellow  Pager: [phone]    EP STAFF NOTE  Patient seen and examined and management plan personally reviewed with the patient. I agree with the note above by the CV/EP fellow.  Bryan Orozco MD [facility] [place]  Cardiology - Electrophysiology    CC  Patient Care Team:  Linn Thompson as PCP - General (Family Practice)  Fernando Montgomery MD as MD (Radiology)  Ree Cruz MD as MD (Hematology & Oncology)  Moraima Quintero, LEEANNE as Nurse Coordinator (Hematology & Oncology)

## 2018-06-13 NOTE — MR AVS SNAPSHOT
After Visit Summary   6/13/2018    Serge Brambila    MRN: [id number]           Patient Information     Date Of Birth          1945        Visit Information        Provider Department      6/13/2018 9:30 AM Bryan Orozco MD [facility]        Today's Diagnoses     Atrial fibrillation, unspecified type (H)    -  1       Follow-ups after your visit        Your next 10 appointments already scheduled     Jun 21, 2018  7:30 AM CDT   Paracentesis Visit with Uc Spec Inf Para Provider, UC 40 ATC   [facility] ([facility])    [address]  [address]  [facility] [address]   [phone]            Jun 28, 2018  7:30 AM CDT   Paracentesis Visit with Uc Spec Inf Para Provider, UC 39 ATC   [facility] ([facility])    [address]  [address]  [facility] [address]   [phone]            Jul 05, 2018  2:00 PM CDT   Paracentesis Visit with Uc Spec Inf Para Provider, UC 40 ATC   [facility] ([facility])    [address]  [address]  [place] [facility] [address]   [phone]            Jul 12, 2018  7:30 AM CDT   Paracentesis Visit with Uc Spec Inf Para Provider, UC 39 ATC   [facility] ([facility])    [address]  [address]  [facility] [address]   [phone]            Jul 19, 2018  7:30 AM CDT   Paracentesis Visit with Uc Spec Inf Para Provider, UC 40 ATC   [facility] ([facility])    [address]  [address]  [place] [address] [phone]              Who to contact     If you have questions or need follow up information about today's clinic visit or your schedule please contact [facility] directly at [phone].  Normal or non-critical lab and imaging results will be communicated to you by MyChart, letter or phone within 4 business days after the clinic has received the results. If you do not hear from us within 7 days, please contact the clinic through [employer] or phone. If you have a critical or abnormal lab result, we will notify you by phone as soon as possible.  Submit refill requests through [employer] or call your pharmacy and they will forward the refill request to us. Please allow 3 business days for your refill to be completed.          Additional Information About Your Visit        [email or web address] Information     [employer] gives you secure access to your electronic health record. If you see a primary care provider, you can also send messages to your care team and make appointments. If you have questions, please call your primary care clinic.  If you do not have a primary care provider, please call [phone] and they will assist you.        Care EveryWhere ID     This is your Care EveryWhere ID. This could be used by other organizations to access your [facility] medical records  [id number]        Your Vitals Were     Pulse Height Pulse Oximetry BMI (Body Mass Index)          56 1.727 m (5' 8\") 100% 35.12 kg/m2         Blood Pressure from Last 3 Encounters:   06/14/18 143/43   06/13/18 132/51   06/05/18 135/40    Weight from Last 3 Encounters:   06/14/18 94.2 kg (207 lb 11.2 oz)   06/13/18 104.8 kg (231 lb)   06/05/18 95.3 kg (210 lb)              We Performed the Following     EKG 12-lead, tracing only (Same Day)        Primary Care Provider Office Phone # Fax #    Linn GONZALEZ Jay [phone]       [facility] [address]  [facility] [address]        Equal Access to Services     FRANK CONROY : Eva Benz, warupert lindsey, qaybta kaalmada cynthia, miley guerrero. So [facility] [phone].    ATENCIÓN: Si habla " español, tiene a collins disposición servicios gratuitos de asistencia lingüística. Rufina blakely [phone].    We comply with applicable federal civil rights laws and [place] laws. We do not discriminate on the basis of race, color, national origin, age, disability, sex, sexual orientation, or gender identity.            Thank you!     Thank you for choosing [facility]  for your care. Our goal is always to provide you with excellent care. Hearing back from our patients is one way we can continue to improve our services. Please take a few minutes to complete the written survey that you may receive in the mail after your visit with us. Thank you!             Your Updated Medication List - Protect others around you: Learn how to safely use, store and throw away your medicines at www.disposemymeds.org.          This list is accurate as of 6/13/18 11:59 PM.  Always use your most recent med list.                   Brand Name Dispense Instructions for use Diagnosis    BASAGLAR 100 UNIT/ML injection      Inject 37 Units Subcutaneous At Bedtime        * ONETOUCH ULTRA test strip   Generic drug:  blood glucose monitoring      TEST TWICE DAILY TO THREE TIMES DAILY        * blood glucose monitoring test strip    no brand specified     1 each        ciprofloxacin 250 MG tablet    CIPRO    30 tablet    Take 2 tablets (500 mg) by mouth daily    Other ascites       fluticasone 50 MCG/ACT spray    FLONASE     Spray 1 spray into both nostrils daily        furosemide 20 MG tablet    LASIX    30 tablet    Take 2 tablets (40 mg) by mouth daily    Cirrhosis of liver with ascites, unspecified hepatic cirrhosis type (H)       [employer] LANCETS 21G Misc      by Misc.(Non-Drug; Combo Route) route once daily.        lactulose 10 GM/15ML solution    CHRONULAC    2700 mL    30 mLs (20 g) by Oral or NG Tube route 3 times daily    Hepatic encephalopathy (H)       LEVOTHYROXINE SODIUM PO      Take 150 mcg by mouth daily        metoprolol  tartrate 25 MG tablet    LOPRESSOR    60 tablet    Take 1 tablet (25 mg) by mouth 2 times daily    Paroxysmal atrial fibrillation (H)       NITROQUICK SL           pantoprazole 20 MG EC tablet    PROTONIX    60 tablet    Take 2 tablets (40 mg) by mouth 2 times daily    Gastrointestinal hemorrhage, unspecified gastrointestinal hemorrhage type, HCC (hepatocellular carcinoma) (H)       RA BLOOD PRESSURE CUFF MONITOR Misc      by Misc.(Non-Drug; Combo Route) route once daily.        rifaximin 550 MG Tabs tablet    XIFAXAN    60 tablet    Take 1 tablet (550 mg) by mouth 2 times daily    Hepatic encephalopathy (H)       sodium bicarbonate 650 MG tablet     120 tablet    Take 2 tablets (1,300 mg) by mouth 2 times daily    Chronic kidney disease, unspecified CKD stage       spironolactone 50 MG tablet    ALDACTONE    30 tablet    Take 1 tablet (50 mg) by mouth daily    Cirrhosis of liver with ascites, unspecified hepatic cirrhosis type (H)       * Notice:  This list has 2 medication(s) that are the same as other medications prescribed for you. Read the directions carefully, and ask your doctor or other care provider to review them with you.

## 2018-06-13 NOTE — NURSING NOTE
Chief Complaint   Patient presents with     New Patient     ref. to evaluate AF/EKG today     Vitals were performed, medications were reconciled. EKG was performed.   Nikki Miranda MA

## 2018-06-13 NOTE — PROGRESS NOTES
CARDIOLOGY CONSULTATION    Referring Provider:  Fernando Montgomery  Primary Care Provider:   Linn Thompson  Indication for Consultation:  Atrial fibrillation    HPI: Serge Brambila is a 72 year old male being seen today for evaluation of atrial fibrillation.   He has a PMH of CAD (s/p inferior MI in 2002 with PCI of RCA), HTN, HPL, DM, NAA, KUHN cirrhosis, hepatocellular carcinoma, and GI bleeding necessitating multiple transfusions, who has been referred for evaluation of AF.     He was initially diagnosed with atrial fibrillation in 5/2017 at an outside hospital while he was admitted to the ICU with sepsis. On a one month follow up he was found to be in AF again, albeit rate controlled. He had remained on warfarin until two months ago, when it was stopped due to frequent GI bleeds requiring several hospitalizations for transfusions.     In terms of his symptoms, he denies ever noticing any symptoms from AF. Specifically he denies any chest pain, pressure, palpitations, SOB, fatigue, dizziness, lightheadedness, presyncope or syncope.     From a hepatocellular carcinoma standpoint, he is not a candidate for any curative treatment at this time. Prior to holding warfarin, he had to be admitted to the hospital for fatigue, anemia, and required several transfusions. However, since the warfarin has bene held he has been able to spend more time out of the hospital.       PAST MEDICAL HISTORY:  Past Medical History:   Diagnosis Date     Atrial fibrillation (H)      Diabetes (H)     type II     Hepatic encephalopathy (H)      Hypertension      MI (myocardial infarction)     stent placement     Sleep apnea      Thyroid disease        CURRENT MEDICATIONS:  Current Outpatient Prescriptions   Medication Sig Dispense Refill     BASAGLAR 100 UNIT/ML injection Inject 37 Units Subcutaneous At Bedtime        blood glucose monitoring (NO BRAND SPECIFIED) test strip 1 each       blood glucose monitoring (ONETOUCH ULTRA) test strip  TEST TWICE DAILY TO THREE TIMES DAILY       Blood Pressure Monitoring (RA BLOOD PRESSURE CUFF MONITOR) MISC by Misc.(Non-Drug; Combo Route) route once daily.       furosemide (LASIX) 20 MG tablet Take 2 tablets (40 mg) by mouth daily 30 tablet 1     [employer] LANCETS 21G MISC by Misc.(Non-Drug; Combo Route) route once daily.       lactulose (CHRONULAC) 10 GM/15ML solution 30 mLs (20 g) by Oral or NG Tube route 3 times daily 2700 mL 1     LEVOTHYROXINE SODIUM PO Take 150 mcg by mouth daily        metoprolol tartrate (LOPRESSOR) 25 MG tablet Take 1 tablet (25 mg) by mouth 2 times daily 60 tablet 1     pantoprazole (PROTONIX) 20 MG EC tablet Take 2 tablets (40 mg) by mouth 2 times daily 60 tablet 2     rifaximin (XIFAXAN) 550 MG TABS tablet Take 1 tablet (550 mg) by mouth 2 times daily 60 tablet 3     sodium bicarbonate 650 MG tablet Take 2 tablets (1,300 mg) by mouth 2 times daily 120 tablet 1     spironolactone (ALDACTONE) 50 MG tablet Take 1 tablet (50 mg) by mouth daily 30 tablet 1     ciprofloxacin (CIPRO) 250 MG tablet Take 2 tablets (500 mg) by mouth daily (Patient not taking: Reported on 5/16/2018) 30 tablet 3     fluticasone (FLONASE) 50 MCG/ACT spray Spray 1 spray into both nostrils daily       Nitroglycerin (NITROQUICK SL)          PAST SURGICAL HISTORY:  Past Surgical History:   Procedure Laterality Date     APPENDECTOMY  age 16     COLONOSCOPY       ESOPHAGOSCOPY, GASTROSCOPY, DUODENOSCOPY (EGD), COMBINED N/A 1/24/2018    Procedure: COMBINED ESOPHAGOSCOPY, GASTROSCOPY, DUODENOSCOPY (EGD);  ESOPHAGOGASTRODUODENOSCOPY (MAC) ;  Surgeon: Colton Stroud MD;  Location:  GI     GI SURGERY  2012    partial colectomy for pre-CA nodule, removed 10 in.       ALLERGIES  Penicillins    FAMILY HX:  Family History   Problem Relation Age of Onset     Hyperlipidemia Father      Hypertension Father      Cervical Cancer Maternal Grandmother      Alcoholism Mother      Abdominal Aortic Aneurysm Mother      HEART  "DISEASE Mother      Hypertension Mother      Thyroid Disease Mother      Hyperlipidemia Mother        SOCIAL HX:  Social History     Social History     Marital status:      Spouse name: N/A     Number of children: N/A     Years of education: N/A     Social History Main Topics     Smoking status: Former Smoker     Packs/day: 1.00     Years: 10.00     Quit date: 1/1/1999     Smokeless tobacco: Never Used     Alcohol use No     Drug use: No     Sexual activity: Not Asked     Other Topics Concern     None     Social History Narrative       ROS:  Constitutional: No fever, chills, or sweats. No weight gain/loss.   HEENT: No visual disturbance, ear ache, epistaxis, sore throat.   Allergies/Immunologic: Negative.   Respiratory: No cough, hemoptysis.   Cardiovascular: As per HPI.   GI: No nausea, vomiting, hematemesis, melena, or hematochezia.   : No urinary frequency, dysuria, or hematuria.   Integument: No rash.   Psychiatric: No anxiety / depression.   Neuro: No speech disturbance, focal sensory or motor deficit.   Endocrinology: No polyuria / polyphagia.   Musculoskeletal: No myalgia.    VITAL SIGNS:  /51 (BP Location: Right arm, Patient Position: Chair, Cuff Size: Adult Regular)  Pulse 56  Ht 1.727 m (5' 8\")  Wt 104.8 kg (231 lb)  SpO2 100%  BMI 35.12 kg/m2  Body mass index is 35.12 kg/(m^2).  Wt Readings from Last 2 Encounters:   06/13/18 104.8 kg (231 lb)   06/05/18 95.3 kg (210 lb)       PHYSICAL EXAM  /51 (BP Location: Right arm, Patient Position: Chair, Cuff Size: Adult Regular)  Pulse 56  Ht 1.727 m (5' 8\")  Wt 104.8 kg (231 lb)  SpO2 100%  BMI 35.12 kg/m2  GEN: aao x 3, NAD  Neck: No JVD elevation  LUNGS: No wheezing or rales  HEART: S1S2 audible, no murmurs or rubs. Regular rhythm  ABDOMEN: Soft, nt, nd. +BS  EXTREMITIES: Warm calves, +DPs, no LE edema  NEURO: aao x 3, no focal deficits      LABS  Recent Labs   Lab Test  05/09/18   0808  05/03/18   0534   WBC  4.2  3.4*   HGB  9.2* "  7.5*   HCT  29.4*  23.5*   PLT  81*  91*     Recent Labs   Lab Test  05/09/18   0808  05/03/18   0534   NA  140  142   POTASSIUM  4.8  5.4*   CHLORIDE  113*  116*   CO2  20  17*   GLC  161*  76   BUN  31*  51*   CR  1.55*  2.27*   HARI  8.3*  7.8*     No results for input(s): CHOL, HDL, LDL, TRIG, CHOLHDLRATIO, NHDL in the last 23670 hours.     ECHO: 4/2017   1. LV function is normal. The visually estimated ejection fraction is 65%.   2. Mild concentric left ventricular hypertrophy.   3. Cannot rule out small area of basal septal hypokinesis.   4. Aortic sclerosis without stenosis; mean gradient 6.0 mmHG.   5. Due to inadequate tricuspid regurgitant velocity, unable to calculate pulmonary artery systolic pressure.   6. IVC does not fully collapse with respiration, right atrial pressure is elevated.   7. No pericardial effusion.    STRESS TEST:  6/2017  CONCLUSIONS:  1.  Negative vasodilator stress test without evidence of infarct or ischemia.  2.  Preserved left ventricular systolic function, ejection fraction 54%.        ASSESSMENT AND PLAN:   72 year old male being seen today for evaluation of atrial fibrillation.   He has a PMH of CAD (s/p inferior MI in 2002 with PCI of RCA), HTN, HPL, DM, NAA, KUHN cirrhosis, hepatocellular carcinoma, and GI bleeding necessitating multiple transfusions, who has been referred for evaluation of AF.     He was initially diagnosed with atrial fibrillation in 5/2017 at an outside hospital while he was admitted to the ICU with sepsis. On a one month follow up he was found to be in AF again, albeit rate controlled. He had remained on warfarin until two months ago, when it was stopped due to frequent GI bleeds requiring several hospitalizations for transfusions.     In terms of his symptoms, he denies ever noticing any symptoms from AF. Specifically he denies any chest pain, pressure, palpitations, SOB, fatigue, dizziness, lightheadedness, presyncope or syncope.     From a  hepatocellular carcinoma standpoint, he is not a candidate for any curative treatment at this time. Prior to holding warfarin, he had to be admitted to the hospital for fatigue, anemia, and required several transfusions. However, since the warfarin has bene held he has been able to spend more time out of the hospital.     We discussed his risk for thromboembolism (CHADSVasc 4: age, HTN, DM, CAD) with the patient and his wife. Given his history of recurrent GI bleeding and his overall prognosis from HCC, patient and wife decided to continue holding warfarin.     Follow up as needed.    Patient seen and discussed with Dr. Orozco and the above note reflects our joint assessment and plan.     Aurelio Becker MD  Cardiovascular Disease Fellow  Pager: [phone]    EP STAFF NOTE  Patient seen and examined and management plan personally reviewed with the patient. I agree with the note above by the CV/EP fellow.  Bryan Orozco MD [facility]  Cardiology - Electrophysiology    CC  Patient Care Team:  Linn Thompson as PCP - General (Family Practice)  Ron Montgomery MD as MD (Radiology)  Ree Cruz MD as MD (Hematology & Oncology)  Moraima Quintero, LEEANEN as Nurse Coordinator (Hematology & Oncology)  RON MONTGOMERY

## 2018-06-14 NOTE — PROGRESS NOTES
Paracentesis Nursing Note  Serge Brambila presents today to [facility] for a paracentesis.    During today's appointment orders from Fernando Cortés were completed.    Progress Note:  Patient identification verified by name and date of birth.  Assessment completed.  Vitals monitored throughout appointment and recorded in Doc Flowsheets.  See proceduralist note in ultrasound.    Date of consent or authorization: 4/6/18.  Invasive Procedure Safety Checklist was completed and sent for scanning.     Paracentesis performed by Haresh Loredo PA-C Radiology.    The following labs were communicated to provider performing paracentesis:  Lab Results   Component Value Date     06/14/2018       Total amount of ascites fluid drained: 3 liters.  Color of ascites fluid: yellow and clear.  Total amount of albumin given: 37.5  grams.    Patient tolerated procedure well.    Post procedure,denies pain or discomfort post paracentesis.      Discharge Plan:  Discharge instructions were reviewed with patient.  Patient/Representative verbalized understanding and all questions were answered.   Discharged from [facility] in stable condition.    Yesenia Hicks RN    Administrations This Visit     albumin human 25 % injection 12.5 g     Admin Date Action Dose Route Administered By             06/14/2018 New Bag 37.5 g Intravenous Yesenia Hicks RN                    lidocaine 1 % 20 mL     Admin Date Action Dose Route Administered By             06/14/2018 Given by Other Clinician 20 mL Other Yesenia Hicks RN                          BP (P) 138/40  Pulse 61  Temp 97.8  F (36.6  C)  Wt 97.4 kg (214 lb 12.8 oz)  SpO2 99%  BMI 32.66 kg/m2

## 2018-06-14 NOTE — MR AVS SNAPSHOT
After Visit Summary   6/14/2018    Serge Brambila    MRN: [id number]           Patient Information     Date Of Birth          1945        Visit Information        Provider Department      6/14/2018 7:30 AM Provider, [facility]; UC [facility]        Today's Diagnoses     Liver cirrhosis secondary to KUHN (H)    -  1    Ascites of liver          Care Instructions      Discharge Instructions for Paracentesis  Paracentesis is a procedure to remove extra fluid from your belly (abdomen). This fluid buildup in the abdomen is called ascites. The procedure may have been done to take a sample of the fluid. Or, it may have been done to drain the extra fluid from your abdomen and help make you more comfortable.     Ascites is buildup of excess fluid in the abdomen.   Home care    If you have pain after the procedure, your healthcare provider can prescribe or recommend pain medicines. Take these exactly as directed. If you stopped taking other medicines before the procedure, ask your provider when you can start them again.    Take it easy for 24 hours after the procedure. Avoid physical activity until your provider says it s OK.    You will have a small bandage over the puncture site. Stitches (sutures), surgical staples, adhesive tapes, adhesive strips, or surgical glue may be used to close the incision. They also help stop bleeding and speed healing. You may take the bandage off in 24 hours.    Check the puncture site for the signs of infection listed below.  Follow-up care  Make a follow-up appointment with your healthcare provider as directed. During your follow-up visit, your provider will check your healing. Let your provider know how you are feeling. You can also discuss the cause of your ascites and if you need any further treatment.  When to seek medical advice  Call your healthcare provider if you have any of the following after the  procedure:    A fever of 100.4 F (38.0 C) or higher    Trouble breathing    Pain that doesn't go away even after taking pain medicine    Belly pain not caused by having the skin punctured    Bleeding from the puncture site    More than a small amount of fluid leaking from the puncture site    Swollen belly    Signs of infection at the puncture site. These include increased pain, redness, or swelling, warmth, or bad-smelling drainage.    Blood in your urine    Feeling dizzy or lightheaded, or fainting   Date Last Reviewed: 7/1/2016 2000-2017 [employer]. [address] [place] [address]. All rights reserved. This information is not intended as a substitute for professional medical care. Always follow your healthcare professional's instructions.                Follow-ups after your visit        Your next 10 appointments already scheduled     Jun 21, 2018  7:30 AM CDT   Paracentesis Visit with [facility] Spec Inf Para Provider, UC 40 ATC   [facility] ([facility])    [address] [place] [address]  [address]  [facility] [address]   [phone]            Jun 28, 2018  7:30 AM CDT   Paracentesis Visit with Uc Spec Inf Para Provider, UC 39 ATC   [facility] ([facility])    [address]  [address]  [facility] [address]   [phone]            Jul 05, 2018  2:00 PM CDT   Paracentesis Visit with Uc Spec Inf Para Provider, UC 40 ATC   [facility] ([facility])    [address]  [address]  [facility] [address]   [phone]            Jul 12, 2018  7:30 AM CDT   Paracentesis Visit with Uc Spec Inf Para Provider, UC 39 ATC   [facility] ([facility])    [address]  [address]  [facility]  [address] [phone]            Jul 19, 2018  7:30 AM CDT   Paracentesis Visit with Uc Spec Inf Para Provider, UC 40 ATC   [facility] ([facility])    [address]  [address]  [facility] [address] [phone]              Who to contact     If you have questions or need follow up information about today's clinic visit or your schedule please contact [facility] PROC[facility] directly at [phone].  Normal or non-critical lab and imaging results will be communicated to you by [employer], letter or phone within 4 business days after the clinic has received the results. If you do not hear from us within 7 days, please contact the clinic through [employer] or phone. If you have a critical or abnormal lab result, we will notify you by phone as soon as possible.  Submit refill requests through [employer] or call your pharmacy and they will forward the refill request to us. Please allow 3 business days for your refill to be completed.          Additional Information About Your Visit        Click[employer]ide[employer] Information     [employer] gives you secure access to your electronic health record. If you see a primary care provider, you can also send messages to your care team and make appointments. If you have questions, please call your primary care clinic.  If you do not have a primary care provider, please call [phone] and they will assist you.        Care EveryWhere ID     This is your Care EveryWhere ID. This could be used by other organizations to access your [place] medical records  [id number]        Your Vitals Were     Pulse Temperature Pulse Oximetry BMI (Body Mass Index)          61 97.8  F (36.6  C) 99% 31.58 kg/m2         Blood Pressure from Last 3 Encounters:   06/14/18 143/43   06/13/18 132/51   06/05/18 135/40    Weight from Last 3 Encounters:   06/14/18 94.2 kg (207 lb 11.2 oz)   06/13/18 104.8 kg  (231 lb)   06/05/18 95.3 kg (210 lb)              We Performed the Following     Platelet count     US Paracentesis        Primary Care Provider Office Phone # Fax #    Linn Thompson [phone]       [facility] [address]  [facility] [address]        Equal Access to Services     AVA CONROY : Hadii aad ku hadasho Soomaali, waaxda luqadaha, qaybta kaalmada adeegyada, waxay idiin hayaan adeeg khsuzan lajurgenn . So [facility] [phone].    ATENCIÓN: Si habla español, tiene a collins disposición servicios gratuitos de asistencia lingüística. Rufina al [phone].    We comply with applicable federal civil rights laws and [place] laws. We do not discriminate on the basis of race, color, national origin, age, disability, sex, sexual orientation, or gender identity.            Thank you!     Thank you for choosing [facility]  for your care. Our goal is always to provide you with excellent care. Hearing back from our patients is one way we can continue to improve our services. Please take a few minutes to complete the written survey that you may receive in the mail after your visit with us. Thank you!             Your Updated Medication List - Protect others around you: Learn how to safely use, store and throw away your medicines at www.disposemymeds.org.          This list is accurate as of 6/14/18 10:17 AM.  Always use your most recent med list.                   Brand Name Dispense Instructions for use Diagnosis    BASAGLAR 100 UNIT/ML injection      Inject 37 Units Subcutaneous At Bedtime        * ONETOUCH ULTRA test strip   Generic drug:  blood glucose monitoring      TEST TWICE DAILY TO THREE TIMES DAILY        * blood glucose monitoring test strip    no brand specified     1 each        ciprofloxacin 250 MG tablet    CIPRO    30 tablet    Take 2 tablets (500 mg) by mouth daily    Other ascites       fluticasone 50 MCG/ACT spray    FLONASE      Spray 1 spray into both nostrils daily        furosemide 20 MG tablet    LASIX    30 tablet    Take 2 tablets (40 mg) by mouth daily    Cirrhosis of liver with ascites, unspecified hepatic cirrhosis type (H)       [employer]ER LANCETS 21G Misc      by Misc.(Non-Drug; Combo Route) route once daily.        lactulose 10 GM/15ML solution    CHRONULAC    2700 mL    30 mLs (20 g) by Oral or NG Tube route 3 times daily    Hepatic encephalopathy (H)       LEVOTHYROXINE SODIUM PO      Take 150 mcg by mouth daily        metoprolol tartrate 25 MG tablet    LOPRESSOR    60 tablet    Take 1 tablet (25 mg) by mouth 2 times daily    Paroxysmal atrial fibrillation (H)       NITROQUICK SL           pantoprazole 20 MG EC tablet    PROTONIX    60 tablet    Take 2 tablets (40 mg) by mouth 2 times daily    Gastrointestinal hemorrhage, unspecified gastrointestinal hemorrhage type, HCC (hepatocellular carcinoma) (H)       RA BLOOD PRESSURE CUFF MONITOR Misc      by Misc.(Non-Drug; Combo Route) route once daily.        rifaximin 550 MG Tabs tablet    XIFAXAN    60 tablet    Take 1 tablet (550 mg) by mouth 2 times daily    Hepatic encephalopathy (H)       sodium bicarbonate 650 MG tablet     120 tablet    Take 2 tablets (1,300 mg) by mouth 2 times daily    Chronic kidney disease, unspecified CKD stage       spironolactone 50 MG tablet    ALDACTONE    30 tablet    Take 1 tablet (50 mg) by mouth daily    Cirrhosis of liver with ascites, unspecified hepatic cirrhosis type (H)       * Notice:  This list has 2 medication(s) that are the same as other medications prescribed for you. Read the directions carefully, and ask your doctor or other care provider to review them with you.

## 2018-06-14 NOTE — PATIENT INSTRUCTIONS
Discharge Instructions for Paracentesis  Paracentesis is a procedure to remove extra fluid from your belly (abdomen). This fluid buildup in the abdomen is called ascites. The procedure may have been done to take a sample of the fluid. Or, it may have been done to drain the extra fluid from your abdomen and help make you more comfortable.     Ascites is buildup of excess fluid in the abdomen.   Home care    If you have pain after the procedure, your healthcare provider can prescribe or recommend pain medicines. Take these exactly as directed. If you stopped taking other medicines before the procedure, ask your provider when you can start them again.    Take it easy for 24 hours after the procedure. Avoid physical activity until your provider says it s OK.    You will have a small bandage over the puncture site. Stitches (sutures), surgical staples, adhesive tapes, adhesive strips, or surgical glue may be used to close the incision. They also help stop bleeding and speed healing. You may take the bandage off in 24 hours.    Check the puncture site for the signs of infection listed below.  Follow-up care  Make a follow-up appointment with your healthcare provider as directed. During your follow-up visit, your provider will check your healing. Let your provider know how you are feeling. You can also discuss the cause of your ascites and if you need any further treatment.  When to seek medical advice  Call your healthcare provider if you have any of the following after the procedure:    A fever of 100.4 F (38.0 C) or higher    Trouble breathing    Pain that doesn't go away even after taking pain medicine    Belly pain not caused by having the skin punctured    Bleeding from the puncture site    More than a small amount of fluid leaking from the puncture site    Swollen belly    Signs of infection at the puncture site. These include increased pain, redness, or swelling, warmth, or bad-smelling drainage.    Blood in your  urine    Feeling dizzy or lightheaded, or fainting   Date Last Reviewed: 7/1/2016 2000-2017 [employer]. [address], [place] [address]. All rights reserved. This information is not intended as a substitute for professional medical care. Always follow your healthcare professional's instructions.

## 2018-06-21 PROBLEM — D64.9 ANEMIA: Status: ACTIVE | Noted: 2018-01-01

## 2018-06-21 NOTE — PATIENT INSTRUCTIONS
Dear Serge Brambila    Thank you for choosing [facility] ([facility]) for your Paracentesis.  The following information is a summary of our appointment as well as important reminders.    Discharge Instructions for Paracentesis  Paracentesis is a procedure to remove extra fluid from your belly (abdomen). This fluid buildup in the abdomen is called ascites. The procedure may have been done to take a sample of the fluid. Or, it may have been done to drain the extra fluid from your abdomen and help make you more comfortable.     Ascites is buildup of excess fluid in the abdomen.   Home care    If you have pain after the procedure, your healthcare provider can prescribe or recommend pain medicines. Take these exactly as directed. If you stopped taking other medicines before the procedure, ask your provider when you can start them again.    Take it easy for 24 hours after the procedure. Avoid physical activity until your provider says it s OK.    You will have a small bandage over the puncture site. Stitches (sutures), surgical staples, adhesive tapes, adhesive strips, or surgical glue may be used to close the incision. They also help stop bleeding and speed healing. You may take the bandage off in 24 hours.    Check the puncture site for the signs of infection listed below.  Follow-up care  Make a follow-up appointment with your healthcare provider as directed. During your follow-up visit, your provider will check your healing. Let your provider know how you are feeling. You can also discuss the cause of your ascites and if you need any further treatment.  When to seek medical advice  Call your healthcare provider if you have any of the following after the procedure:    A fever of 100.4 F (38.0 C) or higher    Trouble breathing    Pain that doesn't go away even after taking pain medicine    Belly pain not caused by having the skin punctured    Bleeding from the  puncture site    More than a small amount of fluid leaking from the puncture site    Swollen belly    Signs of infection at the puncture site. These include increased pain, redness, or swelling, warmth, or bad-smelling drainage.    Blood in your urine    Feeling dizzy or lightheaded, or fainting   Date Last Reviewed: 7/1/2016 2000-2017 [employer]. [address] [place] [address]. All rights reserved. This information is not intended as a substitute for professional medical care. Always follow your healthcare professional's instructions.        We look forward in seeing you on your next appointment here at [facility].  Please don t hesitate to call us at [phone] to reschedule any of your appointments or to speak with one of the [facility] registered nurses.  It was a pleasure taking care of you today.    Sincerely,    [facility]  Specialty Infusion & Procedure Center  [address] [place]  [address]  Phone:  [phone]

## 2018-06-21 NOTE — MR AVS SNAPSHOT
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  The following information is a summary of our appointment as well as important reminders.    Discharge Instructions for Paracentesis  Paracentesis is a procedure to remove extra fluid from your belly (abdomen). This fluid buildup in the abdomen is called ascites. The procedure may have been done to take a sample of the fluid. Or, it may have been done to drain the extra fluid from your abdomen and help make you more comfortable.     Ascites is buildup of excess fluid in the abdomen.   Home care    If you have pain after the procedure, your healthcare provider can prescribe or recommend pain medicines. Take these exactly as directed. If you stopped taking other medicines before the procedure, ask your provider when you can start them again.    Take it easy for 24 hours after the procedure. Avoid physical activity until your provider says it s OK.    You will have a small bandage over the puncture site. Stitches (sutures), surgical staples, adhesive tapes, adhesive strips, or surgical glue may be used to close the incision. They also help stop bleeding and speed healing. You may take the bandage off in 24 hours.    Check the puncture site for the signs of infection listed below.  Follow-up care  Make a follow-up appointment with your healthcare provider as directed. During your follow-up visit, your provider will check your  healing. Let your provider know how you are feeling. You can also discuss the cause of your ascites and if you need any further treatment.  When to seek medical advice  Call your healthcare provider if you have any of the following after the procedure:    A fever of 100.4 F (38.0 C) or higher    Trouble breathing    Pain that doesn't go away even after taking pain medicine    Belly pain not caused by having the skin punctured    Bleeding from the puncture site    More than a small amount of fluid leaking from the puncture site    Swollen belly    Signs of infection at the puncture site. These include increased pain, redness, or swelling, warmth, or bad-smelling drainage.    Blood in your urine    Feeling dizzy or lightheaded, or fainting   Date Last Reviewed: 7/1/2016 2000-2017 [employer]. [address] [place] [address]. All rights reserved. This information is not intended as a substitute for professional medical care. Always follow your healthcare professional's instructions.        We look forward in seeing you on your next appointment here at [facility].  Please don t hesitate to call us at [phone] to reschedule any of your appointments or to speak with one of the [facility] registered nurses.  It was a pleasure taking care of you today.    Sincerely,    [facility]  Specialty Infusion & Procedure Center  [address] [place] [address]  [address]  Phone:  [phone]            Follow-ups after your visit        Your next 10 appointments already scheduled     Jun 28, 2018  7:30 AM CDT   Paracentesis Visit with  Spec Inf Para Provider, UC 39 ATC   [facility] Specialty and Procedure ([facility])    [address] [facility]  [address] [place] [address] [phone]            Jul 05, 2018  2:00 PM CDT   Paracentesis Visit with  Spec Inf Para Provider, UC 40 ATC   [facility]  Specialty and Procedure ([facility])    [address]  [address]  [facility] [address]   [phone]            Jul 12, 2018  7:30 AM CDT   Paracentesis Visit with Uc Spec Inf Para Provider, UC 39 ATC   [facility] Specialty and Procedure ([facility])    [address]  [address]  [facility] [address]   [phone]            Jul 19, 2018  7:30 AM CDT   Paracentesis Visit with Uc Spec Inf Para Provider, UC 40 ATC   [facility] Specialty and Procedure ([facility])    [address]  [address]  [facility] [address]   [phone]            Jul 26, 2018  7:30 AM CDT   Paracentesis Visit with Uc Spec Inf Para Provider, UC 40 ATC   [facility] Specialty and Procedure ([facility])    [address]  [address]  [facility] [address] [phone]              Who to contact     If you have questions or need follow up information about today's clinic visit or your schedule please contact [facility] SPECIALTY AND PROCEDURE directly at [phone].  Normal or non-critical lab and imaging results will be communicated to you by [employer], letter or phone within 4 business days after the clinic has received the results. If you do not hear from us within 7 days, please contact the clinic through [employer]hart or phone. If you have a critical or abnormal lab result, we will notify you by phone as soon as possible.  Submit refill requests through [employer] or call your pharmacy and they will forward the refill request to us. Please allow 3 business days for your refill to be completed.          Additional Information About Your Visit        [employer] Information     [employer] gives you secure access to your electronic health record. If you see a primary care provider, you can also send messages to your care  team and make appointments. If you have questions, please call your primary care clinic.  If you do not have a primary care provider, please call [phone] and they will assist you.        Care EveryWhere ID     This is your Care EveryWhere ID. This could be used by other organizations to access your [place] medical records  [id number]        Your Vitals Were     Pulse Temperature Respirations Pulse Oximetry BMI (Body Mass Index)       78 98.6  F (37  C) (Oral) 16 99% 30.9 kg/m2        Blood Pressure from Last 3 Encounters:   06/21/18 165/55   06/14/18 143/43   06/13/18 132/51    Weight from Last 3 Encounters:   06/21/18 92.2 kg (203 lb 3.2 oz)   06/14/18 94.2 kg (207 lb 11.2 oz)   06/13/18 104.8 kg (231 lb)              Today, you had the following     No orders found for display       Primary Care Provider Office Phone # Fax #    Linn Thompson [phone]       [facility] [address]  [facility] [address]        Equal Access to Services     AVA CONROY : Hadii prateek Benz, waaxda rosalia, qaybta kaalbartolo marie, miley holt . [facility] [phone].    ATENCIÓN: Si habla español, tiene a collins disposición servicios gratuitos de asistencia lingüística. [facility] [phone].    We comply with applicable federal civil rights laws and [place] laws. We do not discriminate on the basis of race, color, national origin, age, disability, sex, sexual orientation, or gender identity.            Thank you!     Thank you for choosing [facility] SPECIALTY AND PROCEDURE  for your care. Our goal is always to provide you with excellent care. Hearing back from our patients is one way we can continue to improve our services. Please take a few minutes to complete the written survey that you may receive in the mail after your visit with us. Thank you!             Your Updated Medication List - Protect others around you: Learn how to  safely use, store and throw away your medicines at www.disposemymeds.org.          This list is accurate as of 6/21/18  9:25 AM.  Always use your most recent med list.                   Brand Name Dispense Instructions for use Diagnosis    BASAGLAR 100 UNIT/ML injection      Inject 37 Units Subcutaneous At Bedtime        * ONETOUCH ULTRA test strip   Generic drug:  blood glucose monitoring      TEST TWICE DAILY TO THREE TIMES DAILY        * blood glucose monitoring test strip    no brand specified     1 each        ciprofloxacin 250 MG tablet    CIPRO    30 tablet    Take 2 tablets (500 mg) by mouth daily    Other ascites       fluticasone 50 MCG/ACT spray    FLONASE     Spray 1 spray into both nostrils daily        furosemide 20 MG tablet    LASIX    30 tablet    Take 2 tablets (40 mg) by mouth daily    Cirrhosis of liver with ascites, unspecified hepatic cirrhosis type (H)       [employer] LANCETS 21G Misc      by Misc.(Non-Drug; Combo Route) route once daily.        lactulose 10 GM/15ML solution    CHRONULAC    2700 mL    30 mLs (20 g) by Oral or NG Tube route 3 times daily    Hepatic encephalopathy (H)       LEVOTHYROXINE SODIUM PO      Take 150 mcg by mouth daily        metoprolol tartrate 25 MG tablet    LOPRESSOR    60 tablet    Take 1 tablet (25 mg) by mouth 2 times daily    Paroxysmal atrial fibrillation (H)       NITROQUICK SL           pantoprazole 20 MG EC tablet    PROTONIX    60 tablet    Take 2 tablets (40 mg) by mouth 2 times daily    Gastrointestinal hemorrhage, unspecified gastrointestinal hemorrhage type, HCC (hepatocellular carcinoma) (H)       RA BLOOD PRESSURE CUFF MONITOR Misc      by Misc.(Non-Drug; Combo Route) route once daily.        rifaximin 550 MG Tabs tablet    XIFAXAN    60 tablet    Take 1 tablet (550 mg) by mouth 2 times daily    Hepatic encephalopathy (H)       sodium bicarbonate 650 MG tablet     120 tablet    Take 2 tablets (1,300 mg) by mouth 2 times daily    Chronic kidney disease,  unspecified CKD stage       spironolactone 50 MG tablet    ALDACTONE    30 tablet    Take 1 tablet (50 mg) by mouth daily    Cirrhosis of liver with ascites, unspecified hepatic cirrhosis type (H)       * Notice:  This list has 2 medication(s) that are the same as other medications prescribed for you. Read the directions carefully, and ask your doctor or other care provider to review them with you.

## 2018-06-21 NOTE — TELEPHONE ENCOUNTER
RN Care Coordination Note  [facility]:  [facility]MO[facility] (06/21/2018 1:15 PM)  HEMOGLOBIN OP (06/21/2018 1:15 PM)   Component Value Ref Range   HEMOGLOBIN OP 7.2 (L) 14.0 - 18.0 gm/dL   Obtained appt for transfusion tomorrow at [facility]  F/U call to pt's wife to offer lab/[id number] appt for tomorrow am, which she is appreciative of and accepts. She will have him here at 0730.   Pt's wife states his appetite waxes and wanes, has lost weight, but continuing weekly paracenteses. Pleased that he was able to take the trip to [place], [place] and [place] which was a goal of his and that he really enjoyed doing.  Pt's wife denies any other needs/questions/concerns today.  Writer will work to find provider to sign blood consent tomorrow - Vielka agreed to help with this if available.  Moraima Quintero, RN, BSN, OCN  Care Coordinator  [facility]

## 2018-06-21 NOTE — TELEPHONE ENCOUNTER
----- Message from Ree Cruz MD sent at 6/21/2018  4:08 PM CDT -----  Regarding: RE: Blood transfusion  Yes, that is fine.  I have placed blood transfusion conditional orders.      ----- Message -----     From: Denise Donald RN     Sent: 6/21/2018   3:59 PM       To: Ree Cruz MD, #  Subject: Blood transfusion                                Hi Serge Davis had his blood drawn at his primary care clinic today. They aren't loaded into care everywhere quite yet, but his wife Tori tells me that his hemoglobin is 7.1. She is wondering if you would be open to ordering a blood transfusion so that he can avoid a hospital. He is running quite run down and short of breath at times.    Thanks,  Denise

## 2018-06-21 NOTE — PROGRESS NOTES
Paracentesis Nursing Note  Serge Brambila [facility] for a [facility]acentesis.    During today's appointment orders from Dr. Montgomery were completed.    Progress Note:  Patient identification verified by name and date of birth.  Assessment completed.  Vitals monitored throughout appointment and recorded in Doc Flowsheets.  See proceduralist note in ultrasound.    Date of consent or authorization: 4/6/18.  Invasive Procedure Safety Checklist was completed and sent for scanning.     Paracentesis performed by Dr. Rico.    The following labs were communicated to provider performing paracentesis:  Lab Results   Component Value Date     06/14/2018       Total amount of ascites fluid drained: 3.5 liters.  Color of ascites fluid: yellow.  Total amount of albumin given: 50  grams.    Patient tolerated procedure well.    Post procedure,denies pain or discomfort post paracentesis.      Discharge Plan:  Discharge instructions were reviewed with patient.  Patient/Representative verbalized understanding and all questions were answered.   Discharged from [facility] in stable condition.    KATIE CALDERON, LEEANNE       Administrations This Visit     albumin human 25 % injection 12.5 g     Admin Date Action Dose Route Administered By             06/21/2018 New Bag 12.5 g Intravenous Ally Timmons RN              Admin Date Action Dose Route Administered By             06/21/2018 New Bag 12.5 g Intravenous Ally Timmosn RN              Admin Date Action Dose Route Administered By             06/21/2018 New Bag 12.5 g Intravenous Ally Timmons RN              Admin Date Action Dose Route Administered By             06/21/2018 New Bag 12.5 g Intravenous Ally Timmons, RN                    lidocaine 1 % 20 mL     Admin Date Action Dose Route Administered By             06/21/2018 Given by Other 10 mL Other Ally Timmons, LEEANNE                             Temp 98.6  F (37  C) (Oral)  Resp 16  Wt 95.7 kg (211 lb)  SpO2 99%  BMI 32.08 kg/m2

## 2018-06-22 NOTE — PROGRESS NOTES
Infusion Nursing Note:  Serge Brambila presents today for one bag PRBCs.    Patient seen by provider today: No    Note: Patient here for one unit PRBCs.  He had blood drawn yesterday at his primary care physician's clinic so we are transfusion based off of yesterday's Hgb result of 7.2.  Patient has previously received blood products without difficulties but has never received transfusions as an out patient so a consent was obtained today with Vielka SHRESTHA.  Patient tolerated today's transfusion without difficulty and let clinic in stable condition with his wife.    Intravenous Access:  Peripheral IV placed by vascular access      Treatment Conditions:  Lab Results   Component Value Date    HGB 9.2 05/09/2018     Lab Results   Component Value Date    WBC 4.2 05/09/2018      Lab Results   Component Value Date    ANEU 3.0 05/02/2018     Lab Results   Component Value Date     06/14/2018      Lab Results   Component Value Date     05/09/2018                   Lab Results   Component Value Date    POTASSIUM 4.8 05/09/2018           Lab Results   Component Value Date    MAG 2.5 03/02/2018            Lab Results   Component Value Date    CR 1.55 05/09/2018                   Lab Results   Component Value Date    HARI 8.3 05/09/2018                Lab Results   Component Value Date    BILITOTAL 1.4 05/09/2018           Lab Results   Component Value Date    ALBUMIN 2.5 05/09/2018                    Lab Results   Component Value Date    ALT 73 05/09/2018           Lab Results   Component Value Date     05/09/2018       Results reviewed, labs MET treatment parameters, ok to proceed with treatment.  Blood transfusion consent signed 6/22/18.      Post Infusion Assessment:  Patient tolerated infusion without incident.  Blood return noted pre and post infusion.  Site patent and intact, free from redness, edema or discomfort.  No evidence of extravasations.  Access discontinued per protocol.    Discharge  Plan:   Patient declined prescription refills.  Patient and/or family verbalized understanding of discharge instructions and all questions answered.  Copy of AVS reviewed with patient and/or family.  Patient will return 6/28/18 for a paracentesis for his next appointment.  Patient discharged in stable condition accompanied by: wife.  Departure Mode: Ambulatory.    Chayo Doan RN

## 2018-06-22 NOTE — NURSING NOTE
Chief Complaint   Patient presents with     Blood Draw     pt here for venipuncture blood draw, vitals completed by MA, pt checked in for appt.      Kerry Loya MA

## 2018-06-22 NOTE — PATIENT INSTRUCTIONS
Contact Numbers    [facility] Main Line: [phone]    Triage Nurse Line: [phone]      Please call the [facility] Nurse Triage line if you experience a temperature greater than or equal to 100.5, shaking chills, have uncontrolled nausea, vomiting and/or diarrhea, dizziness, shortness of breath, bleeding not relieved with pressure, or if you have any other questions or concerns.     If it is after hours, weekends, or holidays, please call the [phone] and a nurse will be able to triage your call.    If you are having any concerning symptoms or wish to speak to a provider before your next infusion visit, please call your care coordinator or triage them so we can adequately serve you.      If you need to refill your narcotic prescription or other medication, please call triage before your infusion appointment.        June 2018 Sunday Monday Tuesday Wednesday Thursday Friday Saturday                            1     2       3     4     5     [facility] PARACENTESIS    7:30 AM   (120 min.)   Provider,  Spec Inf Para   [facility] Specialty and Procedure     US PARACENTESIS    7:35 AM   (60 min.)   [address] Ultrasound 6     7     8     9       10     11     12     13     [facility] NEW ATRIAL FIBRILLATION    9:15 AM   (30 min.)   Bryan Orozco MD   [facility] 14     [facility] PARACENTESIS    7:30 AM   (120 min.)   Provider,  Spec Inf [facility] Specialty and Procedure     US PARACENTESIS    7:40 AM   (60 min.)   [facility]   [facility] Ultrasound 15     16       17     18     19     20     21     UMP PARACENTESIS    7:30 AM   (120 min.)   Provider,  Spec Inf Para   [facility] Specialty and Procedure     US PARACENTESIS    7:35 AM   (60 min.)   [address] Ultrasound 22     [facility] DRAW    7:45 AM   (15 min.)   [facility]   [facility] Lab Draw     [facility] ONC INFUSION 240    9:30 AM   (240 min.)   [facility] ONCOLOGY INFUSION   [facility] 23       24     25     26     27     28     UMP PARACENTESIS    7:30 AM   (120 min.)   Provider,  Spec Inf Para   [facility] Specialty and Procedure 29 30 July 2018 Sunday Monday Tuesday Wednesday Thursday Friday Saturday   1     2     3     4     5     UMP PARACENTESIS    2:00 PM   (120 min.)   Provider,  Spec Inf Para   [facility] Specialty and Procedure 6     7       8     9     10  Happy Birthday!     11     12     [facility] PARACENTESIS    7:30 AM   (120 min.)   Provider,  Spec Inf Para   [facility] Specialty and Procedure 13     14       15     16     17     18     19     [facility] PARACENTESIS    7:30 AM   (120 min.)   Provider,  Spec Inf Para   [facility] Specialty and Procedure 20     21       22     23     24     25     26     [facility] PARACENTESIS    7:30 AM   (120 min.)   Provider,  Spec Inf Para   [facility] Specialty and Procedure 27     28       29     30     31                                     Recent Results (from the past 24 hour(s))   ABO/Rh type and screen    Collection Time: 06/22/18  7:41 AM   Result Value Ref Range    Units Ordered 1     ABO O     RH(D) Pos     Antibody Screen Neg     Test Valid Only At          [facility]    Specimen Expires 06/25/2018     Crossmatch Red Blood Cells    Blood component    Collection Time: 06/22/18  7:41 AM   Result Value Ref Range    Unit Number [id number]     Blood Component Type Red Blood Cells Leukocyte Reduced     Division Number 00     Status of Unit Released to care unit 06/22/2018 1047     Blood Product Code [id number]     Unit Status ISS

## 2018-06-22 NOTE — MR AVS SNAPSHOT
After Visit Summary   6/22/2018    Serge Brambila    MRN: [id number]           Patient Information     Date Of Birth          1945        Visit Information        Provider Department      6/22/2018 9:30 AM  12 ATC;  ONCOLOGY INFUSION [facility]        Today's Diagnoses     Anemia, unspecified type    -  1      Care Instructions    Contact Numbers    [facility] Main Line: [phone]    Triage Nurse Line: [phone]      Please call the [facility] Nurse Triage line if you experience a temperature greater than or equal to 100.5, shaking chills, have uncontrolled nausea, vomiting and/or diarrhea, dizziness, shortness of breath, bleeding not relieved with pressure, or if you have any other questions or concerns.     If it is after hours, weekends, or holidays, please call the [phone] and a nurse will be able to triage your call.    If you are having any concerning symptoms or wish to speak to a provider before your next infusion visit, please call your care coordinator or triage them so we can adequately serve you.      If you need to refill your narcotic prescription or other medication, please call triage before your infusion appointment.        June 2018 Sunday Monday Tuesday Wednesday Thursday Friday Saturday                            1     2       3     4     5     UMP PARACENTESIS    7:30 AM   (120 min.)   Provider,  Spec Inf [facility] Specialty and Procedure     US PARACENTESIS    7:35 AM   (60 min.)   [facility]   [facility] Ultrasound 6     7     8     9       10     11     12     13     UMP NEW ATRIAL FIBRILLATION    9:15 AM   (30 min.)   Bryan Orozco MD   [facility] 14     UMP PARACENTESIS    7:30 AM   (120 min.)   Provider,  Spec Inf [facility] Specialty and Procedure     US PARACENTESIS    7:40 AM   (60 min.)   USA2   [facility] Ultrasound 15     16       17     18     19     20     21     UMP PARACENTESIS    7:30 AM   (120 min.)   Provider,  Spec Inf Para   [facility] Specialty and Procedure     US PARACENTESIS    7:35 AM   (60 min.)   [email or web address]   [facility] Ultrasound 22     UMP [facility] DRAW    7:45 AM   (15 min.)    [facility] DRAW   [facility]     UMP ONC INFUSION 240    9:30 AM   (240 min.)    ONCOLOGY INFUSION   [facility] 23       24     25     26     27     28     UMP PARACENTESIS    7:30 AM   (120 min.)   Provider,  Spec Inf Para   [facility] Specialty and Procedure 29 30 July 2018 Sunday Monday Tuesday Wednesday Thursday Friday Saturday   1     2     3     4     5     UMP PARACENTESIS    2:00 PM   (120 min.)   Provider,  Spec Inf Para   [facility] Specialty and Procedure 6     7       8     9     10  Happy Birthday!     11     12     UMP PARACENTESIS    7:30 AM   (120 min.)   Provider,  Spec Inf Para   [facility] Specialty and Procedure 13     14       15     16     17     18     19     UMP PARACENTESIS    7:30 AM   (120 min.)   Provider,  Spec Inf Para   [facility] Specialty and Procedure 20     21       22     23     24     25     26     UMP PARACENTESIS    7:30 AM   (120 min.)   Provider,  Spec Inf Para   [facility] Specialty and Procedure 27     28       29     30     31                                     Recent Results (from the past 24 hour(s))   ABO/Rh type and screen    Collection Time: 06/22/18  7:41 AM   Result Value Ref Range    Units Ordered 1     ABO O     RH(D) Pos     Antibody Screen Neg     Test Valid Only At          [facility]    Specimen Expires 06/25/2018     Crossmatch Red Blood Cells    Blood component     Collection Time: 06/22/18  7:41 AM   Result Value Ref Range    Unit Number [id number]     Blood Component Type Red Blood Cells Leukocyte Reduced     Division Number 00     Status of Unit Released to care unit 06/22/2018 1047     Blood Product Code [id number]     Unit Status ISS                  Follow-ups after your visit        Your next 10 appointments already scheduled     Jun 28, 2018  7:30 AM CDT   Paracentesis Visit with Uc Spec Inf Para Provider, UC 39 ATC   [facility] Specialty and Procedure ([facility])    [address]  [address]  [place] [address]   [phone]            Jul 05, 2018  2:00 PM CDT   Paracentesis Visit with Uc Spec Inf Para Provider, UC 40 ATC   [facility] Specialty and Procedure ([facility])    [address] [place] [address]  [address]  [facility] [facility] [address]   [phone]            Jul 12, 2018  7:30 AM CDT   Paracentesis Visit with Uc Spec Inf Para Provider, UC 39 ATC   [facility] Specialty and Procedure ([facility])    [address]  [address]  [place] [address]   [phone]            Jul 19, 2018  7:30 AM CDT   Paracentesis Visit with Uc Spec Inf Para Provider, UC 40 ATC   [facility] Specialty and Procedure ([facility])    [address]  [address]  [place] [address]   [phone]            Jul 26, 2018  7:30 AM CDT   Paracentesis Visit with Uc Spec Inf Para Provider, UC 40 ATC   [facility] Specialty and Procedure ([facility])    [address] [place] [address]  [address]  [facility] [address]   [phone]              Future tests that were ordered for you today     Open Standing Orders        Priority Remaining Interval Expires Ordered    Transfuse red blood cell unit Routine 99/100 TRANSFUSE 1 UNIT   "6/22/2018    Red blood cell prepare order unit Routine 99/100 CONDITIONAL (SPECIFY) BLOOD  6/21/2018            Who to contact     If you have questions or need follow up information about today's clinic visit or your schedule please contact [facility] CANCER [facility] directly at [phone].  Normal or non-critical lab and imaging results will be communicated to you by MyChart, letter or phone within 4 business days after the clinic has received the results. If you do not hear from us within 7 days, please contact the clinic through [employer] or phone. If you have a critical or abnormal lab result, we will notify you by phone as soon as possible.  Submit refill requests through [email or web address] or call your pharmacy and they will forward the refill request to us. Please allow 3 business days for your refill to be completed.          Additional Information About Your Visit        MyChart Information     [email or web address] gives you secure access to your electronic health record. If you see a primary care provider, you can also send messages to your care team and make appointments. If you have questions, please call your primary care clinic.  If you do not have a primary care provider, please call [phone] and they will assist you.        Care EveryWhere ID     This is your Care EveryWhere ID. This could be used by other organizations to access your [place] medical records  [id number]        Your Vitals Were     Pulse Temperature Respirations Height Pulse Oximetry BMI (Body Mass Index)    71 98.4  F (36.9  C) (Tympanic) 16 1.727 m (5' 8\") 100% 31.03 kg/m2       Blood Pressure from Last 3 Encounters:   06/22/18 146/56   06/22/18 138/57   06/21/18 165/55    Weight from Last 3 Encounters:   06/22/18 92.6 kg (204 lb 1.6 oz)   06/21/18 92.2 kg (203 lb 3.2 oz)   06/14/18 94.2 kg (207 lb 11.2 oz)              We Performed the Following     ABO/Rh type and screen     Blood component     Transfuse red blood cell unit        Primary " Care Provider Office Phone # Fax #    Linn Thompson [phone]       [facility] [address]  [facility] [address]        Equal Access to Services     FRANK CONROY : Eva curiel veritoo Sojerryali, waaxda luqadaha, qaybta kaalmada aderamyada, miley lamb laFabioalan guerreor. So [facility] [phone].    ATENCIÓN: Si habla español, tiene a collins disposición servicios gratuitos de asistencia lingüística. Rufian al [phone].    We comply with applicable federal civil rights laws and [place] laws. We do not discriminate on the basis of race, color, national origin, age, disability, sex, sexual orientation, or gender identity.            Thank you!     Thank you for choosing [facility]  for your care. Our goal is always to provide you with excellent care. Hearing back from our patients is one way we can continue to improve our services. Please take a few minutes to complete the written survey that you may receive in the mail after your visit with us. Thank you!             Your Updated Medication List - Protect others around you: Learn how to safely use, store and throw away your medicines at www.disposemymeds.org.          This list is accurate as of 6/22/18 12:08 PM.  Always use your most recent med list.                   Brand Name Dispense Instructions for use Diagnosis    BASAGLAR 100 UNIT/ML injection      Inject 30 Units Subcutaneous At Bedtime        * ONETOUCH ULTRA test strip   Generic drug:  blood glucose monitoring      TEST TWICE DAILY TO THREE TIMES DAILY        * blood glucose monitoring test strip    no brand specified     1 each        ciprofloxacin 250 MG tablet    CIPRO    30 tablet    Take 2 tablets (500 mg) by mouth daily    Other ascites       fluticasone 50 MCG/ACT spray    FLONASE     Spray 1 spray into both nostrils daily        furosemide 20 MG tablet    LASIX    30 tablet    Take 2 tablets (40 mg) by mouth daily    Cirrhosis of liver with  ascites, unspecified hepatic cirrhosis type (H)       [employer] LANCETS 21G Misc      by Misc.(Non-Drug; Combo Route) route once daily.        lactulose 10 GM/15ML solution    CHRONULAC    2700 mL    30 mLs (20 g) by Oral or NG Tube route 3 times daily    Hepatic encephalopathy (H)       LEVOTHYROXINE SODIUM PO      Take 150 mcg by mouth daily        metoprolol tartrate 25 MG tablet    LOPRESSOR    60 tablet    Take 1 tablet (25 mg) by mouth 2 times daily    Paroxysmal atrial fibrillation (H)       NITROQUICK SL           pantoprazole 20 MG EC tablet    PROTONIX    60 tablet    Take 2 tablets (40 mg) by mouth 2 times daily    Gastrointestinal hemorrhage, unspecified gastrointestinal hemorrhage type, HCC (hepatocellular carcinoma) (H)       RA BLOOD PRESSURE CUFF MONITOR Misc      by Misc.(Non-Drug; Combo Route) route once daily.        rifaximin 550 MG Tabs tablet    XIFAXAN    60 tablet    Take 1 tablet (550 mg) by mouth 2 times daily    Hepatic encephalopathy (H)       sodium bicarbonate 650 MG tablet     120 tablet    Take 2 tablets (1,300 mg) by mouth 2 times daily    Chronic kidney disease, unspecified CKD stage       spironolactone 50 MG tablet    ALDACTONE    30 tablet    Take 1 tablet (50 mg) by mouth daily    Cirrhosis of liver with ascites, unspecified hepatic cirrhosis type (H)       * Notice:  This list has 2 medication(s) that are the same as other medications prescribed for you. Read the directions carefully, and ask your doctor or other care provider to review them with you.

## 2018-06-26 PROBLEM — A41.9 SEPSIS (H): Status: ACTIVE | Noted: 2018-01-01

## 2018-06-26 NOTE — SUMMARY OF CARE
Patient arrived from ER via litter with the following belongings. Ring, cellphone with , slippers, Cpap, socks, underwear, t-shirt.

## 2018-06-26 NOTE — IP AVS SNAPSHOT
MRN:[id number]                      After Visit Summary   6/26/2018    Serge Brambila    MRN: [id number]           Thank you!     Thank you for choosing [facility] for your care. Our goal is always to provide you with excellent care. Hearing back from our patients is one way we can continue to improve our services. Please take a few minutes to complete the written survey that you may receive in the mail after you visit with us. Thank you!        Patient Information     Date Of Birth          1945        Designated Caregiver       Most Recent Value    Caregiver    Will someone help with your care after discharge? yes    Name of designated caregiver Heike    Phone number of caregiver [phone]    Caregiver address [place], [place]      About your hospital stay     You were admitted on:  June 26, 2018 You last received care in the:  [address] [facility]    You were discharged on:  July 1, 2018        Reason for your hospital stay       You were admitted for an infection in your ascites and blood                  Who to Call     For medical emergencies, please call 911.  For non-urgent questions about your medical care, please call your primary care provider or clinic, [phone]  For questions related to your surgery, please call your surgery clinic        Attending Provider     Provider Specialty    Trinidad Lopez MD Emergency Medicine    [facility] [place], Jean Pineda MD Internal Medicine    Iain, Lindsey Thomas MD Internal Medicine       Primary Care Provider Office Phone # Fax #    Linn GONZALEZ Jay [phone]      After Care Instructions     Activity       Your activity upon discharge: activity as tolerated            Diet       Follow this diet upon discharge: Orders Placed This Encounter      Advance Diet as Tolerated: Regular Diet Adult            Discharge Instructions       -resume your weekly paracenteses  -complete ciprofloxacin as prescribed then continue taking it  daily to prevent infection in your ascites  -resume your diuretics                  Follow-up Appointments     Follow Up and recommended labs and tests       Follow up with primary care provider, TRENA LANGE, within 7 days for hospital follow- up.  The following labs/tests are recommended: CMP, INR, CBC.                  Your next 10 appointments already scheduled     Jul 05, 2018  2:00 PM CDT   Paracentesis Visit with Uc Spec Inf Para Provider, UC 40 ATC   [facility] ([facility])    [address]  [address]  [facility] [address] [phone]            Jul 12, 2018  7:30 AM CDT   Paracentesis Visit with Uc Spec Inf Para Provider, UC 39 ATC   [facility] ([facility])    [address]  [address]  [facility] [address] [phone]            Jul 19, 2018  7:30 AM CDT   Paracentesis Visit with Uc Spec Inf Para Provider, UC 40 ATC   [facility] ([facility])    [address]  [address]  [facility] [address]   [phone]            Jul 26, 2018  7:30 AM CDT   Paracentesis Visit with Uc Spec Inf Para Provider, UC 40 ATC   [facility] ([facility])    [address]  [address]  [facility] [address]   [phone]            Aug 02, 2018 12:00 PM CDT   Lab with UC LAB   [facility] ([facility])    [address]  [address]  [facility] [address] [phone]            Aug 02, 2018 12:00 PM CDT   Paracentesis Visit with Uc Spec Inf Para Provider   [facility] ([facility])    [address]  [address]  [facility] [address] [phone]              Additional Services      Home Care PT Referral for Hospital Discharge       [facility]    Physical Therapy and Occupational Therapy to eval and treat    Your provider has ordered home care - physical therapy. If you have not been contacted within 2 days of your discharge please call the department phone number listed on the top of this document.            Home care nursing referral       Please fax discharge orders to [facility]    PH:  [phone]    Fax: [phone]    Skilled home care RN for initial home safety evaluation and 1-3 times a week to evaluate medication management, nutrition and hydration evaluation, endurance evaluation, and general status evaluation after discharge from the acute care hospital setting.    Skilled home care PT and OT to evaluate in treat in home setting.  Occupational Therapist to perform a cognition screen on patent in his home setting.    Skilled home care RN to notify Mrs. Brambila prior to home care visits so she can make arrangement to be at home with spouse during initial home care evaluations and or to assist with entrance into home setting.  Phone numbers for patient's spouse are listed on face sheet.  Thank you.            Medication Therapy Management Referral       MTM referral reason            Patient has Lactulose or Rifaxamin as a PTA Med or a Discharge medication      Patient has 5 PTA or Discharge Medications AND one of the following   diagnoses: DM,HF,COPD,AMI DX,PULM HTN       This service is designed to help you get the most from your medications.  A specially trained pharmacist will work closely with you and your doctors  to solve any problems related to your medications and to help you get the   best results from taking them.      The Medication Therapy Management staff will call you to schedule an appointment.                  Future tests that were ordered for you     CBC with platelets       Last Lab Result: Hemoglobin (g/dL)       Date                "      Value                 07/01/2018               7.9 (L)          ----------                  Further instructions from your care team       _______________________  [facility]  Phone  [phone]  Fax  [phone]  ______________________        Pending Results     Date and Time Order Name Status Description    6/27/2018 2238 Blood culture Preliminary     6/27/2018 2238 Blood culture Preliminary     6/27/2018 1244 fluid culture - Aerobic Bacterial Preliminary     6/27/2018 1244 Anaerobic bacterial culture Preliminary     6/27/2018 1244 MR Abdomen MRCP w/o & w Contrast Preliminary     6/27/2018 0001 Blood culture Preliminary     6/27/2018 0001 Blood culture Preliminary     6/26/2018 0924 POC US ABDOMEN LIMITED In process             Statement of Approval     Ordered          07/01/18 1419  I have reviewed and agree with all the recommendations and orders detailed in this document.  EFFECTIVE NOW     Approved and electronically signed by:  Tommie Panda MD             Admission Information     Date & Time Provider Department Dept. Phone    6/26/2018 Lindsey Timmons MD [address] [facility] [place] [phone]      Your Vitals Were     Blood Pressure Pulse Temperature Respirations Height Weight    146/42 (BP Location: Left arm) 67 96.8  F (36  C) (Axillary) 18 1.727 m (5' 8\") 98.7 kg (217 lb 9.6 oz)    Pulse Oximetry BMI (Body Mass Index)                100% 33.09 kg/m2          [employer] Information     [employer] gives you secure access to your electronic health record. If you see a primary care provider, you can also send messages to your care team and make appointments. If you have questions, please call your primary care clinic.  If you do not have a primary care provider, please call [phone] and they will assist you.        Care EveryWhere ID     This is your Care EveryWhere ID. This could be used by other organizations to access your [facility] medical records  [id number]        Equal Access " to Services     [facility] : Eva Benz, warupert lindsey, qahugh marie, miley guerrero. So [facility] [phone].    ATENCIÓN: Si jamesla hcino, tiene a collins disposición servicios gratuitos de asistencia lingüística. Llame al [phone].    We comply with applicable federal civil rights laws and [place] laws. We do not discriminate on the basis of race, color, national origin, age, disability, sex, sexual orientation, or gender identity.               Review of your medicines      START taking        Dose / Directions    allopurinol 100 MG tablet   Commonly known as:  ZYLOPRIM   Used for:  Hyperuricemia        Dose:  100 mg   Start taking on:  7/2/2018   Take 1 tablet (100 mg) by mouth daily   Quantity:  90 tablet   Refills:  0       carvedilol 6.25 MG tablet   Commonly known as:  COREG   Used for:  Secondary esophageal varices without bleeding (H)        Dose:  6.25 mg   Take 1 tablet (6.25 mg) by mouth 2 times daily (with meals)   Quantity:  180 tablet   Refills:  0       ciprofloxacin 500 MG tablet   Commonly known as:  CIPRO   Indication:  SBP   Used for:  SBP (spontaneous bacterial peritonitis) (H), Bacteremia        Take ciprofloxacin 500 mg every 12h until 7/10/18 then take 500 mg daily   Quantity:  120 tablet   Refills:  0         CONTINUE these medicines which may have CHANGED, or have new prescriptions. If we are uncertain of the size of tablets/capsules you have at home, strength may be listed as something that might have changed.        Dose / Directions    spironolactone 50 MG tablet   Commonly known as:  ALDACTONE   This may have changed:  how much to take   Used for:  Cirrhosis of liver with ascites, unspecified hepatic cirrhosis type (H)        Dose:  100 mg   Take 2 tablets (100 mg) by mouth daily   Quantity:  180 tablet   Refills:  0         CONTINUE these medicines which have NOT CHANGED        Dose / Directions    BASAGLAR 100 UNIT/ML  injection        Dose:  30 Units   Inject 30 Units Subcutaneous At Bedtime   Refills:  0       * ONETOUCH ULTRA test strip   Generic drug:  blood glucose monitoring        TEST TWICE DAILY TO THREE TIMES DAILY   Refills:  0       * blood glucose monitoring test strip   Commonly known as:  no brand specified        Dose:  1 each   1 each   Refills:  0       furosemide 20 MG tablet   Commonly known as:  LASIX   Used for:  Cirrhosis of liver with ascites, unspecified hepatic cirrhosis type (H)        Dose:  40 mg   Take 2 tablets (40 mg) by mouth daily   Quantity:  30 tablet   Refills:  1       [employer] LANCETS 21G Misc        by Misc.(Non-Drug; Combo Route) route once daily.   Refills:  0       lactulose 10 GM/15ML solution   Commonly known as:  CHRONULAC   Used for:  Hepatic encephalopathy (H)        Dose:  20 g   30 mLs (20 g) by Oral or NG Tube route 3 times daily   Quantity:  2700 mL   Refills:  1       LEVOTHYROXINE SODIUM PO        Dose:  150 mcg   Take 150 mcg by mouth daily   Refills:  0       NITROQUICK SL        Place under the tongue as needed   Refills:  0       pantoprazole 20 MG EC tablet   Commonly known as:  PROTONIX   Used for:  Gastrointestinal hemorrhage, unspecified gastrointestinal hemorrhage type, HCC (hepatocellular carcinoma) (H)        Dose:  40 mg   Take 2 tablets (40 mg) by mouth 2 times daily   Quantity:  60 tablet   Refills:  2       RA BLOOD PRESSURE CUFF MONITOR Misc        by Misc.(Non-Drug; Combo Route) route once daily.   Refills:  0       rifaximin 550 MG Tabs tablet   Commonly known as:  XIFAXAN   Indication:  hepatic encephalopathy   Used for:  Hepatic encephalopathy (H)        Dose:  550 mg   Take 1 tablet (550 mg) by mouth 2 times daily   Quantity:  60 tablet   Refills:  3       sodium bicarbonate 650 MG tablet   Used for:  Chronic kidney disease, unspecified CKD stage        Dose:  1300 mg   Take 2 tablets (1,300 mg) by mouth 2 times daily   Quantity:  120 tablet   Refills:  1        * Notice:  This list has 2 medication(s) that are the same as other medications prescribed for you. Read the directions carefully, and ask your doctor or other care provider to review them with you.      STOP taking     metoprolol tartrate 25 MG tablet   Commonly known as:  LOPRESSOR                Where to get your medicines      These medications were sent to [id number] - [place], [place] - [address] AT [facility] [address], [place] [address]     Phone:  [phone]     allopurinol 100 MG tablet    carvedilol 6.25 MG tablet    ciprofloxacin 500 MG tablet    spironolactone 50 MG tablet                Protect others around you: Learn how to safely use, store and throw away your medicines at www.disposemymeds.org.        ANTIBIOTIC INSTRUCTION     You've Been Prescribed an Antibiotic - Now What?  Your healthcare team thinks that you or your loved one might have an infection. Some infections can be treated with antibiotics, which are powerful, life-saving drugs. Like all medications, antibiotics have side effects and should only be used when necessary. There are some important things you should know about your antibiotic treatment.      Your healthcare team may run tests before you start taking an antibiotic.    Your team may take samples (e.g., from your blood, urine or other areas) to run tests to look for bacteria. These test can be important to determine if you need an antibiotic at all and, if you do, which antibiotic will work best.      Within a few days, your healthcare team might change or even stop your antibiotic.    Your team may start you on an antibiotic while they are working to find out what is making you sick.    Your team might change your antibiotic because test results show that a different antibiotic would be better to treat your infection.    In some cases, once your team has more information, they learn that you do not need  an antibiotic at all. They may find out that you don't have an infection, or that the antibiotic you're taking won't work against your infection. For example, an infection caused by a virus can't be treated with antibiotics. Staying on an antibiotic when you don't need it is more likely to be harmful than helpful.      You may experience side effects from your antibiotic.    Like all medications, antibiotics have side effects. Some of these can be serious.    Let you healthcare team know if you have any known allergies when you are admitted to the hospital.    One significant side effect of nearly all antibiotics is the risk of severe and sometimes deadly diarrhea caused by Clostridium difficile (C. Difficile). This occurs when a person takes antibiotics because some good germs are destroyed. Antibiotic use allows C. diificile to take over, putting patients at high risk for this serious infection.    As a patient or caregiver, it is important to understand your or your loved one's antibiotic treatment. It is especially important for caregivers to speak up when patients can't speak for themselves. Here are some important questions to ask your healthcare team.    What infection is this antibiotic treating and how do you know I have that infection?    What side effects might occur from this antibiotic?    How long will I need to take this antibiotic?    Is it safe to take this antibiotic with other medications or supplements (e.g., vitamins) that I am taking?     Are there any special directions I need to know about taking this antibiotic? For example, should I take it with food?    How will I be monitored to know whether my infection is responding to the antibiotic?    What tests may help to make sure the right antibiotic is prescribed for me?      Information provided by:  www.cdc.gov/getsmart  U.S. Department of Health and Human Services  Centers for disease Control and Prevention  National Center for Emerging and  Zoonotic Infectious Diseases  Division of Healthcare Quality Promotion             Medication List: This is a list of all your medications and when to take them. Check marks below indicate your daily home schedule. Keep this list as a reference.      Medications           Morning Afternoon Evening Bedtime As Needed    allopurinol 100 MG tablet   Commonly known as:  ZYLOPRIM   Take 1 tablet (100 mg) by mouth daily   Start taking on:  7/2/2018   Last time this was given:  100 mg on 7/1/2018  8:37 AM                                BASAGLAR 100 UNIT/ML injection   Inject 30 Units Subcutaneous At Bedtime   Last time this was given:  30 Units on 6/30/2018  9:46 PM                                * ONETOUCH ULTRA test strip   TEST TWICE DAILY TO THREE TIMES DAILY   Generic drug:  blood glucose monitoring                                * blood glucose monitoring test strip   Commonly known as:  no brand specified   1 each                                carvedilol 6.25 MG tablet   Commonly known as:  COREG   Take 1 tablet (6.25 mg) by mouth 2 times daily (with meals)   Last time this was given:  6.25 mg on 7/1/2018  8:37 AM                                ciprofloxacin 500 MG tablet   Commonly known as:  CIPRO   Take ciprofloxacin 500 mg every 12h until 7/10/18 then take 500 mg daily   Last time this was given:  500 mg on 7/1/2018  8:37 AM                                furosemide 20 MG tablet   Commonly known as:  LASIX   Take 2 tablets (40 mg) by mouth daily                                [employer] LANCETS 21G Misc   by Misc.(Non-Drug; Combo Route) route once daily.                                lactulose 10 GM/15ML solution   Commonly known as:  CHRONULAC   30 mLs (20 g) by Oral or NG Tube route 3 times daily   Last time this was given:  20 g on 7/1/2018  8:38 AM                                LEVOTHYROXINE SODIUM PO   Take 150 mcg by mouth daily   Last time this was given:  150 mcg on 7/1/2018  8:38 AM                                 NITROQUICK SL   Place under the tongue as needed                                pantoprazole 20 MG EC tablet   Commonly known as:  PROTONIX   Take 2 tablets (40 mg) by mouth 2 times daily   Last time this was given:  40 mg on 7/1/2018  8:38 AM                                RA BLOOD PRESSURE CUFF MONITOR Misc   by Misc.(Non-Drug; Combo Route) route once daily.                                rifaximin 550 MG Tabs tablet   Commonly known as:  XIFAXAN   Take 1 tablet (550 mg) by mouth 2 times daily   Last time this was given:  550 mg on 7/1/2018  8:38 AM                                sodium bicarbonate 650 MG tablet   Take 2 tablets (1,300 mg) by mouth 2 times daily   Last time this was given:  1,300 mg on 7/1/2018  8:38 AM                                spironolactone 50 MG tablet   Commonly known as:  ALDACTONE   Take 2 tablets (100 mg) by mouth daily                                * Notice:  This list has 2 medication(s) that are the same as other medications prescribed for you. Read the directions carefully, and ask your doctor or other care provider to review them with you.

## 2018-06-26 NOTE — H&P
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           Assessment and Plan:     #Fever  #?Sepsis  Patient presents with fever of 102F. Was tachycardic with elevated lactic acid. Suspect sepsis. WBC normal. Unclear of source. Patient does not have symptoms of UTI or respiratory tract infection. CXR without consolidation. Does have some features of cholangitis in the setting of altered anatomy from malignancy. He also had tick bite a week ago, but rash not characteristic for lyme, however, can't rule out Anaplasma or Ehrichiosis as pt with elevated LFTs. He is known to have malignancy therefore, symptoms could be tumor necrosis vs increased tumor burden. Patient also had paracentesis few days ago, therefore SBP in differential as well. No meningeal signs on exam, no photophobia on history to suggest CNS infection.   - Vanc/Zosyn  - Blood cultures x2  - Urine cultures  - Abdominal US with duplex  - Anaplasma/Ehrliciosis serology  - Peripheral smear  - Paracentesis with WBC + diff, albumin, gram stain and culture    #Acute metabolic encephalopathy  Believe this is related to sepsis. No meningeal signs and no focal neurological deficit. Can't rule out viral encephalitis, but history more consistent with pathology in hepatobiliary system. Ammonia not significantly elevated. Patient compliant with lactulose and rifaximin per wife and having >3 bowel movements.  - Neuro checks q 4hrs  - If no significant improvement despite abx, will consider LP    #Transaminitis  #Hyperbilirubinemia  #HCC, 11 x 5.9 cm  #KUHN cirrhosis, MELD 25  #Grade II EV  #Ascites  LFTs and Bilirubin above baseline. This could be secondary  to tumor progression, worsening of cirrhosis or biliary tract infection. Due to malignancy could have PV thrombosis from tumor or blood clot. Other infectious causes as stated above. In terms of his HCC, he was evaluated by IR few months ago and was not a candidate for radioembolization due to extensive lung shunting. There was ongoing discussion of systemic chemotherapy, but liver function precluded this. He saw Oncology to possibly start sorafenib, but family leanings towards hospice care.    - RUQ US with duplex  - Trend LFTs  - AFP  - TLS labs  - Direct and indirect bili pending  - Continue lactulose and rifaximin  - Holding lasix and aldactone  - GI consulted to evaluate if any palliative intervention in biliary tract is indicated  - Goals of care discussion  - Might need therapeutic paracentesis, last para 06/21/18 (3.5L)    #Elevated lactic acid  #HAGMA  #?Respiratory alkalosis  #NAGMA  #JUAN M on CKD  Suspect lactic acid is secondary to sepsis vs increased tumor burden. He does take sodium bicarb for chronically decreased HCO3 levels. UA unremarkable. Although taken from VB, pCO2 lower than expected for HCO3. He appeared tachypnic as well on exam.   - IVF  - Repeat Cr in the AM  - Trend lactate    #Chronic anemia  #Chronic thrombocytopenia  He had a previous hospitalization about two months ago due to concerns of GI bleed. Known to have EV grade II. At that time required transfusions. GI evaluated patient and deferred EGD. Gets transfusions when required on visits for paracentesis. Last transfusion on 06/22/18, at that time hemoglobin 7.2. GI bleed appears to be slow ooze as patient without melena or hematochezia. Suspect that anemia mainly due to this, but will rule out hemolysis. Perhaps some slow DIC as well. Thrombocytopenia probably related to liver disease and marrow suppression from high tumor burden.  - Peripheral smear  - Hemolysis labs  - Continue PPI    #CAD s/p RCA stent 2002  #HTN  #HLD  -  Continue metoprolol  - No aspirin due to GI bleed    #A-fib ([id number] 5)  Not on warfarin due to GI bleed, has bled score > risk of thromboembolism from a-fib. Also, currently in sinus rhythm.    #DM2  - Continue home dose of lantus  - Medium insulin ss    #Hypothyroidism  - Continue home synthroid    Patient evaluated and discussed with Dr. Awad.    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 History was obtained from wife as patient encephalopathic.    Wife stated that she noticed patient suddenly lethargic  and not talkative day before admission. She gave him extra doses of lactulose, which somewhat helped, but on day of admission he was even more lethargic.  She called paramedics and and felt that patient had chills.  Days before admission, he was in his usual state of health. He is very active, was working on his lawnmower. Frequently visits cabin and is outdoors. Denies sick contacts or recent travel. Did have a tick bite last week, but without any rash. Wife can't recall  if it was a deer tick. He gets frequent paracentesis, most recent on 06/21/18 where he had 3.5L drawn out and no complications. He also had a blood transfution on 06/22/18 due to down-trending hemoglobin.      Wife denies recent fever, chills, night sweats, headache, vision changes, rhinorrhea, sore throat, cough, chest pain, shortness of breath, palpitations, nausea, vomiting, diarrhea, constipation, hematochezia, melena, dysuria, hematuria, joint pain, joint swelling, focal weakness, presyncope, syncope or new rash.     On arrival in the ED he had a temperature of 103F and HR in 100s. Labs significant for  lactic acid of 2.4, Cr 1.86, HCO3 17, T bili 5.2 and elevated LFTs. He was given IV fluids and started on Vanc/Levaquin. Admitted for possible sepsis.                   Review of Systems:     10 pt ROS neg except for HPI             Past Medical History:   Past medical history discussed with patient.  Past Medical History:   Diagnosis Date     Atrial fibrillation (H)      Diabetes (H)     type II     Hepatic encephalopathy (H)      Hypertension      MI (myocardial infarction)     stent placement     Sleep apnea      Thyroid disease               Past Surgical History:   Past surgical history discussed with patient.     Past Surgical History:   Procedure Laterality Date     APPENDECTOMY  age 16     COLONOSCOPY       ESOPHAGOSCOPY, GASTROSCOPY, DUODENOSCOPY (EGD), COMBINED N/A 1/24/2018    Procedure: COMBINED ESOPHAGOSCOPY, GASTROSCOPY, DUODENOSCOPY (EGD);  ESOPHAGOGASTRODUODENOSCOPY (MAC) ;  Surgeon: Colton Stroud MD;  Location:  GI     GI SURGERY  2012    partial colectomy for pre-CA nodule, removed 10 in.              Social History:   Social history discussed with patient.  Social History   Substance Use Topics     Smoking status: Former Smoker     Packs/day: 1.00     Years: 10.00     Quit date: 1/1/1999     Smokeless tobacco: Never Used     Alcohol use No              Family History:   Family history discussed with patient.  Family History   Problem Relation Age of Onset     Hyperlipidemia Father      Hypertension Father      Cervical Cancer Maternal Grandmother      Alcoholism Mother      Abdominal Aortic Aneurysm Mother      HEART DISEASE Mother      Hypertension Mother      Thyroid Disease Mother      Hyperlipidemia Mother               Immunizations:     Immunization History   Administered Date(s) Administered     Influenza (High Dose) 3 valent vaccine 10/23/2017              Allergies:   Allergies discussed with patient  Allergies   Allergen Reactions     Penicillins      Happened in his teens,  unsure what his reaction was. Tolerated ceftriaxone 3/18              Medications:       sodium chloride 0.9%  1,000 mL Intravenous Once     levofloxacin  750 mg Intravenous Once     lidocaine 1% with EPINEPHrine 1:100,000         vancomycin (VANCOCIN) IV  1,750 mg Intravenous Q24H              Medications prior to hospitalization:       No current facility-administered medications on file prior to encounter.   Current Outpatient Prescriptions on File Prior to Encounter:  BASAGLAR 100 UNIT/ML injection Inject 30 Units Subcutaneous At Bedtime    furosemide (LASIX) 20 MG tablet Take 2 tablets (40 mg) by mouth daily   lactulose (CHRONULAC) 10 GM/15ML solution 30 mLs (20 g) by Oral or NG Tube route 3 times daily   LEVOTHYROXINE SODIUM PO Take 150 mcg by mouth daily    metoprolol tartrate (LOPRESSOR) 25 MG tablet Take 1 tablet (25 mg) by mouth 2 times daily   pantoprazole (PROTONIX) 20 MG EC tablet Take 2 tablets (40 mg) by mouth 2 times daily   rifaximin (XIFAXAN) 550 MG TABS tablet Take 1 tablet (550 mg) by mouth 2 times daily   sodium bicarbonate 650 MG tablet Take 2 tablets (1,300 mg) by mouth 2 times daily   spironolactone (ALDACTONE) 50 MG tablet Take 1 tablet (50 mg) by mouth daily   blood glucose monitoring (NO BRAND SPECIFIED) test strip 1 each   blood glucose monitoring (ONETOUCH ULTRA) test strip TEST TWICE DAILY TO THREE TIMES DAILY   Blood Pressure Monitoring (RA BLOOD PRESSURE CUFF MONITOR) MISC by Misc.(Non-Drug; Combo Route) route once daily.   [employer] LANCETS 21G MISC by Misc.(Non-Drug; Combo Route) route once daily.   Nitroglycerin (NITROQUICK SL) Place under the tongue as needed              Physical Exam:   Vital signs at time of admission note: /49  Temp 102.7  F (39.3  C) (Oral)  Resp 21  SpO2 99%  General: AAOX1 (person), lethargic  HEENT: PERRLA, EOMI, mild scleral icterus  Neck: no LAD, no JVD  CV: RRR, loud systolic murmur in upper right sternal border  Resp: CTAB  Abdomen: non tender,  mild distention, fluid shift, +bs, + asterixis  Extremities: WWP, +1 pitting edema  Skin: small puncture in left wrist region, erythematous  Psych: adequate mood, but disoriented  Neuro: Intermittently following commands, 5/5 strength in upper and lower extremities    No intake or output data in the 24 hours ending 06/26/18 1155                Data:     Results for orders placed or performed during the hospital encounter of 06/26/18 (from the past 24 hour(s))   Comprehensive metabolic panel   Result Value Ref Range    Sodium 136 133 - 144 mmol/L    Potassium 5.2 3.4 - 5.3 mmol/L    Chloride 107 94 - 109 mmol/L    Carbon Dioxide 17 (L) 20 - 32 mmol/L    Anion Gap 12 3 - 14 mmol/L    Glucose 128 (H) 70 - 99 mg/dL    Urea Nitrogen 49 (H) 7 - 30 mg/dL    Creatinine 1.86 (H) 0.66 - 1.25 mg/dL    GFR Estimate 36 (L) >60 mL/min/1.7m2    GFR Estimate If Black 43 (L) >60 mL/min/1.7m2    Calcium 8.4 (L) 8.5 - 10.1 mg/dL    Bilirubin Total 5.2 (H) 0.2 - 1.3 mg/dL    Albumin 2.6 (L) 3.4 - 5.0 g/dL    Protein Total 7.4 6.8 - 8.8 g/dL    Alkaline Phosphatase 282 (H) 40 - 150 U/L     (H) 0 - 70 U/L     (H) 0 - 45 U/L   CBC with platelets differential   Result Value Ref Range    WBC 7.8 4.0 - 11.0 10e9/L    RBC Count 2.93 (L) 4.4 - 5.9 10e12/L    Hemoglobin 8.9 (L) 13.3 - 17.7 g/dL    Hematocrit 26.0 (L) 40.0 - 53.0 %    MCV 89 78 - 100 fl    MCH 30.4 26.5 - 33.0 pg    MCHC 34.2 31.5 - 36.5 g/dL    RDW 21.8 (H) 10.0 - 15.0 %    Platelet Count 114 (L) 150 - 450 10e9/L    Diff Method Automated Method     % Neutrophils 89.6 %    % Lymphocytes 6.2 %    % Monocytes 3.2 %    % Eosinophils 0.6 %    % Basophils 0.1 %    % Immature Granulocytes 0.3 %    Nucleated RBCs 0 0 /100    Absolute Neutrophil 6.9 1.6 - 8.3 10e9/L    Absolute Lymphocytes 0.5 (L) 0.8 - 5.3 10e9/L    Absolute Monocytes 0.3 0.0 - 1.3 10e9/L    Absolute Eosinophils 0.1 0.0 - 0.7 10e9/L    Absolute Basophils 0.0 0.0 - 0.2 10e9/L    Abs Immature Granulocytes  0.0 0 - 0.4 10e9/L    Absolute Nucleated RBC 0.0    Ammonia   Result Value Ref Range    Ammonia 54 (H) 10 - 50 umol/L   INR   Result Value Ref Range    INR 1.66 (H) 0.86 - 1.14   ISTAT gases lactate milton POCT   Result Value Ref Range    Ph Venous 7.44 (H) 7.32 - 7.43 pH    PCO2 Venous 24 (L) 40 - 50 mm Hg    PO2 Venous 36 25 - 47 mm Hg    Bicarbonate Venous 16 (L) 21 - 28 mmol/L    O2 Sat Venous 73 %    Lactic Acid 2.4 (H) 0.7 - 2.1 mmol/L   XR Chest Port 1 View    Narrative    EXAM: XR CHEST PORT 1 VW  6/26/2018 8:09 AM     HISTORY:  fever, AMS;        COMPARISON: Chest radiograph 3/14/2018    FINDINGS: Single portable semiupright view of the chest. Cardiac  silhouette is within normal limits and stable. Indistinct pulmonary  vasculature. No pneumothorax. Predominantly perihilar and basilar hazy  opacities. No significant pleural effusion.      Impression    IMPRESSION: Indistinct pulmonary vasculature with predominantly  perihilar hazy opacities may represent mild pulmonary edema versus  diffuse infection.    I have personally reviewed the examination and initial interpretation  and I agree with the findings.    BRAULIO OTERO MD        Internal Medicine Staff Addendum  Date of Service: 6/26/2018  I have seen and examined Mr Brambila, reviewed the data and discussed the plan of care with the care team on rounds.  I agree with the above documentation with the additions/changes to the ROS, HPI, Exam or data (including my edits in italics):    I discussed pt's care with bedside RN, case management/social work today.  I personally reviewed, labs, medications and past 24 hr notes.  Assessment/Plan/Diagnoses: plan/dx as above, which contains my edits and reflects our joint medical decision-making.     Jean Awad MD PhD  Internal Medicine Hospitalist & Staff Physician   of Internal Medicine   [facility]  Pager: [phone]

## 2018-06-26 NOTE — CONSULTS
GASTROENTEROLOGY CONSULTATION      Date of Admission:  6/26/2018           Reason for Consultation:   We were asked by Dr. Awad of Internal Medicine to evaluate this patient with hyperbilirubinemia and fevers in the setting of known HCC.          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     In the ED, patient was noted to be febrile to 102.7*F and bilirubin elevation to 5.2 from 1.4 in May 2018. Liver tests were all slightly elevated with alkaline phosphatase of 282 from 226,  from 73, AST of 145 from 108 and a lactic acid of 2.4. WBC was normal. He was given vancomycin and pip-tazo and admitted to medicine. GI is consulted for further evaluation and management.     Review of prior cross-sectional imaging from May 2018 reveals extensive tumor burden of the liver with dilation of bile ducts or endoscopically intervenable lesion. Patient's clinical presentation likely represents tumor necrosis rather than focal biliary obstruction. Further imaging evaluation is limited. Patient has decreased renal function so CT A/P with contrast is likely contraindicated. Furthermore, given patient's significant ascites, MR/MRCP would likely not be of further help.     Patient has previously met with palliative care. Recommend ongoing goals of care discussion with family with inpatient palliative care consultation. Ongoing infectious evaluation/antibiotics per primary service.      Recommendations:   - Complete infectious evaluation  "per primary service including diagnostic/therapeutic paracentesis   - Antibiotics per primary service   - No role for endoscopic therapy/biliary drainage at this time    - Recommend palliative care consultation while inpatient and ongoing discussion regarding goals of care with primary service   - GI will continue to follow; page with ?s    Gastroenterology outpatient follow up recommendations: None at this time.    Thank you for involving us in this patient's care. Please do not hesitate to contact the GI service with any questions or concerns.     Pt care plan discussed with Dr. Stephen, GI staff physician.    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    History obtained primarily from patient's wife and daughter as well as patient himself and EMR.     Patient's wife reports for the past couple of days he has had increasing lethargy, unresponsiveness and \"the shakes\" which she notes is common when his \"ammonia levels are too high\". She reports he felt febrile and clammy, although he did not have a temperature taken until EMS arrived today and it was 103*F. She reports she increased his lactulose dosage last night and he seemed to have a mild improvement, however, this morning he was again increasingly lethargic so she called EMS to bring him to [facility] for further evaluation and " "management.     Patient denies chest pain, shortness of breath, nausea, vomiting, abdominal pain. He is overall fully functional at home, recently traveling to [place] for a summer vacation. His wife works full time and he takes care of himself and his medications at home during this time. Prior to the past two days the patient was in his normal state of health, which he reports is \"not good\".             Past Medical History:   Reviewed and edited as appropriate  Past Medical History:   Diagnosis Date     Atrial fibrillation (H)      Diabetes (H)     type II     Hepatic encephalopathy (H)      Hypertension      MI (myocardial infarction)     stent placement     Sleep apnea      Thyroid disease             Past Surgical History:   Reviewed and edited as appropriate   Past Surgical History:   Procedure Laterality Date     APPENDECTOMY  age 16     COLONOSCOPY       ESOPHAGOSCOPY, GASTROSCOPY, DUODENOSCOPY (EGD), COMBINED N/A 1/24/2018    Procedure: COMBINED ESOPHAGOSCOPY, GASTROSCOPY, DUODENOSCOPY (EGD);  ESOPHAGOGASTRODUODENOSCOPY (MAC) ;  Surgeon: Colton Stroud MD;  Location:  GI     GI SURGERY  2012    partial colectomy for pre-CA nodule, removed 10 in.            Previous Endoscopy:   No results found for this or any previous visit.         Social History:   Reviewed and edited as appropriate  Social History     Social History     Marital status:      Spouse name: N/A     Number of children: N/A     Years of education: N/A     Occupational History     Not on file.     Social History Main Topics     Smoking status: Former Smoker     Packs/day: 1.00     Years: 10.00     Quit date: 1/1/1999     Smokeless tobacco: Never Used     Alcohol use No     Drug use: No     Sexual activity: Not on file     Other Topics Concern     Not on file     Social History Narrative            Family History:   Reviewed and edited as appropriate  Family History   Problem Relation Age of Onset     Hyperlipidemia Father      " Hypertension Father      Cervical Cancer Maternal Grandmother      Alcoholism Mother      Abdominal Aortic Aneurysm Mother      HEART DISEASE Mother      Hypertension Mother      Thyroid Disease Mother      Hyperlipidemia Mother        No known history of colorectal cancer, liver disease, or inflammatory bowel disease.       Allergies:   Reviewed and edited as appropriate     Allergies   Allergen Reactions     Penicillins      Happened in his teens, unsure what his reaction was. Tolerated ceftriaxone and Zosyn 3/2018            Medications:     Prescriptions Prior to Admission   Medication Sig Dispense Refill Last Dose     BASAGLAR 100 UNIT/ML injection Inject 30 Units Subcutaneous At Bedtime    6/25/2018 at 2000     furosemide (LASIX) 20 MG tablet Take 2 tablets (40 mg) by mouth daily 30 tablet 1 6/25/2018 at 0800     lactulose (CHRONULAC) 10 GM/15ML solution 30 mLs (20 g) by Oral or NG Tube route 3 times daily 2700 mL 1 6/25/2018 at 1800     LEVOTHYROXINE SODIUM PO Take 150 mcg by mouth daily    6/25/2018 at 0800     metoprolol tartrate (LOPRESSOR) 25 MG tablet Take 1 tablet (25 mg) by mouth 2 times daily 60 tablet 1 6/25/2018 at 2000     pantoprazole (PROTONIX) 20 MG EC tablet Take 2 tablets (40 mg) by mouth 2 times daily 60 tablet 2 6/25/2018 at 0800     rifaximin (XIFAXAN) 550 MG TABS tablet Take 1 tablet (550 mg) by mouth 2 times daily 60 tablet 3 6/25/2018 at 2000     sodium bicarbonate 650 MG tablet Take 2 tablets (1,300 mg) by mouth 2 times daily 120 tablet 1 6/25/2018 at 2000     spironolactone (ALDACTONE) 50 MG tablet Take 1 tablet (50 mg) by mouth daily 30 tablet 1 6/25/2018 at 0800     blood glucose monitoring (NO BRAND SPECIFIED) test strip 1 each   Unknown at Unknown time     blood glucose monitoring (ONETOUCH ULTRA) test strip TEST TWICE DAILY TO THREE TIMES DAILY   Unknown at Unknown time     Blood Pressure Monitoring (RA BLOOD PRESSURE CUFF MONITOR) MISC by Misc.(Non-Drug; Combo Route) route once  "daily.   Unknown at Unknown time     [employer] LANCETS 21G MISC by Misc.(Non-Drug; Combo Route) route once daily.   Unknown at Unknown time     Nitroglycerin (NITROQUICK SL) Place under the tongue as needed    Unknown at Unknown time             Review of Systems:   A complete review of systems was performed and is negative except as noted in the HPI           Physical Exam:   /50  Temp 99.6  F (37.6  C) (Oral)  Resp 20  Ht 1.727 m (5' 8\")  Wt 94.8 kg (209 lb 1.6 oz)  SpO2 98%  BMI 31.79 kg/m2  Wt:   Wt Readings from Last 2 Encounters:   06/26/18 94.8 kg (209 lb 1.6 oz)   06/22/18 92.6 kg (204 lb 1.6 oz)      Constitutional: cooperative, pleasant, not dyspneic/diaphoretic, no acute distress  Eyes: Sclera icteric  Ears/nose/mouth/throat: Normal oropharynx without ulcers or exudate, mucus membranes moist, hearing intact  Neck: supple, thyroid normal size  CV: No edema  Respiratory: Unlabored breathing  Lymph: No axillary, submandibular, supraclavicular lymphadenopathy  Abd: Distended, nontender, no peritoneal signs  Skin: warm, perfused, slightly jaundiced  Neuro: AAO x 3, + asterixis  Psych: Normal affect  MSK: No gross deformities         Data:   Labs and imaging below were independently reviewed and interpreted    BMP  Recent Labs  Lab 06/26/18  0755      POTASSIUM 5.2   CHLORIDE 107   HARI 8.4*   CO2 17*   BUN 49*   CR 1.86*   *     CBC  Recent Labs  Lab 06/26/18  0755   WBC 7.8   RBC 2.93*   HGB 8.9*   HCT 26.0*   MCV 89   MCH 30.4   MCHC 34.2   RDW 21.8*   *     INR  Recent Labs  Lab 06/26/18  0755   INR 1.66*     LFTs  Recent Labs  Lab 06/26/18  0755   ALKPHOS 282*   *   *   BILITOTAL 5.2*   PROTTOTAL 7.4   ALBUMIN 2.6*      PANCNo lab results found in last 7 days.    Imaging:  U/S Abdomen 6/26/18  1.  Cirrhotic configuration with evidence of portal hypertension,  including moderate ascites, retrograde blood flow within the portal  veins.  2.  Large ill-defined " heterogeneous mass within the right hepatic lobe  measures up to 7.2 cm, overall not significant change from CT study on  5/2/2018 given different imaging techniques.  3.  Nonobstructive thrombus within the main portal and right portal  veins with retrograde blood flow in the splenic and portal veins.  Overall portal hypertension worsening than 3/2/2018 ultrasound study  given at that time the splenic and left portal veins blood flow was  antegrade.   4.  Hepatic arteries and hepatic veins demonstrate normal direction of  flow.   Seen and examined with GI fellow, agree with findings and recommendations.    Jorge Stephen MD GI Staff

## 2018-06-26 NOTE — LETTER
Transition Communication Hand-off for Care Transitions to Next Level of Care Provider    Name: Serge Brambila  : 1945  MRN #: [id number]  Primary Care Provider: TRENA LANGE     Primary Clinic: [place] [address] [address]     Reason for Hospitalization:  Hepatic encephalopathy (H) [K72.90]  Fever, unspecified fever cause [R50.9]  Altered mental status, unspecified altered mental status type [R41.82]  Admit Date/Time: 2018  7:43 AM  Discharge Date: 2018  Payor Source: Payor: BCBS / Plan: BCBS PLATINUM BLUE / Product Type:   Discharge Plan:  Home with home care services.     Discharge Needs Assessment:  Needs       Most Recent Value    Anticipated Changes Related to Illness none    Equipment Currently Used at Home cpap, walker, rolling    Transportation Available car, family or friend will provide      Follow-up plan:  Future Appointments  Date Time Provider Department Center   2018 6:00 AM Shiv Jolley, PT [facility]   2018 2:00 PM Provider, [facility]   2018 7:30 AM Provider, [facility]   2018 7:30 AM Provider, [facility]   2018 7:30 AM Provider, [facility]   2018 12:00 PM UC [facility]   2018 12:00 PM Provider, [facility]   2018 1:00 PM Vy Ireland PA-C [facility]   8/10/2018 7:30 AM Provider, [facility]   2018 2:00 PM Provider, [facility]   2018 7:30 AM Provider, [facility]   2018 7:30 AM Provider, [facility]       Referrals     Future Labs/Procedures    Home care nursing referral     Comments:    Please fax discharge orders to [facility]    PH:  [phone]    Fax: [phone]    Skilled home care RN for initial home safety evaluation and 1-3 times a week to evaluate medication  management, nutrition and hydration evaluation, endurance evaluation, and general status evaluation after discharge from the acute care hospital setting.    Skilled home care PT and OT to evaluate in treat in home setting.  Occupational Therapist to perform a cognition screen on patent in his home setting.    Skilled home care RN to notify Mrs. Brambila prior to home care visits so she can make arrangement to be at home with spouse during initial home care evaluations and or to assist with entrance into home setting.  Phone numbers for patient's spouse are listed on face sheet.  Thank you.    Home Care PT Referral for Hospital Discharge     Comments:    [facility]    Physical Therapy and Occupational Therapy to eval and treat    Your provider has ordered home care - physical therapy. If you have not been contacted within 2 days of your discharge please call the department phone number listed on the top of this document.    Medication Therapy Management Referral     Comments:    MTM referral reason            Patient has Lactulose or Rifaxamin as a PTA Med or a Discharge medication      Patient has 5 PTA or Discharge Medications AND one of the following   diagnoses: DM,HF,COPD,AMI DX,PULM HTN       This service is designed to help you get the most from your medications.  A specially trained pharmacist will work closely with you and your doctors  to solve any problems related to your medications and to help you get the   best results from taking them.      The Medication Therapy Management staff will call you to schedule an appointment.            Sandy Arango, B.S.N., P.H.N.,R.N.         Pager

## 2018-06-26 NOTE — H&P
[facility], [place]    Internal Medicine History and Physical - [facility] Service       Date of Admission:  6/26/2018    Chief Complaint   Fever with acute encephalopathy    History is obtained from the patient and patient's wife.    History of Present Illness   Serge Brambila is a 72 year old male  who has a previous history of an 7.2cm hepatocellular carcinoma, hepatic encephalopathy, chronic iron deficiency anemia, recurrent GI bleeds, Afib presenting with fever, acute encephalopathy and lethergy. The patients wife states that she started noticing confusion at 4pm yesterday. She states that he stopped answering questions at 4pm when she was on the phone with him. He was behaving similarly to previous episodes of encephalopathy due ammonia. She administered lactulose and symptoms seemed to resolve; however, by this morning he was more confused than yesterday. He was unable to state if he could get into the car so EMS was called. EMS found his temperature to be 103. Serge had reported no symptoms of fever prior to this temperature. He had a blood transfusion on 6/22 but reported no fever or shortness of breath after the infusion of 1 PRBC. He also had a paracentesis (3.5L) on 6/21 for ascites, he reported increased epigastric pain after the procedure, but this was gone by Saturday 6/23. He has not had any sore throat, runny nose, coughing, nausea, vomiting, hematemesis, melena, hematochezia, pain while urinating or increased frequency of urination. Of note he spends lots of time at their [place] in [facility], he found a tick under his watch about 1 week ago.    Review of Systems   The remainder of the 10 point ROS was negative except for items stated above.    Past Medical History    1. Hepatocellular Carcinoma (2017)  2. [facility]N  3. Hepatic encephalopathy  4. Recurrent GI bleeds  5. Chronic iron deficiency anemia  6. CKD stage III  7. Type II diabetes Mellitus  8. Atrial  Fibrilation  8. Hypothyroidism  9. Hypertension  10. Sleep apnea    Past Surgical History   I have reviewed this patient's surgical history and updated it with pertinent information if needed.  Past Surgical History:   Procedure Laterality Date     APPENDECTOMY  age 16     COLONOSCOPY       ESOPHAGOSCOPY, GASTROSCOPY, DUODENOSCOPY (EGD), COMBINED N/A 1/24/2018    Procedure: COMBINED ESOPHAGOSCOPY, GASTROSCOPY, DUODENOSCOPY (EGD);  ESOPHAGOGASTRODUODENOSCOPY (MAC) ;  Surgeon: Colton Stroud MD;  Location:  GI     GI SURGERY  2012    partial colectomy for pre-CA nodule, removed 10 in.       Social History   Social History   Substance Use Topics     Smoking status: Former Smoker     Packs/day: 1.00     Years: 10.00     Quit date: 1/1/1999     Smokeless tobacco: Never Used     Alcohol use No   Currently lives with his wife.    Family History   I have reviewed this patient's family history and updated it with pertinent information if needed.   Family History   Problem Relation Age of Onset     Hyperlipidemia Father      Hypertension Father      Cervical Cancer Maternal Grandmother      Alcoholism Mother      Abdominal Aortic Aneurysm Mother      HEART DISEASE Mother      Hypertension Mother      Thyroid Disease Mother      Hyperlipidemia Mother        Prior to Admission Medications   Prior to Admission Medications   Prescriptions Last Dose Informant Patient Reported? Taking?   BASAGLAR 100 UNIT/ML injection 6/25/2018 at 2000 Self Yes Yes   Sig: Inject 30 Units Subcutaneous At Bedtime    Blood Pressure Monitoring (RA BLOOD PRESSURE CUFF MONITOR) MISC Unknown at Unknown time Self Yes No   Sig: by Misc.(Non-Drug; Combo Route) route once daily.   [employer] LANCETS 21G MISC Unknown at Unknown time Self Yes No   Sig: by Misc.(Non-Drug; Combo Route) route once daily.   LEVOTHYROXINE SODIUM PO 6/25/2018 at 0800 Self Yes Yes   Sig: Take 150 mcg by mouth daily    Nitroglycerin (NITROQUICK SL) Unknown at Unknown time Self  Yes No   Sig: Place under the tongue as needed    blood glucose monitoring (NO BRAND SPECIFIED) test strip Unknown at Unknown time Self Yes No   Si each   blood glucose monitoring (ONETOUCH ULTRA) test strip Unknown at Unknown time Self Yes No   Sig: TEST TWICE DAILY TO THREE TIMES DAILY   furosemide (LASIX) 20 MG tablet 2018 at 0800 Self No Yes   Sig: Take 2 tablets (40 mg) by mouth daily   lactulose (CHRONULAC) 10 GM/15ML solution 2018 at 1800 Self No Yes   Si mLs (20 g) by Oral or NG Tube route 3 times daily   metoprolol tartrate (LOPRESSOR) 25 MG tablet 2018 at 2000 Self No Yes   Sig: Take 1 tablet (25 mg) by mouth 2 times daily   pantoprazole (PROTONIX) 20 MG EC tablet 2018 at 0800  No Yes   Sig: Take 2 tablets (40 mg) by mouth 2 times daily   rifaximin (XIFAXAN) 550 MG TABS tablet 2018 at  Self No Yes   Sig: Take 1 tablet (550 mg) by mouth 2 times daily   sodium bicarbonate 650 MG tablet 2018 at 2000 Self No Yes   Sig: Take 2 tablets (1,300 mg) by mouth 2 times daily   spironolactone (ALDACTONE) 50 MG tablet 2018 at 0800 Self No Yes   Sig: Take 1 tablet (50 mg) by mouth daily      Facility-Administered Medications: None     Allergies   Allergies   Allergen Reactions     Penicillins      Happened in his teens, unsure what his reaction was. Tolerated ceftriaxone and Zosyn 3/2018       Physical Exam   Vital Signs: Temp: 101.1  F (38.4  C) Temp src: Oral BP: 137/50   Heart Rate: 90 Resp: 24 SpO2: 97 % O2 Device: None (Room air)    Weight: 211 lbs 6.4 oz    General Appearance: alert, lethargic, no acute distress,  unable to follow commands  Eyes: icteric, MARY  HEENT: no nucal rigitity, ROM fully intact  Respiratory: upper lung fields clear to ascultation bilaterally, shallow breaths with use of accessory breathing muscles  Cardiovascular: RRR, no murmurs or gallops, no JVD  GI: Bowel sounds present, ascites with fluid wave, non tender to light or deep  palpation  Lymph/Hematologic:  deep cervical, anterior cervical, submandibular, sublingual, superior cervical lymph notes non palpable.  Skin: 0.5 cm red ayla where tick was present, no bullseye rash, no other rashes present, no scalp lesions, no foot ulcers or cuts  Musculoskeletal: extremities well perfused x4 full range of motion, +1 lower leg pitting edema   Neurologic: Oriented to X1 to person, Asteriscus present, CN all intact, strength equal upper and lower extremities bilaterally  Psychiatric: pleasant mood, lethargic    Assessment & Plan   Serge Brambila is a 72 year old male who has a previous history of an 7.2cm hepatocellular carcinoma, hepatic encephalopathy, chronic iron deficiency anemia, recurrent GI bleeds, Afib presenting with fever, acute encephalopathy and lactic acidosis. Differential Diagnoses include Cholangitis vs. SBP vs. Tumor Necrosis.    #Fever  #Acute Encephalopathy  #Possible sepsis - Acute presentation of high fever and encephalitis with elevated lactic acid WBC 7.8 is most likely due to infection. Most likely etiology is Cholangitis. He meets 4/5 criteria for Nettles pentad with Fever of 102.7, Jaundice, rising bilirubin of 5.2, altered mental status, no RUQ tenderness. This cholangitis is most likely from the tumor obstructing the duct, it could also be from a stone. Other possible Infectious etiology includes: 1 SBP because patient received a paracentesis on 6/21. 2. Pneumonia- CXR showed a small right pleural effusion and fever and encephalopathy in the elderly could be pneumonia. 3. Tick infections- Anaplasma often presents with fever and rising liver enzymes and the patient has a tick bite. However we would not expect this presentation only 1 week after a known tick bite. Less likely Lyme with lack of rash or babesiosis. 4. [place] virus- unlikely with lack of passive paralysis. 5 other CNS infections unlikely with lack of nuchal rigidity and light sensativity. 6. Hep B or  Hep C infection unlikely with slight rise in new liver enzymes. The patient also doesn't have any risk factors for acquiring these diseases. 7. Reaction to blood transfusion. This patient has a history of blood transfusions however the timing (not within 24 hrs or 1-3 weeks later). 8. UA- clean so not likely a UTI.    Other possible etiology is a hypermetabolic state due to Tumor necrosis. Or he is obese and could have gallstones and have cholecystitis.    -Blood Cultures Pending  -Vancomycin started with Levaquin in ED. We discontinued Levaquin due to lack of anaerobic coverage and added Piperacillin-Tazobactam.  -Ascites fluid albumin, WBC, gram stain pending  -US abdomen looking for obstruction  -AFP tumor marker pending  - Neuro checks Q4  -GI consulted they think the most likely etiology is Tumor necrosis. He is not amenable to stenting or endoscopic intervention. They would like a palliative consult. We do not feel we are at that point and the patient is not in the correct state of mind to have this conversation.  - Continue home Rifaximin 550mg 2x daily  - Continue home sodium bicarbonate 1,300mg oral  - Continue home lactulose solution 2g 3x daily   -LDH, Uric acid, Phosphate pending for evaluation for Tumor lysis.    #Transaminitis  #Elevated Bilirubin- Liver enzymes are more elevated from his baseline (current: ALT- 106,  baseline: ALT 60, ). Most likely etiology is cholangitis with the obstruction causing elevated bilirubin levels. Other possibilities include tumor necrosis and hemolysis.  - Trend CMP  - Direct vs Indirect bilirubin ordered.  - Tumor AFT pending    #High anionic gap metabolic acidosis  # Respiratory alkalosis  # Lactic Acidosis  - VBG (7.44, 24, 36,16) Lactic acid on admission 2.4 Most likely due to possible sepsis see ideas for specific etiologies above.  -Fluids NS bolus given in ED and 1,000mL at 250mL/hr. If lactate does not correct give Albumin bolus.  - Trend Lactate.      #JUAN M on CKD III- Baseline Creatinine is typically 1.5 on admission it is slightly elevated at 1.8. This is most likely due to dehydration from sepsis.  -Fluid NS bolus given in ED and 1,000mL at 250mL/hr this afternoon  - Continue to trend CMP    #Chronic Anemia- Baseline hemoglobin typically trends downward from 9 to 7. Most recent PRBC transfusion was when Hgb was at 7.2 and he rebounded to 9.2 current Hgb is 8.9. This chronic anemia is most likely from his unspecified GI bleeds and CKD with lack of EPO production. There are no signs of active bleeding.  -Trend CBC    # Chronic Thrombocytopenia- Baseline thrombocytopenia tends to be between 100-120. Current platelets 114. Thrombocytopenia likely due to cirrhosis from [facility].  -Trend CBC    #Atrial Fibrillation- Not treated with warfarin due to recurrent GI bleeds.  -Monitor with Telemetry    #Diabetes Type II- glucose upon admission 128 baseline ranges from 100-140.  - Glucose monitoring POCT   - home dose of glargine injection 30 units    Chronic Medical Conditions  Hypothyroidism- Levothyroxine 150 mcg  Hypertension- metoprolol tartrate 25 mg    # Pain Assessment:  Current Pain Score 6/21/2018   Patient currently in pain? denies   Pain score (0-10) -   Pain location -   Pain descriptors -   Serge s pain level was assessed and he currently denies pain.      Diet: Advance Diet as Tolerated: Clear Liquid Diet  Fluids: IV  mL/hr continuous  DVT Prophylaxis: bilateral pneumatic compression device bilaterally  Code Status: Full Code    Disposition Plan   Expected discharge: 2 - 3 days; recommended to prior living arrangement once SIRS/Sepsis treated.     Entered: Taylor Carrillo 06/26/2018, 1:53 PM   Information in the above section will display in the discharge planner report.    The patient was discussed with resident Dr. Brown and Staff Dr. Ritesh Carrillo  [address]   Pager: [phone]  Please  see sticky note for cross cover information      Data     Recent Labs  Lab 06/26/18  0755   WBC 7.8   HGB 8.9*   MCV 89   *   INR 1.66*      POTASSIUM 5.2   CHLORIDE 107   CO2 17*   BUN 49*   CR 1.86*   ANIONGAP 12   HARI 8.4*   *   ALBUMIN 2.6*   PROTTOTAL 7.4   BILITOTAL 5.2*   ALKPHOS 282*   *   *     Glucose 128     6/26/2018 08:02   Lactic Acid 2.4 (H)

## 2018-06-26 NOTE — PHARMACY-VANCOMYCIN DOSING SERVICE
Pharmacy Vancomycin Initial Note  Date of Service 2018  Patient's  1945  72 year old, male    Indication: Sepsis    Current estimated CrCl = Estimated Creatinine Clearance: 40.2 mL/min (based on Cr of 1.86).    Creatinine for last 3 days  2018:  7:55 AM Creatinine 1.86 mg/dL    Recent Vancomycin Level(s) for last 3 days  No results found for requested labs within last 72 hours.      Vancomycin IV Administrations (past 72 hours)      No vancomycin orders with administrations in past 72 hours.                Nephrotoxins and other renal medications (Future)    Start     Dose/Rate Route Frequency Ordered Stop    18 1034  vancomycin (VANCOCIN) 1,750 mg in sodium chloride 0.9 % 500 mL intermittent infusion      1,750 mg  over 2 Hours Intravenous EVERY 24 HOURS 18 1034            Contrast Orders - past 72 hours     None                Plan:  1.  Start vancomycin  1750 mg IV q24h. (18.9 mg/kg)  2.  Goal Trough Level: 15-20 mg/L   3.  Pharmacy will check trough levels as appropriate in 1-3 Days.    4. Serum creatinine levels will be ordered daily for the first week of therapy and at least twice weekly for subsequent weeks.    5. Ivor method utilized to dose vancomycin therapy: Method 2    Vielka Landa, PharmD Resident

## 2018-06-26 NOTE — ED TRIAGE NOTES
BIBA from home for altered mental status and fevers at home. History of hepatic encephalopathy, wife reports his ammonia is elevated - unknown how elevated. Poor history from patient d/t altered mental status.

## 2018-06-26 NOTE — IP AVS SNAPSHOT
[address] [address]    Phone:  [phone]                                       After Visit Summary   6/26/2018    Serge Brambila    MRN: [id number]           After Visit Summary Signature Page     I have received my discharge instructions, and my questions have been answered. I have discussed any challenges I see with this plan with the nurse or doctor.    ..........................................................................................................................................  Patient/Patient Representative Signature      ..........................................................................................................................................  Patient Representative Print Name and Relationship to Patient    ..................................................               ................................................  Date                                            Time    ..........................................................................................................................................  Reviewed by Signature/Title    ...................................................              ..............................................  Date                                                            Time

## 2018-06-26 NOTE — ED PROVIDER NOTES
History     Chief Complaint   Patient presents with     Altered Mental Status     HPI  Serge Brambila is a 72 year old male with a history of Goldman and hepatocellular carcinoma who presents to the ER due to altered mental status and fever.  Per wife, patient was doing well yesterday morning.  She called him at around 4 PM and noticed that he was not as talkative and interactive on the phone.  She came home and found him sleeping in bed and found that he had not taken his medication including his lactulose.  She gave the medication last night and his stools were soft and liquidy.  She says that she thought he was getting better last night but then again this morning he appeared worse.  Patient's wife says he has been more lethargic, sleepy, and less interactive.  He has not been complaining of any pain.  Patient gets weekly paracentesis and last one was done this past Thursday.  He has no history of having SBP in the past.  No alcohol use in over a year.  Wife says that he did not complain of any shortness of breath.  He did not complain of any urinary complaints.  He has not been eating anything since last night.  Has a history of having his ammonia to be elevated in the past.      I have reviewed the Medications, Allergies, Past Medical and Surgical History, and Social History in the [employer] system.    Past Medical History:   Diagnosis Date     Atrial fibrillation (H)      Diabetes (H)     type II     Hepatic encephalopathy (H)      Hypertension      MI (myocardial infarction)     stent placement     Sleep apnea      Thyroid disease        Past Surgical History:   Procedure Laterality Date     APPENDECTOMY  age 16     COLONOSCOPY       ESOPHAGOSCOPY, GASTROSCOPY, DUODENOSCOPY (EGD), COMBINED N/A 1/24/2018    Procedure: COMBINED ESOPHAGOSCOPY, GASTROSCOPY, DUODENOSCOPY (EGD);  ESOPHAGOGASTRODUODENOSCOPY (MAC) ;  Surgeon: Colton Stroud MD;  Location:  GI     GI SURGERY  2012    partial colectomy for  pre-CA nodule, removed 10 in.       Family History   Problem Relation Age of Onset     Hyperlipidemia Father      Hypertension Father      Cervical Cancer Maternal Grandmother      Alcoholism Mother      Abdominal Aortic Aneurysm Mother      HEART DISEASE Mother      Hypertension Mother      Thyroid Disease Mother      Hyperlipidemia Mother        Social History   Substance Use Topics     Smoking status: Former Smoker     Packs/day: 1.00     Years: 10.00     Quit date: 1/1/1999     Smokeless tobacco: Never Used     Alcohol use No       No current facility-administered medications for this encounter.      Current Outpatient Prescriptions   Medication     BASAGLAR 100 UNIT/ML injection     blood glucose monitoring (NO BRAND SPECIFIED) test strip     blood glucose monitoring (ONETOUCH ULTRA) test strip     Blood Pressure Monitoring (RA BLOOD PRESSURE CUFF MONITOR) MISC     fluticasone (FLONASE) 50 MCG/ACT spray     furosemide (LASIX) 20 MG tablet     [employer] LANCETS 21G MISC     lactulose (CHRONULAC) 10 GM/15ML solution     LEVOTHYROXINE SODIUM PO     metoprolol tartrate (LOPRESSOR) 25 MG tablet     Nitroglycerin (NITROQUICK SL)     pantoprazole (PROTONIX) 20 MG EC tablet     rifaximin (XIFAXAN) 550 MG TABS tablet     sodium bicarbonate 650 MG tablet     spironolactone (ALDACTONE) 50 MG tablet        Allergies   Allergen Reactions     Penicillins      Happened in his teens, unsure what his reaction was. Tolerated ceftriaxone 3/18      Review of Systems   Constitutional: Positive for appetite change (has not eaten since last night), fatigue and fever.   Respiratory: Negative for shortness of breath.    Genitourinary: Negative.    Psychiatric/Behavioral: Positive for confusion.   All other systems reviewed and are negative.    Physical Exam   BP: 146/47  Heart Rate: 103  Temp: 102.7  F (39.3  C)  Resp: 24  SpO2: 99 %    Physical Exam   Constitutional:   Yawning; slow to respond; answers only occasional questions; unable  to give history of present illness    HENT:   Head: Normocephalic and atraumatic.   Eyes: Conjunctivae and EOM are normal. Pupils are equal, round, and reactive to light.   Neck: Normal range of motion. Neck supple.   No pain with movement of neck    Cardiovascular: Normal rate, regular rhythm and normal heart sounds.    Pulmonary/Chest: No respiratory distress. He has no wheezes. He has no rales.   Abdominal: He exhibits distension. There is no tenderness.   Soft abdomen; nontender to palpation; mild fluid shifts and distention    Musculoskeletal: He exhibits no edema.   Neurological: No cranial nerve deficit.   + asterixis; alert and oriented x 1; unaware of month or season    Skin: Skin is warm.       ED Course     ED Course     Procedures             Critical Care time:  none     The patient has signs of Severe Sepsis as evidenced by:    1. 2 SIRS criteria, AND  2. Suspected infection, AND   3. Organ dysfunction: Lactic Acid > 1.9    Time severe sepsis diagnosis confirmed = 802am as this was the time when Lactate resulted, and the level was > 1.9      3 Hour Severe Sepsis Bundle Completion:  1. Initial Lactic Acid Result:   Recent Labs   Lab Test  06/26/18   0802   LACT  2.4*     2. Blood Cultures before Antibiotics: Yes  3. Broad Spectrum Antibiotics Administered: Yes     Anti-infectives (Future)    Start     Dose/Rate Route Frequency Ordered Stop    06/26/18 2000  rifaximin (XIFAXAN) tablet 550 mg      550 mg Oral 2 TIMES DAILY 06/26/18 1333      06/26/18 1400  piperacillin-tazobactam (ZOSYN) 3.375 g vial to attach to  mL bag      3.375 g  over 30 Minutes Intravenous EVERY 6 HOURS 06/26/18 1333      06/26/18 1034  vancomycin (VANCOCIN) 1,750 mg in sodium chloride 0.9 % 500 mL intermittent infusion      1,750 mg  over 2 Hours Intravenous EVERY 24 HOURS 06/26/18 1034          4. Fluids given but restricted due to hx of ESLD and ascities  Ideal body weight: 68.4 kg (150 lb 12.7 oz)  Adjusted ideal body  weight: 79.4 kg (175 lb 0.6 oz)    Procedure: Diagnostic paracentesis  Area of skin was prepped and draped  1% lidocaine with epi injected into skin  Had 3 attempts with 20 hernandez 1.5 inch needle but unable to obtain large volume of fluids obtained 3ml of yellow fluid.   Sent fluid for culture  Pt tolerated procedure well            Labs Ordered and Resulted from Time of ED Arrival Up to the Time of Departure from the ED   CBC WITH PLATELETS DIFFERENTIAL - Abnormal; Notable for the following:        Result Value    RBC Count 2.93 (*)     Hemoglobin 8.9 (*)     Hematocrit 26.0 (*)     RDW 21.8 (*)     Platelet Count 114 (*)     Absolute Lymphocytes 0.5 (*)     All other components within normal limits   ISTAT  GASES LACTATE HUNTER POCT - Abnormal; Notable for the following:     Ph Venous 7.44 (*)     PCO2 Venous 24 (*)     Bicarbonate Venous 16 (*)     Lactic Acid 2.4 (*)     All other components within normal limits   COMPREHENSIVE METABOLIC PANEL   AMMONIA   INR   ROUTINE UA WITH MICROSCOPIC   PERIPHERAL IV CATHETER   PULSE OXIMETRY NURSING   CARDIAC CONTINUOUS MONITORING   ISTAT CG4 GASES LACTATE HUNTER NURSING POCT   NURSING DRAW AND HOLD   NURSING DRAW AND HOLD   NURSING DRAW AND HOLD   BLOOD CULTURE   URINE CULTURE AEROBIC BACTERIAL   BLOOD CULTURE     Results for orders placed or performed during the hospital encounter of 06/26/18 (from the past 24 hour(s))   Comprehensive metabolic panel   Result Value Ref Range    Sodium 136 133 - 144 mmol/L    Potassium 5.2 3.4 - 5.3 mmol/L    Chloride 107 94 - 109 mmol/L    Carbon Dioxide 17 (L) 20 - 32 mmol/L    Anion Gap 12 3 - 14 mmol/L    Glucose 128 (H) 70 - 99 mg/dL    Urea Nitrogen 49 (H) 7 - 30 mg/dL    Creatinine 1.86 (H) 0.66 - 1.25 mg/dL    GFR Estimate 36 (L) >60 mL/min/1.7m2    GFR Estimate If Black 43 (L) >60 mL/min/1.7m2    Calcium 8.4 (L) 8.5 - 10.1 mg/dL    Bilirubin Total 5.2 (H) 0.2 - 1.3 mg/dL    Albumin 2.6 (L) 3.4 - 5.0 g/dL    Protein Total 7.4 6.8 - 8.8  g/dL    Alkaline Phosphatase 282 (H) 40 - 150 U/L     (H) 0 - 70 U/L     (H) 0 - 45 U/L   CBC with platelets differential   Result Value Ref Range    WBC 7.8 4.0 - 11.0 10e9/L    RBC Count 2.93 (L) 4.4 - 5.9 10e12/L    Hemoglobin 8.9 (L) 13.3 - 17.7 g/dL    Hematocrit 26.0 (L) 40.0 - 53.0 %    MCV 89 78 - 100 fl    MCH 30.4 26.5 - 33.0 pg    MCHC 34.2 31.5 - 36.5 g/dL    RDW 21.8 (H) 10.0 - 15.0 %    Platelet Count 114 (L) 150 - 450 10e9/L    Diff Method Automated Method     % Neutrophils 89.6 %    % Lymphocytes 6.2 %    % Monocytes 3.2 %    % Eosinophils 0.6 %    % Basophils 0.1 %    % Immature Granulocytes 0.3 %    Nucleated RBCs 0 0 /100    Absolute Neutrophil 6.9 1.6 - 8.3 10e9/L    Absolute Lymphocytes 0.5 (L) 0.8 - 5.3 10e9/L    Absolute Monocytes 0.3 0.0 - 1.3 10e9/L    Absolute Eosinophils 0.1 0.0 - 0.7 10e9/L    Absolute Basophils 0.0 0.0 - 0.2 10e9/L    Abs Immature Granulocytes 0.0 0 - 0.4 10e9/L    Absolute Nucleated RBC 0.0    Ammonia   Result Value Ref Range    Ammonia 54 (H) 10 - 50 umol/L   INR   Result Value Ref Range    INR 1.66 (H) 0.86 - 1.14   ISTAT gases lactate milton POCT   Result Value Ref Range    Ph Venous 7.44 (H) 7.32 - 7.43 pH    PCO2 Venous 24 (L) 40 - 50 mm Hg    PO2 Venous 36 25 - 47 mm Hg    Bicarbonate Venous 16 (L) 21 - 28 mmol/L    O2 Sat Venous 73 %    Lactic Acid 2.4 (H) 0.7 - 2.1 mmol/L   XR Chest Port 1 View    Narrative    EXAM: XR CHEST PORT 1 VW  6/26/2018 8:09 AM     HISTORY:  fever, AMS;        COMPARISON: Chest radiograph 3/14/2018    FINDINGS: Single portable semiupright view of the chest. Cardiac  silhouette is within normal limits and stable. Indistinct pulmonary  vasculature. No pneumothorax. Predominantly perihilar and basilar hazy  opacities. No significant pleural effusion.      Impression    IMPRESSION: Indistinct pulmonary vasculature with predominantly  perihilar hazy opacities may represent mild pulmonary edema versus  diffuse infection.              Assessments & Plan (with Medical Decision Making)  72-year-old male with a history of hepatocellular carcinoma who presents to the ER with fever.  Patient was altered and was having asterixis on exam.  Patient's temperature was 102 but he was satting well.  Obtained labs that shows that his lactic acid is elevated at 2.4.  Patient's white count is normal.  Patient's platelets 114.  Patient's ammonia is slightly elevated to 54 but this is decreased compared to his prior elevation of 112 in March 2018.  Obtained a chest x-ray that shows some indistinct pulmonary opacities.  Unclear source of sepsis at this time.  Blood cultures ×2 are pending.  Will do a bedside diagnostic paracentesis.  We will then start patient on broad-spectrum antibiotics.  Patient will be admitted to internal medicine for further care.     I have reviewed the nursing notes.    I have reviewed the findings, diagnosis, plan and need for follow up with the patient.  New Prescriptions    No medications on file       Final diagnoses:   Fever, unspecified fever cause   Altered mental status, unspecified altered mental status type   Hepatic encephalopathy (H)   Colt MEDARNO, am serving as a trained medical scribe to document services personally performed by Trinidad Lopez MD, based on the provider's statements to me.   Trinidad MEDRANO MD, was physically present and have reviewed and verified the accuracy of this note documented by Colt Kirby.     6/26/2018   [facility], EMERGENCY DEPARTMENT     Trinidad Lopez MD  06/26/18 [id number]

## 2018-06-26 NOTE — ED NOTES
[facility], [place]   ED Nurse to Floor Handoff     Serge Brambila is a 72 year old male who speaks English and lives with a spouse,  in a home  They arrived in the ED by ambulance from home    ED Chief Complaint: Altered Mental Status    ED Dx;   Final diagnoses:   Fever, unspecified fever cause   Altered mental status, unspecified altered mental status type   Hepatic encephalopathy (H)         Needed?: No    Allergies:   Allergies   Allergen Reactions     Penicillins      Happened in his teens, unsure what his reaction was. Tolerated ceftriaxone 3/18   .  Past Medical Hx:   Past Medical History:   Diagnosis Date     Atrial fibrillation (H)      Diabetes (H)     type II     Hepatic encephalopathy (H)      Hypertension      MI (myocardial infarction)     stent placement     Sleep apnea      Thyroid disease       Baseline Mental status: WDL  Current Mental Status changes: confused to place, time, situation but cooperative     Infection present or suspected this encounter: cultures pending  Sepsis suspected: No  Isolation type: No active isolations     Activity level - Baseline/Home:  Independent  Activity Level - Current:   Stand with Assist of 2    Bariatric equipment needed?: No    In the ED these meds were given:   Medications   lidocaine 1% with EPINEPHrine 1:100,000 1 %-1:630230 injection (not administered)   levofloxacin (LEVAQUIN) infusion 750 mg (750 mg Intravenous New Bag 6/26/18 1057)   0.9% sodium chloride BOLUS (1,000 mLs Intravenous New Bag 6/26/18 1057)     Followed by   sodium chloride 0.9% infusion (not administered)   vancomycin (VANCOCIN) 1,750 mg in sodium chloride 0.9 % 500 mL intermittent infusion (not administered)       Drips running?  Yes    Home pump  No    Current LDAs  Peripheral IV 05/02/18 Right Upper arm (Active)   Number of days:55       Peripheral IV 05/02/18 Left;Anterior Lower forearm (Active)   Number of days:55       Labs results:   Labs  Ordered and Resulted from Time of ED Arrival Up to the Time of Departure from the ED   COMPREHENSIVE METABOLIC PANEL - Abnormal; Notable for the following:        Result Value    Carbon Dioxide 17 (*)     Glucose 128 (*)     Urea Nitrogen 49 (*)     Creatinine 1.86 (*)     GFR Estimate 36 (*)     GFR Estimate If Black 43 (*)     Calcium 8.4 (*)     Bilirubin Total 5.2 (*)     Albumin 2.6 (*)     Alkaline Phosphatase 282 (*)      (*)      (*)     All other components within normal limits   CBC WITH PLATELETS DIFFERENTIAL - Abnormal; Notable for the following:     RBC Count 2.93 (*)     Hemoglobin 8.9 (*)     Hematocrit 26.0 (*)     RDW 21.8 (*)     Platelet Count 114 (*)     Absolute Lymphocytes 0.5 (*)     All other components within normal limits   AMMONIA - Abnormal; Notable for the following:     Ammonia 54 (*)     All other components within normal limits   INR - Abnormal; Notable for the following:     INR 1.66 (*)     All other components within normal limits   ISTAT  GASES LACTATE HUNTER POCT - Abnormal; Notable for the following:     Ph Venous 7.44 (*)     PCO2 Venous 24 (*)     Bicarbonate Venous 16 (*)     Lactic Acid 2.4 (*)     All other components within normal limits   ROUTINE UA WITH MICROSCOPIC   PERIPHERAL IV CATHETER   PULSE OXIMETRY NURSING   CARDIAC CONTINUOUS MONITORING   ISTAT CG4 GASES LACTATE HUNTER NURSING POCT   NURSING DRAW AND HOLD   NURSING DRAW AND HOLD   NURSING DRAW AND HOLD   BLOOD CULTURE   BLOOD CULTURE   CELL COUNT WITH DIFFERENTIAL FLUID   FLUID CULTURE AEROBIC BACTERIAL   GRAM STAIN   URINE CULTURE AEROBIC BACTERIAL       Imaging Studies:   Recent Results (from the past 24 hour(s))   XR Chest Port 1 View    Narrative    EXAM: XR CHEST PORT 1 VW  6/26/2018 8:09 AM     HISTORY:  fever, AMS;        COMPARISON: Chest radiograph 3/14/2018    FINDINGS: Single portable semiupright view of the chest. Cardiac  silhouette is within normal limits and stable. Indistinct  pulmonary  vasculature. No pneumothorax. Predominantly perihilar and basilar hazy  opacities. No significant pleural effusion.      Impression    IMPRESSION: Indistinct pulmonary vasculature with predominantly  perihilar hazy opacities may represent mild pulmonary edema versus  diffuse infection.       Recent vital signs:   /57  Temp 102.7  F (39.3  C) (Oral)  Resp 25  SpO2 97%    Cardiac Rhythm: Tachycardia  Pt needs tele? No  Skin/wound Issues: None    Code Status: Previous full code, admitting team to assess and order code status     Pain control: good    Nausea control: pt had none    Abnormal labs/tests/findings requiring intervention: elevated ammonia    Family present during ED course? Yes   Family Comments/Social Situation comments: supportive family at bedside     Tasks needing completion: continued monitoring     Ramona Snell RN  [phone] [facility]

## 2018-06-26 NOTE — PHARMACY-ADMISSION MEDICATION HISTORY
Admission medication history interview status for the 6/26/2018 admission is complete. See Epic admission navigator for allergy information, pharmacy, prior to admission medications and immunization status.     Medication history interview sources:  patient's wife, patient's medication list, and [place]    Changes made to PTA medication list (reason)  Added: none  Deleted: Flonase nasal spray (per patient wife, no longer using)  Changed: Add PRN directions to nitroglycerin    Additional medication history information (including reliability of information, actions taken by pharmacist):  - Family had medication list and wife was familiar with last doses of medications  - Per wife, took last nights doses but has not had this mornings medications. Doses most medications at 8 am and 8 pm  - Allergies were current per patient wife  - Verified that warfarin therapy has stopped (it wasn't on med list but I saw a history of its use in the chart). Per wife warfarin was stopped a few months ago by patient's PCP.   - Lactulose was being given at 0800, 1200, and 1800 per medication list.       Prior to Admission medications    Medication Sig Last Dose Taking? Auth Provider   BASAGLAR 100 UNIT/ML injection Inject 30 Units Subcutaneous At Bedtime  6/25/2018 at 2000 Yes Reported, Patient   furosemide (LASIX) 20 MG tablet Take 2 tablets (40 mg) by mouth daily 6/25/2018 at 0800 Yes Young Sousa DO   lactulose (CHRONULAC) 10 GM/15ML solution 30 mLs (20 g) by Oral or NG Tube route 3 times daily 6/25/2018 at 1800 Yes Young Sousa DO   LEVOTHYROXINE SODIUM PO Take 150 mcg by mouth daily  6/25/2018 at 0800 Yes Reported, Patient   metoprolol tartrate (LOPRESSOR) 25 MG tablet Take 1 tablet (25 mg) by mouth 2 times daily 6/25/2018 at 2000 Yes Young Sousa DO   pantoprazole (PROTONIX) 20 MG EC tablet Take 2 tablets (40 mg) by mouth 2 times daily 6/25/2018 at 0800 Yes Tea Silveira APRN CNP    rifaximin (XIFAXAN) 550 MG TABS tablet Take 1 tablet (550 mg) by mouth 2 times daily 6/25/2018 at 2000 Yes Young Sousa DO   sodium bicarbonate 650 MG tablet Take 2 tablets (1,300 mg) by mouth 2 times daily 6/25/2018 at 2000 Yes Young Sousa DO   spironolactone (ALDACTONE) 50 MG tablet Take 1 tablet (50 mg) by mouth daily 6/25/2018 at 0800 Yes Young Sousa DO   blood glucose monitoring (NO BRAND SPECIFIED) test strip 1 each Unknown at Unknown time  Reported, Patient   blood glucose monitoring (ONETOUCH ULTRA) test strip TEST TWICE DAILY TO THREE TIMES DAILY Unknown at Unknown time  Reported, Patient   Blood Pressure Monitoring (RA BLOOD PRESSURE CUFF MONITOR) MISC by Misc.(Non-Drug; Combo Route) route once daily. Unknown at Unknown time  Reported, Patient   [employer] LANCETS 21G MISC by Misc.(Non-Drug; Combo Route) route once daily. Unknown at Unknown time  Reported, Patient   Nitroglycerin (NITROQUICK SL) Place under the tongue as needed  Unknown at Unknown time  Reported, Patient         Medication history completed by: Vielka Landa, PharmD Resident

## 2018-06-26 NOTE — PLAN OF CARE
Problem: Patient Care Overview  Goal: Plan of Care/Patient Progress Review  Outcome: Improving  VSS except low-grade fever, trending down with last temp 99.6 oral. Lethargic, confused, per family members at bedside pt is clearing mentally and becoming more responsive. Up with standby assist to bathroom x2 since arrival on unit. PIV intact, infusing vancomycin with orders for NS bolus and continuous NS after. Full liquid diet, RN to advance as tolerated, pt requesting regular diet. Will continue to monitor.

## 2018-06-27 NOTE — PROGRESS NOTES
Medicine Progress Note  Date: June 27, 2018      Changes:  1. Medications-    increased dose of Piperacillin-Tazobactam   Discontinue Vancomycin  2. MRCP  3. Discontinue IV fluids    Assessment & Plan  Serge Brambila is a 72 year old male with a previous history significant for 7.2 cm hepatocellular carcinoma, cirrhosis secondary to K[facility]N, hepatic encephalopathy chronic iron deficiency anemia, recurrent GI bleeds, Afib presenting with fever and acute encephalitis due to Bacteremia. Diagnosis Cholangitis from obstruction vs SBP.    #Fever  #Acute Encephalopathy  #Bacteremia - Acute presentation of high fever and encephalitis with elevated lactic acid WBC 7.8 is most likely due to infection. Initial blood cultures 2/2 and ascites fluid grew G- rods . Most likely etiology is still Cholangitis. He meets 3/5 criteria for [place] with Fever of 102.7, Jaundice/ rising bilirubin of 5.2, altered mental status, no RUQ tenderness or hypotension. This cholangitis is most likely from some biliary obstruction (US unable to visualize common duct). Patient also has recurrent GI bleeds and a dropping hemoglobin so infection could be coming from a GI bleed. SBP grew G- rods. Unclear if this is the origin of infection or secondary from blood entering from paracentesis on 6/21 Other possible Infectious etiology includes: 1.  Tick infections- Anaplasma often presents with fever and rising liver enzymes and the patient has a tick bite. However, we would not expect this presentation only 1 week after a known tick bite. Less likely Lyme with lack of rash or babesiosis. 2. Pneumonia- CXR showed a small right pleural effusion and fever and encephalopathy in the elderly could be pneumonia. G- rods indicates possible aspiration pneumonia.      Other infectious eitiologies not currently in differential. [place] virus- unlikely with lack of passive paralysis. other CNS infections unlikely with lack of nuchal rigidity and light  sensativity. Hep B or Hep C infection unlikely with slight rise in new liver enzymes. The patient also doesn't have any risk factors for acquiring these diseases. Reaction to blood transfusion. This patient has a history of blood transfusions however the timing (not within 24 hrs or 1-3 weeks later). UA- clean so not likely a UTI.     Other possible etiology is a hypermetabolic state due to Tumor necrosis. This is jhighly unlikely with slightly elevated uric acid (10.1) and slightly elevated LDH (245) and 2/2 positive blood cultures.     -New Blood Cultures Pending  -Vancomycin started with Levaquin in ED. We discontinued Levaquin due to lack of anaerobic coverage and added Piperacillin-Tazobactam. Today with growth of G- rods we will discontinue Vancomycin.  -Ascites fluid albumin, WBC- 24 not elevated, gram stain showed G- organisms  -US abdomen looking for obstruction  -AFP tumor marker pending  - Neuro checks Q4  -GI visited today. Would like to do an MRCP for diagnostic purposes for infection location. They would also like a paracentesis with Albumin replacement done today. Still wants goals of care conference.  - Continue home Rifaximin 550mg 2x daily  - Continue home sodium bicarbonate 1,300mg oral  - Continue home lactulose solution 2g 3x daily     #Transaminitis  #Elevated Bilirubin- Liver enzymes are more elevated from his baseline (current: ALT- ,  baseline: ALT 60, ). Most likely etiology is cholangitis with the obstruction causing elevated bilirubin levels.  Other possibilities include tumor necrosis and hemolysis.  - Trend CMP  - Direct vs Indirect bilirubin ordered. Results bili total 5.2 direct 3.0. 6/27 total bilirubin 4.2  - Tumor AFT was within normal limits  -MRCP to look for source of obstruction     #High anionic gap metabolic acidosis  # Respiratory alkalosis  # Lactic Acidosis  - VBG (7.44, 24, 36,16) Lactic acid on admission 2.4 Most likely due to possible sepsis see ideas for  "specific etiologies above. Most recent lactate on 6/27 was 1.0  - 6/27 no further need to trend lactate.      #JUAN M on CKD III- Baseline Creatinine is typically 1.5 on admission it is slightly elevated at 1.8. This is most likely due to dehydration from sepsis. Only 100mL of urine output documented on 6/26  - Continue to trend CMP  -Continue to monitor I&Os     #Chronic Anemia- Baseline hemoglobin typically trends downward from 9 to 7. Most recent PRBC transfusion was when Hgb was at 7.2 on 6/22 and he rebounded to 9.2. 6/26 Hgb 8.9, 6/27 Hgb down to 7.1. This chronic anemia is most likely from his unspecified GI bleeds and CKD with lack of EPO production. There are no signs of active bleeding.  -Trend CBC  -Type and screen done on 6/27, transfuse if Hgb lower than 7.0     # Chronic Thrombocytopenia- Baseline thrombocytopenia tends to be between 100-120.  6/26 Platelets 114,  6/27 Platelets 74. Thrombocytopenia likely due to cirrhosis from K[facility]N.  -Trend CBC      #Atrial Fibrillation- Not treated with warfarin due to recurrent GI bleeds.  -Monitor with Telemetry     #Diabetes Type II- glucose upon admission 128 baseline ranges from 100-140.  - Glucose monitoring POCT   - home dose of glargine injection 30 units     Chronic Medical Conditions  Hypothyroidism- Levothyroxine 150 mcg  Hypertension- metoprolol tartrate 25 mg       Subjective  Blood cultures + 2/2 overnight for growth of G- rods. Nursing noted he became weaker throghout the night. This morning he denied having any pain and was able to carry on a conversation.    Objective  Temp: 98.4  F (36.9  C) Temp src: Oral BP: 139/45   Heart Rate: 64 Resp: 20 SpO2: 99 % O2 Device: BiPAP/CPAP   Height: 172.7 cm (5' 8\") Weight: 94.8 kg (209 lb 1.6 oz)  Estimated body mass index is 31.79 kg/(m^2) as calculated from the following:    Height as of this encounter: 1.727 m (5' 8\").    Weight as of this encounter: 94.8 kg (209 lb 1.6 oz).      Physical  General: alert and " awake  Cardio: RRR, 2/6 systolic murmur heard loudest over the aorta  Resp: soft crackles right lower lung, remaining lung fields clear to ascultation bilaterally.  GI: Bowel sounds present, ascites with fluid wave, non-tender to light or deep palpation  Neuro: Oriented x3, no asterixis.       Labs  Blood cultures from 6/26 positive for E. Coli.  Ascites fluid from 6/26 positive for G- rods     6/27/2018 06:27   Sodium 138   Potassium 4.8   Chloride 112 (H)   Carbon Dioxide 16 (L)   Urea Nitrogen 47 (H)   Creatinine 1.76 (H)   GFR Estimate 38 (L)   GFR Estimate If Black 46 (L)   Calcium 7.6 (L)   Anion Gap 10   Albumin 1.9 (L)   Protein Total 5.6 (L)   Bilirubin Total 4.2 (H)   Alkaline Phosphatase 186 (H)   ALT 78 (H)    (H)      6/27/2018 06:28   Lactic Acid 1.0      6/27/2018 06:27   WBC 4.8   Hemoglobin 7.1 (L)   Hematocrit 21.1 (L)   Platelet Count 74 (L)   RBC Count 2.37 (L)   MCV 89   MCH 30.0   MCHC 33.6   RDW 21.4 (H)     Imaging  US abdomen Impression:   1.  Cirrhotic configuration with evidence of portal hypertension,  including moderate ascites, retrograde blood flow within the portal  veins.  2.  Large ill-defined heterogeneous mass within the right hepatic lobe  measures up to 7.2 cm, overall not significant change from CT study on  5/2/2018 given different imaging techniques.  3.  Nonobstructive thrombus within the main portal and right portal  veins with retrograde blood flow in the splenic and portal veins.  Overall portal hypertension worsening than 3/2/2018 ultrasound study  given at that time the splenic and left portal veins blood flow was  antegrade.   4.  Hepatic arteries and hepatic veins demonstrate normal direction of  flow.         Diet: Full adult diet as tolerated  IVF: no IV fluids  DVT: pneumatic compression device  Code: full  Dispo:    Taylor Carrillo  MS 3  Pager [phone]

## 2018-06-27 NOTE — PROGRESS NOTES
Hospitalist Procedure Service - Paracentesis Procedure Note  Date of Service: 6/27/2018    Patient: Serge Brambila   MRN: [id number]  Admission Date: 6/26/2018  Hospital Day # 1    Attending: Juwan Lee MD  Resident: Morro Brown MD  Procedure: Diagnostic and therapeutic paracentesis  Indication: ascites, discomfort  Pre-procedure diagnosis: K[facility]N cirrhosis with ascites, HCC  Post-procedure diagnosis: Same  The risks and benefits of the procedure were explained to the patient who expressed understanding and opted to proceed.  Consent was obtained and placed in the chart.  Ultrasound was used to find a pocket of ascites in the left lower quadrant. A time out was performed. The area was prepped and draped in the usual sterile fashion.  10 cc ml of 1% lidocaine was instilled and ascites located.  A small incision was made with an 11 blade. The 8F paracentesis catheter and needle were inserted until ascites obtained then the needle removed.  The apparatus was connected to vacuum bottles and a total of 3,400 ml removed. A specimen was sent for analysis. The catheter was withdrawn and the area dressed. Pre and post-procedure images were saved to the medical record.    Patient tolerated the procedure well with no immediate complications.  The primary team was informed of the procedure.     Dr. Lee was present for the duration of the procedure.     Young Christian MD PGY-3     Attending Attestation   I was present for the entirety of the procedure including pre and post-procedure image capture, time out, lidocaine injection, catheter insertion and withdrawal.     Juwan Lee MD  Med-Peds Hospitalist  Pager [phone]

## 2018-06-27 NOTE — PROVIDER NOTIFICATION
Text page to 9103: [address], rm 33-2. DL. [place] RN [id number]. Lab called with positive blood culture from 6/26 from left arm identified as E.Coli. Thanks!

## 2018-06-27 NOTE — PROGRESS NOTES
INTERNAL MEDICINE PROGRESS NOTE    Serge Brambila ([id number]) admitted on 6/26/2018 06/27/2018    Assessment & Plan:  71 y/o male with PMHx significant for HCC of 11 x 5.9cm, KUHN cirrhosis, Grade II varices, CAD s/p RCA stent 2002, a-fib, DM2 and  Hypothyroidism was admitted due to fever of 103F, lethargy and elevated lactic acid; found to have E coli.     #Fever  #Sepsis  #E. Coli bacteremia  #Bacterial peritonitis  Patient presented with fever of 102F and tachycardic. Normal WBC. Paracentesis without signs of SBP, but ascitic fluid positive for GNR. Blood cultures also positive for GNR. Suspect that source is hepatobiliary vs entry after paracentesis about one week before admission. Does have some features of cholangitis in the setting of altered anatomy from hepatic malignancy. Also, he had a tick bite a week ago, but rash not characteristic for lyme, however, can't rule out Anaplasma or Ehrichiosis as pt with elevated LFTs.   - Discontinued Vanc  - Continue Zosyn  - Repeat cultures until negative for 48 hrs  - Repeat paracentesis with cultures and cell count +diff  - Urine cultures pending  - Anaplasma/Ehrliciosis serology pending  - Peripheral smear pending     #Acute metabolic encephalopathy  Believe this is related to sepsis. No meningeal signs and no focal neurological deficit. Can't rule out viral encephalitis, but history more consistent with pathology in hepatobiliary system. Ammonia not significantly elevated. Now improved since IV abx and fluids.   - Neuro checks q 4hrs     #Transaminitis  #Hyperbilirubinemia  #HCC, 11 x 5.9 cm  #KUHN cirrhosis, MELD 25  #Grade II EV  #Ascites  LFTs and Bilirubin downtrending. MELD 25, stable. US showed stable tumor. It also showed known non-obstructive thrombus in main portal vein and right portal vein, previously thought to be of tumor origin vs blood clot. Gallbladder wall thickened, but unable to visualize biliary duct system adequately.  In terms of his  HCC, he was evaluated by IR few months ago and was not a candidate for radioembolization due to extensive lung shunting. There was ongoing discussion of systemic chemotherapy, but liver function precluded this. He saw Oncology to possibly start sorafenib, but family leanings towards hospice care.  AFP within normal range. No evidence of tumor lysis.   - MCRP ordered  - Trend LFTs  - Continue lactulose and rifaximin  - Holding lasix and aldactone  - GI consulted to evaluate if any palliative intervention in biliary tract is indicated  - Goals of care discussion ongoing     #Elevated lactic acid  #HAGMA  #?Respiratory alkalosis  #NAGMA  #JUAN M on CKD  Lactic acid normalized after fluids and antibiotics. Bicarb 16. He does take sodium bicarb for chronically decreased HCO3 levels. UA unremarkable. Appears more comfortable in his respiratory status.   - Repeat Cr in the AM     #Chronic anemia  #Chronic thrombocytopenia  He had a previous hospitalization about two months ago due to concerns of GI bleed. Known to have EV grade II. At that time required transfusions. GI evaluated patient and deferred EGD. Gets transfusions when required on visits for paracentesis. Last transfusion on 06/22/18, at that time hemoglobin 7.2. GI bleed appears to be slow ooze as patient without melena or hematochezia. Hemolysis and DIC labs negative. Thrombocytopenia probably related to liver disease and marrow suppression from tumor burden.  - Peripheral smear pending  - Continue PPI  - Might need transfusion  - If hemoglobin down-trends will speak with GI if EGD indicated since it was not performed in previous admission     #CAD s/p RCA stent 2002  #HTN  #HLD  - Continue metoprolol  - No aspirin due to GI bleed     #A-fib ([id number])  Not on warfarin due to GI bleed, has bled score > risk of thromboembolism from a-fib. Also, currently in sinus rhythm.     #DM2  - Continue home dose of lantus  - Medium insulin ss     #Hypothyroidism  -  "Continue home synthroid    FEN: regular diet  Prophylaxis: DVT mechanical, GI PPI  Disposition:  Pending sepsis evaluation and possible procedures  Code Status: FULL    Morro Brown MD PhD  Internal Medicine PGY2  [phone]    Patient was seen and discussed with Dr. Awad who agrees with above assessment and plan.    ==================================================================    Interval HistorySubjective:  No overnight events. Feels improved from yesterday. Has not have a bowel movement. Adequate appetite. Rest of ROS negative.     Objective:  Most recent vital signs:  /42 (BP Location: Right arm)  Temp 97  F (36.1  C) (Oral)  Resp 18  Ht 1.727 m (5' 8\")  Wt 94.8 kg (209 lb 1.6 oz)  SpO2 100%  BMI 31.79 kg/m2  Temp:  [97  F (36.1  C)-99.9  F (37.7  C)] 97  F (36.1  C)  Heart Rate:  [60-80] 68  Resp:  [18-20] 18  BP: (131-151)/(42-49) 131/42  SpO2:  [98 %-100 %] 100 %  Wt Readings from Last 2 Encounters:   06/26/18 94.8 kg (209 lb 1.6 oz)   06/22/18 92.6 kg (204 lb 1.6 oz)       Intake/Output Summary (Last 24 hours) at 06/27/18 1621  Last data filed at 06/27/18 1300   Gross per 24 hour   Intake          2269.58 ml   Output              700 ml   Net          1569.58 ml     Physical exam:  General: NAD, awake, alert  HEENT: EOMI, PERRL, mild scleral icterus   Cardiac: RRR, soft systolic murmur in right upper sternal border, no rubs or gallops  Respiratory: CTAB  GI: distended, + fluid wave, no asterixis, no hepatomegaly on exam, +bs  Extremities: +1 pitting edema in lower extremities, WWP  Skin: No acute lesions appreciated  Neuro: no focal neurological deficits    Labs:  Results for orders placed or performed during the hospital encounter of 06/26/18 (from the past 24 hour(s))   Glucose by meter   Result Value Ref Range    Glucose 199 (H) 70 - 99 mg/dL   Glucose by meter   Result Value Ref Range    Glucose 215 (H) 70 - 99 mg/dL   Blood Morphology Pathologist Review   Result Value Ref Range    " [facility] Report       Patient Name: TOM SAUER  MR#: [id number]  Specimen #: [id number]  Collected: 6/26/2018  Received: 6/27/2018  Reported: 6/27/2018 15:29  Ordering Phy(s): HAN SCHREIBER    For improved result formatting, select 'View Enhanced Report Format' under   Linked Documents section.    TEST(S):  Blood Smear Morphology    FINAL DIAGNOSIS:  Peripheral blood smear:       Marked normocytic anemia with dimorphic erythrocytes; no increase in   erythrocyte regeneration and  numerous echinocytes       Moderate thrombocytopenia with normal platelet morphology       No intracellular parasitic inclusions identified, see comment    COMMENT:  Thin peripheral blood smear evaluation has low sensitivity for detection   of intracellular parasites. Recommend  thin/thick smears (parasite stain) EPIC code BIE2447, or serology studies   or PCR studies which will have much  higher sensitivities for detecting parasitic infection.    The dimorphic red blood cell morphology, with both hypochromic and   normochrom ic erythrocytes, is concerning  for iron deficiency with the presumed hypochromic erythrocytes being of   recipient origin admixed with the  normochromic transfused donor erythrocytes. Per progress notes, the   patient has a known history of iron  deficiency anemia and recurrent GI bleeds.    I have personally reviewed all specimens and/or slides, including the   listed special stains, and used them  with my medical judgment to determine the final diagnosis.    Electronically signed out by:  Lauren Taylor M.D., [facility]    Technical testing/processing performed at [facility], [place], [place]    CLINICAL HISTORY:  71 yo man with KUHN cirrhosis, HCC, and portal vein thrombosis admitted   with sepsis and on vancomycin and  Zosyn. A peripheral blood smear is requested to evaluate for intracellular   parasites.    MICROSCOPIC DESCRIPTION:  Peripheral Blood  The  red blood cells appear normochromic with numerous admixed hypochromic   er ythrocytes. Poikilocytosis  includes numerous echinocytes. Rouleaux formation is not increased. The   morphology of the platelets is normal.  No intracellular parasites are identified.    Reporting Fellow: MD Berta    CLINICAL LAB RESULTS:  Battery Order No. Lab Test Code Clinical Result Ref. Range Units Result   Date  Hemogram/Diff/PLT G41891 BU WBC Count 6.0 4.0-11.0 10e9/L 6/26/2018 23:59       RBC Count L 2.78 4.4-5.9 10e12/L 6/26/2018 23:59       Hemoglobin L 8.4 13.3-17.7 g/dL 6/26/2018 23:59       Hematocrit L 25.0 40.0-53.0 % 6/26/2018 23:59       MCV 90  fl 6/26/2018 23:59       MCH 30.2 26.5-33.0 pg 6/26/2018 23:59       MCHC 33.6 31.5-36.5 g/dL 6/26/2018 23:59       RDW H 21.7 10.0-15.0 % 6/26/2018 23:59       Platelet Count L 91 150-450 10e9/L 6/26/2018 23:59        SEE TEXT   6/27/2018 01:05       Text/Comments:  Automated Method       % Neutrophils 79.8  % 6/27/2018 01:05       % Lymphocytes 14.4  % 6/27/2018 01:05       % Monocytes 3.9  % 6/27/2018 01:05        % Eosinophils 1.5  % 6/27/2018 01:05       % Basophils 0.2  % 6/27/2018 01:05       % Immature Grans 0.2  % 6/27/2018 01:05       Nucleated RBCs 0 0 /100 6/27/2018 01:05       abs Neutrophils 4.8 1.6-8.3 10e9/L 6/27/2018 01:05       abs Lymphocytes 0.9 0.8-5.3 10e9/L 6/27/2018 01:05       abs Monocytes 0.2 0.0-1.3 10e9/L 6/27/2018 01:05       abs Eosinophils 0.1 0.0-0.7 10e9/L 6/27/2018 01:05       abs Basophils 0.0 0.0-0.2 10e9/L 6/27/2018 01:05       abs Imm Granulocytes 0.0 0-0.4 10e9/L 6/27/2018 01:05       abs NRBC 0.0   6/27/2018 01:05    Retic   Retic abs 38.6 25-95 10e9/L 6/26/2018 23:59    CPT Codes:  A: 07080-BVOYB    TESTING LAB LOCATION:  [facility], [address]   [id number] [phone]    COLLECTION SITE:  Client:  [facility],  [place]  Location:  [id number] (B)     CBC with platelets differential   Result Value Ref Range    WBC 6.0 4.0 - 11.0 10e9/L    RBC Count 2.78 (L) 4.4 - 5.9 10e12/L    Hemoglobin 8.4 (L) 13.3 - 17.7 g/dL    Hematocrit 25.0 (L) 40.0 - 53.0 %    MCV 90 78 - 100 fl    MCH 30.2 26.5 - 33.0 pg    MCHC 33.6 31.5 - 36.5 g/dL    RDW 21.7 (H) 10.0 - 15.0 %    Platelet Count 91 (L) 150 - 450 10e9/L    Diff Method Automated Method     % Neutrophils 79.8 %    % Lymphocytes 14.4 %    % Monocytes 3.9 %    % Eosinophils 1.5 %    % Basophils 0.2 %    % Immature Granulocytes 0.2 %    Nucleated RBCs 0 0 /100    Absolute Neutrophil 4.8 1.6 - 8.3 10e9/L    Absolute Lymphocytes 0.9 0.8 - 5.3 10e9/L    Absolute Monocytes 0.2 0.0 - 1.3 10e9/L    Absolute Eosinophils 0.1 0.0 - 0.7 10e9/L    Absolute Basophils 0.0 0.0 - 0.2 10e9/L    Abs Immature Granulocytes 0.0 0 - 0.4 10e9/L    Absolute Nucleated RBC 0.0    Reticulocyte Count   Result Value Ref Range    % Retic 1.4 0.5 - 2.0 %    Absolute Retic 38.6 25 - 95 10e9/L   Glucose by meter   Result Value Ref Range    Glucose 132 (H) 70 - 99 mg/dL   Blood culture   Result Value Ref Range    Specimen Description Blood Left Hand     Special Requests Received in aerobic bottle only     Culture Micro No growth after 7 hours    Blood culture   Result Value Ref Range    Specimen Description Blood Right Hand     Special Requests Received in aerobic bottle only     Culture Micro No growth after 7 hours    Comprehensive metabolic panel   Result Value Ref Range    Sodium 138 133 - 144 mmol/L    Potassium 4.8 3.4 - 5.3 mmol/L    Chloride 112 (H) 94 - 109 mmol/L    Carbon Dioxide 16 (L) 20 - 32 mmol/L    Anion Gap 10 3 - 14 mmol/L    Glucose 105 (H) 70 - 99 mg/dL    Urea Nitrogen 47 (H) 7 - 30 mg/dL    Creatinine 1.76 (H) 0.66 - 1.25 mg/dL    GFR Estimate 38 (L) >60 mL/min/1.7m2    GFR Estimate If Black 46 (L) >60 mL/min/1.7m2    Calcium 7.6 (L) 8.5 - 10.1 mg/dL    Bilirubin Total 4.2 (H) 0.2 - 1.3 mg/dL     Albumin 1.9 (L) 3.4 - 5.0 g/dL    Protein Total 5.6 (L) 6.8 - 8.8 g/dL    Alkaline Phosphatase 186 (H) 40 - 150 U/L    ALT 78 (H) 0 - 70 U/L     (H) 0 - 45 U/L   CBC with platelets   Result Value Ref Range    WBC 4.8 4.0 - 11.0 10e9/L    RBC Count 2.37 (L) 4.4 - 5.9 10e12/L    Hemoglobin 7.1 (L) 13.3 - 17.7 g/dL    Hematocrit 21.1 (L) 40.0 - 53.0 %    MCV 89 78 - 100 fl    MCH 30.0 26.5 - 33.0 pg    MCHC 33.6 31.5 - 36.5 g/dL    RDW 21.4 (H) 10.0 - 15.0 %    Platelet Count 74 (L) 150 - 450 10e9/L   ABO/Rh type and screen   Result Value Ref Range    ABO O     RH(D) Pos     Antibody Screen Neg     Test Valid Only At          [facility]    Specimen Expires 06/30/2018    Lactic acid whole blood   Result Value Ref Range    Lactic Acid 1.0 0.7 - 2.0 mmol/L   Glucose by meter   Result Value Ref Range    Glucose 102 (H) 70 - 99 mg/dL   INR   Result Value Ref Range    INR 2.02 (H) 0.86 - 1.14   Fibrinogen activity   Result Value Ref Range    Fibrinogen 355 200 - 420 mg/dL   Glucose by meter   Result Value Ref Range    Glucose 131 (H) 70 - 99 mg/dL   Albumin fluid   Result Value Ref Range    Albumin Fluid Source Abdominal Fluid     Albumin Fluid 0.3 g/dL   POC US Guide for Paracentesis    Impression    Attending: Juwan Lee MD  Resident: Morro Brown MD  Procedure: Diagnostic and therapeutic paracentesis  Indication: ascites, discomfort  Pre-procedure diagnosis: KUHN cirrhosis with ascites, HCC  Post-procedure diagnosis: Same  The risks and benefits of the procedure were explained to the patient who expressed understanding and opted to proceed.  Consent was obtained and placed in the chart.  Ultrasound was used to find a pocket of ascites in the left lower quadrant. A time out was performed. The area was prepped and draped in the usual sterile fashion.  10 cc ml of 1% lidocaine was instilled and ascites located.  A small incision was made with an 11 blade. The 8F  paracentesis catheter and needle were inserted until ascites obtained then the needle removed.  The apparatus was connected to vacuum bottles and a total of 3,400 ml removed. A specimen was sent for analysis. The catheter was withdrawn and the area dressed. Pre and post-procedure images were saved to the medical record.     Patient tolerated the procedure well with no immediate complications.  The primary team was informed of the procedure.      Dr. Lee was present for the duration of the procedure.     Young Christian MD PGY-3      Attending Attestation   I was present for the entirety of the procedure including pre and post-procedure image capture, time out, lidocaine injection, catheter insertion and withdrawal.      Juwan Lee MD  [facility] Hospitalist  Pager [phone]     Internal Medicine Staff Addendum  Date of Service: 6/27/2018  I have seen and examined Mr Brambila, reviewed the data and discussed the plan of care with the care team on rounds.  I agree with the above documentation with the additions/changes to the ROS, HPI, Exam or data (including my edits in italics):    I discussed pt's care with bedside RN, case management/social work today.  I personally reviewed, labs, medications and past 24 hr notes.  Assessment/Plan/Diagnoses: plan/dx as above, which contains my edits and reflects our joint medical decision-making.     Jean Awad MD PhD  Internal Medicine Hospitalist & Staff Physician   of Internal Medicine   [facility]  Pager: [phone]

## 2018-06-27 NOTE — PROGRESS NOTES
[facility], [place]   Antimicrobial Management Team (AMT) Note              To: Med Maroon   Unit: 5B   Allergies   Allergen Reactions     Penicillins      Happened in his teens, unsure what his reaction was. 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  He also has grade II varices, CAD, a-fib, DM2, and hypothyroidism. He presented to the ED with his wife who reported that the patient has been lethargic and has had chills the past two days. He was admitted on 6/26 due to altered mental status, fever, and elevated lactic acid, which was subsequently diagnosed as sepsis.    HPI: The patient gets weekly paracentesis and last one was done this past Thursday (6/21).  He has no history of having SBP. On admission, his temperature (102.7), heart rate (103- max 107), Resp rate (24 - max 32), SCr (1.86 - Crcl 40.2 ml/min), lactic acid (2.4), T bili (5.2 - was 1.4 in May), BUN (49), LFTs, and ammonia (54) were elevated. His HCO3 (17) and albumin were low (2.6; now 1.9). Of note, his ammonia was elevated to 112 in March 2018. He was satting well and his platelets (114) and white count were normal (7.8). They obtained a chest x-ray in the ED, which showed indistinct pulmonary opacities. They confirmed sepsis diagnosis at 20:02 based on him meeting 2 SIRS criteria and having elevated lactic acid levels. The source of infection is unknown. Blood cultures were obtained x 2. They administered fluids, vancomycin, levofloxacin, and pip/tazo in the ED. The levofloxacin was just a one-time dose and was discontinued.   Today, he is afebrile with normal RR. His Scr has improved down to 1.76 whereas his albumin has reduced further to 1.9. His blood cultures obtained from his  left arm came back positive for gram negative rods. These were then identified by [facility] as E. Coli with no testable resistance genes (CTX-M, KPC, VIM, IMP, NDM , OXA). Culture also later confirmed E. coli. The susceptibility testing is in progress. The culture of his ascites fluid revealed a single colony of gram negative rods. A urine culture found <10,000 colonies/mL of mixed urogenital adarsh.  Two additional blood cultures obtained 06:01 on 6/27 are pending. Current lines include 1 peripheral IV in the left arm.     Assessment:   E. coli bacteremia  Based on the patient s HR, RR, fever, lactic acid levels, and isolated organism is the blood, the patient meets criteria for sepsis. The source of the bacteria could potentially be translocation from the gut due to low albumin/oncotic pressure. The gram negative riana growing on the ascitic fluid culture indicates bacterascites, but the lack of PMNs indicates that there was not an inflammatory response consistent with SBP.      Because the gram stain from the patient s blood sample indicates only gram negative rods and the [facility] identified the organism as E. Coli with no resistance genes, ceftriaxone would be an excellent agent for treatment per the [facility] antibiogram (98% sensitivity of E. coli to ceftriaxone) and the institutional [facility] blood culture treatment protocol. Pip/tazo would also cover the organism identified, but has broader coverage than necessary. Ceftriaxone also has slightly better E. Coli coverage than pip/tazo per the [facility] antibiogram. The patient s vancomycin should be discontinued as gram positive coverage is currently not needed (no MRSA isolated).     Recommendations:  1). Discontinue vancomycin and piperacillin/tazobactam.  2). Initiate ceftriaxone 2 g IV every 24 hours pending further culture and susceptibility results.    Discussed with ID Staff - Dr. Dwight Samuels PharmD 4 Student    Current Anti-infective  Orders:  Anti-infectives (Future)    Start     Dose/Rate Route Frequency Ordered Stop    06/27/18 0850  piperacillin-tazobactam (ZOSYN) 4.5 g vial to attach to  mL bag      4.5 g  over 1 Hours Intravenous EVERY 6 HOURS 06/27/18 0848      06/26/18 2000  rifaximin (XIFAXAN) tablet 550 mg      550 mg Oral 2 TIMES DAILY 06/26/18 1333            Vitals and other clinical features  Vitals       06/25 0700  -  06/26 0659 06/26 0700  -  06/27 0659 06/27 0700  -  06/27 1513   Most Recent    Temp ( F)   98.4 -  102.7    97 -  98     97 (36.1)    Heart Rate   64 -  107    60 -  68     68    Resp   20 -  (!)32    18 -  20     18    BP   (!) 111/32 -  161/49    131/42 -  145/47     131/42    SpO2 (%)   96 -  100    98 -  100     100        Temperature curve:      Labs  Estimated Creatinine Clearance: 43 mL/min (based on Cr of 1.76).  Recent Labs   Lab Test  06/27/18   0627 06/26/18   0755  05/09/18   0808  05/03/18   0534  05/02/18   1350  04/16/18   0735   CR  1.76*  1.86*  1.55*  2.27*  2.22*  2.69*       Recent Labs   Lab Test  06/27/18   0627  06/26/18   2340  06/26/18   0755  06/14/18   0742  05/09/18   0808  05/03/18   0534  05/02/18   2238  05/02/18   1350   03/23/18   1022   03/13/18   1317   03/03/18   0804   WBC  4.8  6.0  7.8   --   4.2  3.4*   --   4.0   < >  5.4   < >  6.1   < >  3.5*   ANEU   --   4.8  6.9   --    --    --    --   3.0   --   3.8   --   5.2   --   2.3   ALYM   --   0.9  0.5*   --    --    --    --   0.5*   --   0.6*   --   0.4*   --   0.7*   JORGE   --   0.2  0.3   --    --    --    --   0.4   --   0.6   --   0.5   --   0.3   AEOS   --   0.1  0.1   --    --    --    --   0.1   --   0.3   --   0.0   --   0.2   HGB  7.1*  8.4*  8.9*   --   9.2*  7.5*  7.0*  5.7*   < >  8.1*   < >  8.6*   < >  7.7*   HCT  21.1*  25.0*  26.0*   --   29.4*  23.5*   --   18.6*   < >  25.3*   < >  28.0*   < >  25.4*   MCV  89  90  89   --   96  92   --   94   < >  94   < >  94   < >  94   PLT  74*  91*  114*  111*   81*  91*   --   109*   < >  123*   < >  108*   < >  105*    < > = values in this interval not displayed.       Recent Labs   Lab Test  06/27/18   0627  06/26/18   0755  05/09/18   0808  05/03/18   0534  05/02/18   1350  03/23/18   1022   BILITOTAL  4.2*  5.2*  1.4*  1.7*  1.1  0.9   ALKPHOS  186*  282*  226*  176*  210*  204*   ALBUMIN  1.9*  2.6*  2.5*  2.3*  2.6*  2.6*   AST  110*  145*  108*  95*  100*  110*   ALT  78*  106*  73*  62  68  62       Recent Labs   Lab Test  06/27/18   0628  06/26/18   1553   03/17/18   0418  03/16/18   0430   LACT  1.0  2.2*   < >   --    --    PCAL   --    --    --   6.36  7.13    < > = values in this interval not displayed.     No lab results found.    Invalid input(s): AMIK    Culture results with specimen source  Culture Micro   Date Value Ref Range Status   06/27/2018 No growth after 4 hours  Preliminary   06/27/2018 No growth after 4 hours  Preliminary   06/26/2018   Final    <10,000 colonies/mL  mixed urogenital adarsh  Susceptibility testing not routinely done     06/26/2018 Single colony  Gram negative rods   (A)  Preliminary   06/26/2018 Culture in progress  Preliminary   06/26/2018   Preliminary    Critical Value/Significant Value, preliminary result only, called to and read back by  Harrison Almazan RN on 6.27.18 at 1102. bw      Specimen Description   Date Value Ref Range Status   06/27/2018 Blood Left Hand  Final   06/27/2018 Blood Right Hand  Final   06/26/2018 Midstream Urine  Final   06/26/2018 Ascites  Final   06/26/2018 Ascites  Final   06/26/2018 Blood Left Arm  Final        Urine Studies     Recent Labs   Lab Test  06/26/18   1101  03/16/18   1430  03/13/18   1610  03/02/18   1807   URINEPH  5.0  5.5  5.5  5.0   NITRITE  Negative  Negative  Negative  Negative   LEUKEST  Negative  Trace*  Trace*  Negative   WBCU  1  2  3  1       CSF Testing  No lab results found.    Respiratory Virus Testing    Respiratory Virus Source   Date Value Ref Range Status   03/14/2018  Nasopharyngeal  Final     Influenza A   Date Value Ref Range Status   03/14/2018 Negative NEG^Negative Final     Influenza A, H1   Date Value Ref Range Status   03/14/2018 Negative NEG^Negative Final     Influenza A, H3   Date Value Ref Range Status   03/14/2018 Negative NEG^Negative Final     Influenza A 2009 H1N1   Date Value Ref Range Status   03/14/2018 Negative NEG^Negative Final     Influenza B   Date Value Ref Range Status   03/14/2018 Negative NEG^Negative Final     Respiratory Syncytial Virus A   Date Value Ref Range Status   03/14/2018 Negative NEG^Negative Final     Respiratory Syncytial Virus B   Date Value Ref Range Status   03/14/2018 Negative NEG^Negative Final     Parainfluenza Virus 1   Date Value Ref Range Status   03/14/2018 Negative NEG^Negative Final     Parainfluenza Virus 2   Date Value Ref Range Status   03/14/2018 Negative NEG^Negative Final     Parainfluenza Virus 3   Date Value Ref Range Status   03/14/2018 Negative NEG^Negative Final     Human Metapneumovirus   Date Value Ref Range Status   03/14/2018 Negative NEG^Negative Final     Human Rhinovirus   Date Value Ref Range Status   03/14/2018 Negative NEG^Negative Final     Adenovirus Species B/E   Date Value Ref Range Status   03/14/2018 Negative NEG^Negative Final     Adenovirus Species C   Date Value Ref Range Status   03/14/2018 Negative NEG^Negative Final     Last check of C difficile  No results found for: CDBPCT    Imaging:  Us Abd Cmpl Abd/pelvis Duplex Cmpl    Result Date: 6/26/2018  Examination: Abdominal ultrasound complete with Doppler 6/26/2018 2:35 PM Comparison: CT AP dated 5/2/2018, ultrasound abdomen dated 3/2/2018. History: Presents with altered mental status and fever History of HCC, evaluation for hepatic circulation and mass progression Findings: There is moderate ascites. The visualized aorta and IVC are unremarkable. LIVER: Cirrhotic configuration of the liver parenchyma. The liver measures 14 cm craniocaudally.  Ill-defined heterogeneous hyperechoic lesion in the right hepatic lobe measures up to 7.2 cm, not significantly changed from 5/2/2018 CT study given different imaging techniques. No intrahepatic biliary dilatation. GALL BLADDER: Unable to evaluate sonographic Delong's sign due to altered mental status. There are stones and biliary sludge within the gallbladder. The gallbladder wall is slightly thickened measures 7 mm likely due to adjacent hepatic intrinsic parenchymal disease. No pericholecystic effusion. The common bile duct is not identified. PANCREAS: Obscured due to overlying bowel gas. KIDNEY: The right kidney measures 12.0 cm. There is no hydronephrosis or nephrolithiasis. Unchanged parapelvic cysts measure up to 2.5 cm. DOPPLER:  The diameter of the main portal vein is 2.7 cm. Redemonstration of echogenic nonobstructive thrombus within the main portal vein and right portal vein. Flow is retrograde in splenic and portal veins: 67 cm/sec in the main portal vein. 88 cm/sec in the left portal vein. 73 cm/sec in the right portal vein. 79 cm/sec in the splenic vein Flow in the hepatic artery is towards the liver and: 145 cm/sec peak systolic 48 cm/sec minimum diastolic flow 0.71 resistive index Flow in the right hepatic artery is towards the liver and: 200 cm/sec peak systolic 79 cm/sec minimum diastolic flow 0.60 resistive index Flow in the left hepatic artery is towards the liver and: 243 cm/sec peak systolic 93 cm/sec minimum diastolic flow 0.65 resistive index The left, middle, and right hepatic veins are patent with flow towards the IVC. The IVC is patent with flow towards the heart.     Impression: 1.  Cirrhotic configuration with evidence of portal hypertension, including moderate ascites, retrograde blood flow within the portal veins. 2.  Large ill-defined heterogeneous mass within the right hepatic lobe measures up to 7.2 cm, overall not significant change from CT study on 5/2/2018 given different imaging  techniques. 3.  Nonobstructive thrombus within the main portal and right portal veins with retrograde blood flow in the splenic and portal veins. Overall portal hypertension worsening than 3/2/2018 ultrasound study given at that time the splenic and left portal veins blood flow was antegrade. 4.  Hepatic arteries and hepatic veins demonstrate normal direction of flow. I have personally reviewed the examination and initial interpretation and I agree with the findings. CLEOPATRA SIFUENTES MD    Us Paracentesis    Result Date: 6/21/2018  PROCEDURES 6/21/2018: 1. Ultrasound guided paracentesis. CLINICAL HISTORY: Ascites. Abdominal distention. Paracentesis requested. COMPARISONS: 6/14/2018 STAFF RADIOLOGIST: ADAM Guallpa MD I, HUBER GUALLPA MD, attest that I was present in the procedure room for the entire procedure. Medications: The patient was placed on continuous monitoring. No intravenous sedation was administered. The patient remained stable throughout the procedure. PROCEDURE: The patient understood the limitations, alternatives, and risks of the procedure and requested the procedure be performed. Both written and oral consent were obtained. Right lower quadrant was prepped and draped in the usual sterile fashion. 1% lidocaine without epinephrine was used for local anesthesia. Under ultrasound guidance, a 5 Sami AutoeBideh centesis needle catheter was advanced into the peritoneal space. Ultrasound image documenting ascites and needle catheter position was saved in the patient's record. Needle removed. Catheter to vacuum drainage. 3500 mL ascites aspirated. Catheter removed. Sterile dressing applied. No immediate complication.     IMPRESSION: Ultrasound guided paracentesis. 3500 mL ascites aspirated. HUBER GUALLPA MD    Xr Chest Port 1 View    Result Date: 6/26/2018  EXAM: XR CHEST PORT 1 VW  6/26/2018 8:09 AM HISTORY:  fever, AMS;    COMPARISON: Chest radiograph 3/14/2018 FINDINGS: Single portable semiupright view  of the chest. Cardiac silhouette is within normal limits and stable. Indistinct pulmonary vasculature. No pneumothorax. Predominantly perihilar and basilar hazy opacities. No significant pleural effusion.     IMPRESSION: Indistinct pulmonary vasculature with predominantly perihilar hazy opacities may represent mild pulmonary edema versus diffuse infection. I have personally reviewed the examination and initial interpretation and I agree with the findings. BRAULIO OTERO MD    Poc Us Guide For Paracentesis    Attending: Juwan Lee MD Resident: Morro Brown MD Procedure: Diagnostic and therapeutic paracentesis Indication: ascites, discomfort Pre-procedure diagnosis: KUHN cirrhosis with ascites, HCC Post-procedure diagnosis: Same The risks and benefits of the procedure were explained to the patient who expressed understanding and opted to proceed.  Consent was obtained and placed in the chart.  Ultrasound was used to find a pocket of ascites in the left lower quadrant. A time out was performed. The area was prepped and draped in the usual sterile fashion.  10 cc ml of 1% lidocaine was instilled and ascites located.  A small incision was made with an 11 blade. The 8F paracentesis catheter and needle were inserted until ascites obtained then the needle removed.  The apparatus was connected to vacuum bottles and a total of 3,400 ml removed. A specimen was sent for analysis. The catheter was withdrawn and the area dressed. Pre and post-procedure images were saved to the medical record.   Patient tolerated the procedure well with no immediate complications.  The primary team was informed of the procedure.   Dr. Lee was present for the duration of the procedure. Young Christian MD PGY-3   Attending Attestation I was present for the entirety of the procedure including pre and post-procedure image capture, time out, lidocaine injection, catheter insertion and withdrawal.   Juwan Lee MD Med-Peds Hospitalist Pager  [phone]

## 2018-06-27 NOTE — PLAN OF CARE
Problem: Patient Care Overview  Goal: Plan of Care/Patient Progress Review  Outcome: No Change  1909-1818: A&O but slow to respond/lethargic. Bed alarm in place overnight- Up with standby assist to bathroom. 5 urine occurrences & 3 stools overnight. Regular diet in place. NS infusing at 125ml/hr. Pt using home CPAP. No needs at this time, will report to oncoming staff.

## 2018-06-27 NOTE — PLAN OF CARE
Problem: Patient Care Overview  Goal: Plan of Care/Patient Progress Review  Outcome: No Change  D: Patient comfortable most of pm, up with one to two assist. More weaker later in pm. He has been alert but occas. Slower to respond. Took meds well, had several bms after lactulose, did not want later dose due to other dose so close in time. To have cpap on for night. P: Monitor vitals with fevers, level of consciousness, I and O.

## 2018-06-27 NOTE — PLAN OF CARE
Problem: Patient Care Overview  Goal: Plan of Care/Patient Progress Review  Outcome: No Change  A&O x3-4, VSS, up with SBA. Pt had 5 loose stools. AM lactulose given and afternoon dose held. Tele DC'd. Para completed in afternoon with 3.5L removed. MRCP ordered. MRI checklist sent down to department. IV zosyn and vanco. PIV leaking after para. Vascular access consult placed. Albumin to be given after access restablished.

## 2018-06-28 NOTE — PLAN OF CARE
Problem: Patient Care Overview  Goal: Plan of Care/Patient Progress Review  Outcome: No Change  Patient vitals were stable throughout shift on room air, though there was some concern over low blood pressure so metoprolol was held. Pt was experienced some pain in the buttocks due to prolonged sitting. Skin appeared normal with no redness or signs or injury. Rearranged bedding and added two pillows covered with an additional blanket for additional padding. Patient reported reduced pain and added comfort. Patient's spouse and granddaughter came to visit, staying for a couple hours. This visit was very therapeutic. Skin appeared intact around paracentesis and IV sites. Swelling was present particularly in the ankles. Patient reported diarrhea.

## 2018-06-28 NOTE — PROGRESS NOTES
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      In the ED, patient was noted to be febrile to 102.7*F and bilirubin elevation to 5.2 from 1.4 in May 2018. Liver tests were all slightly elevated with alkaline phosphatase of 282 from 226,  from 73, AST of 145 from 108 and a lactic acid of 2.4. WBC was normal. He was given vancomycin and pip-tazo and admitted to medicine. GI is consulted for further evaluation and management.      Now with blood cultures x2 with E coli, ascites growing GNRs. Concern for SBP (or BP) given recurrent instrumentation of ascites fluid/frequent paracentesis vs cholangitis (due to tumor or possibly choledocholithiasis?).     RECOMMENDATIONS:   - Please administer albumin for SBP management (1.5 mg/kg on day one and 1 mg/kg on day three)   - Perform therapeutic paracentesis with appropriate albumin replacement to minimize ascites prior to MR imaging   - Obtain MRCP for further evaluation of biliary tree, evaluate for choledocholithiasis   - Pending results of MRCP will consider EUS +/- ERCP in coming days, as appropriate   - Continue antibiotics for E Coli bacteremia   - Await speciation of ascites fluid culture   - Please consult palliative care for ongoing goals of care discussions given overall poor clinical prognosis    The patient was discussed and plan agreed upon with GI staff.    Angella Timmons MD  GI Consult  "Service  _______________________________________________________________  S: No acute events overnight. Nursing notes reviewed. Afebrile. Denies abdominal pain. Mental status improved. No nausea/vomiting. No fevers or chills. No chest pain or shortness of breath.     O:  Blood pressure (!) 117/35, temperature 97.8  F (36.6  C), temperature source Oral, resp. rate 18, height 1.727 m (5' 8\"), weight 94.8 kg (208 lb 15.9 oz), SpO2 99 %.     Gen: Alert, NAD  HEENT: + scleral icterus  CV: No edema  Lungs: No increased WOB  Abd: Soft, NT, + distension with fluid wave  Skin: slightly jaundiced; no rash  MS: WWP  Neuro: Alert and oriented x 3; no focal deficits      LABS:  BMP  Recent Labs  Lab 06/27/18 0627 06/26/18  0755    136   POTASSIUM 4.8 5.2   CHLORIDE 112* 107   HARI 7.6* 8.4*   CO2 16* 17*   BUN 47* 49*   CR 1.76* 1.86*   * 128*     CBC  Recent Labs  Lab 06/27/18 0627 06/26/18  2340 06/26/18  0755   WBC 4.8 6.0 7.8   RBC 2.37* 2.78* 2.93*   HGB 7.1* 8.4* 8.9*   HCT 21.1* 25.0* 26.0*   MCV 89 90 89   MCH 30.0 30.2 30.4   MCHC 33.6 33.6 34.2   RDW 21.4* 21.7* 21.8*   PLT 74* 91* 114*     INR  Recent Labs  Lab 06/27/18  0915 06/26/18  0755   INR 2.02* 1.66*     LFTs  Recent Labs  Lab 06/27/18 0627 06/26/18  0755   ALKPHOS 186* 282*   * 145*   ALT 78* 106*   BILITOTAL 4.2* 5.2*   PROTTOTAL 5.6* 7.4   ALBUMIN 1.9* 2.6*      PANCNo lab results found in last 7 days.    "

## 2018-06-28 NOTE — PROGRESS NOTES
Medicine Progress Note  Date: June 28, 2018      Changes:  1. Medications   Start Mucinex D BID   Discontinue Piperacillin-Tazobactam   start Ceftriaxone 2mg Q12  2. MRCP- Unable to be completed. Too much ascites fluid to get an image. 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 Currently working on finding source of infection and if there is structural obstruction causing cholangitis.    #Fever  #Acute Encephalopathy  #Bacteremia  #non-neutrophilic SBP - Acute presentation of high fever and encephalitis with elevated lactic acid WBC 7.8 is most likely due to infection. Initial blood cultures 2/2 and ascites fluid grew G- rods . Most likely etiology is still Cholangitis. He meets 3/5 criteria for [place] with Fever of 102.7, Jaundice/ rising bilirubin of 5.2, altered mental status, no RUQ tenderness or hypotension. This cholangitis is most likely from some biliary obstruction (US unable to visualize common duct). Patient also has recurrent GI bleeds and a dropping hemoglobin so infection could be coming from a GI bleed. SBP grew G- rods. Unclear if this is the origin of infection or secondary from blood entering from paracentesis on 6/21. Other possible Infectious etiology includes: 1.  Tick infections- Anaplasma often presents with fever and rising liver enzymes and the patient has a tick bite. However, we would not expect this presentation only 1 week after a known tick bite. Less likely Lyme with lack of rash or babesiosis. 2. Pneumonia- CXR showed a small right pleural effusion and fever and encephalopathy in  the elderly could be pneumonia. G- rods indicates possible aspiration pneumonia.   -New Blood Cultures Pending, Blood cultures from 6/27 were negative after 24hrs.  -Vancomycin started with Levaquin in ED. We discontinued Levaquin due to lack of anaerobic coverage and added Piperacillin-Tazobactam. Vancomycin discontinued 6/27, Pipercillin-Tazobactam discontinued 6/28. Bacteria are Ceftriaxone sensitive 2g q12.  -6/26 Ascites fluid albumin, WBC- 24 not elevated, fluid cultures + for G- rods. Ascites fluid pending from 6/27.  -US abdomen looking for obstruction  -AFP tumor marker pending  - Neuro checks Q4  -GI visited yesterday. Would like to do an MRCP for diagnostic purposes for infection location. They would also like a paracentesis with Albumin replacement done today. Still would like goals of care conference.  - Continue home Rifaximin 550mg 2x daily  - Continue home sodium bicarbonate 1,300mg oral  - Continue home lactulose solution 2g 3x daily    #Cough- Serge had slight right pleural effusion in RLL on admission with occasional cough. On 6/28 the cough has become more frequent and coarser.   - Mucinex D BID  - If sputum production or cough continues to worsen consider repeat CXR     #Transaminitis  #Elevated Bilirubin- Liver enzymes are more elevated from his baseline (current: ALT 68 , AST 99; baseline: ALT 60, ). Most likely etiology is cholangitis with the obstruction causing elevated bilirubin levels.  Other possibilities include tumor necrosis and hemolysis.  - Trend CMP  - Direct vs Indirect bilirubin ordered. Results bili total 5.2 direct 3.0. 6/27 total bilirubin 4.2, 6/28 bilirubin 3.7  - Tumor AFT was within normal limits  -MRCP to look for source of obstruction    #JUAN M on CKD III- Baseline Creatinine is typically 1.5 on admission it is slightly elevated at 1.8.  6/28 creatinine is still elevated at 1.7. This is most likely due to dehydration from Gram negative sepsis. Only 100mL of urine  output documented on 6/26. On 6/27 total urine output 600mL equivalent to 0.8mL/kg/hr.  - Continue to trend CMP  -Continue to monitor I&Os      #Chronic Anemia- Baseline hemoglobin typically trends downward from 9 to 7. Most recent PRBC transfusion was when Hgb was at 7.2 on 6/22 and he rebounded to 9.2. 6/26 Hgb 8.9, 6/27 Hgb down to 7.1. Today Hgb is 6.5 and 2 PRBC given. This chronic anemia is most likely from his unspecified GI bleeds and CKD with lack of EPO production. There are no signs of active bleeding.  -Trend CBC  -Type and screen done on 6/27, transfuse if Hgb lower than 7.0. Type and screen repeated on 6/28 and transfused this morning.      # Chronic Thrombocytopenia- Baseline thrombocytopenia tends to be between 100-120.  6/26 Platelets 114,  6/27 Platelets 74, 6/28 platelets 73. Thrombocytopenia likely due to cirrhosis from KUHN.  -Trend CBC       #High anionic gap metabolic acidosis  # Respiratory alkalosis  # Lactic Acidosis  - VBG (7.44, 24, 36,16) Lactic acid on admission 2.4 Most likely due to possible sepsis see ideas for specific etiologies above. Most recent lactate on 6/27 was 1.0  - 6/27 no further need to trend lactate.       #Atrial Fibrillation- Not treated with warfarin due to recurrent GI bleeds.  -Monitor with Telemetry. D/C on 6/27      #Diabetes Type II- glucose upon admission 128 baseline ranges from 100-140.  - Glucose monitoring POCT   - home dose of glargine injection 30 units      Chronic Medical Conditions  Hypothyroidism- Levothyroxine 150 mcg  Hypertension- metoprolol tartrate 25 mg        Subjective  Hemoglobin dropped to 6.5. Carrillo is having increased coughing. He describes the cough and coarse and has coughing fits. He has not produced any mucous. He also had some epigastric tenderness after the paracentesis which had resolved by this morning. He states that he is not currently in pain.    Objective  Temp: 98.1  F (36.7  C) Temp src: Oral BP: 135/48 Pulse: 66 Heart Rate:  "87 Resp: 20 SpO2: 98 % O2 Device: None (Room air)   Height: 172.7 cm (5' 8\") Weight: 94.8 kg (208 lb 15.9 oz) (206.9 lb)  Estimated body mass index is 31.78 kg/(m^2) as calculated from the following:    Height as of this encounter: 1.727 m (5' 8\").    Weight as of this encounter: 94.8 kg (208 lb 15.9 oz).      Physical  General: Alert and Awake able to carry a conversation at 0700. By 0930 lethargic and barely able to carry on conversation. Continually closing eyes and pausing while talking, speech more slurred.  Cardio: 2/6 systolic murmur heart loudest over aortic valve, regular rate and rhythm  Resp: Fine crackles RLL, clear to ascultation remaining lung fields bilaterally  Neuro: Oriented x3, no asterixis        Labs     6/28/2018 05:29   Sodium 139   Potassium 4.3   Chloride 112 (H)   Carbon Dioxide 17 (L)   Urea Nitrogen 40 (H)   Creatinine 1.70 (H)   GFR Estimate 40 (L)   GFR Estimate If Black 48 (L)   Calcium 8.0 (L)   Anion Gap 10   Albumin 3.2 (L)   Protein Total 6.1 (L)   Bilirubin Total 3.7 (H)   Alkaline Phosphatase 150   ALT 68   AST 99 (H)      6/28/2018 05:29   WBC 3.6 (L)   Hemoglobin 6.5 (LL)   Hematocrit 19.2 (L)   Platelet Count 73 (L)   RBC Count 2.15 (L)   MCV 89   MCH 30.2   MCHC 33.9   RDW 21.6 (H)   INR 1.91 (H)   Bacterial cultures 6/26- no growth after 24hrs      Imaging  MRCP reading pending.      Diet: Full adult diet after MRCP  IVF: no IVF  DVT: pneumatic compression device  Code: full  Dispo:    Taylor Carrillo  MS 3  Pager [phone]        "

## 2018-06-28 NOTE — PLAN OF CARE
Problem: Patient Care Overview  Goal: Plan of Care/Patient Progress Review  PT 5B: Orders received; Per RN, pt to receive 2 Units of blood this AM, requests hold evaluation until after transfusion. Will initiate as appropriate.     Update: multiple providers with pt throughout AM and pt needing time sensitive RN cares during transfusion. Pt with MRCP scheduled at 1PM today; will attempted evaluation later in PM as scheduling permits.

## 2018-06-28 NOTE — PROGRESS NOTES
INTERNAL MEDICINE PROGRESS NOTE    Serge Brambila ([id number]) admitted on 6/26/2018 06/28/2018    Assessment & Plan:  71 y/o male with PMHx significant for HCC of 11 x 5.9cm, KUHN cirrhosis, Grade II varices, CAD s/p RCA stent 2002, a-fib, DM2 and  Hypothyroidism was admitted due to fever of 103F, lethargy and elevated lactic acid; found to have E coli.     #Fever  #Sepsis  #E. Coli bacteremia  #Bacterial peritonitis with E.coli  #Cholangitis  Patient presented with fever of 102F and tachycardic. Normal WBC. Paracentesis without signs of SBP, but ascitic fluid positive for GNR. Blood cultures also positive for GNR. Suspect that source is hepatobiliary vs entry after paracentesis about one week before admission. Does have some features of cholangitis in the setting of altered anatomy from hepatic malignancy. Also, he had a tick bite a week ago, but rash not characteristic for lyme, however, can't rule out Anaplasma or Ehrichiosis as pt with elevated LFTs but no evidence of morulae on Peripheral smear. Repeat paracentesis on 06/27/18 with  and SAAG > 1.1.  - Discontinued Vanc  - Continue Zosyn  - Repeat cultures until negative for 48 hrs  - Repeat paracentesis gram stain and culture pending  - Urine cultures pending  - Anaplasma/Ehrliciosis serology pending  - MRCP (see below)    #Chronic anemia  #Chronic thrombocytopenia  He had a previous hospitalization about two months ago due to concerns of GI bleed. Known to have EV grade II. At that time required transfusions. GI evaluated patient and deferred EGD. Gets transfusions when required on visits for paracentesis. Last transfusion on 06/22/18, at that time hemoglobin 7.2. GI bleed appears to be slow ooze as patient without melena or hematochezia. Hemolysis and DIC labs negative. Thrombocytopenia probably related to liver disease and marrow suppression from tumor burden. Today, hemoglobin 6.5, did receive fluid bolus and high dose of albumin for SBP.  -  Transfused 2uPRBC  - Continue PPI  - Might need transfusion  - If hemoglobin down-trends after transfusion will speak with GI if EGD indicated since it was not performed in previous admission     #Acute metabolic encephalopathy  Believe this is related to sepsis. No meningeal signs and no focal neurological deficit. Can't rule out viral encephalitis, but history more consistent with pathology in hepatobiliary system. Ammonia not significantly elevated. Now improved since IV abx and fluids.   - Neuro checks q 4hrs     #Transaminitis  #Hyperbilirubinemia  #HCC, 11 x 5.9 cm  #KUHN cirrhosis, MELD 25  #Grade II EV  #Ascites  LFTs and Bilirubin downtrending. MELD 25, stable. US showed stable tumor. It also showed known non-obstructive thrombus in main portal vein and right portal vein, previously thought to be of tumor origin vs blood clot. Gallbladder wall thickened, but unable to visualize biliary duct system adequately.  In terms of his HCC, he was evaluated by IR few months ago and was not a candidate for radioembolization due to extensive lung shunting. There was ongoing discussion of systemic chemotherapy, but liver function precluded this. He saw Oncology to possibly start sorafenib, but family leanings towards hospice care.  AFP within normal range. No evidence of tumor lysis.   - MCRP today, depending on results might undergo EUS vs ERCP  - Trend LFTs  - Continue lactulose and rifaximin  - Holding lasix and aldactone  - GI consulted to evaluate if any palliative intervention in biliary tract is indicated  - Goals of care discussion ongoing     #Elevated lactic acid  #HAGMA  #?Respiratory alkalosis  #NAGMA  #JUAN M on CKD  Lactic acid normalized after fluids and antibiotics. Bicarb 16. He does take sodium bicarb for chronically decreased HCO3 levels. UA unremarkable. Cr stable at 1.7, baseline is around 1.5. JUAN M multifactorial as patient with GNR bacteremia, hyperbilirubinemia, sepsis.  Appears more comfortable in  "his respiratory status.   - Repeat Cr in the AM       #CAD s/p RCA stent 2002  #HTN  #HLD  - Continue metoprolol  - No aspirin due to GI bleed     #A-fib ([id number])  Not on warfarin due to GI bleed, has bled score > risk of thromboembolism from a-fib. Also, currently in sinus rhythm.     #DM2  - Continue home dose of lantus  - Medium insulin ss     #Hypothyroidism  - Continue home synthroid    FEN: regular diet  Prophylaxis: DVT mechanical, GI PPI  Disposition:  Pending sepsis evaluation and possible procedures  Code Status: FULL    Morro Brown MD PhD  Internal Medicine PGY2  [phone]    Patient was seen and discussed with Dr. Awad.    ==================================================================    Interval HistorySubjective:  No overnight events. Hungry. Has epigastric abdominal pain. Coughing but not productive. Denies fever, chills, night sweats, headaches. Rest of ROS negative.     Objective:  Most recent vital signs:  /48 (BP Location: Left arm)  Pulse 66  Temp 98.1  F (36.7  C) (Oral)  Resp 20  Ht 1.727 m (5' 8\")  Wt 94.8 kg (208 lb 15.9 oz)  SpO2 98%  BMI 31.78 kg/m2  Temp:  [97  F (36.1  C)-99.3  F (37.4  C)] 98.1  F (36.7  C)  Pulse:  [61-86] 66  Heart Rate:  [65-87] 87  Resp:  [18-20] 20  BP: (117-153)/(35-59) 135/48  SpO2:  [96 %-100 %] 98 %  Wt Readings from Last 2 Encounters:   06/27/18 94.8 kg (208 lb 15.9 oz)   06/22/18 92.6 kg (204 lb 1.6 oz)     Intake/Output Summary (Last 24 hours) at 06/28/18 1414  Last data filed at 06/28/18 1321   Gross per 24 hour   Intake             1540 ml   Output                0 ml   Net             1540 ml     Physical exam:  General: NAD, awake, alert  HEENT: EOMI, PERRL, mild scleral icterus   Cardiac: RRR, soft systolic murmur in right upper sternal border, no rubs or gallops  Respiratory: CTAB  GI: distended, mild no asterixis, no hepatomegaly on exam, +bs, mild epigastric tenderness  Extremities: +1 pitting edema in lower extremities, " [facility]  Skin: No acute lesions appreciated  Neuro: no focal neurological deficits    Labs:  Results for orders placed or performed during the hospital encounter of 06/26/18 (from the past 24 hour(s))   Glucose by meter   Result Value Ref Range    Glucose 140 (H) 70 - 99 mg/dL   Glucose by meter   Result Value Ref Range    Glucose 156 (H) 70 - 99 mg/dL   Blood culture   Result Value Ref Range    Specimen Description Blood Left Hand     Special Requests Received in aerobic bottle only     Culture Micro No growth after 5 hours    Blood culture   Result Value Ref Range    Specimen Description Blood Right Hand     Special Requests Received in aerobic bottle only     Culture Micro No growth after 5 hours    Comprehensive metabolic panel   Result Value Ref Range    Sodium 139 133 - 144 mmol/L    Potassium 4.3 3.4 - 5.3 mmol/L    Chloride 112 (H) 94 - 109 mmol/L    Carbon Dioxide 17 (L) 20 - 32 mmol/L    Anion Gap 10 3 - 14 mmol/L    Glucose 148 (H) 70 - 99 mg/dL    Urea Nitrogen 40 (H) 7 - 30 mg/dL    Creatinine 1.70 (H) 0.66 - 1.25 mg/dL    GFR Estimate 40 (L) >60 mL/min/1.7m2    GFR Estimate If Black 48 (L) >60 mL/min/1.7m2    Calcium 8.0 (L) 8.5 - 10.1 mg/dL    Bilirubin Total 3.7 (H) 0.2 - 1.3 mg/dL    Albumin 3.2 (L) 3.4 - 5.0 g/dL    Protein Total 6.1 (L) 6.8 - 8.8 g/dL    Alkaline Phosphatase 150 40 - 150 U/L    ALT 68 0 - 70 U/L    AST 99 (H) 0 - 45 U/L   CBC with platelets   Result Value Ref Range    WBC 3.6 (L) 4.0 - 11.0 10e9/L    RBC Count 2.15 (L) 4.4 - 5.9 10e12/L    Hemoglobin 6.5 (LL) 13.3 - 17.7 g/dL    Hematocrit 19.2 (L) 40.0 - 53.0 %    MCV 89 78 - 100 fl    MCH 30.2 26.5 - 33.0 pg    MCHC 33.9 31.5 - 36.5 g/dL    RDW 21.6 (H) 10.0 - 15.0 %    Platelet Count 73 (L) 150 - 450 10e9/L   INR   Result Value Ref Range    INR 1.91 (H) 0.86 - 1.14     Internal Medicine Staff Addendum  Date of Service: 6/28/2018  I have seen and examined Mr Brambila, reviewed the data and discussed the plan of care with the care  team on rounds.  I agree with the above documentation with the additions/changes to the ROS, HPI, Exam or data (including my edits in italics):    I discussed pt's care with bedside RN, case management/social work today.  I personally reviewed, labs, medications and past 24 hr notes.  Assessment/Plan/Diagnoses: plan/dx as above, which contains my edits and reflects our joint medical decision-making.     Jean Awad MD PhD  Internal Medicine Hospitalist & Staff Physician   of Internal Medicine   [facility]  Pager: [phone]

## 2018-06-28 NOTE — PLAN OF CARE
Problem: Patient Care Overview  Goal: Plan of Care/Patient Progress Review  Outcome: No Change  Pt A&O x3-4, reporting more lethargy later in afternoon. Morning lactulose given and afternoon dose held. Pt had 5 loose stools. VSS. Low hgb in am and received 2 units of blood. IV zosyn changed to IV ceftri. NPO except medication all shift. Pt awaiting MRCP.

## 2018-06-28 NOTE — PLAN OF CARE
Problem: Patient Care Overview  Goal: Plan of Care/Patient Progress Review  [id number]: A&O but slow to respond. Bed alarm in place overnight- Up with standby assist to bathroom. 3 stools overnight. Kept pt NPO overnight for possible MRCP at some point today. Pt using home CPAP. No needs at this time, will report to oncoming staff.

## 2018-06-28 NOTE — PLAN OF CARE
Problem: Patient Care Overview  Goal: Plan of Care/Patient Progress Review  PT 5B: pt OOR upon attempt in PM; will reschedule evaluation to 6/29.

## 2018-06-28 NOTE — PROGRESS NOTES
Brief GI Note  06/28/18    Chart reviewed. Plan today was for therapeutic paracentesis and MRCP today, unfortunately still too much ascites for evaluation of biliary tree.     Please obtain LFTs in AM, make patient NPO @ 0000. We will re-assess in AM for possible EUS.     Discussed with Dr. Emmanuel.     Angella Timmons MD  Gastroenterology Fellow

## 2018-06-29 NOTE — PROGRESS NOTES
Care Coordinator Progress Note    Admission Date/Time:  6/26/2018  Attending MD:  Jean Awad, *    Data  Chart reviewed, discussed with interdisciplinary team.   Patient was admitted for:    Fever, unspecified fever cause  Altered mental status, unspecified altered mental status type  Hepatic encephalopathy (H)  Sepsis, due to unspecified organism (H)  History of hepatocellular carcinoma.    Concerns with insurance coverage for discharge needs: None.  Current Living Situation: Patient lives with spouse.  Support System: Supportive and Involved  Services Involved: OP paracentesis  Transportation at Discharge: wife  Transportation to Medical Appointments:   - wife  Barriers to Discharge: chronically ill and pending medical clearance    Coordination of Care and Referrals: Provided patient/family with options for Home Care.        Assessment  Patient has a PMHx significant for HCC of 11 x 5.9cm, KUHN cirrhosis, Grade II varices, CAD s/p RCA stent 2002, a-fib, DM2 and Hypothyroidism was admitted due to fever of 103F, lethargy and elevated lactic acid; found to have E coli.     Spoke with patient at bedside.  Introduced RNCC role and discharge planning.  Patient lives in a split level house with his wife.  He has a walker, but doesn't use it inside the house because he has stairs to get up and down.  There are grab bars in bathtub.  Wife works during the day.  PT recommends home care for PT.  Patient is agreeable and would like to use [facility].  Referral and order placed.  He goes to [facility] for weekly OP paracentesis.  States wife will provide transportation upon discharge.  Will continue to follow for discharge needs.    _______________________  [facility]  Phone  [phone]  Fax  [phone]  ______________________      Plan  Anticipated Discharge Date:  TBD  Anticipated Discharge Plan:  Home with [facility]    Yue Marquis RN, BSN  Care Coordinator [facility]  Pager: [phone]  Phone:  [phone]

## 2018-06-29 NOTE — PLAN OF CARE
Problem: Patient Care Overview  Goal: Plan of Care/Patient Progress Review  Discharge Planner PT 5B evaluation  Patient plan for discharge: adamantly desires to return home  Current status: Pt completes bed mobility and functional sit <> stand transfers with SBA. Ambulates 2 x 250' with FWW and SBA, slowed gait speed with no LOB noted; does have occasional pathway deviations however pt states this is due to the wheels on the walker. Completes 2 x 4 stairs with unilateral rail support and SBA using step-to gait pattern.   Barriers to return to prior living situation: limited assist at home, balance, fatigue, weakness  Recommendations for discharge: pt adamantly declining rehab need or placement; home with increased supervision and HH PT; use of FWW  Rationale for recommendations: pt not agreeable to rehab placement due to previous experiences, currently completing mobility with adequate safety for return home; would benefit from increased supervision and HH PT to safety due to # of stairs in the house.        Entered by: Shiv Jolley 06/29/2018 9:08 AM

## 2018-06-29 NOTE — ANESTHESIA PREPROCEDURE EVALUATION
Anesthesia Evaluation     .             ROS/MED HX    ENT/Pulmonary:     (+)tobacco use, Past use , . .    Neurologic:       Cardiovascular:     (+) hypertension--CAD, -past MI,-stent,2002  . : . . . :. .       METS/Exercise Tolerance:     Hematologic:         Musculoskeletal:         GI/Hepatic:     (+) liver disease,       Renal/Genitourinary:         Endo:     (+) type II DM thyroid problem hypothyroidism, .      Psychiatric:         Infectious Disease:         Malignancy:   (+) Malignancy History of Other  Other CA hepatocellular carcinoma status post         Other:                     Physical Exam      Airway   Mallampati: II  TM distance: >3 FB  Neck ROM: full    Dental     Cardiovascular   Rhythm and rate: regular      Pulmonary    breath sounds clear to auscultation                    Anesthesia Plan      History & Physical Review  History and physical reviewed and following examination; no interval change.    ASA Status:  3 .    NPO Status:  > 8 hours    Plan for MAC with Intravenous induction. Maintenance will be TIVA.  Reason for MAC:  Other - see comments  PONV prophylaxis:  Ondansetron (or other 5HT-3)       Postoperative Care  Postoperative pain management:  IV analgesics.      Consents  Anesthetic plan, risks, benefits and alternatives discussed with:  Patient..            Procedure: Procedure(s):  ENDOSCOPIC ULTRASOUND, COMBINED ESOPHAGOSCOPY, GASTROSCOPY, DUODENOSCOPY  - Wound Class: II-Clean Contaminated   - Wound Class: II-Clean Contaminated    HPI: Serge Brambila is a 72 year old male scheduled for EGD, was admitted 06/26 with fevers, concering for sepsis - e coli bacteremia and found from perocentesis.  Patient PMH significant for hepato cellular carcinoma - has previously undergone upper endoscopies for concerns of bleeding, tolerated anesthesia.  PMH also significant for DMII, CAD - h/o MI s/p PCI '02, hypothyroid, a fib (but NSR on ECG from 06/18), HTN, h/o smoking (10pk/yrs)  Plan for  MAC    PMHx/PSHx:  Past Medical History:   Diagnosis Date     Atrial fibrillation (H)      Diabetes (H)     type II     Hepatic encephalopathy (H)      Hypertension      MI (myocardial infarction)     stent placement     Sleep apnea      Thyroid disease        Past Surgical History:   Procedure Laterality Date     APPENDECTOMY  age 16     COLONOSCOPY       ESOPHAGOSCOPY, GASTROSCOPY, DUODENOSCOPY (EGD), COMBINED N/A 1/24/2018    Procedure: COMBINED ESOPHAGOSCOPY, GASTROSCOPY, DUODENOSCOPY (EGD);  ESOPHAGOGASTRODUODENOSCOPY (MAC) ;  Surgeon: Colton Stroud MD;  Location:  GI     GI SURGERY  2012    partial colectomy for pre-CA nodule, removed 10 in.         No current facility-administered medications on file prior to encounter.   Current Outpatient Prescriptions on File Prior to Encounter:  BASAGLAR 100 UNIT/ML injection Inject 30 Units Subcutaneous At Bedtime    furosemide (LASIX) 20 MG tablet Take 2 tablets (40 mg) by mouth daily   lactulose (CHRONULAC) 10 GM/15ML solution 30 mLs (20 g) by Oral or NG Tube route 3 times daily   LEVOTHYROXINE SODIUM PO Take 150 mcg by mouth daily    metoprolol tartrate (LOPRESSOR) 25 MG tablet Take 1 tablet (25 mg) by mouth 2 times daily   pantoprazole (PROTONIX) 20 MG EC tablet Take 2 tablets (40 mg) by mouth 2 times daily   rifaximin (XIFAXAN) 550 MG TABS tablet Take 1 tablet (550 mg) by mouth 2 times daily   sodium bicarbonate 650 MG tablet Take 2 tablets (1,300 mg) by mouth 2 times daily   spironolactone (ALDACTONE) 50 MG tablet Take 1 tablet (50 mg) by mouth daily   blood glucose monitoring (NO BRAND SPECIFIED) test strip 1 each   blood glucose monitoring (ONETOUCH ULTRA) test strip TEST TWICE DAILY TO THREE TIMES DAILY   Blood Pressure Monitoring (RA BLOOD PRESSURE CUFF MONITOR) MISC by Misc.(Non-Drug; Combo Route) route once daily.   [employer] LANCETS 21G MISC by Misc.(Non-Drug; Combo Route) route once daily.   Nitroglycerin (NITROQUICK SL) Place under the tongue as  needed        Social Hx:   Social History   Substance Use Topics     Smoking status: Former Smoker     Packs/day: 1.00     Years: 10.00     Quit date: 1/1/1999     Smokeless tobacco: Never Used     Alcohol use No       Allergies:   Allergies   Allergen Reactions     Penicillins      Happened in his teens, unsure what his reaction was. Tolerated ceftriaxone and Zosyn 3/2018         NPO Status: Per ASA Guidelines    Labs:    Blood Bank:  Lab Results   Component Value Date    ABO O 06/27/2018    RH Pos 06/27/2018    AS Neg 06/27/2018     BMP:  Recent Labs   Lab Test  06/29/18   0540   NA  140   POTASSIUM  4.2   CHLORIDE  113*   CO2  15*   BUN  37*   CR  1.67*   GLC  119*   HARI  8.2*     CBC:   Recent Labs   Lab Test  06/29/18   0540   WBC  3.7*   RBC  2.78*   HGB  8.4*   HCT  24.6*   MCV  89   MCH  30.2   MCHC  34.1   RDW  20.6*   PLT  72*     Coags:  Recent Labs   Lab Test  06/29/18   0540   06/27/18   0915   INR  1.85*   < >  2.02*   FIBR   --    --   355    < > = values in this interval not displayed.       Heber Nino MD  Staff Anesthesiologist  [phone]

## 2018-06-29 NOTE — PLAN OF CARE
Problem: Patient Care Overview  Goal: Plan of Care/Patient Progress Review  Outcome: Improving  Pt alert and oriented x 4. Vitals stable on room air throughout shift. Pain in buttocks improved throughout shift. Chose not to take his Lactulose in the evening as he was still having lose stools. Installed pnuemo-boots and Pt is wearing them to sleep. Pt ambulated from bed to chair multiple times today which helped with mood and pain. Also walked while holding onto wheel chair around entire floor (approx 275 meters), while wearing a gait belt, requiring minimal assistance with corrections steering the wheel chair. Pt was slightly upset earlier in the day due to his image being postponed, particularly since the labs woke him up and he had to fast. Roughly 10 family members visited throughout the day, which greatly improved patients mood.

## 2018-06-29 NOTE — DISCHARGE INSTRUCTIONS
_______________________  [facility]  [phone]  [place]  [phone]  ______________________

## 2018-06-29 NOTE — PROGRESS NOTES
Gastroenterology Inpatient Sign Off Note    Inpatient GI consults service will sign off. No further recommendations at this time. If primary team has addition questions, please page consult fellow listed in [place].    Current GI Consult Staff: Dr. Emmanuel     Follow up recommendations:   No outpatient GI clinic follow-up indicated. Follow-up with primary care provider at timing determined by discharge physician.    If outpatient GI follow-up is felt indicated by primary care, they may coordinate this by placing new GI referral and contacting: [facility] ([place] - ([phone])     Angella Timmons MD  Gastroenterology Fellow

## 2018-06-29 NOTE — PROGRESS NOTES
Medicine Progress Note  Date: June 29, 2018      Changes:  1. EUS today  2. Potential meeting about big picture goals of care this afternoon.  3. 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 Currently working on finding source of infection and if there is structural obstruction causing cholangitis.    #Fever  #Acute Encephalopathy  #Bacteremia  #non-neutrophilic SBP - Acute presentation of high fever and encephalitis with elevated lactic acid WBC 7.8 is most likely due to infection on 6/26. Initial blood cultures on 6/26 2/2 and ascites fluid grew G- rods . Most likely etiology is still Cholangitis. He meets 3/5 criteria for [place] with Fever of 102.7, Jaundice/ rising bilirubin of 5.2, altered mental status, no RUQ tenderness or hypotension. This cholangitis is most likely from some biliary obstruction (US unable to visualize common duct). Patient also has recurrent GI bleeds and a dropping hemoglobin so infection could be coming from a GI bleed. SBP grew G- rods. Unclear if this is the origin of infection or secondary from blood entering from paracentesis on 6/21. Blood cultures drawn on 6/27 were negative for bacterial growth at 48hr, cultures from 6/28 negative at 24hrs.  Other possible Infectious etiology includes: 1.  Tick infections- Anaplasma often presents with fever and rising liver enzymes and the patient has a tick bite. However, we would not expect this presentation only 1 week after a known tick bite. Less likely Lyme with lack of rash or babesiosis. 2. Pneumonia- CXR showed a small right pleural effusion and fever and encephalopathy in the elderly could be pneumonia. G- rods  indicates possible aspiration pneumonia.     -Vancomycin started with Levaquin in ED. We discontinued Levaquin due to lack of anaerobic coverage and added Piperacillin-Tazobactam. Vancomycin discontinued 6/27, Pipercillin-Tazobactam discontinued 6/28. Bacteria are Ceftriaxone sensitive 2g q12.  -6/26 Ascites fluid albumin, WBC- 24 not elevated, fluid cultures + for G- rods. Ascites fluid pending from 6/27 negative.    -AFP tumor marker pending within normal limits  - Neuro checks Q4  -GI will be doing EUS today  - Continue home Rifaximin 550mg 2x daily  - Continue home sodium bicarbonate 1,300mg oral  - Continue home lactulose solution 2g 3x daily    #Transaminitis  #Elevated Bilirubin- Liver enzymes are more elevated from his baseline (current: ALT 70 , ; baseline: ALT 60, ). Most likely etiology is cholangitis with the obstruction causing elevated bilirubin levels (current 3.7 on admission 5.2).  Other possibilities include tumor necrosis and hemolysis.  - Trend CMP  - Direct vs Indirect bilirubin ordered. Results bili total 5.2 direct 3.0. 6/27 total bilirubin 4.2, 6/28 bilirubin 3.7  - Tumor AFT was within normal limits  -MRCP unable to visualize bile ducts due to too much ascites fluid  -EUS to look for source of obstruction     #JUAN M on CKD III- Baseline Creatinine is typically 1.5 on admission it is slightly elevated at 1.8.  6/28 creatinine was 1.7 today it is trending down to 1.67. This is most likely from  from Gram negative sepsis. Only 100mL of urine output documented on 6/26. On 6/27 total urine output 600mL equivalent to 0.8mL/kg/hr. I&O are no longer being tracked.  -Continue to trend CMP  -Continue to monitor I&Os     #Chronic Anemia- Baseline hemoglobin typically trends downward from 9 to 7. Most recent PRBC transfusion was when Hgb was at 7.2 on 6/22. He recieves them about 1x/ month. 6/28 Hgb is 6.5 and 2 PRBC given, he responded well Hgb 8.8. On 6/29 Hgb down to 8.4 This chronic  "anemia is most likely from his unspecified GI bleeds and CKD with lack of EPO production. There are no signs of active bleeding.  -Trend CBC  -Type and screen done on 6/27, transfuse if Hgb lower than 7.0. Type and screen repeated on 6/28 and transfused this morning.  - GI is aware that he seems to have Hgb. Dropping faster than expected for someone who is transfused 1x per month      # Chronic Thrombocytopenia- Baseline thrombocytopenia tends to be between 100-120.  6/26 Platelets 114,  6/27 Platelets 74, 6/28 platelets 73. Thrombocytopenia likely due to cirrhosis from KUHN.  -Trend CBC        #Cough resolved- Serge had slight right pleural effusion in RLL on admission with occasional cough. On 6/28 the cough has become more frequent and coarser.   - Mucinex D BID  - If sputum production or cough continues to worsen consider repeat CXR      #High anionic gap metabolic acidosis  # Respiratory alkalosis  # Lactic Acidosis- Resolved    VBG (7.44, 24, 36,16) Lactic acid on admission 2.4 Most likely due to possible sepsis see ideas for specific etiologies above. Most recent lactate on 6/27 was 1.0  - 6/27 no further need to trend lactate.     #Atrial Fibrillation- Not treated with warfarin due to recurrent GI bleeds.  -Monitor with Telemetry. D/C on 6/27      #Diabetes Type II- glucose upon admission 128 baseline ranges from 100-140.  - Glucose monitoring POCT   - home dose of glargine injection 30 units    Chronic Medical Conditions  Hypothyroidism- Levothyroxine 150 mcg  Hypertension- metoprolol tartrate 25 mg    Subjective  No acute events overnight. The cough has improved with Mucinex D. Not in any acute pain. Abdomen is more distended than yesterday.    Objective  Temp: 97  F (36.1  C) Temp src: Axillary BP: 145/55 Pulse: 61 Heart Rate: 60 Resp: 18 SpO2: 100 % O2 Device: BiPAP/CPAP   Height: 172.7 cm (5' 8\") Weight: 95.3 kg (210 lb 1.6 oz)  Estimated body mass index is 31.95 kg/(m^2) as calculated from the " "following:    Height as of this encounter: 1.727 m (5' 8\").    Weight as of this encounter: 95.3 kg (210 lb 1.6 oz).      Physical  General: tired and lethargic this morning, awake and alert by 0900  Cardio: 2/6 systolic murmur loudest over right sternal border.  Resp: soft crackles Right lower lobe, remaining lung fields clear to ascultation.  GI: Bowel sounds present, distended and tense abdomen. Non tender to light or deep palpation.  Neuro: no asterixis, oriented x3      Labs     6/29/2018 05:40   Sodium 140   Potassium 4.2   Chloride 113 (H)   Carbon Dioxide 15 (L)   Urea Nitrogen 37 (H)   Creatinine 1.67 (H)   GFR Estimate 41 (L)   GFR Estimate If Black 49 (L)   Calcium 8.2 (L)   Anion Gap 11   Albumin 2.8 (L)   Protein Total 6.2 (L)   Bilirubin Total 3.7 (H)   Alkaline Phosphatase 153 (H)   ALT 70    (H)      6/29/2018 05:40   WBC 3.7 (L)   Hemoglobin 8.4 (L)   Hematocrit 24.6 (L)   Platelet Count 72 (L)   RBC Count 2.78 (L)   MCV 89   MCH 30.2   MCHC 34.1   RDW 20.6 (H)   INR 1.85 (H)     Blood Cultures 6/27 after 48hrs negative. 6/28 after 24hrs negative.     Ascites fluid negative for bacterial growth on 6/27.    Imaging  MRCP- unable to be completed due to poor visualization with too much ascites fluid    EUS to be done by GI      Diet: NPO at 0000 for operation, after that Regular adult diet as tolerated  IVF: none  DVT: pneumatic compression device  Code: full  Dispo:    Taylor Carrillo  MS 3  Pager [phone]        "

## 2018-06-29 NOTE — BRIEF OP NOTE
[facility], [place]    Brief Operative Note    Pre-operative diagnosis: Suspected Cholengitis  Post-operative diagnosis *Non-dilated common bile duct without stones*  Procedure: Procedure(s):  ENDOSCOPIC ULTRASOUND, COMBINED ESOPHAGOSCOPY, GASTROSCOPY, DUODENOSCOPY  - Wound Class: II-Clean Contaminated   - Wound Class: II-Clean Contaminated  Surgeon: Surgeon(s) and Role:     * Dakota Emmanuel MD - Primary  Anesthesia: General   Estimated blood loss: None  Drains: None  Specimens: * No specimens in log *  Findings:   EGD with large grade 2 varices involving nearly entire esophagus. No stigmata of recent bleeding. No indication for treatment in light of lack of acute bleeding and overall prognosis.  Portal gastropathy which may account for a component of anemia.  Marked ascites. Large venous collaterals in left upper quadrant. No gastric varices.  Common bile duct 2 mm distally to 4 mm proximally. No stones in duct.  Few stones and sludge in gallbladder.  Shrunken cirrhotic liver.  Multiple non-specific lymph nodes in marion hepatis and celiac region.  Complications: None.  Implants: None.    No indication for ERCP.    Findings suggest bacterial peritonitis as underlying etiology for recent sepsis.

## 2018-06-29 NOTE — ANESTHESIA POSTPROCEDURE EVALUATION
Patient: Serge Brambila    Procedure(s):  ENDOSCOPIC ULTRASOUND with upper endoscopy  - Wound Class: II-Clean Contaminated   - Wound Class: II-Clean Contaminated    Diagnosis:Suspected Cholengitis  Diagnosis Additional Information: No value filed.    Anesthesia Type:  MAC    Note:  Anesthesia Post Evaluation    Patient location during evaluation: PACU  Patient participation: Able to fully participate in evaluation  Level of consciousness: awake  Pain management: adequate  Airway patency: patent  Cardiovascular status: acceptable  Respiratory status: acceptable  Hydration status: acceptable  PONV: none     Anesthetic complications: None          Last vitals:  Vitals:    06/29/18 0700 06/29/18 1114 06/29/18 1422   BP: 145/55 114/50 155/59   Pulse: 61     Resp: 18 18 16   Temp: 36.1  C (97  F) 35.8  C (96.4  F) 36.5  C (97.7  F)   SpO2: 100% 100% 99%         Electronically Signed By: Christen Lynch MD  June 29, 2018  5:05 PM

## 2018-06-29 NOTE — PLAN OF CARE
Problem: Patient Care Overview  Goal: Plan of Care/Patient Progress Review  Outcome: No Change  D/I: Alert and oriented x4. Vitally stable on room air. Hgb is 8.8. Up to bathroom x1 and no evidence of blood in stool. NPO  for possible EUS today.   P: Continue to monitor GI status and care per plan

## 2018-06-29 NOTE — PROGRESS NOTES
06/29/18 0823   Quick Adds   Type of Visit Initial PT Evaluation   Living Environment   Lives With spouse   Living Arrangements house   Home Accessibility bed and bath are not on the first floor;bed and bath on same level;stairs within home;stairs to enter home;grab bars present (bathtub);stairs (1 railing present)   Number of Stairs to Enter Home 1   Number of Stairs Within Home 24  (4 level; split level home, 8 stairs to each level)   Stair Railings at Home (1-2 railings inside)   Transportation Available car;family or friend will provide   Living Environment Comment Pt's wife works and is gone during the day per pt report; has total of 24 stairs int he home; main entrance at platform level with 8 stairs leading down to walk in shower in basement; 8 stairs up from entry leads to kitchen and living room; and an additional 8 stairs up to reach bedroom and bathroom on top floor.    Self-Care   Dominant Hand right   Usual Activity Tolerance moderate   Current Activity Tolerance fair   Regular Exercise yes   Activity/Exercise Type strength training;walking;biking   Exercise Amount/Frequency 3-5 times/wk  (has not done for last month)   Equipment Currently Used at Home cpap;walker, rolling   Activity/Exercise/Self-Care Comment Pt reports he typically does exercises at home but has not for performed for last month. Owns FWW and CPAP machine.   Functional Level Prior   Ambulation 0-->independent   Transferring 0-->independent   Toileting 0-->independent   Bathing 0-->independent  (would call wife before/after )   Dressing 0-->independent   Eating 0-->independent   Communication 0-->understands/communicates without difficulty   Swallowing 0-->swallows foods/liquids without difficulty   Cognition 1 - attention or memory deficits   Fall history within last six months no   Which of the above functional risks had a recent onset or change? ambulation;transferring   Prior Functional Level Comment Pt independent at baseline;  does notify his spouse pre/post shower if she is not home. Reports he has used a shower chair in the past but does not currently endorse owning one; does have grab bars present in the shower.   General Information   Onset of Illness/Injury or Date of Surgery - Date 06/26/18   Referring Physician Morro Brown MD   Patient/Family Goals Statement pt states 'to get out of here'   Pertinent History of Current Problem (include personal factors and/or comorbidities that impact the POC) 73 y/o male with PMHx significant for HCC of 11 x 5.9cm, KUHN cirrhosis, Grade II varices, CAD s/p RCA stent 2002, a-fib, DM2 and  Hypothyroidism was admitted due to fever of 103F, lethargy and elevated lactic acid; found to have E coli.    Precautions/Limitations fall precautions   Weight-Bearing Status - LUE full weight-bearing   Weight-Bearing Status - RUE full weight-bearing   Weight-Bearing Status - LLE full weight-bearing   Weight-Bearing Status - RLE full weight-bearing   General Observations pleasant and agreeable   General Info Comments activity: up with assist   Cognitive Status Examination   Orientation orientation to person, place and time   Level of Consciousness alert   Follows Commands and Answers Questions 100% of the time   Personal Safety and Judgment intact   Memory intact   Cognitive Comment pt is alert and oriented; has occasional word finding difficulties   Pain Assessment   Patient Currently in Pain No   Integumentary/Edema   Integumentary/Edema Comments 1+ pitting BLE to mid calf   Posture    Posture Forward head position   Range of Motion (ROM)   ROM Comment BUE/BLE wfl   Strength   Strength Comments BUE/BLE > 3+/5 per observation of functional mobility    Bed Mobility   Bed Mobility Comments SBA supine <> sit and scooting in bed   Transfer Skills   Transfer Comments SBA sit <> Stand from chair and bed height, up to FWW for support   Gait   Gait Comments Amb x 30' with FWW and SBA; slowed gait speed; occasional  "pathway deviation   Balance   Balance Comments SBA with FWW static balance and during ambulation   Sensory Examination   Sensory Perception Comments denies deficits    General Therapy Interventions   Planned Therapy Interventions balance training;bed mobility training;gait training;neuromuscular re-education;strengthening;transfer training;progressive activity/exercise   Clinical Impression   Criteria for Skilled Therapeutic Intervention yes, treatment indicated   PT Diagnosis impaired functional mobility   Influenced by the following impairments weakness, fatigue, balance deficits   Functional limitations due to impairments decreased tolerance and safety with independent mobility   Clinical Presentation Stable/Uncomplicated   Clinical Presentation Rationale pt presentation, clinical reasoning   Clinical Decision Making (Complexity) Low complexity   Therapy Frequency` (6x/week)   Predicted Duration of Therapy Intervention (days/wks) 1 week   Anticipated Equipment Needs at Discharge (shower chair? - clarify if already owns)   Anticipated Discharge Disposition Home with Assist;Home with Home Therapy   Risk & Benefits of therapy have been explained Yes   Patient, Family & other staff in agreement with plan of care Yes   Clinical Impression Comments evaluation completed, treatment initiated   [facility] [employer] \"6 Clicks\"   2016, Trustees of [facility], under license to [employer].  All rights reserved.   6 Clicks Short Forms Basic Mobility Inpatient Short Form   [facility] AM-PAC  \"6 Clicks\" V.2 Basic Mobility Inpatient Short Form   1. Turning from your back to your side while in a flat bed without using bedrails? 4 - None   2. Moving from lying on your back to sitting on the side of a flat bed without using bedrails? 4 - None   3. Moving to and from a bed to a chair (including a wheelchair)? 3 - A Little   4. Standing up from a chair using your arms (e.g., wheelchair, or bedside chair)? 3 - A " Little   5. To walk in hospital room? 3 - A Little   6. Climbing 3-5 steps with a railing? 3 - A Little   Basic Mobility Raw Score (Score out of 24.Lower scores equate to lower levels of function) 20   Total Evaluation Time   Total Evaluation Time (Minutes) 10

## 2018-06-29 NOTE — ANESTHESIA CARE TRANSFER NOTE
Patient: Serge Brambila    Procedure(s):  ENDOSCOPIC ULTRASOUND with upper endoscopy  - Wound Class: II-Clean Contaminated   - Wound Class: II-Clean Contaminated    Diagnosis: Suspected Cholengitis  Diagnosis Additional Information: No value filed.    Anesthesia Type:   MAC     Note:  Airway :Nasal Cannula  Patient transferred to:PACU  Handoff Report: Identifed the Patient, Identified the Reponsible Provider, Reviewed the pertinent medical history, Discussed the surgical course, Reviewed Intra-OP anesthesia mangement and issues during anesthesia, Set expectations for post-procedure period and Allowed opportunity for questions and acknowledgement of understanding      Vitals: (Last set prior to Anesthesia Care Transfer)    CRNA VITALS  6/29/2018 1542 - 6/29/2018 1623      6/29/2018             SpO2: 98 %    Resp Rate (observed): 12    EKG: Sinus rhythm                Electronically Signed By: YOLI Lim CRNA  June 29, 2018  4:23 PM

## 2018-06-29 NOTE — PLAN OF CARE
Problem: Patient Care Overview  Goal: Plan of Care/Patient Progress Review  Outcome: No Change  Alert, oriented x4. Reporting frustrations with delay of MRCP and then further frustrated when MRCP could not provide adequate images needed. NPO after midnight for possible EUS tomorrow. Monitor GI status. Administer Lantus when pen available.

## 2018-06-30 PROBLEM — I85.10 SECONDARY ESOPHAGEAL VARICES (H): Chronic | Status: ACTIVE | Noted: 2018-01-01

## 2018-06-30 NOTE — PROGRESS NOTES
INTERNAL MEDICINE PROGRESS NOTE    Serge Brambila ([id number]) admitted on 6/26/2018 06/30/2018    Assessment & Plan:  71 y/o male with PMHx significant for HCC of 11 x 5.9cm, KUHN cirrhosis, Grade II varices, CAD s/p RCA stent 2002, a-fib, DM2 and  Hypothyroidism was admitted due to fever of 103F, lethargy and elevated lactic acid; found to have E coli bacteremia and peritonitis.     #Fever  #Sepsis  #E. Coli bacteremia  #Bacterial peritonitis with E.coli  #Cholangitis  Patient presented with fever of 102F and tachycardic. Normal WBC. Paracentesis without signs of SBP, but ascitic fluid positive for E. Coli as well as blood cultures. Suspect source of bacteremia is from SBP. He did have some features of cholangitis in the setting of altered anatomy from hepatic malignancy, but EUS without obvious obstruction and MCRP equivocal due to presence of ascites. Of note, he had a tick bite a week ago, but rash not characteristic for lyme, and Anaplasma/Ehrlichiosis negative. Repeat paracentesis on 06/27/18 showed  and SAAG > 1.1, but no growth. UA unremarkable.  - Discontinued Ceftriaxone  - Started Cipro PO BID  - After completing 2 week course, he will need SBP prophylaxis    #Chronic anemia  #Chronic thrombocytopenia  He had a previous hospitalization about two months ago due to concerns of GI bleed. Known to have EV grade II. At that time required transfusions. GI evaluated patient and deferred EGD. Gets transfusions when required on visits for paracentesis. Last transfusion before admission on 06/22/18, at that time hemoglobin 7.2. Here, he required 2uPRBC due to acute anemia. Hemolysis and DIC labs negative. Thrombocytopenia probably related to liver disease and marrow suppression from tumor burden. GI bleed appears to be slow ooze as patient without melena or hematochezia. EUS on 06/30/18 showed large grade 2 varices involving nearly the entire esophagus.   - Continue PPI  - Might need transfusion as  outpatient  - Switched to carvedilol for EV prophy  - Check daily     #Acute metabolic encephalopathy  Believe this is related to sepsis. No meningeal signs and no focal neurological deficit. Can't rule out viral encephalitis, but history more consistent with pathology in hepatobiliary system. Ammonia not significantly elevated. Now improved since IV abx and fluids.   - Neuro checks q 4hrs  - Continue with lactulose and rifaximin     #Transaminitis  #Hyperbilirubinemia  #HCC, 11 x 5.9 cm  #KUHN cirrhosis, MELD 25  #Grade II EV  #Ascites  LFTs and Bilirubin downtrending. MELD 25, stable. US showed stable tumor. It also showed known non-obstructive thrombus in main portal vein and right portal vein, previously thought to be of tumor origin vs blood clot. Gallbladder wall thickened, but unable to visualize biliary duct system adequately.  In terms of his HCC, he was evaluated by IR few months ago and was not a candidate for radioembolization due to extensive lung shunting. There was ongoing discussion of systemic chemotherapy, but liver function precluded this. He saw Oncology to possibly start sorafenib, but family leanings towards hospice care.  AFP within normal range. No evidence of tumor lysis.   - Trend LFTs  - Continue lactulose and rifaximin  - Holding lasix and aldactone, will resume at discharge  - Carvedilol for SBP prophy  - Goals of care discussion ongoing     #Elevated lactic acid  #HAGMA  #?Respiratory alkalosis  #NAGMA  #JUAN M on CKD  Lactic acid normalized after fluids and antibiotics. Bicarb 15. Potentially RTA II.  He does take sodium bicarb for chronically decreased HCO3 levels. UA unremarkable. Cr stable at 1.5, close to baseline. JUAN M multifactorial as patient with GNR bacteremia, hyperbilirubinemia, sepsis.   - Repeat Cr in the AM       #CAD s/p RCA stent 2002  #HTN  #HLD  - Continue metoprolol  - No aspirin due to GI bleed     #Hyperuricemia  - Allopurinol low dose started for gout  "prophylaxis    #A-fib ([id number] 5)  Not on warfarin due to GI bleed, has bled score > risk of thromboembolism from a-fib. Also, currently in sinus rhythm.     #DM2  - Continue home dose of lantus  - Medium insulin ss     #Hypothyroidism  - Continue home synthroid    FEN: Regular diet   Prophylaxis: DVT mechanical, GI PPI  Disposition:  Potentially discharging tomorrow if continues to improve on oral abx  Code Status: FULL    Morro Brown MD PhD  Internal Medicine PGY2  [phone]    Patient was seen and discussed with Dr. Awad.    ==================================================================    Interval HistorySubjective:  No overnight events. Cough controlled. Able to ambulate.  Denies fever, chills, night sweats, headaches. Rest of ROS negative.     Objective:  Most recent vital signs:  /46 (BP Location: Left arm)  Pulse 62  Temp 96.8  F (36  C) (Oral)  Resp 16  Ht 1.727 m (5' 8\")  Wt 99.8 kg (220 lb)  SpO2 96%  BMI 33.45 kg/m2  Temp:  [95.8  F (35.4  C)-98  F (36.7  C)] 96.8  F (36  C)  Pulse:  [58-72] 62  Heart Rate:  [65-69] 65  Resp:  [15-20] 16  BP: (129-170)/(34-59) 146/46  SpO2:  [96 %-99 %] 96 %  Wt Readings from Last 2 Encounters:   06/29/18 99.8 kg (220 lb)   06/22/18 92.6 kg (204 lb 1.6 oz)     Intake/Output Summary (Last 24 hours) at 06/30/18 1420  Last data filed at 06/30/18 0621   Gross per 24 hour   Intake              390 ml   Output              450 ml   Net              -60 ml     Physical exam:  General: NAD, awake, alert  HEENT: EOMI, PERRL, mild scleral icterus   Cardiac: RRR, soft systolic murmur in right upper sternal border, no rubs or gallops  Respiratory: CTAB  GI: distended, non tender, no hepatomegaly on exam, +bs  Extremities: +1 pitting edema in lower extremities, WWP  Skin: No acute lesions appreciated  Neuro: no focal neurological deficits    Labs:  Results for orders placed or performed during the hospital encounter of 06/26/18 (from the past 24 hour(s))    " Abdomen MRCP w/o & w Contrast    Narrative    MRCP Without and With Contrast    CLINICAL HISTORY: pt with symptoms of cholangitis, has hcc that is  expanding. please evaluate for stone or biliary duct obstruction;     DATE: 6/29/2018 2:03 PM    TECHNIQUE:     Images were acquired with and without intravenous gadolinium contrast  through the upper abdomen. The following MR images were acquired  without intravenous contrast: TrueFISP, multiplanar T2-weighted, axial  T1 in/out of phase, T2-weighted MRCP images, axial diffusion-weighted  and axial apparent diffusion coefficient. T1-weighted images were  obtained before contrast at the multiple time points following  contrast injection. 3-D reformatted images were generated by the  technologist. Contrast dose: 10cc Gadavist Injected    Comparison study: 6/27/2018    FINDINGS:    Biliary Tree: Exam severely limited by presence of ascites and  dielectric artifact. These largely influences the evaluation of the  liver and biliary tree. There is however a possible mild right liver  biliary ductal dilation in correspondence of unknown tumor. No obvious  cause can be seen on today's exam.    Pancreas: Pancreatic duct is difficult to evaluate. There appears to  be no pancreatic ductal dilation. There is a 7 mm cyst adjacent to the  tail of the pancreas (7 image 11)     Liver: Cirrhotic appearance of the liver. Decrease of signal in phase  imaging compatible with iron deposition. Right hepatic lobe  infiltrative appearance of the known mass is not significantly changed  compared to 5/22/2018, measuring up to 13 cm. There are 2 DWI bright  foci, one in correspondence of the known tumor measuring 1.2 cm, one  in segment 5/6 measuring 4 mm that cannot be better characterized on  the current study, but are new when compared to 3/3/2018. Stable 2 cm  T1 hyperintense nodule on series 5 image 23, can represent area of  hemorrhage, sequela of prior biopsy.    Gallbladder: The bladder  evaluation is limited. Appears to contain a  calculus    Spleen: Splenomegaly    Kidneys: Stable kidney cysts. No new kidney lesions.    Adrenal glands: Within normal limits.    Bowel: Grossly normal. Changes of colectomy and appendectomy    Lymph nodes: No significant lymphadenopathy.    Blood vessels: There is main portal vein and right portal vein partial  thrombosis, likely tumoral. Numerous portosystemic shunt, more  prominent in the spleen    Lung bases: Clear    Bones and soft tissues: No suspicious lesions    Mesentery and abdominal wall: No suspicious lesions    Ascites: Moderate-large      Impression    IMPRESSION:   1. Difficult evaluation of the intrahepatic biliary tree that appears  dilated at the level of the known tumor.   2. Stable portal vein thrombosis.   CBC with platelets   Result Value Ref Range    WBC 3.5 (L) 4.0 - 11.0 10e9/L    RBC Count 2.71 (L) 4.4 - 5.9 10e12/L    Hemoglobin 8.2 (L) 13.3 - 17.7 g/dL    Hematocrit 24.5 (L) 40.0 - 53.0 %    MCV 90 78 - 100 fl    MCH 30.3 26.5 - 33.0 pg    MCHC 33.5 31.5 - 36.5 g/dL    RDW 20.9 (H) 10.0 - 15.0 %    Platelet Count 75 (L) 150 - 450 10e9/L   Comprehensive metabolic panel   Result Value Ref Range    Sodium 141 133 - 144 mmol/L    Potassium 4.0 3.4 - 5.3 mmol/L    Chloride 114 (H) 94 - 109 mmol/L    Carbon Dioxide 16 (L) 20 - 32 mmol/L    Anion Gap 11 3 - 14 mmol/L    Glucose 119 (H) 70 - 99 mg/dL    Urea Nitrogen 37 (H) 7 - 30 mg/dL    Creatinine 1.56 (H) 0.66 - 1.25 mg/dL    GFR Estimate 44 (L) >60 mL/min/1.7m2    GFR Estimate If Black 53 (L) >60 mL/min/1.7m2    Calcium 8.6 8.5 - 10.1 mg/dL    Bilirubin Total 4.0 (H) 0.2 - 1.3 mg/dL    Albumin 3.5 3.4 - 5.0 g/dL    Protein Total 6.5 (L) 6.8 - 8.8 g/dL    Alkaline Phosphatase 144 40 - 150 U/L    ALT 69 0 - 70 U/L     (H) 0 - 45 U/L   INR   Result Value Ref Range    INR 1.95 (H) 0.86 - 1.14     Internal Medicine Staff Addendum  Date of Service: 6/30/2018  I have seen and examined Mr  Kosta, reviewed the data and discussed the plan of care with the care team on rounds.  I agree with the above documentation with the additions/changes to the ROS, HPI, Exam or data (including my edits in italics):    I discussed pt's care with bedside RN, case management/social work today.  I personally reviewed, labs, medications and past 24 hr notes.  Assessment/Plan/Diagnoses: plan/dx as above, which contains my edits and reflects our joint medical decision-making.     Jean Awad MD PhD  Internal Medicine Hospitalist & Staff Physician   of Internal Medicine   [facility]  Pager: [phone]

## 2018-06-30 NOTE — PROGRESS NOTES
Medicine Progress Note  Date: June 30, 2018      Changes:  1. Discussion of care- about an hour with him and his family. Discussed his goals of living to January to see granddaughters wedding and wife's birthday.  2. Oral antibiotics- Ciprofloxacin 500mg BID  3. Switched metoprolol to Carvedilol for hypertension because it has prophylaxis against esophageal varices.    Assessment & Plan  Serge is a 72 year old male with a previous history significant for 7.2cm hepatocellular carcinoma, cirrhosis due to KUHN, hepatic encephalopathy, chronic iron deficiency anemia likely from esophageal varices, Afib who presented with a fever and acute encephalopathy from non-neutrophilic SBP from E. Coli that likely lead to E. Coli bacteremia.       #Fever  #Acute Encephalopathy  #Bacteremia  #non-neutrophilic SBP - Acute presentation of high fever and encephalitis with elevated lactic acid WBC 7.8 is most likely due to infection on 6/26. Initial blood cultures on 6/26 2/2 and ascites fluid grew G- rods identified to be E. Coli sensitive to Ceftriaxone. Most likely etiology is SBP that spread to the blood. There were no obstructions of the bile duct found on EUS. Of note enlarging +2 esophageal varices were also seen on EUS. Blood cultures drawn on 6/27 were negative for bacterial growth at 48hr, cultures from 6/28 negative at 48hrs, Ascites fluid from 6/27 was negative for bacterial growth. At this point we feel confidant we have identified the source of infection and have source control. The patient has also been improving clinically over his hospital stay.  -Ceftriaxone 2g q12 d/c  - Oral Ciprofloxacin 500mg BID, if tonight goes well discharge with oral antibiotics to complete dose and prophylactic medications daily.   -Neuro checks Q4  - Continue home Rifaximin 550mg 2x daily  - Continue home sodium bicarbonate 1,300mg oral  - Continue home lactulose solution 2g 3x daily      #Transaminitis  #Elevated Bilirubin- Liver enzymes  are more elevated from his baseline (current: ALT 69 , ; baseline: ALT 60, ). No obstruction noted on EUS. Likely elevated from tumor.   - Trend CMP  - Direct vs Indirect bilirubin ordered. Results bili total 5.2 direct 3.0. 6/27 total bilirubin 4.2, 6/28 bilirubin 3.7  - Tumor AFT was within normal limits  -MRCP unable to visualize bile ducts due to too much ascites fluid  -EUS to look for source of obstruction, no obstruction found. As stated above bacteremia likely from SBP      #JUAN M on CKD III- Baseline Creatinine is typically 1.5 on admission it is slightly elevated at 1.8.  6/30 baseline is back to baseline. Prescribing Ciprofloxacin for antibiotics prophylaxis creatinine should continue to be monitored.  This is most likely from  from Gram negative sepsis. Only 100mL of urine output documented on 6/26. On 6/27 total urine output 600mL equivalent to 0.8mL/kg/hr. I&O are no longer being tracked.  -Continue to trend CMP  -Continue to monitor I&Os     #Chronic Anemia- Baseline hemoglobin typically trends downward from 9 to 7. Most recent PRBC transfusion was when Hgb was at 7.2 on 6/22. He recieves them about 1x/ month. 6/28 Hgb is 6.5 and 2 PRBC given, he responded well Hgb 8.8. Today Hgb. 8.2. This chronic anemia is most likely from his unspecified GI bleeds and CKD with lack of EPO production. There are no signs of active bleeding.  -Trend CBC  -Type and screen done on 6/27, transfuse if Hgb lower than 7.0. Type and screen repeated on 6/28 and transfused this morning.  - Enlarging varices noted on 6/29 EUS, but not banded because not actively bleeding.  - Patient wishes to continue getting transfusions to keep his current quality of life.    # Chronic Thrombocytopenia- Baseline thrombocytopenia tends to be between 100-120.  6/26 Platelets 114,  6/27 Platelets 74, 6/28 platelets 73. Platelets on 6/30 were 75 and stable. Thrombocytopenia likely due to cirrhosis from KUHN.  -Trend  "CBC          #Cough- Serge had slight right pleural effusion in RLL on admission with occasional cough. On 6/28 the cough has become more frequent and coarser. Cough on 6/30 is still present but hasn't gotten worse.  - Mucinex D BID, lethargic after dose  - If sputum production or cough continues to worsen consider repeat CXR        #High anionic gap metabolic acidosis  # Respiratory alkalosis  # Lactic Acidosis- Resolved    VBG (7.44, 24, 36,16) Lactic acid on admission 2.4 Most likely due to possible sepsis see ideas for specific etiologies above. Most recent lactate on 6/27 was 1.0  - 6/27 no further need to trend lactate.      #Atrial Fibrillation- Not treated with warfarin due to recurrent GI bleeds.      #Diabetes Type II- glucose upon admission 128 baseline ranges from 100-140.  - Glucose monitoring POCT   - home dose of glargine injection 30 units     Chronic Medical Conditions  Hypothyroidism- Levothyroxine 150 mcg  Hypertension- switched to Carvedilol because it also has prophylaxis against esophageal varices bleeds. Will monitor blood pressures carefully tonight/tomorrow.      Subjective  No acute events overnight. Not in any pain. Cough is still present but hasn't gotten worse. Goals of care discussion with wife and son this afternoon    Objective  Temp: 96.3  F (35.7  C) Temp src: Axillary BP: 148/42 Pulse: 62 Heart Rate: 65 Resp: 16 SpO2: 98 % O2 Device: BiPAP/CPAP Oxygen Delivery: 2 LPM Height: 172.7 cm (5' 8\") Weight: 99.8 kg (220 lb)  Estimated body mass index is 33.45 kg/(m^2) as calculated from the following:    Height as of this encounter: 1.727 m (5' 8\").    Weight as of this encounter: 99.8 kg (220 lb).      Physical  General: lethargic but able to communicate  Cardio: 2/6 systolic murmur hear loudest over the aortic area  Resp: soft crackles right lower lobe, all remaining lung fields clear to ascultation.   GI: Distended with fluid wave, nontender to light or deep palpation, bowel sounds " present      Labs    6/30/2018 07:13   Sodium 141   Potassium 4.0   Chloride 114 (H)   Carbon Dioxide 16 (L)   Urea Nitrogen 37 (H)   Creatinine 1.56 (H)   GFR Estimate 44 (L)   GFR Estimate If Black 53 (L)   Calcium 8.6   Anion Gap 11   Albumin 3.5   Protein Total 6.5 (L)   Bilirubin Total 4.0 (H)   Alkaline Phosphatase 144   ALT 69    (H)      6/30/2018 07:13   WBC 3.5 (L)   Hemoglobin 8.2 (L)   Hematocrit 24.5 (L)   Platelet Count 75 (L)   RBC Count 2.71 (L)   MCV 90   MCH 30.3   MCHC 33.5   RDW 20.9 (H)   INR 1.95 (H)        Imaging  Findings: EGD with large grade 2 varices involving nearly entire esophagus. No stigmata of recent bleeding. No indication for treatment in light of lack of acute bleeding and overall prognosis. Portal gastropathy which may account for a component of anemia. Marked ascites. Large venous collaterals in left upper quadrant. No gastric varices. Common bile duct 2 mm distally to 4 mm proximally. No stones in duct. Few stones and sludge in gallbladder. Shrunken cirrhotic liver. Multiple non-specific lymph nodes in marion hepatis and celiac region.    No indication for ERCP.     Findings suggest bacterial peritonitis as underlying etiology for recent sepsis.        Diet: Full adult diet as tolerated  IVF: no IVF  DVT: pneumatic compression device  Code: Full  Dispo: Discharge tomorrow    Taylor Carrillo  MS 3  Pager [phone]

## 2018-06-30 NOTE — PROGRESS NOTES
INTERNAL MEDICINE PROGRESS NOTE    Serge Brambila ([id number]) admitted on 6/26/2018 06/29/2018    Assessment & Plan:  73 y/o male with PMHx significant for HCC of 11 x 5.9cm, KUHN cirrhosis, Grade II varices, CAD s/p RCA stent 2002, a-fib, DM2 and  Hypothyroidism was admitted due to fever of 103F, lethargy and elevated lactic acid; found to have E coli bacteremia.     #Fever  #Sepsis  #E. Coli bacteremia  #Bacterial peritonitis with E.coli  #Cholangitis  Patient presented with fever of 102F and tachycardic. Normal WBC. Paracentesis without signs of SBP, but ascitic fluid positive for GNR. Blood cultures also positive for GNR. Suspect that source is hepatobiliary vs entry after paracentesis about one week before admission. Does have some features of cholangitis in the setting of altered anatomy from hepatic malignancy. Also, he had a tick bite a week ago, but rash not characteristic for lyme, and Anaplasma/Ehrlichiosis negative. Repeat paracentesis on 06/27/18 with  and SAAG > 1.1, but no growth.   - Discontinued Zosyn  - Ceftriaxone IV  - Urine cultures pending  - Anaplasma/Ehrliciosis serology pending  - EUS  - Albumin 1.5g/kg on day one and 1g/kg on day 3    #Chronic anemia  #Chronic thrombocytopenia  He had a previous hospitalization about two months ago due to concerns of GI bleed. Known to have EV grade II. At that time required transfusions. GI evaluated patient and deferred EGD. Gets transfusions when required on visits for paracentesis. Last transfusion on 06/22/18, at that time hemoglobin 7.2. GI bleed appears to be slow ooze as patient without melena or hematochezia. Hemolysis and DIC labs negative. Thrombocytopenia probably related to liver disease and marrow suppression from tumor burden. Received 2uPRBC due to acute anemia during this admission. Slowly down-trending, potentially due to slow ooze from portal gastropathy.   - Endoscopy today during EUS  - Continue PPI  - Might need  transfusion  - Check daily     #Acute metabolic encephalopathy  Believe this is related to sepsis. No meningeal signs and no focal neurological deficit. Can't rule out viral encephalitis, but history more consistent with pathology in hepatobiliary system. Ammonia not significantly elevated. Now improved since IV abx and fluids.   - Neuro checks q 4hrs  - Continue with lactulose and rifaximin     #Transaminitis  #Hyperbilirubinemia  #HCC, 11 x 5.9 cm  #KUHN cirrhosis, MELD 25  #Grade II EV  #Ascites  LFTs and Bilirubin downtrending. MELD 25, stable. US showed stable tumor. It also showed known non-obstructive thrombus in main portal vein and right portal vein, previously thought to be of tumor origin vs blood clot. Gallbladder wall thickened, but unable to visualize biliary duct system adequately.  In terms of his HCC, he was evaluated by IR few months ago and was not a candidate for radioembolization due to extensive lung shunting. There was ongoing discussion of systemic chemotherapy, but liver function precluded this. He saw Oncology to possibly start sorafenib, but family leanings towards hospice care.  AFP within normal range. No evidence of tumor lysis.   - EUS and will check status of varices  - Trend LFTs  - Continue lactulose and rifaximin  - Holding lasix and aldactone  - GI consulted to evaluate if any palliative intervention in biliary tract is indicated  - Goals of care discussion ongoing     #Elevated lactic acid  #HAGMA  #?Respiratory alkalosis  #NAGMA  #JUAN M on CKD  Lactic acid normalized after fluids and antibiotics. Bicarb 15. Potentially RTA II.  He does take sodium bicarb for chronically decreased HCO3 levels. UA unremarkable. Cr stable at 1.6, baseline is around 1.5. Slowly improving. JUAN M multifactorial as patient with GNR bacteremia, hyperbilirubinemia, sepsis.   - Repeat Cr in the AM       #CAD s/p RCA stent 2002  #HTN  #HLD  - Continue metoprolol  - No aspirin due to GI bleed     #A-fib  "([id number])  Not on warfarin due to GI bleed, has bled score > risk of thromboembolism from a-fib. Also, currently in sinus rhythm.     #DM2  - Continue home dose of lantus  - Medium insulin ss     #Hypothyroidism  - Continue home synthroid    FEN: NPO for now   Prophylaxis: DVT mechanical, GI PPI  Disposition:  Pending sepsis evaluation and possible procedures  Code Status: FULL    Morro Brown MD PhD  Internal Medicine PGY2  [phone]    Patient was seen and discussed with Dr. Awad.    ==================================================================    Interval HistorySubjective:  No overnight events. Hungry. Cough improved. Able to ambulate.  Denies fever, chills, night sweats, headaches. Rest of ROS negative.     Objective:  Most recent vital signs:  /57  Pulse 61  Temp 97.3  F (36.3  C) (Oral)  Resp 20  Ht 1.727 m (5' 8\")  Wt 95.3 kg (210 lb 1.6 oz)  SpO2 96%  BMI 31.95 kg/m2  Temp:  [96.4  F (35.8  C)-98.5  F (36.9  C)] 97.3  F (36.3  C)  Pulse:  [60-67] 61  Heart Rate:  [60-69] 65  Resp:  [15-20] 20  BP: (114-165)/(40-59) 165/57  SpO2:  [96 %-100 %] 96 %  Wt Readings from Last 2 Encounters:   06/28/18 95.3 kg (210 lb 1.6 oz)   06/22/18 92.6 kg (204 lb 1.6 oz)     Intake/Output Summary (Last 24 hours) at 06/29/18 2007  Last data filed at 06/29/18 1800   Gross per 24 hour   Intake              100 ml   Output              450 ml   Net             -350 ml     Physical exam:  General: NAD, awake, alert  HEENT: EOMI, PERRL, mild scleral icterus   Cardiac: RRR, soft systolic murmur in right upper sternal border, no rubs or gallops  Respiratory: CTAB  GI: distended, mild no asterixis, no hepatomegaly on exam, +bs, mild epigastric tenderness  Extremities: +1 pitting edema in lower extremities, WWP  Skin: No acute lesions appreciated  Neuro: no focal neurological deficits    Labs:  Results for orders placed or performed during the hospital encounter of 06/26/18 (from the past 24 hour(s))   CBC " with platelets   Result Value Ref Range    WBC 3.7 (L) 4.0 - 11.0 10e9/L    RBC Count 2.78 (L) 4.4 - 5.9 10e12/L    Hemoglobin 8.4 (L) 13.3 - 17.7 g/dL    Hematocrit 24.6 (L) 40.0 - 53.0 %    MCV 89 78 - 100 fl    MCH 30.2 26.5 - 33.0 pg    MCHC 34.1 31.5 - 36.5 g/dL    RDW 20.6 (H) 10.0 - 15.0 %    Platelet Count 72 (L) 150 - 450 10e9/L   Comprehensive metabolic panel   Result Value Ref Range    Sodium 140 133 - 144 mmol/L    Potassium 4.2 3.4 - 5.3 mmol/L    Chloride 113 (H) 94 - 109 mmol/L    Carbon Dioxide 15 (L) 20 - 32 mmol/L    Anion Gap 11 3 - 14 mmol/L    Glucose 119 (H) 70 - 99 mg/dL    Urea Nitrogen 37 (H) 7 - 30 mg/dL    Creatinine 1.67 (H) 0.66 - 1.25 mg/dL    GFR Estimate 41 (L) >60 mL/min/1.7m2    GFR Estimate If Black 49 (L) >60 mL/min/1.7m2    Calcium 8.2 (L) 8.5 - 10.1 mg/dL    Bilirubin Total 3.7 (H) 0.2 - 1.3 mg/dL    Albumin 2.8 (L) 3.4 - 5.0 g/dL    Protein Total 6.2 (L) 6.8 - 8.8 g/dL    Alkaline Phosphatase 153 (H) 40 - 150 U/L    ALT 70 0 - 70 U/L     (H) 0 - 45 U/L   INR   Result Value Ref Range    INR 1.85 (H) 0.86 - 1.14     Internal Medicine Staff Addendum  Date of Service: 6/29/2018  I have seen and examined Mr galeas, reviewed the data and discussed the plan of care with the care team on rounds.  I agree with the above documentation with the additions/changes to the ROS, HPI, Exam or data (including my edits in italics):    I discussed pt's care with bedside RN, case management/social work today.  I personally reviewed, labs, medications and past 24 hr notes.  Assessment/Plan/Diagnoses: plan/dx as above, which contains my edits and reflects our joint medical decision-making.     Jean Awad MD PhD  Internal Medicine Hospitalist & Staff Physician   of Internal Medicine   [facility]  Pager: [phone]

## 2018-06-30 NOTE — PLAN OF CARE
"Problem: Patient Care Overview  Goal: Plan of Care/Patient Progress Review  Outcome: No Change  Patient up to the bathroom with SBA, voided and had very small amount of loose stool. A little irritable about being 'in the hospital', and seemed to be quite tired. As stated by the evening shift RN :Capno off while pt is on overnight CPAP as he can't get an adequate facemask seal with capno tubing on his face\". Pulse oximetry continuous and without oxygen ranged 94-97%.   at 0200. No fever and AVSS. Denies pain or nausea. Continue with current plan of nursing care, and update MD with concerns as needed.      "

## 2018-06-30 NOTE — PLAN OF CARE
Pt a&o x 4. Neuros intact although pt slightly lethargic. VSS on RA. Capno off while pt is on overnight CPAP as he can't get an adequate facemask seal with capno tubing on his face.  at bedtime. Pt denies pain or nausea at this time, eager to sleep tonight. Pt up to bathroom x 1 with standby assist, voiding spontaneously, BM without occasion. Continue plan of care.

## 2018-06-30 NOTE — PLAN OF CARE
Problem: Patient Care Overview  Goal: Plan of Care/Patient Progress Review  Outcome: No Change  Pt a/o x 3. VSS on RA, afebrile. Pt went to OR for EUS/EGD today to r/u cholangitis. No gallstones were found. Pt has been stable on CAPNO monitoring. Denies any pain or nausea post procedure. Per GI recent sepsis may be due to bacterial peritonitis. NPO for most of today, BG checks remained above 70. Up assist 1 to BR ~ 1 hour after arriving back to unit. Denied any dizziness when up and had a dark drown loose BM. Will continue to monitor pt status.

## 2018-07-01 NOTE — PROGRESS NOTES
Medicine Progress Note  Date: July 1, 2018      Changes:  1. Carvedilol- for hypertension since it is more protective against esophageal varices bleeds.  2. Goals of care unchanged (wants all measures to live until January for grandaughter wedding and wife's birthday) contemplating DNR, wants to be intubated, was given POLST form to consider filling out with primary care team.  3. Discharge with Home health- Nursing, PT and OT cognition consult.    Assessment & Plan  Serge is a 72 year old male with a previous history significant for 7.2 cm hepatocellular carcinoma, cirrhosis due to KUHN, hepatic encephalopathy, chronic iron deficiency anemia, recurrent GI bleeds admitted on 6/26 for fever and acute encephalopathy from non-neutrophilic SBP and bacteremia with E. Coli    #Fever  #Acute Encephalopathy  #Bacteremia  #non-neutrophilic SBP - Acute presentation of high fever and encephalitis with elevated lactic acid WBC 7.8 is most likely due to infection on 6/26. Initial blood cultures on 6/26 2/2 and ascites fluid grew G- rods identified to be E. Coli sensitive to Ceftriaxone. Most likely etiology is SBP that spread to the blood. There were no obstructions of the bile duct found on EUS. Of note enlarging +2 esophageal varices were also seen on EUS. Blood cultures drawn on 6/27 were negative for bacterial growth at 48hr, cultures from 6/28 negative at 48hrs, Ascites fluid from 6/27 was negative for bacterial growth. At this point we feel confidant we have identified the source of infection and have source control. The patient has also been improving clinically over his hospital stay.  -Ceftriaxone 2g q12 d/c  - Oral Ciprofloxacin 500mg BID, if tonight goes well discharge with oral antibiotics to complete dose and prophylactic medications daily.   -Neuro checks Q4  - Continue home Rifaximin 550mg 2x daily  - Continue home sodium bicarbonate 1,300mg oral  - Continue home lactulose solution 2g 3x  daily    #Transaminitis  #Elevated Bilirubin- Liver enzymes are more elevated from his baseline (current: ALT 80 , ; baseline: ALT 60, ). No obstruction noted on EUS. Likely elevated from tumor or cirrhosis.  - Trend CMP  - Direct vs Indirect bilirubin ordered. Results bili total 5.2 direct 3.0. 6/27 total bilirubin 4.2, 6/28 bilirubin 3.7, 7/1 4.1  - Tumor AFT was within normal limits  -MRCP unable to visualize bile ducts due to too much ascites fluid  -EUS to look for source of obstruction, no obstruction found. As stated above bacteremia likely from SBP    #JUAN M on CKD III RESOLVED- Baseline Creatinine is typically 1.5 on admission it is slightly elevated at 1.8.  7/1 baseline is back to baseline 1.48. Prescribing Ciprofloxacin for antibiotics prophylaxis creatinine should continue to be monitored.  This was most likely from  from Gram negative sepsis.   -Continue to trend CMP    #Chronic Anemia- Baseline hemoglobin typically trends downward from 9 to 7. Most recent PRBC transfusion was when Hgb was at 7.2 on 6/22. He recieves them about 1x/ month. 6/28 Hgb is 6.5 and 2 PRBC given, he responded well Hgb 8.8. Today 7/1 Hgb. 7.9. This chronic anemia is most likely from his unspecified GI bleeds and CKD with lack of EPO production. There are no signs of active bleeding. His rate of Hgb decline appears to be faster than needing his normal transfusion 1/month. This will need to monitored closely when he is discharged from the hospital.   -Trend CBC after discharge  - Enlarging varices noted on 6/29 EUS, but not banded because not actively bleeding.  - Patient wishes to continue getting transfusions to keep his current quality of life.    # Chronic Thrombocytopenia- Baseline thrombocytopenia tends to be between 100-120.  6/26 Platelets 114,  6/27 Platelets 74, 6/28 platelets 73. Platelets on 6/30 were 75, 7/1 70 and stable. Thrombocytopenia likely due to cirrhosis from KUHN.  -Trend CBC            #Cough-  "Serge had slight right pleural effusion in RLL on admission with occasional cough. On 6/28 the cough has become more frequent and coarser. Cough is still present on 7/1 but hasn't gotten worse.  - Mucinex D BID, lethargic after dose  - If sputum production or cough continues to worsen consider repeat CXR     #High anionic gap metabolic acidosis  # Respiratory alkalosis  # Lactic Acidosis- Resolved    VBG (7.44, 24, 36,16) Lactic acid on admission 2.4 Most likely due to possible sepsis see ideas for specific etiologies above. Most recent lactate on 6/27 was 1.0  - 6/27 no further need to trend lactate.       #Atrial Fibrillation- Not treated with warfarin due to recurrent GI bleeds.      #Diabetes Type II- glucose upon admission 128 baseline ranges from 100-140.  - Glucose monitoring POCT   - home dose of glargine injection 30 units      Chronic Medical Conditions  Hypothyroidism- Levothyroxine 150 mcg  Hypertension- switched to Carvedilol because it also has prophylaxis against esophageal varices bleeds. Will monitor blood pressures carefully tonight/tomorrow. On 7/1 after the switch Blood pressures appear to be stable near his baseline 145/42.        Subjective  No acute events overnight. Serge is not in any acute pain. He feels well and is ready to go home.    Objective  Temp: 96.8  F (36  C) Temp src: Axillary BP: 146/42 Pulse: 67 Heart Rate: 65 Resp: 18 SpO2: 100 % O2 Device: None (Room air) Oxygen Delivery: 2 LPM Height: 172.7 cm (5' 8\") Weight: 98.7 kg (217 lb 9.6 oz)  Estimated body mass index is 33.09 kg/(m^2) as calculated from the following:    Height as of this encounter: 1.727 m (5' 8\").    Weight as of this encounter: 98.7 kg (217 lb 9.6 oz).      Physical  General: alert and awake, able to hold a conversation  Cardio: 2/6 systolic murmur over aortic area  Resp: clear to ascultation all lung fields bilaterally.  GI- distended, non tender to light or deep palpation, bowel sounds present  Neuro: " oriented x3, no asterixis       Labs     7/1/2018 05:24   Chloride 117 (H)   Carbon Dioxide 16 (L)   Urea Nitrogen 35 (H)   Creatinine 1.48 (H)   GFR Estimate 47 (L)   GFR Estimate If Black 57 (L)   Calcium 8.6   Anion Gap 9   Albumin 3.2 (L)   Protein Total 6.0 (L)   Bilirubin Total 4.1 (H)   Alkaline Phosphatase 160 (H)   ALT 80 (H)    (H)   Glucose 112 (H)   WBC 3.4 (L)   Hemoglobin 7.9 (L)   Hematocrit 23.4 (L)   Platelet Count 70 (L)   RBC Count 2.60 (L)   MCV 90   MCH 30.4   MCHC 33.8   RDW 20.7 (H)   INR 2.01 (H)         Imaging no new imaging      Diet: full adult diet as tolerated  IVF: no IVF  DVT: pneumatic compression device  Code: Full  Dispo: discharge today    Taylor Carrillo  MS 3  Pager [phone]

## 2018-07-01 NOTE — PLAN OF CARE
Problem: Patient Care Overview  Goal: Plan of Care/Patient Progress Review  Outcome: Adequate for Discharge Date Met: 07/01/18  Pt a/o x 3. VSS on RA. Medically cleared for discharge to home today. Hgb 7.9. Wife providing tx to home. Home discharge instructions and home medications reviewed and pt and wife had no further questions or concerns after reviewing. Plan for weekly paracentesis with platelet/hgb checks with each para and post hospital f/u with PCP within the week. [facility] and home PT/OT will be following pt post discharge. 2 PIV removed prior to discharge. Will continue POC.

## 2018-07-01 NOTE — PROGRESS NOTES
Care Coordinator - Discharge Planning/Update    Admission Date/Time:  6/26/2018  Attending MD:  Lindsey Timmons*     Chart reviewed, discussed with interdisciplinary team.   Patient was admitted for:   1. Secondary esophageal varices without bleeding (H)    2. Fever, unspecified fever cause    3. Altered mental status, unspecified altered mental status type    4. Hepatic encephalopathy (H)    5. Sepsis, due to unspecified organism (H)    6. History of hepatocellular carcinoma    7. Cirrhosis of liver with ascites, unspecified hepatic cirrhosis type (H)    8. SBP (spontaneous bacterial peritonitis) (H)    9. Bacteremia    10. Hyperuricemia    11. Anemia, unspecified type         Discharge Planning Update    After call from the MD team and a conversation with Mrs. Brambila, who stated she was not aware of home care visits that had been arranged on behalf of her spouse, the following updated plans of care were arranged for patient who is being discharged today to home setting:    Please fax discharge orders to [facility]     PH:  [phone]     Fax: [phone]     Skilled home care RN for initial home safety evaluation and 1-3 times a week to evaluate medication management, nutrition and hydration evaluation, endurance evaluation, and general status evaluation after discharge from the acute care hospital setting.     Skilled home care PT and OT to evaluate in treat in home setting.  Occupational Therapist to perform a cognition screen on patent in his home setting.     Skilled home care RN to notify Mrs. Brambila prior to home care visits so she can make arrangement to be at home with spouse during initial home care evaluations and or to assist with entrance into home setting.  Phone numbers for patient's spouse are listed on face sheet.    Coordination of Care and Referrals: Provided patient/family with options for home care agency of choice in home area.      Plan  Anticipated  "Discharge Date:  Today per MD team.  Anticipated Discharge Plan:  As above and as outlined in MD discharge orders.  Mrs. Heike Brambila will have to work daily but states she is less than 5 minutes away from her home setting where she resides with her spouse.  Heike plans to have a lock box placed next to the front entrance of home with a combination.  This will allow home care staff to enter home when she is at work.  Per patient's spouse, she is comfortable taking Mr. Brambila home today because, \"he is better now..but, he is having good days now and it is not always going to be like this.\"  Spouse was glad to be updated that patient's insurance will cover the above initial home care plans of care.    CTS Handoff completed: (Clinic Letter)  Sent.    Sandy Arango, PITA.S.N., P.H.N.,R.N.         Pager    Weekend on call  Nurse  "

## 2018-07-01 NOTE — PLAN OF CARE
Care from [id number]:     Pt a&o x 4. VSS ex low diastolic BP at baseline for patient. Pt wearing home CPAP for sleep.  at bedtime, gave insulin as ordered. Pt in generally good mood, eager to leave tomorrow pending stability. Continue plan of care.

## 2018-07-01 NOTE — PLAN OF CARE
Problem: Patient Care Overview  Goal: Plan of Care/Patient Progress Review  Outcome: No Change  A&Ox4, VSS on RA.  CPAP on at HS.  Pt denies pain.  Neuros intact.  BG monitoring completed (B).  SBA to bathroom.  BA active overnight for safety.  Possible d/c to home today.  Continue to monitor and follow POC.

## 2018-07-01 NOTE — PLAN OF CARE
Problem: Patient Care Overview  Goal: Plan of Care/Patient Progress Review  Discharge Planner PT   Patient plan for discharge: home with option of HH PT if he's up for it  Current status: Patient agreeable to PT. Patient performed all transfers, ambulation and stairs this date with supervision/SBA with mild M-L sway noted throughout and no AD. Patient amb 200 feet total during session with pausing noted with ambulation with any balance challenges in crowded environment and intermittent furniture/wall cruising for balance. Patient ascended/descended >20 stairs during session with close SBA and 2 rails with intermittent catching of toes on stairs ascending. Reviewed benefits of skilled PT in improving balance with patient open to option of HH PT after conversation.  Barriers to return to prior living situation: balance deficits  Recommendations for discharge: Home with 24/7 assist and HH PT as patient not open to TCU  Rationale for recommendations: Patient did not have any overt LOB during session but has balance deficits that are evident with hallway ambulation and any environmental balance challenges. Patient would benefit from HH PT to improve balance and safety.        Entered by: Apolonia Campbell 07/01/2018 1:17 PM

## 2018-07-01 NOTE — PLAN OF CARE
Problem: Patient Care Overview  Goal: Plan of Care/Patient Progress Review  Outcome: No Change  Pt alert, more sleepy today. Pt reports he didn't sleep well last night. Denies any pain, shortness of breath, chest pain, nausea today. Lactulose given and pt had 3 loose BMs today. Rocephin d/c and pt started on oral cipro BID. Pt freqently ambulating in halls with wife. Fair appetite on regular diet. Care Conference was held today with medicine team, wife, and son. Family is leaning towards hospice care. Will continue to monitor pt status, possible discharge to home tomorrow if pt doing ok on oral abx. Will continue to monitor pt status.

## 2018-07-02 NOTE — DISCHARGE SUMMARY
[facility], [place]    Internal Medicine Discharge Summary- Ashley Service    Date of Admission:  6/26/2018  Date of Discharge:  7/1/2018  3:45 PM  Discharging Attending Provider: Dr Lindsey Timmons  Discharge Team: Ashley 5    Discharge Diagnoses   Sepsis  E coli bacteremia  E coli peritonitis  Acute infectious/metabolic encephalopathy  KUHN cirrhosis c/b HCC, grade II EVs, ascites  Chronic normocytic anemia  Chronic thrombocytopenia  Lactic acidosis   AGMA  Respiratory alkalosis  NAGMA  JUAN M on CKD  Hyperuricemia      Follow-ups Needed After Discharge   -continue weekly paracenteses, CBC check, infusion plan in place for transfusion for hgb <7  -PCP f/u 1 week, CMP/CBC check, adjust diuretics as needed, send for blood transfusion prn. If outstanding cultures positive, consider ID consult vs prolong antibiotic course. Consider uric acid in 1 month  -oncology follow up as previously scheduled, ongoing goals of care discussions    Hospital Course   Serge Brambila was admitted on 6/26/2018 for sepsis due to E coli bacteremia and E coli SBP.  The following problems were addressed during his hospitalization:    Sepsis  E coli bacteremia  E coli peritonitis  Acute infectious/metabolic encephalopathy  KUHN cirrhosis c/b HCC, grade II EVs, ascites  Patient presented with concerns for sepsis, found to have E. coli bacteremia as well as E. coli in ascites fluid despite non SBP cell counts.  Patient treated with ceftriaxone, transitioned to Cipro treatment dose with plans to continue ongoing Cipro prophylaxis for SBP.  Patient underwent MRCP and EUS, no obvious obstruction.  Patient also developed intermittent encephalopathy which was likely due to infection versus hepatic.  Patient was continued on lactulose and rifaximin, encephalopathy resolved as infection treated.  Patient improved on IV and later oral antibiotics.  -ciprofloxacin p.o. twice daily (end 7/10) followed by ciprofloxacin 500  mg daily indefinitely  -Ongoing weekly paracenteses  -changed metoprolol to carvedilol for EVs  -resume diuretics at discharge, spironolactone dose increased, PCP f/u 1 week, BMP check, adjust diuretics as needed  -ongoing goals of care discussions with primary oncologist    # Chronic normocytic anemia  # Chronic thrombocytopenia  Patient's ongoing anemia likely due to slow losing from esophageal varices as well as portal hypertensive gastropathy.  Chronic thrombocytopenia due to underlying liver disease, remained stable.  Patient required 2 units of red blood cells for hemoglobin 6.5 with adequate response, continued to slowly drift down throughout hospitalization, discharge hemoglobin 7.9, no clinically evident GI bleed was noted.  -Begin CBC checks at paracenteses  -Transfusion plan in place for pRBCs if hemoglobin less than 7  -If clinical GI bleed, return to hospital for evaluation    Lactic acidosis   AGMA  Respiratory alkalosis  NAGMA  JUAN M on CKD  Lactic acidosis resolved with IVF and antibiotics.  Baseline creatinine approximately 1.4-1.5, admission creatinine 1.86, creatinine 1.48 on discharge.  Patient also noted to have chronic non-anion gap metabolic acidosis, on bicarbonate which was continued.  Remained stable throughout admission.    Hyperuricemia  Noted incidentally, no history of gout.  Patient started on allopurinol prior to discharge  -Consider uric acid check in approximately 1 month per PCP    # Discharge Pain Plan:  - Patient currently has NO PAIN and is not being prescribed pain medications on discharge.    Consultations This Hospital Stay   PHARMACY TO DOSE VANCO  MEDICATION HISTORY IP PHARMACY CONSULT  PHARMACY TO DOSE VANCO  GI PANCREATICOBILIARY ADULT IP CONSULT  INTERNAL MEDICINE PROCEDURE TEAM ADULT IP CONSULT [facility] - PARACENTESIS  VASCULAR ACCESS CARE ADULT IP CONSULT  PHYSICAL THERAPY ADULT IP CONSULT  VASCULAR ACCESS CARE ADULT IP CONSULT     Code Status   Full Code       The  patient was discussed with Dr. Iain Panda  [facility]  Pager: [phone]  ______________________________________________________________________    Physical Exam   Vital Signs: Temp: (P) 96.8  F (36  C) Temp src: (P) Oral BP: (P) 141/45   Heart Rate: (P) 61 Resp: (P) 16 SpO2: (P) 100 % O2 Device: (P) None (Room air)    Weight: 217 lbs 9.6 oz    General Appearance: Pleasant, no acute distress  Respiratory: Clear to auscultation, breathing comfortably on room air  Cardiovascular: Regular rate and rhythm, II/VI systolic murmur, no rubs or gallops   GI: Soft, mildly distended, nontender, no rebound, no guarding, bowel sounds present  Skin: no rashes or jaundice  Other: Alert, oriented, no asterixis, trace lower extremity edema bilaterally, warm well perfused    Significant Results and Procedures   Most Recent 3 CBC's:  Recent Labs   Lab Test  07/01/18   0524  06/30/18   0713  06/29/18   0540   WBC  3.4*  3.5*  3.7*   HGB  7.9*  8.2*  8.4*   MCV  90  90  89   PLT  70*  75*  72*     Most Recent 3 BMP's:  Recent Labs   Lab Test  07/01/18   0524  06/30/18   0713  06/29/18   0540   NA  142  141  140   POTASSIUM  4.0  4.0  4.2   CHLORIDE  117*  114*  113*   CO2  16*  16*  15*   BUN  35*  37*  37*   CR  1.48*  1.56*  1.67*   ANIONGAP  9  11  11   HARI  8.6  8.6  8.2*   GLC  112*  119*  119*     Most Recent 2 LFT's:  Recent Labs   Lab Test  07/01/18   0524  06/30/18   0713   AST  131*  106*   ALT  80*  69   ALKPHOS  160*  144   BILITOTAL  4.1*  4.0*     Most Recent 3 INR's:  Recent Labs   Lab Test  07/01/18   0524  06/30/18   0713  06/29/18   0540   INR  2.01*  1.95*  1.85*     Most Recent 6 Bacteria Isolates From Any Culture (See EPIC Reports for Culture Details):  Recent Labs   Lab Test  06/28/18   0042  06/28/18   0037  06/27/18   1701  06/27/18   1300  06/27/18   0601  06/26/18   1101   CULT  No growth after 4 days  No growth after 4 days  Canceled, Test credited  Cancelled by lab - Specimen  never received.    Canceled, Test credited  Cancelled by lab - Specimen never received.    No growth  Culture negative monitoring continues  No growth after 5 days  No growth after 5 days  <10,000 colonies/mL  mixed urogenital adarsh  Susceptibility testing not routinely done     ,   Results for orders placed or performed during the hospital encounter of 06/26/18   XR Chest Port 1 View    Narrative    EXAM: XR CHEST PORT 1 VW  6/26/2018 8:09 AM     HISTORY:  fever, AMS;        COMPARISON: Chest radiograph 3/14/2018    FINDINGS: Single portable semiupright view of the chest. Cardiac  silhouette is within normal limits and stable. Indistinct pulmonary  vasculature. No pneumothorax. Predominantly perihilar and basilar hazy  opacities. No significant pleural effusion.      Impression    IMPRESSION: Indistinct pulmonary vasculature with predominantly  perihilar hazy opacities may represent mild pulmonary edema versus  diffuse infection.    I have personally reviewed the examination and initial interpretation  and I agree with the findings.    BRAULIO OTERO MD   US Abd Cmpl Abd/Pelvis Duplex Cmpl    Narrative    Examination: Abdominal ultrasound complete with Doppler 6/26/2018 2:35  PM     Comparison: CT AP dated 5/2/2018, ultrasound abdomen dated 3/2/2018.    History: Presents with altered mental status and fever History of HCC,  evaluation for hepatic circulation and mass progression    Findings:   There is moderate ascites. The visualized aorta and IVC are  unremarkable.    LIVER: Cirrhotic configuration of the liver parenchyma. The liver  measures 14 cm craniocaudally. Ill-defined heterogeneous hyperechoic  lesion in the right hepatic lobe measures up to 7.2 cm, not  significantly changed from 5/2/2018 CT study given different imaging  techniques. No intrahepatic biliary dilatation.    GALL BLADDER: Unable to evaluate sonographic Delong's sign due to  altered mental status. There are stones and biliary sludge  within the  gallbladder. The gallbladder wall is slightly thickened measures 7 mm  likely due to adjacent hepatic intrinsic parenchymal disease. No  pericholecystic effusion. The common bile duct is not identified.    PANCREAS: Obscured due to overlying bowel gas.    KIDNEY: The right kidney measures 12.0 cm. There is no hydronephrosis  or nephrolithiasis. Unchanged parapelvic cysts measure up to 2.5 cm.    DOPPLER:  The diameter of the main portal vein is 2.7 cm.     Redemonstration of echogenic nonobstructive thrombus within the main  portal vein and right portal vein. Flow is retrograde in splenic and  portal veins:  67 cm/sec in the main portal vein.   88 cm/sec in the left portal vein.  73 cm/sec in the right portal vein.  79 cm/sec in the splenic vein    Flow in the hepatic artery is towards the liver and:  145 cm/sec peak systolic  48 cm/sec minimum diastolic flow   0.71 resistive index     Flow in the right hepatic artery is towards the liver and:  200 cm/sec peak systolic  79 cm/sec minimum diastolic flow   0.60 resistive index     Flow in the left hepatic artery is towards the liver and:  243 cm/sec peak systolic  93 cm/sec minimum diastolic flow   0.65 resistive index     The left, middle, and right hepatic veins are patent with flow towards  the IVC.     The IVC is patent with flow towards the heart.      Impression    Impression:   1.  Cirrhotic configuration with evidence of portal hypertension,  including moderate ascites, retrograde blood flow within the portal  veins.  2.  Large ill-defined heterogeneous mass within the right hepatic lobe  measures up to 7.2 cm, overall not significant change from CT study on  5/2/2018 given different imaging techniques.  3.  Nonobstructive thrombus within the main portal and right portal  veins with retrograde blood flow in the splenic and portal veins.  Overall portal hypertension worsening than 3/2/2018 ultrasound study  given at that time the splenic and left  portal veins blood flow was  antegrade.   4.  Hepatic arteries and hepatic veins demonstrate normal direction of  flow.     I have personally reviewed the examination and initial interpretation  and I agree with the findings.    CLEOPATRA SIFUENTES MD   MR Abdomen MRCP w/o & w Contrast    Narrative    MRCP Without and With Contrast    CLINICAL HISTORY: pt with symptoms of cholangitis, has hcc that is  expanding. please evaluate for stone or biliary duct obstruction;     DATE: 6/29/2018 2:03 PM    TECHNIQUE:     Images were acquired with and without intravenous gadolinium contrast  through the upper abdomen. The following MR images were acquired  without intravenous contrast: TrueFISP, multiplanar T2-weighted, axial  T1 in/out of phase, T2-weighted MRCP images, axial diffusion-weighted  and axial apparent diffusion coefficient. T1-weighted images were  obtained before contrast at the multiple time points following  contrast injection. 3-D reformatted images were generated by the  technologist. Contrast dose: 10cc Gadavist Injected    Comparison study: 6/27/2018    FINDINGS:    Biliary Tree: There is mild right liver biliary ductal dilation in  segment 8, presumably by patient's known infiltrative hepatocellular  carcinoma. No additional intrahepatic biliary dilatation suspected.    Pancreas: Pancreatic duct is difficult to evaluate. There appears to  be no pancreatic ductal dilation. There is a 7 mm cyst adjacent to the  tail of the pancreas (7 image 11)     Liver: Cirrhotic appearance of the liver. Decrease of signal in phase  imaging compatible with iron deposition. Right hepatic lobe  infiltrative appearance of the known mass is not significantly changed  compared to 5/22/2018, measuring up to 13 cm. There are 2 DWI bright  foci, one in correspondence of the known tumor measuring 1.2 cm, one  in segment 5/6 measuring 4 mm that cannot be better characterized on  the current study, but are new when compared to  3/3/2018. Stable 2 cm  T1 hyperintense nodule on series 5 image 23, can represent area of  hemorrhage, sequela of prior biopsy. Associated tumor thrombus also  remains unchanged.    Gallbladder: The bladder evaluation is limited. Appears to contain a  calculus    Spleen: Splenomegaly    Kidneys: Stable kidney cysts. No new kidney lesions.    Adrenal glands: Within normal limits.    Bowel: Grossly normal. Changes of colectomy and appendectomy    Lymph nodes: No significant lymphadenopathy.    Blood vessels: There is main portal vein and right portal vein partial  thrombosis, likely tumoral. Numerous portosystemic shunt, more  prominent in the spleen    Lung bases: Clear    Bones and soft tissues: No suspicious lesions    Mesentery and abdominal wall: No suspicious lesions    Ascites: Moderate-large      Impression    IMPRESSION:   1. Mild focal intrahepatic biliary dilatation involving segment 8,  from patient's known tumor. No additional intrahepatic biliary  dilatation suspected. There is no extrahepatic biliary dilatation.  1a. No significant change in extent of patient's known hepatocellular  carcinoma and associated tumor thrombus.    I have personally reviewed the examination and initial interpretation  and I agree with the findings.    AMANDA HA MD   POC US Guide for Paracentesis    Impression    Attending: Juwan Lee MD  Resident: Morro Brown MD  Procedure: Diagnostic and therapeutic paracentesis  Indication: ascites, discomfort  Pre-procedure diagnosis: KUHN cirrhosis with ascites, HCC  Post-procedure diagnosis: Same  The risks and benefits of the procedure were explained to the patient who expressed understanding and opted to proceed.  Consent was obtained and placed in the chart.  Ultrasound was used to find a pocket of ascites in the left lower quadrant. A time out was performed. The area was prepped and draped in the usual sterile fashion.  10 cc ml of 1% lidocaine was instilled and ascites  located.  A small incision was made with an 11 blade. The 8F paracentesis catheter and needle were inserted until ascites obtained then the needle removed.  The apparatus was connected to vacuum bottles and a total of 3,400 ml removed. A specimen was sent for analysis. The catheter was withdrawn and the area dressed. Pre and post-procedure images were saved to the medical record.     Patient tolerated the procedure well with no immediate complications.  The primary team was informed of the procedure.      Dr. Lee was present for the duration of the procedure.     Young Christian MD PGY-3      Attending Attestation   I was present for the entirety of the procedure including pre and post-procedure image capture, time out, lidocaine injection, catheter insertion and withdrawal.      Juwan Lee MD  [facility] Hospitalist  Pager [phone]       Pending Results   These results will be followed up by PCP  Unresulted Labs Ordered in the Past 30 Days of this Admission     Date and Time Order Name Status Description    6/27/2018 2238 Blood culture Preliminary     6/27/2018 2238 Blood culture Preliminary     6/27/2018 1244 Anaerobic bacterial culture Preliminary     6/27/2018 0001 Blood culture Preliminary     6/27/2018 0001 Blood culture Preliminary              Primary Care Physician   TRENA LANGE    Discharge Disposition   Discharged to home  Condition at discharge: Stable    Discharge Orders     CBC with platelets   Last Lab Result: Hemoglobin (g/dL)      Date                     Value                07/01/2018               7.9 (L)          ----------     Home Care PT Referral for Hospital Discharge     Medication Therapy Management Referral     Home care nursing referral     MD face to face encounter   Documentation of Face to Face and Certification for Home Health Services    I certify that patient: Serge Brambila is under my care and that I, or a nurse practitioner or physician's assistant working with me, had a  face-to-face encounter that meets the physician face-to-face encounter requirements with this patient on: 07/01/2018.    This encounter with the patient was in whole, or in part, for the following medical condition, which is the primary reason for home health care: deconditioning, HCC, altered mental status.    I certify that, based on my findings, the following services are medically necessary home health services: Skilled home care RN,  Physical Therapy and Occupational Therapy.    My clinical findings support the need for the above services because: Skilled RN, Physical Therapy/Occupational Therapy Services are needed to assess and treat the following functional impairments: weakness, deconditioning.    Further, I certify that my clinical findings support that this patient is homebound (i.e. absences from home require considerable and taxing effort and are for medical reasons or [employer] services or infrequently or of short duration when for other reasons) because: deconditioning/increase cognition evaluations in home setting.      Based on the above findings. I certify that this patient is confined to the home and needs intermittent skilled nursing care, physical therapy and/or speech therapy.  The patient is under my care, and I have initiated the establishment of the plan of care.  This patient will be followed by a physician who will periodically review the plan of care.  Physician/Provider to provide follow up care: Linn Thompson    Attending hospital physician (the Medicare certified [place] provider): Jean Awad, *  Physician Signature: See electronic signature associated with these discharge orders.    Date: 7/01/2018/2018     Reason for your hospital stay   You were admitted for an infection in your ascites and blood     Activity   Your activity upon discharge: activity as tolerated     Discharge Instructions   -resume your weekly paracenteses  -complete ciprofloxacin as prescribed then  continue taking it daily to prevent infection in your ascites  -resume your diuretics     Follow Up and recommended labs and tests   Follow up with primary care provider, TRENA LANGE, within 7 days for hospital follow- up.  The following labs/tests are recommended: CMP, INR, CBC.     Full Code     Diet   Follow this diet upon discharge: Orders Placed This Encounter     Advance Diet as Tolerated: Regular Diet Adult       Discharge Medications   Discharge Medication List as of 7/1/2018  2:52 PM      CONTINUE these medications which have CHANGED    Details   allopurinol (ZYLOPRIM) 100 MG tablet Take 1 tablet (100 mg) by mouth daily, Disp-90 tablet, R-0, E-Prescribe      carvedilol (COREG) 6.25 MG tablet Take 1 tablet (6.25 mg) by mouth 2 times daily (with meals), Disp-180 tablet, R-0, E-Prescribe      ciprofloxacin (CIPRO) 500 MG tablet Take ciprofloxacin 500 mg every 12h until 7/10/18 then take 500 mg daily, Disp-120 tablet, R-0, E-Prescribe      spironolactone (ALDACTONE) 50 MG tablet Take 2 tablets (100 mg) by mouth daily, Disp-180 tablet, R-0, E-Prescribe         CONTINUE these medications which have NOT CHANGED    Details   BASAGLAR 100 UNIT/ML injection Inject 30 Units Subcutaneous At Bedtime , Historical      furosemide (LASIX) 20 MG tablet Take 2 tablets (40 mg) by mouth daily, Disp-30 tablet, R-1, E-Prescribe      lactulose (CHRONULAC) 10 GM/15ML solution 30 mLs (20 g) by Oral or NG Tube route 3 times daily, Disp-2700 mL, R-1, E-Prescribe      LEVOTHYROXINE SODIUM PO Take 150 mcg by mouth daily , Historical      pantoprazole (PROTONIX) 20 MG EC tablet Take 2 tablets (40 mg) by mouth 2 times daily, Disp-60 tablet, R-2, E-Prescribe      rifaximin (XIFAXAN) 550 MG TABS tablet Take 1 tablet (550 mg) by mouth 2 times daily, Disp-60 tablet, R-3, Local Print      sodium bicarbonate 650 MG tablet Take 2 tablets (1,300 mg) by mouth 2 times daily, Disp-120 tablet, R-1, E-Prescribe      !! blood glucose monitoring (NO  BRAND SPECIFIED) test strip 1 each, Historical      !! blood glucose monitoring (ONETOUCH ULTRA) test strip TEST TWICE DAILY TO THREE TIMES DAILY, Historical      Blood Pressure Monitoring (RA BLOOD PRESSURE CUFF MONITOR) MISC by Misc.(Non-Drug; Combo Route) route once daily., Historical      [employer] LANCETS 21G MISC by Misc.(Non-Drug; Combo Route) route once daily., Historical      Nitroglycerin (NITROQUICK SL) Place under the tongue as needed , Historical       !! - Potential duplicate medications found. Please discuss with provider.      STOP taking these medications       metoprolol tartrate (LOPRESSOR) 25 MG tablet Comments:   Reason for Stopping:             Allergies   Allergies   Allergen Reactions     Penicillins      Happened in his teens, unsure what his reaction was. Tolerated ceftriaxone and Zosyn 3/2018

## 2018-07-02 NOTE — PLAN OF CARE
Problem: Patient Care Overview  Goal: Plan of Care/Patient Progress Review  Physical Therapy Discharge Summary    Reason for therapy discharge:    Discharged to home with home therapy.    Progress towards therapy goal(s). See goals on [facility] Plan in [facility] record for goal details.  Goals partially met.  Barriers to achieving goals:   discharge from facility.    Therapy recommendation(s):    Continued therapy is recommended.  Rationale/Recommendations:  home safety evaluation, to progress balance and functional mobility.

## 2018-07-05 NOTE — MR AVS SNAPSHOT
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  The following information is a summary of our appointment as well as important reminders.      DISCHARGE INSTRUCTIONS FOLLOWING ABDOMINAL PARACENTESIS    After you go home:    No strenuous activity for 24 hours    Resume your regular diet    Limit fluid intake for the first 48 hours to no more than 2 quarts per day.  There should be minimal drainage from the needle site.  If drainage does occur and soaks through the bandage, apply gentle pressure with your hand for 5 minutes.    Notify MD for the following:    Excessive drainage    Excessive swelling, redness or tenderness at the needle site    Fever greater than 101 degrees F    Dizziness or light-headedness when getting up or walking      IF THIS IS A MEDICAL EMERGENCY, CALL [phone]    If you have any questions or concerns    Contact the Hepatology Clinic:   [phone]    If you are a post-transplant patient, contact the Transplant Office:  [phone]    If this is after hours, contact the hospital :  [phone] and as to have the GI resident on call paged                   We look forward in seeing you on your next appointment here at [facility].  Please don t hesitate to call us at [phone] to reschedule any of your appointments or to speak with one of the [facility] registered nurses.  It was a pleasure taking care of you  today.    Sincerely,    [facility]  Specialty Infusion & Procedure Center  [address] [place]  [address]  Phone:  [phone]            Follow-ups after your visit        Your next 10 appointments already scheduled     Jul 12, 2018  7:30 AM CDT   Paracentesis Visit with Uc Spec Inf Para Provider, UC 39 ATC   [facility] Specialty and Procedure ([facility] [place])    [address]  [address]  [facility] [id number] [phone]            Jul 19, 2018  7:30 AM CDT   Paracentesis Visit with Uc Spec Inf Para Provider, UC 40 ATC   [facility] Specialty and Procedure ([facility] [place])    [address]  [address]  [facility] [id number] [phone]            Jul 26, 2018  7:30 AM CDT   Paracentesis Visit with Uc Spec Inf Para Provider, UC 40 ATC   [facility] Specialty and Procedure ([facility])    [address]  [address]  [facility] [id number] [phone]            Aug 02, 2018 12:00 PM CDT   Lab with UC LAB   [facility] ([facility])    [address]  1st Floor  [facility] [id number] [phone]            Aug 02, 2018 12:00 PM CDT   Paracentesis Visit with Uc Spec Inf Para Provider, UC 40 ATC   [facility] Specialty and Procedure ([facility] [place])    [address]  [address]  [facility] [address] [phone]            Aug 02, 2018  1:00 PM CDT   (Arrive by 12:45 PM)   Return General Liver with Vy Ireland PA-C   [facility] Hepatology ([facility])    [address] [place] [address]  [address]  [facility] [id number] [phone]              Who to contact     If you have questions or need follow up information about today's clinic visit or your schedule please contact [facility] SPECIALTY AND PROCEDURE directly at [phone].  Normal or non-critical lab and imaging results will be communicated to you by MyChart, letter or phone within 4 business days after the clinic has received the results. If you do not hear from us within 7 days, please contact the clinic through [employer] or phone. If you have a critical or abnormal lab result, we will notify you by phone as soon as possible.  Submit refill requests through [employer] or call your pharmacy and they will forward the refill request to us. Please allow 3 business days for your refill to be completed.          Additional Information About Your Visit        [employer] Information     [employer] gives you secure access to your electronic health record. If you see a primary care provider, you can also send messages to your care team and make appointments. If you have questions, please call your primary care clinic.  If you do not have a primary care provider, please call [phone] and they will assist you.        Care EveryWhere ID     This is your Care EveryWhere ID. This could be used by other organizations to access your [place] medical records  [id number]        Your Vitals Were     Temperature Pulse Oximetry BMI (Body Mass Index)             98.1  F (36.7  C) 100% 32.92 kg/m2          Blood Pressure from Last 3 Encounters:   07/05/18 137/44   07/01/18 (P) 141/45   06/22/18 148/64    Weight from Last 3 Encounters:   07/05/18 98.2 kg (216 lb 8 oz)   06/30/18 98.7 kg (217 lb 9.6 oz)   06/22/18 92.6 kg (204 lb 1.6 oz)              We Performed the Following     CBC with platelets     US Paracentesis        Primary Care Provider Office Phone # Fax #    Linn GONZALEZ Jay [phone]       [facility] [address]  [facility] [address]        Equal Access to Services     FRANK CONROY : Eva Benz, waluceroda zenonqadaha, qaybta kaalmamiranda marie, miley holt  ah. So [facility] [phone].    ATENCIÓN: Si tawanda magana, tiene a collins disposición servicios gratuitos de asistencia lingüística. Rufina lbakely [phone].    We comply with applicable federal civil rights laws and [place] laws. We do not discriminate on the basis of race, color, national origin, age, disability, sex, sexual orientation, or gender identity.            Thank you!     Thank you for choosing [facility] SPECIALTY AND PROCEDURE  for your care. Our goal is always to provide you with excellent care. Hearing back from our patients is one way we can continue to improve our services. Please take a few minutes to complete the written survey that you may receive in the mail after your visit with us. Thank you!             Your Updated Medication List - Protect others around you: Learn how to safely use, store and throw away your medicines at www.disposemymeds.org.          This list is accurate as of 7/5/18  3:21 PM.  Always use your most recent med list.                   Brand Name Dispense Instructions for use Diagnosis    allopurinol 100 MG tablet    ZYLOPRIM    90 tablet    Take 1 tablet (100 mg) by mouth daily    Hyperuricemia       BASAGLAR 100 UNIT/ML injection      Inject 30 Units Subcutaneous At Bedtime        * ONETOUCH ULTRA test strip   Generic drug:  blood glucose monitoring      TEST TWICE DAILY TO THREE TIMES DAILY        * blood glucose monitoring test strip    no brand specified     1 each        carvedilol 6.25 MG tablet    COREG    180 tablet    Take 1 tablet (6.25 mg) by mouth 2 times daily (with meals)    Secondary esophageal varices without bleeding (H)       ciprofloxacin 500 MG tablet    CIPRO    120 tablet    Take ciprofloxacin 500 mg every 12h until 7/10/18 then take 500 mg daily    SBP (spontaneous bacterial peritonitis) (H), Bacteremia       furosemide 20 MG tablet    LASIX    30 tablet    Take 2 tablets (40 mg) by mouth daily    Cirrhosis of liver with ascites,  unspecified hepatic cirrhosis type (H)       [employer] LANCETS 21G Misc      by Misc.(Non-Drug; Combo Route) route once daily.        lactulose 10 GM/15ML solution    CHRONULAC    2700 mL    30 mLs (20 g) by Oral or NG Tube route 3 times daily    Hepatic encephalopathy (H)       LEVOTHYROXINE SODIUM PO      Take 150 mcg by mouth daily        NITROQUICK SL      Place under the tongue as needed        pantoprazole 20 MG EC tablet    PROTONIX    60 tablet    Take 2 tablets (40 mg) by mouth 2 times daily    Gastrointestinal hemorrhage, unspecified gastrointestinal hemorrhage type, HCC (hepatocellular carcinoma) (H)       RA BLOOD PRESSURE CUFF MONITOR Misc      by Misc.(Non-Drug; Combo Route) route once daily.        rifaximin 550 MG Tabs tablet    XIFAXAN    60 tablet    Take 1 tablet (550 mg) by mouth 2 times daily    Hepatic encephalopathy (H)       sodium bicarbonate 650 MG tablet     120 tablet    Take 2 tablets (1,300 mg) by mouth 2 times daily    Chronic kidney disease, unspecified CKD stage       spironolactone 50 MG tablet    ALDACTONE    180 tablet    Take 2 tablets (100 mg) by mouth daily    Cirrhosis of liver with ascites, unspecified hepatic cirrhosis type (H)       * Notice:  This list has 2 medication(s) that are the same as other medications prescribed for you. Read the directions carefully, and ask your doctor or other care provider to review them with you.

## 2018-07-05 NOTE — PROGRESS NOTES
Paracentesis Nursing Note  Serge Brambila presents today [facility] for a paracentesis.    During today's appointment orders from Dr. Fernando Montgomery were completed.    Progress Note:  Patient identification verified by name and date of birth.  Assessment completed.  Vitals monitored throughout appointment and recorded in Doc Flowsheets.  See proceduralist note in ultrasound.    Date of consent or authorization: 04/06/2018 - 07/06/2018.  Invasive Procedure Safety Checklist was completed and sent for scanning.     Paracentesis performed by Haresh Loredo PA-C Radiology.    The following labs were communicated to provider performing paracentesis:  Lab Results   Component Value Date    PLT 70 07/01/2018       Total amount of ascites fluid drained: 4 liters.  Color of ascites fluid: anabela.  Total amount of albumin given: 50  grams.    Patient tolerated procedure well.    Pt and his wife state that he should have labs drawn frequently to monitor his Hgb. CBC in open orders does not contain a frequency. Message sent to charge nurse Reshma Kang RN to clarify.     Post procedure,denies pain or discomfort post paracentesis.     Administrations This Visit     albumin human 25 % injection 12.5 g     Admin Date Action Dose Route Administered By             07/05/2018 New Bag 12.5 g Intravenous Katty Mccloud RN              Admin Date Action Dose Route Administered By             07/05/2018 New Bag 12.5 g Intravenous Katty Mccloud RN              Admin Date Action Dose Route Administered By             07/05/2018 New Bag 12.5 g Intravenous Katty Mccloud RN              Admin Date Action Dose Route Administered By             07/05/2018 New Bag 12.5 g Intravenous Katty Mccloud RN                    ethyl chloride spray     Admin Date Action Dose Route Administered By             07/05/2018 Given   Topical Francine Mc RN                    lidocaine 1 % 20 mL      Admin Date Action Dose Route Administered By             07/05/2018 Given by Other Clinician 20 mL Other Katty Mccloud, RN                            Discharge Plan:  Discharge instructions were reviewed with patient.  Patient/Representative verbalized understanding and all questions were answered.   Discharged from [facility] in stable condition.    Katty Mccloud RN        Temp 98.1  F (36.7  C)  Wt 98.2 kg (216 lb 8 oz)  SpO2 100%  BMI 32.92 kg/m2

## 2018-07-05 NOTE — PATIENT INSTRUCTIONS
Dear Serge Brambila    Thank you for choosing [facility] Specialty Infusion [facility] Procedure Center ([facility]) for your procedure.  The following information is a summary of our appointment as well as important reminders.      DISCHARGE INSTRUCTIONS FOLLOWING ABDOMINAL PARACENTESIS    After you go home:    No strenuous activity for 24 hours    Resume your regular diet    Limit fluid intake for the first 48 hours to no more than 2 quarts per day.  There should be minimal drainage from the needle site.  If drainage does occur and soaks through the bandage, apply gentle pressure with your hand for 5 minutes.    Notify MD for the following:    Excessive drainage    Excessive swelling, redness or tenderness at the needle site    Fever greater than 101 degrees F    Dizziness or light-headedness when getting up or walking      IF THIS IS A MEDICAL EMERGENCY, CALL [phone]    If you have any questions or concerns    Contact the Hepatology Clinic:   [phone]    If you are a post-transplant patient, contact the Transplant Office:  [phone]    If this is after hours, contact the hospital :  [phone] and as to have the GI resident on call paged                   We look forward in seeing you on your next appointment here at [facility].  Please don t hesitate to call us at [phone] to reschedule any of your appointments or to speak with one of the [facility] registered nurses.  It was a pleasure taking care of you today.    Sincerely,    [facility]  Specialty Infusion & Procedure Center  [address] [place]  [address]  Phone:  [phone]

## 2018-07-12 NOTE — MR AVS SNAPSHOT
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  The following information is a summary of our appointment as well as important reminders.          We look forward in seeing you on your next appointment here at [facility].  Please don t hesitate to call us at [phone] to reschedule any of your appointments or to speak with one of the [facility] registered nurses.  It was a pleasure taking care of you today.    Sincerely,    [facility]  Specialty Infusion & Procedure Center  [address] [place] [address]  [address]  Phone:  [phone]              Follow-ups after your visit        Your next 10 appointments already scheduled     Jul 19, 2018  7:30 AM CDT   Paracentesis Visit with Uc Spec Inf Para Provider, UC 40 ATC   [facility] Specialty and Procedure ([facility])    [address]  [address]  [facility] [address] [phone]            Jul 26, 2018  7:30 AM CDT   Paracentesis Visit with Uc Spec Inf Para Provider, UC 40 ATC   [facility] Specialty and Procedure ([facility])    [address]  [address]  [facility] [address] [phone]            Aug 02, 2018 10:00 AM CDT   Lab with UC LAB   [facility] ([facility])    [address] [place] [address]  1st Floor  [facility]  [address]   [phone]            Aug 02, 2018 11:00 AM CDT   (Arrive by 10:45 AM)   Return General Liver with Vy Ireland PA-C   [facility] Hepatology ([facility])    [address] [place] [address]  [address]  [facility] [address]   [phone]            Aug 02, 2018 12:00 PM CDT   Paracentesis Visit with Uc Spec Inf Para Provider, UC 40 ATC   [facility] Specialty and Procedure ([facility])    [address]  [address]  [facility] [address]   [phone]            Aug 10, 2018  7:30 AM CDT   Paracentesis Visit with Uc Spec Inf Para Provider, UC 40 ATC   [facility] Specialty and Procedure ([facility])    [address]  [address]  [facility] [address] [phone]              Who to contact     If you have questions or need follow up information about today's clinic visit or your schedule please contact [facility] SPECIALTY AND PROCEDURE directly at [phone].  Normal or non-critical lab and imaging results will be communicated to you by [employer], letter or phone within 4 business days after the clinic has received the results. If you do not hear from us within 7 days, please contact the clinic through [employer] or phone. If you have a critical or abnormal lab result, we will notify you by phone as soon as possible.  Submit refill requests through [employer] or call your pharmacy and they will forward the refill request to us. Please allow 3 business days for your refill to be completed.          Additional Information About Your Visit        MyChart Information     [employer] gives you secure access to your electronic health record. If you see a primary care provider, you can also send messages to your care team and make appointments. If you have questions, please call your primary care clinic.  If you do not have a primary care provider,  please call [phone] and they will assist you.        Care EveryWhere ID     This is your Care EveryWhere ID. This could be used by other organizations to access your [place] medical records  [id number]        Your Vitals Were     Pulse Respirations BMI (Body Mass Index)             57 18 30.93 kg/m2          Blood Pressure from Last 3 Encounters:   07/12/18 (!) 134/38   07/05/18 133/43   07/01/18 (P) 141/45    Weight from Last 3 Encounters:   07/12/18 92.3 kg (203 lb 6.4 oz)   07/05/18 94.4 kg (208 lb 1.8 oz)   06/30/18 98.7 kg (217 lb 9.6 oz)              We Performed the Following     CBC with platelets     US Paracentesis        Primary Care Provider Office Phone # Fax #    Linn Thompson [phone]       [facility] [address]  [facility] [address]        Equal Access to Services     [facility] CenterSEK[facility] : Hadii aad ku hadasho Soomaali, waaxda luqadaha, qaybta kaalmada adeegyada, miley holt . So [facility] [phone].    ATENCIÓN: Si habla español, tiene a collins disposición servicios gratuitos de asistencia lingüística. Llame al [phone].    We comply with applicable federal civil rights laws and [place] laws. We do not discriminate on the basis of race, color, national origin, age, disability, sex, sexual orientation, or gender identity.            Thank you!     Thank you for choosing [facility] [place] SPECIALTY AND PROCEDURE  for your care. Our goal is always to provide you with excellent care. Hearing back from our patients is one way we can continue to improve our services. Please take a few minutes to complete the written survey that you may receive in the mail after your visit with us. Thank you!             Your Updated Medication List - Protect others around you: Learn how to safely use, store and throw away your medicines at www.disposemymeds.org.          This list is accurate as of 7/12/18  9:17 AM.  Always use your most  recent med list.                   Brand Name Dispense Instructions for use Diagnosis    allopurinol 100 MG tablet    ZYLOPRIM    90 tablet    Take 1 tablet (100 mg) by mouth daily    Hyperuricemia       BASAGLAR 100 UNIT/ML injection      Inject 30 Units Subcutaneous At Bedtime        * ONETOUCH ULTRA test strip   Generic drug:  blood glucose monitoring      TEST TWICE DAILY TO THREE TIMES DAILY        * blood glucose monitoring test strip    no brand specified     1 each        carvedilol 6.25 MG tablet    COREG    180 tablet    Take 1 tablet (6.25 mg) by mouth 2 times daily (with meals)    Secondary esophageal varices without bleeding (H)       ciprofloxacin 500 MG tablet    CIPRO    120 tablet    Take ciprofloxacin 500 mg every 12h until 7/10/18 then take 500 mg daily    SBP (spontaneous bacterial peritonitis) (H), Bacteremia       furosemide 20 MG tablet    LASIX    30 tablet    Take 2 tablets (40 mg) by mouth daily    Cirrhosis of liver with ascites, unspecified hepatic cirrhosis type (H)       [employer] LANCETS 21G Misc      by Misc.(Non-Drug; Combo Route) route once daily.        lactulose 10 GM/15ML solution    CHRONULAC    2700 mL    30 mLs (20 g) by Oral or NG Tube route 3 times daily    Hepatic encephalopathy (H)       LEVOTHYROXINE SODIUM PO      Take 150 mcg by mouth daily        NITROQUICK SL      Place under the tongue as needed        pantoprazole 20 MG EC tablet    PROTONIX    60 tablet    Take 2 tablets (40 mg) by mouth 2 times daily    Gastrointestinal hemorrhage, unspecified gastrointestinal hemorrhage type, HCC (hepatocellular carcinoma) (H)       RA BLOOD PRESSURE CUFF MONITOR Misc      by Misc.(Non-Drug; Combo Route) route once daily.        rifaximin 550 MG Tabs tablet    XIFAXAN    60 tablet    Take 1 tablet (550 mg) by mouth 2 times daily    Hepatic encephalopathy (H)       sodium bicarbonate 650 MG tablet     120 tablet    Take 2 tablets (1,300 mg) by mouth 2 times daily    Chronic kidney  disease, unspecified CKD stage       spironolactone 50 MG tablet    ALDACTONE    180 tablet    Take 2 tablets (100 mg) by mouth daily    Cirrhosis of liver with ascites, unspecified hepatic cirrhosis type (H)       * Notice:  This list has 2 medication(s) that are the same as other medications prescribed for you. Read the directions carefully, and ask your doctor or other care provider to review them with you.

## 2018-07-12 NOTE — PROGRESS NOTES
Paracentesis Nursing Note  Serge Brambila presents today to [facility] for a [facility]acentesis.    During today's appointment orders from Dr. Montgomery were completed.    Progress Note:  Patient identification verified by name and date of birth.  Assessment completed.  Vitals monitored throughout appointment and recorded in Doc Flowsheets.  See proceduralist note in ultrasound.    Date of consent or authorization: 7/12/2018.  Invasive Procedure Safety Checklist was completed and sent for scanning.     Paracentesis performed by Myrtle Walters PA-C Radiology.    The following labs were communicated to provider performing paracentesis:  Lab Results   Component Value Date    PLT 87 07/12/2018       Total amount of ascites fluid drained: 3.7 liters.  Color of ascites fluid: yellow.  Total amount of albumin given: 50  grams.    Patient tolerated procedure well.    Post procedure,denies pain or discomfort post paracentesis.      Discharge Plan:  Discharge instructions were reviewed with patient.  Patient/Representative verbalized understanding and all questions were answered.   Discharged from [facility] in stable condition.    Yue Srivastava RN    Administrations This Visit     albumin human 25 % injection 12.5 g     Admin Date Action Dose Route Administered By             07/12/2018 New Bag 12.5 g Intravenous Yue Srivastava RN                    lidocaine 1 % 20 mL     Admin Date Action Dose Route Administered By             07/12/2018 Given by Other 20 mL Other Yue Srivastava RN                          Wt 95.6 kg (210 lb 12.8 oz)  BMI 32.05 kg/m2

## 2018-07-12 NOTE — PATIENT INSTRUCTIONS
Dear Serge Brambila    Thank you for choosing [facility] ([facility]) for your procedure.  The following information is a summary of our appointment as well as important reminders.          We look forward in seeing you on your next appointment here at [facility].  Please don t hesitate to call us at [phone] to reschedule any of your appointments or to speak with one of the [facility] registered nurses.  It was a pleasure taking care of you today.    Sincerely,    [facility]  [facility]  [address] [place] [address]  [address]  Phone:  [phone]

## 2018-07-19 NOTE — MR AVS SNAPSHOT
After Visit Summary   7/19/2018    Serge Brambila    MRN: [id number]           Patient Information     Date Of Birth          1945        Visit Information        Provider Department      7/19/2018 1:30 PM UC 4 ATC; UC BMT INFUSION [facility] Blood and Marrow Transplant        Today's Diagnoses     Anemia, unspecified type    -  1          [facility] Center ([facility])  [address] [place] [address]  Phone: [phone]  Clinic Hours:   Monday-Thursday:7am to 7pm   Friday: 7am to 5pm   Weekends and holidays:    8am to noon (in general)  If your fever is 100.5  or greater,   call the clinic.  After hours call the   hospital at [phone] or   [phone]. Ask for the BMT   fellow on-call            Follow-ups after your visit        Your next 10 appointments already scheduled     Jul 26, 2018  7:30 AM CDT   Paracentesis Visit with Uc Spec Inf Para Provider, UC 40 ATC   [facility] Specialty and Procedure ([facility])    [address] [place] [address]  [address]  [facility] [id number] [phone]            Aug 02, 2018 10:00 AM CDT   Lab with UC LAB   [facility] ([facility])    [address] [place] [address]  1st Floor  [facility] [address] [phone]            Aug 02, 2018 11:00 AM CDT   (Arrive by 10:45 AM)   Return General Liver with Vy Ireland PA-C   [facility] Hepatology ([facility])    [address] [place] [address]  [address]  [facility] [id number] [phone]            Aug 02, 2018 12:00 PM CDT   Paracentesis Visit with Uc Spec Inf Para Provider, UC 40 ATC   [facility] Specialty and Procedure ([facility])    [address] [place] [address]  [address]  [facility] [id number] [phone]            Aug 10, 2018  7:30 AM CDT   Paracentesis Visit with Uc Spec Inf Para Provider, UC 40 ATC   [facility]  Specialty and Procedure ([facility])    [address]  [address]  [facility] [address] [phone]            Aug 17, 2018  2:00 PM CDT   Paracentesis Visit with Elli Spec Inf Para Provider, UC 39 ATC   [facility] Specialty and Procedure ([facility])    [address]  [address]  [facility] [address] [phone]              Future tests that were ordered for you today     Open Standing Orders        Priority Remaining Interval Expires Ordered    Transfuse red blood cell unit Routine 98/100 TRANSFUSE 1 UNIT  7/19/2018    Red blood cell prepare order unit Routine 98/100 CONDITIONAL (SPECIFY) BLOOD  7/19/2018    CBC with platelets differential Routine 52/52 weekly  7/19/2019 7/19/2018          Open Future Orders        Priority Expected Expires Ordered    Hepatic panel Routine 8/2/2018 7/18/2019 7/18/2018    Basic metabolic panel Routine 8/2/2018 7/18/2019 7/18/2018    INR Routine 8/2/2018 7/18/2019 7/18/2018    CBC with platelets Routine 8/2/2018 7/18/2019 7/18/2018            Who to contact     If you have questions or need follow up information about today's clinic visit or your schedule please contact [facility] BLOOD AND MARROW TRANSPLANT directly at [phone].  Normal or non-critical lab and imaging results will be communicated to you by Inbiomotionhart, letter or phone within 4 business days after the clinic has received the results. If you do not hear from us within 7 days, please contact the clinic through Inbiomotionhart or phone. If you have a critical or abnormal lab result, we will notify you by phone as soon as possible.  Submit refill requests through Fitocracy or call your pharmacy and they will forward the refill request to us. Please allow 3 business days for your refill to be completed.          Additional Information About Your Visit        Fitocracy Information     Fitocracy gives you secure access to your electronic  health record. If you see a primary care provider, you can also send messages to your care team and make appointments. If you have questions, please call your primary care clinic.  If you do not have a primary care provider, please call [phone] and they will assist you.        Care EveryWhere ID     This is your Care EveryWhere ID. This could be used by other organizations to access your [place] medical records  [id number]        Your Vitals Were     Pulse Temperature Respirations Pulse Oximetry          59 97.8  F (36.6  C) (Oral) 16 97%         Blood Pressure from Last 3 Encounters:   07/19/18 120/63   07/19/18 (!) 135/37   07/12/18 (!) 134/38    Weight from Last 3 Encounters:   07/19/18 91.3 kg (201 lb 4.8 oz)   07/12/18 92.3 kg (203 lb 6.4 oz)   07/05/18 94.4 kg (208 lb 1.8 oz)              We Performed the Following     Transfuse red blood cell unit     Transfuse red blood cell unit        Recent Review Flowsheet Data     BMT Recent Results Latest Ref Rng & Units 7/1/2018 7/1/2018 7/1/2018 7/1/2018 7/5/2018 7/12/2018 7/19/2018    WBC 4.0 - 11.0 10e9/L - 3.4(L) - - 4.3 4.6 3.7(L)    Hemoglobin 13.3 - 17.7 g/dL - 7.9(L) - - 8.1(L) 8.5(L) 6.2(LL)    Platelet Count 150 - 450 10e9/L - 70(L) - - 91(L) 87(L) 73(L)    Neutrophils (Absolute) 1.6 - 8.3 10e9/L - - - - - - -    INR 0.86 - 1.14 - 2.01(H) - - - - -    Sodium 133 - 144 mmol/L - 142 - - - - -    Potassium 3.4 - 5.3 mmol/L - 4.0 - - - - -    Chloride 94 - 109 mmol/L - 117(H) - - - - -    Glucose 70 - 99 mg/dL 159(H) 112(H) 79 137(H) - - -    Urea Nitrogen 7 - 30 mg/dL - 35(H) - - - - -    Creatinine 0.66 - 1.25 mg/dL - 1.48(H) - - - - -    Calcium (Total) 8.5 - 10.1 mg/dL - 8.6 - - - - -    Protein (Total) 6.8 - 8.8 g/dL - 6.0(L) - - - - -    Albumin 3.4 - 5.0 g/dL - 3.2(L) - - - - -    Bilirubin (Direct) 0.0 - 0.2 mg/dL - - - - - - -    Alkaline Phosphatase 40 - 150 U/L - 160(H) - - - - -    AST 0 - 45 U/L - 131(H) - - - - -    ALT 0 - 70 U/L - 80(H) -  - - - -    MCV 78 - 100 fl - 90 - - 91 90 88               Primary Care Provider Office Phone # Fax #    Linn Thompson [phone]       [facility] [address]  [place] [address]        Equal Access to Services     FRANK CONROY : Eva curiel hadasho Soomaali, waaxda luqadaha, qaybta kaalmada adeegyada, waxkwasi sesay laverneyang sherifftajmaurilio guerrero. So [facility] [phone].    ATENCIÓN: Si habla español, tiene a collins disposición servicios gratuitos de asistencia lingüística. Rufina al [phone].    We comply with applicable federal civil rights laws and [place] laws. We do not discriminate on the basis of race, color, national origin, age, disability, sex, sexual orientation, or gender identity.            Thank you!     Thank you for choosing [facility] BLOOD AND MARROW TRANSPLANT  for your care. Our goal is always to provide you with excellent care. Hearing back from our patients is one way we can continue to improve our services. Please take a few minutes to complete the written survey that you may receive in the mail after your visit with us. Thank you!             Your Updated Medication List - Protect others around you: Learn how to safely use, store and throw away your medicines at www.disposemymeds.org.          This list is accurate as of 7/19/18  5:02 PM.  Always use your most recent med list.                   Brand Name Dispense Instructions for use Diagnosis    allopurinol 100 MG tablet    ZYLOPRIM    90 tablet    Take 1 tablet (100 mg) by mouth daily    Hyperuricemia       BASAGLAR 100 UNIT/ML injection      Inject 30 Units Subcutaneous At Bedtime        * ONETOUCH ULTRA test strip   Generic drug:  blood glucose monitoring      TEST TWICE DAILY TO THREE TIMES DAILY        * blood glucose monitoring test strip    no brand specified     1 each        carvedilol 6.25 MG tablet    COREG    180 tablet    Take 1 tablet (6.25 mg) by mouth 2 times daily (with meals)    Secondary  esophageal varices without bleeding (H)       ciprofloxacin 500 MG tablet    CIPRO    120 tablet    Take ciprofloxacin 500 mg every 12h until 7/10/18 then take 500 mg daily    SBP (spontaneous bacterial peritonitis) (H), Bacteremia       furosemide 20 MG tablet    LASIX    30 tablet    Take 2 tablets (40 mg) by mouth daily    Cirrhosis of liver with ascites, unspecified hepatic cirrhosis type (H)       [employer] LANCETS 21G Misc      by Misc.(Non-Drug; Combo Route) route once daily.        lactulose 10 GM/15ML solution    CHRONULAC    2700 mL    30 mLs (20 g) by Oral or NG Tube route 3 times daily    Hepatic encephalopathy (H)       LEVOTHYROXINE SODIUM PO      Take 150 mcg by mouth daily        NITROQUICK SL      Place under the tongue as needed        pantoprazole 20 MG EC tablet    PROTONIX    60 tablet    Take 2 tablets (40 mg) by mouth 2 times daily    Gastrointestinal hemorrhage, unspecified gastrointestinal hemorrhage type, HCC (hepatocellular carcinoma) (H)       RA BLOOD PRESSURE CUFF MONITOR Misc      by Misc.(Non-Drug; Combo Route) route once daily.        rifaximin 550 MG Tabs tablet    XIFAXAN    60 tablet    Take 1 tablet (550 mg) by mouth 2 times daily    Hepatic encephalopathy (H)       sodium bicarbonate 650 MG tablet     120 tablet    Take 2 tablets (1,300 mg) by mouth 2 times daily    Chronic kidney disease, unspecified CKD stage       spironolactone 50 MG tablet    ALDACTONE    180 tablet    Take 2 tablets (100 mg) by mouth daily    Cirrhosis of liver with ascites, unspecified hepatic cirrhosis type (H)       * Notice:  This list has 2 medication(s) that are the same as other medications prescribed for you. Read the directions carefully, and ask your doctor or other care provider to review them with you.

## 2018-07-19 NOTE — NURSING NOTE
Chief Complaint   Patient presents with     RECHECK     Pt here for 2 units PRBC's. Pt with Anemia and Liver Cancer.      Archana Florez RN

## 2018-07-19 NOTE — PROGRESS NOTES
Paracentesis Nursing Note  Serge Brambila [facility] for [facility].    During today's appointment orders from Dr. Fernando Montgomery were completed.    Progress Note:  Patient identification verified by name and date of birth.  Assessment completed.  Vitals monitored throughout appointment and recorded in Doc Flowsheets.  See proceduralist note in ultrasound.    Date of consent or authorization:  07/12/2018.  Invasive Procedure Safety Checklist was completed and sent for scanning.     Paracentesis performed by Kike Harley PA-C Radiology.    The following labs were communicated to provider performing paracentesis:  Lab Results   Component Value Date    PLT 87 07/01/2018       Total amount of ascites fluid drained: 3.3 Liters.  Color of ascites fluid: anabela.  Total amount of albumin given: 37.5 grams.  CBC drawn per patient request that MD wanted weekly Hgb due to pt history. (states weekly cbc.)  Max from lab reported critical Hgb 6.2. To this nurse.  Yue Srivastava RN gave critical hgb to MD CLINT Hernández and she will follow up.  Patient tolerated procedure well.    Post procedure,denies pain or discomfort post paracentesis.     Administrations This Visit     albumin human 25 % injection 12.5 g     Admin Date Action Dose Route Administered By             07/19/2018 Given 37.5 g Intravenous Halie Ruiz RN                    lidocaine 1 % 20 mL     Admin Date Action Dose Route Administered By             07/19/2018 Given 10 mL Other Halie Ruiz RN                            Discharge Plan:  Discharge instructions were reviewed with patient.  Patient/Representative verbalized understanding and all questions were answered.   Dis[facility] in stable condition.    Halie Ruiz RN        Temp 98.2  F (36.8  C) (Oral)  Resp 16  Wt 94.5 kg (208 lb 4.8 oz)  SpO2 100%  BMI 31.67 kg/m2

## 2018-07-19 NOTE — PROGRESS NOTES
RN Care Coordination Note  Reason for Outgoing Call:   MD called re: hgb 6.2 with lab draw done with paracentesis today  - per Dr. Cruz, pt should have 2 units PRBCs today or tomorrow.  Plan for possible 1 unit weekly with para/CBC  starting next week. Tx plans in place.  Nursing Action/Patient Instructions  - contacted pt's wife re: anemia. Pt has no sx of active bleeding per wife but is more fatigued. No other acute sx/concerns.  - arranged transfusion for add-on today at [facility] BMT infusion at 1330, called lab to add on t&c - confirmed, and message sent to scheduling to arrange future possible transfusion appts.   Patient Response/Evaluation:   Pt's wife voiced understanding of above instructions and information and denied further questions    Moraima Quintero, RN, BSN, OCN  Care Coordinator  [facility]

## 2018-07-19 NOTE — MR AVS SNAPSHOT
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)    -  1    Ascites of liver        Anemia, unspecified type           Follow-ups after your visit        Your next 10 appointments already scheduled     Jul 26, 2018  7:30 AM CDT   Paracentesis Visit with Uc Spec Inf Para Provider, UC 40 ATC   [facility] Specialty and Procedure ([facility])    [address]  [address]  [facility] [address]   [phone]            Aug 02, 2018 10:00 AM CDT   Lab with UC LAB   [facility] Lab ([facility])    [address]  1st Floor  [facility] [address] [phone]            Aug 02, 2018 11:00 AM CDT   (Arrive by 10:45 AM)   Return General Liver with Vy Ireland PA-C   [facility] Hepatology ([facility])    [address]  [address]  [facility] [address]   [phone]            Aug 02, 2018 12:00 PM CDT   Paracentesis Visit with Uc Spec Inf Para Provider, UC 40 ATC   [facility] Specialty and Procedure ([facility])    [address]  [address]  [facility] [address] [phone]            Aug 10, 2018  7:30 AM CDT   Paracentesis Visit with Uc Spec Inf Para Provider, UC 40 ATC   [facility] Specialty and Procedure ([facility])    [address]  [address]  [facility] [address]   [phone]            Aug 17, 2018  2:00 PM CDT   Paracentesis Visit with Uc Spec Inf Para Provider, UC 39 ATC   [facility] Specialty  and Procedure ([facility])    [address]  [address]  [facility] [place] [address] [phone]              Future tests that were ordered for you today     Open Standing Orders        Priority Remaining Interval Expires Ordered    Transfuse red blood cell unit Routine 99/100 TRANSFUSE 1 UNIT  7/19/2018    Red blood cell prepare order unit Routine 98/100 CONDITIONAL (SPECIFY) BLOOD  7/19/2018    CBC with platelets differential Routine 52/52 weekly  7/19/2019 7/19/2018          Open Future Orders        Priority Expected Expires Ordered    Hepatic panel Routine 8/2/2018 7/18/2019 7/18/2018    Basic metabolic panel Routine 8/2/2018 7/18/2019 7/18/2018    INR Routine 8/2/2018 7/18/2019 7/18/2018    CBC with platelets Routine 8/2/2018 7/18/2019 7/18/2018            Who to contact     If you have questions or need follow up information about today's clinic visit or your schedule please contact [facility] SPECIALTY AND PROCEDURE directly at [phone].  Normal or non-critical lab and imaging results will be communicated to you by [employer]amm[employer] Gameshart, letter or phone within 4 business days after the clinic has received the results. If you do not hear from us within 7 days, please contact the clinic through Original or phone. If you have a critical or abnormal lab result, we will notify you by phone as soon as possible.  Submit refill requests through Original or call your pharmacy and they will forward the refill request to us. Please allow 3 business days for your refill to be completed.          Additional Information About Your Visit        Original Information     Original gives you secure access to your electronic health record. If you see a primary care provider, you can also send messages to your care team and make appointments. If you have questions, please call your primary care clinic.  If you do not have a primary care provider, please call [phone] and they will  assist you.        Care EveryWhere ID     This is your Care EveryWhere ID. This could be used by other organizations to access your [place] medical records  [id number]        Your Vitals Were     Pulse Temperature Respirations Pulse Oximetry BMI (Body Mass Index)       65 98.2  F (36.8  C) (Oral) 16 100% 30.61 kg/m2        Blood Pressure from Last 3 Encounters:   07/19/18 (!) 135/37   07/12/18 (!) 134/38   07/05/18 133/43    Weight from Last 3 Encounters:   07/19/18 91.3 kg (201 lb 4.8 oz)   07/12/18 92.3 kg (203 lb 6.4 oz)   07/05/18 94.4 kg (208 lb 1.8 oz)              We Performed the Following     CBC with platelets     US Paracentesis        Primary Care Provider Office Phone # Fax #    Linn Thompson [phone]       [facility] [address]  [place] [address]        Equal Access to Services     [facility]: Hadii aad ku hadasho Soomaali, waaxda luqadaha, qaybta kaalmada adeegyada, waxay idiin hayaan elsa holt . So [facility] [phone].    ATENCIÓN: Si habla español, tiene a collins disposición servicios gratuitos de asistencia lingüística. [facility] [phone].    We comply with applicable federal civil rights laws and [place] laws. We do not discriminate on the basis of race, color, national origin, age, disability, sex, sexual orientation, or gender identity.            Thank you!     Thank you for choosing [facility] SPECIAL[facility] PROCED[facility]  for your care. Our goal is always to provide you with excellent care. Hearing back from our patients is one way we can continue to improve our services. Please take a few minutes to complete the written survey that you may receive in the mail after your visit with us. Thank you!             Your Updated Medication List - Protect others around you: Learn how to safely use, store and throw away your medicines at www.disposemymeds.org.          This list is accurate as of 7/19/18  2:16 PM.  Always  use your most recent med list.                   Brand Name Dispense Instructions for use Diagnosis    allopurinol 100 MG tablet    ZYLOPRIM    90 tablet    Take 1 tablet (100 mg) by mouth daily    Hyperuricemia       BASAGLAR 100 UNIT/ML injection      Inject 30 Units Subcutaneous At Bedtime        * ONETOUCH ULTRA test strip   Generic drug:  blood glucose monitoring      TEST TWICE DAILY TO THREE TIMES DAILY        * blood glucose monitoring test strip    no brand specified     1 each        carvedilol 6.25 MG tablet    COREG    180 tablet    Take 1 tablet (6.25 mg) by mouth 2 times daily (with meals)    Secondary esophageal varices without bleeding (H)       ciprofloxacin 500 MG tablet    CIPRO    120 tablet    Take ciprofloxacin 500 mg every 12h until 7/10/18 then take 500 mg daily    SBP (spontaneous bacterial peritonitis) (H), Bacteremia       furosemide 20 MG tablet    LASIX    30 tablet    Take 2 tablets (40 mg) by mouth daily    Cirrhosis of liver with ascites, unspecified hepatic cirrhosis type (H)       [employer] LANCETS 21G Misc      by Misc.(Non-Drug; Combo Route) route once daily.        lactulose 10 GM/15ML solution    CHRONULAC    2700 mL    30 mLs (20 g) by Oral or NG Tube route 3 times daily    Hepatic encephalopathy (H)       LEVOTHYROXINE SODIUM PO      Take 150 mcg by mouth daily        NITROQUICK SL      Place under the tongue as needed        pantoprazole 20 MG EC tablet    PROTONIX    60 tablet    Take 2 tablets (40 mg) by mouth 2 times daily    Gastrointestinal hemorrhage, unspecified gastrointestinal hemorrhage type, HCC (hepatocellular carcinoma) (H)       RA BLOOD PRESSURE CUFF MONITOR Misc      by Misc.(Non-Drug; Combo Route) route once daily.        rifaximin 550 MG Tabs tablet    XIFAXAN    60 tablet    Take 1 tablet (550 mg) by mouth 2 times daily    Hepatic encephalopathy (H)       sodium bicarbonate 650 MG tablet     120 tablet    Take 2 tablets (1,300 mg) by mouth 2 times daily     Chronic kidney disease, unspecified CKD stage       spironolactone 50 MG tablet    ALDACTONE    180 tablet    Take 2 tablets (100 mg) by mouth daily    Cirrhosis of liver with ascites, unspecified hepatic cirrhosis type (H)       * Notice:  This list has 2 medication(s) that are the same as other medications prescribed for you. Read the directions carefully, and ask your doctor or other care provider to review them with you.

## 2018-07-25 NOTE — PROGRESS NOTES
RN Care Coordination Note  Reason for Outgoing Call:   Need for transfusion appt for 1 unit PRBC tomorrow after weekly para, not scheduled yet per scheduling due to lack of infusion availabilty.  Nursing Action/Patient Instruction:  -Next day add-on obtained in [facility] infusion through charge nurse.  T&C added for tomorrow.   - Notified pt's wife, Tori of 1 unit PRBC appt tomorrow added for 10:30. She reports that pt's PCP darin hgb today and it was 7.2. Questions answered re: timing of appts and typical length of wait for blood products from blood bank and transfusion times. Reminded her that T&C has to be done every 72 hrs for blood bank and that \[facility]\"" staff has told me they will do the lab draw prior to para each week.. Explained that starting 8/2 the possible 1 unit PRBCs are pre-scheduled as per our discussion last week.  Patient Response/Evaluation:   Tori voiced understanding of above instructions and information and denied further questions today.    Moraima Quintero, RN, BSN, OCN  Care Coordinator  [facility]

## 2018-07-25 NOTE — IP AVS SNAPSHOT
MRN:[id number]                      After Visit Summary   7/25/2018    Serge Brambila    MRN: [id number]           Thank you!     Thank you for choosing [facility] for your care. Our goal is always to provide you with excellent care. Hearing back from our patients is one way we can continue to improve our services. Please take a few minutes to complete the written survey that you may receive in the mail after you visit with us. Thank you!        Patient Information     Date Of Birth          1945        Designated Caregiver       Most Recent Value    Caregiver    Will someone help with your care after discharge? yes    Name of designated caregiver Tori    Phone number of caregiver [phone]    Caregiver address [address]      About your hospital stay     You were admitted on:  July 26, 2018 You last received care in the:  [address] [facility]    You were discharged on:  July 30, 2018        Reason for your hospital stay       You were admitted for elevated potassium levels, a decrease in your renal function and worsening of your ascites. We have adjusted your medications, removed some abdominal fluid, and you have shown good improvement. You will continue to need close monitoring by the kidney and liver doctors, as well as by your cancer doctors. Please take all medications as prescribed, and attend all appointments as scheduled.     If you develop chest pain, shortness of breath, dizziness, weakness, feel faint, or have any new symptoms you should seek medical attention.                  Who to Call     For medical emergencies, please call 911.  For non-urgent questions about your medical care, please call your primary care provider or clinic, [phone]          Attending Provider     Provider Specialty    Brooks Mcduffie MD Emergency Medicine    Shiv Araujo MD Internal Medicine    Kath Marti MD Internal Medicine       Primary Care  Provider Office Phone # Fax #    Trena Lange [phone]      After Care Instructions     Activity       Your activity upon discharge: activity as tolerated            Diet       Follow this diet upon discharge: Orders Placed This Encounter      Fluid restriction 2000 ML FLUID      Advance Diet as Tolerated: Regular Diet Adult; 2 gm NA Diet            Discharge Instructions                 Follow-up Appointments     Adult [facility] Follow-up and recommended labs and tests       Follow up with primary care provider, TRENA LANGE, within 1-2 weeks, to evaluate medication change and for hospital follow- up. The following labs/tests are recommended: CBC, BMP, TSH/free T4, LFTs.    Follow up with nephrology in 1 week  Follow up with Gastroenterology within 4 weeks        Appointments on [place] [facility]or [facility] (with [facility] or [facility] provider or service). Call [phone] if you haven't heard regarding these appointments within 7 days of discharge.            Follow Up and recommended labs and tests       Follow up with renal and liver doctors: CBC, BMP, TSH/free T4, LFTs                  Your next 10 appointments already scheduled     Aug 02, 2018 10:00 AM CDT   Lab with ELADIO LAB   [facility] ([facility])    [address]  1st Floor  [place] [id number] [phone]            Aug 02, 2018 11:00 AM CDT   (Arrive by 10:45 AM)   Return General Liver with Vy Ireland PA-C   [facility] ([facility])    [address]  [address]  [place] [id number] [phone]            Aug 02, 2018 12:00 PM CDT   Paracentesis Visit with  Spec Inf Para Provider, UC 40 ATC   [facility] ([facility])    [address]  [address]  [place] [id number] [phone]            Aug 02, 2018  2:30 PM CDT   Infusion 180 with  ONCOLOGY  INFUSION, UC 19 ATC   [facility] ([facility])    [address]  [address]  [place] [address] [phone]            Aug 10, 2018  7:30 AM CDT   Paracentesis Visit with Elli Spec Inf Para Provider, UC 40 ATC   [facility] [place] [facility] Procedure ([facility])    [address]  [address]  [place] [id number] [phone]            Aug 10, 2018  1:00 PM CDT   Infusion 180 with ELLI ONCOLOGY INFUSION, UC 10 ATC   [facility] ([facility])    [address]  [address]  [place] [id number] [phone]              Additional Services     Home Care OT Referral for Hospital Discharge       OT to eval and treat    Your provider has ordered home care - occupational therapy. If you have not been contacted within 2 days of your discharge please call the department phone number listed on the top of this document.            Home Care PT Referral for Hospital Discharge       PT to eval and treat    Your provider has ordered home care - physical therapy. If you have not been contacted within 2 days of your discharge please call the department phone number listed on the top of this document.            Home care nursing referral       [facility] #[phone]  Resume skilled nursing visits  Monitor cardiac and resp status  Monitor hydration, nutrition and bowel status  Monitor diabetic status  Instruct in medications and eval effects  Instruct in medications and eval effects    Your provider has ordered home care nursing services. If you have not been contacted within 2 days of your discharge please call the inpatient department phone number at [phone] .            Medication Therapy Management Referral       MTM referral reason            Patient has Lactulose or Rifaxamin as a PTA Med or a Discharge medication      Patient had a hospital or  "ED visit in last 6 months and has more than 10   PTA or Discharge medications    Patient has 5 PTA or Discharge Medications AND one of the following   diagnoses: DM,HF,COPD,AMI DX,PULM HTN       This service is designed to help you get the most from your medications.  A specially trained pharmacist will work closely with you and your doctors  to solve any problems related to your medications and to help you get the   best results from taking them.      The Medication Therapy Management staff will call you to schedule an appointment.                  Pending Results     Date and Time Order Name Status Description    7/29/2018 2200 Blood Morphology Pathologist Review In process     7/29/2018 2200 Erythropoietin In process     7/26/2018 1453 Blood culture Preliminary     7/26/2018 1453 Blood culture Preliminary     7/26/2018 0714 fluid culture - Aerobic Bacterial Preliminary             Statement of Approval     Ordered          07/30/18 1927  I have reviewed and agree with all the recommendations and orders detailed in this document.  EFFECTIVE NOW     Approved and electronically signed by:  Peter Cardenas MD             Admission Information     Date & Time Provider Department Dept. Phone    7/25/2018 Kath Marti MD [address] [facility] [phone]      Your Vitals Were     Blood Pressure Pulse Temperature Respirations Height Weight    142/53 (BP Location: Right arm) 50 97.9  F (36.6  C) (Oral) 16 1.727 m (5' 8\") 96.7 kg (213 lb 3 oz)    Pulse Oximetry BMI (Body Mass Index)                100% 32.41 kg/m2          MyChart Information     [facility] gives you secure access to your electronic health record. If you see a primary care provider, you can also send messages to your care team and make appointments. If you have questions, please call your primary care clinic.  If you do not have a primary care provider, please call [phone] and they will assist you.        Care EveryWhere ID     " This is your Care EveryWhere ID. This could be used by other organizations to access your [facility] medical records  [id number]        Equal Access to Services     FRANK CONROY : Hadii prateek Benz, waluceromiranda lindsey, edda kristenuziel marie, miley margaretin hayaayang johnsonsancho lamb lajurgenyang cesar. So [facility] [phone].    ATENCIÓN: Si habla español, tiene a collins disposición servicios gratuitos de asistencia lingüística. Llame al [phone].    We comply with applicable federal civil rights laws and [place] laws. We do not discriminate on the basis of race, color, national origin, age, disability, sex, sexual orientation, or gender identity.               Review of your medicines      START taking        Dose / Directions    acetaminophen 325 MG tablet   Commonly known as:  TYLENOL   Used for:  Liver cirrhosis secondary to KUHN (H), Liver mass, Cervical radiculopathy        Dose:  650 mg   Take 2 tablets (650 mg) by mouth every 8 hours as needed for mild pain   Quantity:  100 tablet   Refills:  0       melatonin 1 MG Tabs tablet   Used for:  Liver cirrhosis secondary to KUHN (H), Liver mass        Dose:  1 mg   Take 1 tablet (1 mg) by mouth nightly as needed for sleep   Refills:  0         CONTINUE these medicines which may have CHANGED, or have new prescriptions. If we are uncertain of the size of tablets/capsules you have at home, strength may be listed as something that might have changed.        Dose / Directions    Levothyroxine Sodium 125 MCG Caps   This may have changed:    - medication strength  - how much to take   Used for:  Thyroid disease        Dose:  125 mcg   Take 125 mcg by mouth daily   Quantity:  30 capsule   Refills:  0         CONTINUE these medicines which have NOT CHANGED        Dose / Directions    allopurinol 100 MG tablet   Commonly known as:  ZYLOPRIM   Used for:  Hyperuricemia        Dose:  100 mg   Take 1 tablet (100 mg) by mouth daily   Quantity:  90 tablet   Refills:  0       BASAGLAR  100 UNIT/ML injection        Dose:  30 Units   Inject 30 Units Subcutaneous At Bedtime   Refills:  0       * ONETOUCH ULTRA test strip   Generic drug:  blood glucose monitoring        TEST TWICE DAILY TO THREE TIMES DAILY   Refills:  0       * blood glucose monitoring test strip   Commonly known as:  no brand specified        Dose:  1 each   1 each   Refills:  0       carvedilol 6.25 MG tablet   Commonly known as:  COREG   Used for:  Secondary esophageal varices without bleeding (H)        Dose:  6.25 mg   Take 1 tablet (6.25 mg) by mouth 2 times daily (with meals)   Quantity:  180 tablet   Refills:  0       ciprofloxacin 500 MG tablet   Commonly known as:  CIPRO   Indication:  SBP   Used for:  SBP (spontaneous bacterial peritonitis) (H), Bacteremia        Take ciprofloxacin 500 mg every 12h until 7/10/18 then take 500 mg daily   Quantity:  120 tablet   Refills:  0       [employer] LANCETS 21G Misc        by Misc.(Non-Drug; Combo Route) route once daily.   Refills:  0       lactulose 10 GM/15ML solution   Commonly known as:  CHRONULAC   Used for:  Hepatic encephalopathy (H)        Dose:  20 g   30 mLs (20 g) by Oral or NG Tube route 3 times daily   Quantity:  2700 mL   Refills:  1       NITROQUICK SL        Dose:  1 tablet   Place 1 tablet under the tongue as needed   Refills:  0       pantoprazole 20 MG EC tablet   Commonly known as:  PROTONIX   Used for:  Gastrointestinal hemorrhage, unspecified gastrointestinal hemorrhage type, HCC (hepatocellular carcinoma) (H)        Dose:  40 mg   Take 2 tablets (40 mg) by mouth 2 times daily   Quantity:  60 tablet   Refills:  2       RA BLOOD PRESSURE CUFF MONITOR Misc        by Misc.(Non-Drug; Combo Route) route once daily.   Refills:  0       rifaximin 550 MG Tabs tablet   Commonly known as:  XIFAXAN   Indication:  hepatic encephalopathy   Used for:  Hepatic encephalopathy (H)        Dose:  550 mg   Take 1 tablet (550 mg) by mouth 2 times daily   Quantity:  60 tablet   Refills:   3       sodium bicarbonate 650 MG tablet   Used for:  Chronic kidney disease, unspecified CKD stage        Dose:  1300 mg   Take 2 tablets (1,300 mg) by mouth 2 times daily   Quantity:  120 tablet   Refills:  1       * Notice:  This list has 2 medication(s) that are the same as other medications prescribed for you. Read the directions carefully, and ask your doctor or other care provider to review them with you.      STOP taking     furosemide 20 MG tablet   Commonly known as:  LASIX           spironolactone 50 MG tablet   Commonly known as:  ALDACTONE                Where to get your medicines      These medications were sent to [facility] (acquired by [employer] in 2014) [id number] - [place], [place] - [address] AT [address], [place] [address]     Phone:  [phone]     Levothyroxine Sodium 125 MCG Caps         Some of these will need a paper prescription and others can be bought over the counter. Ask your nurse if you have questions.     You don't need a prescription for these medications     acetaminophen 325 MG tablet    melatonin 1 MG Tabs tablet                Protect others around you: Learn how to safely use, store and throw away your medicines at www.disposemymeds.org.             Medication List: This is a list of all your medications and when to take them. Check marks below indicate your daily home schedule. Keep this list as a reference.      Medications           Morning Afternoon Evening Bedtime As Needed    acetaminophen 325 MG tablet   Commonly known as:  TYLENOL   Take 2 tablets (650 mg) by mouth every 8 hours as needed for mild pain   Last time this was given:  650 mg on 7/28/2018 11:40 AM                                   allopurinol 100 MG tablet   Commonly known as:  ZYLOPRIM   Take 1 tablet (100 mg) by mouth daily                                   BASAGLAR 100 UNIT/ML injection   Inject 30 Units Subcutaneous At Bedtime                                   *  ONETOUCH ULTRA test strip   TEST TWICE DAILY TO THREE TIMES DAILY   Generic drug:  blood glucose monitoring                                * blood glucose monitoring test strip   Commonly known as:  no brand specified   1 each                                carvedilol 6.25 MG tablet   Commonly known as:  COREG   Take 1 tablet (6.25 mg) by mouth 2 times daily (with meals)   Last time this was given:  6.25 mg on 7/26/2018  8:23 AM                                      ciprofloxacin 500 MG tablet   Commonly known as:  CIPRO   Take ciprofloxacin 500 mg every 12h until 7/10/18 then take 500 mg daily   Last time this was given:  250 mg on 7/30/2018  8:26 AM                                   [employer] LANCETS 21G Misc   by Misc.(Non-Drug; Combo Route) route once daily.                                lactulose 10 GM/15ML solution   Commonly known as:  CHRONULAC   30 mLs (20 g) by Oral or NG Tube route 3 times daily   Last time this was given:  20 g on 7/30/2018  3:40 PM                                         Levothyroxine Sodium 125 MCG Caps   Take 125 mcg by mouth daily                                   melatonin 1 MG Tabs tablet   Take 1 tablet (1 mg) by mouth nightly as needed for sleep                                      NITROQUICK SL   Place 1 tablet under the tongue as needed                                   pantoprazole 20 MG EC tablet   Commonly known as:  PROTONIX   Take 2 tablets (40 mg) by mouth 2 times daily   Last time this was given:  40 mg on 7/30/2018  8:26 AM                                      RA BLOOD PRESSURE CUFF MONITOR Misc   by Misc.(Non-Drug; Combo Route) route once daily.                                rifaximin 550 MG Tabs tablet   Commonly known as:  XIFAXAN   Take 1 tablet (550 mg) by mouth 2 times daily   Last time this was given:  550 mg on 7/30/2018  8:25 AM                                      sodium bicarbonate 650 MG tablet   Take 2 tablets (1,300 mg) by mouth 2 times daily   Last time  this was given:  1,300 mg on 7/30/2018  8:25 AM                                      * Notice:  This list has 2 medication(s) that are the same as other medications prescribed for you. Read the directions carefully, and ask your doctor or other care provider to review them with you.

## 2018-07-25 NOTE — IP AVS SNAPSHOT
[address] [address]    Phone:  [phone]                                       After Visit Summary   7/25/2018    Serge Brambila    MRN: [id number]           After Visit Summary Signature Page     I have received my discharge instructions, and my questions have been answered. I have discussed any challenges I see with this plan with the nurse or doctor.    ..........................................................................................................................................  Patient/Patient Representative Signature      ..........................................................................................................................................  Patient Representative Print Name and Relationship to Patient    ..................................................               ................................................  Date                                            Time    ..........................................................................................................................................  Reviewed by Signature/Title    ...................................................              ..............................................  Date                                                            Time

## 2018-07-26 PROBLEM — N17.9 AKI (ACUTE KIDNEY INJURY) (H): Status: ACTIVE | Noted: 2018-01-01

## 2018-07-26 NOTE — PLAN OF CARE
Problem: Patient Care Overview  Goal: Plan of Care/Patient Progress Review  Outcome: No Change  Neuro: A&Ox4. Slightly drowsy, makes needs known.   Cardiac: SR/SB with rates in upper 50's.   Respiratory: Sating 98 % on RA.  GI/: Adequate urine output. Q1 BG due to being on insulin gtt.   Activity:  Assist of 1, up to bathroom  Pain: Denies pain   Skin: No new deficits noted. Nonblanchable redness on bottom   LDA's: 1 PIV--insulin gtt     Plan: Continue with POC. Notify primary team with changes.      Problem: Diabetes Comorbidity  Goal: Diabetes  Patient comorbidity will be monitored for signs and symptoms of hyperglycemia or hypoglycemia. Problems will be absent, minimized or managed by discharge/transition of care.  Insulin gtt, BG in 160's, 3 units currently running

## 2018-07-26 NOTE — PLAN OF CARE
Problem: Patient Care Overview  Goal: Plan of Care/Patient Progress Review  Outcome: No Change  Neuro: A&Ox4.   Cardiac: SB- HR 48-55. VSS.   Respiratory: Sating 98% on RA.  GI/: Adequate urine output. No BM this shift. Abdomen distended.   Diet/appetite: NPO at this time for tests today.   Activity:  SBA, up to bathroom and in room.   Pain: Denies pain  Skin: No new deficits noted.  LDA's: L PIV-Protonix+Octreotide gtt. L PIV-PRBC.    Plan: Numerous tests today for workup: Abdominal ultrasound, ECHO, Diagnostic Paracentesis. 1U PRBC started at 11:40. GI consult, saw pt this afternoon no orders. Pt very fatigued and hungry. Sarwat HAGER asked if ok to restart diet, awaiting reply. Edema to BLE. Pt wears home CPAP at rest. Continue with POC. Notify primary team with changes.      Problem: Diabetes Comorbidity  Goal: Diabetes  Patient comorbidity will be monitored for signs and symptoms of hyperglycemia or hypoglycemia. Problems will be absent, minimized or managed by discharge/transition of care.   Outcome: No Change  BG stable

## 2018-07-26 NOTE — ED TRIAGE NOTES
Pt comes in with increasing fatigue and was told that his kidney function is elevated. Pt has liver cancer and had a paracentesis a week ago and is scheduled for another tomorrow. Pt receives his cares at [facility] but comes to the [facility] for hospitalizations/ER.

## 2018-07-26 NOTE — PROGRESS NOTES
[facility]  Patient is currently open to home care services with [facility].  The patient is currently receiving RN services.  [facility]  and team have been notified of patient admission.  [facility] liaison will continue to follow patient during stay.  If appropriate provide orders to resume home care at time of discharge.    Thank you  Delores Flaherty RN, BSN  [facility] Liaison  [phone]

## 2018-07-26 NOTE — PROGRESS NOTES
Admission          7/25/2018 10:02 PM  -----------------------------------------------------------  Reason for admission: Abnormal labs, elevated K+ Elevated creatinine, JUAN M  Primary team notified of pt arrival.  Admitted from: ED  Via: stretcher  Accompanied by: family  Belongings: Placed in closet; valuables sent home with family  Admission Profile: complete  Teaching: orientation to unit and call light- call light within reach, call don't fall, use of console, meal times, when to call for the RN, and enforced importance of safety   Access: PIV  Telemetry:Placed on pt  Ht./Wt.: complete  2 RN Skin Assessment Completed By:   Pt status: Oriented, pleasant, pt. A bit drowsy and say's he's extremely tired from being in the ED all day, currently on insulin gtt    Temp:  [97.6  F (36.4  C)-98  F (36.7  C)] 98  F (36.7  C)  Pulse:  [54] 54  Heart Rate:  [57-59] 57  Resp:  [12-20] 14  BP: ()/(31-90) 133/49  SpO2:  [97 %-98 %] 98 %

## 2018-07-26 NOTE — H&P
"[facility]    Internal Medicine History and Physical - [facility] Service       Date of Admission:  7/25/2018    Chief Complaint   Abnormal lab values at f/u appt    History is obtained from the patient    History of Present Illness   Serge Brambila is a 73 year old male  who has a history of KUHN cirrhosis c/b HCC, ascites, esophageal varices, HE, A fib, DM, MI and is admitted for JUAN M, hyperkalemia.    Recent hospitalization 6/26-7/1 for Ecoli bacteremia and SBP. Treated with ceftriaxone transited to Cipro.  MRCP and EUS without obstruction.  Discharged on PO abx and weekly para, and weekly transfusion. Also had JUAN M. Scr 3.19 baseline 1.4-1.5.    Pt followed up with pcp for labs. Scr 3.19 and told to go to ED. Hgb 7.3 K 6.3 was on spironolactone otpt. Patient is fatigued but denies other symptoms.     No vominting, takes 3 doses of lactulos er day and has 6BMs. If he cuts down, he becomes encephalopathic. Normal PO intake. Reports that he has been feeling rundown and weak since last hospitalization, hasnt \"bounced back\". Reports decreased exercise tolerance and some ISLAS. No orthopnea    ED course:  Insulin drip, I fluids.    Review of Systems   The 10 point Review of Systems is negative other than noted in the HPI or here.     Past Medical History    I have reviewed this patient's medical history and updated it with pertinent information if needed.   Past Medical History:   Diagnosis Date     Atrial fibrillation (H)      Diabetes (H)     type II     Hepatic encephalopathy (H)      Hypertension      MI (myocardial infarction)     stent placement     Sleep apnea      Thyroid disease         Past Surgical History   I have reviewed this patient's surgical history and updated it with pertinent information if needed.  Past Surgical History:   Procedure Laterality Date     APPENDECTOMY  age 16     COLONOSCOPY       ENDOSCOPIC ULTRASOUND UPPER GASTROINTESTINAL TRACT (GI) N/A 6/29/2018 "    Procedure: ENDOSCOPIC ULTRASOUND, ESOPHAGOSCOPY / UPPER GASTROINTESTINAL TRACT (GI);  ENDOSCOPIC ULTRASOUND with upper endoscopy ;  Surgeon: Dakota Emmanuel MD;  Location: [facility] OR     ESOPHAGOSCOPY, GASTROSCOPY, DUODENOSCOPY (EGD), COMBINED N/A 1/24/2018    Procedure: COMBINED ESOPHAGOSCOPY, GASTROSCOPY, DUODENOSCOPY (EGD);  ESOPHAGOGASTRODUODENOSCOPY (MAC) ;  Surgeon: Colton Stroud MD;  Location:  GI     ESOPHAGOSCOPY, GASTROSCOPY, DUODENOSCOPY (EGD), COMBINED N/A 6/29/2018    Procedure: COMBINED ESOPHAGOSCOPY, GASTROSCOPY, DUODENOSCOPY (EGD);;  Surgeon: Dakota Emmanuel MD;  Location: [facility] OR     GI SURGERY  2012    partial colectomy for pre-CA nodule, removed 10 in.        Social History   Social History   Substance Use Topics     Smoking status: Former Smoker     Packs/day: 1.00     Years: 10.00     Quit date: 1/1/1999     Smokeless tobacco: Never Used     Alcohol use No       Family History   I have reviewed this patient's family history and updated it with pertinent information if needed.   Family History   Problem Relation Age of Onset     Hyperlipidemia Father      Hypertension Father      Cervical Cancer Maternal Grandmother      Alcoholism Mother      Abdominal Aortic Aneurysm Mother      HEART DISEASE Mother      Hypertension Mother      Thyroid Disease Mother      Hyperlipidemia Mother        Prior to Admission Medications   Prior to Admission Medications   Prescriptions Last Dose Informant Patient Reported? Taking?   BASAGLAR 100 UNIT/ML injection  Self Yes No   Sig: Inject 30 Units Subcutaneous At Bedtime    Blood Pressure Monitoring (RA BLOOD PRESSURE CUFF MONITOR) MISC  Self Yes No   Sig: by Misc.(Non-Drug; Combo Route) route once daily.   [employer] LANCETS 21G MISC  Self Yes No   Sig: by Misc.(Non-Drug; Combo Route) route once daily.   LEVOTHYROXINE SODIUM PO 7/25/2018 at Unknown time Self Yes Yes   Sig: Take 150 mcg by mouth daily    Nitroglycerin (NITROQUICK SL)  Self Yes No    Sig: Place under the tongue as needed    allopurinol (ZYLOPRIM) 100 MG tablet 2018 at Unknown time  No Yes   Sig: Take 1 tablet (100 mg) by mouth daily   blood glucose monitoring (NO BRAND SPECIFIED) test strip  Self Yes No   Si each   blood glucose monitoring (ONETOUCH ULTRA) test strip  Self Yes No   Sig: TEST TWICE DAILY TO THREE TIMES DAILY   carvedilol (COREG) 6.25 MG tablet 2018 at Unknown time  No Yes   Sig: Take 1 tablet (6.25 mg) by mouth 2 times daily (with meals)   ciprofloxacin (CIPRO) 500 MG tablet 2018 at Unknown time  No Yes   Sig: Take ciprofloxacin 500 mg every 12h until 7/10/18 then take 500 mg daily   furosemide (LASIX) 20 MG tablet 2018 at Unknown time Self No Yes   Sig: Take 2 tablets (40 mg) by mouth daily   lactulose (CHRONULAC) 10 GM/15ML solution 2018 at Unknown time Self No Yes   Si mLs (20 g) by Oral or NG Tube route 3 times daily   pantoprazole (PROTONIX) 20 MG EC tablet 2018 at Unknown time  No Yes   Sig: Take 2 tablets (40 mg) by mouth 2 times daily   rifaximin (XIFAXAN) 550 MG TABS tablet 2018 at Unknown time Self No Yes   Sig: Take 1 tablet (550 mg) by mouth 2 times daily   sodium bicarbonate 650 MG tablet 2018 at Unknown time Self No Yes   Sig: Take 2 tablets (1,300 mg) by mouth 2 times daily   spironolactone (ALDACTONE) 50 MG tablet 2018 at Unknown time  No Yes   Sig: Take 2 tablets (100 mg) by mouth daily      Facility-Administered Medications: None     Allergies   Allergies   Allergen Reactions     Penicillins      Happened in his teens, unsure what his reaction was. Tolerated ceftriaxone and Zosyn 3/2018       Physical Exam   Vital Signs: Temp: 98  F (36.7  C) Temp src: Oral BP: 133/49 Pulse: 54 Heart Rate: 57 Resp: 14 SpO2: 98 % O2 Device: None (Room air)    Weight: 211 lbs 4.8 oz    General Appearance: Lying flat comfortably  Eyes: No scleral icterus  HEENT: MMM  Respiratory: CTAB  Cardiovascular:  RRR no Murmur  GI:  Mildly distended nontender  Lymph/Hematologic: No LAD or brusing  Genitourinary: No suprapubic tenderness  Skin: No rash  Musculoskeletal: Normal bulk and tone  Neurologic: A&O3x no focla or lateralizing symptoms  Psychiatric: Appropriate affect and behavior, makes requent sassy/witty remarks.    Assessment & Plan   Serge Brambila is a 73 year old male  who has a history of KUHN cirrhosis c/b HCC, ascites, esophageal varices, HE, A fib, DM, MI and is admitted for JUAN M, hyperkalemia no peaked t waves on EKG.    #JUAN M  Scr 3.19 baseline 1.4-1.5. Specific gravity is 1.011 . No protein in UA. May be pre-renal due to hypovolemia in the setting of recent diuretic use and frequent BMs. May be ATN from long term JUAN M during recent hospitalization for sepsis, consider examning urine sediment. May also be due to hepatorenal physiology. UOP is normal so obstructive is less likely. No acute need for dialysis. Less Likely could be new onset heart failure in the context of deconditioning, ISLAS.   -Hold diuretics (lasix and spironolactone)  -UA is noninfectious, negative protein  -Trend Scr  -FENa  -Urine sodium  -I&O  -Daily weight  -Orthostatic vitals  -US renal   -Consider Fluid challenge (Albumin), recieved  -Echo    #Hyperkalemia  JUAN M may be negatively effecting renal K removal likely worsened by potassium sparing diuretics. No peaked T changes on EKG.  -Hold spironolactone  -Tele  -Paused insulin drip from ED  -Recheck K  -500ml LR bolus  -Continue treatment with subcutaneous insulin consider dosing 9 unit(s). Following K recheck.      #Chronic KUHN cirrhosis  #HCC  Compensated. Hx of HE, Ascites, SBP, Varices.  -PTA Protonix 40mg BID  -PTA Lactulose  -PTA Rifamixin  -therapeutic Para 7/26      #chronic normocytic anemia  #chronic thrombocytopenia.   Anemia likely from ongoing slow blood loss due to portal hypertensive gastropathy and chronic disease. Thrombocytopenia due to underlying liver disease. Supposed to be due for  weekly transfusion on 7/26.  -CBC daily  -Transfuse goal of >7.0    #Chronic NAGMA  -PTA Bicarb 1300mg BID    #NAA  -Cpap with home settings    # Pain Assessment:Serge montana pain level was assessed and he currently denies pain.      Diet: Advance Diet as Tolerated: Clear Liquid Diet  Fluids: Albumin fluid challagne  DVT Prophylaxis: Heparin SQ  Code Status: Full Code    Disposition Plan   Expected discharge: 2 - 3 days; recommended to prior living arrangement once renal function improved.     Entered: Apolonia Salmeron 07/26/2018, 6:24 AM   Information in the above section will display in the discharge planner report.    To be staffed in AM    Apolonia Salmeron  Medicine [facility]   Pager: [phone]  Please see sticky note for cross cover information      Data   Data     Recent Labs  Lab 07/26/18  0345 07/25/18  2235 07/19/18  0835   WBC  --  3.1* 3.7*   HGB  --  7.3* 6.2*   MCV  --  86 88   PLT  --  54* 73*   NA  --  132*  --    POTASSIUM 6.0* 6.3*  --    CHLORIDE  --  104  --    CO2  --  17*  --    BUN  --  93*  --    CR  --  2.91*  --    ANIONGAP  --  11  --    HARI  --  8.6  --    GLC  --  243*  --    ALBUMIN  --  2.6*  --    PROTTOTAL  --  6.5*  --    BILITOTAL  --  8.8*  --    ALKPHOS  --  174*  --    ALT  --  181*  --    AST  --  302*  --      No results found for this or any previous visit (from the past 24 hour(s)).            ADDENDUM TO H&P:    Please see above for full HPI.    Acute Decompensated Cirrhosis  Acute Hyponatremia  Hx of KUHN Cirrhosis  Hx of HCC  Hx of Encephalopathy  Unclear etiology. Differential at this point broad. SBP vs portal vein thrombosis vs GI bleed. Does have hx of esophageal varices, SBP. Last EGD in 01/2018 with grade II varices. Given acute anemia this AM (Hgb <7), will transfuse, make NPO, consult GI for possible scope.   - hold all diuretics  - diagnostic tap for SBP eval and therapeutic paracentesis (with max 1 L to drain)  - 10mg IV vitamin k x 3 days  (7/26-7/28)  - Complete abdominal US with dopplers  - albumin 1.5g/kg now and then will evluate based on ascites results if we will do daily challenge vs 1g/kg on day 3  - NPO for abdominal US with dopplers then 2gm Na diet with 2L fluid restriction  - 1U PRBC now  - switch from cipro to ceftriaxone, start octreotide gtt, and protonix gtt   - GI consult, recs appreciated    Acute on chronic anemia  Pt has required regular infusions for his anemia thought to be 2/2 to his liver failure and underlying chronic disease. Given his Hgb of 6.6 today in the setting of decompensated cirrhosis and presenting hyperkalemia, will evaluate for hemolysis.   - peripheral smear  - fibrinogen  - retic count  - 1 unit(s) PRBC now   - GI consult as above for possible scope  - monitor for bleeding    Hyperkalemia  JUAN M may be negatively effecting renal K removal likely worsened by potassium sparing diuretics vs GI bleed vs hemolysis. No peaked T changes on EKG. Asymtpomic. Started on insulin drip ED, now stopped. Received 500cc bolus  -Hold diuretuics  -Tele  -Recheck K, if still elevated will shift with calcium gluconate and insulin    Coagulopathy  Thrombocytopenia  Likely 2/2 Cirrhosis  - vitamin K 10mg x 3 days (7/26-7/28)  - check DIC labs as above    July Fam MD  Internal Medicine/Pediatrics, PGY4  [phone]

## 2018-07-26 NOTE — PROGRESS NOTES
[facility], [place]    Internal Medicine Progress Note - [facility] Service    Main Plans for Today   - GÓMEZ for eval of endocarditis  - c/w IV ceftraixone today  - Per nephrology, will start midodrine TID and octreotide subcutaneous TID and re-consult them tomorrow if no improvement     Assessment & Plan   Serge Brambila is a 73 year old male admitted on 7/25/2018.     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      # Decompensated KUHN cirrhosis (MELD-Na 33)  # Hx of hepatic encephalopathy  # Ascites  # HCC (not a candidate for locoregional treatment)  Hx of SBP  Unclear etiology of worsening decompensation. 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   - Continue lactulose (titrated to 3-4 BMs daily) and rifaximin for his hx of HE  - c/w holding his home dose of diuretics due to his JUAN M  - Will limit any paracentesis to < 3 L to avoid major fluid shifts that can aggravate the JUAN M.  - 3 day albumin challenge (7/26-7/28)  - vitamin K 10mg IV x 3 days (7/26-7/28)  - Daily 40mg PO Protonix  - Continue ceftriaxone untile work-up for endocarditis completed (see below); will need cipro once daily for SBP prophylaxis  - Outpatient hepatology follow up at the time of  discharge     JUAN M  Hyperkalemia  Prerenal vs hepatorenal vs ATN vs AIN  No evidence of hydronephrosis on US. Will treat decompensated cirrhosis. Unfortunately, Cr has not improved with albumin on 7/26 but rather is worsening. Pt also reporting decreased urinary outpatient. In addition, hyperkalemia continues to be a problem despite shifting.   - Albumin challenge 100 gm x 3 days (7/25-7/28)  - Strict Is/Os  - UA, FeNA, Uosm, Liudmila, Uprotein/cr  - Nephrology consultation for HRS vs ATN and to evaluate need for hyperkalemia. Spoke with nephrology fellow who recommended midodrine tid and subcutaneous octreotide TID along with 20mg lasix and to re-consult tomorrow if no improvement.      Concern for Endocarditis   Pt had a TTE done on 7/26 for concern for cardiorenal syndrome. Incidentally, TTE concerning for small vegetations on aortic valve. Pt does have recent hx of E coli bacteremia s/p treatment. He does not meet any clinical [facility] critereat for IE but based on TTE does meet pathologic criteria.   - Will obtain GÓMEZ today to confirm vegetations   - If positive, will need IV vanc and ID consult to assist with therapy duration and follow-up    Acute on chronic macrocytic anemia:  Patient had large grade II varices and PHG on last EUS 6/29/18. Previously on warfarin for A Fib but has been held due to worsening anemia. Transfusion dependent (unclear why, no hx of MDS) ~ requiring 1U PRBC every week. Denies any overt GI bleeding. Hgb 6.6 on 7/26 s/p 1U PRBC. Fibrinogen wnl. % Retic elevated but absolute retic inappropriately normal. Iron wnl. Ferritin elevated (but this is also an acute phase reactant and would be expected)  - peripheral smear pending  - GI hepatology consulted, per recs no EGD at this time  - monitor for hematochezia    Coagulopathy  Thrombocytopenia  Likely 2/2 Cirrhosis. No evidence of cirrhosis currently   - vitamin K 10mg x 3 days (7/26-7/28)    # Pain Assessment:  Current Pain Score 7/27/2018    Patient currently in pain? denies   Pain score (0-10) -   Pain location -   Pain descriptors -   Serge montana pain level was assessed and he currently denies pain.      Diet: Fluid restriction 2000 ML FLUID  NPO per Anesthesia Guidelines for Procedure/Surgery Except for: Meds  Fluids: none  DVT Prophylaxis: Pneumatic Compression Devices  Code Status: Full Code    Disposition Plan   Expected discharge: 2 - 3 days, recommended to prior living arrangement once antibiotic plan established, hemoglobin stable and renal function improved.     Entered: July Fam 07/27/2018, 3:34 PM   Information in the above section will display in the discharge planner report.      The patient's care was discussed with the Attending Physician, Dr. Marti, Bedside Nurse and Care Coordinator/.    July Fam  [facility]  Maroon: 5  Pager: [phone]  Please see sticky note for cross cover information    Interval History   Overnight events and nursing notes reviewed.     HD stable. No bleeding noticed. TTE preformed on 7/26 with concerns for vegetations.     Pt denies rigors, fevers, chills, sob. States uop has decreased from yesterday.    4 point ROS negative except as above.    Physical Exam   Vital Signs: Temp: 97.6  F (36.4  C) Temp src: Oral BP: 121/41 Pulse: 51 Heart Rate: 52 Resp: 14 SpO2: 96 % O2 Device: None (Room air)    Weight: 210 lbs 3.2 oz    General: In no acute distress  HEENT: PERRL. EOMi. Mild scleral icterus, mildly dry mucous mebranes  CV: S1/S2 without murmurs, Skin warm and dry  Lungs: CTAB  Abd: Distended, non-tender, BS +. No peritoneal signs  Extrem: No peripehral edema  Skin: Yes jaundice  Neuro: AOx3, No  asterixis    Data   Labs and Imaging reviewed in [facility]

## 2018-07-26 NOTE — PROCEDURES
Hospitalist Procedure Service - Paracentesis Procedure Note  Date of Service: 07/26/2018    Patient: Serge Brambila  MRN: [id number]  Admission Date: 7/25/2018  Hospital Day # 0    Attending: Myrtle Ty   Resident: None  Procedure: Diagnostic paracentesis  Indication: SBP Evaluation  Pre-procedure diagnosis: Ascites  Post-procedure diagnosis: Same    The risks and benefits of the procedure were explained to Serge who expressed understanding and opted to proceed.  Consent was obtained and placed in the chart.  A time out was performed.  An area of ascites was located and marked in the left lower quadrant; the area was prepped and draped in the usual sterile fashion.  10 ml of 1% lidocaine was instilled and ascites located. Approximately 12 ml of ascites was removed and was sent for analysis.   Patient tolerated the procedure well with no immediate complications.  The primary team was informed of the procedure.     Myrtle Ty MD  Med-Peds Hospitalist  Pager [phone]

## 2018-07-26 NOTE — ED PROVIDER NOTES
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 Per chart review, the patient was recently hospitalized from 6/26-7/1 for sepsis due to E coli bacteremia and E coli SBP.  He was treated with ceftriaxone for pain transition to Cipro treatment dose.  He underwent MRCP and EUS without obvious obstruction and the patient also had intermittent encephalopathy likely due to infection versus hepatic.  He was discharged on oral antibiotics as well as ongoing weekly paracenteses.  He was also scheduled to undergo regular transfusion if hemoglobin was below 7.  During that hospitalization, patient also had an acute kidney injury with admission creatinine of 1.86 (baseline 1.4-1.5).  Today, the patient followed up with his primary care physician through the [facility] system.  Labs drawn today revealed a creatinine of 3.19 so he was told to come to the emergency department.  His hemoglobin was 7.3.  The patient notes that he is fatigued, but denies any other symptoms.  He denies any abdominal pain.  He is scheduled to undergo transfusion and paracentesis tomorrow.    Past Medical History:   Diagnosis Date     Atrial fibrillation (H)      Diabetes (H)     type II     Hepatic encephalopathy (H)      Hypertension      MI (myocardial infarction)     stent placement     Sleep apnea      Thyroid disease        Past Surgical History:   Procedure Laterality Date     APPENDECTOMY  age 16     COLONOSCOPY       ENDOSCOPIC ULTRASOUND UPPER GASTROINTESTINAL TRACT (GI) N/A 6/29/2018    Procedure: ENDOSCOPIC ULTRASOUND, ESOPHAGOSCOPY / UPPER GASTROINTESTINAL TRACT (GI);  ENDOSCOPIC ULTRASOUND with upper endoscopy ;   Surgeon: Dakota Emmanuel MD;  Location: [facility] OR     ESOPHAGOSCOPY, GASTROSCOPY, DUODENOSCOPY (EGD), COMBINED N/A 1/24/2018    Procedure: COMBINED ESOPHAGOSCOPY, GASTROSCOPY, DUODENOSCOPY (EGD);  ESOPHAGOGASTRODUODENOSCOPY (MAC) ;  Surgeon: Colton Stroud MD;  Location:  GI     ESOPHAGOSCOPY, GASTROSCOPY, DUODENOSCOPY (EGD), COMBINED N/A 6/29/2018    Procedure: COMBINED ESOPHAGOSCOPY, GASTROSCOPY, DUODENOSCOPY (EGD);;  Surgeon: Dakota Emmanuel MD;  Location: [facility] OR     GI SURGERY  2012    partial colectomy for pre-CA nodule, removed 10 in.       Family History   Problem Relation Age of Onset     Hyperlipidemia Father      Hypertension Father      Cervical Cancer Maternal Grandmother      Alcoholism Mother      Abdominal Aortic Aneurysm Mother      HEART DISEASE Mother      Hypertension Mother      Thyroid Disease Mother      Hyperlipidemia Mother        Social History   Substance Use Topics     Smoking status: Former Smoker     Packs/day: 1.00     Years: 10.00     Quit date: 1/1/1999     Smokeless tobacco: Never Used     Alcohol use No       Current Facility-Administered Medications   Medication     insulin 1 unit/1 mL in NS (NovoLIN, HumuLIN Regular) drip -ED DKA algorithm     Current Outpatient Prescriptions   Medication     allopurinol (ZYLOPRIM) 100 MG tablet     carvedilol (COREG) 6.25 MG tablet     ciprofloxacin (CIPRO) 500 MG tablet     furosemide (LASIX) 20 MG tablet     lactulose (CHRONULAC) 10 GM/15ML solution     LEVOTHYROXINE SODIUM PO     pantoprazole (PROTONIX) 20 MG EC tablet     rifaximin (XIFAXAN) 550 MG TABS tablet     sodium bicarbonate 650 MG tablet     spironolactone (ALDACTONE) 50 MG tablet     BASAGLAR 100 UNIT/ML injection     blood glucose monitoring (NO BRAND SPECIFIED) test strip     blood glucose monitoring (ONETOUCH ULTRA) test strip     Blood Pressure Monitoring (RA BLOOD PRESSURE CUFF MONITOR) MISC     [employer] LANCETS 21G MISC     Nitroglycerin (NITROQUICK SL)       "  Allergies   Allergen Reactions     Penicillins      Happened in his teens, unsure what his reaction was. Tolerated ceftriaxone and Zosyn 3/2018         I have reviewed the Medications, Allergies, Past Medical and Surgical History, and Social History in the Epic system.    Review of Systems   Constitutional: Positive for fatigue. Negative for fever.   Respiratory: Negative for shortness of breath.    Cardiovascular: Negative for chest pain.   Gastrointestinal: Negative for abdominal pain.   All other systems reviewed and are negative.      Physical Exam   BP: (!) 122/31  Pulse: 54  Temp: 97.6  F (36.4  C)  Resp: 16  Height: 172.7 cm (5' 8\")  Weight: 92.1 kg (203 lb)  SpO2: 98 %      Physical Exam   Constitutional: He is oriented to person, place, and time. He appears well-developed and well-nourished.  Non-toxic appearance. He does not have a sickly appearance. He does not appear ill. No distress.   HENT:   Head: Normocephalic and atraumatic.   Eyes: No scleral icterus.   Neck: Normal range of motion. Neck supple.   Cardiovascular: Normal rate.    Pulmonary/Chest: Effort normal.   Abdominal: Soft. He exhibits ascites. There is no tenderness.   Musculoskeletal: He exhibits edema.   Neurological: He is alert and oriented to person, place, and time.   Skin: Skin is warm and dry. No rash noted. He is not diaphoretic. No erythema. No pallor.       ED Course     ED Course     Procedures             EKG Interpretation:      Interpreted by Brooks Mcduffie  Time reviewed: 0001  Symptoms at time of EKG: None   Rhythm: sinus bradycardia  Rate: 50-60  Axis: Right Axis Deviation  Ectopy: none  Conduction: right bundle branch block (incomplete)  ST Segments/ T Waves: No ST-T wave changes  Q Waves: none  Comparison to prior: Unchanged    Clinical Impression: sinus bradycardia                Critical Care time:  was 90 minutes for this patient excluding procedures.             Labs Ordered and Resulted from Time of ED Arrival " Up to the Time of Departure from the ED   COMPREHENSIVE METABOLIC PANEL - Abnormal; Notable for the following:        Result Value    Sodium 132 (*)     Potassium 6.3 (*)     Carbon Dioxide 17 (*)     Glucose 243 (*)     Urea Nitrogen 93 (*)     Creatinine 2.91 (*)     GFR Estimate 21 (*)     GFR Estimate If Black 26 (*)     Bilirubin Total 8.8 (*)     Albumin 2.6 (*)     Protein Total 6.5 (*)     Alkaline Phosphatase 174 (*)      (*)      (*)     All other components within normal limits   CBC WITH PLATELETS DIFFERENTIAL - Abnormal; Notable for the following:     WBC 3.1 (*)     RBC Count 2.41 (*)     Hemoglobin 7.3 (*)     Hematocrit 20.7 (*)     RDW 22.9 (*)     Platelet Count 54 (*)     Absolute Lymphocytes 0.5 (*)     All other components within normal limits   ROUTINE UA WITH MICROSCOPIC REFLEX TO CULTURE - Abnormal; Notable for the following:     Bilirubin Urine Small (*)     Mucous Urine Present (*)     All other components within normal limits   ABO/RH TYPE AND SCREEN            Assessments & Plan (with Medical Decision Making)   This is a 73-year-old male with a history of hepatocellular carcinoma and liver failure is presenting to the emergency room with abnormal labs.  Today he had a normal follow-up with his primary care physician he had labs that were drawn.  When he arrived home he received a call from his primary care physician that stated that he needed to return to emergency room immediately for evaluation.  He is found to have a worsening of his AK I as well as elevated potassium.  Here in the emergency room he is otherwise asymptomatic and is in no obvious distress.  His potassium is 6.3 and his creatinine is 2.9.  These are both significantly elevated from his baseline.  He states that he has been drinking and eating normally and denies all other complaints at this time.  He will be started on insulin drip and provide with IV fluids and will be admitted to the medicine service for  continued care management.  He is expected to have a paracentesis in the morning as well as a unit of PRBCs after his procedure.    I have reviewed the nursing notes.    I have reviewed the findings, diagnosis, plan and need for follow up with the patient.    New Prescriptions    No medications on file       Final diagnoses:   Hyperkalemia   IJohn, am serving as a trained medical scribe to document services personally performed by Zeke Mcduffie MD, based on the provider's statements to me.      IZeke MD, was physically present and have reviewed and verified the accuracy of this note documented by John Skinner.       7/25/2018   [facility], [place], EMERGENCY DEPARTMENT     Brooks Mcduffie MD  07/26/18 0159

## 2018-07-26 NOTE — PHARMACY-ADMISSION MEDICATION HISTORY
Admission medication history interview status for the 7/25/2018 admission is complete. See Epic admission navigator for allergy information, pharmacy, prior to admission medications and immunization status.     Medication history interview sources:  patient and outside pharmacy fill records.    Changes made to PTA medication list (reason)  Added: n/a  Deleted: n/a  Changed: n/a    Additional medication history information (including reliability of information, actions taken by pharmacist):  - Patient was a good historian.  - Home pharmacy [place], [place].  - Influenza vaccination received 10/23/2017.  - Reviewed allergies.  - Patient recently switched from 25 mg to 50 mg spironolactone tablets, but dose of 100 mg daily unchanged.  - Current ciprofloxacin dose is 500 mg daily.    Prior to Admission medications    Medication Sig Last Dose Taking? Auth Provider   allopurinol (ZYLOPRIM) 100 MG tablet Take 1 tablet (100 mg) by mouth daily 7/25/2018 at AM Yes Tommie Panda MD   BASAGLAR 100 UNIT/ML injection Inject 30 Units Subcutaneous At Bedtime  7/24/2018 at PM Yes Reported, Patient   carvedilol (COREG) 6.25 MG tablet Take 1 tablet (6.25 mg) by mouth 2 times daily (with meals) 7/25/2018 at AM/PM Yes Tommie Panda MD   ciprofloxacin (CIPRO) 500 MG tablet Take ciprofloxacin 500 mg every 12h until 7/10/18 then take 500 mg daily 7/25/2018 at AM Yes Tommie Panda MD   furosemide (LASIX) 20 MG tablet Take 2 tablets (40 mg) by mouth daily 7/25/2018 at AM Yes Young Sousa DO   lactulose (CHRONULAC) 10 GM/15ML solution 30 mLs (20 g) by Oral or NG Tube route 3 times daily 7/25/2018 at AM/Noon/PM Yes Young Sousa DO   LEVOTHYROXINE SODIUM PO Take 150 mcg by mouth daily  7/25/2018 at AM Yes Reported, Patient   pantoprazole (PROTONIX) 20 MG EC tablet Take 2 tablets (40 mg) by mouth 2 times daily 7/25/2018 at AM/PM Yes Tea Silveira APRN CNP   rifaximin (XIFAXAN) 550 MG TABS tablet Take 1  tablet (550 mg) by mouth 2 times daily 7/25/2018 at AM/PM Yes Young Sousa DO   sodium bicarbonate 650 MG tablet Take 2 tablets (1,300 mg) by mouth 2 times daily 7/25/2018 at Unknown time Yes Young Sousa DO   spironolactone (ALDACTONE) 50 MG tablet Take 2 tablets (100 mg) by mouth daily 7/25/2018 at AM/PM Yes Tommie Panda MD   Nitroglycerin (NITROQUICK SL) Place 1 tablet under the tongue as needed   at PRN  Reported, Patient     Medication history completed by: Devin Steele PD4 Student

## 2018-07-26 NOTE — CONSULTS
Hepatology Consultation  Serge Brambila   MRN# [id number]     Age: 73 year old YOB: 1945     Referring provider: Kath Marti  Attending Hepatologist: Dr. Ortega   Consult requested for: Decompensated KUHN Cirrhosis, JUAN M, hyperkalemia    Assessment and Recommendation:   Assessment:  Serge Brambila is a 73 year old male with decompensated KUHN Cirrhosis c/w HE (on lactulose and rifaximin), ascites (requiring weekly paracentesis 1-3L), Grade II EV (last seen on EGD 6/29/18), diagnosis of HCC in May 2017 and recent E coli bacteremia and SBP (hospitalized 6/26/18 - 7118) [11 cm mass with an associated tumor thrombus throughout the R portal venous system to the splenorenal confluence, outside Bryson criteria and deemed a poor candidate for loco-regional treatment d/t large pulmonary shunts and poor liver function] who has been admitted by his PCP due to routine labs drawn showing an elevated Cr of 3.19 (up from a Cr of 1.48 on 7/1/18 at the time of discharge) along with hyperkalemia with a K of 6.3. Hepatology team has been consulted to assist in management of his decompensated KUHN cirrhosis and acute on chronic anemia.     # Decompensated KUHN cirrhosis (MELD-Na 33)  # Hx of hepatic encephalopathy  # Ascites  # HCC (not a candidate for locoregional treatment)  # Hx of SBP  -- Continue lactulose (titrated to 3-4 BMs daily) and rifaximin for his hx of HE  -- Agree with holding his home dose of diuretics due to his JUAN M  -- Will limit any paracentesis to < 3 L to avoid major fluid shifts that can aggravate the JUAN M.  -- Continue cipro once daily for SBP prophylaxis  -- Outpatient hepatology follow up at the time of discharge    # JUAN M  # Hyperkalemia  Prerenal vs hepatorenal vs ATN vs AIN  -- Agree with albumin challenge 100 gm x 3 days  -- Strict Is/Os  -- FeNa and urinary sodium  -- Consider nephrology consultation.    # Acute on chronic anemia:  - Patient had large grade II varices and PHG on  last EUS 6/29/18.  - Previously on warfarin for A Fib but has been held due to worsening anemia.  - Transfusion dependent ~ requiring 1U PRBC every week   - Denies any overt GI Bleeding.  --- No role for EGD at this time  --- Continue once daily PPI.  --- Consider iron supplementation parenterally while the patient is in house. Will be ideal to check iron indices prior to supplementation.    Plan of care to be discussed with Dr. Ortega  Thank you for the opportunity to be involved in Serge Brambila care. Please do not hesitate to call with any questions or concerns.     Tori Campbell MD  Inpatient Hepatology Fellow  [phone]    Physician Attestation  I, Guzman Ortega, saw and examined this patient, and in agreement with Dr. Campbell and their findings as well as plan of care as documented in the above note.    I personally reviewed vital signs, medications, labs and imaging.    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 Hepatology team has been consulted to assist in management of his decompensated KUHN cirrhosis and acute on chronic anemia.     Patient had a recent hospitalization from 6/26/18 to 7/1/18 with E Coli bacteremia attributed to possibly SBP since no other  source of infection was found (even though cell count was < 250 in the ascitic fluid, it was felt that it was a false negative in setting of initiation of anti-microbial therapy since ascitic fluid cultures were positive for E coli). Evaluation for cholangitis via an MRCP and EUS were both negative. Patient followed up today with his PCP and was found to have an elevated Cr to 3.10 with a K of 6.3 and a Hb of 7.3. Patient had only report ongoing fatigue but had denied any other symptoms. Patient has been taking his lactulose daily and has been having 5-6 BMs daily [has tried cutting down the lactulose but it makes him encephalopathic]. He is also maintained on 40 mg of furosemide and 100 mg of spirinolactone for his ascites along with weekly paracentesis. For his anemia, he has been getting weekly transfusions (he was previously on warfarin for his A Fib which has been discontinued x several weeks).   He denies any current symptoms           Past Medical History:     Past Medical History:   Diagnosis Date     Atrial fibrillation (H)      Diabetes (H)     type II     Hepatic encephalopathy (H)      Hypertension      MI (myocardial infarction)     stent placement     Sleep apnea      Thyroid disease               Past Surgical History:     Past Surgical History:   Procedure Laterality Date     APPENDECTOMY  age 16     COLONOSCOPY       ENDOSCOPIC ULTRASOUND UPPER GASTROINTESTINAL TRACT (GI) N/A 6/29/2018    Procedure: ENDOSCOPIC ULTRASOUND, ESOPHAGOSCOPY / UPPER GASTROINTESTINAL TRACT (GI);  ENDOSCOPIC ULTRASOUND with upper endoscopy ;  Surgeon: Dakota Emmanuel MD;  Location: [facility] OR     ESOPHAGOSCOPY, GASTROSCOPY, DUODENOSCOPY (EGD), COMBINED N/A 1/24/2018    Procedure: COMBINED ESOPHAGOSCOPY, GASTROSCOPY, DUODENOSCOPY (EGD);  ESOPHAGOGASTRODUODENOSCOPY (MAC) ;  Surgeon: Colton Stroud MD;  Location:  GI     ESOPHAGOSCOPY, GASTROSCOPY, DUODENOSCOPY (EGD), COMBINED N/A 6/29/2018    Procedure: COMBINED  ESOPHAGOSCOPY, GASTROSCOPY, DUODENOSCOPY (EGD);;  Surgeon: Dakota Emmanuel MD;  Location: [facility] OR     GI SURGERY  2012    partial colectomy for pre-CA nodule, removed 10 in.              Social History:     Social History   Substance Use Topics     Smoking status: Former Smoker     Packs/day: 1.00     Years: 10.00     Quit date: 1/1/1999     Smokeless tobacco: Never Used     Alcohol use No             Family History:   The family history includes Abdominal Aortic Aneurysm in his mother; Alcoholism in his mother; Cervical Cancer in his maternal grandmother; HEART DISEASE in his mother; Hyperlipidemia in his father and mother; Hypertension in his father and mother; Thyroid Disease in his mother.             Immunizations:     Immunization History   Administered Date(s) Administered     Influenza (High Dose) 3 valent vaccine 10/23/2017            Allergies:     Allergies   Allergen Reactions     Penicillins      Happened in his teens, unsure what his reaction was. Tolerated ceftriaxone and Zosyn 3/2018             Medications:   @  Current Facility-Administered Medications Ordered in Epic   Medication Dose Route Frequency Last Rate Last Dose     albumin human 25 % injection 100 g  100 g Intravenous Daily         cefTRIAXone (ROCEPHIN) 1 g vial to attach to  mL bag for ADULTS or NS 50 mL bag for PEDS  1 g Intravenous Q24H         glucose gel 15-30 g  15-30 g Oral Q15 Min PRN        Or     dextrose 50 % injection 25-50 mL  25-50 mL Intravenous Q15 Min PRN        Or     glucagon injection 1 mg  1 mg Subcutaneous Q15 Min PRN         lactulose (CHRONULAC) solution 20 g  20 g Oral or NG Tube TID         levothyroxine (SYNTHROID/LEVOTHROID) tablet 150 mcg  150 mcg Oral Daily   150 mcg at 07/26/18 0823     melatonin tablet 1 mg  1 mg Oral At Bedtime PRN         naloxone (NARCAN) injection 0.1-0.4 mg  0.1-0.4 mg Intravenous Q2 Min PRN         octreotide (sandoSTATIN) 1,250 mcg in sodium chloride 0.9 % 250 mL  50  "mcg/hr Intravenous Continuous 10 mL/hr at 07/26/18 1116 50 mcg/hr at 07/26/18 1116     pantoprazole (PROTONIX) 80 mg in sodium chloride 0.9 % 100 mL infusion  8 mg/hr Intravenous Continuous 10 mL/hr at 07/26/18 1106 8 mg/hr at 07/26/18 1106     phytonadione (AQUA-MEPHYTON) 10 mg in sodium chloride 0.9 % 50 mL intermittent infusion  10 mg Intravenous Daily 100 mL/hr at 07/26/18 1102 10 mg at 07/26/18 1102     rifaximin (XIFAXAN) tablet 550 mg  550 mg Oral BID   550 mg at 07/26/18 0823     sodium bicarbonate tablet 1,300 mg  1,300 mg Oral BID   1,300 mg at 07/26/18 0823     No current Epic-ordered outpatient prescriptions on file.   @          Review of Systems:    ROS: 10 point ROS neg other than the symptoms noted above in the HPI.          Physical Exam:   Blood pressure 130/45, pulse 54, temperature 97.8  F (36.6  C), temperature source Oral, resp. rate 18, height 1.727 m (5' 8\"), weight 95.8 kg (211 lb 4.8 oz), SpO2 97 %. Body mass index is 32.13 kg/(m^2).    Intake/Output Summary (Last 24 hours) at 07/26/18 1240  Last data filed at 07/26/18 1200   Gross per 24 hour   Intake           580.28 ml   Output              300 ml   Net           280.28 ml     General: In no acute distress, mild facial muscle wasting  Neuro: AOx3, No asterixis  HEENT: PERRL Yes scleral icterus, Yes oral lesions  Lymph:  No cervical lymphadenoapthy  CV: S1/S2 without murmurs, Skin warm and dry  Lungs: clear to auscultation Respirations even and nonlabored on room air  Abd: Distended, non-tender, BS +ve. No peritoneal signs  Extrem: No peripehral edema  Skin: Yes jaundice  Psych: Normal affect         Data:   Labs and imaging below were independently reviewed and interpreted    BMP  Recent Labs  Lab 07/26/18  0710 07/26/18  0345 07/25/18  2235   NA  --   --  132*   POTASSIUM 5.9* 6.0* 6.3*   CHLORIDE  --   --  104   HARI  --   --  8.6   CO2  --   --  17*   BUN  --   --  93*   CR  --   --  2.91*   GLC  --   --  243*     CBC  Recent Labs  Lab " 07/26/18  1143 07/26/18  0710 07/25/18  2235   WBC 2.8* 3.3* 3.1*   RBC 2.32* 2.22* 2.41*   HGB 6.9* 6.6* 7.3*   HCT 19.9* 18.8* 20.7*   MCV 86 85 86   MCH 29.7 29.7 30.3   MCHC 34.7 35.1 35.3   RDW 23.2* 22.9* 22.9*   PLT 54* 62* 54*     INR  Recent Labs  Lab 07/26/18  1151   INR 2.03*     LFTs  Recent Labs  Lab 07/25/18  2235   ALKPHOS 174*   *   *   BILITOTAL 8.8*   PROTTOTAL 6.5*   ALBUMIN 2.6*      PANCNo lab results found in last 7 days.    MELD-Na score: 34 at 7/26/2018 11:51 AM  MELD score: 33 at 7/26/2018 11:51 AM  Calculated from:  Serum Creatinine: 2.91 mg/dL at 7/25/2018 10:35 PM  Serum Sodium: 132 mmol/L at 7/25/2018 10:35 PM  Total Bilirubin: 8.8 mg/dL at 7/25/2018 10:35 PM  INR(ratio): 2.03 at 7/26/2018 11:51 AM  Age: 73 years           Previous Endoscopy:   No results found for this or any previous visit.      IMAGING:  U/S Abdomen 6/26/18  1.  Cirrhotic configuration with evidence of portal hypertension,  including moderate ascites, retrograde blood flow within the portal  veins.  2.  Large ill-defined heterogeneous mass within the right hepatic lobe  measures up to 7.2 cm, overall not significant change from CT study on  5/2/2018 given different imaging techniques.  3.  Nonobstructive thrombus within the main portal and right portal  veins with retrograde blood flow in the splenic and portal veins.  Overall portal hypertension worsening than 3/2/2018 ultrasound study  given at that time the splenic and left portal veins blood flow was  antegrade.   4.  Hepatic arteries and hepatic veins demonstrate normal direction of  flow.

## 2018-07-26 NOTE — ED NOTES
[facility], [place]   ED Nurse to Floor Handoff     Serge Brambila is a 73 year old male who speaks English and lives with a spouse,  in a home  They arrived in the ED by car from home    ED Chief Complaint: Abnormal Labs and Fatigue    ED Dx;   Final diagnoses:   Hyperkalemia         Needed?: No    Allergies:   Allergies   Allergen Reactions     Penicillins      Happened in his teens, unsure what his reaction was. Tolerated ceftriaxone and Zosyn 3/2018   .  Past Medical Hx:   Past Medical History:   Diagnosis Date     Atrial fibrillation (H)      Diabetes (H)     type II     Hepatic encephalopathy (H)      Hypertension      MI (myocardial infarction)     stent placement     Sleep apnea      Thyroid disease       Baseline Mental status: WDL  Current Mental Status changes: at basesline    Infection present or suspected this encounter: no  Sepsis suspected: No  Isolation type: No active isolations     Activity level - Baseline/Home:  Stand with Assist  Activity Level - Current:   Stand with Assist    Bariatric equipment needed?: No    In the ED these meds were given:   Medications   insulin 1 unit/1 mL in NS (NovoLIN, HumuLIN Regular) drip -ED DKA algorithm (2 Units/hr Intravenous New Bag 7/26/18 0158)   0.9% sodium chloride BOLUS (0 mLs Intravenous Stopped 7/26/18 0045)       Drips running?  Yes    Home pump  No    Current LDAs  Peripheral IV 07/25/18 Left Lower forearm (Active)   Site Assessment [facility] 7/25/2018 10:37 PM   Number of days:1       Labs results:   Labs Ordered and Resulted from Time of ED Arrival Up to the Time of Departure from the ED   COMPREHENSIVE METABOLIC PANEL - Abnormal; Notable for the following:        Result Value    Sodium 132 (*)     Potassium 6.3 (*)     Carbon Dioxide 17 (*)     Glucose 243 (*)     Urea Nitrogen 93 (*)     Creatinine 2.91 (*)     GFR Estimate 21 (*)     GFR Estimate If Black 26 (*)     Bilirubin Total 8.8 (*)     Albumin 2.6 (*)   "   Protein Total 6.5 (*)     Alkaline Phosphatase 174 (*)      (*)      (*)     All other components within normal limits   CBC WITH PLATELETS DIFFERENTIAL - Abnormal; Notable for the following:     WBC 3.1 (*)     RBC Count 2.41 (*)     Hemoglobin 7.3 (*)     Hematocrit 20.7 (*)     RDW 22.9 (*)     Platelet Count 54 (*)     Absolute Lymphocytes 0.5 (*)     All other components within normal limits   ROUTINE UA WITH MICROSCOPIC REFLEX TO CULTURE - Abnormal; Notable for the following:     Bilirubin Urine Small (*)     Mucous Urine Present (*)     All other components within normal limits   ABO/RH TYPE AND SCREEN       Imaging Studies: No results found for this or any previous visit (from the past 24 hour(s)).    Recent vital signs:   BP (!) 97/31  Pulse 54  Temp 97.6  F (36.4  C) (Oral)  Resp 20  Ht 1.727 m (5' 8\")  Wt 92.1 kg (203 lb)  SpO2 98%  BMI 30.87 kg/m2    Cardiac Rhythm: Normal Sinus  Pt needs tele? Yes  Skin/wound Issues: None    Code Status: Full Code    Pain control: good    Nausea control: good    Abnormal labs/tests/findings requiring intervention: See Epic    Family present during ED course? Yes   Family Comments/Social Situation comments: Wife    Tasks needing completion: None    Tino Bell RN  [facility]-- [phone]  [phone]      "

## 2018-07-26 NOTE — PROVIDER NOTIFICATION
-------------------CRITICAL LAB VALUE-------------------    Lab Value: Hgb 6.6.  Time of notification: 7:43 AM  MD notified: Sarwat  Patient status: Stable, oriented.   Temp:  [97.6  F (36.4  C)-98  F (36.7  C)] 98  F (36.7  C)  Pulse:  [54] 54  Heart Rate:  [53-59] (P) 53  Resp:  [12-20] 14  BP: ()/(31-90) 133/49  SpO2:  [97 %-98 %] 97 %  Orders received: Sarwat will order 1U PRBC and will come up to consent pt soon. Will continue to monitor.

## 2018-07-27 NOTE — PLAN OF CARE
Problem: Patient Care Overview  Goal: Plan of Care/Patient Progress Review  Outcome: No Change  Temp:  [97.3  F (36.3  C)-98  F (36.7  C)] 97.4  F (36.3  C)  Pulse:  [54] 54  Heart Rate:  [52-65] 52  Resp:  [12-20] 17  BP: ()/(31-90) 120/56  SpO2:  [95 %-98 %] 95 %  Shift 7003-2663    Neuro: A/Ox4, drowsy in between cares (pt did not sleep last night due to late admission).  Cardiac: SB 49-59. Orthostatic BP/HR completed and negative.   Respiratory:  RA. Home cpap settings when asleep.   GI/: 2 bm, 1 scheduled dose of lactulose given. 2nd bm had tiny dot of blood, will monitor next stool. 300cc UO this shift.   Diet/appetite: 2gm sodium, 2 L FR, good appetite.   Activity:  Assistance x1 to bathroom.   Pain: headache, 1 dose tylenol given with some relief per pt.   Skin: jaundiced. BLE edema, elevated on pillows. No new deficits noted.     hgb recheck 7.2.     **NPO at midnight for GÓMEZ tomorrow**        Continue with POC. Notify primary team with changes.    Problem: Diabetes Comorbidity  Goal: Diabetes  Patient comorbidity will be monitored for signs and symptoms of hyperglycemia or hypoglycemia. Problems will be absent, minimized or managed by discharge/transition of care.   Outcome: No Change  BG Q4 151 and 171, no insulin ordered at this time.

## 2018-07-27 NOTE — PLAN OF CARE
Problem: Patient Care Overview  Goal: Plan of Care/Patient Progress Review  Outcome: No Change  Neuro: A&Ox4.   Cardiac: Afebrile. SB with HR 40-50s. VSS.   Respiratory: Sating >95% on RA. Wears home CPAP while asleep.   GI/: Marginal UOP, anabela in color. BM x3.   Diet/appetite: NPO since midnight.   Activity:  Up with SBA.   Pain: Denies.   Skin: No new deficits noted. BLE edema.   LDA's: PIV x2.    Plan: Continue with POC. Notify primary team with changes. K+ 6.3 this AM, given insulin and D50 and started on D10 infusion at 75 mL/hr. Recheck K+ at 0800 and 1200. Plan for GÓMEZ today.       Problem: Diabetes Comorbidity  Goal: Diabetes  Patient comorbidity will be monitored for signs and symptoms of hyperglycemia or hypoglycemia. Problems will be absent, minimized or managed by discharge/transition of care.   Outcome: No Change  BG elevated overnight to 190-240s. Rechecked Q 30 min this AM after K+ shifting with D50 and insulin and BGs 170-190s.

## 2018-07-27 NOTE — PROVIDER NOTIFICATION
-------------------CRITICAL LAB VALUE-------------------    Lab Value: EKG: New Junctional Rhythm with incomplete R BBB.  Time of notification: 8:12 AM  MD notified: Dr. Fam  Patient status: Stable, denies chest pain.   Temp:  [97.3  F (36.3  C)-97.8  F (36.6  C)] 97.8  F (36.6  C)  Heart Rate:  [48-56] 48  Resp:  [16-18] 18  BP: (117-135)/(43-66) 117/48  SpO2:  [95 %-98 %] 97 %  Orders received: Recheck K+ Now.

## 2018-07-27 NOTE — PROGRESS NOTES
"CLINICAL NUTRITION SERVICES - ASSESSMENT NOTE     Nutrition Prescription    RECOMMENDATIONS FOR MDs/PROVIDERS TO ORDER:  Encourage PO intakes- pt with ongoing wt loss    Malnutrition Status:    Severe malnutrition in the context of chronic illness    Recommendations already ordered by Registered Dietitian (RD):  RD to send Boost BID ( vanilla/strawberry) pt dislikes chocolate  RD to send Magic Cup ( berry/orange) for pt too try    Future/Additional Recommendations:  Montior for need to begin calorie counts     REASON FOR ASSESSMENT  Serge Brambila is a/an 73 year old male assessed by the dietitian for Admission Nutrition Risk Screen for reduced oral intake over the last month    NUTRITION HISTORY  Pt reports that at home his appetite and PO intakes have been suboptimal. He reports a decrease in appetite d/t overall not being hungry. When he does eat, he reports early satiety. He states that he does not eat breakfast, his wife usually packs him a lunch and then they eat dinner together in the evening that she cooks. For llunches, it is usually an Ensure and a sandwich with deli meat, and dinners vary (spagehtti, meat and potatoes) He reports some snacks throughout the day, but these are minimal.     CURRENT NUTRITION ORDERS  Diet: NPO- for procedure, 2L fluid restriction, 2 g Na   Intake/Tolerance: Pt was eating a side, yogurt and fruit upon RD visit. He ate 100% of the yogurt and reported already feeling full.     LABS  Labs reviewed    MEDICATIONS  Medications reviewed    ANTHROPOMETRICS  Height: 172.7 cm (5' 8\")  Most Recent Weight: 92.1 kg (203 lb )    IBW: 70 kg  BMI: Obesity Grade I BMI 30-34.9  Weight History: ~11% wt loss in the past 4 months. Pt reports that he has lost ~65# in the past 9 months. Per chart review, and records, pt has lost ~24% in the past 9 months.   Wt Readings from Last 30 Encounters:   07/27/18 95.3 kg (210 lb 3.2 oz)   07/19/18 91.3 kg (201 lb 4.8 oz)   07/12/18 92.3 kg (203 lb 6.4 " oz)   07/05/18 94.4 kg (208 lb 1.8 oz)   06/30/18 98.7 kg (217 lb 9.6 oz)   06/22/18 92.6 kg (204 lb 1.6 oz)   06/21/18 92.2 kg (203 lb 3.2 oz)   06/14/18 94.2 kg (207 lb 11.2 oz)   06/13/18 104.8 kg (231 lb)   06/05/18 95.3 kg (210 lb)   05/25/18 96.3 kg (212 lb 4.9 oz)   05/17/18 99.3 kg (218 lb 14.4 oz)   05/16/18 100.7 kg (221 lb 14.4 oz)   05/11/18 100.9 kg (222 lb 8 oz)   05/09/18 101.3 kg (223 lb 6.4 oz)   05/03/18 103.8 kg (228 lb 14.4 oz)   04/30/18 106.6 kg (235 lb 0.2 oz)   04/24/18 104.5 kg (230 lb 4.8 oz)   04/16/18 104.3 kg (230 lb)   04/06/18 104.1 kg (229 lb 6.4 oz)   03/23/18 107.4 kg (236 lb 11.2 oz)   03/18/18 106.7 kg (235 lb 3.7 oz)   03/06/18 105.3 kg (232 lb 2.3 oz)   01/24/18 103.4 kg (228 lb)   8/23/17: 267# - per care everywhere    Dosing Weight: 76 kg (adjusted based on wt from admit of 203#)    ASSESSED NUTRITION NEEDS  Estimated Energy Needs: 4374-6858 kcals/day (25 - 30 kcals/kg)  Justification: Maintenance  Estimated Protein Needs:  grams protein/day (1.2 - 1.5 grams of pro/kg)  Justification: Maintenance  Estimated Fluid Needs: On a fluid restriction    PHYSICAL FINDINGS  See malnutrition section below.    MALNUTRITION  % Intake: < 75% for >/= 3 months (non-severe)  % Weight Loss: > 10% in 6 months (severe)  Subcutaneous Fat Loss: None observed   Muscle Loss: Temporal, Thoracic region (clavicle, acromium bone, deltoid, trapezius, pectoral), Upper arm (bicep, tricep), Lower arm  (forearm): moderate, difficult to assess LE d/t fluids  Fluid Accumulation/Edema: Moderate  Malnutrition Diagnosis: Severe malnutrition in the context of chronic illness    NUTRITION DIAGNOSIS  Inadequate oral intake related to suboptimal PO intakes, decreased appetite and early satiety as evidenced by 24% wt loss in the past 9 months and pt report.       INTERVENTIONS  Implementation  Nutrition education for nutrition relationship to health/disease   Medical food supplement therapy     Goals  Patient to  consume % of nutritionally adequate meal trays TID, or the equivalent with supplements/snacks.     Monitoring/Evaluation  Progress toward goals will be monitored and evaluated per protocol.      Brigid Patel RD, MS, LD  6B- Pager: [phone]

## 2018-07-27 NOTE — PHARMACY-CONSULT NOTE
Patient is being treated for hyperkalemia. A pharmacy consult was initiated to review the patient's medication list for possible causes of hyperkalemia.  No medications on this patient's profile are likely to have the side effect of hyperkalemia.     Continue current medication regimen.     Viola Argueta, PharmD, [facility]

## 2018-07-27 NOTE — PLAN OF CARE
I was called by Dr. Fam to discuss risk/benefit of GÓMEZ in patient with large esophageal varices. A GÓMEZ is scheduled to be done to further evaluate the vegetations seen on the TTE earlier yesterday.     From a GI stand-point, there are no prophylactic measures that can be taken on an emergent basis to mitigate the risk of bleeding prior to a GÓMEZ. An EGD with banding prior to GÓMEZ will confer a higher risk of bleeding in case one of the bands is dislodged during the GÓMEZ. We will recommend a thorough risk /benefit analysis prior to pursuing GÓMEZ in a patient with terminal malignancy and no current signs/symptoms of infection. If a GÓMEZ is so desired, there is no contraindication from a hepatology perspective. Plan of care discussed with the cardiology and the medicine team.    Tori CESAR [phone]  Hepatology Fellow

## 2018-07-27 NOTE — PROGRESS NOTES
Care Coordinator Progress Note    Admission Date/Time:  7/25/2018  Attending MD:  Kath Marti,*    Data  Chart reviewed, discussed with interdisciplinary team.   Patient was admitted for:    Hyperkalemia  Diabetic complication (H)  Encounter for long-term (current) use of insulin (H).    Concerns with insurance coverage for discharge needed: None identified  Current Living Situation: Patient lives with Wife  Support System: Wife, Heike is Primary Caregiver  Services Involved: [facility]  Transportation at Discharge: Wife  Transportation to Medical Appointments:  Barriers to Discharge: None identified    Coordination of Care and Referrals: Updated [facility].        Assessment  Pt with history of [facility] Cirrhosis admitted with hyperkalemia. I have met with Pt to introduce myself and care coordinator role. Prior to admission, Pt was living with his Wife in [place]. Prior to admission Pt [facility] which he would resumed post discharge.  I have updated [facility] and added skilled nursing, PT/OT to the discharge orders.  Pt has been getting weekly paracentesis at the [facility].  Per Medicine Team, Pt may need IV abx, I have checked insurance benefits, he does NOT have insurance coverage for home infusion.       Plan  Anticipated Discharge Date: TBD  Anticipated Discharge Plan:  Discharge to home with Wife and intermittent Home Care visits    Mirlande A. Alexis, RN   6B care coordinator #[phone]

## 2018-07-27 NOTE — PLAN OF CARE
Problem: Patient Care Overview  Goal: Plan of Care/Patient Progress Review  Outcome: No Change  Neuro: A&Ox4.   Cardiac: SB/Juctional rhythm now back in SB HR 48-50s. VSS.   Respiratory: Sating 99% on RA.  GI/: Oliguric UOP. BM X1, small.   Diet/appetite: NPO for GÓMEZ.   Activity:  SBA, up to chair and in room.   Pain: Denies pain.   Skin: No new deficits noted. Sclera yellow, skin slightly jaundice.   LDA's: L PIV-D10% 75ml/hr.     Plan: This shift K+ 6.0: Insulin+dextrose given, Recheck K 5.4. Last . Repeat 12L EKG showed Sinus bradycardia+1 degree AV block.  Nephrology consulted. Off unit for GÓMEZ @1415. D10% running at 75ml/hr as BG still trending down after insulin+dextrose. Transfer orders placed to 5A/B with tele.  Continue with POC. Notify primary team with changes.

## 2018-07-27 NOTE — PLAN OF CARE
"Problem: Patient Care Overview  Goal: Plan of Care/Patient Progress Review  Outcome: No Change  /50  Pulse 50  Temp 97.9  F (36.6  C) (Oral)  Resp 20  Ht 1.727 m (5' 8\")  Wt 95.3 kg (210 lb 3.2 oz)  SpO2 97%  BMI 31.96 kg/m2    VSS. RA. Denies pain. A&O x4. GÓMEZ done today. Tolerating diet post procedure. No void this shift. No BM this shift, scheduled lactulose given. Up with SBA. 1 unit PRBCs infusing for hgb 6.7. Q4H pot checks. Continue to monitor.       "

## 2018-07-27 NOTE — PROVIDER NOTIFICATION
-------------------CRITICAL LAB VALUE-------------------    Lab Value: K+ 6.3  Time of notification: 5:44 AM  MD notified: Apolonia Salmeron MD  Patient status: VSS. Unchanged.   Temp:  [97.3  F (36.3  C)-97.8  F (36.6  C)] 97.5  F (36.4  C)  Heart Rate:  [50-65] 53  Resp:  [14-18] 18  BP: (120-135)/(43-66) 124/62  SpO2:  [95 %-98 %] 97 %  Orders received: Return phone call received and acknowledged critical value; order placed for insulin, D50, and D10 infusion.

## 2018-07-27 NOTE — PROVIDER NOTIFICATION
Time of notification: 12:50 AM  Provider notified: Ashley Strong  Patient status: Notified that current HK=939 and no sliding scale insulin ordered. Pt has not been receiving his Basaglar long acting insulin as we don't carry it. Also notified that pt is now NPO for GÓMEZ in AM. RN will recheck BG at 0400.  Temp:  [97.3  F (36.3  C)-98  F (36.7  C)] 97.5  F (36.4  C)  Heart Rate:  [50-65] 50  Resp:  [12-20] 18  BP: ()/(31-90) 125/66  SpO2:  [95 %-98 %] 96 %  Orders received: Received return phone call from Apolonia Salmeron MD. OK with current BGs, no plan to give insulin as pt is NPO.

## 2018-07-27 NOTE — PROGRESS NOTES
Pt arrived in ECHO department for scheduled GÓMEZ.   Procedure explained, questions answered and consent signed. Discharge instructions discussed with patient.  Pt's throat sprayed at 14:30, therefore pt will not be able to eat or drink until 2 hours after at 16:30. Informed pt of this time and encouraged to start with warm fluids and soft foods.    Pt tolerated procedure well, and was given a total of 25 mcg IV fentanyl and 1.5 mg IV versed for conscious sedation.     GÓMEZ probe 55 used for procedure.  Pt denied chest or throat pain after procedure and was monitored until easily wakes and VSS. Escorted back to 6b via stretcher and hospital transport.   Report given to 6B RN.

## 2018-07-28 NOTE — PLAN OF CARE
"Problem: Patient Care Overview  Goal: Plan of Care/Patient Progress Review  Outcome: No Change  /53 (BP Location: Right arm)  Pulse 50  Temp 97.6  F (36.4  C) (Oral)  Resp 14  Ht 1.727 m (5' 8\")  Wt 96.6 kg (212 lb 15.4 oz)  SpO2 100%  BMI 32.38 kg/m2  Neuro: A&Ox4.   Cardiac: SB. VSS.   Respiratory: Sating in mid-high 90's on RA. Home CPAP at HS.  GI/:  Presently oliguric. Multiple small loose brown stools in response to scheduled evening lactulose and kayexolate (45 GM) total  Diet/appetite: Tolerating meds well.  Activity:  Assist of one when OOB. Pt calls appropriately for assistance.  Pain: Denies  Skin: No new deficits noted.  LDA's: PIV x1    Plan: Continue with POC. Notify primary team with changes.      Problem: Diabetes Comorbidity  Goal: Diabetes  Patient comorbidity will be monitored for signs and symptoms of hyperglycemia or hypoglycemia. Problems will be absent, minimized or managed by discharge/transition of care.   Outcome: No Change  Frequent BG checks overnight in response to potassium shift x2 performed. Initial intervention for value of 6.3, followed by recheck at 6.0, which resulted in 2nd shift.  Awaiting scheduled AM labs including K+ draw @ 0800..      "

## 2018-07-28 NOTE — PLAN OF CARE
"Problem: Patient Care Overview  Goal: Plan of Care/Patient Progress Review  Outcome: No Change  /46 (BP Location: Right arm)  Pulse 57  Temp 97.6  F (36.4  C) (Oral)  Resp 14  Ht 1.727 m (5' 8\")  Wt 96.6 kg (212 lb 15.4 oz)  SpO2 100%  BMI 32.38 kg/m2  Neuro: A&Ox4.   Cardiac: SB. VSS.                 Respiratory: Sating in mid-high 90's on RA. Home CPAP at HS.  GI/:  Presently oliguric. Multiple small loose brown stools in response to scheduled lactulose. K+ down to 5.5 - 5.6. 400 mL Albumin given this AM. Renal consulted and will determine needs for patient to receive fluid or hold off. Following orders. Urine output 200 - 300 mL total for shift.   Diet/appetite: Tolerating meds well. Decreased appetite.   Activity:  Assist of one when OOB. Pt calls appropriately for assistance.  Pain: Denies  Skin: No new deficits noted.  LDA's: PIV x2      "

## 2018-07-28 NOTE — PROGRESS NOTES
[facility], [place]    Internal Medicine Progress Note - [facility] Service    Main Plans for Today   - c/w IV ceftraixone today, will transition to PO cipro today  - Formal Nephrology consult today     Assessment & Plan   Serge Brambila is a 73 year old male admitted on 7/25/2018.     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     # Decompensated KUHN cirrhosis (MELD-Na 33)  # Hx of hepatic encephalopathy  # Ascites  # HCC (not a candidate for locoregional treatment)  Hx of SBP  Unclear etiology of worsening decompensation. 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   - Continue lactulose (titrated to 3-4 BMs daily) and rifaximin for his hx of HE  - c/w holding his home dose of diuretics due to his JUAN M  - Will limit any paracentesis to < 3 L to avoid major fluid shifts that can aggravate the JUAN M.  - 3 day albumin challenge (7/26-7/28)  - vitamin K 10mg IV x 3 days (7/26-7/28)  - Daily 40mg PO Protonix  - Cipro Daily 500mg QDaily for SBP prophylaxis  - Outpatient hepatology follow up at the time of discharge     JUAN M  Hyperkalemia  Prerenal vs hepatorenal vs ATN vs AIN  No evidence of hydronephrosis on US. Will treat decompensated cirrhosis. Unfortunately, Cr has not improved with  albumin on 7/26 but rather is worsening. Pt also reporting decreased urinary outpatient. In addition, hyperkalemia continues to be a problem despite shifting. DIC labs negative x2. UA, FeNA, Uosm, Liudmila, Uprotein/cr pending.   - Albumin challenge 100 gm x 3 days (7/25-7/28)  - Strict Is/Os  - Nephrology consultation for HRS vs ATN and to evaluate need for hyperkalemia. Spoke with nephrology fellow on 7/27 who recommended midodrine tid and subcutaneous octreotide TID along with 20mg lasix therapy before formal consult, no improvement.   - Will request formal Nephrology consult today for persistent hyperkalemia and worsening JUAN M     Concern for Endocarditis   Pt had a TTE done on 7/26 for concern for cardiorenal syndrome. Incidentally, TTE concerning for small vegetations on aortic valve. Pt does have recent hx of E coli bacteremia s/p treatment. He does not meet any clinical [facility] critereat for IE but based on TTE does meet pathologic criteria.GÓMEZ on 7/27 without vegetations.    - discontinue IV ceftriaxone     Acute on chronic macrocytic anemia:  Patient had large grade II varices and PHG on last EUS 6/29/18. Previously on warfarin for A Fib but has been held due to worsening anemia. Transfusion dependent (unclear why, no hx of MDS) ~ requiring 1U PRBC every week. Denies any overt GI bleeding. Hgb 6.6 on 7/26 s/p 1U PRBC. Fibrinogen wnl. % Retic elevated but absolute retic inappropriately normal. Iron wnl. Ferritin elevated (but this is also an acute phase reactant and would be expected)  - peripheral smear pending  - GI hepatology consulted, per recs no EGD at this time  - monitor for hematochezia  - consider CT abdomen if worsening anemia    Coagulopathy  Thrombocytopenia  Likely 2/2 Cirrhosis. No evidence of cirrhosis currently   - vitamin K 10mg x 3 days (7/26-7/28)    # Pain Assessment:  Current Pain Score 7/28/2018   Patient currently in pain? denies   Pain score (0-10) -   Pain location -   Pain descriptors -    Serge s pain level was assessed and he currently denies pain.      Diet: Fluid restriction 2000 ML FLUID  Advance Diet as Tolerated: Regular Diet Adult; 2 gm NA Diet  Fluids: none  DVT Prophylaxis: Pneumatic Compression Devices  Code Status: Full Code    Disposition Plan   Expected discharge: 2 - 3 days, recommended to prior living arrangement once antibiotic plan established, hemoglobin stable and renal function improved.     Entered: July Fam 07/28/2018, 10:42 AM   Information in the above section will display in the discharge planner report.      The patient's care was discussed with the Attending Physician, Dr. Marti, Bedside Nurse and Care Coordinator/.    July Fam  [facility]  Maroon: 5  Pager: [phone]  Please see sticky note for cross cover information    Interval History   Overnight events and nursing notes reviewed.     No overt bleeding; however, patient was anemic to <7 overnight requiring blood transfusion. HD stable.     Pt denies chest pain, sob, chest palpitations.     4 point ROS negative except as above.    Physical Exam   Vital Signs: Temp: 97.9  F (36.6  C) Temp src: Oral BP: 128/46 Pulse: 50 Heart Rate: 57 Resp: 16 SpO2: 98 % O2 Device: None (Room air)    Weight: 212 lbs 15.43 oz    General: In no acute distress. Pleasant, laying in bed.   HEENT: PERRL. EOMi. Mild scleral icterus, MMM  CV: RRR without murmurs, Skin warm and dry  Lungs: CTAB  Abd: Distended, non-tender, BS +. No peritoneal signs  Extrem: No peripehral edema  Skin: Jaundice  Neuro: AOx3, No  asterixis    Data   Labs and Imaging reviewed in [facility]

## 2018-07-29 NOTE — PLAN OF CARE
Problem: Patient Care Overview  Goal: Plan of Care/Patient Progress Review  Outcome: Improving  Shift:   VS: Temp: 97.9  F (36.6  C) Temp src: Oral BP: 145/56   Heart Rate: 64 Resp: 18 SpO2: 96 % O2 Device: None (Room air)    Pain: Denies pain/nausea  Neuro: A&Ox4, calls appropriately  Cardiac: Tele SR with intermittent bradycardia, HR mostly in 60s  Respiratory: Denies SOB/wears CPAP intermittently during day  GI/Diet/Appetite: Lactulose TID, loose BM x 3. Fair appetite with 2g low sodium diet, 2L FR  :  Urinating frequently in small amounts  LDA's: PIVs SL  Skin: No new deficits, paracentesis site on left lower abdomen covered with bandage. +3 edema on BLE  Activity: Up to bathroom with Ax 1  Tests/Procedures: 1 unit of blood transfused for Hgb 6.9, recheck value is 7.7. Paracentesis on left lower abdomen done today with total of 3000 ml removed.  Pertinent Labs/Lab Collection:      Plan: Continue with plan of cares and update MD of any changes.

## 2018-07-29 NOTE — PLAN OF CARE
"Problem: Patient Care Overview  Goal: Plan of Care/Patient Progress Review  Outcome: No Change  /53 (BP Location: Right arm)  Pulse 50  Temp 98.1  F (36.7  C) (Oral)  Resp 16  Ht 1.727 m (5' 8\")  Wt 96.7 kg (213 lb 3 oz)  SpO2 99%  BMI 32.41 kg/m2  Neuro: A&Ox4.   Cardiac: SB, VSS.   Respiratory: Sating in mid-high 90's on RA/Home CPAP overnight.   GI/: Making small amounts of urine. Lactulose TID; stools remain brown, loose.  Diet/appetite: Tolerating diet, medications.  Activity:  Assist x1 when OOB  Pain:  Denies pain  Skin: No new deficits noted.  LDA's: LFA PIVx2    Note:  500cc NS bolus given overnight per Nephrology.  AM Hgb @ 6.9 (7.5 previously), Plt @ 49 (56 previously).  Contacted Cross Cover with these values. Plt redraw ordered and result pending at present. K+ @ 5.2 this morning.      .      Problem: Diabetes Comorbidity  Goal: Diabetes  Patient comorbidity will be monitored for signs and symptoms of hyperglycemia or hypoglycemia. Problems will be absent, minimized or managed by discharge/transition of care.   Outcome: No Change  BG levels managed with insuline per order    Problem: Liver Failure, Acute/Chronic (Adult)  Goal: Signs and Symptoms of Listed Potential Problems Will be Absent, Minimized or Managed (Liver Failure, Acute/Chronic)  Signs and symptoms of listed potential problems will be absent, minimized or managed by discharge/transition of care (reference Liver Failure, Acute/Chronic (Adult) CPG).   Outcome: No Change  Xifaxin BID, Lactulose TID.       "

## 2018-07-29 NOTE — PROGRESS NOTES
Hospitalist Procedure Service - Paracentesis Procedure Note  Date of Service: 07/29/2018    Patient: Serge Brambila  MRN: [id number]  Admission Date: 7/25/2018  Hospital Day # 3    Attending: Myrtle Ty MD  JAY: Yony Doran NP  Procedure: Therapeutic paracentesis  Indication: Abdominal Distension  Pre-procedure diagnosis: Ascites  Post-procedure diagnosis: Same    The risks and benefits of the procedure were explained to Serge who expressed understanding and opted to proceed.  Consent was obtained and placed in the chart.  Ultrasound was used to find a pocket of ascites in the left upper quadrant near the level of the umbilicus. A time out was performed. The area was prepped and draped in the usual sterile fashion.  10 ml of 1% lidocaine was instilled and ascites located.  A small incision was made with an 11 blade.  The [employer] paracentesis catheter and needle were inserted until ascites obtained then the needle removed.  The apparatus was connected to vacuum bottles and a total of 3000 ml removed.   A specimen was not sent for analysis per primary team orders. The catheter was withdrawn and the area dressed. Pre and post-procedure images were saved to the medical record.    Patient tolerated the procedure well with no immediate complications.  The primary team was informed of the procedure.     Myrtle Ty MD  Med-Peds Hospitalist  Pager [phone]

## 2018-07-29 NOTE — PROGRESS NOTES
Nephrology Progress Note  07/29/2018         ASSESSMENT AND RECOMMENDATIONS:   Mr. Brambila is a 73 year old male with PMHx of decompensated KUHN Cirrhosis. Hepatic encepalopathy (on lactulose and rifaximin), ascites (requiring weekly paracentesis 1-3L), and recent diagnosis of metastatic hepatic cellular carcinoma, recent E coli bacteremia and SBP (hospitalized 6/26/18 - 7/1/18). Adm 7/25 with JUAN M.     1- JUAN M on CKD:  - In March 2018 Crea was 1.52, on admission 2.91.  Last normal Cr was 2016.   hyaline casts and FENa < 1%.  Etiology likely due to vol depletion or HRS.   In addition pt has reversal of portal flow and high venous pressure.  Patient was on Lasix 40 mg daily, Aldactone 100 mg daily and Lactulose 5-6 doses a day as OP  Patient received Albumin 100 g X3 (26,27,28 of July) and  Octreotide 100 mcg  TID and Midodrine 2.5 mg PO TID.  Cr today is stable at 3.2 and he remains volume up  - agree with trial of lasix  - check TSH  None in our system    2- Hyperkalemia:  - Was treated medically with Insulin and dextrose and with Kayexalate. Pt had been on 100 mg SL as OP, now stopped for 3 days. K 5.2 today  - avoid Potassium sparing diuretic if possible.      3- Hyponatremia:  - Resolved  - Na yesterday 132, today 136  - Likely due to Cirrhosis, renal failure  - Daily monitoring.      3- BP-Volume status:  - BP: within normal limits  - Patient weight was 92 on admission, today 96.7 kg which is unchanged from yesterday in spite of diarrhea.  - agree with paracentesis      4- Acid-Base, metabolic acidosis without respiratory compensation.  Would like to give bicarb to help maintain potassium cellular balance but do not want an alkalemic pH given his HE  - repeat VBG     5- Normocytic anemia:   Anemia of chronic disease VS iron deficiency anemia.  Hb 6.9 today so he is getting another unit of blood  - Epo level pending    Heather Burris MD  [phone]    Interval History :   In the last 24 hours Serge OMRIN  "Kosta has been stable. More alert today as sedation for GÓMEZ wears off.  Cr still 3.2 and K has improved.  Being transfused today and then to have para.  Pt has eight stools yesterday.  Able to walk in harp last night    Review of Systems:   I reviewed the following systems:  GI: fair appetite. no nausea or vomiting or diarrhea.   Neuro:  some confusion  Constitutional:  no fever or chills  CV: no dyspnea.  Does have edema    Physical Exam:   I/O last 3 completed shifts:  In: 1145 [P.O.:120; I.V.:1025]  Out: 750 [Urine:750]   /53 (BP Location: Right arm)  Pulse 50  Temp 98.1  F (36.7  C) (Oral)  Resp 16  Ht 1.727 m (5' 8\")  Wt 96.7 kg (213 lb 3 oz)  SpO2 99%  BMI 32.41 kg/m2     GENERAL APPEARANCE: frail, cheerful.  Speech slow  EYES:    scleral icterus, pupils equal  HENT: mouth without ulcers or lesions  PULM: lungs clear to auscultation bilaterally, equal air movement, no clubbing  CV: regular rhythm, normal rate, no rub     -JVD no     -edema pitting to thighs   GI: soft, nontender, distended, ascites present.  Soft  INTEGUMENT: no cyanosis, no rash  NEURO:  no asterixis   Access none    Labs:   All labs reviewed by me  Electrolytes/Renal -   Recent Labs   Lab Test  07/29/18   0421  07/28/18   2122  07/28/18   1246  07/28/18   0805   07/27/18   1655   07/27/18   0804   06/26/18   1530   03/02/18   0638   NA  137   --    --   136   --   132*   --    --    < >   --    < >  144   POTASSIUM  5.2  5.7*  5.6*  5.5*   < >  5.7*   < >  6.0*   < >   --    < >  4.0   CHLORIDE  109   --    --   108   --   104   --    --    < >   --    < >  116*   CO2  14*   --    --   14*   --   16*   --    --    < >   --    < >  17*   BUN  97*   --    --   95*   --   93*   --    --    < >   --    < >  23   CR  3.15*   --    --   3.17*   --   3.11*   --    --    < >   --    < >  1.49*   GLC  181*   --    --   136*   --   173*   --    --    < >   --    < >  112*   HARI  8.4*   --    --   8.8   --   8.5   --    --    < >   --    " < >  7.8*   MAG   --    --    --    --    --    --    --   2.3   --    --    --   2.5*   PHOS   --    --    --    --    --    --    --   5.1*   --   3.2   --    --     < > = values in this interval not displayed.       CBC -   Recent Labs   Lab Test  07/29/18   0546  07/29/18 0421 07/28/18   0805 07/27/18 2207 07/27/18 0456   WBC   --   2.7*  3.1*   --    --   2.9*   HGB   --   6.9*  7.5*  7.1*   < >  7.1*   PLT  43*  49*  56*   --    --   56*    < > = values in this interval not displayed.       LFTs -   Recent Labs   Lab Test  07/29/18 0421 07/28/18 0805 07/27/18 0456   ALKPHOS  93  113  139   BILITOTAL  11.1*  9.9*  9.3*   ALT  168*  198*  160*   AST  281*  361*  267*   PROTTOTAL  6.5*  6.7*  6.8   ALBUMIN  3.8  3.6  3.4       Iron Panel -   Recent Labs   Lab Test  07/27/18 0456   IRON  141   IRONSAT  97*   WALLY  958*         Imaging:  reviewed    Current Medications:    ciprofloxacin  250 mg Oral Q24H CHRIS     insulin aspart  1-6 Units Subcutaneous Q4H     lactulose  20 g Oral or NG Tube TID     levothyroxine (SYNTHROID/LEVOTHROID) tablet 150 mcg  150 mcg Oral Daily     midodrine  2.5 mg Oral TID w/meals     octreotide  100 mcg Subcutaneous TID     pantoprazole  40 mg Oral QAM AC     rifaximin  550 mg Oral BID     sodium bicarbonate  1,300 mg Oral BID       Heather Burris MD

## 2018-07-29 NOTE — CONSULTS
Nephrology Initial Consult  July 28, 2018      Serge Brambila MRN:[id number] YOB: 1945  Date of Admission:7/25/2018  Primary care provider: Linn Thompson  Requesting physician: Kath Marti,*    ASSESSMENT AND RECOMMENDATIONS:   Mr. Brambila is a 73 year old male with PMHx of decompensated KUHN Cirrhosis. hepatoencepalopathy (on lactulose and rifaximin), ascites (requiring weekly paracentesis 1-3L), recent diagnosed with hepatic cellular carcinoma, recent E coli bacteremia and SBP (hospitalized 6/26/18 - 7/1/18). Patient was admitted to our hospital due to abnormal lab (hyperkalemia and JUAN M) and found to be in decompensated KUHN cirrhosis, anemic and hyponatremic    1- JUAN M on CKD:  - In March 2018 Crea was 1.52, on admission 2.91  - In 2016 Crea was 1.0  - Etiology likely due to Pre-renal azotemia, volume depletion, ATN or HRS,   - Other Etiology also could be bilirubin nephropathy, Bili total on admission 8.8, today 9.9. Or could be due to the metastasis that cause pressure on the portal vein and elevate the venous pressure that increase the pressure in the renal veins and so decrease the glomerular filtration and kidney function.     - On Admission FENa was 0.5<1%.  - Patient was on Lasix 40 mg daily, Aldactone 100 mg daily and Lactulose 5-6 doses a day.  - Patient received Albumin 100 g X3 (26,27,28 of July)  - Patient started on Octreotide 100 mcg  TID and Midodrine 2.5 mg PO TID  - Patient off diuretics, ordered 500 ml of NS one time over one hour,  Patient has 3rd space edema, looks in intravascular depletion, to conitune monitoring closely.  - UOP: 500 ml  - I/O: 800/500  - We recommend to switch Ciprofloxacin to other antibiotic with no renal SE or to decrease the dose to 250 mg.        2- Hyperkalemia:  - Was treated medically with Insulin and dextrose and with Kayexalate. Pt had been on 100 mg SL as OP, now stopped for 3 days  - Patient received 15 g and 30 g of Kayexalate  today  - Last K level 5.7, stable, to continue monitoring.   - When restore kidney function, to avoid Potassium sparing diuretic if possible.     3- Hyponatremia:  - Resolved  - Na yesterday 132, today 136  - Likely due to Cirrhosis, renal failure  - Daily monitoring.     3- BP-Volume status:  - BP: within normal limits  - Patient weight was 92 on admission, today 96 kg  - Ascites fluid is positive, for GI team to decide regarding continue with weekly paracentesis      4- Acid-Base, metabolic acidosis without respiratory compensation:  - Need to repeat the VBG.  If pH is acidic can use bicarb to translocate K  - in , VB.44/24/36/16  - Expected PCO2: 1.5 (14) +8+-2= (27-31)      5- Normocytic anemia:  - Anemia of chronic disease VS iron deficiency anemia  - Patient received 1 unit of RBC on admission  - Hgb today: 7.5  - Need iron study      Recommendations were communicated to primary team via verbal communication    Seen and discussed with Dr. Dayton Hernandez MD   [phone]      REASON FOR CONSULT: JUAN M on CKD, Hyperkalemia,       HISTORY OF PRESENT ILLNESS:  Mr. Brambila is a 73 year old male with PMHx of decompensated KUHN Cirrhosis. hepatoencepalopathy (on lactulose and rifaximin), ascites (requiring weekly paracentesis 1-3L), recent diagnosed with hepatic cellular carcinoma, recent E coli bacteremia and SBP (hospitalized 18 - 18). Patient was admitted to our hospital due to abnormal lab (hyperkalemia and JUAN M) and found to be in decompensated KUHN cirrhosis, anemic and hyponatremic    Patient had a recent hospitalization from 18 to 18 with E Coli bacteremia attributed to possibly SBP since no other source of infection was found (even though cell count was < 250 in the ascitic fluid, it was felt that it was a false negative in setting of initiation of anti-microbial therapy since ascitic fluid cultures were positive for E coli). Evaluation for cholangitis via an MRCP and EUS  were both negative. Patient followed up today with his PCP and was found to have an elevated Cr to 3.10 with a K of 6.3 and a Hb of 7.3. Patient had only report ongoing fatigue but had denied any other symptoms. Patient has been taking his lactulose daily and has been having 5-6 BMs daily [has tried cutting down the lactulose but it makes him encephalopathic]. He is also maintained on 40 mg of furosemide and 100 mg of spirinolactone for his ascites along with weekly paracentesis. For his anemia, he has been getting weekly transfusions (he was previously on warfarin for his A Fib which has been discontinued x several weeks).   He denies any current symptoms    PAST MEDICAL HISTORY:  Reviewed with patient on 07/28/2018   At bedside  Past Medical History:   Diagnosis Date     Atrial fibrillation (H)      Diabetes (H)     type II     Hepatic encephalopathy (H)      Hypertension      MI (myocardial infarction)     stent placement     Sleep apnea      Thyroid disease        Past Surgical History:   Procedure Laterality Date     APPENDECTOMY  age 16     COLONOSCOPY       ENDOSCOPIC ULTRASOUND UPPER GASTROINTESTINAL TRACT (GI) N/A 6/29/2018    Procedure: ENDOSCOPIC ULTRASOUND, ESOPHAGOSCOPY / UPPER GASTROINTESTINAL TRACT (GI);  ENDOSCOPIC ULTRASOUND with upper endoscopy ;  Surgeon: Dakota Emmanuel MD;  Location: [facility] OR     ESOPHAGOSCOPY, GASTROSCOPY, DUODENOSCOPY (EGD), COMBINED N/A 1/24/2018    Procedure: COMBINED ESOPHAGOSCOPY, GASTROSCOPY, DUODENOSCOPY (EGD);  ESOPHAGOGASTRODUODENOSCOPY (MAC) ;  Surgeon: Colton Stroud MD;  Location:  GI     ESOPHAGOSCOPY, GASTROSCOPY, DUODENOSCOPY (EGD), COMBINED N/A 6/29/2018    Procedure: COMBINED ESOPHAGOSCOPY, GASTROSCOPY, DUODENOSCOPY (EGD);;  Surgeon: Dakota Emmanuel MD;  Location: [facility] OR     GI SURGERY  2012    partial colectomy for pre-CA nodule, removed 10 in.        MEDICATIONS:  PTA Meds  Prior to Admission medications    Medication Sig Last Dose Taking?  Auth Provider   allopurinol (ZYLOPRIM) 100 MG tablet Take 1 tablet (100 mg) by mouth daily 7/25/2018 at AM Yes Tommie Panda MD   BASAGLAR 100 UNIT/ML injection Inject 30 Units Subcutaneous At Bedtime  7/24/2018 at PM Yes Reported, Patient   carvedilol (COREG) 6.25 MG tablet Take 1 tablet (6.25 mg) by mouth 2 times daily (with meals) 7/25/2018 at AM/PM Yes Tommie Panda MD   ciprofloxacin (CIPRO) 500 MG tablet Take ciprofloxacin 500 mg every 12h until 7/10/18 then take 500 mg daily 7/25/2018 at AM Yes Tommie Panda MD   furosemide (LASIX) 20 MG tablet Take 2 tablets (40 mg) by mouth daily 7/25/2018 at AM Yes Young Sousa DO   lactulose (CHRONULAC) 10 GM/15ML solution 30 mLs (20 g) by Oral or NG Tube route 3 times daily 7/25/2018 at AM/Noon/PM Yes Young Sousa DO   LEVOTHYROXINE SODIUM PO Take 150 mcg by mouth daily  7/25/2018 at AM Yes Reported, Patient   pantoprazole (PROTONIX) 20 MG EC tablet Take 2 tablets (40 mg) by mouth 2 times daily 7/25/2018 at AM/PM Yes Tea Silveira APRN CNP   rifaximin (XIFAXAN) 550 MG TABS tablet Take 1 tablet (550 mg) by mouth 2 times daily 7/25/2018 at AM/PM Yes Young Sousa DO   sodium bicarbonate 650 MG tablet Take 2 tablets (1,300 mg) by mouth 2 times daily 7/25/2018 at Unknown time Yes Young Sousa DO   spironolactone (ALDACTONE) 50 MG tablet Take 2 tablets (100 mg) by mouth daily 7/25/2018 at AM/PM Yes Tommie Panda MD   blood glucose monitoring (NO BRAND SPECIFIED) test strip 1 each   Reported, Patient   blood glucose monitoring (ONETOUCH ULTRA) test strip TEST TWICE DAILY TO THREE TIMES DAILY   Reported, Patient   Blood Pressure Monitoring (RA BLOOD PRESSURE CUFF MONITOR) MISC by Misc.(Non-Drug; Combo Route) route once daily.   Reported, Patient   [employer] LANCETS 21G MISC by Misc.(Non-Drug; Combo Route) route once daily.   Reported, Patient   Nitroglycerin (NITROQUICK SL) Place 1 tablet under the tongue as needed   at PRN   Reported, Patient      Current Meds    ciprofloxacin  500 mg Oral Q24H CHRIS     insulin aspart  1-6 Units Subcutaneous Q4H     lactulose  20 g Oral or NG Tube TID     levothyroxine (SYNTHROID/LEVOTHROID) tablet 150 mcg  150 mcg Oral Daily     midodrine  2.5 mg Oral TID w/meals     octreotide  100 mcg Subcutaneous TID     pantoprazole  40 mg Oral QAM AC     rifaximin  550 mg Oral BID     sodium bicarbonate  1,300 mg Oral BID     Infusion Meds      ALLERGIES:    Allergies   Allergen Reactions     Penicillins      Happened in his teens, unsure what his reaction was. Tolerated ceftriaxone and Zosyn 3/2018       REVIEW OF SYSTEMS:  A comprehensive of systems was negative except as noted above.    SOCIAL HISTORY:   Social History     Social History     Marital status:      Spouse name: N/A     Number of children: N/A     Years of education: N/A     Occupational History     Not on file.     Social History Main Topics     Smoking status: Former Smoker     Packs/day: 1.00     Years: 10.00     Quit date: 1999     Smokeless tobacco: Never Used     Alcohol use No     Drug use: No     Sexual activity: Not on file     Other Topics Concern     Not on file     Social History Narrative     Reviewed with patient at bedside  Wife accompanies Serge Brambila in hospital room    FAMILY MEDICAL HISTORY:   Family History   Problem Relation Age of Onset     Hyperlipidemia Father      Hypertension Father      Cervical Cancer Maternal Grandmother      Alcoholism Mother      Abdominal Aortic Aneurysm Mother      HEART DISEASE Mother      Hypertension Mother      Thyroid Disease Mother      Hyperlipidemia Mother      Reviewed with patient at bedside    PHYSICAL EXAM:   Temp  Av.5  F (36.4  C)  Min: 96.6  F (35.9  C)  Max: 98  F (36.7  C)      Pulse  Av.1  Min: 50  Max: 54 Resp  Avg: 15.6  Min: 12  Max: 20  SpO2  Av.6 %  Min: 95 %  Max: 100 %  FiO2 (%)  Av %  Min: 21 %  Max: 21 %       /55 (BP Location:  "Right arm)  Pulse 50  Temp 97.6  F (36.4  C) (Oral)  Resp 16  Ht 1.727 m (5' 8\")  Wt 96.6 kg (212 lb 15.4 oz)  SpO2 99%  BMI 32.38 kg/m2   Date 07/28/18 0700 - 07/29/18 0659   Shift 5347-1089 3951-2405 1511-4172 24 Hour Total   I  N  T  A  K  E   P.O.  120  120    I.V. 525   525    Shift Total  (mL/kg) 525  (5.43) 120  (1.24)  645  (6.68)   O  U  T  P  U  T   Urine 200 200  400    Shift Total  (mL/kg) 200  (2.07) 200  (2.07)  400  (4.14)   Weight (kg) 96.6 96.6 96.6 96.6        Admit Weight: 92.1 kg (203 lb)     GENERAL APPEARANCE: no acute distress,  Awake, alert  EYES: positive scleral icterus, pupils equal  Endo: no goiter, no moon facies  Lymphatics: no cervical or supraclavicular LAD  Pulmonary: lungs clear to auscultation with equal breath sounds bilaterally  CV: regular rhythm, normal rate, no rub   - JVD not elevated   - Edema generalized  GI: soft, nontender, normal bowel sounds  MS: no evidence of inflammation in joints, no muscle tenderness  : no prince  SKIN: no rash, warm, dry, no cyanosis  NEURO: face symmetric, no asterixis     LABS:   CMP  Recent Labs  Lab 07/28/18  2122 07/28/18  1246 07/28/18  0805 07/28/18  0231  07/27/18  1655  07/27/18  0804 07/27/18  0456 07/26/18  0710  07/25/18  2235   NA  --   --  136  --   --  132*  --   --  134 134  134  --  132*   POTASSIUM 5.7* 5.6* 5.5* 6.0*  < > 5.7*  < > 6.0* 6.3* 5.8*  5.9*  < > 6.3*   CHLORIDE  --   --  108  --   --  104  --   --  108 108  --  104   CO2  --   --  14*  --   --  16*  --   --  16* 13*  --  17*   ANIONGAP  --   --  14  --   --  12  --   --  10 13  --  11   GLC  --   --  136*  --   --  173*  --   --  208* 128*  --  243*   BUN  --   --  95*  --   --  93*  --   --  89* 87*  --  93*   CR  --   --  3.17*  --   --  3.11*  --   --  2.88* 2.71*  2.68*  --  2.91*   GFRESTIMATED  --   --  19*  --   --  20*  --   --  22* 23*  24*  --  21*   GFRESTBLACK  --   --  23*  --   --  24*  --   --  26* 28*  28*  --  26*   HARI  --   --  8.8  -- "   --  8.5  --   --  8.7 8.0*  --  8.6   MAG  --   --   --   --   --   --   --  2.3  --   --   --   --    PHOS  --   --   --   --   --   --   --  5.1*  --   --   --   --    PROTTOTAL  --   --  6.7*  --   --   --   --   --  6.8 6.3*  --  6.5*   ALBUMIN  --   --  3.6  --   --   --   --   --  3.4 2.4*  --  2.6*   BILITOTAL  --   --  9.9*  --   --   --   --   --  9.3* 7.9*  --  8.8*   ALKPHOS  --   --  113  --   --   --   --   --  139 166*  --  174*   AST  --   --  361*  --   --   --   --   --  267* 300*  --  302*   ALT  --   --  198*  --   --   --   --   --  160* 178*  --  181*   < > = values in this interval not displayed.  CBC  Recent Labs  Lab 07/28/18  0805 07/27/18  2207 07/27/18  1655 07/27/18  0456  07/26/18  1143 07/26/18  0710   HGB 7.5* 7.1* 6.7* 7.1*  < > 6.9* 6.6*   WBC 3.1*  --   --  2.9*  --  2.8* 3.3*   RBC 2.52*  --   --  2.33*  --  2.32* 2.22*   HCT 22.2*  --   --  20.8*  --  19.9* 18.8*   MCV 88  --   --  89  --  86 85   MCH 29.8  --   --  30.5  --  29.7 29.7   MCHC 33.8  --   --  34.1  --  34.7 35.1   RDW 22.3*  --   --  22.7*  --  23.2* 22.9*   PLT 56*  --   --  56*  --  54* 62*   < > = values in this interval not displayed.  INR  Recent Labs  Lab 07/28/18  0805 07/27/18  1824 07/27/18  0456 07/26/18  1151   INR 1.31* 1.54* 1.58* 2.03*   PTT  --  42*  --   --      ABGNo lab results found in last 7 days.   URINE STUDIES  Recent Labs   Lab Test  07/27/18   1030  07/26/18   0845  07/26/18   0030  06/26/18   1101  03/16/18   1430   COLOR  Orange  Yellow  Dark Yellow  Yellow  Yellow   APPEARANCE  Slightly Cloudy  Clear  Clear  Clear  Clear   URINEGLC  Negative  Negative  Negative  Negative  Negative   URINEBILI  Small*  Small*  Small*  Negative  Negative   URINEKETONE  Negative  Negative  Negative  Negative  Negative   SG  1.014  1.010  1.011  1.010  1.019   UBLD  Negative  Negative  Negative  Negative  Negative   URINEPH  5.0  5.0  5.0  5.0  5.5   PROTEIN  30*  Negative  Negative  10*  10*   NITRITE   Negative  Negative  Negative  Negative  Negative   LEUKEST  Negative  Negative  Negative  Negative  Trace*   RBCU  1   --   0  1  1   WBCU  2   --   <1  1  2     Recent Labs   Lab Test  07/27/18   1030   UTPG  0.29*     PTH  No lab results found.  IRON STUDIES  Recent Labs   Lab Test  07/27/18   0456  03/03/18   0804   IRON  141   --    FEB  145*   --    IRONSAT  97*   --    WALLY  958*  42       IMAGING:  All imaging studies reviewed by me.     Lenny Hernandez MD   Nephrology Fellow  Pager: [phone]    I have seen and examined this patient with the resident.  This note reflects our joint assessment and plan.     Heather Burris MD

## 2018-07-29 NOTE — PROGRESS NOTES
[facility], [place]    Internal Medicine Progress Note - [facility] Service    Main Plans for Today   - decreased prophylactic cipro dose to 250mg daily per renal rec  - paracentesis today  - 1 unit PRBC transfusion today    Assessment & Plan   Serge Brambila is a 73 year old male admitted on 7/25/2018.     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    JUAN M  Hyperkalemia  Prerenal vs hepatorenal vs ATN vs AIN   No evidence of hydronephrosis on US. Will treat decompensated cirrhosis. Cre trended up this admission prompted nephrology consult. Received NS 500ml once 7/28. Today, Cre 3.15 and stable since yesterday.  - s/p Albumin challenge 100 gm x 3 days (7/25-7/28)  - Strict Is/Os  - appreicate nephrology consult. Overall volume overloaded. Planned for para and possible lasix. Checking VBG    # Decompensated KUHN cirrhosis (MELD-Na 33)  # Hx of hepatic encephalopathy  # Ascites  # HCC (not a candidate for locoregional treatment)  Hx of SBP  Unclear etiology of worsening decompensation. 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   - Continue lactulose (titrated to 3-4 BMs daily) and rifaximin for his hx of HE  - c/w holding his home dose of diuretics due to his JUAN M  - Will limit any  paracentesis to < 3 L to avoid major fluid shifts that can aggravate the JUAN M.  - s/p 3 day albumin challenge (7/26-7/28)  - s/p vitamin K 10mg IV x 3 days (7/26-7/28)  - Daily 40mg PO Protonix  - Cipro Daily 250mg QDaily for SBP prophylaxis (Dose decreased 7/29 per renal rec)  - Outpatient hepatology follow up at the time of discharge    Acute on chronic macrocytic anemia:  Patient had large grade II varices and PHG on last EUS 6/29/18. Previously on warfarin for A Fib but has been held due to worsening anemia. Transfusion dependent (unclear why, no hx of MDS) ~ requiring 1U PRBC every week. Denies any overt GI bleeding. Hgb 6.6 on 7/26 s/p 1U PRBC. Fibrinogen wnl. % Retic elevated but absolute retic inappropriately normal. Iron wnl. Ferritin elevated (but this is also an acute phase reactant and would be expected)  - peripheral smear pending, haptoglobin and reticulocyte count pending.  - GI hepatology consulted, per recs no EGD at this time  - monitor for hematochezia  - Hb 6.9 today and transfusing 1 unit PRBC.   - consider CT abdomen if worsening anemia     Abnormal TTE finding Concern for Endocarditis   Pt had a TTE done on 7/26 for concern for cardiorenal syndrome. Incidentally, TTE concerning for small vegetations on aortic valve. Pt does have recent hx of E coli bacteremia s/p treatment. He does not meet any clinical [facility] critereat for IE but based on TTE does meet pathologic criteria.GÓMEZ on 7/27 without vegetations.    - discontinue IV ceftriaxone     Coagulopathy  Thrombocytopenia  Likely 2/2 Cirrhosis. No evidence of cirrhosis currently   - s/p vitamin K 10mg x 3 days (7/26-7/28)    # Pain Assessment:  Current Pain Score 7/29/2018   Patient currently in pain? denies   Pain score (0-10) -   Pain location -   Pain descriptors -   Serge [place]ana pain level was assessed and he currently denies pain.      Diet: Fluid restriction 2000 ML FLUID  Advance Diet as Tolerated: Regular Diet Adult; 2 gm NA Diet  Fluids:  none  DVT Prophylaxis: Pneumatic Compression Devices  Code Status: Full Code    Disposition Plan   Expected discharge: 2 - 3 days, recommended to prior living arrangement once antibiotic plan established, hemoglobin stable and renal function improved.     Entered: Kath Marti 07/29/2018, 12:20 PM   Information in the above section will display in the discharge planner report.        Interval History   Overnight events and nursing notes reviewed.     No overt bleeding. HD stable.     Pt denies chest pain, sob, chest palpitations.     4 point ROS negative except as above.    Physical Exam   Vital Signs: Temp: 97.4  F (36.3  C) Temp src: Oral BP: 146/52   Heart Rate: 62 Resp: 18 SpO2: 97 % O2 Device: None (Room air)    Weight: 213 lbs 2.96 oz    General: In no acute distress. Pleasant, laying in bed.   HEENT: PERRL. EOMi. Mild scleral icterus, MMM  CV: RRR without murmurs, Skin warm and dry  Lungs: CTAB  Abd: Distended, non-tender, BS +. No peritoneal signs  Extrem: No peripehral edema  Skin: Jaundice  Neuro: AOx3, No  asterixis    Data   Labs and Imaging reviewed in EPIC

## 2018-07-30 NOTE — PLAN OF CARE
"Problem: Patient Care Overview  Goal: Plan of Care/Patient Progress Review  Outcome: No Change  /52 (BP Location: Right arm)  Pulse 50  Temp 97.8  F (36.6  C) (Oral)  Resp 16  Ht 1.727 m (5' 8\")  Wt 96.7 kg (213 lb 3 oz)  SpO2 99%  BMI 32.41 kg/m2  Neuro:  Generally lethargic, however A&0x4  Cardiac: SB/SR. VSS.   Respiratory: Sating in mid-high 90's on RA.  GI/:  Frequent small amounts of urine. BM's in response to oral Lactulose.   Diet/appetite:  Poor appetite. Tolerating  meds well.   Activity:  Assist x1 when OOB  Pain: Denies pain  Skin: No new deficits noted.  LDA's: PIVx2    Plan: Continue with POC. Notify primary team with changes.      Problem: Diabetes Comorbidity  Goal: Diabetes  Patient comorbidity will be monitored for signs and symptoms of hyperglycemia or hypoglycemia. Problems will be absent, minimized or managed by discharge/transition of care.   Outcome: No Change  Supplemental insulin needs per MAR.     Problem: Liver Failure, Acute/Chronic (Adult)  Goal: Signs and Symptoms of Listed Potential Problems Will be Absent, Minimized or Managed (Liver Failure, Acute/Chronic)  Signs and symptoms of listed potential problems will be absent, minimized or managed by discharge/transition of care (reference Liver Failure, Acute/Chronic (Adult) CPG).   Outcome: No Change  Unchanged. Pt compliant with scheduled Xifaxan and Lactulose.      "

## 2018-07-30 NOTE — PLAN OF CARE
Problem: Patient Care Overview  Goal: Plan of Care/Patient Progress Review  Neuro: A&Ox4.   Cardiac: SR. VSS.   Respiratory: Sating 96% on RA.  GI/: Adequate urine output. BM X 2 , loose stools.   Diet/appetite: Tolerating regular diet. Eating well.  Activity:  Stand by assist, up to chair and in halls.  Pain: denied pain or discomfort.   Skin: No new deficits noted.  LDA's: PIV sl'd.   Plan: Continue with POC. Notify primary team with changes.

## 2018-07-31 NOTE — IP AVS SNAPSHOT
MRN:[id number]                      After Visit Summary   7/31/2018    Serge Brambila    MRN: [id number]           Thank you!     Thank you for choosing [facility] for your care. Our goal is always to provide you with excellent care. Hearing back from our patients is one way we can continue to improve our services. Please take a few minutes to complete the written survey that you may receive in the mail after you visit with us. Thank you!        Patient Information     Date Of Birth          1945        Designated Caregiver       Most Recent Value    Caregiver    Will someone help with your care after discharge? yes    Name of designated caregiver Tori-wife    Phone number of caregiver [phone]    Caregiver address [address] [place] [address]      About your hospital stay     You were admitted on:  July 31, 2018 You last received care in the:  [facility]    You were discharged on:  August 10, 2018        Reason for your hospital stay       Somnolence concerning for sepsis                  Who to Call     For medical emergencies, please call 911.  For non-urgent questions about your medical care, please call your primary care provider or clinic, [phone]          Attending Provider     Provider Specialty    Bao Marino MD Emergency Medicine    Chester Zhou MD Internal Medicine    Arash Brown MD Internal Medicine       Primary Care Provider Office Phone # Fax #    Linn Thompson [phone]      After Care Instructions     Activity       Your activity upon discharge: activity as tolerated            Diet       Follow this diet upon discharge: Orders Placed This Encounter      Snacks/Supplements Adult: Magic Cup; With Meals      Regular Diet Adult            Discharge Instructions       You were admitted for increased weakness and difficulty waking up. You were treated with a seven day course of antibiotics and are much improved. You should  continue to work on rebuilding your strength, and follow-up with your primary doctors for continued management of your blood transfusions and paracentesis. Our palliative team has been involved, and if you should decide to enter our Hospice program please notify your primary care doctor.                  Follow-up Appointments     Adult [facility] Follow-up and recommended labs and tests       Follow up with primary care provider, TRENA LANGE, within 7 days for hospital follow- up.  The following labs/tests are recommended: BMP, CBC.      Appointments on [place] and/or [facility] (with [facility] or [facility] provider or service). Call [phone] if you haven't heard regarding these appointments within 7 days of discharge.                  Your next 10 appointments already scheduled     Aug 17, 2018  7:00 AM CDT   Infusion 180 with UC ONCOLOGY INFUSION, UC 12 ATC   [facility] ([facility])    [address]  [address]  [facility] [address]   [phone]            Aug 17, 2018  2:00 PM CDT   Paracentesis Visit with Uc Spec Inf Para Provider, UC 39 ATC   [facility] ([facility])    [address]  [address]  [facility] [address]   [phone]            Aug 23, 2018  7:30 AM CDT   Paracentesis Visit with Uc Spec Inf Para Provider, UC 40 ATC   [facility] ([facility])    [address]  [address]  [facility] [address]   [phone]            Aug 23, 2018 12:30 PM CDT   Infusion 180 with UC ONCOLOGY INFUSION, UC 30 ATC   [facility] ([facility])    [address]  [address]  [facility] [address]   [phone]            Aug 30, 2018  7:30 AM CDT   Paracentesis Visit with Uc Spec Inf Para Provider, UC 40 ATC   [facility]  Specialty and Procedure ([facility])    [address]  [address]  [facility] [address] [phone]              Additional Services     Home care nursing referral       Resume    [facility]   #[phone]        Resume skilled nursing visits  ---potential for Palliative care soon, please discuss with family    Monitor cardiac and resp status  Monitor hydration, nutrition and bowel status  Monitor diabetic status  Instruct in medications and eval effects  Instruct in medications and eval effects    Home PT--eval and treat  Home OT--eval and treat  New---Home OT for Lymphedema Therapy            Your provider has ordered home care nursing services. If you have not been contacted within 2 days of your discharge please call the inpatient department phone number at [phone]                  Further instructions from your care team       ________________________________________________________  Discharge RN please fax discharge orders to home care agency: [facility]  ________________________________________________________    Pending Results     Date and Time Order Name Status Description    8/9/2018 1419 fluid culture - Aerobic Bacterial Preliminary     8/9/2018 1314 POC US Guide for Paracentesis In process             Statement of Approval     Ordered          08/10/18 2148  I have reviewed and agree with all the recommendations and orders detailed in this document.  EFFECTIVE NOW     Approved and electronically signed by:  Peter Cardenas MD             Admission Information     Date & Time Provider Department Dept. Phone    7/31/2018 Arash Brown MD [address] [phone]      Your Vitals Were     Blood Pressure Pulse Temperature Respirations Weight Pulse Oximetry    113/46 (BP Location: Left arm) 74 96.7  F (35.9  C) (Axillary) 18 94 kg (207 lb 3.2 oz) 97%    BMI (Body Mass Index)                   31.5 kg/m2           MyChart Information      [facility] gives you secure access to your electronic health record. If you see a primary care provider, you can also send messages to your care team and make appointments. If you have questions, please call your primary care clinic.  If you do not have a primary care provider, please call [phone] and they will assist you.        Care EveryWhere ID     This is your Care EveryWhere ID. This could be used by other organizations to access your [facility] medical records  [id number]        Equal Access to Services     FRANK CONROY : Hadii prateek ku hadasho Soomaali, waaxda luqadaha, qaybta kaalmada adeegyada, waxkwasi guerrero. So [facility] [phone].    ATENCIÓN: Si habla español, tiene a collins disposición servicios gratuitos de asistencia lingüística. [facility] [phone].    We comply with applicable federal civil rights laws and [place] laws. We do not discriminate on the basis of race, color, national origin, age, disability, sex, sexual orientation, or gender identity.               Review of your medicines      CONTINUE these medicines which may have CHANGED, or have new prescriptions. If we are uncertain of the size of tablets/capsules you have at home, strength may be listed as something that might have changed.        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 (H), Liver mass        Dose:  1-3 mg   Take 1-3 tablets (1-3 mg) by mouth nightly as needed for sleep   Quantity:  30 tablet   Refills:  1         CONTINUE these medicines which have NOT CHANGED        Dose / Directions    acetaminophen 325 MG tablet   Commonly known  as:  TYLENOL   Used for:  Liver cirrhosis secondary to KUHN (H), Liver mass, Cervical radiculopathy        Dose:  650 mg   Take 2 tablets (650 mg) by mouth every 8 hours as needed for mild pain   Quantity:  100 tablet   Refills:  0       allopurinol 100 MG tablet   Commonly known as:  ZYLOPRIM   Used for:  Hyperuricemia        Dose:  100 mg   Take 1 tablet (100 mg) by mouth daily   Quantity:  90 tablet   Refills:  0       * ONETOUCH ULTRA test strip   Generic drug:  blood glucose monitoring        TEST TWICE DAILY TO THREE TIMES DAILY   Refills:  0       * blood glucose monitoring test strip   Commonly known as:  no brand specified        Dose:  1 each   1 each   Refills:  0       carvedilol 6.25 MG tablet   Commonly known as:  COREG   Used for:  Secondary esophageal varices without bleeding (H)        Dose:  6.25 mg   Take 1 tablet (6.25 mg) by mouth 2 times daily (with meals)   Quantity:  180 tablet   Refills:  0       ciprofloxacin 500 MG tablet   Commonly known as:  CIPRO   Indication:  SBP   Used for:  SBP (spontaneous bacterial peritonitis) (H), Bacteremia        Take ciprofloxacin 500 mg every 12h until 7/10/18 then take 500 mg daily   Quantity:  120 tablet   Refills:  0       [employer] LANCETS 21G Misc        by Misc.(Non-Drug; Combo Route) route once daily.   Refills:  0       lactulose 10 GM/15ML solution   Commonly known as:  CHRONULAC   Used for:  Hepatic encephalopathy (H)        Dose:  20 g   30 mLs (20 g) by Oral or NG Tube route 3 times daily   Quantity:  2700 mL   Refills:  1       Levothyroxine Sodium 125 MCG Caps   Used for:  Thyroid disease        Dose:  125 mcg   Take 125 mcg by mouth daily   Quantity:  30 capsule   Refills:  0       NITROQUICK SL        Dose:  1 tablet   Place 1 tablet under the tongue as needed   Refills:  0       pantoprazole 20 MG EC tablet   Commonly known as:  PROTONIX   Used for:  Gastrointestinal hemorrhage, unspecified gastrointestinal hemorrhage type, HCC (hepatocellular  carcinoma) (H)        Dose:  40 mg   Take 2 tablets (40 mg) by mouth 2 times daily   Quantity:  60 tablet   Refills:  2       RA BLOOD PRESSURE CUFF MONITOR Misc        by Misc.(Non-Drug; Combo Route) route once daily.   Refills:  0       rifaximin 550 MG Tabs tablet   Commonly known as:  XIFAXAN   Indication:  hepatic encephalopathy   Used for:  Hepatic encephalopathy (H)        Dose:  550 mg   Take 1 tablet (550 mg) by mouth 2 times daily   Quantity:  60 tablet   Refills:  3       sodium bicarbonate 650 MG tablet   Used for:  Chronic kidney disease, unspecified CKD stage        Dose:  1300 mg   Take 2 tablets (1,300 mg) by mouth 2 times daily   Quantity:  120 tablet   Refills:  1       * Notice:  This list has 2 medication(s) that are the same as other medications prescribed for you. Read the directions carefully, and ask your doctor or other care provider to review them with you.         Where to get your medicines      These medications were sent to [id number] - [place], [place] - [address] AT [facility] [address]  [address], [place] [address]     Phone:  [phone]     BASAGLAR 100 UNIT/ML injection    melatonin 1 MG Tabs tablet         Some of these will need a paper prescription and others can be bought over the counter. Ask your nurse if you have questions.     Bring a paper prescription for each of these medications     rifaximin 550 MG Tabs tablet                Protect others around you: Learn how to safely use, store and throw away your medicines at www.disposemymeds.org.        ANTIBIOTIC INSTRUCTION     You've Been Prescribed an Antibiotic - Now What?  Your healthcare team thinks that you or your loved one might have an infection. Some infections can be treated with antibiotics, which are powerful, life-saving drugs. Like all medications, antibiotics have side effects and should only be used when necessary. There are some important things you  should know about your antibiotic treatment.      Your healthcare team may run tests before you start taking an antibiotic.    Your team may take samples (e.g., from your blood, urine or other areas) to run tests to look for bacteria. These test can be important to determine if you need an antibiotic at all and, if you do, which antibiotic will work best.      Within a few days, your healthcare team might change or even stop your antibiotic.    Your team may start you on an antibiotic while they are working to find out what is making you sick.    Your team might change your antibiotic because test results show that a different antibiotic would be better to treat your infection.    In some cases, once your team has more information, they learn that you do not need an antibiotic at all. They may find out that you don't have an infection, or that the antibiotic you're taking won't work against your infection. For example, an infection caused by a virus can't be treated with antibiotics. Staying on an antibiotic when you don't need it is more likely to be harmful than helpful.      You may experience side effects from your antibiotic.    Like all medications, antibiotics have side effects. Some of these can be serious.    Let you healthcare team know if you have any known allergies when you are admitted to the hospital.    One significant side effect of nearly all antibiotics is the risk of severe and sometimes deadly diarrhea caused by Clostridium difficile (C. Difficile). This occurs when a person takes antibiotics because some good germs are destroyed. Antibiotic use allows C. diificile to take over, putting patients at high risk for this serious infection.    As a patient or caregiver, it is important to understand your or your loved one's antibiotic treatment. It is especially important for caregivers to speak up when patients can't speak for themselves. Here are some important questions to ask your healthcare  team.    What infection is this antibiotic treating and how do you know I have that infection?    What side effects might occur from this antibiotic?    How long will I need to take this antibiotic?    Is it safe to take this antibiotic with other medications or supplements (e.g., vitamins) that I am taking?     Are there any special directions I need to know about taking this antibiotic? For example, should I take it with food?    How will I be monitored to know whether my infection is responding to the antibiotic?    What tests may help to make sure the right antibiotic is prescribed for me?      Information provided by:  www.cdc.gov/getsmart  U.S. Department of Health and Human Services  Centers for disease Control and Prevention  National Center for Emerging and Zoonotic Infectious Diseases  Division of Healthcare Quality Promotion             Medication List: This is a list of all your medications and when to take them. Check marks below indicate your daily home schedule. Keep this list as a reference.      Medications           Morning Afternoon Evening Bedtime As Needed    acetaminophen 325 MG tablet   Commonly known as:  TYLENOL   Take 2 tablets (650 mg) by mouth every 8 hours as needed for mild pain   Last time this was given:  375 mg on 8/9/2018  4:54 PM                                allopurinol 100 MG tablet   Commonly known as:  ZYLOPRIM   Take 1 tablet (100 mg) by mouth daily   Last time this was given:  100 mg on 8/10/2018  8:13 AM                                BASAGLAR 100 UNIT/ML injection   Inject 15 Units Subcutaneous At Bedtime   Last time this was given:  15 Units on 8/9/2018 10:33 PM                                * ONETOUCH ULTRA test strip   TEST TWICE DAILY TO THREE TIMES DAILY   Generic drug:  blood glucose monitoring                                * blood glucose monitoring test strip   Commonly known as:  no brand specified   1 each                                carvedilol 6.25 MG  tablet   Commonly known as:  COREG   Take 1 tablet (6.25 mg) by mouth 2 times daily (with meals)   Last time this was given:  6.25 mg on 8/10/2018  8:14 AM                                ciprofloxacin 500 MG tablet   Commonly known as:  CIPRO   Take ciprofloxacin 500 mg every 12h until 7/10/18 then take 500 mg daily   Last time this was given:  500 mg on 8/10/2018 11:35 AM                                KROGER LANCETS 21G Misc   by Misc.(Non-Drug; Combo Route) route once daily.                                lactulose 10 GM/15ML solution   Commonly known as:  CHRONULAC   30 mLs (20 g) by Oral or NG Tube route 3 times daily   Last time this was given:  20 g on 8/10/2018  2:30 PM                                Levothyroxine Sodium 125 MCG Caps   Take 125 mcg by mouth daily                                melatonin 1 MG Tabs tablet   Take 1-3 tablets (1-3 mg) by mouth nightly as needed for sleep                                NITROQUICK SL   Place 1 tablet under the tongue as needed                                pantoprazole 20 MG EC tablet   Commonly known as:  PROTONIX   Take 2 tablets (40 mg) by mouth 2 times daily   Last time this was given:  40 mg on 8/10/2018  8:14 AM                                RA BLOOD PRESSURE CUFF MONITOR Misc   by Misc.(Non-Drug; Combo Route) route once daily.                                rifaximin 550 MG Tabs tablet   Commonly known as:  XIFAXAN   Take 1 tablet (550 mg) by mouth 2 times daily   Last time this was given:  550 mg on 8/10/2018  8:13 AM                                sodium bicarbonate 650 MG tablet   Take 2 tablets (1,300 mg) by mouth 2 times daily   Last time this was given:  1,300 mg on 8/10/2018  8:13 AM                                * Notice:  This list has 2 medication(s) that are the same as other medications prescribed for you. Read the directions carefully, and ask your doctor or other care provider to review them with you.

## 2018-07-31 NOTE — PROGRESS NOTES
Patient's wife states that he is just fatigued and tired today  She is having him drink fluids and eat fruit  She is going to check his blood pressure and blood sugars  They have a home health nurse that is coming out this afternoon or tomorrow    Wife advised to call or bring patient in if he gets worse

## 2018-07-31 NOTE — PROGRESS NOTES
Spoke to Daughter Jojo  She states her father so weak that he can barely move  Exteremly fatigue. He is able to form sentences. And talk to them   He has no headache, no blurry vision, no shortness of breath no chest pain,   Blood sugar was 309   Had his ensure  Blood pressure - 139/50  Heart rate- 73

## 2018-07-31 NOTE — LETTER
Transition Communication Hand-off for Care Transitions to Next Level of Care Provider    Name: Serge Brambila  : 1945  MRN #: [id number]  Primary Care Provider: TRENA LANGE     Primary Clinic: [facility] [address] [place] [address]     Reason for Hospitalization:  Heart murmur [R01.1]  Hepatocellular carcinoma (H) [C22.0]  Hyperammonemia (H) [E72.20]  Jaundice [R17]  Thrombocytopenia (H) [D69.6]  Prolonged Q-T interval on ECG [R94.31]  Generalized muscle weakness [M62.81]  End-stage liver disease (H) [K72.90]  Anemia, unspecified type [D64.9]  Chronic kidney disease, unspecified CKD stage [N18.9]  Hypertension, unspecified type [I10]  Admit Date/Time: 2018  3:17 PM  Discharge Date: 8.10.18  Payor Source: Payor: BCBS / Plan: BCBS PLATINUM BLUE / Product Type: PPO /              Reason for Communication Hand-off Referral: Other encephalapathy    Discharge Plan: Care Conference held--not quite ready for hospice, to c/w [facility]--informed them that Palliative should follow       Concern for non-adherence with plan of care:   Y/N N  Discharge Needs Assessment:  Needs       Most Recent Value    Equipment Currently Used at Home cpap, grab bar, walker, rolling, other (see comments) [scooter]    Transportation Available family or friend will provide    Home Care [facility] [phone], Fax: [phone]            Follow-up specialty is recommended: No    Follow-up plan:  Future Appointments  Date Time Provider Department Center   2018 7:00 AM UC ONCOLOGY INFUSION [facility]   2018 2:00 PM Provider, Uc Spec Inf Para [facility]   2018 7:30 AM Provider, [facility] Spec Inf Para [facility]   2018 12:30 PM UC ONCOLOGY INFUSION [facility]   2018 7:30 AM Provider, [facility] Spec Inf Para [facility]       Any outstanding tests or procedures:        Referrals     Future Labs/Procedures    Home care nursing referral     Comments:    Resume    [facility]   #[phone]        Resume skilled nursing visits  ---potential for Palliative care soon, please discuss with family    Monitor cardiac and resp status  Monitor hydration, nutrition and bowel status  Monitor diabetic status  Instruct in medications and eval effects  Instruct in medications and eval effects    Home PT--eval and treat  Home OT--eval and treat  New---Home OT for Lymphedema Therapy            Your provider has ordered home care nursing services. If you have not been contacted within 2 days of your discharge please call the inpatient department phone number at [phone]            Su Recommendations:      Consuelo Lala    AVS/Discharge Summary is the source of truth; this is a helpful guide for improved communication of patient story

## 2018-07-31 NOTE — IP AVS SNAPSHOT
[address] [address]    Phone:  [phone]                                       After Visit Summary   7/31/2018    Serge Brambila    MRN: [id number]           After Visit Summary Signature Page     I have received my discharge instructions, and my questions have been answered. I have discussed any challenges I see with this plan with the nurse or doctor.    ..........................................................................................................................................  Patient/Patient Representative Signature      ..........................................................................................................................................  Patient Representative Print Name and Relationship to Patient    ..................................................               ................................................  Date                                            Time    ..........................................................................................................................................  Reviewed by Signature/Title    ...................................................              ..............................................  Date                                                            Time

## 2018-07-31 NOTE — PROGRESS NOTES
Patients wife Raquel,  called in stating she does have concerns about how tiered he is and is requesting a call to discuss this - [phone].

## 2018-07-31 NOTE — TELEPHONE ENCOUNTER
Spoke with spouse Tori regarding messages forwarded to Oncology reporting symptoms of fatigue and weakness.    Raquel De Souza, CMA        7/31/18 1:12 PM   Note      Spoke to Daughter Jojo  She states her father so weak that he can barely move  Exteremly fatigue. He is able to form sentences. And talk to them   He has no headache, no blurry vision, no shortness of breath no chest pain,   Blood sugar was 309   Had his ensure  Blood pressure - 139/50  Heart rate- 73        Spouse was just pulling up to the house and state home care RN is due to be there now. Advised her pt needs to be evaluated in the ED if he is weak and unable to ambulate per usual. She state she doesn't think she would be able to get him in the car, writer then advised her to call 911. Tori verbalized understanding.

## 2018-07-31 NOTE — ED NOTES
[facility], [place]   ED Nurse to Floor Handoff     Serge Brambila is a 73 year old male who speaks English and lives with a spouse,  in a home  They arrived in the ED by ambulance from home    ED Chief Complaint: Fatigue    ED Dx;   Final diagnoses:   Generalized muscle weakness   Jaundice   End-stage liver disease (H)   Hepatocellular carcinoma (H)   Chronic kidney disease, unspecified CKD stage   Anemia, unspecified type   Prolonged Q-T interval on ECG   Heart murmur   Hypertension, unspecified type   Hyperammonemia (H) - Level 202   Thrombocytopenia (H)         Needed?: No    Allergies:   Allergies   Allergen Reactions     Penicillins      Happened in his teens, unsure what his reaction was. Tolerated ceftriaxone and Zosyn 3/2018   .  Past Medical Hx:   Past Medical History:   Diagnosis Date     Atrial fibrillation (H)      Diabetes (H)     type II     Hepatic encephalopathy (H)      Hypertension      MI (myocardial infarction)     stent placement     Sleep apnea      Thyroid disease       Baseline Mental status: wnl  Current Mental Status changes: slight confusion, slow to process    Infection present or suspected this encounter: no  Sepsis suspected: No  Isolation type: No active isolations     Activity level - Baseline/Home:  Independent  Activity Level - Current:   Stand with Assist    Bariatric equipment needed?: No    In the ED these meds were given:   Medications   lactulose (CHRONULAC) solution 20 g (20 g Oral Given 7/31/18 1649)       Drips running?  No    Home pump  No    Current LDAs  Peripheral IV 07/31/18 Left Hand (Active)   Number of days:0       Labs results:   Labs Ordered and Resulted from Time of ED Arrival Up to the Time of Departure from the ED   COMPREHENSIVE METABOLIC PANEL - Abnormal; Notable for the following:        Result Value    Carbon Dioxide 18 (*)     Glucose 195 (*)     Urea Nitrogen 99 (*)     Creatinine 2.61 (*)     GFR Estimate 24 (*)      GFR Estimate If Black 29 (*)     Bilirubin Total 17.2 (*)     Protein Total 6.6 (*)      (*)      (*)     All other components within normal limits   AMMONIA - Abnormal; Notable for the following:     Ammonia 202 (*)     All other components within normal limits   CBC WITH PLATELETS DIFFERENTIAL - Abnormal; Notable for the following:     WBC 3.4 (*)     RBC Count 2.61 (*)     Hemoglobin 8.0 (*)     Hematocrit 23.2 (*)     RDW 21.6 (*)     Platelet Count 48 (*)     Absolute Lymphocytes 0.4 (*)     All other components within normal limits   INR - Abnormal; Notable for the following:     INR 1.43 (*)     All other components within normal limits   PARTIAL THROMBOPLASTIN TIME   LIPASE   MAGNESIUM   TROPONIN I   ROUTINE UA WITH MICROSCOPIC   CARDIAC CONTINUOUS MONITORING       Imaging Studies:   Recent Results (from the past 24 hour(s))   XR Chest Port 1 View    Narrative    XR CHEST PORT 1 VW 7/31/2018 4:05 PM    Comparison: 6/26/2018    History: SOB    Findings: Single AP view of the chest. Prominent cardiac silhouette,  unchanged. Pulmonary vasculature is distinct. No acute airspace  disease. No pleural effusion or pneumothorax.      Impression    Impression: No acute airspace disease.       Recent vital signs:   /87  Pulse 71  Temp 98.7  F (37.1  C) (Oral)  Resp 15  SpO2 100%    Cardiac Rhythm: Normal Sinus  Pt needs tele? No  Skin/wound Issues: ecchmotic    Code Status: Full Code    Pain control: good    Nausea control: good    Abnormal labs/tests/findings requiring intervention:     Family present during ED course? Yes   Family Comments/Social Situation comments: lives with spouse    Tasks needing completion: None    Chayo Miramontes RN  [facility]--   [phone] [place] [facility]  [phone] [facility]

## 2018-07-31 NOTE — PROGRESS NOTES
Nephrology Progress Note  07/30/2018         ASSESSMENT AND RECOMMENDATIONS:   Mr. Brambila is a 73 year old male with PMHx of decompensated KUHN Cirrhosis. Hepatic encepalopathy (on lactulose and rifaximin), ascites (requiring weekly paracentesis 1-3L), and recent diagnosis of metastatic hepatic cellular carcinoma, recent E coli bacteremia and SBP (hospitalized 6/26/18 - 7/1/18). Adm 7/25 with JUAN M.     JUAN M on CKD 3/4  bl creatinine since 2017 to 3/2018 was 1.3-1.8,  on admission 2.91.  Last normal Cr was 2016.     hyaline casts and FENa < 1%.  Etiology likely due to vol depletion or HRS.     In addition pt has reversal of portal flow and high venous pressure.  Patient PTA was on Lasix 40 mg daily, Aldactone 100 mg daily and Lactulose 5-6 doses a day as OP  Patient received Albumin 100 g X3 (26,27,28 of July) and  Octreotide 100 mcg  TID and Midodrine 2.5 mg PO TID.    Cr today is stable at 2.9 and he remains volume up but has improved UO with improved edema  -- continue to hold off diuresis at discharge and he will need to follow with nephrology after discharge and resume diuretics if needed based on volume assessment and if creatinine stays stable    Hyperkalemia:  Improved   - avoid Potassium sparing diuretic if possible.   Hyponatremia:  - Resolved  - Likely due to Cirrhosis, renal failure  - Daily monitoring.      BP: within normal limits  Hypervolemic with relative arterial hypotension  LE edema +  Patient weight was 92 on admission, today 96 kg which is unchanged from yesterday in spite of diarrhea.  - now Sp paracentesis 3L today  - he will benefit from scheduled paracentesis with limit to 3L per procedure with albumin replacement     He has chronic resp alkalosis sec to cirrhosis   Bicarb is chronically low  -- will follow on levels OP and may DC  -- acutely possible worsening acidosis with JUAN M and low Bicarb repletion    Anemia  ACD/inflamm/ESLD  Iron replete  EPO adequate  Will continue to  "monitor    Recommendations discussed with primary team  Staffed with Dr Valerie Shelley MD    [phone]    Interval History :   In the last 24 hours Serge Brambila has been stable. More alert today as sedation for GÓMEZ wears off.  Cr still 3.2 and K has improved.  Being transfused today and then to have para.  Pt has eight stools yesterday.  Able to walk in harp last night    Review of Systems:   I reviewed the following systems:  GI: fair appetite. no nausea or vomiting or diarrhea.   Neuro:  some confusion  Constitutional:  no fever or chills  CV: no dyspnea.  Does have edema    Physical Exam:   I/O last 3 completed shifts:  In: 240 [P.O.:240]  Out: 895 [Urine:895]   /53 (BP Location: Right arm)  Pulse 50  Temp 97.9  F (36.6  C) (Oral)  Resp 16  Ht 1.727 m (5' 8\")  Wt 96.7 kg (213 lb 3 oz)  SpO2 100%  BMI 32.41 kg/m2     GENERAL APPEARANCE: frail, cheerful.  Speech slow  EYES:    scleral icterus, pupils equal  HENT: mouth without ulcers or lesions  PULM: lungs clear to auscultation bilaterally, equal air movement, no clubbing  CV: regular rhythm, normal rate, no rub     -JVD no     -edema pitting to thighs   GI: soft, nontender, distended, ascites present.  Soft  INTEGUMENT: no cyanosis, no rash  NEURO:  no asterixis   Access none    Labs:   All labs reviewed by me  Electrolytes/Renal -   Recent Labs   Lab Test  07/30/18   0516  07/29/18   1358  07/29/18   0421   07/28/18   0805   07/27/18   0804   06/26/18   1530   03/02/18   0638   NA  139   --   137   --   136   < >   --    < >   --    < >  144   POTASSIUM  4.9  5.2  5.2   < >  5.5*   < >  6.0*   < >   --    < >  4.0   CHLORIDE  109   --   109   --   108   < >   --    < >   --    < >  116*   CO2  16*   --   14*   --   14*   < >   --    < >   --    < >  17*   BUN  101*   --   97*   --   95*   < >   --    < >   --    < >  23   CR  2.97*   --   3.15*   --   3.17*   < >   --    < >   --    < >  1.49*   GLC  177*   --   181*   --   136*   < > "   --    < >   --    < >  112*   HARI  8.8   --   8.4*   --   8.8   < >   --    < >   --    < >  7.8*   MAG   --    --    --    --    --    --   2.3   --    --    --   2.5*   PHOS   --    --    --    --    --    --   5.1*   --   3.2   --    --     < > = values in this interval not displayed.       CBC -   Recent Labs   Lab Test  07/30/18   0516  07/29/18   1358  07/29/18   0546  07/29/18   0421   WBC  3.1*  3.4*   --   2.7*   HGB  7.7*  7.7*   --   6.9*   PLT  43*  48*  43*  49*       LFTs -   Recent Labs   Lab Test  07/30/18   0516  07/29/18   0421  07/28/18   0805   ALKPHOS  96  93  113   BILITOTAL  14.5*  11.1*  9.9*   ALT  157*  168*  198*   AST  248*  281*  361*   PROTTOTAL  6.6*  6.5*  6.7*   ALBUMIN  3.8  3.8  3.6       Iron Panel -   Recent Labs   Lab Test  07/30/18   0516  07/27/18   0456   IRON  115  141   IRONSAT  104*  97*   WALLY   --   958*         Imaging:  reviewed    Current Medications:      Jacob Shelley MD

## 2018-07-31 NOTE — PROGRESS NOTES
DISCHARGE                         No discharge date for patient encounter.  ----------------------------------------------------------------------------  Discharged to: Home  Via: private transportation  Accompanied by: Family - wife  Discharge Instructions: diet, activity, medications, follow up appointments, when to call the MD, aftercare instructions.  Prescriptions: To be filled by [facility] pharmacy; medication list reviewed & sent with pt  Follow Up Appointments: arranged; information given  Belongings: All sent with pt  IV: d/c'd  Telemetry: d/c'd  Pt exhibits understanding of above discharge instructions; all questions answered.    Discharge Paperwork: Signed, copied, and sent home with patient.

## 2018-07-31 NOTE — ED TRIAGE NOTES
Patient BIBA for weakness and fatigue. Was just here on Sunday for a blood transfusion and discharged yesterday. H/O liver failure.

## 2018-07-31 NOTE — PROVIDER NOTIFICATION
Time of notification: 17:30  Provider notified: Dr NARCISO Cardenas   Patient status:  Pt is waiting to be seen by GI since this morning. Wife here to take pt home. They are insisting to go home and the reason why the delay  was.   Temp:  [96.7  F (35.9  C)-98.4  F (36.9  C)] 98.4  F (36.9  C)  Heart Rate:  [56-66] 61  Resp:  [16] 16  BP: (128-154)/(46-70) 128/46  FiO2 (%):  [21 %] 21 %  SpO2:  [96 %-100 %] 96 %  Orders received:  came at bedside and talk to pt and wife.  said he will expedite the discharge process. Will wait upon discharge orders.

## 2018-07-31 NOTE — DISCHARGE SUMMARY
[facility], [place]    Internal Medicine Discharge Summary- [facility] Service    Date of Admission:  7/25/2018  Date of Discharge:  7/30/2018  8:10 PM  Discharging Attending Provider: Kaia  Discharge Team: Ashley Ramirez 5    Discharge Diagnoses   Hyperkalemia   JUAN M  HCC  Decompensated KUHN cirrhosis   Acute Anemia   Acute Hyponatremia     Follow-ups Needed After Discharge   Patient re-admitted <24hrs after discharge, for follow-up see next admission discharge summary    Hospital Course   Serge Brambila was admitted directly admitted on 7/25/2018 by PCP for JUAN M and hyperkalemia.  The following problems were addressed during his hospitalization:      Serge Brambila is a 73 year old male with decompensated KUHN Cirrhosis c/w HE (on lactulose and rifaximin), ascites (requiring weekly paracentesis 1-3L), Grade II EV (last seen on EGD 6/29/18), diagnosis of HCC in May 2017 and recent E coli bacteremia and SBP (hospitalized 6/26/18 - 7118) who has been admitted by his PCP due to routine labs drawn showing an elevated Cr of 3.19 (up from a Cr of 1.48 on 7/1/18 at the time of discharge) along with hyperkalemia with a K of 6.3 and found to be in decompensated KUHN cirrhosis, acutely anemic, and acutely hyponatremic.         # JUAN M  # Hyperkalemia  Prerenal vs hepatorenal vs ATN vs AIN. No evidence of hydronephrosis on US. Will treat decompensated cirrhosis. Cr trended up this admission prompted nephrology consult. Received NS 500ml once 7/28.  S/p Albumin challenge 100 gm x 3 days (7/25-7/28). Creatinine improving at discharge. Family and patient wanted to go home in the setting of terminal illness and feeling better. Home diuretics held.          # Decompensated KUHN cirrhosis (MELD-Na 33)  # Hx of hepatic encephalopathy  # Ascites  # HCC (not a candidate for locoregional treatment)  # Hx of SBP  Unclear etiology of worsening decompensation. 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 Improved symptomatically on lactulose, rifaximin. Diuretics held d/t JUAN M. Was given vitamin K 10mg IV x 3 days (7/26-7/28), protonix, cipro ppx, with plan for hepatology follow-up after discharge.       # Malnourished, Cachexia   Secondary to metastatic cancer.      # Acute on chronic macrocytic anemia:  Patient had large grade II varices and PHG on last EUS 6/29/18. Previously on warfarin for A Fib but has been held due to worsening anemia. Transfusion dependent (unclear why, no hx of MDS) ~ requiring 1U PRBC every week. Denies any overt GI bleeding. Hgb 6.6 on 7/26 s/p 1U PRBC. Fibrinogen wnl. % Retic elevated but absolute retic inappropriately normal. Iron wnl. Ferritin elevated (but this is also an acute phase reactant and would be expected). Peripheral smear pending, haptoglobin and reticulocyte count pending.        # Abnormal TTE finding Concern for Endocarditis   Pt had a TTE done on 7/26 for concern for cardiorenal syndrome. Incidentally, TTE concerning for small vegetations on aortic valve. Pt does have recent hx of E coli bacteremia s/p treatment. He does not meet any clinical [facility] critereat for IE but based on TTE does meet pathologic criteria.GÓMEZ on 7/27 without vegetations.  Discontinued IV ceftriaxone.      # Coagulopathy  # Thrombocytopenia  Likely 2/2 Cirrhosis. No evidence of cirrhosis currently   - s/p vitamin K 10mg x 3 days (7/26-7/28)      # Discharge Pain Plan:   - Patient currently has NO PAIN and is not being prescribed pain medications on discharge.    Consultations This Hospital Stay   INTERNAL MEDICINE PROCEDURE TEAM ADULT IP CONSULT [place] - PARACENTESIS  GI HEPATOLOGY ADULT IP CONSULT  VASCULAR ACCESS CARE ADULT IP CONSULT  MEDICATION HISTORY IP PHARMACY CONSULT  INTERNAL MEDICINE PROCEDURE TEAM ADULT IP CONSULT [facility]  [facility] - PARACENTESIS  PHARMACY IP CONSULT  NEPHROLOGY GENERAL ADULT IP CONSULT  PHARMACY IP CONSULT  PHARMACY IP CONSULT  NEPHROLOGY GENERAL ADULT IP CONSULT  VASCULAR ACCESS CARE ADULT IP CONSULT  INTERNAL MEDICINE PROCEDURE TEAM ADULT IP CONSULT [facility] - [facility]     Code Status   Full Code       The patient was discussed with Dr. Kaia Cardenas  [facility]  Pager: [phone]  ______________________________________________________________________    Physical Exam   Vital Signs: Temp: 97.9  F (36.6  C) Temp src: Oral BP: 142/53   Heart Rate: 92 Resp: 16 SpO2: 100 % O2 Device: None (Room air)    Weight: 213 lbs 2.96 oz    GEN: A&Ox3, in NAD, pleasant, cooperative, jaundice   CV: RRR  LUNGS: CTAB, no increased work of breathing  ABD: +BS, soft, NT/ND  EXT: Normal muscle bulk and tone  SKIN: w/d/i  NEURO: no tremor, no focal deficits appreciated       Significant Results and Procedures   Most Recent 3 CBC's:  Recent Labs   Lab Test  08/01/18 0627 07/31/18   1522  07/30/18   0516   WBC  2.4*  3.4*  3.1*   HGB  7.6*  8.0*  7.7*   MCV  89  89  89   PLT  37*  48*  43*     Most Recent 3 BMP's:  Recent Labs   Lab Test  08/01/18 0627 07/31/18   1522  07/30/18   0516   NA  142  137  139   POTASSIUM  4.9  5.1  4.9   CHLORIDE  112*  108  109   CO2  18*  18*  16*   BUN  96*  99*  101*   CR  2.37*  2.61*  2.97*   ANIONGAP  11  12  14   HARI  9.0  8.7  8.8   GLC  174*  195*  177*     Most Recent 2 LFT's:  Recent Labs   Lab Test  08/01/18 0627  07/31/18   1522   AST  176*  201*   ALT  126*  133*   ALKPHOS  99  97   BILITOTAL  18.1*  17.2*     Most Recent 3 INR's:  Recent Labs   Lab Test  08/01/18 0627 07/31/18   1522  07/30/18   0516   INR  1.55*  1.43*  1.42*       Pending Results   These results will be followed up by PCP  Unresulted Labs Ordered in the Past 30 Days of this Admission     Date and Time Order Name Status Description    7/29/2018 2200 Blood Morphology Pathologist Review In  process     7/29/2018 2200 Erythropoietin In process     7/26/2018 1453 Blood culture Preliminary     7/26/2018 1453 Blood culture Preliminary     7/26/2018 0714 fluid culture - Aerobic Bacterial Preliminary              Primary Care Physician   TRENA LANGE    Discharge Disposition   Discharged to home  Condition at discharge: Fair    Discharge Orders     Medication Therapy Management Referral     Home care nursing referral     Home Care PT Referral for Hospital Discharge     Home Care OT Referral for Hospital Discharge     MD face to face encounter   Documentation of Face to Face and Certification for Home Health Services    I certify that patient: Serge Brambila is under my care and that I, or a nurse practitioner or physician's assistant working with me, had a face-to-face encounter that meets the physician face-to-face encounter requirements with this patient on: 7/30/2018.    This encounter with the patient was in whole, or in part, for the following medical condition, which is the primary reason for home health metastatic hepatic cellular carcinoma: Nursing and Physical Therapy, Occupational therapy    My clinical findings support the need for the above services because: Nurse is needed and Physical Therapy/Occupational Therapies are needed to assess and treat the following functional impairments:   Decreased strength & balance and endurance and to improve safety and independence with all functional mobility improvent with ADL's /IADL's  Nursing to assess vital signs and weight, respiratory and cardiac status, pain level and activity tolerance, incision for signs/symptoms of infection, hydration, nutrition and home safety.    Further, I certify that my clinical findings support that this patient is homebound (i.e. absences from home require considerable and taxing effort and are for medical reasons or Mandaeism services or infrequently or of short duration when for other reasons) because: Requires  assistance of another person or specialized equipment to access medical services because patient: Range of motion limitations prevents ability to exit home safely and Requires supervision of another for safe transfer.    Based on the above findings. I certify that this patient is confined to the home and needs intermittent skilled nursing care, physical therapy and/or speech therapy.  The patient is under my care, and I have initiated the establishment of the plan of care.  This patient will be followed by a physician who will periodically review the plan of care.  Physician/Provider to provide follow up care: Trena Lange    Attending hospital physician (the Medicare certified [place] provider): Kath Marti,*  Physician Signature: See electronic signature associated with these discharge orders.  Date: 7/30/2018     Reason for your hospital stay   You were admitted for elevated potassium levels, a decrease in your renal function and worsening of your ascites. We have adjusted your medications, removed some abdominal fluid, and you have shown good improvement. You will continue to need close monitoring by the kidney and liver doctors, as well as by your cancer doctors. Please take all medications as prescribed, and attend all appointments as scheduled.     If you develop chest pain, shortness of breath, dizziness, weakness, feel faint, or have any new symptoms you should seek medical attention.     Adult [facility] Follow-up and recommended labs and tests   Follow up with primary care provider, TRENA LANGE, within 1-2 weeks, to evaluate medication change and for hospital follow- up. The following labs/tests are recommended: CBC, BMP, TSH/free T4, LFTs.    Follow up with nephrology in 1 week  Follow up with Gastroenterology within 4 weeks        Appointments on [place] and/or [facility] (with [facility] or [facility] provider or service). Call [phone] if you haven't heard regarding these appointments within 7  days of discharge.     Follow Up and recommended labs and tests   Follow up with renal and liver doctors: CBC, BMP, TSH/free T4, LFTs     Activity   Your activity upon discharge: activity as tolerated     Discharge Instructions     Full Code     Diet   Follow this diet upon discharge: Orders Placed This Encounter     Fluid restriction 2000 ML FLUID     Advance Diet as Tolerated: Regular Diet Adult; 2 gm NA Diet       Discharge Medications   Current Discharge Medication List      START taking these medications    Details   acetaminophen (TYLENOL) 325 MG tablet Take 2 tablets (650 mg) by mouth every 8 hours as needed for mild pain  Qty: 100 tablet    Associated Diagnoses: Liver cirrhosis secondary to KUHN (H); Liver mass; Cervical radiculopathy      melatonin 1 MG TABS tablet Take 1 tablet (1 mg) by mouth nightly as needed for sleep    Associated Diagnoses: Liver cirrhosis secondary to KUHN (H); Liver mass         CONTINUE these medications which have CHANGED    Details   Levothyroxine Sodium 125 MCG CAPS Take 125 mcg by mouth daily  Qty: 30 capsule, Refills: 0    Associated Diagnoses: Thyroid disease         CONTINUE these medications which have NOT CHANGED    Details   allopurinol (ZYLOPRIM) 100 MG tablet Take 1 tablet (100 mg) by mouth daily  Qty: 90 tablet, Refills: 0    Associated Diagnoses: Hyperuricemia      BASAGLAR 100 UNIT/ML injection Inject 30 Units Subcutaneous At Bedtime       carvedilol (COREG) 6.25 MG tablet Take 1 tablet (6.25 mg) by mouth 2 times daily (with meals)  Qty: 180 tablet, Refills: 0    Associated Diagnoses: Secondary esophageal varices without bleeding (H)      ciprofloxacin (CIPRO) 500 MG tablet Take ciprofloxacin 500 mg every 12h until 7/10/18 then take 500 mg daily  Qty: 120 tablet, Refills: 0    Associated Diagnoses: SBP (spontaneous bacterial peritonitis) (H); Bacteremia      lactulose (CHRONULAC) 10 GM/15ML solution 30 mLs (20 g) by Oral or NG Tube route 3 times daily  Qty: 2700 mL,  Refills: 1    Associated Diagnoses: Hepatic encephalopathy (H)      pantoprazole (PROTONIX) 20 MG EC tablet Take 2 tablets (40 mg) by mouth 2 times daily  Qty: 60 tablet, Refills: 2    Associated Diagnoses: Gastrointestinal hemorrhage, unspecified gastrointestinal hemorrhage type; HCC (hepatocellular carcinoma) (H)      rifaximin (XIFAXAN) 550 MG TABS tablet Take 1 tablet (550 mg) by mouth 2 times daily  Qty: 60 tablet, Refills: 3    Associated Diagnoses: Hepatic encephalopathy (H)      sodium bicarbonate 650 MG tablet Take 2 tablets (1,300 mg) by mouth 2 times daily  Qty: 120 tablet, Refills: 1    Associated Diagnoses: Chronic kidney disease, unspecified CKD stage      !! blood glucose monitoring (NO BRAND SPECIFIED) test strip 1 each      !! blood glucose monitoring (ONETOUCH ULTRA) test strip TEST TWICE DAILY TO THREE TIMES DAILY      Blood Pressure Monitoring (RA BLOOD PRESSURE CUFF MONITOR) MISC by Misc.(Non-Drug; Combo Route) route once daily.      [employer] LANCETS 21G MISC by Misc.(Non-Drug; Combo Route) route once daily.      Nitroglycerin (NITROQUICK SL) Place 1 tablet under the tongue as needed        !! - Potential duplicate medications found. Please discuss with provider.      STOP taking these medications       furosemide (LASIX) 20 MG tablet Comments:   Reason for Stopping:         spironolactone (ALDACTONE) 50 MG tablet Comments:   Reason for Stopping:             Allergies   Allergies   Allergen Reactions     Penicillins      Happened in his teens, unsure what his reaction was. Tolerated ceftriaxone and Zosyn 3/2018

## 2018-07-31 NOTE — ED PROVIDER NOTES
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  Per review of patient's chart, patient was recently hospitalized here from 7/25/2018 27/30/2018 (yesterday evening) for JUAN M and hyperkalemia.    Patient presents to the Emergency Department today for evaluation of fatigue.  The patient's family reports that when he was discharged yesterday he was doing better and had improved energy; however, the patient has been fatigued today and they are concerned his hemoglobin has dropped once again.  The patient is also jaundice and the family reports that this started here at the Emergency Department, they report that his skin was normal at home this morning.     Recent hospitalization 6/26-7/1 for Ecoli bacteremia and SBP. Treated with ceftriaxone transited to Cipro.  MRCP and EUS without obstruction.  Discharged on PO abx and weekly para, and weekly transfusion. Also had JUAN M. Scr 3.19.      I have reviewed the Medications, Allergies, Past Medical and Surgical History, and Social History in the [employer] system.    Past Medical History:   Diagnosis Date     Atrial fibrillation (H)      Diabetes (H)     type II     Hepatic encephalopathy (H)      Hypertension      MI (myocardial infarction)     stent placement     Sleep apnea      Thyroid disease        Past Surgical History:   Procedure Laterality Date     APPENDECTOMY  age 16     COLONOSCOPY       ENDOSCOPIC ULTRASOUND UPPER GASTROINTESTINAL TRACT (GI) N/A 6/29/2018    Procedure: ENDOSCOPIC ULTRASOUND, ESOPHAGOSCOPY / UPPER GASTROINTESTINAL TRACT (GI);  ENDOSCOPIC ULTRASOUND with upper endoscopy ;  Surgeon: Dakota Emmanuel MD;  Location: [facility] OR     ESOPHAGOSCOPY, GASTROSCOPY, DUODENOSCOPY (EGD), COMBINED N/A 1/24/2018    Procedure: COMBINED ESOPHAGOSCOPY,  GASTROSCOPY, DUODENOSCOPY (EGD);  ESOPHAGOGASTRODUODENOSCOPY (MAC) ;  Surgeon: Colton Stroud MD;  Location:  GI     ESOPHAGOSCOPY, GASTROSCOPY, DUODENOSCOPY (EGD), COMBINED N/A 6/29/2018    Procedure: COMBINED ESOPHAGOSCOPY, GASTROSCOPY, DUODENOSCOPY (EGD);;  Surgeon: Dakota Emmanuel MD;  Location: [facility] OR     GI SURGERY  2012    partial colectomy for pre-CA nodule, removed 10 in.       Family History   Problem Relation Age of Onset     Hyperlipidemia Father      Hypertension Father      Cervical Cancer Maternal Grandmother      Alcoholism Mother      Abdominal Aortic Aneurysm Mother      HEART DISEASE Mother      Hypertension Mother      Thyroid Disease Mother      Hyperlipidemia Mother        Social History   Substance Use Topics     Smoking status: Former Smoker     Packs/day: 1.00     Years: 10.00     Quit date: 1/1/1999     Smokeless tobacco: Never Used     Alcohol use No       No current facility-administered medications for this encounter.      Current Outpatient Prescriptions   Medication     acetaminophen (TYLENOL) 325 MG tablet     allopurinol (ZYLOPRIM) 100 MG tablet     BASAGLAR 100 UNIT/ML injection     blood glucose monitoring (NO BRAND SPECIFIED) test strip     blood glucose monitoring (ONETOUCH ULTRA) test strip     Blood Pressure Monitoring (RA BLOOD PRESSURE CUFF MONITOR) MISC     carvedilol (COREG) 6.25 MG tablet     ciprofloxacin (CIPRO) 500 MG tablet     [employer] LANCETS 21G MISC     lactulose (CHRONULAC) 10 GM/15ML solution     Levothyroxine Sodium 125 MCG CAPS     melatonin 1 MG TABS tablet     Nitroglycerin (NITROQUICK SL)     pantoprazole (PROTONIX) 20 MG EC tablet     rifaximin (XIFAXAN) 550 MG TABS tablet     sodium bicarbonate 650 MG tablet        Allergies   Allergen Reactions     Penicillins      Happened in his teens, unsure what his reaction was. Tolerated ceftriaxone and Zosyn 3/2018         Review of Systems   Constitutional: Positive for fatigue.   Eyes:         Positive for jaundice   Skin: Positive for color change (jaundice).   All other systems reviewed and are negative.      Physical Exam   BP: 156/87  Pulse: 71  Heart Rate: 72  Temp: 98.7  F (37.1  C)  Resp: 18  SpO2: 99 %      Physical Exam   Constitutional: He appears ill. No distress.   HENT:   Head: Atraumatic.   Mouth/Throat: Oropharynx is clear and moist. No oropharyngeal exudate.   Eyes: Pupils are equal, round, and reactive to light. Scleral icterus is present.   Cardiovascular: Intact distal pulses.    Murmur (loud, systolic ejection) heard.  Pulmonary/Chest: Breath sounds normal. No respiratory distress.   Abdominal: Soft. Bowel sounds are normal. He exhibits distension (mild). There is no tenderness. There is no guarding.   Musculoskeletal: He exhibits no edema or tenderness.   Skin: Skin is warm. No rash noted. He is not diaphoretic.   Moderate jaundice   Nursing note and vitals reviewed.      ED Course   3:44 PM  The patient was seen and examined by Bao Marino MD in Room ED15.     ED Course   Patient is currently currently has stable vital signs although mildly hypertensive, EKG showed no evidence of acute coronary syndrome however there was mildly prolonged QTc interval .  Patient has a history of hepatocellular carcinoma with cirrhosis in kidney disease at this point we will evaluate cardiac enzymes will evaluate his H&H to include kidney functions and then disposition will take place once the workup has been completed.  This point there is no need for intra-abdominal imaging.    Addendum 1622 hrs. patient noted to have severe hyperammonemia 202, I have ordered lactulose at this point we are pending the rest of the labs before admission.    Addendum 1752 hrs. labs show chronic changes patient does have moderate anemia although better than the last time he was discharged today he is 8 and 23.  Also has chronic renal disease with a slightly improved creatinine today is 2.61.   Patient does have  elevated LFTs which is a chronic condition for him and was noted to have thrombocytopenia at 48.  Will call medicine right now and put the patient for admitted for evaluation of his increasing ammonia.  Patient's mental status is appears to be mildly slow although patient is not confused and does not appear to be encephalopathic even though his ammonia is so high.    Addendum 1821 hrs.  Have spoken with hospitalist Dr. Brown, he agrees to take this patient to her service for further evaluation and treatment family agree as well  Procedures             EKG Interpretation:      Interpreted by Bao Marino MD  Time reviewed: 16:00  Symptoms at time of EKG: Fatigue   Rhythm: normal sinus   Rate: 69 bpm  Axis: Rightward Axis  Ectopy: none  Conduction: 1st degree AV block  ST Segments/ T Waves: No ST-T wave changes, No acute ischemic changes and QTc prolongation 501 ms  Q Waves: none  Comparison to prior: Unchanged from 7/27/2018    Clinical Impression: Abnormal EKG prolonged QTC right axis deviation    Critical Care time:  none             Labs Ordered and Resulted from Time of ED Arrival Up to the Time of Departure from the ED   COMPREHENSIVE METABOLIC PANEL - Abnormal; Notable for the following:        Result Value    Carbon Dioxide 18 (*)     Glucose 195 (*)     Urea Nitrogen 99 (*)     Creatinine 2.61 (*)     GFR Estimate 24 (*)     GFR Estimate If Black 29 (*)     Bilirubin Total 17.2 (*)     Protein Total 6.6 (*)      (*)      (*)     All other components within normal limits   AMMONIA - Abnormal; Notable for the following:     Ammonia 202 (*)     All other components within normal limits   CBC WITH PLATELETS DIFFERENTIAL - Abnormal; Notable for the following:     WBC 3.4 (*)     RBC Count 2.61 (*)     Hemoglobin 8.0 (*)     Hematocrit 23.2 (*)     RDW 21.6 (*)     Platelet Count 48 (*)     Absolute Lymphocytes 0.4 (*)     All other components within normal limits   INR - Abnormal; Notable for the  following:     INR 1.43 (*)     All other components within normal limits   ROUTINE UA WITH MICROSCOPIC - Abnormal; Notable for the following:     Blood Urine Trace (*)     Protein Albumin Urine 30 (*)     Leukocyte Esterase Urine Small (*)     RBC Urine 3 (*)     Bacteria Urine Few (*)     All other components within normal limits   PARTIAL THROMBOPLASTIN TIME   LIPASE   MAGNESIUM   TROPONIN I   CARDIAC CONTINUOUS MONITORING            Assessments & Plan (with Medical Decision Making)   This is a 73-year-old male with a past medical history of Goldman cirrhosis complicated by Hepatocellular carcinoma ascites esophageal varices encephalopathy as well as coronary artery disease and renal disease.  Patient comes to emergency department for evaluation of general weakness and malaise patient noted to be significantly jaundiced very icteric and appeared to be chronically ill.  EKG showed no evidence of ST elevation or mild though he did have  prolonged QTc interval.  At this point I am doing labs to reevaluate for the level of #1 kidney disease #2 ammonia status #3 H&H once the workup is been completed patient will be dispositioned.    I have reviewed the nursing notes.    I have reviewed the findings, diagnosis, plan and need for follow up with the patient.    New Prescriptions    No medications on file       Final diagnoses:   Generalized muscle weakness   Jaundice   End-stage liver disease (H)   Hepatocellular carcinoma (H)   Chronic kidney disease, unspecified CKD stage   Anemia, unspecified type   Prolonged Q-T interval on ECG   Heart murmur   Hypertension, unspecified type   Hyperammonemia (H) - Level 202   Thrombocytopenia (H)     I, Lyle Hood, am serving as a trained medical scribe to document services personally performed by Bao Marino MD, based on the provider's statements to me.   IBao MD, was physically present and have reviewed and verified the accuracy of this note documented by Lyle  Erwin.    7/31/2018   [facility], [place], EMERGENCY DEPARTMENT     Bao Marino MD  07/31/18 1688

## 2018-08-01 NOTE — H&P
[facility], [place]    Internal Medicine History and Physical - [facility] Service       Date of Admission:  7/31/2018    Chief Complaint   General fatigue, increased jaundice    History is obtained from the patient and his wife     History of Present Illness   Serge Brambila is a 73 year old male  who has a history of decompensated KUHN Cirrhosis c/w HE (on lactulose and rifaximin), ascites (requiring weekly paracentesis 1-3L), Grade II EV (last seen on EGD 6/29/18), diagnosis of HCC in May 2017, recent E coli bacteremia and SBP (hospitalized 6/26/18 - 7/1/18), and recent hospitalization 7/25-7/30 for JUAN M and hyperkalemia. Now presenting with increased fatigue and somnolence since discharge last night (7/30). Patient was hospitalized here at [facility] 7/25-7/30 for JUAN M and hyperkalemia, found to be in decompensated KUHN cirrhosis with acute anemia and acute hyponatremia. Patient was treated with albumin challenge and diuretics (lasix and spironolactone) were held on discharge. Initial concern for endocarditis on echo on 7/26, ?vegatations on aortic valve in the setting of recent e. Coli bacteremia. Follow up Echo on 7/27 was negative for vegetations and antibiotics were discontinued.     He left hospital with wife around 8pm yesterday and was feeling well. They live in split level house and he was able to get up 7 steps to living room and 7 steps to bedroom without issues. He slept through the night and wife reports he got up and had 2-3 loose stools throughout the night. When he woke this AM he was fatigued and would not take any of his medications. Have very little PO intake throughout day and continued to have generalized fatigue. Wife and daughter feel like he is less conversational but do not think he is overtly confused. He has had 2 loose bowel movements today, nonbloodly. Wife and daughter feel he has looked more yellow throughout the day. His last paracentesis was on 7/29, removed  3L, and did not receive albumin afterwards.     Mr. Brambila denies any specific symptoms, including fever, chills, SOB, cough, n/v, abdominal pain, diarrhea, constipation, dysuria or urinary frequency.     Review of Systems   The 10 point Review of Systems is negative other than noted in the HPI.    Past Medical History    I have reviewed this patient's medical history and updated it with pertinent information if needed.   Past Medical History:   Diagnosis Date     Atrial fibrillation (H)      Diabetes (H)     type II     Hepatic encephalopathy (H)      Hypertension      MI (myocardial infarction)     stent placement     Sleep apnea      Thyroid disease         Past Surgical History   I have reviewed this patient's surgical history and updated it with pertinent information if needed.  Past Surgical History:   Procedure Laterality Date     APPENDECTOMY  age 16     COLONOSCOPY       ENDOSCOPIC ULTRASOUND UPPER GASTROINTESTINAL TRACT (GI) N/A 6/29/2018    Procedure: ENDOSCOPIC ULTRASOUND, ESOPHAGOSCOPY / UPPER GASTROINTESTINAL TRACT (GI);  ENDOSCOPIC ULTRASOUND with upper endoscopy ;  Surgeon: Dakota Emmanuel MD;  Location: [facility] OR     ESOPHAGOSCOPY, GASTROSCOPY, DUODENOSCOPY (EGD), COMBINED N/A 1/24/2018    Procedure: COMBINED ESOPHAGOSCOPY, GASTROSCOPY, DUODENOSCOPY (EGD);  ESOPHAGOGASTRODUODENOSCOPY (MAC) ;  Surgeon: Cloton Stroud MD;  Location: [facility]     ESOPHAGOSCOPY, GASTROSCOPY, DUODENOSCOPY (EGD), COMBINED N/A 6/29/2018    Procedure: COMBINED ESOPHAGOSCOPY, GASTROSCOPY, DUODENOSCOPY (EGD);;  Surgeon: Dakota Emmanuel MD;  Location: [facility] OR     GI SURGERY  2012    partial colectomy for pre-CA nodule, removed 10 in.        Social History   Social History   Substance Use Topics     Smoking status: Former Smoker     Packs/day: 1.00     Years: 10.00     Quit date: 1/1/1999     Smokeless tobacco: Never Used     Alcohol use No       Family History   I have reviewed this patient's family history and  updated it with pertinent information if needed.   Family History   Problem Relation Age of Onset     Hyperlipidemia Father      Hypertension Father      Cervical Cancer Maternal Grandmother      Alcoholism Mother      Abdominal Aortic Aneurysm Mother      HEART DISEASE Mother      Hypertension Mother      Thyroid Disease Mother      Hyperlipidemia Mother        Prior to Admission Medications   Prior to Admission Medications   Prescriptions Last Dose Informant Patient Reported? Taking?   BASAGLAR 100 UNIT/ML injection  Self Yes No   Sig: Inject 30 Units Subcutaneous At Bedtime    Blood Pressure Monitoring (RA BLOOD PRESSURE CUFF MONITOR) MISC  Self Yes No   Sig: by Misc.(Non-Drug; Combo Route) route once daily.   [employer] LANCETS 21G MISC  Self Yes No   Sig: by Misc.(Non-Drug; Combo Route) route once daily.   Levothyroxine Sodium 125 MCG CAPS   No No   Sig: Take 125 mcg by mouth daily   Nitroglycerin (NITROQUICK SL)  Self Yes No   Sig: Place 1 tablet under the tongue as needed    acetaminophen (TYLENOL) 325 MG tablet   No No   Sig: Take 2 tablets (650 mg) by mouth every 8 hours as needed for mild pain   allopurinol (ZYLOPRIM) 100 MG tablet  Self No No   Sig: Take 1 tablet (100 mg) by mouth daily   blood glucose monitoring (NO BRAND SPECIFIED) test strip  Self Yes No   Si each   blood glucose monitoring (ONETOUCH ULTRA) test strip  Self Yes No   Sig: TEST TWICE DAILY TO THREE TIMES DAILY   carvedilol (COREG) 6.25 MG tablet  Self No No   Sig: Take 1 tablet (6.25 mg) by mouth 2 times daily (with meals)   ciprofloxacin (CIPRO) 500 MG tablet  Self No No   Sig: Take ciprofloxacin 500 mg every 12h until 7/10/18 then take 500 mg daily   lactulose (CHRONULAC) 10 GM/15ML solution  Self No No   Si mLs (20 g) by Oral or NG Tube route 3 times daily   melatonin 1 MG TABS tablet   No No   Sig: Take 1 tablet (1 mg) by mouth nightly as needed for sleep   pantoprazole (PROTONIX) 20 MG EC tablet  Self No No   Sig: Take 2  tablets (40 mg) by mouth 2 times daily   rifaximin (XIFAXAN) 550 MG TABS tablet  Self No No   Sig: Take 1 tablet (550 mg) by mouth 2 times daily   sodium bicarbonate 650 MG tablet  Self No No   Sig: Take 2 tablets (1,300 mg) by mouth 2 times daily      Facility-Administered Medications: None     Allergies   Allergies   Allergen Reactions     Penicillins      Happened in his teens, unsure what his reaction was. Tolerated ceftriaxone and Zosyn 3/2018       Physical Exam   Vital Signs: Temp: 98.7  F (37.1  C) Temp src: Oral BP: 122/60 Pulse: 71 Heart Rate: 69 Resp: 12 SpO2: 98 % O2 Device: None (Room air)    Weight: 0 lbs 0 oz    General Appearance: Sitting up in chair with head hanging down, appears to be sleeping. Somnolent during exam, answers yes/no and is conversational at times with wife and daughter. In no acute distress. 2+ lower extremity edema.   Eyes: Sclera icterus, no conjunctival injection. PERRL, EOMi.   HEENT: Normal external auditory canals, normal nares, no rhinorrhea. No thyromegaly or cervical LAD.   Respiratory: Nonlabored breathing on room air. CTAB, no wheezes or crackles.   Cardiovascular: Regular rate and rhythm, normal s1/s2, grade 3/6 systolic murmur. No rub or gallop.  GI: +BS, soft, round abdomen with fluid wave. Nontender, no guarding or rebound tenderness.   Lymph/Hematologic: Bruising on right and left antecubital fossas.   Genitourinary: deferred  Skin: Bruising on bilateral antecubital fossas. + palmar erythema. No spider angiomas.   Musculoskeletal: Normal muscle tone and bulk.   Neurologic: Alert and oriented to person and place. Unable to report year, states Toro Pang is president. CN II-VII intact, normal sensation. 4/5 strength in upper and lower extremities symmetric.   Psychiatric: Somnolent. No SI/HI.     Assessment & Plan   Serge Brambila is a 73 year old male admitted on 7/31/2018. He has a history of decompensated KUHN Cirrhosis c/w HE (on lactulose and rifaximin),  ascites (requiring weekly paracentesis 1-3L), Grade II EV (last seen on EGD 6/29/18), diagnosis of HCC in May 2017, and recent hospitalization 7/25-7/30 for JUAN M and hyperkalemia. Now presenting <24 hrs after discharge with increased fatigue and somnolence and is admitted for elevated ammonia.      # Encephalopathy   # Generalized muscle weakness  Concern for hepatic encephalopathy given liver disease and elevate ammonia to 202. Last ammonia checked 6/26/18 was 54. No recent change in lactulose dosing and patient was in hospital and compliant with meds. DDx for elevated ammonia and HE precipitants also includes infection, GI bleed, hypokalemia, HCC, and portal venous thrombus. No localizing symptoms for infection, UA and CXR were unremarkable. No abdominal pain to suggest SBP, patient was recently tapped but no diagnotic labs were sent. No hemoptysis, melena or charlie blood in stool or urine to suggest GI bleed. Patient has known metastatic HCC with portal venous thrombus. Last formal RUQ ultrasound showed nonocclusive thrombus of right and left portal veins that appeared to be progressed from MRI one month prior. Concern for progressive of HCC and thrombus.  - Diagnostic paracentesis on 7/31 with cell count, gram stain and culture sent   - Blood cultures x2 on 7/31  - Titrate lactulose for 3-4 loose stools per day  - Will hold off on formal RUQ ultrasound tonight as he recently had one 5 days ago  - Continue to monitor for focal symptoms that would suggest infectious source  - Will hold off on GI/hepatology consult as no current question to ask   - If concern for progression of HCC, consider goals of care discussion with patient and family (possibly palliative care consult?) as this is patients 3rd admission in last month.       # Hyperbilirubinemia: Total bilirubin has gradually been increasing during previous admission, 11 on 7/29, now up to 17.2. No abdominal pain to suggest obstructing stone. Concern for  progression of HCC.   - Direct and indirect bilirubin levels  - If elevated indirect bilirubin, would get hemolysis labs in AM.      # KUHN Cirrhosis  # Hepatocellular carcinoma, metastatic   MELD-Na 31 today (33 on last admission). History of ascites requiring weekly paracenteses, SBP in June 2018 (now on ppx), hepatic encephalopathy, and esophageal varices.   - Continue PTA pantoprazole 40mg BID  - Continue PTA lactulose (titrate to 3-4 loose stools per day)  - continue PTA Rifaximin   - Continue PTA ciprofloxacin for SBP ppx   - Diagnostic paracentesis, 7/31 - cell count, gram stain, and culture sent.     # Chronic normocytic anemia   # Chronic thrombocytopenia  Chronic anemia requiring periodic transfusions. Hemoglobin and platelets are stable from last admission, no history or exam findings to suggest active bleed. Thought to be due to slow blood loss d/t portal hypertensive gastropathy and chronic disease. Thrombocytopenia due to underlying liver disease.   - CBC in AM  - Transfuse goal of hgb >7.0 (consented for blood on 7/31)    # Chronic non anion gap metabolic acidosis:   - holding PTA bicarb 1300mg BID    # JUAN M: baseline creatinine of 1.5-2.0. Recently admitted for JUAN M with creatinine elevated to 3.19. Thought to be due to hypovolemia vs HRS. Diuretics (lasix and spironolactone) were held and patient given albumin challenge. Creatinine improving to 2.9 on discharge day (7/30), and now 2.6 on admission.   - Continue to hold lasix and spironolactone   - BMP in AM.      # Atrial fibrillation: In normal sinus rhythm on admission. Not on anticoagulation   - Continue PTA carvedilol     # Diabetes Mellitus, type II: No previous A1C found in epic records. On Basaglar 30 units at bedtime at home.   - Decrease basaglar to 15unit at bedtime - patient not eating well   - Low dose insulin correction scale  - Hypoglycemia protocol   - Consider A1C during admission if no records.     # Hypothyroidism   - Continue PTA  levothyroxine     # NAA  - Cpap with home settings       # Pain Assessment:  Current Pain Score 7/31/2018   Patient currently in pain? denies   Pain score (0-10) -   Pain location -   Pain descriptors -   Serge montana pain level was assessed and he currently denies pain.      Diet:  Carb consistent diet  Fluids: PO intake, no IV fluids on admission.   DVT Prophylaxis: Heparin subcutaneous (Padua score 7 - reduced mobility, age, cancer)  Code Status: Full Code    Disposition Plan   Expected discharge: 2 - 3 days; recommended to prior living arrangement once mental status at baseline.     Entered: Abhay Thomas 07/31/2018, 8:39 PM   Information in the above section will display in the discharge planner report.    The patient was discussed with Dr. Brown, attending physician, who agrees with assessment and plan.     Abhay Thomas  [facility]   Pager: [phone]  Please see sticky note for cross cover information      Data   Data     Recent Labs  Lab 07/31/18  1522 07/30/18  0516 07/29/18  1358  07/29/18  0421   WBC 3.4* 3.1* 3.4*  --  2.7*   HGB 8.0* 7.7* 7.7*  --  6.9*   MCV 89 89 90  --  88   PLT 48* 43* 48*  < > 49*   INR 1.43* 1.42*  --   --  1.44*    139  --   --  137   POTASSIUM 5.1 4.9 5.2  --  5.2   CHLORIDE 108 109  --   --  109   CO2 18* 16*  --   --  14*   BUN 99* 101*  --   --  97*   CR 2.61* 2.97*  --   --  3.15*   ANIONGAP 12 14  --   --  14   HARI 8.7 8.8  --   --  8.4*   * 177*  --   --  181*   ALBUMIN 3.5 3.8  --   --  3.8   PROTTOTAL 6.6* 6.6*  --   --  6.5*   BILITOTAL 17.2* 14.5*  --   --  11.1*   ALKPHOS 97 96  --   --  93   * 157*  --   --  168*   * 248*  --   --  281*   LIPASE 248  --   --   --   --    TROPI <0.015  --   --   --   --    < > = values in this interval not displayed.

## 2018-08-01 NOTE — PHARMACY-ADMISSION MEDICATION HISTORY
Admission medication history interview status for the 7/31/2018 admission is complete. See Epic admission navigator for allergy information, pharmacy, prior to admission medications and immunization status.     Medication history interview sources:  [facility] discharge note 7/30, med history note 7/26    Changes made to PTA medication list (reason)  Added: none  Deleted: none  Changed: none    Additional medication history information (including reliability of information, actions taken by pharmacist):  1. Was unable to speak to patient as he was confused and unalert. Patient was recently admitted to [facility] on 7//26 and discharged 7/30. Med list was gathered using information from patient's last hospital admission.   2. Per patient's 7/30 discharge summary, patient is currently taking ciprofloxacin for SBP.  According to surescripts, patient filled ciprofloxacin on 7/1 (quantity of 120 for 60 days). Instructions were to take 500 mg q12h until 7/10 then 500 mg daily. Patient should currently be on 500 mg daily.   3. Per patient's 7/30 discharge summary, he is taking 30 Units basaglar at bedtime.        Prior to Admission medications    Medication Sig Last Dose Taking? Auth Provider   allopurinol (ZYLOPRIM) 100 MG tablet Take 1 tablet (100 mg) by mouth daily 8/1/2018 at am Yes Tommie Panda MD   carvedilol (COREG) 6.25 MG tablet Take 1 tablet (6.25 mg) by mouth 2 times daily (with meals) 8/1/2018 at am Yes Tommie Panda MD   ciprofloxacin (CIPRO) 500 MG tablet Take ciprofloxacin 500 mg every 12h until 7/10/18 then take 500 mg daily 8/1/2018 at am Yes Tommie Panda MD   lactulose (CHRONULAC) 10 GM/15ML solution 30 mLs (20 g) by Oral or NG Tube route 3 times daily 8/1/2018 at am Yes Young Sousa,    Levothyroxine Sodium 125 MCG CAPS Take 125 mcg by mouth daily 8/1/2018 at am Yes Peter Cardenas MD   pantoprazole (PROTONIX) 20 MG EC tablet Take 2 tablets (40 mg) by mouth 2 times daily 8/1/2018 at  received IV am Yes Tea Silveira APRN CNP   rifaximin (XIFAXAN) 550 MG TABS tablet Take 1 tablet (550 mg) by mouth 2 times daily 8/1/2018 at am Yes Young Sousa DO   acetaminophen (TYLENOL) 325 MG tablet Take 2 tablets (650 mg) by mouth every 8 hours as needed for mild pain Unknown at Unknown time  Peter Cardenas MD   BASAGLAR 100 UNIT/ML injection Inject 30 Units Subcutaneous At Bedtime  Unknown at Unknown time  Reported, Patient   blood glucose monitoring (NO BRAND SPECIFIED) test strip 1 each Unknown at Unknown time  Reported, Patient   blood glucose monitoring (ONETOUCH ULTRA) test strip TEST TWICE DAILY TO THREE TIMES DAILY Unknown at Unknown time  Reported, Patient   Blood Pressure Monitoring (RA BLOOD PRESSURE CUFF MONITOR) MISC by Misc.(Non-Drug; Combo Route) route once daily. Unknown at Unknown time  Reported, Patient   [employer] LANCETS 21G MISC by Misc.(Non-Drug; Combo Route) route once daily. Unknown at Unknown time  Reported, Patient   melatonin 1 MG TABS tablet Take 1 tablet (1 mg) by mouth nightly as needed for sleep Unknown at Unknown time  Peter Cardenas MD   Nitroglycerin (NITROQUICK SL) Place 1 tablet under the tongue as needed  Unknown at Unknown time  Reported, Patient   sodium bicarbonate 650 MG tablet Take 2 tablets (1,300 mg) by mouth 2 times daily Unknown at Unknown time  Young Sousa DO         Medication history completed by: Mary Nassar, PharmD IV student  ____________________________________    I have reviewed the student's documentation  Kp Frank  PharmD  08/01/18

## 2018-08-01 NOTE — PROGRESS NOTES
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 Now presenting <24 hrs after discharge with increased fatigue and somnolence and is admitted for elevated ammonia.        # Hepatic Encephalopathy   # HCC, metastatic   # Decompensated KUHN cirrhosis   # Recurrent Ascites c/b hx of SBP  # Grade II EV  # Hyperbilirubinemia   Patient presents w/acute change in mentation. MELD 26, ammonia 202 (was 54 6/26). He is afebrile, WBC 2.4, K 4.9, recent ECHO w/o vegetations. He is s/p 3 day albumin challenge (7/26-7/28) with minimal response and  vitamin K 10mg IV x 3 days (7/26-7/28) .  This likely represents complete portal VT vs progression of HCC.   - HE: lactulose, rifaximin  - c/w holding his home dose of diuretics due to his JUAN M  - Will limit any paracentesis to < 3 L to avoid major fluid shifts that can aggravate the JUAN M.  - Daily 40mg PO Protonix  - Cipro Daily 250mg QDaily for SBP prophylaxis (Dose decreased 7/29 per renal rec)  - Outpatient hepatology follow up at the time of discharge       # JUAN M  Hepatorenal syndrome vs pre-renal in the setting of poor PO/dehydration. No response to albumin challenge 100 gm x 3 days (7/25-7/28). Slowly improving.  - CTM  - avoid nephrotoxic agents         # Acute on chronic macrocytic anemia:  Patient had large grade II varices and PHG on last EUS 6/29/18. Previously on warfarin for A Fib but has been held due to worsening anemia. Transfusion dependent (unclear why, no hx of  MDS) ~ requiring 1U PRBC every week. Denies any overt GI bleeding. Hgb 6.6 on 7/26 s/p 1U PRBC. Fibrinogen wnl. % Retic elevated but absolute retic inappropriately normal. Iron wnl. Ferritin elevated (but this is also an acute phase reactant and would be expected)  - monitor for hematochezia  - transfused for Hgb >7   - consider CT abdomen if worsening anemia          # Coagulopathy  # Thrombocytopenia  Likely 2/2 Cirrhosis. No evidence of cirrhosis currently   - s/p vitamin K 10mg x 3 days (7/26-7/28)      Chronic   # Hx of Afib  Now in NSR. Warfarin held d/t anemia   - PTA carvedilol       # T2DM  - Decrease basaglar to 15unit at bedtime - patient not eating well   - Low dose insulin correction scale  - Hypoglycemia protocol   - Consider A1C during admission if no records.     # Hypothyroidism   - Continue PTA levothyroxine      # NAA  - Cpap with home settings     Diet:   Fluids:  DVT:   Code Status:     This patient was staffed with Dr. Zhou the attending physician on service.     Peter Cardenas IV, MD, MSc  Internal Medicine Resident, PGY2  [phone]              Objective:   /54 (BP Location: Left arm)  Pulse 68  Temp 97.5  F (36.4  C) (Oral)  Resp 16  Wt 95.9 kg (211 lb 6.7 oz)  SpO2 98%  BMI 32.15 kg/m2      Intake/Output Summary (Last 24 hours) at 08/01/18 0757  Last data filed at 08/01/18 0300   Gross per 24 hour   Intake                0 ml   Output              300 ml   Net             -300 ml       PHYSICAL EXAMINATION  GEN: Very lethargic, weak, jaundice, in NAD, pleasant, attempts to cooperate   CV: RRR, FRANKO @LUSB  LUNGS: CTAB  ABD: +BS, soft, suprapubic tenderness   EXT: wwp, +2 pitting edema b/l  SKIN: w/d/i  NEURO: no tremor, no focal deficits appreciated, + asterixis       Labs:   All labs reviewed by me. Notable labs include:   Na 142  Cr 2.37  Albumin 3.4  INR 1.55  Alk Phos 99      Ammonia 202    Imaging:  All imaging studies reviewed by me.     Current  Medications:  All medications reviewed by me.

## 2018-08-01 NOTE — ED NOTES
[facility], [place]   ED Nurse to Floor Handoff     Serge Brambila is a 73 year old male who speaks English and lives unknown,  home status is unknown  They arrived in the ED by unknown from unknown    ED Chief Complaint: Fatigue    ED Dx;   Final diagnoses:   Generalized muscle weakness   Jaundice   End-stage liver disease (H)   Hepatocellular carcinoma (H)   Chronic kidney disease, unspecified CKD stage   Anemia, unspecified type   Prolonged Q-T interval on ECG   Heart murmur   Hypertension, unspecified type   Hyperammonemia (H) - Level 202   Thrombocytopenia (H)         Needed?: No    Allergies:   Allergies   Allergen Reactions     Penicillins      Happened in his teens, unsure what his reaction was. Tolerated ceftriaxone and Zosyn 3/2018   .  Past Medical Hx:   Past Medical History:   Diagnosis Date     Atrial fibrillation (H)      Diabetes (H)     type II     Hepatic encephalopathy (H)      Hypertension      MI (myocardial infarction)     stent placement     Sleep apnea      Thyroid disease       Baseline Mental status: WDL  Current Mental Status changes: at basesline    Infection present or suspected this encounter: no  Sepsis suspected: No  Isolation type: No active isolations     Activity level - Baseline/Home:  Unable to Assess  Activity Level - Current:   Unable to Assess    Bariatric equipment needed?: No    In the ED these meds were given:   Medications   lactulose (CHRONULAC) solution 20 g (20 g Oral Given 7/31/18 1649)       Drips running?  No    Home pump  No    Current LDAs  Peripheral IV 07/31/18 Left Hand (Active)   Number of days:0       Labs results:   Labs Ordered and Resulted from Time of ED Arrival Up to the Time of Departure from the ED   COMPREHENSIVE METABOLIC PANEL - Abnormal; Notable for the following:        Result Value    Carbon Dioxide 18 (*)     Glucose 195 (*)     Urea Nitrogen 99 (*)     Creatinine 2.61 (*)     GFR Estimate 24 (*)     GFR  Estimate If Black 29 (*)     Bilirubin Total 17.2 (*)     Protein Total 6.6 (*)      (*)      (*)     All other components within normal limits   AMMONIA - Abnormal; Notable for the following:     Ammonia 202 (*)     All other components within normal limits   CBC WITH PLATELETS DIFFERENTIAL - Abnormal; Notable for the following:     WBC 3.4 (*)     RBC Count 2.61 (*)     Hemoglobin 8.0 (*)     Hematocrit 23.2 (*)     RDW 21.6 (*)     Platelet Count 48 (*)     Absolute Lymphocytes 0.4 (*)     All other components within normal limits   INR - Abnormal; Notable for the following:     INR 1.43 (*)     All other components within normal limits   ROUTINE UA WITH MICROSCOPIC - Abnormal; Notable for the following:     Blood Urine Trace (*)     Protein Albumin Urine 30 (*)     Leukocyte Esterase Urine Small (*)     RBC Urine 3 (*)     Bacteria Urine Few (*)     All other components within normal limits   PARTIAL THROMBOPLASTIN TIME   LIPASE   MAGNESIUM   TROPONIN I   CARDIAC CONTINUOUS MONITORING       Imaging Studies:   Recent Results (from the past 24 hour(s))   XR Chest Port 1 View    Narrative    XR CHEST PORT 1 VW 7/31/2018 4:05 PM    Comparison: 6/26/2018    History: SOB    Findings: Single AP view of the chest. Prominent cardiac silhouette,  unchanged. Pulmonary vasculature is distinct. No acute airspace  disease. No pleural effusion or pneumothorax.      Impression    Impression: No acute airspace disease.       Recent vital signs:   /59  Pulse 71  Temp 98.7  F (37.1  C) (Oral)  Resp 15  SpO2 100%    Cardiac Rhythm: Other  Pt needs tele? No  Skin/wound Issues: None    Code Status: Full Code    Pain control: pt had none    Nausea control: pt had none    Abnormal labs/tests/findings requiring intervention: Elevated ammonia    Family present during ED course? Yes   Family Comments/Social Situation comments: none     Tasks needing completion: None    Kem Mercado, LEEANNE  [facility]-- [phone] [place]  ED  [phone] [facility]

## 2018-08-01 NOTE — PROCEDURES
"[facility], [place]  Procedure Note         Medicine Bedside Procedure:     Procedure performed:  Paracentesis    Pause for the cause: Right patient: Yes      Right body part: Yes      Right procedure Yes    Ultrasound guidance:  Yes - used to identify fluid pocket.     Primary indication:  diagnostic monitoring    Diagnostic date review:  PT/INR:  No components found for: PTPATIENT, PTINR  CBC:   Lab Results   Component Value Date    WBC 3.4 07/31/2018    RBC 2.61 07/31/2018    HGB 8.0 07/31/2018    HCT 23.2 07/31/2018    MCV 89 07/31/2018    RDW 21.6 07/31/2018    PLT 48 07/31/2018       H&P status: H&P was reviewed, the patient was examined and no change has occurred in patient's condition since H&P was completed.    Barrier precautions and sterile technique\"  As documented in the Pre-Procedure Check List.    Local anesthesia:  Lidocaine 1% 6ml without epinephrine    Number of kits used:  1    Sterile dressings:  Applied    Estimated blood loss:  None    Specimens collected:  Type:  10cc of peritoneal fluid    Specimens sent:  Microbiology  Cytopathology    Follow up chest x-ray:  Not indicatedar  Complications:  none    Primary proceduralist:   Abhay Thomas, Internal Medicine PGY1    Attestation:    "

## 2018-08-01 NOTE — PROGRESS NOTES
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 Now presenting with increased fatigue and somnolence since discharge last night (7/30). Patient was hospitalized here at [facility] 7/25-7/30 for JUAN M and hyperkalemia, found to be in decompensated KUHN cirrhosis with acute anemia and acute hyponatremia. Patient was treated with albumin challenge and diuretics (lasix and spironolactone) were held on discharge. Initial concern for endocarditis on echo on 7/26, ?vegatations on aortic valve in the setting of recent e. Coli bacteremia. Follow up Echo on 7/27 was negative for vegetations and antibiotics were discontinued.    He left hospital with wife around 8pm yesterday and was feeling well. They live in split level house and he was able to get up 7 steps to living room and 7 steps to bedroom without issues. He slept through the night and wife reports he got up and had 2-3 loose stools throughout the night. When he woke this AM he was fatigued and would not take any of his medications. Have very little PO intake throughout day and continued to have generalized fatigue--note per Dr Abhay Franklin 7/31/18.  I: Per chart review and note by Delores Flaherty [facility] meli, pt is open to [facility] with RN,PT and OT. Pt goes to ATC clinic Q week for paracentesis and is scheduled 8/2 and 8/10--I will update ATC clinic that he has been readmitted.  Will follow.

## 2018-08-01 NOTE — PLAN OF CARE
Problem: Patient Care Overview  Goal: Individualization & Mutuality  Outcome: No Change  VSS on room air. No sign of decreasing blood pressure or increasing pulse. Writer did vital once this shift.   Denies nausea and pain.   Lactulose PRN given X 2. Two loose stools.   Adequate UOP with one stool measured.   Pt is NPO.  He had a small cough with one of the larger pills this am, but passed the swallowing test. Continue to monitor this and practice aspiration precautions.  Pt has been able to get up with assist of two to the commode.  He has been continent.   ABD US completed.  MD has spoken to family about prognosis.

## 2018-08-01 NOTE — DISCHARGE INSTRUCTIONS
________________________________________________________  Discharge RN please fax discharge orders to home care agency: [facility]  ________________________________________________________

## 2018-08-01 NOTE — PLAN OF CARE
Problem: Diabetes Comorbidity  Goal: Diabetes  Patient comorbidity will be monitored for signs and symptoms of hyperglycemia or hypoglycemia. Problems will be absent, minimized or managed by discharge/transition of care.   Outcome: No Change  BG monitored

## 2018-08-01 NOTE — PLAN OF CARE
Problem: Patient Care Overview  Goal: Plan of Care/Patient Progress Review    /55 (BP Location: Left arm)  Pulse 71  Temp 97.8  F (36.6  C) (Oral)  Resp 15  SpO2 97%     [id number]  Serge Brambila admitted to  via ED with high LFTs, high ammonia, and with fatigue/lethargy. VSS on RA, no s/s of distress. Pt very lethargic/sleepy; arousable with gentle shaking. Pt can answer yes/no questions and is oriented, but slow to speak. Per family, his wife and two adult children, pt has been sleepy/fatigued all day. Up x2 assist--pt had small BM and void on arrival. Pt denied pain. Pt endorsed some vague nausea after taking night medications but nausea resolved without intervention. Pt has some scattered bruising on extremities and BLE edema. PIV SL. Ultrasound done at bedside and hepatology to f/u and consult. Family notified of POC; pt sleeping/resting comfortably now. Call light and belongings within reach. Bed exit armed for safety. Admission database completed; admission assessment completed. Will continue to monitor and continue POC/notify team as needed.

## 2018-08-01 NOTE — PLAN OF CARE
Problem: Liver Failure, Acute/Chronic (Adult)  Goal: Signs and Symptoms of Listed Potential Problems Will be Absent, Minimized or Managed (Liver Failure, Acute/Chronic)  Signs and symptoms of listed potential problems will be absent, minimized or managed by discharge/transition of care (reference Liver Failure, Acute/Chronic (Adult) CPG).   Outcome: Declining  AVSS. Denied pain. Lethargic, solemnent and confused. Bed alarm activated. Awakes to voice, falls right back to sleep. midshfit awake with nausea, dry heaves. Unable to removed home CPAP which he was able to remove and adjust earlier in shift. MD notified and came to assess pt. Prn lactulose orders received and admin. Pt vomited up 5ml of the 30ml ingested- emesis included a couple small clots and tissue fragments. MD notified and PIV protonix order received. Vitals remained stable throughout. CPAP left off- pt O2 sats >96 on ra. Family called in for progress report and was updated.   Prior to increased solemnence pt up with assist of 2/GB to BR. Void adequate amt icteric urine. Small loose BM. Paracentesis site monitored. Small amt bruising seen lateral to site, no pt reported pain. MD assessed. Continue to monitor.   08/01/18 0717   Liver Failure, Acute/Chronic   Problems Assessed (Liver Failure, Acute/Chronic) all   Problems Present (Liver Failure) situational response;gastrointestinal complications

## 2018-08-01 NOTE — PROGRESS NOTES
[facility]  Patient is currently open to home care services with [facility].    [facility] received Resumption of Care orders for RN/PT/OT.  This was not completed due to the patient returning to hospital.  [facility]  and team have been notified of patient admission.  [facility] liaison will continue to follow patient during stay.  If appropriate please provide Start of Care orders and Face to Face document at time of discharge due to certification expiration.      Thank you  Delores Flaherty RN, BSN  [facility] Liaison  [phone]

## 2018-08-02 NOTE — PLAN OF CARE
Problem: Patient Care Overview  Goal: Plan of Care/Patient Progress Review  [id number]    VSS, on RA.     Pt too obtunded to take PO meds. MD aware. No orders placed for lactulose enemas as a result of change in plan of care for pt. Care conference held today around 1300. Family in agreement to be less aggressive with care. For now, will continue with IV antibiotics for possible blood infection and IVFs. Will reevaluate with family tomorrow with another care conference. Pt unable to voice his needs or follow simple commands. Due to mental status, it is unsafe to help pt up to the commode. Using bed pan and urinal when able, but pt has been incontinent for the most part. Had 3-4 voids this shift with only one watery stool. 1:1 attendant has been discontinued because pt has become less active. Bed alarm is on and frequent visualization checks were done.

## 2018-08-02 NOTE — PROVIDER NOTIFICATION
DATE:  8/2/2018   TIME OF RECEIPT FROM LAB:  approx 0740  LAB TEST:  Blood culture + for staph epidermidis; ammonia   LAB VALUE:  Positive blood culture; ammonia 194  RESULTS GIVEN WITH READ-BACK TO (PROVIDER):  Paged Dr. Cardenas   TIME LAB VALUE REPORTED TO PROVIDER:   approx 8692

## 2018-08-02 NOTE — PROGRESS NOTES
/52 (BP Location: Left arm)  Pulse 68  Temp 98  F (36.7  C) (Oral)  Resp 15  Wt 95.9 kg (211 lb 6.7 oz)  SpO2 96%  BMI 32.15 kg/m2RA.Pt confused and trying to climb out of bed and setting off bed alarm.Pt got out of bed and very weak and took 2-3 people to get to commode.Pt has been incontinent of urine and stool x2 during the night.Trying to use urinal and bedpan but not going well.Pt also was gagging when gave him the melatonin and spit it out.Blood glucose was 154 at start of night.MD here to see patient and assess.Plan is waiting for MD to order something to help patient rest and sleep.Bed alarm and staff at bedside.

## 2018-08-02 NOTE — PROVIDER NOTIFICATION
MD notified of patient climbing out of bed and coming to see pt.Bed alarm on but unable to leave pt alone.Setting off bed alarm frequently.

## 2018-08-02 NOTE — PROGRESS NOTES
"CLINICAL NUTRITION SERVICES - ASSESSMENT NOTE     Nutrition Prescription    Recommendations already ordered by Registered Dietitian (RD):  Boost Plus between meals (strawberry/vanilla)  Magic Cup with meals (berry/orange)    Future/Additional Recommendations:  Pending POC, consider FT placement for supplemental TF if confusion/lethargy continues and patient/family desire aggressive cares.  Consult RD for TF if FT placement occurs.      REASON FOR ASSESSMENT  Serge Brambila is a/an 73 year old male assessed by the dietitian for Admission Nutrition Risk Screen for reduced oral intake over the last month    NUTRITION HISTORY  PMH of HCC (dx 5/17), decompensated [facility] Cirrhosis c/w HE (on lactulose and rifaximin), ascites (requiring weekly paracentesis 1-3L), Grade II EV (last seen on EGD 6/29/18), and recent hospitalization 7/25-7/30 for JUAN M and hyperkalemia    Patient's wife notes that his eating has declined over the last several months d/t poor appetite, early satiety.  She feels it is related to his disease progression.  She noted that during previous admissions, he enjoyed Magic Cup supplements.     CURRENT NUTRITION ORDERS  Diet: 2 g Sodium  Intake/Tolerance: Poor appetite, lethargic/tired. Wife notes that when nursing tried to feed him recently, he vomited what was given.     LABS  Labs reviewed    MEDICATIONS  Medications reviewed    ANTHROPOMETRICS  Height: 5'8\"  Most Recent Weight: 95.9 kg (211 lb 6.7 oz)    IBW: 70 kg (137% IBW)  BMI: Obesity Grade I BMI 30-34.9  Weight History: It appears weight fluctuates between 200-220#, unclear if overall weight loss has occurred.   Wt Readings from Last 15 Encounters:   08/01/18 95.9 kg (211 lb 6.7 oz)   07/29/18 96.7 kg (213 lb 3 oz)   07/19/18 91.3 kg (201 lb 4.8 oz)   07/12/18 92.3 kg (203 lb 6.4 oz)   07/05/18 94.4 kg (208 lb 1.8 oz)   06/30/18 98.7 kg (217 lb 9.6 oz)   06/22/18 92.6 kg (204 lb 1.6 oz)   06/21/18 92.2 kg (203 lb 3.2 oz)   06/14/18 94.2 kg (207 lb " 11.2 oz)   06/13/18 104.8 kg (231 lb)   06/05/18 95.3 kg (210 lb)   05/25/18 96.3 kg (212 lb 4.9 oz)   05/17/18 99.3 kg (218 lb 14.4 oz)   05/16/18 100.7 kg (221 lb 14.4 oz)   05/11/18 100.9 kg (222 lb 8 oz)     Dosing Weight: 76 kg (adj BW based on IBW of 70 kg and actual BW of 95.9 kg)    ASSESSED NUTRITION NEEDS  Estimated Energy Needs: 9465-6548 kcals/day (25-30 kcal/kg)  Justification: Maintenance  Estimated Protein Needs: 76-91 grams protein/day (1-1.2g/kg)  Justification: Maintenance/increased needs  Estimated Fluid Needs: 1 ml/kcal per day  Justification: Maintenance    PHYSICAL FINDINGS  Unable to complete physical assessment as patient was asleep in bed during visit.      MALNUTRITION  % Intake: <75% for >1 month  % Weight Loss: Difficult to assess d/t fluid status  Subcutaneous Fat Loss: Unable to assess  Muscle Loss: Unable to asess  Fluid Accumulation/Edema: Unable to assess  Malnutrition Diagnosis: Unable to fully evaluate at this time    NUTRITION DIAGNOSIS  Inadequate oral intake related to poor appetite, early satiety, lethargy as evidenced by patient with minimal intake over the last several days      INTERVENTIONS  Implementation  Boost Plus between meals  Magic Cup with meals    Goals  Pt to consume at least % of meals or equivalent with snacks/supplements.      Monitoring/Evaluation  Progress toward goals will be monitored and evaluated per protocol.    Moraima Patino MS, RD, LD  Pager [phone]

## 2018-08-02 NOTE — PHARMACY-VANCOMYCIN DOSING SERVICE
Pharmacy Vancomycin Initial Note  Date of Service 2018  Patient's  1945  73 year old, male    Indication: Bacteremia    Current estimated CrCl = Estimated Creatinine Clearance: 31.6 mL/min (based on Cr of 2.37).    Creatinine for last 3 days  2018:  3:22 PM Creatinine 2.61 mg/dL  2018:  6:27 AM Creatinine 2.37 mg/dL    Recent Vancomycin Level(s) for last 3 days  No results found for requested labs within last 72 hours.      Vancomycin IV Administrations (past 72 hours)      No vancomycin orders with administrations in past 72 hours.                Nephrotoxins and other renal medications (Future)    Start     Dose/Rate Route Frequency Ordered Stop    18 0530  vancomycin (VANCOCIN) 1,750 mg in sodium chloride 0.9 % 500 mL intermittent infusion      1,750 mg  over 2 Hours Intravenous ONCE 18 0527  vancomycin place sanchez - receiving intermittent dosing      1 each Does not apply SEE ADMIN INSTRUCTIONS 18            Contrast Orders - past 72 hours     None                Plan:  1.  Start vancomycin  1750 mg IV x 1, then dose intermittently based on SCr and levels.   2.  Goal Trough Level: 15-20  3.  Pharmacy will check trough levels as appropriate in 1-3 Days.    4. Serum creatinine levels will be ordered daily for the first week of therapy and at least twice weekly for subsequent weeks.    5. Rawlins method utilized to dose vancomycin therapy: Method 2    Renee Cao, PharmD  Antimicrobial Stewardship & Infectious Diseases Pharmacist  Office Ph: [phone]  Pager: [phone]

## 2018-08-02 NOTE — PROVIDER NOTIFICATION
Paged Dr. Cardenas- LOW 7A D.L. [id number], [place] [phone], pt too lethargic to take PO meds, did you want to change anything to IV? lactulose enemas? Thanks    Addendum: MD came to see pt at the bedside but did not touch base with the nurse. Will await further orders. Currently, no PO meds or lactulose have been given.

## 2018-08-02 NOTE — PROGRESS NOTES
[facility] Progress  Note            Interval History:   No acute events overnight    RN note reviewed   Patient very somnolent   Denies any pain or complaints    Family care conference held today. Family believes patient would like every reasonably opportunity to live. In the setting of positive blood cultures (potentially , family believes he would wish to trial abx. Will tx w/24hrs of vanco, IVFs, rectal lactulose, avoid sedating medications. Family understands this is a potentially uncomfortable approach, but believes would be his choice (even if they would disagree with him).     Plan for Today  - DNR/DNI   - Spiritual Services consult per family for Taoism Last Kartik  - Lactulose per rectum   - LR @ 150cc/hr  - Vancomycin  - OK to hold PO medications d/t mental status   - Discussed anticoagulation risks/benefits with pharmacy - will hold anticoagulation   - family care conference tomorrow             Assessment and Plan:   Serge Brambila is a 72 y/o M w/Hx of HCC (dx 5/17), 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 Now presenting <24 hrs after discharge with increased fatigue and somnolence and is admitted for elevated ammonia.      # ?Staph Epi Bacteremia   1/2 BCx w/ mecA+ staph epi. Likely a contaminate, but in the setting of immunocompromised state and methicillin resistance, there is a chance this may be a real infection. However, patient is afebrile, WBC 2-3. Family elects to tx for 24-48hrs and re-assess, understanding likelihood of this being a contaminant.  - Vancomycin   - IVFs       # Hepatic Encephalopathy   # HCC, metastatic   # Decompensated KUHN cirrhosis   # Recurrent Ascites c/b hx of SBP  # Grade II EV  # Hyperbilirubinemia   Patient presents w/acute change in mentation. MELD 26, ammonia 202 (was 54  6/26). He is afebrile, WBC 2.4, K 4.9, recent ECHO w/o vegetations. He is s/p 3 day albumin challenge (7/26-7/28) with minimal response and  vitamin K 10mg IV x 3 days (7/26-7/28) .  This likely represents progression of HCC, however in the setting of Staph Epi + blood cx, family elects to treat for 24-48 hours and reassess.  - continue lactulose (enema if needed) & rifaximin  - c/w holding his home dose of diuretics due to his JUAN M  - Will limit any paracentesis to < 3 L to avoid major fluid shifts that can aggravate the JUAN M.  - Daily 40mg PO Protonix  - Cipro Daily 250mg QDaily for SBP prophylaxis (Dose decreased 7/29 per renal rec)  - Outpatient hepatology follow up at the time of discharge  - OK to hold PO meds d/t encephalopathy        # JUAN M  Hepatorenal syndrome vs pre-renal in the setting of poor PO/dehydration. No response to albumin challenge 100 gm x 3 days (7/25-7/28). Slowly improving.  - CTM  - avoid nephrotoxic agents         # Acute on chronic macrocytic anemia:  Patient had large grade II varices and PHG on last EUS 6/29/18. Previously on warfarin for A Fib but has been held due to worsening anemia. Transfusion dependent (unclear why, no hx of MDS) ~ requiring 1U PRBC every week. Denies any overt GI bleeding. Hgb 6.6 on 7/26 s/p 1U PRBC. Fibrinogen wnl. % Retic elevated but absolute retic inappropriately normal. Iron wnl. Ferritin elevated (but this is also an acute phase reactant and would be expected)  - monitor for hematochezia  - transfused for Hgb >7   - consider CT abdomen if worsening anemia          # Coagulopathy  # Thrombocytopenia  # DVT ppx  Plt 40-50, baseline has been slowly trending down. This is likely related to cirrhosis/liver pathology. He is now s/p vitamin K 10mg x 3 days (7/26-7/28). Patient is at high risk for DVT in the setting of age, cancer, and immobility, however he is also at risk for bleeding given ammonia level. There does not appear to be any guidelines that support  pharmacologic dvt ppx.   - CTM      Chronic   # Hx of Afib  Now in NSR. Warfarin held d/t anemia   - PTA carvedilol       # T2DM  - Decrease basaglar to 15unit at bedtime - patient not eating well   - Low dose insulin correction scale  - Hypoglycemia protocol   - Consider A1C during admission if no records.     # Hypothyroidism   - Continue PTA levothyroxine      # NAA  - Cpap with home settings     Diet: General diet   Fluids: IVF @150  DVT: mechanical  Code Status: DNR/DNI    This patient was staffed with Dr. Zhou the attending physician on service.     Peetr Cardenas IV, MD, MSc  Internal Medicine Resident, PGY2  [phone]              Objective:   /54 (BP Location: Left arm)  Pulse 68  Temp 97.8  F (36.6  C) (Axillary)  Resp 16  Wt 95.9 kg (211 lb 6.7 oz)  SpO2 98%  BMI 32.15 kg/m2      Intake/Output Summary (Last 24 hours) at 08/01/18 0757  Last data filed at 08/01/18 0300   Gross per 24 hour   Intake                0 ml   Output              300 ml   Net             -300 ml       PHYSICAL EXAMINATION  GEN: Very lethargic, weak, jaundice, in NAD, pleasant, attempts to cooperate   CV: RRR, FRANKO @LUSB  LUNGS: CTAB  ABD: +BS, soft, suprapubic tenderness   EXT: wwp, +2 pitting edema b/l  SKIN: w/d/i  NEURO: no tremor, no focal deficits appreciated, + asterixis       Labs:   All labs reviewed by me. Notable labs include:   Na 146  Cr 2.08  Albumin 3.3  INR 1.59  Alk Phos 105      Ammonia 194    Imaging:  All imaging studies reviewed by me.     Current Medications:  All medications reviewed by me.

## 2018-08-02 NOTE — PLAN OF CARE
Problem: Patient Care Overview  Goal: Plan of Care/Patient Progress Review    /50 (BP Location: Left arm)  Pulse 68  Temp 98  F (36.7  C) (Oral)  Resp 12  Wt 95.9 kg (211 lb 6.7 oz)  SpO2 98%  BMI 32.15 kg/m2    4767-6519  VSS on RA, no s/s of distress. Pt continues to be lethargic/slow to answer questions, however he is oriented when awake; slept intermittently between cares this shift. Wife, Tori, at bedside as well as adult children and grandchildren. Pt prognosis is poor--per Tori, family is to have conference with team tomorrow to determine future POC; family tearful--emotional support offered. Pt denies pain. Had emesis x2 of less than 30cc--clear in color; declined interventions (MD paged to have PRN zofran on hand; EKG ordered to assess Q-T interval). On 2gram sodium diet--no food eaten d/t no appetite; pt only taking sips of water occasionally and with meds. Aspiration precautions. AC/HS BG, 168 and 152; covered per sliding scale. Up x2 assist--pt very lethargic/tired and slow moving; fatigues easily. Pt had 3 large loose stools this shift; voiding adequate amounts. On scheduled and PRN lactulose. Heparin held per reyes HAGER verbal order (MD paged; pt platelets low--day team to reassess).  RPIV SL. Bed exit armed for safety. Pt resting quietly now with family at bedside. Will continue to monitor and continue POC/notify team as needed.

## 2018-08-02 NOTE — PROVIDER NOTIFICATION
Lab called and Pt gram poss+ cocci in clusters from right hand blood culture darin on 7/31. Will let MD know and charge nurse Akanksha

## 2018-08-03 NOTE — PLAN OF CARE
Problem: Patient Care Overview  Goal: Plan of Care/Patient Progress Review  Outcome: Declining  VSS, checked q shift per orders with spot checks of O2 sats (attempted continuous pulse ox but pt became agitated and removed); sats well on RA. Appears to have some increased work of breathing but no significant changes from beginning of shift with RN bedside handoff. Somnolent, unable to answer orientation questions, yes/no questions, nor follow most commands. VPM initiated and bed alarm on for patient safety as he remains impulsive and attempts to get out of bed. No evidence of pain or nausea. Attempted to provide comfort measures and redirection (blankets, gown, pillows, lotion, mouth swab). NPO. BG spot checked for 147. LR continues at 150mL/hr through PIV. Incontinent of large amounts of urine x2. Given lactulose enema x1 with very little result, no evidence of blood or bleeding, second enema held this shift (see provider notification note). Restless in bed at times, assisted to reposition when needed. Plan for possible discharge to home with hospice.    Problem: Liver Failure, Acute/Chronic (Adult)  Goal: Signs and Symptoms of Listed Potential Problems Will be Absent, Minimized or Managed (Liver Failure, Acute/Chronic)  Signs and symptoms of listed potential problems will be absent, minimized or managed by discharge/transition of care (reference Liver Failure, Acute/Chronic (Adult) CPG).   Outcome: Declining  Somnolent; largely unable to follow commands. Agitated at times. Poorly tolerated lactulose enema at 0200, and second enema held this shift.    Problem: Diabetes Comorbidity  Goal: Diabetes  Patient comorbidity will be monitored for signs and symptoms of hyperglycemia or hypoglycemia. Problems will be absent, minimized or managed by discharge/transition of care.   Outcome: No Change  BG spot checked for 147.

## 2018-08-03 NOTE — PROVIDER NOTIFICATION
Notified reyes HAGER of pt recent stool after lactulose enema--stool is dark brown/black/maroon and mucousy; MD in to assess stool. Awaiting further orders. Pt resting quietly with family at bedside. Will continue to monitor and continue POC/notify team as needed.     Addendum: No new orders for now but continue to monitor. Family informed of POC by MDs; pt resting quietly, will continue to monitor.

## 2018-08-03 NOTE — PHARMACY-VANCOMYCIN DOSING SERVICE
Pharmacy Vancomycin Note  Date of Service August 3, 2018  Patient's  1945   73 year old, male    Indication: Bacteremia  Goal Trough Level: 15-20 mg/L  Day of Therapy: 2  Current Vancomycin regimen: Intermittent dosing per levels    Current estimated CrCl = Estimated Creatinine Clearance: 43.3 mL/min (based on Cr of 1.73).    Creatinine for last 3 days  2018:  3:22 PM Creatinine 2.61 mg/dL  2018:  6:27 AM Creatinine 2.37 mg/dL  2018:  6:31 AM Creatinine 2.08 mg/dL  8/3/2018:  7:43 AM Creatinine 1.73 mg/dL    Recent Vancomycin Levels (past 3 days)  8/3/2018:  7:43 AM Vancomycin Level 12.8 mg/L    Vancomycin IV Administrations (past 72 hours)                   vancomycin (VANCOCIN) 1,750 mg in sodium chloride 0.9 % 500 mL intermittent infusion (mg) 1,750 mg New Bag 18 0654                Nephrotoxins and other renal medications (Future)    Start     Dose/Rate Route Frequency Ordered Stop    18 1445  vancomycin (VANCOCIN) 1,250 mg in sodium chloride 0.9 % 250 mL intermittent infusion      1,250 mg  over 90 Minutes Intravenous ONCE 18 1444      18 0527  vancomycin place sanchez - receiving intermittent dosing      1 each Does not apply SEE ADMIN INSTRUCTIONS 18 0528               Contrast Orders - past 72 hours     None          Interpretation of levels and current regimen:  Trough level is below desired range, will need repeat dosing to attain desired trough    Has serum creatinine changed > 50% in last 72 hours: No  Urine output:  diminished urine output  Renal Function: Improving, SCr slowly trending down    Plan:  1.  Will re-dose vancomycin at 1250 mg IV x 1 dose today  2.  Pharmacy will check trough levels as appropriate in 1-3 Days.    3. Serum creatinine levels will be ordered daily for the first week of therapy and at least twice weekly for subsequent weeks.      Catia Banerjee, PharmD, BCPS  Pager [phone]        .

## 2018-08-03 NOTE — PROGRESS NOTES
"SPIRITUAL HEALTH SERVICES  SPIRITUAL ASSESSMENT Progress Note  [facility] ([place]) 7A     REFERRAL SOURCE: Follow-up visit    Follow-up visit with family, provided reading of Psalm 23, brief prayer service, and Lord's prayer.  Wife Tori stated \"We're not real Pentecostal but we're not anti-Pentecostal.\"  Daughter Jojo added \"We're Sabianism.\"  Family appreciative of prayer service and I affirmed ongoing  support.    PLAN: Informed family to let nurse know if they have any  needs over the weekend and nurse can page on-call weekend .  Otherwise unit  to follow-up next week.    Lesa Shoemaker  Chaplain Resident  Pager [phone]  "

## 2018-08-03 NOTE — PROVIDER NOTIFICATION
On call provider paged at [phone] to discuss plans for lactulose enema. Pt very somnolent, unable to tolerate PO. Attempted lactulose enema when medication arrived and pt was unable to retain for very long and became agitated. Only had smear stool result as of time of this writing. Pt now NPO and had very large BM on evening shift. Provider stated okay to hold next scheduled dose of lactulose and to re-address plan for lactulose enemas in AM.

## 2018-08-03 NOTE — PROGRESS NOTES
"SPIRITUAL HEALTH SERVICES  SPIRITUAL ASSESSMENT Progress Note  [facility] ([place]) 7A     REFERRAL SOURCE: Epic consult requesting \"Gnosticist last rites\"    Visited with pt (who was sleeping) and pt's wife Tori.  Tori relayed story of pt's medical journey and at times became tearful.  Spoke about accepting that pt's death may be near, and Tori desires to wait until as many of pt's children as possible are present before doing some sort of end-of-life blessing/Gnosticist \"last rites.\"  Tori stated they have a care conference at 1:00 PM today and unsure of how long that will go.  Affirmed to Tori that chaplains can be available whenever is good for the family's schedule and to feel free to wait until all children can be present.  Also affirmed we have on-call  availability.  Agreed that Tori will have nurse page  if family desires blessing today, and that I will also check in again before the end of the day.    PLAN: Unit  or on-call  available to provide end-of-life blessing.    Lesa Shoemaker  Chaplain Resident  Pager [phone]  "

## 2018-08-03 NOTE — PROVIDER NOTIFICATION
Temp 94 (axillary); other VSS on RA.  Provider notified of lactate of 2.2.  Awaiting further orders.

## 2018-08-03 NOTE — PLAN OF CARE
Problem: Patient Care Overview  Goal: Plan of Care/Patient Progress Review  Outcome: No Change  /59 (BP Location: Left arm)  Pulse 67  Temp 97.3  F (36.3  C) (Axillary)  Resp 18  Wt 95.9 kg (211 lb 6.7 oz)  SpO2 96%  BMI 32.15 kg/m2    Pt opens eyes to voice and follows commands but remains lethargic; mentation slightly improved from overnight.  -136; no Novolog needed.  Denies pain.  Diet changed to regular; family aware of aspiration risk.  LR infusing at 150mL/hr into PIV.  Vancomycin administered.  Incontinent urine.  Pt tolerated PO lactulose mixed with apple juice; incontinent BMx1.  Skin intact.  Turned q2h.  Family at bedside; supportive of pt.  Care conference today; plan for a family care conference tomorrow to reassess and discuss pt status.  Continue with plan of care and notify provider with changes.

## 2018-08-03 NOTE — PROGRESS NOTES
[facility] Progress  Note            Interval History:   No acute events overnight    RN note reviewed   Patient very somnolent   Denies any pain or complaints    Family care conference today. Patient appears improved on abx, unclear if patient rallying or if improving on tx. Family wishes to continue with abx and IVF. Risk of renal injury and pulmonary edema understood. Family wishes patient to be able to enjoy food, even with risk of aspiration given end-of-life condition. Continue rectal lactulose even if blood stools - improvement of mentation important to patient. Given disease course, benefits outweigh risk.     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 Now presenting <24 hrs after discharge with increased fatigue and somnolence and is admitted for elevated ammonia.      # ?Staph Epi Bacteremia   1/2 BCx w/ mecA+ staph epi. Likely a contaminate, but in the setting of immunocompromised state and methicillin resistance, there is a chance this may be a real infection. However, patient is afebrile, WBC 2-3. Family elects to tx for 24-48hrs and re-assess, understanding likelihood of this being a contaminant and potential for IVFs to cause discomfort from edema/pulm edema.  - Vancomycin   - IVFs       # Hepatic Encephalopathy   # HCC, metastatic   # Decompensated KUHN cirrhosis   # Recurrent Ascites c/b hx of SBP  # Grade II EV  # Hyperbilirubinemia   Patient presents w/acute change in mentation. MELD 26, ammonia 202 (was 54 6/26). He is afebrile, WBC 2.4, K 4.9, recent ECHO w/o  vegetations. He is s/p 3 day albumin challenge (7/26-7/28) with minimal response and  vitamin K 10mg IV x 3 days (7/26-7/28) .  This likely represents progression of HCC, however in the setting of Staph Epi + blood cx, family elects to treat for 24-48 hours and reassess.  - continue lactulose (enema if needed) & rifaximin  - c/w holding his home dose of diuretics due to his JUAN M    - this may need to be changed if continues to be volume overloaded  - Will limit any paracentesis to < 3 L to avoid major fluid shifts that can aggravate the JUAN M.  - Daily 40mg PO Protonix  - Cipro Daily 250mg QDaily for SBP prophylaxis (Dose decreased 7/29 per renal rec)  - Outpatient hepatology follow up at the time of discharge  - OK to hold PO meds d/t encephalopathy        # JUAN M  Hepatorenal syndrome vs pre-renal in the setting of poor PO/dehydration. No response to albumin challenge 100 gm x 3 days (7/25-7/28). Slowly improving.  - CTM        # Acute on chronic macrocytic anemia:  Patient had large grade II varices and PHG on last EUS 6/29/18. Previously on warfarin for A Fib but has been held due to worsening anemia. Transfusion dependent (unclear why, no hx of MDS) ~ requiring 1U PRBC every week. Denies any overt GI bleeding. Hgb 6.6 on 7/26 s/p 1U PRBC. Fibrinogen wnl. % Retic elevated but absolute retic inappropriately normal. Iron wnl. Ferritin elevated (but this is also an acute phase reactant and would be expected)  - monitor for hematochezia  - if worsening will need to discuss transfusion and imaging with family          # Coagulopathy  # Thrombocytopenia  # DVT ppx  Plt 40-50, baseline has been slowly trending down. This is likely related to cirrhosis/liver pathology. He is now s/p vitamin K 10mg x 3 days (7/26-7/28). Patient is at high risk for DVT in the setting of age, cancer, and immobility, however he is also at risk for bleeding given ammonia level. There does not appear to be any guidelines that support pharmacologic  dvt ppx.   - CTM      Chronic   # Hx of Afib  Now in NSR. Warfarin held d/t anemia   - PTA carvedilol       # T2DM  - Decrease basaglar to 15unit at bedtime - patient not eating well   - Low dose insulin correction scale  - Hypoglycemia protocol   - Consider A1C during admission if no records.     # Hypothyroidism   - Continue PTA levothyroxine      # NAA  - Cpap with home settings     Diet: General diet   Fluids: IVF @150  DVT: mechanical  Code Status: DNR/DNI    This patient was staffed with Dr. Zhou the attending physician on service.     Peter Cardenas IV, MD, MSc  Internal Medicine Resident, PGY2  [phone]              Objective:   /72 (BP Location: Left arm)  Pulse 69  Temp 94  F (34.4  C) (Axillary)  Resp 16  Wt 95.9 kg (211 lb 6.7 oz)  SpO2 94%  BMI 32.15 kg/m2      Intake/Output Summary (Last 24 hours) at 08/01/18 0757  Last data filed at 08/01/18 0300   Gross per 24 hour   Intake                0 ml   Output              300 ml   Net             -300 ml       PHYSICAL EXAMINATION  GEN: Very lethargic, weak, jaundice, in NAD, pleasant, attempts to cooperate   CV: RRR, FRANKO @LUSB  LUNGS: CTAB  ABD: +BS, soft   EXT: wwp, +2 pitting edema b/l  SKIN: w/d/i  NEURO: no tremor, no focal deficits appreciated, + asterixis, responsiveness minimally improved from yesterday       Labs:   All labs reviewed by me. Notable labs include:   Na 151  Cr 1.73  Albumin 3.2  INR 1.69  Alk Phos 109      Ammonia 194    Imaging:  All imaging studies reviewed by me.     Current Medications:  All medications reviewed by me.

## 2018-08-04 NOTE — PLAN OF CARE
Problem: Patient Care Overview  Goal: Plan of Care/Patient Progress Review  Outcome: Improving  /80 (BP Location: Right arm)  Pulse 84  Temp 98.4  F (36.9  C) (Axillary)  Resp 18  Wt 95.9 kg (211 lb 6.7 oz)  SpO2 96%  BMI 32.15 kg/m2    07:00-19:30:  Pt alert and orientedx4 today.  Denies pain or nausea.  Tolerating clears, PO lactulose, and pills.  LR infusing at 150mL/hr into PIV.  Vancomycin administered.  Voiding; continent.  BMx4 this shift.  Pulsate air mattress provided.  Turned q2h.  Pt tearful and anxious throughout the day.  Family at bedside; supportive of pt.  Family care conference this afternoon.  Continue to monitor and notify provider with changes.

## 2018-08-04 NOTE — PROGRESS NOTES
[facility] Progress  Note            Interval History:   No acute events overnight - was found to be more responsive  RN notes reviewed   Patient very somnolent, but arousable   Denies any pain or complaints    Patient much more arousable today.  Alert and oriented times 3, knows that he is in [facility].  Drank from a straw with vigor and without difficulty, when I assisted him at his request.  Per family, has been conversant and appropriate and very thirsty.  This AM, denies f/c, abdominal pain, SOB.     Family care conference again today. Family wishes to continue with abx and IVF. Risk of renal injury and pulmonary edema understood. Family wishes patient to be able to enjoy food, even with risk of aspiration given end-of-life condition. Continue rectal lactulose even if blood stools - improvement of mentation important to patient. Given disease course, benefits outweigh risk. Also, will restart oral medications.    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 Now presenting <24 hrs after discharge with increased fatigue and somnolence and is admitted for elevated ammonia.      # ?Staph Epi Bacteremia   1/2 BCx w/ mecA+ staph epi. Likely a contaminate, but in the setting of immunocompromised state and methicillin resistance, there is a chance this may be a real infection. However, patient is afebrile, WBC 2-3. Family elects to tx for 24-48hrs and re-assess, understanding likelihood of this being a contaminant and potential for IVFs to cause discomfort from edema/pulm edema.Given his clinical improvement, perhaps this is a real infection that is being treated with  the appropriate antibiotics.  - Vancomycin   - IVFs       # Hepatic Encephalopathy   # HCC, metastatic   # Decompensated KUHN cirrhosis   # Recurrent Ascites c/b hx of SBP  # Grade II EV  # Hyperbilirubinemia   Patient presents w/acute change in mentation. MELD 26, ammonia 202 (was 54 6/26). He is afebrile, WBC 2.4, K 4.9, recent ECHO w/o vegetations. He is s/p 3 day albumin challenge (7/26-7/28) with minimal response and  vitamin K 10mg IV x 3 days (7/26-7/28) .  This likely represents progression of HCC, however in the setting of Staph Epi + blood cx, family elects to treat for 24-48 hours and reassess.  - continue lactulose (enema if needed) & rifaximin  - c/w holding his home dose of diuretics due to his JUAN M    - this may need to be changed if continues to be volume overloaded  - Will limit any paracentesis to < 3 L to avoid major fluid shifts that can aggravate the JUAN M.  - Daily 40mg PO Protonix  - Cipro Daily 250mg QDaily for SBP prophylaxis (Dose decreased 7/29 per renal rec)  - Outpatient hepatology follow up at the time of discharge  - OK to hold PO meds d/t encephalopathy        # JUAN M  Hepatorenal syndrome vs pre-renal in the setting of poor PO/dehydration. No response to albumin challenge 100 gm x 3 days (7/25-7/28). Slowly improving.  - CTM        # Acute on chronic macrocytic anemia:  Patient had large grade II varices and PHG on last EUS 6/29/18. Previously on warfarin for A Fib but has been held due to worsening anemia. Transfusion dependent (unclear why, no hx of MDS) ~ requiring 1U PRBC every week. Denies any overt GI bleeding. Hgb 6.6 on 7/26 s/p 1U PRBC. Fibrinogen wnl. % Retic elevated but absolute retic inappropriately normal. Iron wnl. Ferritin elevated (but this is also an acute phase reactant and would be expected)  - monitor for hematochezia  - if worsening will need to discuss transfusion and imaging with family          # Coagulopathy  # Thrombocytopenia  # DVT ppx  Plt 40-50, baseline has  been slowly trending down. This is likely related to cirrhosis/liver pathology. He is now s/p vitamin K 10mg x 3 days (7/26-7/28). Patient is at high risk for DVT in the setting of age, cancer, and immobility, however he is also at risk for bleeding given ammonia level. There does not appear to be any guidelines that support pharmacologic dvt ppx.   - CTM      Chronic   # Hx of Afib  Now in NSR. Warfarin held d/t anemia   - PTA carvedilol       # T2DM  - Decrease basaglar to 15unit at bedtime - patient not eating well   - Low dose insulin correction scale  - Hypoglycemia protocol   - Consider A1C during admission if no records.     # Hypothyroidism   - Continue PTA levothyroxine      # NAA  - Cpap with home settings     Diet: General diet   Fluids: IVF @150  DVT: mechanical  Code Status: DNR/DNI    Chester Zhou 2002           Objective:   /63 (BP Location: Left arm)  Pulse 69  Temp 97.3  F (36.3  C) (Oral)  Resp 18  Wt 95.9 kg (211 lb 6.7 oz)  SpO2 96%  BMI 32.15 kg/m2      Intake/Output Summary (Last 24 hours) at 08/01/18 0757  Last data filed at 08/01/18 0300   Gross per 24 hour   Intake                0 ml   Output              300 ml   Net             -300 ml       PHYSICAL EXAMINATION  GEN: Very lethargic, weak, jaundice, in NAD, pleasant, more cooperative this AM   CV: RRR, FRANKO @LUSB  LUNGS: CTAB  ABD: +BS, soft   EXT: wwp, +2 pitting edema b/l  SKIN: w/d/i  NEURO: no tremor, no focal deficits appreciated, + asterixis, responsiveness minimally improved from yesterday       Labs:   All labs reviewed by me. Notable labs include (8/3) :   Na 151  Cr 1.73  Albumin 3.2  INR 1.69  Alk Phos 109      Ammonia 194    Imaging:  All imaging studies reviewed by me.     Current Medications:  All medications reviewed by me.

## 2018-08-04 NOTE — PHARMACY-VANCOMYCIN DOSING SERVICE
Pharmacy Vancomycin Note  Date of Service 2018  Patient's  1945   73 year old, male    Indication: Bacteremia  Goal Trough Level: 15-20 mg/L  Day of Therapy: 3  Current Vancomycin regimen: intermittent dosing    Current estimated CrCl = Estimated Creatinine Clearance: 43.3 mL/min (based on Cr of 1.73).    Creatinine for last 3 days  2018:  6:31 AM Creatinine 2.08 mg/dL  8/3/2018:  7:43 AM Creatinine 1.73 mg/dL    Recent Vancomycin Levels (past 3 days)  8/3/2018:  7:43 AM Vancomycin Level 12.8 mg/L  2018: 12:30 PM Vancomycin Level 17.8 mg/L    Vancomycin IV Administrations (past 72 hours)                   vancomycin (VANCOCIN) 1,250 mg in sodium chloride 0.9 % 250 mL intermittent infusion (mg) 1,250 mg New Bag 18 1623                Nephrotoxins and other renal medications (Future)    Start     Dose/Rate Route Frequency Ordered Stop    18 1515  vancomycin (VANCOCIN) 1000 mg in dextrose 5% 200 mL PREMIX      1,000 mg  200 mL/hr over 1 Hours Intravenous ONCE 18 1503      18 0527  vancomycin place sanchez - receiving intermittent dosing      1 each Does not apply SEE ADMIN INSTRUCTIONS 18 0528               Contrast Orders - past 72 hours     None          Interpretation of levels and current regimen:  Trough level is  Therapeutic, will continue to try another Q24H dose today and consider changing to scheduled dose tomorrow if level ok    Has serum creatinine changed > 50% in last 72 hours: No  Urine output:  diminished urine output  Renal Function: Improving    Plan:  1.  Will give vancomycin 1000 mg IV x 1 today  2.  Pharmacy will check trough levels as appropriate in 1-3 Days.    3. Serum creatinine levels will be ordered daily for the first week of therapy and at least twice weekly for subsequent weeks.      Catia Banerjee, PharmD, BCPS  Pager [phone]        .

## 2018-08-04 NOTE — PLAN OF CARE
Problem: Patient Care Overview  Goal: Plan of Care/Patient Progress Review  Outcome: Improving    RN:  Afebrile, denies pain and nausea. Patient's mentation much improved and alert, able to answer questions appropriately. Still intermittently confused, bed alarm and VPM activated when family not present. Patient incontinent of stool and urine x3. Blood glucose 238 at bedtime, sliding scale and Lantus given, blood glucose check at 0200 not done, patient sleeping. Patient resting between cares, will continue to monitor.

## 2018-08-05 NOTE — PROGRESS NOTES
At about 2000, patient c/o SOB, patient was repositioned in bed and O2 was applied via NC for comfort. Sating 95-97% on room air, % at 2L/NC. MD on call was notified and came to assessed/evaluate patient. Ordered to discontinue MIV and portable chest x-ray. MIV discontinued, x-ray result pending.

## 2018-08-05 NOTE — PLAN OF CARE
Problem: Patient Care Overview  Goal: Plan of Care/Patient Progress Review  Outcome: Improving  /63 (BP Location: Right arm)  Pulse 82  Temp 97.3  F (36.3  C) (Oral)  Resp 18  Wt 95.9 kg (211 lb 6.7 oz)  SpO2 97%  BMI 32.15 kg/m2    07:00-19:30:  Afebrile; VSS, sats 97% on RA.  A&Ox4; behavior appropriate to situation.  Hemoglobin 6.6 (down from 7.5); 1unit PRBC transfused and recheck 7.5.  -193; Novolog administered per sliding scale.  Scheduled vancomycin administered.  Poor appetite on regular diet; tolerating Ensure and clears.  PIV saline locked.  Voided 650mL after lasix x2 and spironolactone.  Small watery BMx5; scheduled lactulose administeredx2.  Turned in bed q2h.  Pt motivated to sit in chair; PT consult placed.  Family at bedside; supportive of pt.  Patient and family in agreement with continuing aggressive cares.  Continue to monitor and notify provider with changes.

## 2018-08-05 NOTE — PHARMACY-VANCOMYCIN DOSING SERVICE
Pharmacy Vancomycin Note  Date of Service 2018  Patient's  1945   73 year old, male    Indication: Bacteremia  Goal Trough Level: 15-20 mg/L  Current Vancomycin regimen: intermittent dosing per levels    Current estimated CrCl = Estimated Creatinine Clearance: 47.2 mL/min (based on Cr of 1.59).    Creatinine for last 3 days  8/3/2018:  7:43 AM Creatinine 1.73 mg/dL  2018:  7:09 AM Creatinine 1.59 mg/dL    Recent Vancomycin Levels (past 3 days)  8/3/2018:  7:43 AM Vancomycin Level 12.8 mg/L  2018: 12:30 PM Vancomycin Level 17.8 mg/L  2018:  1:23 PM Vancomycin Level 20.7 mg/L    Vancomycin IV Administrations (past 72 hours)                   vancomycin (VANCOCIN) 1000 mg in dextrose 5% 200 mL PREMIX (mg) 1,000 mg New Bag 18 1743    vancomycin (VANCOCIN) 1,250 mg in sodium chloride 0.9 % 250 mL intermittent infusion (mg) 1,250 mg New Bag 18 1623                Nephrotoxins and other renal medications (Future)    Start     Dose/Rate Route Frequency Ordered Stop    18 0930  furosemide (LASIX) injection 20 mg      20 mg  over 1-2 Minutes Intravenous DAILY 18 0925      18 0527  vancomycin place sanchez - receiving intermittent dosing      1 each Does not apply SEE ADMIN INSTRUCTIONS 18 0528               Contrast Orders - past 72 hours     None          Interpretation of levels and current regimen:  Trough level is  Therapeutic    Has serum creatinine changed > 50% in last 72 hours: No  Urine output:  diminished urine output  Renal Function: Improving    Plan:  1.  Will change to scheduled dose: vancomycin 1000 mg IV Q24H  2.  Pharmacy will check trough levels as appropriate in 1-3 Days.    3. Serum creatinine levels will be ordered daily for the first week of therapy and at least twice weekly for subsequent weeks.      Catia Banerjee, PharmD, BCPS   Pager [phone]        .

## 2018-08-05 NOTE — PROGRESS NOTES
Brief Cross Cover Note:     Called to bedside to see  for increased shortness of breath.     SUBJECTIVE:   Mr. Brambila reports shortness of breath this evening at 8pm. Patient had O2 sat in mid 90s but was started on 2L oxygen via NC for symptom relief. Went to bedside to assess patient, he reports oxygen is not helping SOB very much. Feels better when he is sitting up. Family at bedside and repots he has been thirst and drinking a lot today. On chart review patient is also on maintenance IVF, and has received 3L today. He is net + 3.4L in past 24 hours, although he has had 3 episodes of urinary incontinence today so has had more urine output than charted. Patient denies fever, chills, cough, or chest pain. Wife at bedside reports patient abdomen has increased in size again. It has been 7 days since last paracentesis, 7/29, and he usually gets tap done weekly. She feels like increased ascites is contributing to shortness of breath  VITALS: Reviewed in chart and notable for afebrile, pulse 85, /62, RR 18, 97% on RA. After 2L O2 via NC placed, O2 saturation of 99%.   EXAM:   General: sitting up in bed, no acute distress, resting with family in room but easily arousal.   Cardiac:RRR, no murmur, rub or west  Res: non labored breathing on 2L NC, no significant orthopnea when flattened, lungs with fine crackles at bases bilaterally. No wheezing.   Abdomen: +BS, soft, nontender, distended with fluid but not tense.   Extremities: no lower extremity edema  Neuro: alert, oriented to person. Answering questions appropriately, fluent speech, some incomprehensible mumbling.     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 Now presenting <24 hrs after discharge with increased fatigue and somnolence and is  admitted for elevated ammonia. - concerning for worsening disease at baseline but also poor reserve and Staph Epi Bacteremia.     Patient has been drinking lots of fluid per family  And per INS/Outs over past 24h patient is + nearly 3.5l and nearly 5L positive since admission (missing some output d/t urinary incontinence), last para on Sunday 7/29 (patient undergoes Qweek) and  exam notable for increased abd distention and mild  crackles at bases.   No resp distress - breathing ~18 bpm and no overt evidence of resp collapse or airway compromise. Oxygen saturation in high 90s, put on 2L of O2 for symptom relief.     - CXR for dx   - Ins and outs - please record all if able as well as unmeasures   - Will give IVF holiday overnight and monitor BP and resp status closely  - Holding home diuretics for now ---> may consider resuming at half home dose if CXR markedly congested   - Can consider Therapeutic para in upcoming days pr primary team in AM     Abhay Thomas  Internal Medicine, PGY1  P: [phone]

## 2018-08-05 NOTE — PROVIDER NOTIFICATION
MD notified of critical hemoglobin of 6.6 (from 7.5).  Platelets 40 (from 38).  Afebrile; VSS on 2L NC.  Pt denies chest pain but c/o mild SOB.  Awaiting orders.

## 2018-08-05 NOTE — PROGRESS NOTES
[facility] Progress  Note            Interval History:   No acute events overnight - was found to be more responsive  RN notes reviewed   Patient much improved today - able to be aroused.   Denies any pain or complaints    Discussion had with patient and family at bedside. Patient would like to continue aggressive cares, will transfuse, diurese, and continue abx.       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 Now presenting <24 hrs after discharge with increased fatigue and somnolence and is admitted for elevated ammonia.      # ?Staph Epi Bacteremia   1/2 BCx w/ mecA+ staph epi. Likely a contaminate, but in the setting of immunocompromised state and methicillin resistance, there is a chance this may be a real infection. However, patient is afebrile, WBC 2-3. Family elects to tx for 24-48hrs and re-assess, understanding likelihood of this being a contaminant and potential for IVFs to cause discomfort from edema/pulm edema.Given his clinical improvement, perhaps this is a real infection that is being treated with the appropriate antibiotics.  - Vancomycin   - IVFs       # Hepatic Encephalopathy   # HCC, metastatic   # Decompensated KUHN cirrhosis   # Recurrent Ascites c/b hx of SBP  # Grade II EV  # Hyperbilirubinemia   Patient presents w/acute change in mentation. MELD 26, ammonia 202 (was 54 6/26). He is afebrile, WBC 2.4, K 4.9, recent ECHO w/o vegetations. He is s/p 3 day albumin challenge (7/26-7/28) with minimal response and  vitamin K 10mg IV x 3 days (7/26-7/28) .  This likely represents progression of HCC, however in the setting of  Staph Epi + blood cx, family elects to treat for 24-48 hours and reassess.  - continue lactulose (enema if needed) & rifaximin  - c/w holding his home dose of diuretics due to his JUAN M    - this may need to be changed if continues to be volume overloaded  - Will limit any paracentesis to < 3 L to avoid major fluid shifts that can aggravate the JUAN M.  - Daily 40mg PO Protonix  - Cipro Daily 250mg QDaily for SBP prophylaxis (Dose decreased 7/29 per renal rec)  - Outpatient hepatology follow up at the time of discharge  - OK to hold PO meds d/t encephalopathy        # JUAN M  Hepatorenal syndrome vs pre-renal in the setting of poor PO/dehydration. No response to albumin challenge 100 gm x 3 days (7/25-7/28). Slowly improving.  - CTM        # Acute on chronic macrocytic anemia:  Patient had large grade II varices and PHG on last EUS 6/29/18. Previously on warfarin for A Fib but has been held due to worsening anemia. Transfusion dependent (unclear why, no hx of MDS) ~ requiring 1U PRBC every week. Denies any overt GI bleeding. Hgb 6.6 on 7/26 s/p 1U PRBC. Fibrinogen wnl. % Retic elevated but absolute retic inappropriately normal. Iron wnl. Ferritin elevated (but this is also an acute phase reactant and would be expected)  - monitor for hematochezia  - if worsening will need to discuss transfusion and imaging with family  - Transfuse Hgb 7 +/- 0.5      # Coagulopathy  # Thrombocytopenia  # DVT ppx  Plt 40-50, baseline has been slowly trending down. This is likely related to cirrhosis/liver pathology. He is now s/p vitamin K 10mg x 3 days (7/26-7/28). Patient is at high risk for DVT in the setting of age, cancer, and immobility, however he is also at risk for bleeding given ammonia level. There does not appear to be any guidelines that support pharmacologic dvt ppx.   - CTM      Chronic   # Hx of Afib  Now in NSR. Warfarin held d/t anemia   - PTA carvedilol       # T2DM  - Decrease basaglar to 15unit at bedtime - patient not  eating well   - Low dose insulin correction scale  - Hypoglycemia protocol   - Consider A1C during admission if no records.     # Hypothyroidism   - Continue PTA levothyroxine      # NAA  - Cpap with home settings     Diet: General diet   Fluids: IVF @150  DVT: mechanical  Code Status: DNR/DNI    This patient was staffed with Dr. Zhou, the attending physician on service.     Peter Cardenas IV, MD, MSc  Internal Medicine Resident, PGY2  Pager [phone]             Objective:   /63  Pulse 85  Temp 98.2  F (36.8  C)  Resp 16  Wt 95.9 kg (211 lb 6.7 oz)  SpO2 98%  BMI 32.15 kg/m2      Intake/Output Summary (Last 24 hours) at 08/01/18 0757  Last data filed at 08/01/18 0300   Gross per 24 hour   Intake                0 ml   Output              300 ml   Net             -300 ml       PHYSICAL EXAMINATION  GEN: A&Ox3, weak, jaundice, in NAD, pleasant  CV: RRR, FRANKO @LUSB  LUNGS: CTAB  ABD: +BS, soft   EXT: wwp, +2 pitting edema b/l  SKIN: w/d/i  NEURO: no tremor, no focal deficits appreciated, + asterixis, responsiveness minimally improved from yesterday       Labs:   All labs reviewed by me. Notable labs include (8/3) :   Na 141  Cr 1.59  Albumin 3.1  Hgb 6.6    Imaging:  All imaging studies reviewed by me.     Current Medications:  All medications reviewed by me.

## 2018-08-05 NOTE — PLAN OF CARE
Problem: Patient Care Overview  Goal: Plan of Care/Patient Progress Review  Outcome: Improving    RN:  AVSS, denies pain and nausea. Denies SOB, O2 sat 97-99% @2L/NC. Patient alert and oriented x4, slight mumbling with speech. Continent of urine and stool, adequate urine output, watery loose BM x3, using urinal and bedpan with 2 person assist. Patient resting between cares. VPM on standby, son and daughter in the room with patient all night. Will continue to monitor.

## 2018-08-06 NOTE — PLAN OF CARE
Problem: Patient Care Overview  Goal: Individualization & Mutuality  Outcome: Improving  /57 (BP Location: Right arm)  Pulse 68  Temp 97.6  F (36.4  C) (Oral)  Resp 18  Wt 98.6 kg (217 lb 6 oz)  SpO2 97%  BMI 33.05 kg/m2   Pt A/Ox4. VSS on RA. Patient denies pain/nausea. Urine Output - in urinal dark yellow urine. Bowel Function - loose BM 1x in bedpan. Nutrition - poor appetite. Drains - PIV SL. Activity - extensive assist of 2 turn/repos. Pt's family at bedside and supportive. Pt verbalized feeling anxious, massaged with essential oil with effective relaxation result. Continue with POC

## 2018-08-06 NOTE — PROGRESS NOTES
[facility] Progress  Note            Interval History:   No acute events overnight - was found to be more responsive  RN notes reviewed   Patient much improved today - interacting with family, in good spirits   Denies any pain or complaints    Discussion had with patient and family at bedside. Patient would like to continue aggressive cares, will transfuse, diurese, and continue abx.       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 Now presenting <24 hrs after discharge with increased fatigue and somnolence and is admitted for hepatic encephalopathy.      # ?Staph Epi Bacteremia   1/2 BCx w/ mecA+ staph epi. Generally a contaminate, but in the setting of immunocompromised state and methicillin resistance, there is a chance this may be a real infection. Treated with vancomycin with substantial improvement 24-48 hours after initiation of therapy. Given his clinical improvement, favor this is a real infection that is being treated with the appropriate antibiotics. Recommended course is 7-14 days.  - Continue Vancomycin (8/2- 8/8) to complete 7 day course.        # Hepatic Encephalopathy   # HCC, metastatic   # Decompensated KUHN cirrhosis   # Recurrent Ascites c/b hx of SBP  # Grade II EV  # Hyperbilirubinemia   Patient presents w/acute change in mentation. MELD 26, ammonia 202 (was 54 6/26). He is afebrile, WBC 2.4, K 4.9, recent ECHO w/o vegetations. Recent decompensation may represent progression of HCC, however in the setting of Staph Epi + blood cx, could not rule out infection leading to decompensation.   - continue lactulose (enema if needed) & rifaximin  - Daily 40mg PO Protonix  -  Cipro Daily 250mg QDaily for SBP prophylaxis (Dose decreased 7/29 per renal rec)         # JUAN M on CKD  Hepatorenal syndrome vs pre-renal in the setting of poor PO/dehydration. No response to albumin challenge 100 gm x 3 days (7/25-7/28). Slowly improving and stable today. Vancomycin certainly poses a risk of further renal injury, but given patients limited life expectancy and wishes, will CTM and continue with abx.   - CTM  - c/w holding his home dose of diuretics due to his JUAN M    - this may need to be changed if continues to be volume overloaded  - Will limit any paracentesis to < 3 L to avoid major fluid shifts that can aggravate the JUAN M.        # Acute on chronic macrocytic anemia:  Patient had large grade II varices and PHG on last EUS 6/29/18. Previously on warfarin for A Fib but has been held due to worsening anemia. Transfusion dependent (unclear why, no hx of MDS) ~ requiring 1U PRBC every week. Denies any overt GI bleeding. Hgb 6.6 on 7/26 s/p 1U PRBC. Fibrinogen wnl. % Retic elevated but absolute retic inappropriately normal. Iron wnl. Ferritin elevated (but this is also an acute phase reactant and would be expected)  - CTM        # Coagulopathy  # Thrombocytopenia  # DVT ppx  Plt 40-50, baseline has been slowly trending down. This is likely related to cirrhosis/liver pathology. He is now s/p vitamin K 10mg x 3 days (7/26-7/28). Patient is at high risk for DVT in the setting of age, cancer, and immobility, however he is also at risk for bleeding given ammonia level. There does not appear to be any guidelines that support pharmacologic dvt ppx.   - CTM      Chronic   # Hx of Afib  Now in NSR. Warfarin held d/t anemia   - PTA carvedilol       # T2DM  - Decrease basaglar to 15unit at bedtime - patient not eating well   - Low dose insulin correction scale  - Hypoglycemia protocol       # Hypothyroidism   - Continue PTA levothyroxine      # NAA  - Cpap with home settings     Diet: General diet   Fluids: IVF  @150  DVT: mechanical  Code Status: DNR/DNI    This patient was staffed with Dr. Brown, the attending physician on service.     Peter Cardenas IV, MD, MSc  Internal Medicine Resident, PGY2  Pager [phone]    Physician Attestation  I, Arash Brown MD, saw this patient with the resident and agree with the resident s findings and plan of care as documented in the resident s note with my edits.     I personally reviewed vital signs, medications, labs and imaging.    Arash Brown MD  Date of Service (when I saw the patient): 8/6/18                   Objective:   /47 (BP Location: Left arm)  Pulse 80  Temp 97.6  F (36.4  C) (Oral)  Resp 18  Wt 95.9 kg (211 lb 6.7 oz)  SpO2 98%  BMI 32.15 kg/m2      Intake/Output Summary (Last 24 hours) at 08/01/18 0757  Last data filed at 08/01/18 0300   Gross per 24 hour   Intake                0 ml   Output              300 ml   Net             -300 ml       PHYSICAL EXAMINATION  GEN: A&Ox3, weak, jaundice, in NAD, pleasant  CV: RRR, FRANKO @LUSB  LUNGS: CTAB  ABD: +BS, soft   EXT: wwp, +2 pitting edema b/l  SKIN: w/d/i  NEURO: no tremor, no focal deficits appreciated, + asterixis, responsiveness minimally improved from yesterday       Labs:   All labs reviewed by me. Notable labs include (8/3) :   Na 141  Cr 1.59  Albumin 3.1  Hgb 6.6    Imaging:  All imaging studies reviewed by me.     Current Medications:  All medications reviewed by me.

## 2018-08-06 NOTE — PLAN OF CARE
Problem: Patient Care Overview  Goal: Plan of Care/Patient Progress Review  Outcome: Improving    RN:  Afebrile, VSS, O2 sat 96-98% on personal CPAP. Patient  A&Ox4, mentation very much improved, able to use call light for assistance.  Son at bedside, bed alarm activated and VPM on standby.  Denies pain and nausea, PO intake improving on regular diet, blood glucose 231, sliding scale and Lantus given . Adequate urine output, loose/watery BM x4, on Lactulose, using urinal and bedpan with 2 person assist. Patient resting between cares, will continue to monitor.

## 2018-08-06 NOTE — PROGRESS NOTES
[facility], [place]   Antimicrobial Management Team (AMT) Note              To: [facility]  Unit: 7A  Allergies   Allergen Reactions     Penicillins      Happened in his teens, unsure what his reaction was. Tolerated ceftriaxone and Zosyn 3/2018       Brief Summary: Serge Brambila is a 73 year old man with PMH significant for decompensated KUHN Cirrhosis (MELD 31) c/b HE, ascites (requiring weekly paracentesis 1-3L), Grade II EV (last seen on EGD 6/29/18), HCC (dx 5/2017)  admitted on 7/31/2018 with fatigue and somnolence.  HPI: He was recent hospitalized at [facility] from 7/25/2018-7/30/2018 for JUAN M and hyperkalemia d/t decompensated KUHN cirrhosis. On admission he was afebrile, normotensive, with a consistent leukopenia (ANC 2.5). One of 2 peripheral blood cultures collected on admission was positive for S. Epi positive for mecA on verigene. Vancomycin was started on 8/2/2018    Assessment: S. Epidermidis bacteremia. He remains on day 5 of vancomycin for S. Epi bacteremia. Pt has no indwelling prosthesis or central lines. Repeat blood culture on 8/2/2018 is NGTD. He continues to have leukopenia without neutropenia. It is noted that the team acknowledges that the S. Epi is a likely contaminant - which is likely the case given the organism took nearly 31 hours to grow and  the limited pathogenicity of the organism in this setting. Continuation of vancomycin is not without risk - including selection of vancomycin resistant organisms within the host's biome. Therefore - would favor discontinuation of vancomycin in the setting of lack of infection.    Recommendations:  1. Discontinue vancomycin    Discussed with ID Staff - MD Renee Moreno, PharmD  Antimicrobial Stewardship & Infectious Diseases Pharmacist  Office Ph: [phone]  Pager: [phone]    Current Anti-infective Orders:  Anti-infectives (Future)    Start     Dose/Rate Route Frequency Ordered Stop    08/05/18  1530  vancomycin (VANCOCIN) 1000 mg in dextrose 5% 200 mL PREMIX      1,000 mg  200 mL/hr over 1 Hours Intravenous EVERY 24 HOURS 08/05/18 1528      08/01/18 0900  ciprofloxacin (CIPRO) tablet 500 mg      500 mg Oral EVERY 24 HOURS SCHEDULED 07/31/18 2051 07/31/18 2100  rifaximin (XIFAXAN) tablet 550 mg      550 mg Oral 2 TIMES DAILY 07/31/18 2051            Vitals and other clinical features  Vitals       08/04 0700  -  08/05 0659 08/05 0700  -  08/06 0659 08/06 0700  -  08/06 1006   Most Recent    Temp ( F) 97.2 -  98.4    97.2 -  98.2      97.8     97.8 (36.6)    Pulse 84 -  85    80 -  82      56     56    Heart Rate 88 -  91    62 -  89       64    Resp   18    16 - 18      18     18    /80 -  151/70    123/63 -  136/63      113/79     113/79    SpO2 (%) 96 -  99    97 -  98      99     99        Temperature curve:      Labs  Estimated Creatinine Clearance: 44.2 mL/min (based on Cr of 1.72).  Recent Labs   Lab Test  08/06/18   0739  08/05/18   0709  08/03/18   0743  08/02/18   0631  08/01/18   0627  07/31/18   1522   CR  1.72*  1.59*  1.73*  2.08*  2.37*  2.61*       Recent Labs   Lab Test  08/06/18   0739  08/05/18   1726  08/05/18   0709  08/04/18   0631  08/03/18   0743  08/02/18   0631  08/01/18   0627  07/31/18   1522  07/30/18   0516  07/29/18   1358   07/26/18   1143  07/26/18   0710  07/25/18   2235   WBC  2.9*   --   2.9*   --   3.2*  2.8*  2.4*  3.4*  3.1*  3.4*   < >  2.8*  3.3*  3.1*   ANEU   --    --    --    --    --    --    --   2.5  2.3  2.4   --   1.8  2.1  2.0   ALYM   --    --    --    --    --    --    --   0.4*  0.5*  0.5*   --   0.3*  0.3*  0.5*   JORGE   --    --    --    --    --    --    --   0.3  0.2  0.2   --   0.5  0.6  0.3   AEOS   --    --    --    --    --    --    --   0.2  0.2  0.2   --   0.1  0.2  0.2   HGB  7.8*  7.5*  6.6*   --   7.5*  7.8*  7.6*  8.0*  7.7*  7.7*   < >  6.9*  6.6*  7.3*   HCT  23.2*   --   20.2*   --   22.7*  23.1*  22.3*  23.2*  22.4*  23.0*   <  >  19.9*  18.8*  20.7*   MCV  92   --   94   --   92  91  89  89  89  90   < >  86  85  86   PLT  35*   --   40*  38*  42*  40*  37*  48*  43*  48*   < >  54*  62*  54*    < > = values in this interval not displayed.       Recent Labs   Lab Test  08/05/18   0709  08/03/18   0743  08/02/18   0631  08/01/18   0627  07/31/18   1522  07/30/18   0516   BILITOTAL  17.2*  22.4*  20.6*  18.1*  17.2*  14.5*   ALKPHOS  115  109  105  99  97  96   ALBUMIN  3.1*  3.2*  3.3*  3.4  3.5  3.8   AST  334*  351*  242*  176*  201*  248*   ALT  260*  231*  162*  126*  133*  157*       Recent Labs   Lab Test  08/03/18   1143  06/27/18   0628   03/17/18   0418  03/16/18   0430   LACT  2.2*  1.0   < >   --    --    PCAL   --    --    --   6.36  7.13    < > = values in this interval not displayed.     Recent Labs   Lab Test  08/05/18   1323   VANCOMYCIN  20.7       Culture results with specimen source  Culture Micro   Date Value Ref Range Status   08/02/2018 No growth after 4 days  Preliminary   07/31/2018 No growth  Final   07/31/2018 (A)  Final    Cultured on the 1st day of incubation:  Staphylococcus epidermidis     07/31/2018   Final    Critical Value/Significant Value, preliminary result only, called to and read back by  Jacqueline Escamilla RN from [facility]. 8.2.18 at 0452. GR.      07/31/2018   Final    (Note)  POSITIVE for STAPHYLOCOCCUS EPIDERMIDIS and POSITIVE for the mecA  gene (resistant to methicillin) by Dasientigene multiplex nucleic acid  test. Final identification and antimicrobial susceptibility testing  will be verified by standard methods.    Specimen tested with Verigene multiplex, gram-positive blood culture  nucleic acid test for the following targets: Staph aureus, Staph  epidermidis, Staph lugdunensis, other Staph species, Enterococcus  faecalis, Enterococcus faecium, Streptococcus species, S. agalactiae,  S. anginosus grp., S. pneumoniae, S. pyogenes, Listeria sp., mecA  (methicillin resistance) and Kevin/B (vancomycin  resistance).    Critical Value/Significant Value called to and read back by Ryanne Diggs RN at 0726 [id number]     07/31/2018 No growth  Final    Specimen Description   Date Value Ref Range Status   08/02/2018 Blood Right Hand  Final   07/31/2018 Ascites  Final   07/31/2018 Ascites  Final   07/31/2018 Blood Right Hand  Final   07/31/2018 Blood Left Arm  Final   07/26/2018 Blood Left Hand  Final        Urine Studies     Recent Labs   Lab Test  07/31/18   1744  07/27/18   1030  07/26/18   0845  07/26/18   0030  06/26/18   1101  03/16/18   1430   URINEPH  5.5  5.0  5.0  5.0  5.0  5.5   NITRITE  Negative  Negative  Negative  Negative  Negative  Negative   LEUKEST  Small*  Negative  Negative  Negative  Negative  Trace*   WBCU  1  2   --   <1  1  2       CSF Testing  No lab results found.    Respiratory Virus Testing    Respiratory Virus Source   Date Value Ref Range Status   03/14/2018 Nasopharyngeal  Final     Influenza A   Date Value Ref Range Status   03/14/2018 Negative NEG^Negative Final     Influenza A, H1   Date Value Ref Range Status   03/14/2018 Negative NEG^Negative Final     Influenza A, H3   Date Value Ref Range Status   03/14/2018 Negative NEG^Negative Final     Influenza A 2009 H1N1   Date Value Ref Range Status   03/14/2018 Negative NEG^Negative Final     Influenza B   Date Value Ref Range Status   03/14/2018 Negative NEG^Negative Final     Respiratory Syncytial Virus A   Date Value Ref Range Status   03/14/2018 Negative NEG^Negative Final     Respiratory Syncytial Virus B   Date Value Ref Range Status   03/14/2018 Negative NEG^Negative Final     Parainfluenza Virus 1   Date Value Ref Range Status   03/14/2018 Negative NEG^Negative Final     Parainfluenza Virus 2   Date Value Ref Range Status   03/14/2018 Negative NEG^Negative Final     Parainfluenza Virus 3   Date Value Ref Range Status   03/14/2018 Negative NEG^Negative Final     Human Metapneumovirus   Date Value Ref Range Status   03/14/2018 Negative  NEG^Negative Final     Human Rhinovirus   Date Value Ref Range Status   03/14/2018 Negative NEG^Negative Final     Adenovirus Species B/E   Date Value Ref Range Status   03/14/2018 Negative NEG^Negative Final     Adenovirus Species C   Date Value Ref Range Status   03/14/2018 Negative NEG^Negative Final     Last check of C difficile  No results found for: CDBPCT    Imaging:  Us Abdominal Doppler Complete    Result Date: 8/1/2018  EXAMINATION: US ABDOMEN LIMITED WITH DOPPLER, 8/1/2018 11:48 AM COMPARISON: Ultrasound abdomen dated 7/26/2018 and MRI 6/28/2018 HISTORY: HCC with acute change in mentation, evaluate for complete portal vein thrombosis TECHNIQUE: The abdomen was scanned in standard fashion with specialized ultrasound transducer(s) using both gray-scale, color Doppler, and spectral flow techniques. Findings: Liver: Cirrhotic configuration of liver with increased echogenicity, coarse echotexture an nodular contours. Known hepatic lesions are better depicted on 6/28/18 MRI. Extrahepatic portal vein flow is retrograde, measuring 86 cm/sec. Right portal vein flow is retrograde, measuring 62 cm/sec. Left portal vein flow is retrograde, measuring 65 cm/sec. Flow in the hepatic artery is towards the liver and: 420 cm/sec peak systolic, potentially exaggerated by respiratory status. 0.68 resistive index. The splenic vein is patent and flow is retrograde.  The left, middle, and right hepatic veins are patent with flow towards the IVC. The IVC is patent with flow towards the heart.   The visualized aorta is not dilated. The main portal vein is dilated measuring 2.5 cm. There is a nonocclusive eccentric thrombus in the main portal vein, extending to the right and left portal veins, unchanged from previous exam as questioned.     Impression: 1.  Cirrhotic appearance of the liver, with evidence of portal hypertension including ascites and retrograde flow in the splenoportal axis. 2.  Nonocclusive thrombus is again seen  in the main portal vein extending into the right and left portal veins. 3.   Noted HCC, not evaluated on today's Doppler examination I have personally reviewed the examination and initial interpretation and I agree with the findings. ALVINO LOVE MD    Xr Chest Port 1 View    Result Date: 8/5/2018  XR CHEST PORT 1 VW  8/4/2018 8:57 PM  HISTORY: shortness of breath; COMPARISON: 7/31/2018 FINDINGS: Increased retrocardiac and patchy perihilar opacities. Pulmonary vasculature is indistinct. Likely left-sided pleural effusion. Cardiac silhouette is stable. No pneumothorax.     IMPRESSION: Increased retrocardiac and patchy perihilar opacities with indistinct pulmonary vasculature. Interstitial pulmonary edema. I have personally reviewed the examination and initial interpretation and I agree with the findings. TOSHIA YIP MD    Xr Chest Port 1 View    Result Date: 7/31/2018  XR CHEST PORT 1 VW 7/31/2018 4:05 PM Comparison: 6/26/2018 History: SOB Findings: Single AP view of the chest. Prominent cardiac silhouette, unchanged. Pulmonary vasculature is distinct. No acute airspace disease. No pleural effusion or pneumothorax.     Impression: No acute airspace disease. I have personally reviewed the examination and initial interpretation and I agree with the findings. BRAULIO OTERO MD

## 2018-08-06 NOTE — PLAN OF CARE
Problem: Patient Care Overview  Goal: Plan of Care/Patient Progress Review  7A PT: Orders acknowledged and appreciated. Cancel, attempted pt in AM though pt and pt's wife declining at the time and requesting reschedule. Multiple family members present in room in PM. Will reschedule PT eval.

## 2018-08-07 NOTE — PHARMACY-VANCOMYCIN DOSING SERVICE
Pharmacy Vancomycin Note  Date of Service 2018  Patient's  1945   73 year old, male    Indication: Bacteremia  Goal Trough Level: 15-20 mg/L  Day of Therapy: 6  Current Vancomycin regimen:  1000 mg IV q24h    Current estimated CrCl = Estimated Creatinine Clearance: 40.9 mL/min (based on Cr of 1.85).    Creatinine for last 3 days  2018:  7:09 AM Creatinine 1.59 mg/dL  2018:  7:39 AM Creatinine 1.72 mg/dL  2018:  6:22 AM Creatinine 1.85 mg/dL    Recent Vancomycin Levels (past 3 days)  2018:  1:23 PM Vancomycin Level 20.7 mg/L  2018:  4:46 PM Vancomycin Level 24.9 mg/L    Vancomycin IV Administrations (past 72 hours)                   vancomycin (VANCOCIN) 1000 mg in dextrose 5% 200 mL PREMIX (mg) 1,000 mg New Bag 18 1805     1,000 mg New Bag 18 1958     1,000 mg New Bag 18 1726                Nephrotoxins and other renal medications (Future)    Start     Dose/Rate Route Frequency Ordered Stop    18 1855  vancomycin place sanchez - receiving intermittent dosing      1 each Does not apply SEE ADMIN INSTRUCTIONS 18 1856      18 1345  furosemide (LASIX) tablet 40 mg      40 mg Oral DAILY 18 1337               Contrast Orders - past 72 hours     None          Interpretation of levels and current regimen:  Trough level is  Supratherapeutic (21 hour trough)    Has serum creatinine changed > 50% in last 72 hours: No    Urine output:  Multiple unmeasured outputs    Renal Function: Worsening    Plan:  1.  Change to intermittent dosing. Patient received 18 minutes worth of vancomycin prior to level being evaluated. (300mg)  2.  Pharmacy will check trough levels as appropriate in 1-3 Days.    3. Serum creatinine levels will be ordered daily for the first week of therapy and at least twice weekly for subsequent weeks.      Barbara Mccormack, PharmD, BCPS          .

## 2018-08-07 NOTE — PLAN OF CARE
Problem: Patient Care Overview  Goal: Plan of Care/Patient Progress Review  Discharge Planner PT  7A PT  Patient plan for discharge: Pt states if he is not in the hospital he wants to be at home  Current status: PT eval complete, treatment initiated. Pt's spouse and several family members present throughout session. Pt becomes tearful throughout session due to prognosis. Pt performs supine>sit mod-Ortega, sit<>stand Ortega + FWW at EOB, requires modA at toilet height. Pt ambulates 75' with FWW and SBA, easily fatigued and requires several standing rest breaks.  Barriers to return to prior living situation: Medical status, level of assist  Recommendations for discharge: Home with 24/7 A + home nursing and HHPT pending LOS and progress with therapy  Rationale for recommendations: Pt has strong preference to return home if not in the hospital. Pt will likely be safe/appropriate to return home with 24/7 A and HHPT and nursing services. PT will work on functional mobility and caregiver training during remainder of IP stay to ensure safe discharge plan. Pt has great support of family members.       Entered by: Latanya Alejandro 08/07/2018 3:12 PM

## 2018-08-07 NOTE — PLAN OF CARE
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       A: A0x4. VSS, on room air. Afebrile. Denies pain.  Watery BM x1. Voiding in urinal. Up to bathroom with assist of 1-2 and wheelchair.Sleeping well. VPM on for night. Family asleep in room.    I/O this shift:  In: -   Out: 200 [Urine:200]    Temp:  [97.6  F (36.4  C)-97.8  F (36.6  C)] 97.7  F (36.5  C)  Pulse:  [56-68] 68  Heart Rate:  [58] 58  Resp:  [18] 18  BP: (113-124)/(52-79) 124/52  SpO2:  [97 %-99 %] 98 %      P: Continue to monitor Pt status and report changes to treatment team.            Problem: Diabetes Comorbidity  Goal: Diabetes  Patient comorbidity will be monitored for signs and symptoms of hyperglycemia or hypoglycemia. Problems will be absent, minimized or managed by discharge/transition of care.   Outcome: No Change  Blood sugar check at 0200 was 236.

## 2018-08-07 NOTE — PLAN OF CARE
Problem: Patient Care Overview  Goal: Plan of Care/Patient Progress Review  Patient out of bed x3 this shift. Went outside with entire family to get a group photo. States he is tired after being in wheelchair for extended period of time, but happy to be out of bed. Patient used bathroom x2 with assist of 1-2 and wheelchair. He feels happy to be able to get to toilet. Ate a good supper and drinking good PO liquids. Family here much of shift to keep patient company. Denies pain.

## 2018-08-07 NOTE — PROGRESS NOTES
"CLINICAL NUTRITION SERVICES - REASSESSMENT NOTE     Nutrition Prescription    RECOMMENDATIONS FOR MDs/PROVIDERS TO ORDER:  Patient with overall inadequate intake.  Will need to consider FT placement for supplemental nutrition support.     Malnutrition Status:    Patient does not meet two of the above criteria necessary for diagnosing malnutrition but is at risk for malnutrition    Recommendations already ordered by Registered Dietitian (RD):  Discontinued Boost Plus supplements.    Encouraged ordering Magic Cup.  Provided cafeteria pass.  Family getting a lot of food from outside the hospital.     Future/Additional Recommendations:  Consider calorie counts.  Wife seemed hesitant to have this done for patient.      EVALUATION OF THE PROGRESS TOWARD GOALS   Diet: Regular + Boost Plus between meals BID + Magic Cup with meals    Intake: Per patient's wife, PO intake has been very poor.  Ate fruit ice this morning.  Yesterday ate 2 white castle slider (each contained 140 kcal, 7 grams protein), which was the most he has eaten in a while.  His children plan to bring in burritos from a Mexican restaurant later today and patient seemed excited about this per wife.  Wife requested we stop sending the Boost supplements, he says they are \"too thick\".  Family brought in Ensure original instead.  Hasn't liked the Magic Cup as much as last time.  Patient sleeping with CPAP on during visit.      NEW FINDINGS   Patient/family wish for aggressive cares most recently    MALNUTRITION  % Intake: </= 50% for >/= 5 days (severe)  % Weight Loss: Unable to assess (d/t fluid status)  Subcutaneous Fat Loss: Unable to assess  Muscle Loss: Unable to assess  Fluid Accumulation/Edema: Mild  Malnutrition Diagnosis: Patient does not meet two of the above criteria necessary for diagnosing malnutrition but is at risk for malnutrition    Previous Goals   Pt to consume at least % of meals or equivalent with snacks/supplements.   Evaluation: Not " met    Previous Nutrition Diagnosis  Inadequate oral intake  Evaluation: No change    CURRENT NUTRITION DIAGNOSIS  Inadequate oral intake related to altered mental status, fatigue, decreased appetite as evidenced by patient eating 1 meal/day, declining most foods.      INTERVENTIONS  Implementation  Nutrition education for recommended modifications - discussed oral supplements with wife.  Encouraged diluting Boost/Ensure with ice.  Requested re-try of Magic Cup.  Provided cafeteria pass. Encouraged family to bring food in for patient.  Explained nutrition importance.  Discussed possible need for FT if PO intake unable to improve.     Goals  Patient to consume % of nutritionally adequate meal trays TID, or the equivalent with supplements/snacks.    Monitoring/Evaluation  Progress toward goals will be monitored and evaluated per protocol.      Moraima Patino MS, RD, LD  Pager [phone]

## 2018-08-07 NOTE — PROGRESS NOTES
08/07/18 1443   Quick Adds   Type of Visit Initial PT Evaluation   Living Environment   Lives With spouse   Living Arrangements house   Home Accessibility bed and bath are not on the first floor;bed and bath on same level;stairs within home;stairs to enter home;grab bars present (bathtub);stairs (1 railing present)   Number of Stairs to Enter Home 1   Number of Stairs Within Home 24  (split level home, 3 flights of 8 stairs)   Stair Railings at Home inside, present on right side   Transportation Available family or friend will provide   Living Environment Comment Pt lives with wife, who previously was gone throughout the day per pt report, though now with pt's worsening condition, pt's wife present throughout the day. Split level home with 8 stairs leading down to basement where shower is located, 8 stairs up to kitchen and living room and another 8 stairs up to bedroom, TV room, and computer room. Pt has supportive family.   Self-Care   Dominant Hand right   Usual Activity Tolerance fair   Current Activity Tolerance poor   Regular Exercise no   Equipment Currently Used at Home cpap;grab bar;walker, rolling;other (see comments)  (scooter)   Activity/Exercise/Self-Care Comment Limited exercise recently, notes he uses electric scooter in garage and outside, has a FWW but has not been using. Pt reports home health PT/OT/SLP and nurses have been coming to home, though unable to verbalize when or how long these services have been provided.   Functional Level Prior   Ambulation 0-->independent   Transferring 0-->independent   Toileting 0-->independent   Bathing 2-->assistive person   Dressing 0-->independent   Eating 0-->independent   Communication 0-->understands/communicates without difficulty   Swallowing 0-->swallows foods/liquids without difficulty   Cognition 1 - attention or memory deficits   Fall history within last six months no   Which of the above functional risks had a recent onset or change?  ambulation;transferring;toileting;bathing;dressing;cognition   Prior Functional Level Comment Ind with functional mobility, notes his wife assists with ADLs, wife is present when pt showers for safety   General Information   Onset of Illness/Injury or Date of Surgery - Date 07/31/18   Referring Physician Peter Cardenas MD   Patient/Family Goals Statement To return home, pt ultimately would like to [facility] [place] one last time as well   Pertinent History of Current Problem (include personal factors and/or comorbidities that impact the POC) Serge Brambila is a 74 y/o M w/Hx of HCC (dx 5/17), 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 Now presenting <24 hrs after discharge with increased fatigue and somnolence and is admitted for elevated ammonia.   Precautions/Limitations fall precautions   General Observations 73-year-old male resting in bed upon arrival, multiple family members present   General Info Comments Activity orders: up ad david   Cognitive Status Examination   Orientation orientation to person, place and time   Level of Consciousness alert;lethargic/somnolent   Follows Commands and Answers Questions 100% of the time   Personal Safety and Judgment intact   Memory impaired   Cognitive Comment Mild difficulty with word finding   Pain Assessment   Patient Currently in Pain No   Integumentary/Edema   Integumentary/Edema no deficits were identifed   Posture    Posture Forward head position;Protracted shoulders   Range of Motion (ROM)   ROM Comment B LE and UE ROM WFL   Strength   Strength Comments B LE at least 3/5 strength per functional status   Bed Mobility   Bed Mobility Comments Mod-Ortega supine>sit EOB   Transfer Skills   Transfer Comments Ortega sit<>stand EOB with FWW, modA sit<>stand at toilet with FWW   Gait  "  Gait Comments Ambulates 10' to bathroom with CGA-SBA and FWW, slow catina and short step length, cues to keep FWW in close proximity   Balance   Balance Comments Good sitting balance, SBA for standing balance with B UE support on FWW   Sensory Examination   Sensory Perception no deficits were identified   General Therapy Interventions   Planned Therapy Interventions balance training;gait training;strengthening;home program guidelines;progressive activity/exercise  (Caregiver training)   Clinical Impression   Criteria for Skilled Therapeutic Intervention yes, treatment indicated   PT Diagnosis Impaired functional mobility   Influenced by the following impairments Decreased strength, balance, activity tolerance   Functional limitations due to impairments Pt requires assist for functional mobility and ADLs   Clinical Presentation Stable/Uncomplicated   Clinical Presentation Rationale PMH and clinical judgment   Clinical Decision Making (Complexity) Low complexity   Therapy Frequency` other (see comments)  (4x/week)   Predicted Duration of Therapy Intervention (days/wks) 1 week   Anticipated Equipment Needs at Discharge (TBD)   Anticipated Discharge Disposition Home;Other (see comments)  (hospice?)   Risk & Benefits of therapy have been explained Yes   Patient, Family & other staff in agreement with plan of care Yes   Clinical Impression Comments Pt and family are considering options for discharge, potentially considering home with hospice care. Educated on importance of caregiver training during future sessions, pt and family verbalize understanding.    [facility] [employer] \"6 Clicks\"   2016, Trustees of [facility], under license to [employer].  All rights reserved.   6 Clicks Short Forms Basic Mobility Inpatient Short Form   [facility] [employer]  \"6 Clicks\" V.2 Basic Mobility Inpatient Short Form   1. Turning from your back to your side while in a flat bed without using bedrails? 3 - A Little "   2. Moving from lying on your back to sitting on the side of a flat bed without using bedrails? 3 - A Little   3. Moving to and from a bed to a chair (including a wheelchair)? 3 - A Little   4. Standing up from a chair using your arms (e.g., wheelchair, or bedside chair)? 3 - A Little   5. To walk in hospital room? 3 - A Little   6. Climbing 3-5 steps with a railing? 2 - A Lot   Basic Mobility Raw Score (Score out of 24.Lower scores equate to lower levels of function) 17   Total Evaluation Time   Total Evaluation Time (Minutes) 10

## 2018-08-07 NOTE — PROGRESS NOTES
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 Now presenting <24 hrs after discharge with increased fatigue and somnolence and is admitted for hepatic encephalopathy.      # ?Staph Epi Bacteremia   1/2 BCx w/ mecA+ staph epi. Generally a contaminate, but in the setting of immunocompromised state and methicillin resistance, there is a chance this may be a real infection. Treated with vancomycin with substantial improvement 24-48 hours after initiation of therapy. Given his clinical improvement, favor this is a real infection that is being treated with the appropriate antibiotics. Recommended course is 7-14 days.  - Continue Vancomycin (8/2- 8/8) to complete 7 day course       # Hepatic Encephalopathy   # HCC, metastatic   # Decompensated KUHN cirrhosis   # Recurrent Ascites c/b hx of SBP  # Grade II EV  # Hyperbilirubinemia   Patient presents w/acute change in mentation. MELD 26, ammonia 202 (was 54 6/26). He is afebrile, WBC 2.4, K 4.9, recent ECHO w/o vegetations. Does have an aggressively progressing HCC, however in the setting of rapid and substantial improvement on antibiotics, this current acute decompensation is likely related to infection superimposed on malignancy.  - continue lactulose (enema if needed) & rifaximin  - Daily 40mg PO Protonix  - Cipro Daily 250mg QDaily for SBP prophylaxis (Dose decreased 7/29 per renal  rec)  - Considering home hospice for dc        # JUAN M on CKD  Hepatorenal syndrome vs pre-renal in the setting of poor PO/dehydration. No response to albumin challenge 100 gm x 3 days (7/25-7/28). Vancomycin certainly poses a risk of further renal injury, but given patients limited life expectancy and wishes, will CTM and continue with abx.   - CTM  - c/w holding his home dose of diuretics due to his JUAN M    - this may need to be changed if continues to be volume overloaded  - Will limit any paracentesis to < 3 L to avoid major fluid shifts that can aggravate the JUAN M.        # Acute on chronic macrocytic anemia:  Patient had large grade II varices and PHG on last EUS 6/29/18. Previously on warfarin for A Fib but has been held due to worsening anemia. Transfusion dependent (unclear why, no hx of MDS) ~ requiring 1U PRBC every week. Denies any overt GI bleeding. Hgb 6.6 on 7/26 s/p 1U PRBC. Fibrinogen wnl. % Retic elevated but absolute retic inappropriately normal. Iron wnl. Ferritin elevated (but this is also an acute phase reactant and would be expected).  - CTM  - transfuse for Hgb <7        # Coagulopathy  # Thrombocytopenia  # DVT ppx  Plt 40-50, baseline has been slowly trending down. This is likely related to cirrhosis/liver pathology. He is now s/p vitamin K 10mg x 3 days (7/26-7/28). Patient is at high risk for DVT in the setting of age, cancer, and immobility, however he is also at risk for bleeding given ammonia level. There does not appear to be any guidelines that support pharmacologic dvt ppx given thrombocytopenia.   - CTM      Chronic   # Hx of Afib  Now in NSR. Warfarin held d/t anemia   - PTA carvedilol       # T2DM  - Decrease basaglar to 15unit at bedtime - patient not eating well   - Low dose insulin correction scale  - Hypoglycemia protocol       # Hypothyroidism   - Continue PTA levothyroxine      # NAA  - Cpap with home settings     Diet: General diet   Fluids: IVF @150  DVT: mechanical  Code  Status: DNR/DNI    This patient was staffed with Dr. Brown, the attending physician on service.     Peter Cardenas IV, MD, MSc  Internal Medicine Resident, PGY2  Pager [phone]    Physician Attestation  I, Arash Brown MD, saw this patient with the resident and agree with the resident s findings and plan of care as documented in the resident s note with my edits.     I personally reviewed vital signs, medications, labs and imaging.    Arash Brown MD  Date of Service (when I saw the patient): 8/7/18                   Objective:   /54 (BP Location: Right arm)  Pulse 68  Temp 97.5  F (36.4  C) (Oral)  Resp 18  Wt 97.9 kg (215 lb 12.8 oz)  SpO2 99%  BMI 32.81 kg/m2      Intake/Output Summary (Last 24 hours) at 08/01/18 0757  Last data filed at 08/01/18 0300   Gross per 24 hour   Intake                0 ml   Output              300 ml   Net             -300 ml       PHYSICAL EXAMINATION  GEN: A&Ox3, weak, jaundice, in NAD, pleasant  CV: RRR, FRANKO @LUSB  LUNGS: CTAB  ABD: +BS, soft   EXT: wwp, +2 pitting edema b/l  SKIN: w/d/i  NEURO: no tremor, no FND      Labs:   All labs reviewed by me. Notable labs include (8/3) :   Na 137  Cr 1.85  Hgb 7.5    Imaging:  All imaging studies reviewed by me.     Current Medications:  All medications reviewed by me.

## 2018-08-08 NOTE — PLAN OF CARE
Problem: Patient Care Overview  Goal: Plan of Care/Patient Progress Review  Outcome: No Change  /61  Pulse 93  Temp 97.3  F (36.3  C) (Oral)  Resp 18  Wt 97.6 kg (215 lb 3.2 oz)  SpO2 97%  BMI 32.72 kg/m2    A&Ox4.  -190; Novolog administered per sliding scale.  Denies pain or nausea.  Fair appetite on regular diet.  PIV saline locked.  Voiding but not saving. BMx2.  Up ambulating in room and harp with Ax1 and walker.  Care conference completed with patient, family, palliative team, medicine team, and care coordinator.  Hospice liaison to visit with pt and family this afternoon.  Plan for discharge as soon as tomorrow.  Awaiting decision from patient and family regarding transition of care.  Continue to monitor and notify provider with changes.

## 2018-08-08 NOTE — PLAN OF CARE
Problem: Patient Care Overview  Goal: Plan of Care/Patient Progress Review  Discharge Planner PT  7A  Patient plan for discharge: Pt wants to go home if not in hospital  Current status: Ortega for sit<>stand at toilet, CGAx1-2 for sit<> wc. Pt ambulates 100' + 25' with FWW and SBA, ascends/descends 4 stairs with B rails and Ortega, step to pattern and heavy UE use on rails. Increased fatigue noted during today's session, evidence of B LE weakness. Discussed recommendations for discharge including use of tub bench, will trial transfer during next session. Also discussed including OT during IP stay though pt prefers to work solely with PT at this time.  Barriers to return to prior living situation: Medical status, level of assist   Recommendations for discharge: Home with 24/7 A + home care/nursing and HHPT pending LOS and progress with therapy  Rationale for recommendations: Pt has strong preference to return home if not in the hospital. Pt will likely be safe/appropriate to return home with 24/7 A and HHPT and nursing/home care services. PT will work on functional mobility and caregiver training during remainder of IP stay to ensure safe discharge plan. Pt has great support of family members.       Entered by: Latanya Alejandro 08/08/2018 9:29 AM

## 2018-08-08 NOTE — PROGRESS NOTES
"Care Coordinator: care Conference  D: Family requested CC at 1pm today--goal to find out pt's wishes about treatment and further care.  I: Held in pt's room with Dr Arash Brown, Dr Peter Cardenas, Palliative: Radha (Resident), bedside RN Moraima West, and family: wife Tori, son Sidney- his wife and Dung, sister-Reyna and myself.  Options: 1. Continue with treatment and return to hospital if gets sick, continue with [facility] and he will have a family member with him 24/7 or  2. Transition to Hospice--Serge is still not sure that he would want to die at home--he \" doesn't want to put the burden on any one person if he dies at home.\"  He has 1 Goal before he dies--to rent a motor[employer]City [employer]e, and drive to his [facility] in [place].  A: family has questions about hospice, and wish to meet with liagonzalo Timmons today--called her [phone] and left her a message (t/w her earlier and she said she could see them this afternoon), then family to decide which route they will take. Will follow--inform family when Argelia can come talk  With them.  "

## 2018-08-08 NOTE — CONSULTS
[facility], [place]    Palliative Care Consultation Note    Patient: Serge Brambila  Date of Admission:  7/31/2018    Requesting provider/team: [facility]  Reason for consult: Goals of care  Decisional support  Patient and family support    Recommendations:  Please see assessment below for rationale.    - Seems like patients goals do not align with hospice, given ongoing focus on curative intent and desire to return to the hospital, thus would recommend discharge with home health services and appointments as currently arranged  - Recommend referral to palliative care at discharge to facilitate transition to hospice, if and when ready to do so  - Consider paracentesis prior to discharge for comfort; if elects a more palliative care-focused plan of care, could consider Pleur-x catheter at some point to help with recurrent ascites   - Recommend POLST prior to discharge  - Palliative care will continue to follow, and will re-assess the above prior to discharge    These recommendations have been discussed with the patient, his family, and the primary treatment team.    Thank you for the opportunity to participate in the care of this patient and family. Our team will continue to follow. Please feel free to contact the on-call Palliative provider with any urgent needs.     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     Symptoms:    - complains of worsening abdominal distention this admission; consider paracentesis prior to discharge. Noted in past not to be  interested in Pleur-x due to concerns around infection risk  - dyspnea in the past with recurrent ascites; not bothersome at this point  - difficulty sleeping - already receiving prn melatonin  - poor appetite - already using Ensure supplementation  Social:         Living situation: lives at home with wife       Support system: immediate and extended family (3 children that live locally, plus grandchildren)       Actual/Potential Caregiver: immediate and extended family; per son someone is always present at home with him       Functional status: ambulatory       Substance use disorder (past / present): N/A       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 Briefly touched on likely progression of disease, with which he was already familiar.     Advance Care Planning: ongoing        Decision making capacity: Y       Disease understanding: good       Goals of Care: wishes to travel - either to [facility] in [place] or back to home in [place] with his son       Health care directive: drafted, not notarized       Code Status:  DNR / DNI       POLST There is no POLST (Physician orders for life-sustaining treatment) form on file for this patient    History of Present Illness   Sources of History: patient, electronic health record and patient's family    73 year old gentleman with complex medical history, but significant for CAD (s/p MI w/ PCI 2002), a fib, HTN, HLD, NAA, DM, colon cancer s/p R hemicolectomy, hypothyroidism, who was initially diagnosed with cirrhosis in 2017. First admission for HE in April 2017, with multiple admissions between then and June 2018 for HE, acute on chronic anemia, JUAN M. His hospital admissions then escalated, with multiple hospitalizations between late June and the present day (6/26 to 7/1/2018 for E coli bacteremia and SBP;  "7/25 to 7/30/2018 for JUAN M and hyperkalemia, decompensated KUHN cirrhosis). He discharged to home 7/30 feeling well. Unfortunately, he decompensated overnight following discharge, becoming acutely altered and somnolent. He returned on 7/31, and was readmitted for HE in the setting of possible Staph epi bacteremia. Since being readmitted, he made a \"dramatic\" turnaround mentally, per patient and family giving him additional and unexpected time to spend with those most important to him.     Per chart, he was initially referred to [facility] hospice in 2017, but his goals of care at the time were noted not to be in line with the program philosophy. More recently, he was seen by the [facility] palliative medicine team on 5/16/2018, at which point the hospice benefit was revisited, and declined due to his goals not being in alignment with the program philosophy. This admission, given the increase in his recent admissions and overall poor prognosis, the palliative medicine team was engaged to help with goals of care, and (at the family's request) to revisit the concept of the hospice benefit.     A care conference was held at the bedside today with the patient and multiple family members present. The patient voiced overall recognition of his disease and its likely course, expressed interest in the hospice benefit, and general acceptance that there were interventions that he would not want if he were nearing the end of his life. He expressed concern that he would be a burden on his family if he were to die at home, and at one point noted that if his mental status were worsening, he would not want to continue to receive blood transfusions (which he has been getting almost weekly in the recent past). However, he later stated that he would not want to spend his final days in a nursing or hospice facility, and in the event that he were worsening, he would would want to return to the hospital for evaluation and treatment of " any reversible causes for his decline (expressly noting he would want antibiotics, blood products). It became clear over multiple conversations that being in the hospital provides him with a feeling of security, and he expressed that his preference would be to die in the hospital. His wife, on the other hand, expresses a desire to have the support of the hospice program, given her (positive) experience with hospice during the final days of her father. His son, similarly, expresses a desire for the support of the hospice program following discharge.      ROS:  A comprehensive ROS has been negative other than stated in the HPI and below:   Palliative Symptom Review (0=no symptom/no concern, 1=mild, 2=moderate, 3=severe):  Pain: 0  Fatigue: 2  Nausea: 0  Constipation: 0  Diarrhea: 0  Depressive Symptoms: 1  Anxiety: 1  Drowsiness: 2  Poor Appetite: 1  Shortness of Breath: 0  Insomnia: 0  Delirium: 0  Overall (0 good/no concerns, 3 very poor): 1     Past Medical History:   Past Medical History:   Diagnosis Date     Atrial fibrillation (H)      Diabetes (H)     type II     Hepatic encephalopathy (H)      Hypertension      MI (myocardial infarction)     stent placement     Sleep apnea      Thyroid disease           Past Surgical History:   Past Surgical History:   Procedure Laterality Date     APPENDECTOMY  age 16     COLONOSCOPY       ENDOSCOPIC ULTRASOUND UPPER GASTROINTESTINAL TRACT (GI) N/A 6/29/2018    Procedure: ENDOSCOPIC ULTRASOUND, ESOPHAGOSCOPY / UPPER GASTROINTESTINAL TRACT (GI);  ENDOSCOPIC ULTRASOUND with upper endoscopy ;  Surgeon: Dakota Emmanuel MD;  Location: [facility] OR     ESOPHAGOSCOPY, GASTROSCOPY, DUODENOSCOPY (EGD), COMBINED N/A 1/24/2018    Procedure: COMBINED ESOPHAGOSCOPY, GASTROSCOPY, DUODENOSCOPY (EGD);  ESOPHAGOGASTRODUODENOSCOPY (MAC) ;  Surgeon: Colton Stroud MD;  Location:  GI     ESOPHAGOSCOPY, GASTROSCOPY, DUODENOSCOPY (EGD), COMBINED N/A 6/29/2018    Procedure: COMBINED  ESOPHAGOSCOPY, GASTROSCOPY, DUODENOSCOPY (EGD);;  Surgeon: Dakoat Emmanuel MD;  Location: [facility] OR     GI SURGERY  2012    partial colectomy for pre-CA nodule, removed 10 in.            Family History:   Family History   Problem Relation Age of Onset     Hyperlipidemia Father      Hypertension Father      Cervical Cancer Maternal Grandmother      Alcoholism Mother      Abdominal Aortic Aneurysm Mother      HEART DISEASE Mother      Hypertension Mother      Thyroid Disease Mother      Hyperlipidemia Mother      Family history reviewed        Allergies:   Allergies   Allergen Reactions     Penicillins      Happened in his teens, unsure what his reaction was. Tolerated ceftriaxone and Zosyn 3/2018            Medications:   I have reviewed this patient's medication profile and medications given in the past 24 hours.  Lactulose/rifaximin  Acetaminophen prn  Melatonin         Physical Exam:   Vital Signs: Temp: 97.3  F (36.3  C) Temp src: Oral BP: 131/61 Pulse: 93 Heart Rate: 87 Resp: 18 SpO2: 97 % O2 Device: None (Room air)    Weight: 215 lbs 3.2 oz    Physical Exam:  Gen: resting upright in bed, in NAD  HEENT: NC/AT, PERRL, EOMI, scleral icterus present  CV: Irregularly irregular, 3/6 systolic ejection murmur present, loudest at LUSB  Resp: CTAB, no respiratory distress  Abd: soft, distended, bowel sounds present and normoactive, non-tender  Ext: WWP, 3+ BLE edema  Skin: diffusely jaundiced, scattered ecchymoses present on BUE     Data reviewed:   BMP with normal Na, K, with mild hyperchloremia (111, improved from prior), evidence of NAGMA (CO2 17), elevated BUN (70) and Cr (1.86).      LFTs from 8/5 notable for the following: Alb 3.1, T bili 17.2, Alk phos wnl, , . INR 1.66     CBC with WBC 3.0, Hgb 7.4 (stable from 8/7), Plt 45 (stable from 8/7)    YOLI Berg CNS  Pager: [phone]  [facility] Inpatient Team Consult pager [phone] (M-F 8-4:30)  After-hours Answering Service  [phone]  Palliative Clinic: [phone]     Total time spent was greater than 85 minutes,  >50% of time was spent counseling and/or coordination of care regarding prognosis, possible entry into hospice benefit. 65 min of that were spent face-to-face in room from [id number].

## 2018-08-08 NOTE — PLAN OF CARE
Problem: Patient Care Overview  Goal: Plan of Care/Patient Progress Review  Outcome: No Change    RN:  Afebrile, /72, HR 73, O2 sat % on CPAP, 98% on room air. A&O x4, denies pain and nausea. Blood glucose 236 at bedtime, sliding scale and Lantus given, no BG recheck at 0200 if patient sleeping. Adequate urine output, medium watery BM x2, up to bathroom with walker and one person assist. Patient resting/sleeping between cares, will continue to monitor.

## 2018-08-08 NOTE — PLAN OF CARE
Problem: Patient Care Overview  Goal: Plan of Care/Patient Progress Review    /61  Pulse 93  Temp 97.3  F (36.3  C) (Oral)  Resp 18  Wt 97.6 kg (215 lb 3.2 oz)  SpO2 97%  BMI 32.72 kg/m2    [id number]  Pt requesting not to be bothered at this time. Meds rescheduled. A/VSS, using CPAP, breathing unlabored. Bed alarm on without family at bedside. Continue with POC.

## 2018-08-08 NOTE — PLAN OF CARE
Problem: Patient Care Overview  Goal: Plan of Care/Patient Progress Review  Outcome: No Change  /59 (BP Location: Right arm)  Pulse 68  Temp 97.5  F (36.4  C) (Oral)  Resp 18  Wt 97.9 kg (215 lb 12.8 oz)  SpO2 99%  BMI 32.81 kg/m2    07:00-19:30:  A&Ox4.  -217; Novolog administered per sliding scale.  Denies pain or nausea.  Fair appetite on regular diet.  Scheduled vancomycin stopped as vancomycin level was high this evening.  Voiding but not saving.  Watery BMx6; Lactulose administered as scheduled per pt request.  Up ambulating with Ax1; pt worked with PT.  Family at bedside; supportive of patient.  Family requesting care conference tomorrow at 13:00.  Continue with plan of care and notify provider with changes.

## 2018-08-09 NOTE — PROGRESS NOTES
[facility] Internal Medicine Progress  Note            Interval History:   No acute events overnight  RN notes reviewed   Patient continues to improve. He is able to ambulate independently, and is able to assist in self-cares. However, still weak with difficulty getting out of chairs. Will continue to work with PT. Plan to dc home some support, but continues to decline hospice.    Plan for Today  - Lactulose PO   - 1U pRBCs  - Paracentesis             Assessment and Plan:   Serge Brambila is a 72 y/o M w/Hx of HCC (dx 5/17), 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 Now presenting <24 hrs after discharge with increased fatigue and somnolence and is admitted for hepatic encephalopathy.      # Hepatic Encephalopathy   # HCC, metastatic   # Decompensated KUHN cirrhosis   # Recurrent Ascites c/b hx of SBP  # Grade II EV  # Hyperbilirubinemia   Patient presents w/acute change in mentation. MELD 26, ammonia 202 (was 54 6/26). He is afebrile, WBC 2.4, K 4.9, recent ECHO w/o vegetations. Does have an aggressively progressing HCC, however in the setting of rapid and substantial improvement on antibiotics, this current acute decompensation is likely related to infection superimposed on malignancy.  - continue lactulose (enema if needed) & rifaximin  - Daily 40mg PO Protonix  - Cipro Daily 250mg QDaily for SBP prophylaxis (Dose decreased 7/29 per renal rec)       # JUAN M on CKD  Hepatorenal syndrome vs pre-renal in the setting of poor PO/dehydration. No response to albumin challenge 100 gm x 3 days (7/25-7/28). Vancomycin certainly poses a risk of further renal injury, but given patients limited life expectancy and wishes, will CTM and continue with abx.   - c/w holding his home dose of diuretics due to his JUAN M, will need PCP f/u   - have been limiting  paracentesis of <3L to avoid shift which can worsen JUAN M        # Acute on chronic macrocytic anemia:  Patient had large grade II varices and PHG on last EUS 6/29/18. Previously on warfarin for A Fib but has been held due to worsening anemia. Transfusion dependent, requiring 1U PRBC every week. Denies any overt GI bleeding. Fibrinogen wnl. % Retic elevated but absolute retic inappropriately normal. Iron wnl. Ferritin elevated (but this is also an acute phase reactant and would be expected).  - transfuse for Hgb <7  - transfuse 1U        # Coagulopathy  # Thrombocytopenia  # DVT ppx  Plt 40-50, baseline has been slowly trending down. This is likely related to cirrhosis/liver pathology. He is now s/p vitamin K 10mg x 3 days (7/26-7/28). Patient is at high risk for DVT in the setting of age, cancer, and immobility, however he is also at risk for bleeding given ammonia level. There does not appear to be any guidelines that support pharmacologic dvt ppx given thrombocytopenia.   - CTM      # ?Staph Epi Bacteremia, resolved   1/2 BCx w/ mecA+ staph epi (7/31). Generally a contaminate, but in the setting of immunocompromised state and methicillin resistance, there is a chance this may have been a real infection. Treated with vancomycin (8/2- 8/8) with substantial improvement 24-48 hours after initiation of therapy.      Chronic   # Hx of Afib  Now in NSR. Warfarin held d/t anemia   - PTA carvedilol       # T2DM  - Decrease basaglar to 15unit at bedtime - patient not eating well   - Low dose insulin correction scale  - Hypoglycemia protocol       # Hypothyroidism   - Continue PTA levothyroxine      # NAA  - Cpap with home settings     Diet: General diet   Fluids: IVF @150  DVT: mechanical  Code Status: DNR/DNI    This patient was staffed with Dr. Brown, the attending physician on service.     Peter Cardenas IV, MD, MSc  Internal Medicine Resident, PGY2  Pager [phone]             Objective:   /50 (BP Location: Left arm)   Pulse 87  Temp 97.3  F (36.3  C) (Oral)  Resp 16  Wt 97.6 kg (215 lb 3.2 oz)  SpO2 99%  BMI 32.72 kg/m2      Intake/Output Summary (Last 24 hours) at 08/01/18 0757  Last data filed at 08/01/18 0300   Gross per 24 hour   Intake                0 ml   Output              300 ml   Net             -300 ml       PHYSICAL EXAMINATION  GEN: A&Ox3, weak, jaundice, in NAD, pleasant  CV: RRR, FRANKO @LUSB  LUNGS: CTAB  ABD: +BS, soft   EXT: wwp, +2 pitting edema b/l  SKIN: w/d/i  NEURO: no tremor, no FND      Labs:   All labs reviewed by me. Notable labs include (8/3) :   Na 138  Cr 1.86  Hgb 7.5    Imaging:  All imaging studies reviewed by me.     Current Medications:  All medications reviewed by me.

## 2018-08-09 NOTE — PLAN OF CARE
Problem: Patient Care Overview  Goal: Plan of Care/Patient Progress Review  Outcome: Therapy, progress toward functional goals is gradual  /51  Pulse 87  Temp 97.1  F (36.2  C) (Oral)  Resp 18  Wt 97.6 kg (215 lb 3.2 oz)  SpO2 99%  BMI 32.72 kg/m2    AVSS on room air. A&O x4. Received one unit of PRBC's today for hgb of 7.1 Paracentesis done today. 350 mg of tylenol given x1 for pain at site following. Tolerating regular diet. Denies nausea. On scheduled lactulose and had BM x5. Passing gas. Mixing urine with stool. PIV saline locked. Up with assist x1. Family present at bedside and supportive with cares. Plan is to discharge home tomorrow. Will continue POC and notify team with any changes.

## 2018-08-09 NOTE — PLAN OF CARE
Problem: Patient Care Overview  Goal: Plan of Care/Patient Progress Review  Discharge Planner PT   Patient plan for discharge: Home with assist and HHPT  Current status: Assisted pt in going to the bathroom and cleaning up at sink. PT providing CGA physical support and maxA for pericares and donning/doffing briefs and gown. Pt progressed functional endurance by amb 210 ft x2 with FWW and SBA with 1 seated rest break.  Barriers to return to prior living situation: assist for transfers and ADLs needed, poor activity tolerance  Recommendations for discharge: Home with 24/7 assist from family and HHPT  Rationale for recommendations: see above       Entered by: Lauren Buenrostro 08/09/2018 2:00 PM

## 2018-08-09 NOTE — PROCEDURES
Procedure Note  The risks and benefits of the procedure were explained to the patient and patient's spouse, who expressed understanding and opted to proceed. Consent was already in the chart. A time out was performed. An area of ascites was located using ultrasound and marked in the left lower quadrant; the area was prepped and draped in the usual sterile fashion. 5 mL of 1% lidocaine was instilled and ascites located. The paracentesis catheter and needle were inserted until ascites obtained then the needle removed. The apparatus was connected to vacuum bottles and a total of 3400 mL removed.  The catheter was withdrawn and the area dressed. The patient tolerated the procedure well.  Paracentesis was performed by Kristen Laguerre PA-C.  Location: LLQ  Total volume removed: 3400 ml  Fluid sent to the lab: yes    Kristen Laguerre PA-C  [facility] Medicine  Bedside Procedures Service  [phone]  DOS:  August 9, 2018

## 2018-08-09 NOTE — PLAN OF CARE
Problem: Patient Care Overview  Goal: Plan of Care/Patient Progress Review  Outcome: Improving    RN:  AVSS, O2 sat 97-99% on CPAP.  Denies pain and nausea. Adequate urine output, watery BM x2, up to bathroom with walker and standby assist. Possibly to discharge today with home care/services? Patient and family will still decide transition of care. Patient resting between cares, will continue to monitor.

## 2018-08-09 NOTE — PROGRESS NOTES
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     Discussed extensively today patients goals of care. Discussed that overall he is wanting less aggressive work up and treatments moving forward. He is still wanting to come back to the hospital for that. He does not want to go through really aggressive treatment, he does not want dialysis, he does not want BiPAP. Completed a POLST with patient today and gave him and his family copies. It was also clear that patient is verbally processing his thoughts about the end of his life. He actually in an ideal world wants to die in the hospital because he does not want the burden to be on his family if they did everything they could and he does not want his wife to live there with that memory of his death at home. He also understands that whatever happens that will be ok. He expresses a lot of feeling when talking about going to [facility] to be with his other family that has  (parents, brother in March of this year, and a grandchild that was a stillborn).    Spiritual/Anabaptist:    Spiritual background: he says that he met a  that works with his son. He built a good relationship with this  and appreciates their approach. He is not particularly spiritual or  Buddhism.      Advance Care Planning:               Decision making capacity: Yes       Disease understanding: good understanding       Goals of Care: restorative       Health care directive: None in chart       Code Status:  DNR / DNI       POLST Physician Orders for Life-Sustaining Treatment (POLST)    Treatment options when the patient is not breathing and has no pulse   Do not attempt or continue any resuscitation      Treatment options when the patient has a pulse and/or is breathing   Limited additional interventions         Artificially administered fluids and nutrition   No feeding tube      Discussed with the patient and the patient's spouse       These recommendations have been discussed via this note.    YOLI Berg CNS  Palliative Care Consult Team  Pager: [phone]    [facility] Inpatient Team Consult pager [phone] (M-F 8-4:30)  After-hours Answering Service [phone]   Palliative Clinic: [phone]       Interval History:      Sitting up in chair, he is processing his thoughts a lot about end of life and expressed he had tears of sanjay today about thoughts of being with his family members that have  in the future.       Medications:   I have reviewed this patient's medication profile and medications during this hospitalization    Acetaminophen PRN  Lactulose PRN  Melatonin PRN          Review of Systems:   A comprehensive ROS has been negative other than stated in the HPI and below:   Palliative Symptom Review (0=no symptom/no concern, 1=mild, 2=moderate, 3=severe):      Pain: 0      Fatigue: 0      Nausea: 0      Constipation: 0      Diarrhea: 0      Depressive Symptoms: 0      Anxiety: 0      Drowsiness: 0      Poor Appetite: 0      Shortness of Breath: 0      Insomnia: 0          Physical Exam:     Vital Signs: Temp: 97.3  F (36.3  C) Temp src: Oral BP: 112/50 Pulse: 87 Heart Rate: 65 Resp: 16 SpO2: 99 % O2 Device: None (Room air)    Weight: 215 lbs 3.2 oz    Physical  Exam:  Constitutional: Awake, alert, cooperative, no apparent distress  Lungs: No increased work of breathing  Neurologic: Awake, alert, oriented to name, place and time.   Neuropsychiatric: tearful, normal orientation, memory and insight.  Skin:janudiced      Data Reviewed:     ROUTINE ICU LABS (Last four results)  CMP  Recent Labs  Lab 08/09/18  0634 08/08/18  0639 08/07/18  0622 08/06/18  0739 08/05/18  0709 08/03/18  0743    138 137 138 141 151*   POTASSIUM 4.5 4.4 4.5 4.3 4.3 5.0   CHLORIDE 110* 111* 111* 112* 114* 124*   CO2 18* 17* 17* 17* 17* 17*   ANIONGAP 8 10 9 9 10 10   * 167* 179* 161* 137* 146*   BUN 70* 70* 74* 71* 73* 87*   CR 1.97* 1.86* 1.85* 1.72* 1.59* 1.73*   GFRESTIMATED 34* 36* 36* 39* 43* 39*   GFRESTBLACK 41* 43* 44* 48* 52* 47*   HARI 8.5 8.5 8.5 8.6 8.7 9.4   PROTTOTAL  --   --   --   --  5.9* 6.5*   ALBUMIN  --   --   --   --  3.1* 3.2*   BILITOTAL 12.2*  --   --   --  17.2* 22.4*   ALKPHOS  --   --   --   --  115 109   AST  --   --   --   --  334* 351*   ALT  --   --   --   --  260* 231*     CBC  Recent Labs  Lab 08/09/18  0634 08/08/18  0639 08/07/18  0622 08/06/18  0739   WBC 2.8* 3.0* 3.4* 2.9*   RBC 2.36* 2.39* 2.49* 2.53*   HGB 7.1* 7.4* 7.5* 7.8*   HCT 21.6* 22.0* 22.9* 23.2*   MCV 92 92 92 92   MCH 30.1 31.0 30.1 30.8   MCHC 32.9 33.6 32.8 33.6   RDW 22.4* 22.2* 22.0* 21.8*   PLT 41* 45* 43* 35*     INR  Recent Labs  Lab 08/09/18  0634 08/08/18  0639 08/03/18  0743   INR 1.65* 1.66* 1.69*     Arterial Blood GasNo lab results found in last 7 days.    YOLI Berg CNS  Palliative Care Consult Team  Pager: [phone]      [facility] Inpatient Team Consult pager [phone] (M-F 8-4:30)  After-hours Answering Service [phone]  Palliative Clinic: [phone]     Total time spent was 35 minutes,  >50% of time was spent counseling and/or coordination of care regarding goals of care.

## 2018-08-09 NOTE — PROGRESS NOTES
"[facility]  Met with pt and family to discuss plans for hospice and answer questions about hospice.   Discussed the entire hospice benefit with the entire family. Family had many questions about what was covered and what was not covered and many \"what If\" questions.  Pt seems to align most of the time with the hospice philosophy, however there are times when he may want to return to the hospital and not die at home and have more testing done to \"see\".  Left pt and family with written information about the hospice benefit and contact information.  Updated the CC and SW on the meeting.     Pt/ Family has the contact information for  Hospice and my contact information for any questions or concerns.     Argelia Timmons RN Liaison   [facility]    "

## 2018-08-09 NOTE — PROGRESS NOTES
Pt receives free rifaximin from [facility] and requested a new script for refills. Pharmacy liaison received script from Dr. Cardenas and faxed to [facility]. [facility] confirmed they received the fax and will set up delivery with pt.     Lauren Fisher  Pharmacy Liaison  Ph: [phone] Page: [phone]

## 2018-08-09 NOTE — PROGRESS NOTES
[facility] Internal Medicine Progress  Note            Interval History:   No acute events overnight  RN notes reviewed   Patient much improved today, more energy and strength  Family states he walked the halls  Extensive discussion held with family and palliative care team today. Hospice and palliative services offered. Patient expressing incongruent goals: does not want to be treated again if he decompensates, but would like some treatments; is tired of fight, but would like to go home and maybe take a trip to [place]. Will likely be medically ready for dc tomorrow, pending stable or continued improvement. Patient would like to go home, which services and cares will be accepted are as of yet unclear.       Plan for Today  - Lactulose PO   - dc Vancomycin            Assessment and Plan:   Serge Brambila is a 72 y/o M w/Hx of HCC (dx 5/17), 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 Now presenting <24 hrs after discharge with increased fatigue and somnolence and is admitted for hepatic encephalopathy.      # ?Staph Epi Bacteremia   1/2 BCx w/ mecA+ staph epi. Generally a contaminate, but in the setting of immunocompromised state and methicillin resistance, there is a chance this may be a real infection. Treated with vancomycin with substantial improvement 24-48 hours after initiation of therapy. Given his clinical improvement, favor this is a real infection that is being treated with the appropriate antibiotics. Recommended course is 7-14 days.  - Vancomycin (8/2- 8/8)       # Hepatic Encephalopathy   # HCC, metastatic   # Decompensated KUHN cirrhosis   # Recurrent Ascites c/b hx of SBP  # Grade II EV  # Hyperbilirubinemia   Patient presents w/acute change in mentation. MELD 26, ammonia 202 (was 54 6/26). He is afebrile, WBC 2.4, K 4.9, recent ECHO  w/o vegetations. Does have an aggressively progressing HCC, however in the setting of rapid and substantial improvement on antibiotics, this current acute decompensation is likely related to infection superimposed on malignancy.  - continue lactulose (enema if needed) & rifaximin  - Daily 40mg PO Protonix  - Cipro Daily 250mg QDaily for SBP prophylaxis (Dose decreased 7/29 per renal rec)  - Considering home hospice for dc        # JUAN M on CKD  Hepatorenal syndrome vs pre-renal in the setting of poor PO/dehydration. No response to albumin challenge 100 gm x 3 days (7/25-7/28). Vancomycin certainly poses a risk of further renal injury, but given patients limited life expectancy and wishes, will CTM and continue with abx.   - CTM  - c/w holding his home dose of diuretics due to his JUAN M    - this may need to be changed if continues to be volume overloaded  - Will limit any paracentesis to < 3 L to avoid major fluid shifts that can aggravate the JUAN M.        # Acute on chronic macrocytic anemia:  Patient had large grade II varices and PHG on last EUS 6/29/18. Previously on warfarin for A Fib but has been held due to worsening anemia. Transfusion dependent (unclear why, no hx of MDS) ~ requiring 1U PRBC every week. Denies any overt GI bleeding. Hgb 6.6 on 7/26 s/p 1U PRBC. Fibrinogen wnl. % Retic elevated but absolute retic inappropriately normal. Iron wnl. Ferritin elevated (but this is also an acute phase reactant and would be expected).  - CTM  - transfuse for Hgb <7        # Coagulopathy  # Thrombocytopenia  # DVT ppx  Plt 40-50, baseline has been slowly trending down. This is likely related to cirrhosis/liver pathology. He is now s/p vitamin K 10mg x 3 days (7/26-7/28). Patient is at high risk for DVT in the setting of age, cancer, and immobility, however he is also at risk for bleeding given ammonia level. There does not appear to be any guidelines that support pharmacologic dvt ppx given thrombocytopenia.   -  CTM      Chronic   # Hx of Afib  Now in NSR. Warfarin held d/t anemia   - PTA carvedilol       # T2DM  - Decrease basaglar to 15unit at bedtime - patient not eating well   - Low dose insulin correction scale  - Hypoglycemia protocol       # Hypothyroidism   - Continue PTA levothyroxine      # NAA  - Cpap with home settings     Diet: General diet   Fluids: IVF @150  DVT: mechanical  Code Status: DNR/DNI    This patient was staffed with Dr. Brown, the attending physician on service.     Peter Cardenas IV, MD, MSc  Internal Medicine Resident, PGY2  Pager [phone]             Objective:   /61  Pulse 93  Temp 97.3  F (36.3  C) (Oral)  Resp 18  Wt 97.6 kg (215 lb 3.2 oz)  SpO2 97%  BMI 32.72 kg/m2      Intake/Output Summary (Last 24 hours) at 08/01/18 0757  Last data filed at 08/01/18 0300   Gross per 24 hour   Intake                0 ml   Output              300 ml   Net             -300 ml       PHYSICAL EXAMINATION  GEN: A&Ox3, weak, jaundice, in NAD, pleasant  CV: RRR, FRANKO @LUSB  LUNGS: CTAB  ABD: +BS, soft   EXT: wwp, +2 pitting edema b/l  SKIN: w/d/i  NEURO: no tremor, no FND      Labs:   All labs reviewed by me. Notable labs include (8/3) :   Na 138  Cr 1.86  Hgb 7.5    Imaging:  All imaging studies reviewed by me.     Current Medications:  All medications reviewed by me.

## 2018-08-10 NOTE — PLAN OF CARE
Problem: Patient Care Overview  Goal: Plan of Care/Patient Progress Review  Discharge Planner PT   Patient plan for discharge: Home with wife assist and home PT/OT/lymphedema (educated appropriately)  Current status: Performs multitude of bed mobility, sit<>stands, bed<>w/c<>bath bench pivots and amb 200' x 2 with FWW. Negotiated up/down 8 steps to simulate home. Performs with SBA/mod indep in safe manner.   Barriers to return to prior living situation: None  Recommendations for discharge: Home with wife assist and home PT/OT/lymphedema  Rationale for recommendations: Current level of function       Entered by: Bruno Negron 08/10/2018 4:21 PM

## 2018-08-10 NOTE — PLAN OF CARE
Problem: Patient Care Overview  Goal: Plan of Care/Patient Progress Review  Outcome: Improving    RN:  AVSS, O2 sat 97-98% on CPAP,  A&O x4, denies pain and nausea. Fair PO intake, blood glucose at bedtime 243, patient frequently drinking regular Coke, sliding scale and Lantus given. Loose, watery BM x2, voiding with BM, up to bathroom with walker and one person assist. Speech consult for swallow eval today. Possible discharge to home today? Patient resting between cares, will continue to monitor.

## 2018-08-10 NOTE — DISCHARGE SUMMARY
[facility], [place]    Internal Medicine Discharge Summary- Ashley Service    Date of Admission:  7/31/2018  Date of Discharge:  8/10/2018  Discharging Attending Provider: Kevin  Discharge Team: Ashley Davenport    Discharge Diagnoses   HCC  Encephalopathy   Methicillin Resistant Staph Epi Bacteremia?    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 Now presenting <24 hrs after discharge was admitted on 7/31/2018 for acute AMS.  The following problems were addressed during his hospitalization:      # ?Staph Epi Bacteremia, resolved   # AMS/Encephalopathy   1/2 BCx w/ mecA+ staph epi (7/31). Generally a contaminate, but in the setting of immunocompromised state and methicillin resistance, there is a chance this may have been a real infection. Treated with vancomycin (8/2- 8/8) with substantial improvement 24-48 hours after initiation of therapy.       # Acute on chronic macrocytic anemia:  Patient had large grade II varices and PHG on last EUS 6/29/18. Previously on warfarin for A Fib but has been held due to worsening anemia. Transfusion dependent, requiring 1U PRBC every week. Transfused with good improvement in energy.         # Hepatic Encephalopathy   # HCC, metastatic   # Decompensated KUHN cirrhosis   # Recurrent Ascites c/b hx of SBP  # Grade II EV  # Hyperbilirubinemia   Patient presents w/acute change in mentation. MELD 26, ammonia 202 (was 54 6/26). He is afebrile, WBC 2.4, K 4.9, recent ECHO w/o vegetations. Does have an aggressively progressing HCC, however in the setting of rapid and substantial improvement on antibiotics, this current acute  decompensation is likely related to infection superimposed on malignancy. Continued lactulose, rifaximin, protonix, and Cipro 250mg qDaily for SBP prophylaxis.          # JUAN M on CKD  Hepatorenal syndrome vs pre-renal in the setting of poor PO/dehydration. No response to albumin challenge 100 gm x 3 days (7/25-7/28). Vancomycin certainly poses a risk of further renal injury, but given patients limited life expectancy and wishes, will CTM and continue with abx. Holding his home dose of diuretics due to his JUAN M, will need PCP 1-2 weeks, can be seen sooner if symptomatic. Have been limiting paracentesis of <3L to avoid shift which can worsen JUAN M.           # Coagulopathy  # Thrombocytopenia  # DVT ppx  Plt 40-50, baseline has been slowly trending down. This is likely related to cirrhosis/liver pathology. He is now s/p vitamin K 10mg x 3 days (7/26-7/28). Patient is at high risk for DVT in the setting of age, cancer, and immobility, however he is also at risk for bleeding given ammonia level. There does not appear to be any guidelines that support pharmacologic dvt ppx given thrombocytopenia.         # Hx of Afib  Now in NSR. Warfarin held d/t anemia, continued PTA carvedilol         # T2DM  Decrease basaglar to 25 -> 15unit at bedtime with lose dose insulin correction, d/t poor PO. TDD 18-19U w/glucose 2-250's. Will send home with plan for Glargine 15U.        # Hypothyroidism   - Continue PTA levothyroxine       # NAA  - Cpap with home settings     # Discharge Pain Plan:   - Patient currently has NO PAIN and is not being prescribed pain medications on discharge.    Consultations This Hospital Stay   MEDICATION HISTORY IP PHARMACY CONSULT  PHARMACY TO DOSE [facility] SERVICES IP CONSULT  VASCULAR ACCESS CARE ADULT IP CONSULT  PHYSICAL THERAPY ADULT IP CONSULT  PALLIATIVE CARE ADULT IP CONSULT  INTERNAL MEDICINE PROCEDURE TEAM ADULT IP CONSULT [place] - PARACENTESIS  PHYSICAL THERAPY ADULT IP  CONSULT  SWALLOW EVAL SPEECH PATH AT BEDSIDE IP CONSULT  LYMPHEDEMA THERAPY IP CONSULT     Code Status   DNR / DNI       The patient was discussed with Dr. Kevin Cardenas IV, MD, MSc  Internal Medicine Resident, PGY2  [facility]   Pager [phone]    ______________________________________________________________________    Physical Exam   Vital Signs: Temp: 97.7  F (36.5  C) Temp src: Oral BP: 113/55 Pulse: 82 Heart Rate: 75 Resp: 20 SpO2: 99 % O2 Device: BiPAP/CPAP    Weight: 208 lbs 6.4 oz    GEN: A&Ox3, weak, jaundice, in NAD, pleasant  CV: RRR, FRANKO @LUSB  LUNGS: CTAB  ABD: +BS, soft, obese, some fluid wave  EXT: wwp, +2/3 pitting edema b/l  SKIN: w/d/i  NEURO: no tremor, no FND    Significant Results and Procedures   Most Recent 3 CBC's:  Recent Labs   Lab Test  08/10/18   0701  08/09/18   0634  08/08/18   0639   WBC  2.6*  2.8*  3.0*   HGB  8.0*  7.1*  7.4*   MCV  91  92  92   PLT  46*  41*  45*     Most Recent 3 BMP's:  Recent Labs   Lab Test  08/10/18   0701  08/09/18   0634  08/08/18   0639   NA  136  137  138   POTASSIUM  4.6  4.5  4.4   CHLORIDE  110*  110*  111*   CO2  16*  18*  17*   BUN  72*  70*  70*   CR  2.01*  1.97*  1.86*   ANIONGAP  12  8  10   HARI  8.7  8.5  8.5   GLC  230*  162*  167*     Most Recent 2 LFT's:  Recent Labs   Lab Test  08/09/18   0634  08/05/18   0709  08/03/18   0743   AST   --   334*  351*   ALT   --   260*  231*   ALKPHOS   --   115  109   BILITOTAL  12.2*  17.2*  22.4*     Most Recent 3 INR's:  Recent Labs   Lab Test  08/09/18   0634  08/08/18   0639  08/03/18   0743   INR  1.65*  1.66*  1.69*     Most Recent 6 Bacteria Isolates From Any Culture (See EPIC Reports for Culture Details):  Recent Labs   Lab Test  08/09/18   1421  08/02/18   0907  07/31/18   2330  07/31/18   2210  07/31/18   2208  07/26/18   1549   CULT  Culture negative monitoring continues  No growth  No growth  Cultured on the 1st day of incubation:  Staphylococcus epidermidis  *   Critical Value/Significant Value, preliminary result only, called to and read back by  Jacqueline Escamilla RN from [facility]. 8.2.18 at 0452. GR.     (Note)  POSITIVE for STAPHYLOCOCCUS EPIDERMIDIS and POSITIVE for the mecA  gene (resistant to methicillin) by Verigene multiplex nucleic acid  test. Final identification and antimicrobial susceptibility testing  will be verified by standard methods.    Specimen tested with Verigene multiplex, gram-positive blood culture  nucleic acid test for the following targets: Staph aureus, Staph  epidermidis, Staph lugdunensis, other Staph species, Enterococcus  faecalis, Enterococcus faecium, Streptococcus species, S. agalactiae,  S. anginosus grp., S. pneumoniae, S. pyogenes, Listeria sp., mecA  (methicillin resistance) and Kevin/B (vancomycin resistance).    Critical Value/Significant Value called to and read back by Ryanne Diggs RN at 0726 8.2.18.DK    No growth  No growth       Pending Results   These results will be followed up by PCP  Unresulted Labs Ordered in the Past 30 Days of this Admission     Date and Time Order Name Status Description    8/9/2018 2330 Basic metabolic panel In process     8/9/2018 1419 fluid culture - Aerobic Bacterial Preliminary              Primary Care Physician   TRENA LANGE    Discharge Disposition   Discharged to home  Condition at discharge: Terminal    Discharge Orders     Home care nursing referral     MD face to face encounter   Documentation of Face to Face and Certification for Home Health Services    I certify that patient: Serge Brambila is under my care and that I, or a nurse practitioner or physician's assistant working with me, had a face-to-face encounter that meets the physician face-to-face encounter requirements with this patient on: 8/1/2018.    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 Now presenting with increased fatigue and somnolence since discharge last night (7/30). Patient was hospitalized here at [facility] 7/25-7/30 for JUAN M and hyperkalemia, found to be in decompensated KUHN cirrhosis with acute anemia and acute hyponatremia. Patient was treated with albumin challenge and diuretics (lasix and spironolactone) were held on discharge. Initial concern for endocarditis on echo on 7/26, ?vegatations on aortic valve in the setting of recent e. Coli bacteremia. Follow up Echo on 7/27 was negative for vegetations and antibiotics were discontinued.      He left hospital with wife around 8pm yesterday and was feeling well. They live in split level house and he was able to get up 7 steps to living room and 7 steps to bedroom without issues. He slept through the night and wife reports he got up and had 2-3 loose stools throughout the night. When he woke this AM he was fatigued and would not take any of his medications. Have very little PO intake throughout day and continued to have generalized fatigue    I certify that, based on my findings, the following services are medically necessary home health services: Nursing, Occupational Therapy and Physical Therapy.    My clinical findings support the need for the above services because: Nurse is needed: To assess s/p hospitalization for hepatic encephalopathy, after changes in medications or other medical regimen frequent assessments and lab draws Occupational Therapy Services are needed to assess and treat cognitive ability and address ADL safety due to impairment in encephalapathy and Physical Therapy Services are needed to assess and  treat the following functional impairments: weakness/deconditioning    Further, I certify that my clinical findings support that this patient is homebound (i.e. absences from home require considerable and taxing effort and are for medical reasons or [employer] services or infrequently or of short duration when for other reasons) because: Leaving home is medically contraindicated for the following reason(s): hepatic encephalopathy and Requires assistance of another person or specialized equipment to access medical services because patient: Requires supervision of another for safe transfer...    Based on the above findings. I certify that this patient is confined to the home and needs intermittent skilled nursing care, physical therapy and/or speech therapy.  The patient is under my care, and I have initiated the establishment of the plan of care.  This patient will be followed by a physician who will periodically review the plan of care.  Physician/Provider to provide follow up care: Linn Thompson    Attending hospital physician (the Medicare certified [place] provider): Arash Brown MD  Physician Signature: See electronic signature associated with these discharge orders.  Date: 8/1/2018       Discharge Medications   Current Discharge Medication List      CONTINUE these medications which have CHANGED    Details   rifaximin (XIFAXAN) 550 MG TABS tablet Take 1 tablet (550 mg) by mouth 2 times daily  Qty: 60 tablet, Refills: 3    Associated Diagnoses: Hepatic encephalopathy (H)         CONTINUE these medications which have NOT CHANGED    Details   allopurinol (ZYLOPRIM) 100 MG tablet Take 1 tablet (100 mg) by mouth daily  Qty: 90 tablet, Refills: 0    Associated Diagnoses: Hyperuricemia      carvedilol (COREG) 6.25 MG tablet Take 1 tablet (6.25 mg) by mouth 2 times daily (with meals)  Qty: 180 tablet, Refills: 0    Associated Diagnoses: Secondary esophageal varices without bleeding (H)      ciprofloxacin (CIPRO) 500 MG tablet  Take ciprofloxacin 500 mg every 12h until 7/10/18 then take 500 mg daily  Qty: 120 tablet, Refills: 0    Associated Diagnoses: SBP (spontaneous bacterial peritonitis) (H); Bacteremia      lactulose (CHRONULAC) 10 GM/15ML solution 30 mLs (20 g) by Oral or NG Tube route 3 times daily  Qty: 2700 mL, Refills: 1    Associated Diagnoses: Hepatic encephalopathy (H)      Levothyroxine Sodium 125 MCG CAPS Take 125 mcg by mouth daily  Qty: 30 capsule, Refills: 0    Associated Diagnoses: Thyroid disease      pantoprazole (PROTONIX) 20 MG EC tablet Take 2 tablets (40 mg) by mouth 2 times daily  Qty: 60 tablet, Refills: 2    Associated Diagnoses: Gastrointestinal hemorrhage, unspecified gastrointestinal hemorrhage type; HCC (hepatocellular carcinoma) (H)      acetaminophen (TYLENOL) 325 MG tablet Take 2 tablets (650 mg) by mouth every 8 hours as needed for mild pain  Qty: 100 tablet    Associated Diagnoses: Liver cirrhosis secondary to KUHN (H); Liver mass; Cervical radiculopathy      BASAGLAR 100 UNIT/ML injection Inject 30 Units Subcutaneous At Bedtime       !! blood glucose monitoring (NO BRAND SPECIFIED) test strip 1 each      !! blood glucose monitoring (ONETOUCH ULTRA) test strip TEST TWICE DAILY TO THREE TIMES DAILY      Blood Pressure Monitoring (RA BLOOD PRESSURE CUFF MONITOR) MISC by Misc.(Non-Drug; Combo Route) route once daily.      [employer] LANCETS 21G MISC by Misc.(Non-Drug; Combo Route) route once daily.      melatonin 1 MG TABS tablet Take 1 tablet (1 mg) by mouth nightly as needed for sleep    Associated Diagnoses: Liver cirrhosis secondary to KUHN (H); Liver mass      Nitroglycerin (NITROQUICK SL) Place 1 tablet under the tongue as needed       sodium bicarbonate 650 MG tablet Take 2 tablets (1,300 mg) by mouth 2 times daily  Qty: 120 tablet, Refills: 1    Associated Diagnoses: Chronic kidney disease, unspecified CKD stage       !! - Potential duplicate medications found. Please discuss with provider.         Allergies   Allergies   Allergen Reactions     Penicillins      Happened in his teens, unsure what his reaction was. Tolerated ceftriaxone and Zosyn 3/2018

## 2018-08-10 NOTE — PLAN OF CARE
Problem: Patient Care Overview  Goal: Plan of Care/Patient Progress Review  Outcome: Adequate for Discharge Date Met: 08/10/18  VSS.  Napping at start of shift.  Wife Tori at bedside helpful and supportive.  Ok'd for discharge to home this afternoon.  Has all needed scripts at home with no new meds needed.  PIV dc'd.  Resident MD up to see.  Wife is comfortable with discharge plan.  Son and family here to .  Patient left via wc at 1700 accompanied by family.

## 2018-08-10 NOTE — PLAN OF CARE
Problem: Patient Care Overview  Goal: Plan of Care/Patient Progress Review  Discharge Planner SLP   Patient plan for discharge: Home with outpatient services  Current status: Patient presents with minimal oropharyngeal dysphagia best categorized as presbyphagia, impacted by impulsivity and poor swallow habits. Patient required minimal cues for safe swallow strategies, and strategies written on paper on tray table. Recommend regular diet and thin liquids, pills whole with liquid wash, with the following precautions: verbally remind pt for single sips and slow pacing at each meal, alternate solids and liquids, sit fully upright for all PO intake. SLP to f/u x1-2 for strategy use for swallowing.   Barriers to return to prior living situation: Medical stability  Recommendations for discharge: Continue previous OP SLP for language and memory  Rationale for recommendations: Pt with ongoing deficits in language and memory. Pt is not likely to require ongoing SLP services for swallowing at discharge.        Entered by: Bonnie Rodrigez 08/10/2018 10:21 AM

## 2018-08-10 NOTE — PROGRESS NOTES
08/10/18 1000   General Information   Onset Date 07/31/18   Start of Care Date 08/10/18   Referring Physician Peter Cardenas MD   Patient Profile Review/OT: Additional Occupational Profile Info See Profile for full history and prior level of function   Patient/Family Goals Statement Eat and drink without coughing   Swallowing Evaluation Bedside swallow evaluation   Behaviorial Observations WFL (within functional limits)   Mode of current nutrition Oral diet   Type of oral diet Regular;Thin liquid   Respiratory Status Room air   Comments Orders received for swallow evaluation. Pt reports frequent incidents of coughing with thin liquids, reports it does not occur with thicker textures. Pt with no hx dysphagia, however does report ongoing SLP services as outpatient for memory and language. Patient presents today with history of HCC (dx 5/17), 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 Now presenting <24 hrs after discharge with increased fatigue and somnolence and is admitted for hepatic encephalopathy, improved.    Clinical Swallow Evaluation   Oral Musculature generally intact   Structural Abnormalities none present   Dentition present and adequate   Mucosal Quality good   Mandibular Strength and Mobility intact   Oral Labial Strength and Mobility WFL   Lingual Strength and Mobility WFL   Velar Elevation intact   Buccal Strength and Mobility intact   Laryngeal Function Cough;Throat clear;Swallow;Voicing initiated;Dry swallow palpated  (WFL)   Oral Musculature Comments Grossly intact oral musculature   Additional Documentation Yes   Clinical Swallow Eval: Thin Liquid Texture Trial   Mode of Presentation, Thin Liquids straw;self-fed   Volume of Liquid or Food Presented 4oz   Oral Phase of Swallow WFL   Pharyngeal Phase of Swallow  intact   Diagnostic Statement No incident of coughing with thin liquids today, however pt reports coughing with large drinks. Pt noted to drink large volume and consecutive swallows. Pt strongly prefers to consume liquids via straw.    Clinical Swallow Eval: Puree Solid Texture Trial   Mode of Presentation, Puree spoon;self-fed   Volume of Puree Presented 4 heaping tsp   Oral Phase, Puree WFL   Pharyngeal Phase, Puree intact   Diagnostic Statement Functional oropharyngeal swallow mechanism for puree textures   Clinical Swallow Eval: Solid Food Texture Trial   Mode of Presentation, Solid self-fed   Volume of Solid Food Presented 1/2 muffin   Oral Phase, Solid WFL   Pharyngeal Phase, Solid intact   Diagnostic Statement Functional oropharyngeal swallow mechanism for solids   Swallow Compensations   Swallow Compensations Alternate viscosity of consistencies;Pacing;Reduce amounts   Results No difficulties noted   Esophageal Phase of Swallow   Patient reports or presents with symptoms of esophageal dysphagia Yes   Esophageal comments Known hx GERD   General Therapy Interventions   Planned Therapy Interventions Dysphagia Treatment   Dysphagia treatment Instruction of safe swallow strategies   Intervention Comments SLP: f/u x1-2 for education on strategies   Swallow Eval: Clinical Impressions   Skilled Criteria for Therapy Intervention Skilled criteria met.  Treatment indicated.   Functional Assessment Scale (FAS) 6   Treatment Diagnosis Minimal oropharyngeal dysphagia    Diet texture recommendations Regular diet;Thin liquids   Recommended Feeding/Eating Techniques maintain upright posture during/after eating for 30 mins;small sips/bites   Therapy Frequency 3 times/wk   Predicted Duration of Therapy Intervention (days/wks) 1 week   Anticipated Discharge Disposition home w/ outpatient services   Risks and Benefits of Treatment have been explained. Yes   Patient, family and/or staff in agreement with Plan of Care Yes    Clinical Impression Comments Patient presents with minimal oropharyngeal dysphagia best categorized as presbyphagia, impacted by impulsivity and poor swallow habits. Patient required minimal cues for safe swallow strategies, and strategies written on paper on tray table. Recommend regular diet and thin liquids, pills whole with liquid wash, with the following precautions: verbally remind pt for single sips and slow pacing at each meal, alternate solids and liquids, sit fully upright for all PO intake. SLP to f/u x1-2 for strategy use for swallowing.    Total Evaluation Time   Total Evaluation Time (Minutes) 10        08/10/18 1000   General Information   Onset Date 07/31/18   Start of Care Date 08/10/18   Referring Physician Peter Cardenas MD   Patient Profile Review/OT: Additional Occupational Profile Info See Profile for full history and prior level of function   Patient/Family Goals Statement Eat and drink without coughing   Swallowing Evaluation Bedside swallow evaluation   Behaviorial Observations WFL (within functional limits)   Mode of current nutrition Oral diet   Type of oral diet Regular;Thin liquid   Respiratory Status Room air   Comments Orders received for swallow evaluation. Pt reports frequent incidents of coughing with thin liquids, reports it does not occur with thicker textures. Pt with no hx dysphagia, however does report ongoing SLP services as outpatient for memory and language. Patient presents today with history of HCC (dx 5/17), 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 Now presenting <24 hrs after discharge with increased fatigue and somnolence and is admitted for hepatic encephalopathy, improved.    Clinical Swallow Evaluation   Oral Musculature generally intact   Structural Abnormalities none present    Dentition present and adequate   Mucosal Quality good   Mandibular Strength and Mobility intact   Oral Labial Strength and Mobility WFL   Lingual Strength and Mobility WFL   Velar Elevation intact   Buccal Strength and Mobility intact   Laryngeal Function Cough;Throat clear;Swallow;Voicing initiated;Dry swallow palpated  (WFL)   Oral Musculature Comments Grossly intact oral musculature   Additional Documentation Yes   Clinical Swallow Eval: Thin Liquid Texture Trial   Mode of Presentation, Thin Liquids straw;self-fed   Volume of Liquid or Food Presented 4oz   Oral Phase of Swallow WFL   Pharyngeal Phase of Swallow intact   Diagnostic Statement No incident of coughing with thin liquids today, however pt reports coughing with large drinks. Pt noted to drink large volume and consecutive swallows. Pt strongly prefers to consume liquids via straw.    Clinical Swallow Eval: Puree Solid Texture Trial   Mode of Presentation, Puree spoon;self-fed   Volume of Puree Presented 4 heaping tsp   Oral Phase, Puree WFL   Pharyngeal Phase, Puree intact   Diagnostic Statement Functional oropharyngeal swallow mechanism for puree textures   Clinical Swallow Eval: Solid Food Texture Trial   Mode of Presentation, Solid self-fed   Volume of Solid Food Presented 1/2 muffin   Oral Phase, Solid WFL   Pharyngeal Phase, Solid intact   Diagnostic Statement Functional oropharyngeal swallow mechanism for solids   Swallow Compensations   Swallow Compensations Alternate viscosity of consistencies;Pacing;Reduce amounts   Results No difficulties noted   Esophageal Phase of Swallow   Patient reports or presents with symptoms of esophageal dysphagia Yes   Esophageal comments Known hx GERD   General Therapy Interventions   Planned Therapy Interventions Dysphagia Treatment   Dysphagia treatment Instruction of safe swallow strategies   Intervention Comments SLP: f/u x1-2 for education on strategies   Swallow Eval: Clinical Impressions   Skilled Criteria  for Therapy Intervention Skilled criteria met.  Treatment indicated.   Functional Assessment Scale (FAS) 6   Treatment Diagnosis Minimal oropharyngeal dysphagia    Diet texture recommendations Regular diet;Thin liquids   Recommended Feeding/Eating Techniques maintain upright posture during/after eating for 30 mins;small sips/bites   Therapy Frequency 3 times/wk   Predicted Duration of Therapy Intervention (days/wks) 1 week   Anticipated Discharge Disposition home w/ outpatient services   Risks and Benefits of Treatment have been explained. Yes   Patient, family and/or staff in agreement with Plan of Care Yes   Clinical Impression Comments Patient presents with minimal oropharyngeal dysphagia best categorized as presbyphagia, impacted by impulsivity and poor swallow habits. Patient required minimal cues for safe swallow strategies, and strategies written on paper on tray table. Recommend regular diet and thin liquids, pills whole with liquid wash, with the following precautions: verbally remind pt for single sips and slow pacing at each meal, alternate solids and liquids, sit fully upright for all PO intake. SLP to f/u x1-2 for strategy use for swallowing.    Total Evaluation Time   Total Evaluation Time (Minutes) 10

## 2018-08-10 NOTE — PLAN OF CARE
Problem: Patient Care Overview  Goal: Plan of Care/Patient Progress Review  Outcome: Improving  Blood pressure 120/51, pulse 87, temperature 97.9  F (36.6  C), temperature source Oral, resp. rate 16, weight 94 kg (207 lb 3.2 oz), SpO2 99 %.  Up with SBA and walker. Doing well. In good spirits.  Speech therapist visited and gave good recommendations for safe swallowing; pt. Seemed to understand but will need some review.  Likely to discharge early evening.  Wife and son here today, they will transport pt. Home.

## 2018-08-10 NOTE — PROGRESS NOTES
SPIRITUAL HEALTH SERVICES  SPIRITUAL ASSESSMENT Progress Note  [facility] 7A     REFERRAL SOURCE: Follow-up visit    Was walking down harp to visit pt.  Pt and wife were walking down the harp with physical therapist and pt made sexually inappropriate comments to me.  I attempted to address them but pt and staff kept walking.  Visited briefly with wife who tried to apologize for his comments.  Checked in with wife on how she and pt were doing, she stated pt took a turn for the better in the past few days.      PLAN: Recommendation that female chaplains do not visit pt.    Lesa Shoemaker  Chaplain Resident  Pager [phone]

## 2018-08-10 NOTE — PROGRESS NOTES
Care Coordinator  D/I per Dr Peter Cardenas, pt may discharge to home with family today and resume [facility] with RN,PT,OT and New OT for Lymphedema therapy and transition to [facility] palliative program soon, also resume OP Paracentesis and blood in [facility]--scheduled for 8/17 and 8/23--I have informed AT[facility] clinic.  P: Delores Flaherty [facility] liason was updated. Bedside RNJess updated.

## 2018-08-11 NOTE — PLAN OF CARE
Problem: Patient Care Overview  Goal: Plan of Care/Patient Progress Review  Speech Language Therapy Discharge Summary    Reason for therapy discharge:    Discharged to home.    Progress towards therapy goal(s). See goals on Care Plan in [facility] electronic health record for goal details.  Goals met    Therapy recommendation(s):    Continued therapy is recommended.  Rationale/Recommendations:  Pt should re-initiate home SLP services for language and cognition from prior to this hospital stay. Swallowing goal met, swallowing does not need to be treated by SLP. .

## 2018-08-15 NOTE — PROGRESS NOTES
RN Care Coordination Note  Reason for Outgoing Call:   Message from Triage 8/14/18   Archana Schwarz RN Garris, Jessica, RN                   He is scheduled for lab at 630 Friday and transfusion at 7:00. Spouse concerned he may not need the blood and they'd come in at 6:30 for nothing, and he has a para later that day. I asked if he could come the day before and than we would know and could have the T & C done and blood ready.  She said he's frail and it's difficult.  Not sure if home care could draw it. But wouldn't want the T & C to get messed up. She is asking you to call her on her cell about this. number in demographics    Patient Questions/Concerns:   above  Nursing Action/Patient Instruction:  Call to pt's wife who states [facility] staff is working to move the para to tomorrow 8/16 due to pt request. We discussed that if this can happen, we will request lab on 8/16 as well, to include type and screen and keep [facility] appt 8/17 am. I also suggested f/u in palliative care clinic to discuss goals of care as pt's wife voiced difficulties in managing pt's appts and that he wants to continue karine and transfusions to stay as comfortable as possible but continues to refuse hospice. [facility] RN did intake 8/13 and PT/OT is being set up as well as private pay sitter as pt's wife works and pt cannot be left alone. I reiterated availability of palliative team to re-discuss goals of care and safety, (code status updated inpt last week) and she declines stating she doesn't have time for more doctor's appointments. Tearful. Emotional support provided.  Updated lab orders to include weekly type and screen standing order  Patient Response/Evaluation:   Pt voiced understanding of above instructions and information and denied further questions      Moraima Quintero, RN, BSN, OCN  Care Coordinator  [facility]

## 2018-08-16 NOTE — PROGRESS NOTES
Paracentesis Nursing Note  Serge Brambila presents today to [facility] for a paracentesis.    During today's appointment orders from Dr. Montgomery were completed.    Progress Note:  Patient identification verified by name and date of birth.  Assessment completed.  Vitals monitored throughout appointment and recorded in Doc Flowsheets.  See proceduralist note in ultrasound.    Date of consent or authorization: 7/12/2018.  Invasive Procedure Safety Checklist was completed and sent for scanning.     Paracentesis performed by Myrtle Walters PA-C Radiology.    The following labs were communicated to provider performing paracentesis:  Lab Results   Component Value Date    PLT 46 08/10/2018     Labs drawn per standing orders. Patient's spouse will contact oncology team to se if transfusion is needed tomorrow.    Total amount of ascites fluid drained: 4.2 liters.  Color of ascites fluid: anabela.  Total amount of albumin given: 50  grams.    Patient tolerated procedure well.    Post procedure,denies pain or discomfort post paracentesis.    Discharge Plan:  Discharge instructions were reviewed with patient.  Patient/Representative verbalized understanding and all questions were answered.   Discharged from [facility] in stable condition.    Aldo Jones RN     Administrations This Visit     albumin human 25 % injection 12.5 g     Admin Date Action Dose Route Administered By             08/16/2018 New Bag 12.5 g Intravenous Aldo Jones RN              Admin Date Action Dose Route Administered By             08/16/2018 New Bag 12.5 g Intravenous Aldo Jones RN              Admin Date Action Dose Route Administered By             08/16/2018 New Bag 12.5 g Intravenous Aldo Jones RN              Admin Date Action Dose Route Administered By             08/16/2018 New Bag 12.5 g Intravenous Aldo Jones RN                    lidocaine 1 % 20 mL     Admin  Date Action Dose Route Administered By             08/16/2018 Given by Other 15 mL Other Aldo Jones, LEEANNE                        Temp 97.5  F (36.4  C) (Oral)  Resp 18  Wt 99.7 kg (219 lb 12.8 oz)  SpO2 98%  BMI 33.42 kg/m2

## 2018-08-16 NOTE — MR AVS SNAPSHOT
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  The following information is a summary of our appointment as well as important reminders.      Paracentesis  Your healthcare provider recommends that you have paracentesis. This is a procedure to remove extra fluid from your belly (abdomen). A needle is used to drain the fluid. A small sample of fluid may be taken and tested for problems. If the fluid buildup is causing discomfort or pain, all of the fluid may be drained. To do this, a tube is attached to the needle. The fluid is drained into a container that sits outside of the body. If symptoms are severe, paracentesis may be done as an emergency procedure. Otherwise, it will be scheduled ahead of time. Read on to learn more about paracentesis and how it works.     Understanding ascites  Many of the body s organs, including the liver and intestines, are inside the belly (abdomen). The organs are covered in a thin membrane called the peritoneum. The peritoneum has 2 layers. It makes a fluid that allows the layers to glide smoothly past each other. If this fluid builds up in the belly, the condition is called ascites. Ascites causes pain and discomfort. It can also make it hard to breathe. Fluid can build up for a number of reasons.  These include chronic liver disease (cirrhosis), heart or kidney failure, and cancer. Your provider can tell you more about the cause of your ascites.  How paracentesis works  The goal of paracentesis may be to help diagnose the cause of the excess fluid. Or, the goal may be to drain excess fluid from the abdomen. In some cases, fluid returns and the procedure needs to be repeated.  Before the procedure    Tell your provider about any medicines you are taking. This includes all prescription medicines, over-the-counter medicines, street drugs, herbs, vitamins, and other supplements.    Tell your provider about any allergies you have.    Before the procedure begins, you ll be asked to empty your bladder. This helps prevent injury to the bladder during the procedure. If needed, a thin tube (Trivedi catheter) may be placed into your bladder to drain urine during the procedure. This tube is removed after the procedure.    An IV (intravenous) line may be put into a vein in your arm or hand. This line supplies fluids and medicines.  During the procedure    You are awake during the procedure.    An imaging method called ultrasound may be used to guide the procedure. It shows live images of the inside of your belly on a video screen. This helps the provider find the site of the excess fluid inside your belly and decide where to insert the needle.    A numbing medicine (local anesthesia) is injected into your belly where the needle will be inserted.    Once the skin is numb, the provider carefully inserts the needle into the belly. This causes the needle to fill with fluid.    The needle may be removed with only a small sample of fluid. This sample is sent to a lab for testing. Getting a sample takes about 10 to 15 minutes.    Or, a tube may be attached to the needle so that more of the excess fluid can be drained. The tube may be taped or stitched into place. This keeps it from pulling the needle out of your belly.    How long  it takes to drain all of the fluid varies for each person. In most cases, it takes about 30 minutes. Your provider will let you know if the procedure is expected to take longer than usual.    Once all of the fluid is drained, the needle and tube are removed.    Pressure is put on the puncture site to stop any fluid leakage or bleeding.    A small bandage is placed over the puncture site. Albumin may be given during or after the procedure to prevent low blood pressure or kidney problems.  After the procedure  You may be taken to a recovery room to rest after the procedure. If you are in pain, you will be given medicine as needed. You will likely be sent home 1 to 2 hours after the procedure is done. When you leave the hospital, have an adult family member or friend drive you home. If you are staying in the hospital, you will return to your hospital room.  Risks and possible complications of paracentesis  This procedure is considered safe. But like all procedures, it carries some risks. These include the following:    Bleeding    Infection    Injury to structures in the belly    Fast drop in blood pressure   Recovering at home    If needed, your provider can prescribe or recommend pain medicines for you to take at home. Take these exactly as directed. If you stopped taking other medicines before the procedure, ask your provider when you can start them again.    You may remove the bandage 24 hours after the procedure.    Take it easy for 24 hours after the procedure. Avoid physical activity until your provider says it s OK.  Follow-up care  Make a follow-up appointment with your provider as directed. During your follow-up visit, your provider will check your healing. Let your provider know how you are feeling. You can also discuss the cause of your ascites and if any more treatment is needed.   When to seek medical care  Call your healthcare provider if you notice any of the following after the procedure:    A fever of  100.4 F (38.0 C) or higher    Trouble breathing    Pain that does not go away even after taking pain medicine    Belly pain not caused by having the skin punctured    Bleeding from the puncture site    More than a small amount of fluid leakage from the puncture site    Swollen belly    Signs of infection at the puncture site. These include increased pain, redness, or swelling, as well as warmth or bad-smelling drainage.    Blood in your urine    Dizziness, lightheadedness, or fainting   Date Last Reviewed: 7/1/2016 2000-2017 [employer]. [address] [place] [address]. All rights reserved. This information is not intended as a substitute for professional medical care. Always follow your healthcare professional's instructions.          We look forward in seeing you on your next appointment here at [facility].  Please don t hesitate to call us at [phone] to reschedule any of your appointments or to speak with one of the [facility] registered nurses.  It was a pleasure taking care of you today.    Sincerely,    [facility]  Specialty Infusion & Procedure Center  [address] [place] [address]  [address]  Phone:  [phone]            Follow-ups after your visit        Your next 10 appointments already scheduled     Aug 17, 2018  6:30 AM CDT   [facility] Lab Draw with  [facility] LAB DRAW   [facility] ([facility])    [address] [place] [address]  [address] [place] [address] [phone]            Aug 17, 2018  7:00 AM CDT   Infusion 180 with  ONCOLOGY INFUSION, UC 12 ATC   [facility] ([facility])    [address] [place] [address]  [address] [place] [address] [phone]            Aug 23, 2018  7:30 AM CDT   Paracentesis Visit with  Spec Inf Para Provider, UC 40 ATC   [facility] Specialty and Procedure ([facility])    [address] [place] [address]  [address]  [address]  [facility] [address]   [phone]            Aug 23, 2018 12:30 PM CDT   Infusion 180 with UC ONCOLOGY INFUSION, UC 30 ATC   [facility] ([facility])    [address]  [address]  [facility] [address]   [phone]            Aug 30, 2018  7:30 AM CDT   Paracentesis Visit with Uc Spec Inf Para Provider, UC 40 ATC   [facility] Specialty and Procedure ([facility])    [address]  [address]  [facility] [address]   [phone]            Sep 07, 2018  7:30 AM CDT   Paracentesis Visit with Uc Spec Inf Para Provider   [facility] Specialty and Procedure ([facility])    [address]  [address]  [facility] [address]   [phone]              Future tests that were ordered for you today     Open Standing Orders        Priority Remaining Interval Expires Ordered    ABO/Rh type and screen Routine 50/52 weekly 8/15/2019 8/15/2018            Who to contact     If you have questions or need follow up information about today's clinic visit or your schedule please contact [facility] SPECIALTY AND PROCEDURE directly at [phone].  Normal or non-critical lab and imaging results will be communicated to you by [employer], letter or phone within 4 business days after the clinic has received the results. If you do not hear from us within 7 days, please contact the clinic through [employer] or phone. If you have a critical or abnormal lab result, we will notify you by phone as soon as possible.  Submit refill requests through [employer] or call your pharmacy and they will forward the refill request to us. Please allow 3 business days for your refill to be completed.          Additional Information About Your Visit        [employer] Information     [employer] gives you secure access to your electronic health record. If  you see a primary care provider, you can also send messages to your care team and make appointments. If you have questions, please call your primary care clinic.  If you do not have a primary care provider, please call [phone] and they will assist you.        Care EveryWhere ID     This is your Care EveryWhere ID. This could be used by other organizations to access your [place] medical records  [id number]        Your Vitals Were     Temperature Respirations Pulse Oximetry BMI (Body Mass Index)          97.5  F (36.4  C) (Oral) 18 98% 31.93 kg/m2         Blood Pressure from Last 3 Encounters:   08/16/18 114/43   08/10/18 113/46   07/30/18 142/53    Weight from Last 3 Encounters:   08/16/18 95.3 kg (210 lb)   08/10/18 94 kg (207 lb 3.2 oz)   07/29/18 96.7 kg (213 lb 3 oz)              We Performed the Following     ABO/Rh type and screen     CBC with platelets     Parathyroid Hormone Intact     Renal panel     US Paracentesis     Vitamin D Deficiency        Primary Care Provider Office Phone # Fax #    Linn Thompson [phone]       [facility] [address]  [place] [address]        Equal Access to Services     FRANK CONROY : Hadii aad ku hadasho Soomaali, waaxda luqadaha, qaybta kaalmada adeegyada, miley guerrero. So [facility] [phone].    ATENCIÓN: Si habla español, tiene a collins disposición servicios gratuitos de asistencia lingüística. Llame al [phone].    We comply with applicable federal civil rights laws and [place] laws. We do not discriminate on the basis of race, color, national origin, age, disability, sex, sexual orientation, or gender identity.            Thank you!     Thank you for choosing [facility] SPECIALTY AND PROCEDURE  for your care. Our goal is always to provide you with excellent care. Hearing back from our patients is one way we can continue to improve our services. Please take a few minutes to  complete the written survey that you may receive in the mail after your visit with us. Thank you!             Your Updated Medication List - Protect others around you: Learn how to safely use, store and throw away your medicines at www.disposemymeds.org.          This list is accurate as of 8/16/18  9:28 AM.  Always use your most recent med list.                   Brand Name Dispense Instructions for use Diagnosis    acetaminophen 325 MG tablet    TYLENOL    100 tablet    Take 2 tablets (650 mg) by mouth every 8 hours as needed for mild pain    Liver cirrhosis secondary to KUHN (H), Liver mass, Cervical radiculopathy       allopurinol 100 MG tablet    ZYLOPRIM    90 tablet    Take 1 tablet (100 mg) by mouth daily    Hyperuricemia       BASAGLAR 100 UNIT/ML injection     3 mL    Inject 15 Units Subcutaneous At Bedtime    Type 2 diabetes mellitus without complication, with long-term current use of insulin (H)       * ONETOUCH ULTRA test strip   Generic drug:  blood glucose monitoring      TEST TWICE DAILY TO THREE TIMES DAILY        * blood glucose monitoring test strip    no brand specified     1 each        carvedilol 6.25 MG tablet    COREG    180 tablet    Take 1 tablet (6.25 mg) by mouth 2 times daily (with meals)    Secondary esophageal varices without bleeding (H)       ciprofloxacin 500 MG tablet    CIPRO    120 tablet    Take ciprofloxacin 500 mg every 12h until 7/10/18 then take 500 mg daily    SBP (spontaneous bacterial peritonitis) (H), Bacteremia       [employer] LANCETS 21G Misc      by Misc.(Non-Drug; Combo Route) route once daily.        lactulose 10 GM/15ML solution    CHRONULAC    2700 mL    30 mLs (20 g) by Oral or NG Tube route 3 times daily    Hepatic encephalopathy (H)       Levothyroxine Sodium 125 MCG Caps     30 capsule    Take 125 mcg by mouth daily    Thyroid disease       melatonin 1 MG Tabs tablet     30 tablet    Take 1-3 tablets (1-3 mg) by mouth nightly as needed for sleep    Liver  cirrhosis secondary to KUHN (H), Liver mass       NITROQUICK SL      Place 1 tablet under the tongue as needed        pantoprazole 20 MG EC tablet    PROTONIX    60 tablet    Take 2 tablets (40 mg) by mouth 2 times daily    Gastrointestinal hemorrhage, unspecified gastrointestinal hemorrhage type, HCC (hepatocellular carcinoma) (H)       RA BLOOD PRESSURE CUFF MONITOR Misc      by Misc.(Non-Drug; Combo Route) route once daily.        rifaximin 550 MG Tabs tablet    XIFAXAN    60 tablet    Take 1 tablet (550 mg) by mouth 2 times daily    Hepatic encephalopathy (H)       sodium bicarbonate 650 MG tablet     120 tablet    Take 2 tablets (1,300 mg) by mouth 2 times daily    Chronic kidney disease, unspecified CKD stage       * Notice:  This list has 2 medication(s) that are the same as other medications prescribed for you. Read the directions carefully, and ask your doctor or other care provider to review them with you.

## 2018-08-16 NOTE — PATIENT INSTRUCTIONS
Dear Serge Brambila    Thank you for choosing [facility] Specialty Infusion and Procedure Center ([facility]) for your paracentesis.  The following information is a summary of our appointment as well as important reminders.      Paracentesis  Your healthcare provider recommends that you have paracentesis. This is a procedure to remove extra fluid from your belly (abdomen). A needle is used to drain the fluid. A small sample of fluid may be taken and tested for problems. If the fluid buildup is causing discomfort or pain, all of the fluid may be drained. To do this, a tube is attached to the needle. The fluid is drained into a container that sits outside of the body. If symptoms are severe, paracentesis may be done as an emergency procedure. Otherwise, it will be scheduled ahead of time. Read on to learn more about paracentesis and how it works.     Understanding ascites  Many of the body s organs, including the liver and intestines, are inside the belly (abdomen). The organs are covered in a thin membrane called the peritoneum. The peritoneum has 2 layers. It makes a fluid that allows the layers to glide smoothly past each other. If this fluid builds up in the belly, the condition is called ascites. Ascites causes pain and discomfort. It can also make it hard to breathe. Fluid can build up for a number of reasons. These include chronic liver disease (cirrhosis), heart or kidney failure, and cancer. Your provider can tell you more about the cause of your ascites.  How paracentesis works  The goal of paracentesis may be to help diagnose the cause of the excess fluid. Or, the goal may be to drain excess fluid from the abdomen. In some cases, fluid returns and the procedure needs to be repeated.  Before the procedure    Tell your provider about any medicines you are taking. This includes all prescription medicines, over-the-counter medicines, street drugs, herbs, vitamins, and other  supplements.    Tell your provider about any allergies you have.    Before the procedure begins, you ll be asked to empty your bladder. This helps prevent injury to the bladder during the procedure. If needed, a thin tube (Trivedi catheter) may be placed into your bladder to drain urine during the procedure. This tube is removed after the procedure.    An IV (intravenous) line may be put into a vein in your arm or hand. This line supplies fluids and medicines.  During the procedure    You are awake during the procedure.    An imaging method called ultrasound may be used to guide the procedure. It shows live images of the inside of your belly on a video screen. This helps the provider find the site of the excess fluid inside your belly and decide where to insert the needle.    A numbing medicine (local anesthesia) is injected into your belly where the needle will be inserted.    Once the skin is numb, the provider carefully inserts the needle into the belly. This causes the needle to fill with fluid.    The needle may be removed with only a small sample of fluid. This sample is sent to a lab for testing. Getting a sample takes about 10 to 15 minutes.    Or, a tube may be attached to the needle so that more of the excess fluid can be drained. The tube may be taped or stitched into place. This keeps it from pulling the needle out of your belly.    How long it takes to drain all of the fluid varies for each person. In most cases, it takes about 30 minutes. Your provider will let you know if the procedure is expected to take longer than usual.    Once all of the fluid is drained, the needle and tube are removed.    Pressure is put on the puncture site to stop any fluid leakage or bleeding.    A small bandage is placed over the puncture site. Albumin may be given during or after the procedure to prevent low blood pressure or kidney problems.  After the procedure  You may be taken to a recovery room to rest after the  procedure. If you are in pain, you will be given medicine as needed. You will likely be sent home 1 to 2 hours after the procedure is done. When you leave the hospital, have an adult family member or friend drive you home. If you are staying in the hospital, you will return to your hospital room.  Risks and possible complications of paracentesis  This procedure is considered safe. But like all procedures, it carries some risks. These include the following:    Bleeding    Infection    Injury to structures in the belly    Fast drop in blood pressure   Recovering at home    If needed, your provider can prescribe or recommend pain medicines for you to take at home. Take these exactly as directed. If you stopped taking other medicines before the procedure, ask your provider when you can start them again.    You may remove the bandage 24 hours after the procedure.    Take it easy for 24 hours after the procedure. Avoid physical activity until your provider says it s OK.  Follow-up care  Make a follow-up appointment with your provider as directed. During your follow-up visit, your provider will check your healing. Let your provider know how you are feeling. You can also discuss the cause of your ascites and if any more treatment is needed.   When to seek medical care  Call your healthcare provider if you notice any of the following after the procedure:    A fever of 100.4 F (38.0 C) or higher    Trouble breathing    Pain that does not go away even after taking pain medicine    Belly pain not caused by having the skin punctured    Bleeding from the puncture site    More than a small amount of fluid leakage from the puncture site    Swollen belly    Signs of infection at the puncture site. These include increased pain, redness, or swelling, as well as warmth or bad-smelling drainage.    Blood in your urine    Dizziness, lightheadedness, or fainting   Date Last Reviewed: 7/1/2016 2000-2017 [employer] [address]  [place] [address]. All rights reserved. This information is not intended as a substitute for professional medical care. Always follow your healthcare professional's instructions.          We look forward in seeing you on your next appointment here at [facility].  Please don t hesitate to call us at [phone] to reschedule any of your appointments or to speak with one of the [facility] registered nurses.  It was a pleasure taking care of you today.    Sincerely,    [facility]  Specialty Infusion & Procedure Center  [address] [place] [address]  [address]  Phone:  [phone]

## 2018-08-21 NOTE — PROCEDURES
PROCEDURE : Harrison Juárez MD    Informed consent was obtained after risks and benefits were explained at length.     A time-out was performed. A pocket of ascites was identified using the ultrasound; the area was marked. The area of the left lower abdomen was prepped & draped in a sterile fashion using chlorhexidine scrub. 1% lidocaine was used to numb the region. The skin was incised 1.5 mm using a 10 blade scalpel. The paracentesis catheter was inserted & advanced with negative pressure. No blood was aspirated. Clear yellow fluid was retrieved & collected. No ascitic fluid was sent for laboratory analysis as this was purely for therapeutic purposes. The catheter was then connected to the vaccutainer & 4.3 liters of ascitic fluid were drained. The catheter was removed & no leaking was noted. The incision site was covered w/ a dressing.     The patient tolerated the procedure well without any immediate complications.     Dr. Araujo was present during the procedure.    Harrison Juárez MD  Internal Medicine PGY-3  [phone]      Physician Attestation   I spent a total of 45 minutes with the patient, personally observing the resident as he performed the therapeutic paracentesis described above. Static pre and post procedure ultrasound images were obtained and saved in the patient's chart.     Shiv Araujo  Date of Service (when I saw the patient): 08/21/18

## 2018-08-21 NOTE — PROGRESS NOTES
Emergency Social Work Services Note    Date of  Intervention: 08/21/18  Last Emergency Department Visit:  07/31/18  Care Plan:  none  Collaborated with: ED RN, ED MD, chart review, patient, patient's family    Data: Serge Wagner is a 73-year-old   male with end-stage liver disease who is here for end-of-life care.   consult by RN to discuss comfort care with patient and his family.  Per consult with ED MD, there will unlikely to survive this hospitalization.  He is not anticipated to be stable enough for discharge to residential hospice facility at this time and does not wish to have hospice at home.    Intervention: ED  met with Serge along with many of his family members to discuss his disposition.  Serge is drowsy and moaning, briefly responsive to  questions.  He indicated consent for  to talk to his family.  Spouse, children, other extended family members all in the room to provide emotional support.  Serge is indicating he is ready to die and family is there to support him.  Provided overview of supportive services available during hospitalization.    Assessment:  End of life support    Plan:    Anticipated Disposition:  comfort care    Barriers to d/c plan:  none    Follow Up:  Unit RNCC and SW to follow prn.     Family contact - spouse Heike Brambila (p) [phone]    RAMANDEEP Park, [facility]  Social Work Services, Emergency Dept [facility]  Pager: [phone] Mon-Sat 9 am - 9 pm, on-call/after hours pager [phone]

## 2018-08-21 NOTE — PROGRESS NOTES
[facility]  Patient is currently open to home care services with [place].  The patient is currently receiving RN/PT/OT services.  [facility]  and team have been notified of patient admission.  [facility] liaison will continue to follow patient during stay.  If appropriate provide orders to resume home care at time of discharge.    Thank you  Delores Flaherty RN, BSN  [facility] Liaison  [facility]  [phone]

## 2018-08-21 NOTE — H&P
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     Patient states that over the interm time he was able to enjoy meaningful time with his family, including two trips to their cabin. However, his health has continued to decline. He is now to weak and fatigued to be safe at home and for family to provide adequate cares. In addition his symptoms of nausea, chills, and anxiety are not well controlled at home. He states that he is ready to die and declines labs and vital signs. He expresses a clear desire for comfort care measures only. Family is at bed side and supportive of patient's wishes.     He endorses sacral pain, partially relieved by morphine; abdominal fullness. He denies shortness of breath.       REVIEW OF SYSTEMS: A 10 point review of systems was negative except as noted above.         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 Now on comfort cares    # Hepatic Encephalopathy   # HCC, metastatic   # Decompensated KUHN cirrhosis   # Recurrent Ascites c/b hx of SBP  # Grade II EV  # Hyperbilirubinemia    Advanced disease, patient is terminally ill and quite weak from above and elects to receive comfort cares only. Patient appears to be actively dying and would be too weak or frail for transfer.   - initiate comfort care order set    - morphine    - ativan    - no current need for secretion management   - holding lactulose, rifaximin, protonix, abx  - Therapeutic paracentesis, no labs ordered       # Hx of CKD/JUAN M: Hepatorenal syndrome vs pre-renal  - OK to take off fluid as needed      # Hx of Afib  - continued PTA carvedilol   - holding anticoagulation       # T2DM: holding home Glargine 15U.      # Hypothyroidism: Continue PTA levothyroxine       # NAA: holding CPAP with home settings       CODE: DNR/DNi  DVT: none  FEN: General Adult   Disposition: pending imminent death     This patient was staffed with Dr. CLINT Kennedy, the attending physician on service.     Peter Cardenas IV, MD, MSc  Internal Medicine Resident, PGY2  [facility]   Pager [phone]    PAST MEDICAL HISTORY:  Past Medical History:   Diagnosis Date     Atrial fibrillation (H)      Diabetes (H)     type II     Hepatic encephalopathy (H)      Hypertension      MI (myocardial infarction)     stent placement     Sleep apnea      Thyroid disease      Patient Active Problem List   Diagnosis     Cervical radiculopathy     Liver mass     Liver cirrhosis secondary to KUHN (H)     Ascites of liver     HCC (hepatocellular carcinoma) (H)     Atrial fibrillation (H)     Diabetes (H)     Thyroid disease     Sleep apnea     Hypertension     Hepatic encephalopathy (H)     MI (myocardial infarction)     Acute blood loss anemia     Anemia     Sepsis (H)     Secondary esophageal varices (H)     UJAN M (acute kidney injury) (H)       PAST SURGICAL HISTORY:  Past Surgical History:   Procedure Laterality Date     APPENDECTOMY  age 16     COLONOSCOPY       ENDOSCOPIC ULTRASOUND UPPER GASTROINTESTINAL TRACT (GI) N/A 6/29/2018    Procedure: ENDOSCOPIC  ULTRASOUND, ESOPHAGOSCOPY / UPPER GASTROINTESTINAL TRACT (GI);  ENDOSCOPIC ULTRASOUND with upper endoscopy ;  Surgeon: Dakota Emmanuel MD;  Location: [facility] OR     ESOPHAGOSCOPY, GASTROSCOPY, DUODENOSCOPY (EGD), COMBINED N/A 1/24/2018    Procedure: COMBINED ESOPHAGOSCOPY, GASTROSCOPY, DUODENOSCOPY (EGD);  ESOPHAGOGASTRODUODENOSCOPY (MAC) ;  Surgeon: Colton Stroud MD;  Location:  GI     ESOPHAGOSCOPY, GASTROSCOPY, DUODENOSCOPY (EGD), COMBINED N/A 6/29/2018    Procedure: COMBINED ESOPHAGOSCOPY, GASTROSCOPY, DUODENOSCOPY (EGD);;  Surgeon: Dakota Emmanuel MD;  Location: [facility] OR     GI SURGERY  2012    partial colectomy for pre-CA nodule, removed 10 in.        MEDICATIONS:  PTA Meds  Prior to Admission medications    Medication Sig Last Dose Taking? Auth Provider   acetaminophen (TYLENOL) 325 MG tablet Take 2 tablets (650 mg) by mouth every 8 hours as needed for mild pain 8/20/2018 at Unknown time Yes Peter Cardenas MD   allopurinol (ZYLOPRIM) 100 MG tablet Take 1 tablet (100 mg) by mouth daily 8/20/2018 at Unknown time Yes Tommie Panda MD   BASAGLAR 100 UNIT/ML injection Inject 15 Units Subcutaneous At Bedtime 8/20/2018 at Unknown time Yes Peter Cardenas MD   carvedilol (COREG) 6.25 MG tablet Take 1 tablet (6.25 mg) by mouth 2 times daily (with meals) 8/20/2018 at Unknown time Yes Tommie Panda MD   ciprofloxacin (CIPRO) 500 MG tablet Take ciprofloxacin 500 mg every 12h until 7/10/18 then take 500 mg daily 8/20/2018 at Unknown time Yes Tommie Panda MD   lactulose (CHRONULAC) 10 GM/15ML solution 30 mLs (20 g) by Oral or NG Tube route 3 times daily 8/20/2018 at Unknown time Yes Young Sousa,    Levothyroxine Sodium 125 MCG CAPS Take 125 mcg by mouth daily 8/20/2018 at Unknown time Yes Peter Cardenas MD   melatonin 1 MG TABS tablet Take 1-3 tablets (1-3 mg) by mouth nightly as needed for sleep 8/20/2018 at Unknown time Yes Peter Cardenas,  MD   pantoprazole (PROTONIX) 20 MG EC tablet Take 2 tablets (40 mg) by mouth 2 times daily 8/20/2018 at Unknown time Yes Tea Silveira APRN CNP   rifaximin (XIFAXAN) 550 MG TABS tablet Take 1 tablet (550 mg) by mouth 2 times daily 8/20/2018 at Unknown time Yes Peter Cardenas MD   sodium bicarbonate 650 MG tablet Take 2 tablets (1,300 mg) by mouth 2 times daily 8/20/2018 at Unknown time Yes Young Sousa DO   blood glucose monitoring (NO BRAND SPECIFIED) test strip 1 each   Reported, Patient   blood glucose monitoring (ONETOUCH ULTRA) test strip TEST TWICE DAILY TO THREE TIMES DAILY   Reported, Patient   Blood Pressure Monitoring (RA BLOOD PRESSURE CUFF MONITOR) MISC by Misc.(Non-Drug; Combo Route) route once daily.   Reported, Patient   [employer] LANCETS 21G MISC by Misc.(Non-Drug; Combo Route) route once daily.   Reported, Patient   Nitroglycerin (NITROQUICK SL) Place 1 tablet under the tongue as needed    Reported, Patient        ALLERGIES:    Allergies   Allergen Reactions     Penicillins      Happened in his teens, unsure what his reaction was. Tolerated ceftriaxone and Zosyn 3/2018       SOCIAL HISTORY:   Social History     Social History     Marital status:      Spouse name: N/A     Number of children: N/A     Years of education: N/A     Occupational History     Not on file.     Social History Main Topics     Smoking status: Former Smoker     Packs/day: 1.00     Years: 10.00     Quit date: 1/1/1999     Smokeless tobacco: Never Used     Alcohol use No     Drug use: No     Sexual activity: Not on file     Other Topics Concern     Not on file     Social History Narrative       FAMILY MEDICAL HISTORY:   Family History   Problem Relation Age of Onset     Hyperlipidemia Father      Hypertension Father      Cervical Cancer Maternal Grandmother      Alcoholism Mother      Abdominal Aortic Aneurysm Mother      HEART DISEASE Mother      Hypertension Mother      Thyroid Disease Mother       "Hyperlipidemia Mother        PHYSICAL EXAM:   BP (!) 72/35  Pulse (!) 40  Temp 92.9  F (33.8  C) (Axillary)  Ht 1.727 m (5' 8\")  Wt 95.3 kg (210 lb)  SpO2 100%  BMI 31.93 kg/m2   No intake or output data in the 24 hours ending 08/21/18 1053    GEN: Alert, oriented, jaundiced, frail  HEENT: Scleral icterus, dry MM  CV: bradycardic, faint FRANKO  LUNGS: CTAB, weak effort   ABD: distended, + BS  SKIN: jaundice, no wounds on limited exam  NEURO: difficulty with mustering energy to respond, but is A&Ox3,     LABS:   CMP  Recent Labs  Lab 08/16/18  0743   *   POTASSIUM 5.0   CHLORIDE 102   CO2 18*   ANIONGAP 12   *   BUN 79*   CR 2.57*   GFRESTIMATED 25*   GFRESTBLACK 30*   HARI 8.6   PHOS 3.8   ALBUMIN 2.6*     CBC  Recent Labs  Lab 08/16/18  0743   HGB 7.6*   WBC 3.0*   RBC 2.38*   HCT 21.6*   MCV 91   MCH 31.9   MCHC 35.2   RDW 22.5*   PLT 77*     INRNo lab results found in last 7 days.  ABGNo lab results found in last 7 days.   URINE STUDIES  Recent Labs   Lab Test  07/31/18   1744  07/27/18   1030  07/26/18   0845  07/26/18   0030  06/26/18   1101   COLOR  Yellow  Orange  Yellow  Dark Yellow  Yellow   APPEARANCE  Clear  Slightly Cloudy  Clear  Clear  Clear   URINEGLC  Negative  Negative  Negative  Negative  Negative   URINEBILI  Negative  Small*  Small*  Small*  Negative   URINEKETONE  Negative  Negative  Negative  Negative  Negative   SG  1.010  1.014  1.010  1.011  1.010   UBLD  Trace*  Negative  Negative  Negative  Negative   URINEPH  5.5  5.0  5.0  5.0  5.0   PROTEIN  30*  30*  Negative  Negative  10*   NITRITE  Negative  Negative  Negative  Negative  Negative   LEUKEST  Small*  Negative  Negative  Negative  Negative   RBCU  3*  1   --   0  1   WBCU  1  2   --   <1  1     Recent Labs   Lab Test  07/27/18   1030   UTPG  0.29*       IRON STUDIES  Recent Labs   Lab Test  07/30/18   0516  07/27/18   0456  03/03/18   0804   IRON  115  141   --    FEB  111*  145*   --    IRONSAT  104*  97*   --    WALLY   " --   958*  42       IMAGING:  All imaging studies reviewed by me.

## 2018-08-21 NOTE — ED NOTES
"Bed: ED14  Expected date: 8/21/18  Expected time:   Means of arrival: Ambulance  [place]  [facility] [place] [facility] [place] [facility]  73 y M  ESRD, \"looking for comfort care\"  Yellow  "

## 2018-08-21 NOTE — PLAN OF CARE
Problem: Patient Care Overview  Goal: Plan of Care/Patient Progress Review  Outcome: No Change  Pt admitted for hospice initiation. Comfort cares order in place. PRN morphine and ativan given for comfort. Family at bedside attentive to needs. Plan for bedside paracentesis. Repositioned PRN for comfort. SW consult placed. Will continue with POC.

## 2018-08-21 NOTE — PHARMACY-ADMISSION MEDICATION HISTORY
Admission medication history interview status for the 8/21/2018 admission is complete. See Epic admission navigator for allergy information, pharmacy, prior to admission medications and immunization status.     Medication history interview sources:  Patient's wife, Chart Review    Changes made to PTA medication list (reason)  Added: None  Deleted: None  Changed: None    Additional medication history information (including reliability of information, actions taken by pharmacist):  -Confirmed with pt's wife that there has been no changes to pt's meds since his discharge here on 8/10, and confirmed he took his medications yesterday  -Confirmed with most recent discharge list the medications' doses and directions below      Prior to Admission medications    Medication Sig Last Dose Taking? Auth Provider   acetaminophen (TYLENOL) 325 MG tablet Take 2 tablets (650 mg) by mouth every 8 hours as needed for mild pain 8/20/2018 at Unknown time Yes Peter Cardenas MD   allopurinol (ZYLOPRIM) 100 MG tablet Take 1 tablet (100 mg) by mouth daily 8/20/2018 at Unknown time Yes Tommie Panda MD   BASAGLAR 100 UNIT/ML injection Inject 15 Units Subcutaneous At Bedtime 8/20/2018 at Unknown time Yes Peter Cardenas MD   carvedilol (COREG) 6.25 MG tablet Take 1 tablet (6.25 mg) by mouth 2 times daily (with meals) 8/20/2018 at Unknown time Yes Tommie Panda MD   ciprofloxacin (CIPRO) 500 MG tablet Take ciprofloxacin 500 mg every 12h until 7/10/18 then take 500 mg daily 8/20/2018 at Unknown time Yes Tommie Panda MD   lactulose (CHRONULAC) 10 GM/15ML solution 30 mLs (20 g) by Oral or NG Tube route 3 times daily 8/20/2018 at Unknown time Yes Young Sousa,    Levothyroxine Sodium 125 MCG CAPS Take 125 mcg by mouth daily 8/20/2018 at Unknown time Yes Peter Cardenas MD   melatonin 1 MG TABS tablet Take 1-3 tablets (1-3 mg) by mouth nightly as needed for sleep 8/20/2018 at Unknown time Yes  Peter Cardenas MD   pantoprazole (PROTONIX) 20 MG EC tablet Take 2 tablets (40 mg) by mouth 2 times daily 8/20/2018 at Unknown time Yes Tea Silveira APRN CNP   rifaximin (XIFAXAN) 550 MG TABS tablet Take 1 tablet (550 mg) by mouth 2 times daily 8/20/2018 at Unknown time Yes Peter Cardenas MD   sodium bicarbonate 650 MG tablet Take 2 tablets (1,300 mg) by mouth 2 times daily 8/20/2018 at Unknown time Yes Young Sousa DO   blood glucose monitoring (NO BRAND SPECIFIED) test strip 1 each   Reported, Patient   blood glucose monitoring (ONETOUCH ULTRA) test strip TEST TWICE DAILY TO THREE TIMES DAILY   Reported, Patient   Blood Pressure Monitoring (RA BLOOD PRESSURE CUFF MONITOR) MISC by Misc.(Non-Drug; Combo Route) route once daily.   Reported, Patient   [employer] LANCETS 21G MISC by Misc.(Non-Drug; Combo Route) route once daily.   Reported, Patient   Nitroglycerin (NITROQUICK SL) Place 1 tablet under the tongue as needed    Reported, Patient         Medication history completed by:   Tacho Astorga  Pharmacy Student  [employer]

## 2018-08-21 NOTE — ED NOTES
[facility], [place]   ED Nurse to Floor Handoff     Serge Brambila is a 73 year old male who speaks English and lives with a spouse,  in a home  They arrived in the ED by ambulance from home    ED Chief Complaint: Nausea    ED Dx;   Final diagnoses:   Encounter for end of life care   Comfort measures only status   Hypotension, unspecified hypotension type   End-stage liver disease (H)         Needed?: No    Allergies:   Allergies   Allergen Reactions     Penicillins      Happened in his teens, unsure what his reaction was. Tolerated ceftriaxone and Zosyn 3/2018   .  Past Medical Hx:   Past Medical History:   Diagnosis Date     Atrial fibrillation (H)      Diabetes (H)     type II     Hepatic encephalopathy (H)      Hypertension      MI (myocardial infarction)     stent placement     Sleep apnea      Thyroid disease       Baseline Mental status: WDL  Current Mental Status changes: at basesline    Infection present or suspected this encounter: yes other Patient has declined all sepsis treatment. Patient and family requesting only comfort cares   Sepsis suspected: Yes  Isolation type: No active isolations     Activity level - Baseline/Home:  Stand with Assist of 2  Activity Level - Current:   Stand with Assist of 2    Bariatric equipment needed?: No    In the ED these meds were given:   Medications   morphine solution 5-10 mg (10 mg Oral Given 8/21/18 0945)     Or   morphine sulfate HIGH CONCENTRATE (ROXANOL) 10 mg/0.5 mL (HIGH CONC) solution 5-10 mg ( Sublingual See Alternative 8/21/18 0945)   acetaminophen (TYLENOL) tablet 650 mg (not administered)   ondansetron (ZOFRAN-ODT) ODT tab 4 mg (not administered)     Or   ondansetron (ZOFRAN) injection 4 mg (not administered)   prochlorperazine (COMPAZINE) injection 5 mg (not administered)     Or   prochlorperazine (COMPAZINE) tablet 5 mg (not administered)     Or   prochlorperazine (COMPAZINE) Suppository 12.5 mg (not  "administered)       Drips running?  No    Home pump  No    Current LDAs       Labs results: Labs Ordered and Resulted from Time of ED Arrival Up to the Time of Departure from the ED - No data to display    Imaging Studies: No results found for this or any previous visit (from the past 24 hour(s)).    Recent vital signs:   BP (!) 73/37  Pulse (!) 40  Temp 92.9  F (33.8  C) (Axillary)  Ht 1.727 m (5' 8\")  Wt 95.3 kg (210 lb)  SpO2 100%  BMI 31.93 kg/m2    Cardiac Rhythm: Normal Sinus  Pt needs tele? No  Skin/wound Issues: Patient has declined all skin assessments. Patient does ocassionally c/o mild coccyx pain.    Code Status: DNR / DNI    Pain control: good    Nausea control: good    Abnormal labs/tests/findings requiring intervention: No    Family present during ED course? Yes   Family Comments/Social Situation comments: Many family and friends have been visiting throughout the day.     Tasks needing completion: None    Yesenia Manriquez, RN    [phone] [facility]      "

## 2018-08-21 NOTE — ED PROVIDER NOTES
"  History     Chief Complaint   Patient presents with     Nausea     Patient is a 73 year old male presenting with general. The history is provided by the patient, the spouse, medical records and the EMS personnel.   Other   This is a new problem. The current episode started 3 to 5 hours ago. The problem occurs constantly. The problem has not changed since onset.Pertinent negatives include no chest pain, no abdominal pain, no headaches and no shortness of breath. Nothing aggravates the symptoms. Nothing relieves the symptoms. He has tried nothing for the symptoms.       I have reviewed the Medications, Allergies, Past Medical and Surgical History, and Social History in the Epic system.    Review of Systems   Respiratory: Negative for cough and shortness of breath.    Cardiovascular: Negative for chest pain.   Gastrointestinal: Positive for nausea. Negative for abdominal pain and vomiting.   Allergic/Immunologic: Positive for immunocompromised state.   Neurological: Positive for weakness. Negative for headaches.   Psychiatric/Behavioral: Positive for confusion.   All other systems reviewed and are negative.      Physical Exam   BP: (!) 72/35  Pulse: (!) 40  Temp: 92.9  F (33.8  C)  Height: 172.7 cm (5' 8\")  Weight: 95.3 kg (210 lb)  SpO2: 100 %      Physical Exam   Constitutional: He appears well-developed. No distress.   HENT:   Head: Normocephalic and atraumatic.   Eyes: EOM are normal. Pupils are equal, round, and reactive to light. Scleral icterus is present.   Neck: Normal range of motion. Neck supple.   Cardiovascular: Bradycardia present.    Murmur heard.  Pulmonary/Chest: Effort normal and breath sounds normal. No stridor. No respiratory distress. He has no wheezes. He has no rales.   Abdominal: Soft.   Musculoskeletal: Normal range of motion.   Neurological: He is alert.   Skin: Skin is warm and dry. No rash noted. He is not diaphoretic. No erythema.   Psychiatric: He has a normal mood and affect.   Nursing " note and vitals reviewed.      ED Course     ED Course     Procedures             Critical Care time:  none             Labs Ordered and Resulted from Time of ED Arrival Up to the Time of Departure from the ED - No data to display        Assessments & Plan (with Medical Decision Making)   73-year-old male with history of diabetes, hypertension, atrial fibrillation, CAD, hepatocellular carcinoma with end-stage liver disease who presents with nausea and hypotension with request to be admitted for end-of-life cares.  Patient recently admitted for bacteremic sepsis.  During that admission patient and his family chose to make him comfort care measures only.  He has not established care with hospice because he wants to die in the hospital at the [facility] where he is most comfortable.  He currently declines intravenous access, labs, cardiac monitoring.  I discussed patient's low blood pressure in my high suspicion for sepsis.  He does not want IV fluids or antibiotics to treat this infection explaining that he is ready to go.  Patient's wife is at bedside and appropriately supportive.  She confirms that patient was able to say his goodbyes after his last discharge.  He is tired of fighting and being in pain.  She is in agreement with him refusing antibiotics and all associated treatments.  Patient currently denies pain but has nausea.  Orders placed for comfort care measures.  Patient admitted to medical service for end-of-life care.  Offered family social work or  services for emotional/spiritual support. They declined at this time.      I have reviewed the nursing notes.    I have reviewed the findings, diagnosis, plan and need for follow up with the patient.    Current Discharge Medication List          Final diagnoses:   Encounter for end of life care   Comfort measures only status   Hypotension, unspecified hypotension type   End-stage liver disease (H)       8/21/2018   [facility], [place], EMERGENCY  DEPARTMENT     Autumn Lynne MD  08/21/18 3280

## 2018-08-21 NOTE — PROGRESS NOTES
Pt arrived from ED report received from LEEANNE Patterson. Family present at bedside. Patient admitted for hospice care. Pt alert but withdrawn. No PIV present. Comfort Cares initiated. DNR/DNI. Call light within reach. Will continue with POC.

## 2018-08-21 NOTE — ED TRIAGE NOTES
Has end stage liver cancer. States he would like to pass away in the hospital and he is ready to die. Denies all interventions at this time. C/O nausea. Denies pain.

## 2018-08-21 NOTE — ED PROVIDER NOTES
"  History     Chief Complaint   Patient presents with     Nausea     The history is provided by the patient, the EMS personnel and the spouse.     Serge Brambila is a 73 year old male with history of KUHN cirrhosis c/b HCC (diagnosed 5/2017), ascites (requiring weekly paracentesis 1-3L), Grade II EV (last seen on EGD 6/29/18), DM II, hepatic encephalopathy, MI, and atrial fibrillation who had recent hospitalizations from 7/25-7/30 for JUAN M and hyperkalemia and from 7/31-8/10/18 for treatment of Staph Epi Bacteremia (treated with vancomycin from 8/2-8/8) and acute on chronic macrocytic anemia. He presents today BIBA seeking comfort care for his nausea. The patient is DNR/DNI and declines IV fluids and antibiotics here in the ED, his family is in agreement with this plan.    Per EMS report, patient was brought in today due to complaints of nausea, for which the patient was administered 4 mg of oral Zofran at ~7:45 AM en route. In addition to his nausea, he complains of chills. EMS reports the patient has had a decreased appetite d/t his nausea and is now just \"ready to pass\". He is not on hospice and wants to pass here in the hospital as this is where he feels comfortable. Patient has no history of palliative care. EMS states the patient last took his medications yesterday, none today, and notably, the patient is on chronic Cipro due to a history of SBP. He denies pain currently. In regards to his 7/31 admission, patient's spouse notes the patient has been weaker and more fatigued after his discharge but denies any changes in his mentation.     Past Medical History:   Diagnosis Date     Atrial fibrillation (H)      Diabetes (H)     type II     Hepatic encephalopathy (H)      Hypertension      MI (myocardial infarction)     stent placement     Sleep apnea      Thyroid disease        Past Surgical History:   Procedure Laterality Date     APPENDECTOMY  age 16     COLONOSCOPY       ENDOSCOPIC ULTRASOUND UPPER " GASTROINTESTINAL TRACT (GI) N/A 6/29/2018    Procedure: ENDOSCOPIC ULTRASOUND, ESOPHAGOSCOPY / UPPER GASTROINTESTINAL TRACT (GI);  ENDOSCOPIC ULTRASOUND with upper endoscopy ;  Surgeon: Dakota Emmanuel MD;  Location: [facility] OR     ESOPHAGOSCOPY, GASTROSCOPY, DUODENOSCOPY (EGD), COMBINED N/A 1/24/2018    Procedure: COMBINED ESOPHAGOSCOPY, GASTROSCOPY, DUODENOSCOPY (EGD);  ESOPHAGOGASTRODUODENOSCOPY (MAC) ;  Surgeon: Colton Stroud MD;  Location:  GI     ESOPHAGOSCOPY, GASTROSCOPY, DUODENOSCOPY (EGD), COMBINED N/A 6/29/2018    Procedure: COMBINED ESOPHAGOSCOPY, GASTROSCOPY, DUODENOSCOPY (EGD);;  Surgeon: Dakota Emmanuel MD;  Location: [facility] OR     GI SURGERY  2012    partial colectomy for pre-CA nodule, removed 10 in.       Family History   Problem Relation Age of Onset     Hyperlipidemia Father      Hypertension Father      Cervical Cancer Maternal Grandmother      Alcoholism Mother      Abdominal Aortic Aneurysm Mother      HEART DISEASE Mother      Hypertension Mother      Thyroid Disease Mother      Hyperlipidemia Mother        Social History   Substance Use Topics     Smoking status: Former Smoker     Packs/day: 1.00     Years: 10.00     Quit date: 1/1/1999     Smokeless tobacco: Never Used     Alcohol use No       No current facility-administered medications for this encounter.      Current Outpatient Prescriptions   Medication     acetaminophen (TYLENOL) 325 MG tablet     allopurinol (ZYLOPRIM) 100 MG tablet     BASAGLAR 100 UNIT/ML injection     carvedilol (COREG) 6.25 MG tablet     ciprofloxacin (CIPRO) 500 MG tablet     lactulose (CHRONULAC) 10 GM/15ML solution     Levothyroxine Sodium 125 MCG CAPS     melatonin 1 MG TABS tablet     pantoprazole (PROTONIX) 20 MG EC tablet     rifaximin (XIFAXAN) 550 MG TABS tablet     sodium bicarbonate 650 MG tablet     blood glucose monitoring (NO BRAND SPECIFIED) test strip     blood glucose monitoring (ONETOUCH ULTRA) test strip     Blood Pressure  "Monitoring (RA BLOOD PRESSURE CUFF MONITOR) MISC     [employer] LANCETS 21G MISC     Nitroglycerin (NITROQUICK SL)        Allergies   Allergen Reactions     Penicillins      Happened in his teens, unsure what his reaction was. Tolerated ceftriaxone and Zosyn 3/2018         I have reviewed the Medications, Allergies, Past Medical and Surgical History, and Social History in the Epic system.    Review of Systems   Constitutional: Positive for appetite change (decreased), chills and fatigue.   Gastrointestinal: Positive for nausea.   Neurological: Positive for weakness.   All other systems reviewed and are negative.      Physical Exam   BP: (!) 72/35  Pulse: (!) 40  Temp: 92.9  F (33.8  C)  Height: 172.7 cm (5' 8\")  Weight: 95.3 kg (210 lb)  SpO2: 100 %      Physical Exam    ED Course     ED Course     Procedures       8:18 AM  The patient was seen and examined by Dr. yLnne in Room 14.     {EKG done?:210194::\" \"}    Critical Care time:  {none or minutes:110988::\"none\"}  {trauma activation or Fall?:760936::\" \"}  {Sepsis/Septic Shock/Stemi/Stroke:101992::\" \"}       Labs Ordered and Resulted from Time of ED Arrival Up to the Time of Departure from the ED - No data to display         Assessments & Plan (with Medical Decision Making)   ***    I have reviewed the nursing notes.    I have reviewed the findings, diagnosis, plan and need for follow up with the patient.    New Prescriptions    No medications on file       Final diagnoses:   None   IGaldino, am serving as a trained medical scribe to document services personally performed by Autumn Lynne MD, based on the provider's statements to me.   IAutumn MD, was physically present and have reviewed and verified the accuracy of this note documented by Galdino Ragland.      8/21/2018   [facility], EMERGENCY DEPARTMENT  "

## 2018-08-22 NOTE — PLAN OF CARE
Problem: Patient Care Overview  Goal: Plan of Care/Patient Progress Review  Outcome: No Change  Pt somnolent after 1600 dose of morphine. HR 36, measured per family request. No signs of pain or agitation. Coreg and protonix held due to somnolence. Pt did not void or have BM this shift. Family at bedside attentive to pt.

## 2018-08-22 NOTE — PROGRESS NOTES
[facility] Internal Medicine Progress  Note            Interval History:   GAUTAM, team notes reviewed   Appears comfortable   Some m twitching     Plan for Today  - continue comfort cares          Assessment and Plan:   Serge Brambila 72 y/o M w/Hx of HCC (dx 5/17), decompensated KUHN Cirrhosis c/w HE (on lactulose and rifaximin), ascites (requiring weekly paracentesis 1-3L), Grade II EV (last seen on EGD 6/29/18), and recent hospitalization 7/25-7/30 for JUAN M and hyperkalemia, and 7/31-8/10 for acute AMS, possible sepsis who represents today with family complaining of increased weakness and fatigue. Now on comfort cares     # Hepatic Encephalopathy   # HCC, metastatic   # Decompensated KUHN cirrhosis   # Recurrent Ascites c/b hx of SBP  # Grade II EV  # Hyperbilirubinemia   Advanced disease, patient is terminally ill and quite weak from above and elects to receive comfort cares only. Patient appears to be actively dying and would be too weak or frail for transfer. Will not schedule morphine - patient not voiding and w/muscle twitching my be actively dying vs morphine toxicity in the setting of presumed renal failure.   - initiate comfort care order set                          - morphine (not ordered, will be given PRN by MD)                         - ativan                          - no current need for secretion management   - holding lactulose, rifaximin, protonix, abx  - Therapeutic paracentesis, no labs ordered         # Hx of CKD/JUAN M: Hepatorenal syndrome vs pre-renal  - OK to take off fluid as needed        # Hx of Afib  - continued PTA carvedilol   - holding anticoagulation         # T2DM: holding home Glargine 15U.      # Hypothyroidism: Continue PTA levothyroxine       # NAA: holding CPAP with home settings         CODE: DNR/DNi  DVT: none  FEN: General Adult   Disposition: pending imminent death      This patient was staffed with Dr. CLINT Kennedy, the attending physician on service.      Peter Cardenas  "MD STACI, MSc  Internal Medicine Resident, PGY2  [facility]   Pager [phone]            Objective:   BP (!) 73/37  Pulse (!) 40  Temp 92.9  F (33.8  C) (Axillary)  Ht 1.727 m (5' 8\")  Wt 95.3 kg (210 lb)  SpO2 100%  BMI 31.93 kg/m2    No intake or output data in the 24 hours ending 08/22/18 1417    PHYSICAL EXAMINATION  GEN: minimally responsive, jaundiced  CV: bradycardic   LUNGS: irregular, shallow breathing,   ABD: soft, minimal fluid/ascities   EXT: cold  SKIN: jaundiced  NEURO: no m tone, weak reflexes        Labs, Imaging, & Medications:   No labs or imaging obtained  Medications reviewed.           "

## 2018-08-22 NOTE — PROGRESS NOTES
Patient on comfort cares, resting comfortably. Has not voided or BM during shift. Repositioning as needed. Gave 5mg Morphine and Ativan 1mg in AM and has been restless. Family attentive and supportive at bedside. Continue to monitor.

## 2018-08-22 NOTE — PLAN OF CARE
Problem: Patient Care Overview  Goal: Plan of Care/Patient Progress Review  Outcome: No Change  Shift: [id number]    Pt on comfort cares, resting comfortably.  Incontinent, did not void or have a BM during shift. Up using lift, repo for comfort. Received PRN PO Morphine 5mg and Ativan 1mg at 0130 resulted in decreased restlessness. Family at bedside. Skin tears and bruising present, no other skin problems present, legs are edematous. No IV in place. Pt resting comfortably through night. Will continue to monitor and follow POC.

## 2018-08-22 NOTE — PROGRESS NOTES
I was called to patient s bedside to pronounce that the patient had . No spontaneous movements were present. There was no response to verbal or tactile stimuli. Pupils were dilated and fixed. No breath sounds were appreciated over either lung field. No carotid pulses were palpable. No heart sounds were auscultated over the entire precordium. Patient pronounced dead at 1834 on 2018. Family was present at bedside. Autopsy and organ donation were declined.  and postmortem services offered. Patient s major medical illness was multiorgan dysfunction second to metastatic hepatocellular carinoma. Time of death was     Dr. Yoly Kennedy was notified of the patient's passing.    Peter Cardenas IV, MD, MSc  Internal Medicine Resident, PGY2  [facility]  p [phone]

## 2018-08-23 NOTE — PROVIDER NOTIFICATION
Provider notified of pts passing. Time of Death . Organ donation contacted. ME contacted.  home contacted.

## 2018-08-23 NOTE — DISCHARGE SUMMARY
[facility], [place]    Internal Medicine Discharge Summary- Ashley Service    Date of Admission:  2018  Date of Discharge:  2018  Discharging Attending Provider: Brent  Discharge Team: Ashley 5    Discharge Diagnoses   Hepatocellular Carcinoma     Follow-ups Needed After Discharge   None -      Hospital Course   Serge Brambila was admitted on 2018 for weakness.  The following problems were addressed during his hospitalization:    # Multiorgan Dysfunction secondary to HCC  # Metabolic on Hepatic Encephalopathy   # Cardiovascular collapse   # KUHN Cirrhosis   Patient admitted weak and lethargic expressing interest in comfort cares and declining labs or interventions. He was treated with morphine, ativan, and atropine, he remained comfortable. Family was at bedside during entire course of hospitalization. He was pronounced dead at Wed 2018 at 1834.    # Recurrent Ascites - therapeutic paracentesis     # AFib - carvedilol given when able to tolerate PO for symptome management/comfort.     # Hypothyroidism - continued PTA levothyroxine for comfort     # T2DM - holding home Glargine, was     # Discharge Pain Plan:   Patient     Consultations This Hospital Stay   SOCIAL WORK IP CONSULT  PALLIATIVE CARE ADULT IP CONSULT  INTERNAL MEDICINE PROCEDURE TEAM ADULT IP CONSULT [facility] - PARACENTESIS  MEDICATION HISTORY IP PHARMACY CONSULT     Code Status          The patient was discussed with Dr. Brent Cardenas IV, MD, MSc  Internal Medicine Resident, PGY2  [facility]   Pager [phone]    Physician Attestation   I, Yoly Kennedy, saw and evaluated this patient prior to discharge.  I discussed the patient with the resident/fellow and agree with plan of care as documented in the note.      I personally reviewed vital signs and medications.    I personally spent 30 minutes on discharge activities.    Yoly Rivera  MD Brent  Date of Service (when I saw the patient): Aug 22, 2018    ______________________________________________________________________    Physical Exam   Vital Signs: HR: 0, BP: 0/0, RR: 0                    Weight: 210 lbs 0 oz    GEN: unresponsive, ashen/jaundice  HEENT: pupils fixed and dilated   CV: no cardiac sounds  PULM: no inspirations or pulmonary sounds  NEURO: no response to noxious stimuli     Significant Results and Procedures   No labs obtained per patient/family preference     Pending Results   These results will be followed up by none  Unresulted Labs Ordered in the Past 30 Days of this Admission     No orders found for last 61 day(s).             Primary Care Physician   TRENA LANGE    Discharge Disposition   Morgue/ home    Condition at discharge:     Discharge Orders   No discharge procedures on file.  Discharge Medications   Current Discharge Medication List      CONTINUE these medications which have NOT CHANGED    Details   acetaminophen (TYLENOL) 325 MG tablet Take 2 tablets (650 mg) by mouth every 8 hours as needed for mild pain  Qty: 100 tablet    Associated Diagnoses: Liver cirrhosis secondary to KUHN (H); Liver mass; Cervical radiculopathy      allopurinol (ZYLOPRIM) 100 MG tablet Take 1 tablet (100 mg) by mouth daily  Qty: 90 tablet, Refills: 0    Associated Diagnoses: Hyperuricemia      BASAGLAR 100 UNIT/ML injection Inject 15 Units Subcutaneous At Bedtime  Qty: 3 mL, Refills: 1    Associated Diagnoses: Type 2 diabetes mellitus without complication, with long-term current use of insulin (H)      carvedilol (COREG) 6.25 MG tablet Take 1 tablet (6.25 mg) by mouth 2 times daily (with meals)  Qty: 180 tablet, Refills: 0    Associated Diagnoses: Secondary esophageal varices without bleeding (H)      ciprofloxacin (CIPRO) 500 MG tablet Take ciprofloxacin 500 mg every 12h until 7/10/18 then take 500 mg daily  Qty: 120 tablet, Refills: 0    Associated Diagnoses: SBP (spontaneous  bacterial peritonitis) (H); Bacteremia      lactulose (CHRONULAC) 10 GM/15ML solution 30 mLs (20 g) by Oral or NG Tube route 3 times daily  Qty: 2700 mL, Refills: 1    Associated Diagnoses: Hepatic encephalopathy (H)      Levothyroxine Sodium 125 MCG CAPS Take 125 mcg by mouth daily  Qty: 30 capsule, Refills: 0    Associated Diagnoses: Thyroid disease      melatonin 1 MG TABS tablet Take 1-3 tablets (1-3 mg) by mouth nightly as needed for sleep  Qty: 30 tablet, Refills: 1    Associated Diagnoses: Liver cirrhosis secondary to KUHN (H); Liver mass      pantoprazole (PROTONIX) 20 MG EC tablet Take 2 tablets (40 mg) by mouth 2 times daily  Qty: 60 tablet, Refills: 2    Associated Diagnoses: Gastrointestinal hemorrhage, unspecified gastrointestinal hemorrhage type; HCC (hepatocellular carcinoma) (H)      rifaximin (XIFAXAN) 550 MG TABS tablet Take 1 tablet (550 mg) by mouth 2 times daily  Qty: 60 tablet, Refills: 3    Associated Diagnoses: Hepatic encephalopathy (H)      sodium bicarbonate 650 MG tablet Take 2 tablets (1,300 mg) by mouth 2 times daily  Qty: 120 tablet, Refills: 1    Associated Diagnoses: Chronic kidney disease, unspecified CKD stage      !! blood glucose monitoring (NO BRAND SPECIFIED) test strip 1 each      !! blood glucose monitoring (ONETOUCH ULTRA) test strip TEST TWICE DAILY TO THREE TIMES DAILY      Blood Pressure Monitoring (RA BLOOD PRESSURE CUFF MONITOR) MISC by Misc.(Non-Drug; Combo Route) route once daily.      [employer] LANCETS 21G MISC by Misc.(Non-Drug; Combo Route) route once daily.      Nitroglycerin (NITROQUICK SL) Place 1 tablet under the tongue as needed        !! - Potential duplicate medications found. Please discuss with provider.        Allergies   Allergies   Allergen Reactions     Penicillins      Happened in his teens, unsure what his reaction was. Tolerated ceftriaxone and Zosyn 3/2018

## 2019-08-22 NOTE — PLAN OF CARE
Problem: Patient Care Overview  Goal: Plan of Care/Patient Progress Review  Outcome: Improving  AVSS. Afebrile. BG monitored. Hgb recheck was 7.0, 1 unit of RBC's given overnight (2 units given upon admission last evening for Hgb 5.7). No transfusion reaction. Tylenol (x1) with relief of right rib cage pain (baseline pain from fall in the shower at home). Denies nausea and SOB. CPAP on overnight. Up with SBA to bathroom. Voiding adequately/spontaneously. Anticipating paracentesis today or tomorrow. Continue monitoring and with POC.       How Severe Are Your Bumps?: moderate Have Your Bumps Been Treated?: not been treated Is This A New Presentation, Or A Follow-Up?: Bump Additional History: Bump on rib cage

## 2020-08-23 NOTE — PROCEDURES
Interventional Radiology Brief Post Procedure Note    Procedure: IR VISCERAL ANGIOGRAM    Proceduralist: Fernando Montgomery MD    Assistant: Jay Graves MD    Time Out: Prior to the start of the procedure and with procedural staff participation, I verbally confirmed the patient s identity using two indicators, relevant allergies, that the procedure was appropriate and matched the consent or emergent situation, and that the correct equipment/implants were available. Immediately prior to starting the procedure I conducted the Time Out with the procedural staff and re-confirmed the patient s name, procedure, and site/side. (The Joint Commission universal protocol was followed.)  Yes    Medications   Medication Event Details Admin User Admin Time   midazolam (VERSED) injection 0.5-1 mg Medication Given Dose: 2 mg; Route: Intravenous Albee, Duane A, RN 4/16/2018  9:37 AM   fentaNYL (PF) (SUBLIMAZE) injection 25-50 mcg Medication Given Dose: 175 mcg; Route: Intravenous Albee, Duane A, RN 4/16/2018  9:38 AM   iodixanol (VISIPAQUE 320) injection 150 mL Medication Given Dose: 20 mL; Route: INTRA-ARTERIAL; Scheduled Time:  8:30 AM Raiza Garcia 4/16/2018  9:38 AM   lidocaine 1 % 1-30 mL Medication Given by Other Clinician Dose: 10 mL; Route: Intradermal Albee, Duane A, RN 4/16/2018  9:39 AM   HYDROmorphone (DILAUDID) injection 0.2 mg Medication Given Dose: 0.2 mg; Route: Intravenous; Scheduled Time:  9:45 AM Albee, Duane A, RN 4/16/2018  9:41 AM       Sedation: IR Nurse Monitored Care   Post Procedure Summary:  Prior to the start of the procedure and with procedural staff participation, I verbally confirmed the patient s identity using two indicators, relevant allergies, that the procedure was appropriate and matched the consent or emergent situation, and that the correct equipment/implants were available. Immediately prior to starting the procedure I conducted the Time Out with the procedural staff and re-confirmed the  patient s name, procedure, and site/side. (The Joint Commission universal protocol was followed.)  Yes       Sedatives: Fentanyl and Midazolam (Versed)    Vital signs, airway and pulse oximetry were monitored and remained stable throughout the procedure and sedation was maintained until the procedure was complete.  The patient was monitored by staff until sedation discharge criteria were met.    Patient tolerance: Patient tolerated the procedure well with no immediate complications.    Time of sedation in minutes: 60 Minutes minutes from beginning to end of physician one to one monitoring.          Findings:   Successful MAA mapping for right hepatic lobe     Estimated Blood Loss: Minimal    Fluoroscopy Time:  minute(s)    SPECIMENS: None    Complications: 1. None     Condition: Stable    Plan:   Bedrest for 3 hrs with right leg straight  SPECT CT in 1 hr   Sirtex Y90 delivery in 2 days on Wednesday depending on lung shunt results     Comments: See dictated procedure note for full details.    Jay Graves MD         I personally performed the service described in the documentation recorded by the scribe in my presence, and it accurately and completely records my words and actions.

## 2022-03-28 NOTE — PROGRESS NOTES
Therapy: IV ABX   Insurance: [facility] [employer] Medicare replacement plan  Pt has all Medicare products, which does not cover IV ABX in the home. (Pt would have coverage for short term TCU or IC). Below is what pt would be responsible for if pt wanted to go w [facility]      Drug would go to Part-D (pt would be responsible for the co-pay per dispense)    Pt would have to self-pay for the per-yoselin (daily)    If not homebound, nursing would also be self-pay (per visit)    Please contact Intake with any questions, [phone] or In Basket pool, [facility] ([id number]).  In reference to admission date 7/25/18 to check IV ABX coverage    
All other review of systems negative, except as noted in HPI

## 2022-04-05 NOTE — IP AVS SNAPSHOT
MRN:[id number]                      After Visit Summary   3/1/2018    Serge Brambila    MRN: [id number]           Thank you!     Thank you for choosing [facility] for your care. Our goal is always to provide you with excellent care. Hearing back from our patients is one way we can continue to improve our services. Please take a few minutes to complete the written survey that you may receive in the mail after you visit with us. Thank you!        Patient Information     Date Of Birth          1945        Designated Caregiver       Most Recent Value    Caregiver    Will someone help with your care after discharge? no      About your hospital stay     You were admitted on:  March 1, 2018 You last received care in the:  [address] [facility]    You were discharged on:  March 6, 2018        Reason for your hospital stay       Dear Serge Brambila    Your were hospitalized at [facility] for further evaluation of hepatocellular carcinoma.  Today you are ready to be discharged home.  If you develop fever, shortness of breath, light headedness, chest pain, abdominal pain, bowel changes or worsening confusion please seek medical attention.    We are suggesting the following medication changes:  Spironolactone 50 mg daily  Lactulose 3 times daily with a goal of 3-5 bowel movements per day.  Stop taking neomycin. Prior authorization for cheaper rifaximin (another medicine for hepatic encephalopathy) was placed in the hospital, it can take a few weeks to get approval. Discuss this with your primary care doctor.  - continue your usual dose of coumadin and follow up with your INR clinic for INR recheck in 2 days. If your INR is ever high or you are having new bleeding have your doctor check a chromogenic factor 10 level as this may help guide anticoagulation in the setting of liver disease.    Please get the following tests done:  INR in two days at anticoagulation clinic  CBC,  ART THERAPY GROUP PROGRESS NOTE    PATIENT SCHEDULED FOR GROUP AT: 200    ATTENDANCE: Full    PARTICIPATION LEVEL:Participates fully in the art process    ATTENTION LEVEL: Able to focus on task    FOCUS: Stress management    SYMBOLIC & THEMATIC CONTENT AS NOTED IN IMAGERY: She was calm, compliant, and invested in the task at hand. She participated minimally in group discussion and did share that she needs to work on [employer]. \" She did not elaborate. CMP within 7 days    Please set up an appointment with:  1. PCP within 7 days to discuss likely diagnosis, and to help coordinate specialist appointments  2. Oncology follow up for further intervention   3. Interventional radiology within 1-2 weeks for consideration of radio-embolization therapy  4. Nephrology follow up in 3-4 weeks (Dr. Kumar) at [facility]   5. Hepatology (Dr. Godfrey) for HCC follow up   6. GI follow up for repeat colonoscopy and esophogeal banding   7. Anticoagulation clinic for ongoing monitoring of chromogenic factor 10    Your hospital unit at the time of discharge is 5A so if you have any questions please call the hospital at [phone] and ask to talk to a nurse on 5A.                  Who to Call     For medical emergencies, please call 911.  For non-urgent questions about your medical care, please call your primary care provider or clinic, [phone]          Attending Provider     Provider Specialty    Arash Brown MD Internal Medicine    Jean Stout MD Internal Medicine    Vineet Romero MD Internal Medicine       Primary Care Provider Office Phone # Fax #    Linn Thompson [phone]      After Care Instructions     Activity       Your activity upon discharge: activity as tolerated            Diet       Follow this diet upon discharge: Orders Placed This Encounter      Fluid restriction 1500 ML FLUID      2 Gram Sodium Diet                  Follow-up Appointments     Adult [facility] Follow-up and recommended labs and tests       Follow up with primary care provider, LINN THOMPSON, within 7 days to evaluate after surgery, for hospital follow- up and regarding new diagnosis.  The following labs/tests are recommended: CMP, CBC, INR, chromogenic factor 10.      1. PCP within 7 days: discuss re-initiation of metformin if Kidney function continues to be stable  2. Oncology follow up for further intervention   3. Interventional radiology within 1-2 weeks for consideration of  radio-embolization therapy  4. Nephrology follow up in 3-4 weeks (Dr. Kumar) at [facility]   5. Hepatology (Dr. Godfrey) for HCC follow up   6. GI follow up for repeat colonoscopy and esophogeal banding   7. Anticoagulation clinic for ongoing monitoring of INR (though discuss repeat chromogenic factor 10 repeat testing with your primary doctor and oncologist)      Appointments on [place] and/or [facility] (with [facility] or [facility] provider or service). Call [phone] if you haven't heard regarding these appointments within 7 days of discharge.            Follow Up and recommended labs and tests       1. PCP within 7 days  2. Oncology follow up for further intervention   3. Interventional radiology within 1-2 weeks for consideration of radio-embolization therapy  4. Nephrology follow up in 3-4 weeks (Dr. Kumar) at [facility]   5. Hepatology (Dr. Godfrey) for HCC follow up   6. GI follow up for repeat colonoscopy and esophogeal banding   7. Anticoagulation clinic for ongoing monitoring of chromogenic factor 10    CBC, CMP in 7 days  INR, chromogenic factor 10 in 2-3 days                  Your next 10 appointments already scheduled     Mar 16, 2018  9:15 AM CDT   (Arrive by 9:00 AM)   New Patient Visit with Zeus Willis MD   [facility] ([facility])    [address] [place] [address]  [address] [place] [id number] [phone]              Pending Results     Date and Time Order Name Status Description    3/3/2018 0804 Cancer antigen 19-9 In process     3/2/2018 1902 Fluid Culture Aerobic Bacterial Preliminary     3/2/2018 1632 Blood culture Preliminary     3/2/2018 1632 Blood culture Preliminary     3/2/2018 1408 POC US Guide for Paracentesis In process     3/1/2018 2233 Cytology non gyn (Fluid) In process             Statement of Approval     Ordered          03/06/18 1213  I have reviewed and agree with all the recommendations and orders detailed in this document.   "EFFECTIVE NOW     Approved and electronically signed by:  Marko Santacruz MD             Admission Information     Date & Time Provider Department Dept. Phone    3/1/2018 Vineet Romero MD [address] [facility] [phone]      Your Vitals Were     Blood Pressure Pulse Temperature Respirations Weight Pulse Oximetry    139/41 (BP Location: Right arm) 55 97.6  F (36.4  C) (Oral) 16 105.3 kg (232 lb 2.3 oz) 99%    BMI (Body Mass Index)                   35.3 kg/m2           [employer] Information     [employer] lets you send messages to your doctor, view your test results, renew your prescriptions, schedule appointments and more. To sign up, go to [email or web address] . Click on \"Log in\" on the left side of the screen, which will take you to the Welcome page. Then click on \"Sign up Now\" on the right side of the page.     You will be asked to enter the access code listed below, as well as some personal information. Please follow the directions to create your username and password.     Your access code is: [email or web address]  Expires: 2018 10:57 AM     Your access code will  in 90 days. If you need help or a new code, please call your [facility] clinic or [phone].        Care EveryWhere ID     This is your Care EveryWhere ID. This could be used by other organizations to access your [facility] medical records  [id number]        Equal Access to Services     [facility] : Hadii prateek sellerso Demar, waaxda luqadaha, qaybta kaalmada elsayada, miley guerrero. So [facility] [phone].    ATENCIÓN: Si habla español, tiene a collins disposición servicios gratuitos de asistencia lingüística. Llame al [phone].    We comply with applicable federal civil rights laws and [place] laws. We do not discriminate on the basis of race, color, national origin, age, disability, sex, sexual orientation, or gender identity.               Review of your medicines      START taking        Dose / " Directions    rifaximin 550 MG Tabs tablet   Commonly known as:  XIFAXAN   Indication:  hepatic encephalopathy   Used for:  Hepatic encephalopathy (H)        Dose:  550 mg   Take 1 tablet (550 mg) by mouth 2 times daily   Quantity:  60 tablet   Refills:  3       spironolactone 50 MG tablet   Commonly known as:  ALDACTONE   Used for:  Cirrhosis of liver with ascites, unspecified hepatic cirrhosis type (H)        Dose:  50 mg   Take 1 tablet (50 mg) by mouth daily   Quantity:  30 tablet   Refills:  1         CONTINUE these medicines which may have CHANGED, or have new prescriptions. If we are uncertain of the size of tablets/capsules you have at home, strength may be listed as something that might have changed.        Dose / Directions    aspirin 81 MG EC tablet   Commonly known as:  ASPIRIN ADULT LOW STRENGTH   This may have changed:    - how much to take  - when to take this   Used for:  Coronary artery disease involving native heart without angina pectoris, unspecified vessel or lesion type        Dose:  81 mg   Take 1 tablet (81 mg) by mouth daily   Quantity:  30 tablet   Refills:  1       furosemide 20 MG tablet   Commonly known as:  LASIX   This may have changed:    - medication strength  - how much to take  - when to take this   Used for:  Cirrhosis of liver with ascites, unspecified hepatic cirrhosis type (H)        Dose:  40 mg   Take 2 tablets (40 mg) by mouth daily   Quantity:  30 tablet   Refills:  1       lactulose 10 GM/15ML solution   Commonly known as:  CHRONULAC   This may have changed:    - medication strength  - how much to take  - how to take this  - when to take this   Used for:  Hepatic encephalopathy (H)        Dose:  20 g   30 mLs (20 g) by Oral or NG Tube route 3 times daily   Quantity:  2700 mL   Refills:  1       losartan 25 MG tablet   Commonly known as:  COZAAR   This may have changed:    - how much to take  - when to take this   Used for:  Essential hypertension        Dose:  50 mg   Take  2 tablets (50 mg) by mouth daily   Quantity:  30 tablet   Refills:  1       metoprolol tartrate 25 MG tablet   Commonly known as:  LOPRESSOR   This may have changed:    - how much to take  - when to take this   Used for:  Paroxysmal atrial fibrillation (H)        Dose:  25 mg   Take 1 tablet (25 mg) by mouth 2 times daily   Quantity:  60 tablet   Refills:  1       sodium bicarbonate 650 MG tablet   This may have changed:    - medication strength  - how much to take  - when to take this   Used for:  Chronic kidney disease, unspecified CKD stage        Dose:  1300 mg   Take 2 tablets (1,300 mg) by mouth 2 times daily   Quantity:  120 tablet   Refills:  1         CONTINUE these medicines which have NOT CHANGED        Dose / Directions    BASAGLAR 100 UNIT/ML injection        Inject Subcutaneous daily   Refills:  0       LEVOTHYROXINE SODIUM PO        Refills:  0       NITROQUICK SL        Refills:  0       PANTOPRAZOLE SODIUM PO        Refills:  0       WARFARIN SODIUM PO        Refills:  0         STOP taking     METFORMIN HCL PO           neomycin 500 MG tablet   Commonly known as:  MYCIFRADIN                Where to get your medicines      These medications were sent to [facility] - [address] [address]     Phone:  [phone]     aspirin 81 MG EC tablet    furosemide 20 MG tablet    lactulose 10 GM/15ML solution    losartan 25 MG tablet    metoprolol tartrate 25 MG tablet    sodium bicarbonate 650 MG tablet    spironolactone 50 MG tablet         Some of these will need a paper prescription and others can be bought over the counter. Ask your nurse if you have questions.     Bring a paper prescription for each of these medications     rifaximin 550 MG Tabs tablet                Protect others around you: Learn how to safely use, store and throw away your medicines at www.disposemymeds.org.        ANTIBIOTIC INSTRUCTION     You've Been  Prescribed an Antibiotic - Now What?  Your healthcare team thinks that you or your loved one might have an infection. Some infections can be treated with antibiotics, which are powerful, life-saving drugs. Like all medications, antibiotics have side effects and should only be used when necessary. There are some important things you should know about your antibiotic treatment.      Your healthcare team may run tests before you start taking an antibiotic.    Your team may take samples (e.g., from your blood, urine or other areas) to run tests to look for bacteria. These test can be important to determine if you need an antibiotic at all and, if you do, which antibiotic will work best.      Within a few days, your healthcare team might change or even stop your antibiotic.    Your team may start you on an antibiotic while they are working to find out what is making you sick.    Your team might change your antibiotic because test results show that a different antibiotic would be better to treat your infection.    In some cases, once your team has more information, they learn that you do not need an antibiotic at all. They may find out that you don't have an infection, or that the antibiotic you're taking won't work against your infection. For example, an infection caused by a virus can't be treated with antibiotics. Staying on an antibiotic when you don't need it is more likely to be harmful than helpful.      You may experience side effects from your antibiotic.    Like all medications, antibiotics have side effects. Some of these can be serious.    Let you healthcare team know if you have any known allergies when you are admitted to the hospital.    One significant side effect of nearly all antibiotics is the risk of severe and sometimes deadly diarrhea caused by Clostridium difficile (C. Difficile). This occurs when a person takes antibiotics because some good germs are destroyed. Antibiotic use allows C. diificile to  take over, putting patients at high risk for this serious infection.    As a patient or caregiver, it is important to understand your or your loved one's antibiotic treatment. It is especially important for caregivers to speak up when patients can't speak for themselves. Here are some important questions to ask your healthcare team.    What infection is this antibiotic treating and how do you know I have that infection?    What side effects might occur from this antibiotic?    How long will I need to take this antibiotic?    Is it safe to take this antibiotic with other medications or supplements (e.g., vitamins) that I am taking?     Are there any special directions I need to know about taking this antibiotic? For example, should I take it with food?    How will I be monitored to know whether my infection is responding to the antibiotic?    What tests may help to make sure the right antibiotic is prescribed for me?      Information provided by:  www.cdc.gov/getsmart  U.S. Department of Health and Human Services  Centers for disease Control and Prevention  National Center for Emerging and Zoonotic Infectious Diseases  Division of Healthcare Quality Promotion             Medication List: This is a list of all your medications and when to take them. Check marks below indicate your daily home schedule. Keep this list as a reference.      Medications           Morning Afternoon Evening Bedtime As Needed    aspirin 81 MG EC tablet   Commonly known as:  ASPIRIN ADULT LOW STRENGTH   Take 1 tablet (81 mg) by mouth daily                                BASAGLAR 100 UNIT/ML injection   Inject Subcutaneous daily   Last time this was given:  15 Units on 3/5/2018 10:24 PM                                furosemide 20 MG tablet   Commonly known as:  LASIX   Take 2 tablets (40 mg) by mouth daily   Last time this was given:  40 mg on 3/6/2018  7:55 AM                                lactulose 10 GM/15ML solution   Commonly known as:   CHRONULAC   30 mLs (20 g) by Oral or NG Tube route 3 times daily   Last time this was given:  20 g on 3/6/2018  7:55 AM                                LEVOTHYROXINE SODIUM PO   Last time this was given:  150 mcg on 3/6/2018  7:55 AM                                losartan 25 MG tablet   Commonly known as:  COZAAR   Take 2 tablets (50 mg) by mouth daily   Last time this was given:  50 mg on 3/6/2018  7:55 AM                                metoprolol tartrate 25 MG tablet   Commonly known as:  LOPRESSOR   Take 1 tablet (25 mg) by mouth 2 times daily   Last time this was given:  25 mg on 3/6/2018  7:55 AM                                NITROQUICK SL                                PANTOPRAZOLE SODIUM PO   Last time this was given:  40 mg on 3/2/2018  7:44 AM                                rifaximin 550 MG Tabs tablet   Commonly known as:  XIFAXAN   Take 1 tablet (550 mg) by mouth 2 times daily   Last time this was given:  550 mg on 3/6/2018  7:55 AM                                sodium bicarbonate 650 MG tablet   Take 2 tablets (1,300 mg) by mouth 2 times daily   Last time this was given:  1,300 mg on 3/6/2018  7:55 AM                                spironolactone 50 MG tablet   Commonly known as:  ALDACTONE   Take 1 tablet (50 mg) by mouth daily   Last time this was given:  50 mg on 3/6/2018  7:55 AM                                WARFARIN SODIUM PO

## (undated) DEVICE — TAPE CLOTH 3" CARDINAL 3TRCL03

## (undated) DEVICE — KIT ENDO FIRST STEP DISINFECTANT 200ML W/POUCH EP-4

## (undated) DEVICE — PACK ENDOSCOPY GI CUSTOM UMMC

## (undated) DEVICE — KIT CONNECTOR FOR OLYMPUS ENDOSCOPES DEFENDO 100310

## (undated) DEVICE — SOL WATER IRRIG 1000ML BOTTLE 2F7114

## (undated) DEVICE — ENDO BITE BLOCK ADULT OMNI-BLOC

## (undated) DEVICE — ENDO TUBING CO2 SMARTCAP STERILE DISP 100145CO2EXT

## (undated) DEVICE — PAD CHUX UNDERPAD 23X24" 7136

## (undated) DEVICE — SUCTION MANIFOLD DORNOCH ULTRA CART UL-CL500

## (undated) DEVICE — ENDO CAP AND TUBING STERILE FOR ENDOGATOR  100130

## (undated) RX ORDER — ALBUMIN (HUMAN) 12.5 G/50ML
SOLUTION INTRAVENOUS
Status: DISPENSED
Start: 2018-01-01

## (undated) RX ORDER — LIDOCAINE HYDROCHLORIDE 10 MG/ML
INJECTION, SOLUTION EPIDURAL; INFILTRATION; INTRACAUDAL; PERINEURAL
Status: DISPENSED
Start: 2018-01-01

## (undated) RX ORDER — PROPOFOL 10 MG/ML
INJECTION, EMULSION INTRAVENOUS
Status: DISPENSED
Start: 2018-01-01

## (undated) RX ORDER — HYDROMORPHONE HCL/0.9% NACL/PF 0.2MG/0.2
SYRINGE (ML) INTRAVENOUS
Status: DISPENSED
Start: 2018-01-01

## (undated) RX ORDER — FENTANYL CITRATE 50 UG/ML
INJECTION, SOLUTION INTRAMUSCULAR; INTRAVENOUS
Status: DISPENSED
Start: 2018-01-01

## (undated) RX ORDER — LIDOCAINE HYDROCHLORIDE 10 MG/ML
INJECTION, SOLUTION INFILTRATION; PERINEURAL
Status: DISPENSED
Start: 2018-01-01